# Patient Record
Sex: FEMALE | Race: BLACK OR AFRICAN AMERICAN | NOT HISPANIC OR LATINO | URBAN - METROPOLITAN AREA
[De-identification: names, ages, dates, MRNs, and addresses within clinical notes are randomized per-mention and may not be internally consistent; named-entity substitution may affect disease eponyms.]

---

## 2018-05-04 PROBLEM — Z00.00 ENCOUNTER FOR PREVENTIVE HEALTH EXAMINATION: Status: ACTIVE | Noted: 2018-05-04

## 2022-01-01 ENCOUNTER — INPATIENT (INPATIENT)
Facility: HOSPITAL | Age: 87
LOS: 7 days | Discharge: HOME HEALTH | End: 2022-09-07
Attending: INTERNAL MEDICINE | Admitting: INTERNAL MEDICINE

## 2022-01-01 ENCOUNTER — TRANSCRIPTION ENCOUNTER (OUTPATIENT)
Age: 87
End: 2022-01-01

## 2022-01-01 ENCOUNTER — INPATIENT (INPATIENT)
Facility: HOSPITAL | Age: 87
LOS: 6 days | Discharge: HOME HEALTH | End: 2022-08-15
Attending: INTERNAL MEDICINE | Admitting: INTERNAL MEDICINE

## 2022-01-01 ENCOUNTER — APPOINTMENT (OUTPATIENT)
Dept: PULMONOLOGY | Facility: CLINIC | Age: 87
End: 2022-01-01

## 2022-01-01 VITALS
DIASTOLIC BLOOD PRESSURE: 50 MMHG | HEART RATE: 87 BPM | TEMPERATURE: 98 F | OXYGEN SATURATION: 99 % | SYSTOLIC BLOOD PRESSURE: 108 MMHG | RESPIRATION RATE: 18 BRPM

## 2022-01-01 VITALS
RESPIRATION RATE: 18 BRPM | TEMPERATURE: 98 F | HEIGHT: 57 IN | HEART RATE: 85 BPM | OXYGEN SATURATION: 98 % | DIASTOLIC BLOOD PRESSURE: 62 MMHG | SYSTOLIC BLOOD PRESSURE: 130 MMHG

## 2022-01-01 VITALS
TEMPERATURE: 98.2 F | BODY MASS INDEX: 27.48 KG/M2 | WEIGHT: 140 LBS | SYSTOLIC BLOOD PRESSURE: 110 MMHG | HEART RATE: 96 BPM | HEIGHT: 60 IN | DIASTOLIC BLOOD PRESSURE: 78 MMHG

## 2022-01-01 VITALS
TEMPERATURE: 98 F | HEART RATE: 102 BPM | SYSTOLIC BLOOD PRESSURE: 133 MMHG | OXYGEN SATURATION: 99 % | RESPIRATION RATE: 18 BRPM | DIASTOLIC BLOOD PRESSURE: 68 MMHG

## 2022-01-01 VITALS
TEMPERATURE: 97 F | OXYGEN SATURATION: 100 % | RESPIRATION RATE: 17 BRPM | HEART RATE: 73 BPM | DIASTOLIC BLOOD PRESSURE: 56 MMHG | SYSTOLIC BLOOD PRESSURE: 120 MMHG

## 2022-01-01 DIAGNOSIS — J90 PLEURAL EFFUSION, NOT ELSEWHERE CLASSIFIED: ICD-10-CM

## 2022-01-01 DIAGNOSIS — I26.99 OTHER PULMONARY EMBOLISM WITHOUT ACUTE COR PULMONALE: ICD-10-CM

## 2022-01-01 DIAGNOSIS — I10 ESSENTIAL (PRIMARY) HYPERTENSION: ICD-10-CM

## 2022-01-01 DIAGNOSIS — Z86.69 PERSONAL HISTORY OF OTHER DISEASES OF THE NERVOUS SYSTEM AND SENSE ORGANS: ICD-10-CM

## 2022-01-01 DIAGNOSIS — Z92.89 PERSONAL HISTORY OF OTHER MEDICAL TREATMENT: Chronic | ICD-10-CM

## 2022-01-01 DIAGNOSIS — D72.829 ELEVATED WHITE BLOOD CELL COUNT, UNSPECIFIED: ICD-10-CM

## 2022-01-01 DIAGNOSIS — D69.6 THROMBOCYTOPENIA, UNSPECIFIED: ICD-10-CM

## 2022-01-01 DIAGNOSIS — R63.8 OTHER SYMPTOMS AND SIGNS CONCERNING FOOD AND FLUID INTAKE: ICD-10-CM

## 2022-01-01 DIAGNOSIS — D64.9 ANEMIA, UNSPECIFIED: ICD-10-CM

## 2022-01-01 DIAGNOSIS — E03.9 HYPOTHYROIDISM, UNSPECIFIED: ICD-10-CM

## 2022-01-01 DIAGNOSIS — C54.1 MALIGNANT NEOPLASM OF ENDOMETRIUM: ICD-10-CM

## 2022-01-01 DIAGNOSIS — Z86.79 PERSONAL HISTORY OF OTHER DISEASES OF THE CIRCULATORY SYSTEM: ICD-10-CM

## 2022-01-01 DIAGNOSIS — Z80.8 FAMILY HISTORY OF MALIGNANT NEOPLASM OF OTHER ORGANS OR SYSTEMS: ICD-10-CM

## 2022-01-01 DIAGNOSIS — Z80.1 FAMILY HISTORY OF MALIGNANT NEOPLASM OF TRACHEA, BRONCHUS AND LUNG: ICD-10-CM

## 2022-01-01 DIAGNOSIS — R79.89 OTHER SPECIFIED ABNORMAL FINDINGS OF BLOOD CHEMISTRY: ICD-10-CM

## 2022-01-01 DIAGNOSIS — E78.5 HYPERLIPIDEMIA, UNSPECIFIED: ICD-10-CM

## 2022-01-01 DIAGNOSIS — Z29.9 ENCOUNTER FOR PROPHYLACTIC MEASURES, UNSPECIFIED: ICD-10-CM

## 2022-01-01 DIAGNOSIS — I25.10 ATHEROSCLEROTIC HEART DISEASE OF NATIVE CORONARY ARTERY WITHOUT ANGINA PECTORIS: ICD-10-CM

## 2022-01-01 DIAGNOSIS — M81.0 AGE-RELATED OSTEOPOROSIS W/OUT CURRENT PATHOLOGICAL FRACTURE: ICD-10-CM

## 2022-01-01 DIAGNOSIS — R74.01 ELEVATION OF LEVELS OF LIVER TRANSAMINASE LEVELS: ICD-10-CM

## 2022-01-01 DIAGNOSIS — Z95.2 PRESENCE OF PROSTHETIC HEART VALVE: Chronic | ICD-10-CM

## 2022-01-01 DIAGNOSIS — M19.90 UNSPECIFIED OSTEOARTHRITIS, UNSPECIFIED SITE: ICD-10-CM

## 2022-01-01 DIAGNOSIS — R55 SYNCOPE AND COLLAPSE: ICD-10-CM

## 2022-01-01 DIAGNOSIS — J18.9 PNEUMONIA, UNSPECIFIED ORGANISM: ICD-10-CM

## 2022-01-01 DIAGNOSIS — R82.81 PYURIA: ICD-10-CM

## 2022-01-01 DIAGNOSIS — I26.99 OTHER PULMONARY EMBOLISM W/OUT ACUTE COR PULMONALE: ICD-10-CM

## 2022-01-01 LAB
A1C WITH ESTIMATED AVERAGE GLUCOSE RESULT: 5.7 % — HIGH (ref 4–5.6)
ALBUMIN SERPL ELPH-MCNC: 2.9 G/DL — LOW (ref 3.3–5)
ALBUMIN SERPL ELPH-MCNC: 3 G/DL — LOW (ref 3.3–5)
ALBUMIN SERPL ELPH-MCNC: 3.1 G/DL — LOW (ref 3.3–5)
ALBUMIN SERPL ELPH-MCNC: 3.2 G/DL — LOW (ref 3.3–5)
ALBUMIN SERPL ELPH-MCNC: 3.4 G/DL — SIGNIFICANT CHANGE UP (ref 3.3–5)
ALBUMIN SERPL ELPH-MCNC: 3.8 G/DL — SIGNIFICANT CHANGE UP (ref 3.3–5)
ALP SERPL-CCNC: 157 U/L — HIGH (ref 40–120)
ALP SERPL-CCNC: 167 U/L — HIGH (ref 40–120)
ALP SERPL-CCNC: 170 U/L — HIGH (ref 40–120)
ALP SERPL-CCNC: 173 U/L — HIGH (ref 40–120)
ALP SERPL-CCNC: 191 U/L — HIGH (ref 40–120)
ALP SERPL-CCNC: 195 U/L — HIGH (ref 40–120)
ALP SERPL-CCNC: 216 U/L — HIGH (ref 40–120)
ALP SERPL-CCNC: 233 U/L — HIGH (ref 40–120)
ALP SERPL-CCNC: 245 U/L — HIGH (ref 40–120)
ALP SERPL-CCNC: 90 U/L — SIGNIFICANT CHANGE UP (ref 40–120)
ALP SERPL-CCNC: 95 U/L — SIGNIFICANT CHANGE UP (ref 40–120)
ALP SERPL-CCNC: 97 U/L — SIGNIFICANT CHANGE UP (ref 40–120)
ALT FLD-CCNC: 14 U/L — SIGNIFICANT CHANGE UP (ref 4–33)
ALT FLD-CCNC: 15 U/L — SIGNIFICANT CHANGE UP (ref 4–33)
ALT FLD-CCNC: 18 U/L — SIGNIFICANT CHANGE UP (ref 4–33)
ALT FLD-CCNC: 20 U/L — SIGNIFICANT CHANGE UP (ref 4–33)
ALT FLD-CCNC: 20 U/L — SIGNIFICANT CHANGE UP (ref 4–33)
ALT FLD-CCNC: 22 U/L — SIGNIFICANT CHANGE UP (ref 4–33)
ALT FLD-CCNC: 23 U/L — SIGNIFICANT CHANGE UP (ref 4–33)
ALT FLD-CCNC: 23 U/L — SIGNIFICANT CHANGE UP (ref 4–33)
ALT FLD-CCNC: 24 U/L — SIGNIFICANT CHANGE UP (ref 4–33)
ALT FLD-CCNC: 27 U/L — SIGNIFICANT CHANGE UP (ref 4–33)
ALT FLD-CCNC: 29 U/L — SIGNIFICANT CHANGE UP (ref 4–33)
ALT FLD-CCNC: SIGNIFICANT CHANGE UP U/L (ref 4–33)
ANION GAP SERPL CALC-SCNC: 10 MMOL/L — SIGNIFICANT CHANGE UP (ref 7–14)
ANION GAP SERPL CALC-SCNC: 10 MMOL/L — SIGNIFICANT CHANGE UP (ref 7–14)
ANION GAP SERPL CALC-SCNC: 11 MMOL/L — SIGNIFICANT CHANGE UP (ref 7–14)
ANION GAP SERPL CALC-SCNC: 12 MMOL/L — SIGNIFICANT CHANGE UP (ref 7–14)
ANION GAP SERPL CALC-SCNC: 12 MMOL/L — SIGNIFICANT CHANGE UP (ref 7–14)
ANION GAP SERPL CALC-SCNC: 13 MMOL/L — SIGNIFICANT CHANGE UP (ref 7–14)
ANION GAP SERPL CALC-SCNC: 13 MMOL/L — SIGNIFICANT CHANGE UP (ref 7–14)
ANION GAP SERPL CALC-SCNC: 14 MMOL/L — SIGNIFICANT CHANGE UP (ref 7–14)
ANION GAP SERPL CALC-SCNC: 6 MMOL/L — LOW (ref 7–14)
ANION GAP SERPL CALC-SCNC: 8 MMOL/L — SIGNIFICANT CHANGE UP (ref 7–14)
ANION GAP SERPL CALC-SCNC: 8 MMOL/L — SIGNIFICANT CHANGE UP (ref 7–14)
ANION GAP SERPL CALC-SCNC: 9 MMOL/L — SIGNIFICANT CHANGE UP (ref 7–14)
ANION GAP SERPL CALC-SCNC: 9 MMOL/L — SIGNIFICANT CHANGE UP (ref 7–14)
ANISOCYTOSIS BLD QL: SLIGHT — SIGNIFICANT CHANGE UP
APPEARANCE UR: ABNORMAL
APPEARANCE UR: CLEAR — SIGNIFICANT CHANGE UP
APPEARANCE UR: CLEAR — SIGNIFICANT CHANGE UP
APTT BLD: 174.4 SEC — CRITICAL HIGH (ref 27–36.3)
APTT BLD: 28.7 SEC — SIGNIFICANT CHANGE UP (ref 27–36.3)
APTT BLD: 51.8 SEC — HIGH (ref 27–36.3)
APTT BLD: 54.5 SEC — HIGH (ref 27–36.3)
APTT BLD: 69.7 SEC — HIGH (ref 27–36.3)
APTT BLD: 85.3 SEC — HIGH (ref 27–36.3)
APTT BLD: 88.6 SEC — HIGH (ref 27–36.3)
APTT BLD: >200 SEC — CRITICAL HIGH (ref 27–36.3)
AST SERPL-CCNC: 20 U/L — SIGNIFICANT CHANGE UP (ref 4–32)
AST SERPL-CCNC: 22 U/L — SIGNIFICANT CHANGE UP (ref 4–32)
AST SERPL-CCNC: 22 U/L — SIGNIFICANT CHANGE UP (ref 4–32)
AST SERPL-CCNC: 24 U/L — SIGNIFICANT CHANGE UP (ref 4–32)
AST SERPL-CCNC: 24 U/L — SIGNIFICANT CHANGE UP (ref 4–32)
AST SERPL-CCNC: 25 U/L — SIGNIFICANT CHANGE UP (ref 4–32)
AST SERPL-CCNC: 31 U/L — SIGNIFICANT CHANGE UP (ref 4–32)
AST SERPL-CCNC: 32 U/L — SIGNIFICANT CHANGE UP (ref 4–32)
AST SERPL-CCNC: 32 U/L — SIGNIFICANT CHANGE UP (ref 4–32)
AST SERPL-CCNC: 33 U/L — HIGH (ref 4–32)
AST SERPL-CCNC: 40 U/L — HIGH (ref 4–32)
AST SERPL-CCNC: SIGNIFICANT CHANGE UP U/L (ref 4–32)
B PERT DNA SPEC QL NAA+PROBE: SIGNIFICANT CHANGE UP
B PERT+PARAPERT DNA PNL SPEC NAA+PROBE: SIGNIFICANT CHANGE UP
BACTERIA # UR AUTO: ABNORMAL
BACTERIA # UR AUTO: ABNORMAL
BASE EXCESS BLDV CALC-SCNC: 4 MMOL/L — HIGH (ref -2–3)
BASE EXCESS BLDV CALC-SCNC: 4.1 MMOL/L — HIGH (ref -2–3)
BASOPHILS # BLD AUTO: 0 K/UL — SIGNIFICANT CHANGE UP (ref 0–0.2)
BASOPHILS # BLD AUTO: 0.03 K/UL — SIGNIFICANT CHANGE UP (ref 0–0.2)
BASOPHILS # BLD AUTO: 0.13 K/UL — SIGNIFICANT CHANGE UP (ref 0–0.2)
BASOPHILS # BLD AUTO: 0.18 K/UL — SIGNIFICANT CHANGE UP (ref 0–0.2)
BASOPHILS NFR BLD AUTO: 0 % — SIGNIFICANT CHANGE UP (ref 0–2)
BASOPHILS NFR BLD AUTO: 0.2 % — SIGNIFICANT CHANGE UP (ref 0–2)
BASOPHILS NFR BLD AUTO: 0.4 % — SIGNIFICANT CHANGE UP (ref 0–2)
BASOPHILS NFR BLD AUTO: 0.5 % — SIGNIFICANT CHANGE UP (ref 0–2)
BILIRUB DIRECT SERPL-MCNC: <0.2 MG/DL — SIGNIFICANT CHANGE UP (ref 0–0.3)
BILIRUB INDIRECT FLD-MCNC: >0 MG/DL — SIGNIFICANT CHANGE UP (ref 0–1)
BILIRUB INDIRECT FLD-MCNC: >0.1 MG/DL — SIGNIFICANT CHANGE UP (ref 0–1)
BILIRUB INDIRECT FLD-MCNC: >0.1 MG/DL — SIGNIFICANT CHANGE UP (ref 0–1)
BILIRUB SERPL-MCNC: 0.2 MG/DL — SIGNIFICANT CHANGE UP (ref 0.2–1.2)
BILIRUB SERPL-MCNC: 0.3 MG/DL — SIGNIFICANT CHANGE UP (ref 0.2–1.2)
BILIRUB SERPL-MCNC: 0.3 MG/DL — SIGNIFICANT CHANGE UP (ref 0.2–1.2)
BILIRUB SERPL-MCNC: 0.4 MG/DL — SIGNIFICANT CHANGE UP (ref 0.2–1.2)
BILIRUB SERPL-MCNC: 0.4 MG/DL — SIGNIFICANT CHANGE UP (ref 0.2–1.2)
BILIRUB SERPL-MCNC: 0.7 MG/DL — SIGNIFICANT CHANGE UP (ref 0.2–1.2)
BILIRUB SERPL-MCNC: 0.7 MG/DL — SIGNIFICANT CHANGE UP (ref 0.2–1.2)
BILIRUB SERPL-MCNC: 0.8 MG/DL — SIGNIFICANT CHANGE UP (ref 0.2–1.2)
BILIRUB SERPL-MCNC: 0.9 MG/DL — SIGNIFICANT CHANGE UP (ref 0.2–1.2)
BILIRUB UR-MCNC: NEGATIVE — SIGNIFICANT CHANGE UP
BLD GP AB SCN SERPL QL: NEGATIVE — SIGNIFICANT CHANGE UP
BLD GP AB SCN SERPL QL: NEGATIVE — SIGNIFICANT CHANGE UP
BLOOD GAS VENOUS COMPREHENSIVE RESULT: SIGNIFICANT CHANGE UP
BLOOD GAS VENOUS COMPREHENSIVE RESULT: SIGNIFICANT CHANGE UP
BORDETELLA PARAPERTUSSIS (RAPRVP): SIGNIFICANT CHANGE UP
BUN SERPL-MCNC: 10 MG/DL — SIGNIFICANT CHANGE UP (ref 7–23)
BUN SERPL-MCNC: 11 MG/DL — SIGNIFICANT CHANGE UP (ref 7–23)
BUN SERPL-MCNC: 15 MG/DL — SIGNIFICANT CHANGE UP (ref 7–23)
BUN SERPL-MCNC: 17 MG/DL — SIGNIFICANT CHANGE UP (ref 7–23)
BUN SERPL-MCNC: 22 MG/DL — SIGNIFICANT CHANGE UP (ref 7–23)
BUN SERPL-MCNC: 5 MG/DL — LOW (ref 7–23)
BUN SERPL-MCNC: 6 MG/DL — LOW (ref 7–23)
BUN SERPL-MCNC: 7 MG/DL — SIGNIFICANT CHANGE UP (ref 7–23)
BUN SERPL-MCNC: 8 MG/DL — SIGNIFICANT CHANGE UP (ref 7–23)
BUN SERPL-MCNC: 9 MG/DL — SIGNIFICANT CHANGE UP (ref 7–23)
BUN SERPL-MCNC: 9 MG/DL — SIGNIFICANT CHANGE UP (ref 7–23)
BURR CELLS BLD QL SMEAR: PRESENT — SIGNIFICANT CHANGE UP
C PNEUM DNA SPEC QL NAA+PROBE: SIGNIFICANT CHANGE UP
CALCIUM SERPL-MCNC: 7.9 MG/DL — LOW (ref 8.4–10.5)
CALCIUM SERPL-MCNC: 8.1 MG/DL — LOW (ref 8.4–10.5)
CALCIUM SERPL-MCNC: 8.2 MG/DL — LOW (ref 8.4–10.5)
CALCIUM SERPL-MCNC: 8.3 MG/DL — LOW (ref 8.4–10.5)
CALCIUM SERPL-MCNC: 8.4 MG/DL — SIGNIFICANT CHANGE UP (ref 8.4–10.5)
CALCIUM SERPL-MCNC: 8.4 MG/DL — SIGNIFICANT CHANGE UP (ref 8.4–10.5)
CALCIUM SERPL-MCNC: 8.5 MG/DL — SIGNIFICANT CHANGE UP (ref 8.4–10.5)
CALCIUM SERPL-MCNC: 8.6 MG/DL — SIGNIFICANT CHANGE UP (ref 8.4–10.5)
CALCIUM SERPL-MCNC: 8.8 MG/DL — SIGNIFICANT CHANGE UP (ref 8.4–10.5)
CALCIUM SERPL-MCNC: 9 MG/DL — SIGNIFICANT CHANGE UP (ref 8.4–10.5)
CALCIUM SERPL-MCNC: 9 MG/DL — SIGNIFICANT CHANGE UP (ref 8.4–10.5)
CHLORIDE BLDV-SCNC: 105 MMOL/L — SIGNIFICANT CHANGE UP (ref 96–108)
CHLORIDE BLDV-SCNC: 99 MMOL/L — SIGNIFICANT CHANGE UP (ref 96–108)
CHLORIDE SERPL-SCNC: 100 MMOL/L — SIGNIFICANT CHANGE UP (ref 98–107)
CHLORIDE SERPL-SCNC: 100 MMOL/L — SIGNIFICANT CHANGE UP (ref 98–107)
CHLORIDE SERPL-SCNC: 101 MMOL/L — SIGNIFICANT CHANGE UP (ref 98–107)
CHLORIDE SERPL-SCNC: 102 MMOL/L — SIGNIFICANT CHANGE UP (ref 98–107)
CHLORIDE SERPL-SCNC: 102 MMOL/L — SIGNIFICANT CHANGE UP (ref 98–107)
CHLORIDE SERPL-SCNC: 103 MMOL/L — SIGNIFICANT CHANGE UP (ref 98–107)
CHLORIDE SERPL-SCNC: 104 MMOL/L — SIGNIFICANT CHANGE UP (ref 98–107)
CHLORIDE SERPL-SCNC: 95 MMOL/L — LOW (ref 98–107)
CHLORIDE SERPL-SCNC: 96 MMOL/L — LOW (ref 98–107)
CHLORIDE SERPL-SCNC: 97 MMOL/L — LOW (ref 98–107)
CHLORIDE SERPL-SCNC: 98 MMOL/L — SIGNIFICANT CHANGE UP (ref 98–107)
CHLORIDE SERPL-SCNC: 98 MMOL/L — SIGNIFICANT CHANGE UP (ref 98–107)
CHLORIDE SERPL-SCNC: 99 MMOL/L — SIGNIFICANT CHANGE UP (ref 98–107)
CK MB BLD-MCNC: 3.7 % — HIGH (ref 0–2.5)
CK MB CFR SERPL CALC: 2.3 NG/ML — SIGNIFICANT CHANGE UP
CK SERPL-CCNC: 63 U/L — SIGNIFICANT CHANGE UP (ref 25–170)
CO2 BLDV-SCNC: 30.6 MMOL/L — HIGH (ref 22–26)
CO2 BLDV-SCNC: 32.2 MMOL/L — HIGH (ref 22–26)
CO2 SERPL-SCNC: 23 MMOL/L — SIGNIFICANT CHANGE UP (ref 22–31)
CO2 SERPL-SCNC: 24 MMOL/L — SIGNIFICANT CHANGE UP (ref 22–31)
CO2 SERPL-SCNC: 25 MMOL/L — SIGNIFICANT CHANGE UP (ref 22–31)
CO2 SERPL-SCNC: 26 MMOL/L — SIGNIFICANT CHANGE UP (ref 22–31)
CO2 SERPL-SCNC: 27 MMOL/L — SIGNIFICANT CHANGE UP (ref 22–31)
CO2 SERPL-SCNC: 28 MMOL/L — SIGNIFICANT CHANGE UP (ref 22–31)
CO2 SERPL-SCNC: 29 MMOL/L — SIGNIFICANT CHANGE UP (ref 22–31)
CO2 SERPL-SCNC: 29 MMOL/L — SIGNIFICANT CHANGE UP (ref 22–31)
CO2 SERPL-SCNC: 31 MMOL/L — SIGNIFICANT CHANGE UP (ref 22–31)
CO2 SERPL-SCNC: 31 MMOL/L — SIGNIFICANT CHANGE UP (ref 22–31)
CO2 SERPL-SCNC: 32 MMOL/L — HIGH (ref 22–31)
COLOR SPEC: YELLOW — SIGNIFICANT CHANGE UP
CREAT SERPL-MCNC: 0.37 MG/DL — LOW (ref 0.5–1.3)
CREAT SERPL-MCNC: 0.43 MG/DL — LOW (ref 0.5–1.3)
CREAT SERPL-MCNC: 0.43 MG/DL — LOW (ref 0.5–1.3)
CREAT SERPL-MCNC: 0.51 MG/DL — SIGNIFICANT CHANGE UP (ref 0.5–1.3)
CREAT SERPL-MCNC: 0.51 MG/DL — SIGNIFICANT CHANGE UP (ref 0.5–1.3)
CREAT SERPL-MCNC: 0.52 MG/DL — SIGNIFICANT CHANGE UP (ref 0.5–1.3)
CREAT SERPL-MCNC: 0.53 MG/DL — SIGNIFICANT CHANGE UP (ref 0.5–1.3)
CREAT SERPL-MCNC: 0.55 MG/DL — SIGNIFICANT CHANGE UP (ref 0.5–1.3)
CREAT SERPL-MCNC: 0.55 MG/DL — SIGNIFICANT CHANGE UP (ref 0.5–1.3)
CREAT SERPL-MCNC: 0.58 MG/DL — SIGNIFICANT CHANGE UP (ref 0.5–1.3)
CREAT SERPL-MCNC: 0.58 MG/DL — SIGNIFICANT CHANGE UP (ref 0.5–1.3)
CREAT SERPL-MCNC: 0.59 MG/DL — SIGNIFICANT CHANGE UP (ref 0.5–1.3)
CREAT SERPL-MCNC: 0.59 MG/DL — SIGNIFICANT CHANGE UP (ref 0.5–1.3)
CREAT SERPL-MCNC: 0.6 MG/DL — SIGNIFICANT CHANGE UP (ref 0.5–1.3)
CREAT SERPL-MCNC: 0.61 MG/DL — SIGNIFICANT CHANGE UP (ref 0.5–1.3)
CREAT SERPL-MCNC: 0.64 MG/DL — SIGNIFICANT CHANGE UP (ref 0.5–1.3)
CREAT SERPL-MCNC: 0.68 MG/DL — SIGNIFICANT CHANGE UP (ref 0.5–1.3)
CREAT SERPL-MCNC: 0.68 MG/DL — SIGNIFICANT CHANGE UP (ref 0.5–1.3)
CULTURE RESULTS: NO GROWTH — SIGNIFICANT CHANGE UP
CULTURE RESULTS: SIGNIFICANT CHANGE UP
D DIMER BLD IA.RAPID-MCNC: 2314 NG/ML DDU — HIGH
DIFF PNL FLD: ABNORMAL
EGFR: 85 ML/MIN/1.73M2 — SIGNIFICANT CHANGE UP
EGFR: 85 ML/MIN/1.73M2 — SIGNIFICANT CHANGE UP
EGFR: 87 ML/MIN/1.73M2 — SIGNIFICANT CHANGE UP
EGFR: 88 ML/MIN/1.73M2 — SIGNIFICANT CHANGE UP
EGFR: 89 ML/MIN/1.73M2 — SIGNIFICANT CHANGE UP
EGFR: 89 ML/MIN/1.73M2 — SIGNIFICANT CHANGE UP
EGFR: 90 ML/MIN/1.73M2 — SIGNIFICANT CHANGE UP
EGFR: 90 ML/MIN/1.73M2 — SIGNIFICANT CHANGE UP
EGFR: 91 ML/MIN/1.73M2 — SIGNIFICANT CHANGE UP
EGFR: 95 ML/MIN/1.73M2 — SIGNIFICANT CHANGE UP
EGFR: 95 ML/MIN/1.73M2 — SIGNIFICANT CHANGE UP
EGFR: 99 ML/MIN/1.73M2 — SIGNIFICANT CHANGE UP
EOSINOPHIL # BLD AUTO: 0 K/UL — SIGNIFICANT CHANGE UP (ref 0–0.5)
EOSINOPHIL # BLD AUTO: 0 K/UL — SIGNIFICANT CHANGE UP (ref 0–0.5)
EOSINOPHIL # BLD AUTO: 0.33 K/UL — SIGNIFICANT CHANGE UP (ref 0–0.5)
EOSINOPHIL # BLD AUTO: 0.37 K/UL — SIGNIFICANT CHANGE UP (ref 0–0.5)
EOSINOPHIL # BLD AUTO: 0.38 K/UL — SIGNIFICANT CHANGE UP (ref 0–0.5)
EOSINOPHIL # BLD AUTO: 0.47 K/UL — SIGNIFICANT CHANGE UP (ref 0–0.5)
EOSINOPHIL # BLD AUTO: 0.7 K/UL — HIGH (ref 0–0.5)
EOSINOPHIL NFR BLD AUTO: 0 % — SIGNIFICANT CHANGE UP (ref 0–6)
EOSINOPHIL NFR BLD AUTO: 0 % — SIGNIFICANT CHANGE UP (ref 0–6)
EOSINOPHIL NFR BLD AUTO: 0.9 % — SIGNIFICANT CHANGE UP (ref 0–6)
EOSINOPHIL NFR BLD AUTO: 0.9 % — SIGNIFICANT CHANGE UP (ref 0–6)
EOSINOPHIL NFR BLD AUTO: 1 % — SIGNIFICANT CHANGE UP (ref 0–6)
EOSINOPHIL NFR BLD AUTO: 3.4 % — SIGNIFICANT CHANGE UP (ref 0–6)
EOSINOPHIL NFR BLD AUTO: 4.2 % — SIGNIFICANT CHANGE UP (ref 0–6)
EPI CELLS # UR: 20 /HPF — HIGH (ref 0–5)
EPI CELLS # UR: 5 /HPF — SIGNIFICANT CHANGE UP (ref 0–5)
ESTIMATED AVERAGE GLUCOSE: 117 — SIGNIFICANT CHANGE UP
FLUAV AG NPH QL: SIGNIFICANT CHANGE UP
FLUAV SUBTYP SPEC NAA+PROBE: SIGNIFICANT CHANGE UP
FLUBV AG NPH QL: SIGNIFICANT CHANGE UP
FLUBV RNA SPEC QL NAA+PROBE: SIGNIFICANT CHANGE UP
GAS PNL BLDV: 133 MMOL/L — LOW (ref 136–145)
GAS PNL BLDV: 136 MMOL/L — SIGNIFICANT CHANGE UP (ref 136–145)
GLUCOSE BLDV-MCNC: 136 MG/DL — HIGH (ref 70–99)
GLUCOSE BLDV-MCNC: 92 MG/DL — SIGNIFICANT CHANGE UP (ref 70–99)
GLUCOSE SERPL-MCNC: 103 MG/DL — HIGH (ref 70–99)
GLUCOSE SERPL-MCNC: 106 MG/DL — HIGH (ref 70–99)
GLUCOSE SERPL-MCNC: 107 MG/DL — HIGH (ref 70–99)
GLUCOSE SERPL-MCNC: 137 MG/DL — HIGH (ref 70–99)
GLUCOSE SERPL-MCNC: 142 MG/DL — HIGH (ref 70–99)
GLUCOSE SERPL-MCNC: 391 MG/DL — HIGH (ref 70–99)
GLUCOSE SERPL-MCNC: 68 MG/DL — LOW (ref 70–99)
GLUCOSE SERPL-MCNC: 71 MG/DL — SIGNIFICANT CHANGE UP (ref 70–99)
GLUCOSE SERPL-MCNC: 73 MG/DL — SIGNIFICANT CHANGE UP (ref 70–99)
GLUCOSE SERPL-MCNC: 75 MG/DL — SIGNIFICANT CHANGE UP (ref 70–99)
GLUCOSE SERPL-MCNC: 82 MG/DL — SIGNIFICANT CHANGE UP (ref 70–99)
GLUCOSE SERPL-MCNC: 84 MG/DL — SIGNIFICANT CHANGE UP (ref 70–99)
GLUCOSE SERPL-MCNC: 85 MG/DL — SIGNIFICANT CHANGE UP (ref 70–99)
GLUCOSE SERPL-MCNC: 86 MG/DL — SIGNIFICANT CHANGE UP (ref 70–99)
GLUCOSE SERPL-MCNC: 91 MG/DL — SIGNIFICANT CHANGE UP (ref 70–99)
GLUCOSE SERPL-MCNC: 99 MG/DL — SIGNIFICANT CHANGE UP (ref 70–99)
GLUCOSE UR QL: NEGATIVE — SIGNIFICANT CHANGE UP
HADV DNA SPEC QL NAA+PROBE: SIGNIFICANT CHANGE UP
HCO3 BLDV-SCNC: 29 MMOL/L — SIGNIFICANT CHANGE UP (ref 22–29)
HCO3 BLDV-SCNC: 31 MMOL/L — HIGH (ref 22–29)
HCOV 229E RNA SPEC QL NAA+PROBE: SIGNIFICANT CHANGE UP
HCOV HKU1 RNA SPEC QL NAA+PROBE: SIGNIFICANT CHANGE UP
HCOV NL63 RNA SPEC QL NAA+PROBE: SIGNIFICANT CHANGE UP
HCOV OC43 RNA SPEC QL NAA+PROBE: SIGNIFICANT CHANGE UP
HCT VFR BLD CALC: 25.9 % — LOW (ref 34.5–45)
HCT VFR BLD CALC: 29.3 % — LOW (ref 34.5–45)
HCT VFR BLD CALC: 30.4 % — LOW (ref 34.5–45)
HCT VFR BLD CALC: 30.4 % — LOW (ref 34.5–45)
HCT VFR BLD CALC: 30.8 % — LOW (ref 34.5–45)
HCT VFR BLD CALC: 31.2 % — LOW (ref 34.5–45)
HCT VFR BLD CALC: 31.2 % — LOW (ref 34.5–45)
HCT VFR BLD CALC: 31.3 % — LOW (ref 34.5–45)
HCT VFR BLD CALC: 31.6 % — LOW (ref 34.5–45)
HCT VFR BLD CALC: 32.7 % — LOW (ref 34.5–45)
HCT VFR BLD CALC: 32.9 % — LOW (ref 34.5–45)
HCT VFR BLD CALC: 33.1 % — LOW (ref 34.5–45)
HCT VFR BLD CALC: 34.4 % — LOW (ref 34.5–45)
HCT VFR BLD CALC: 36.5 % — SIGNIFICANT CHANGE UP (ref 34.5–45)
HCT VFR BLD CALC: 37.7 % — SIGNIFICANT CHANGE UP (ref 34.5–45)
HCT VFR BLD CALC: 38.7 % — SIGNIFICANT CHANGE UP (ref 34.5–45)
HCT VFR BLD CALC: 38.9 % — SIGNIFICANT CHANGE UP (ref 34.5–45)
HCT VFR BLD CALC: 39.6 % — SIGNIFICANT CHANGE UP (ref 34.5–45)
HCT VFR BLD CALC: 39.6 % — SIGNIFICANT CHANGE UP (ref 34.5–45)
HCT VFR BLD CALC: 40 % — SIGNIFICANT CHANGE UP (ref 34.5–45)
HCT VFR BLDA CALC: 34 % — LOW (ref 34.5–46.5)
HCT VFR BLDA CALC: 41 % — SIGNIFICANT CHANGE UP (ref 34.5–46.5)
HGB BLD CALC-MCNC: 11.2 G/DL — LOW (ref 11.5–15.5)
HGB BLD CALC-MCNC: 13.5 G/DL — SIGNIFICANT CHANGE UP (ref 11.5–15.5)
HGB BLD-MCNC: 10 G/DL — LOW (ref 11.5–15.5)
HGB BLD-MCNC: 10.3 G/DL — LOW (ref 11.5–15.5)
HGB BLD-MCNC: 10.3 G/DL — LOW (ref 11.5–15.5)
HGB BLD-MCNC: 10.4 G/DL — LOW (ref 11.5–15.5)
HGB BLD-MCNC: 10.4 G/DL — LOW (ref 11.5–15.5)
HGB BLD-MCNC: 10.8 G/DL — LOW (ref 11.5–15.5)
HGB BLD-MCNC: 10.8 G/DL — LOW (ref 11.5–15.5)
HGB BLD-MCNC: 10.9 G/DL — LOW (ref 11.5–15.5)
HGB BLD-MCNC: 11.8 G/DL — SIGNIFICANT CHANGE UP (ref 11.5–15.5)
HGB BLD-MCNC: 12.3 G/DL — SIGNIFICANT CHANGE UP (ref 11.5–15.5)
HGB BLD-MCNC: 12.5 G/DL — SIGNIFICANT CHANGE UP (ref 11.5–15.5)
HGB BLD-MCNC: 12.5 G/DL — SIGNIFICANT CHANGE UP (ref 11.5–15.5)
HGB BLD-MCNC: 12.6 G/DL — SIGNIFICANT CHANGE UP (ref 11.5–15.5)
HGB BLD-MCNC: 12.7 G/DL — SIGNIFICANT CHANGE UP (ref 11.5–15.5)
HGB BLD-MCNC: 12.9 G/DL — SIGNIFICANT CHANGE UP (ref 11.5–15.5)
HGB BLD-MCNC: 13.1 G/DL — SIGNIFICANT CHANGE UP (ref 11.5–15.5)
HGB BLD-MCNC: 9.4 G/DL — LOW (ref 11.5–15.5)
HGB BLD-MCNC: 9.7 G/DL — LOW (ref 11.5–15.5)
HGB BLD-MCNC: 9.7 G/DL — LOW (ref 11.5–15.5)
HGB BLD-MCNC: 9.8 G/DL — LOW (ref 11.5–15.5)
HMPV RNA SPEC QL NAA+PROBE: SIGNIFICANT CHANGE UP
HPIV1 RNA SPEC QL NAA+PROBE: SIGNIFICANT CHANGE UP
HPIV2 RNA SPEC QL NAA+PROBE: SIGNIFICANT CHANGE UP
HPIV3 RNA SPEC QL NAA+PROBE: SIGNIFICANT CHANGE UP
HPIV4 RNA SPEC QL NAA+PROBE: SIGNIFICANT CHANGE UP
HYALINE CASTS # UR AUTO: 1 /LPF — SIGNIFICANT CHANGE UP (ref 0–7)
HYALINE CASTS # UR AUTO: 4 /LPF — SIGNIFICANT CHANGE UP (ref 0–7)
IANC: 11.63 K/UL — HIGH (ref 1.8–7.4)
IANC: 14.08 K/UL — HIGH (ref 1.8–7.4)
IANC: 24.13 K/UL — HIGH (ref 1.8–7.4)
IANC: 30.54 K/UL — HIGH (ref 1.8–7.4)
IANC: 31.47 K/UL — HIGH (ref 1.8–7.4)
IANC: 35.71 K/UL — HIGH (ref 1.8–7.4)
IANC: 52.11 K/UL — HIGH (ref 1.8–7.4)
IMM GRANULOCYTES NFR BLD AUTO: 0.8 % — SIGNIFICANT CHANGE UP (ref 0–1.5)
IMM GRANULOCYTES NFR BLD AUTO: 3.3 % — HIGH (ref 0–1.5)
IMM GRANULOCYTES NFR BLD AUTO: 3.8 % — HIGH (ref 0–1.5)
INR BLD: 1.2 RATIO — HIGH (ref 0.88–1.16)
KETONES UR-MCNC: NEGATIVE — SIGNIFICANT CHANGE UP
LACTATE BLDV-MCNC: 1.4 MMOL/L — SIGNIFICANT CHANGE UP (ref 0.5–2)
LACTATE BLDV-MCNC: 2.3 MMOL/L — HIGH (ref 0.5–2)
LACTATE SERPL-SCNC: 1.8 MMOL/L — SIGNIFICANT CHANGE UP (ref 0.5–2)
LEUKOCYTE ESTERASE UR-ACNC: ABNORMAL
LEUKOCYTE ESTERASE UR-ACNC: NEGATIVE — SIGNIFICANT CHANGE UP
LEUKOCYTE ESTERASE UR-ACNC: NEGATIVE — SIGNIFICANT CHANGE UP
LYMPHOCYTES # BLD AUTO: 0 % — LOW (ref 13–44)
LYMPHOCYTES # BLD AUTO: 0 K/UL — LOW (ref 1–3.3)
LYMPHOCYTES # BLD AUTO: 0.12 K/UL — LOW (ref 1–3.3)
LYMPHOCYTES # BLD AUTO: 0.5 K/UL — LOW (ref 1–3.3)
LYMPHOCYTES # BLD AUTO: 0.84 K/UL — LOW (ref 1–3.3)
LYMPHOCYTES # BLD AUTO: 0.9 % — LOW (ref 13–44)
LYMPHOCYTES # BLD AUTO: 1.7 % — LOW (ref 13–44)
LYMPHOCYTES # BLD AUTO: 1.78 K/UL — SIGNIFICANT CHANGE UP (ref 1–3.3)
LYMPHOCYTES # BLD AUTO: 1.8 K/UL — SIGNIFICANT CHANGE UP (ref 1–3.3)
LYMPHOCYTES # BLD AUTO: 1.94 K/UL — SIGNIFICANT CHANGE UP (ref 1–3.3)
LYMPHOCYTES # BLD AUTO: 4.3 % — LOW (ref 13–44)
LYMPHOCYTES # BLD AUTO: 4.9 % — LOW (ref 13–44)
LYMPHOCYTES # BLD AUTO: 5 % — LOW (ref 13–44)
LYMPHOCYTES # BLD AUTO: 5.4 % — LOW (ref 13–44)
M PNEUMO DNA SPEC QL NAA+PROBE: SIGNIFICANT CHANGE UP
MACROCYTES BLD QL: SLIGHT — SIGNIFICANT CHANGE UP
MACROCYTES BLD QL: SLIGHT — SIGNIFICANT CHANGE UP
MAGNESIUM SERPL-MCNC: 1.5 MG/DL — LOW (ref 1.6–2.6)
MAGNESIUM SERPL-MCNC: 1.6 MG/DL — SIGNIFICANT CHANGE UP (ref 1.6–2.6)
MAGNESIUM SERPL-MCNC: 1.7 MG/DL — SIGNIFICANT CHANGE UP (ref 1.6–2.6)
MAGNESIUM SERPL-MCNC: 1.7 MG/DL — SIGNIFICANT CHANGE UP (ref 1.6–2.6)
MAGNESIUM SERPL-MCNC: 1.8 MG/DL — SIGNIFICANT CHANGE UP (ref 1.6–2.6)
MAGNESIUM SERPL-MCNC: 2 MG/DL — SIGNIFICANT CHANGE UP (ref 1.6–2.6)
MANUAL SMEAR VERIFICATION: SIGNIFICANT CHANGE UP
MCHC RBC-ENTMCNC: 28.8 PG — SIGNIFICANT CHANGE UP (ref 27–34)
MCHC RBC-ENTMCNC: 29 PG — SIGNIFICANT CHANGE UP (ref 27–34)
MCHC RBC-ENTMCNC: 29 PG — SIGNIFICANT CHANGE UP (ref 27–34)
MCHC RBC-ENTMCNC: 29.1 PG — SIGNIFICANT CHANGE UP (ref 27–34)
MCHC RBC-ENTMCNC: 29.3 PG — SIGNIFICANT CHANGE UP (ref 27–34)
MCHC RBC-ENTMCNC: 29.4 PG — SIGNIFICANT CHANGE UP (ref 27–34)
MCHC RBC-ENTMCNC: 29.4 PG — SIGNIFICANT CHANGE UP (ref 27–34)
MCHC RBC-ENTMCNC: 29.5 PG — SIGNIFICANT CHANGE UP (ref 27–34)
MCHC RBC-ENTMCNC: 29.6 PG — SIGNIFICANT CHANGE UP (ref 27–34)
MCHC RBC-ENTMCNC: 29.7 PG — SIGNIFICANT CHANGE UP (ref 27–34)
MCHC RBC-ENTMCNC: 29.9 PG — SIGNIFICANT CHANGE UP (ref 27–34)
MCHC RBC-ENTMCNC: 30.3 PG — SIGNIFICANT CHANGE UP (ref 27–34)
MCHC RBC-ENTMCNC: 30.6 PG — SIGNIFICANT CHANGE UP (ref 27–34)
MCHC RBC-ENTMCNC: 31.1 GM/DL — LOW (ref 32–36)
MCHC RBC-ENTMCNC: 31.5 PG — SIGNIFICANT CHANGE UP (ref 27–34)
MCHC RBC-ENTMCNC: 31.9 GM/DL — LOW (ref 32–36)
MCHC RBC-ENTMCNC: 32.1 GM/DL — SIGNIFICANT CHANGE UP (ref 32–36)
MCHC RBC-ENTMCNC: 32.1 GM/DL — SIGNIFICANT CHANGE UP (ref 32–36)
MCHC RBC-ENTMCNC: 32.3 GM/DL — SIGNIFICANT CHANGE UP (ref 32–36)
MCHC RBC-ENTMCNC: 32.3 GM/DL — SIGNIFICANT CHANGE UP (ref 32–36)
MCHC RBC-ENTMCNC: 32.6 GM/DL — SIGNIFICANT CHANGE UP (ref 32–36)
MCHC RBC-ENTMCNC: 32.8 GM/DL — SIGNIFICANT CHANGE UP (ref 32–36)
MCHC RBC-ENTMCNC: 32.9 GM/DL — SIGNIFICANT CHANGE UP (ref 32–36)
MCHC RBC-ENTMCNC: 33 GM/DL — SIGNIFICANT CHANGE UP (ref 32–36)
MCHC RBC-ENTMCNC: 33 GM/DL — SIGNIFICANT CHANGE UP (ref 32–36)
MCHC RBC-ENTMCNC: 33.1 GM/DL — SIGNIFICANT CHANGE UP (ref 32–36)
MCHC RBC-ENTMCNC: 33.4 GM/DL — SIGNIFICANT CHANGE UP (ref 32–36)
MCHC RBC-ENTMCNC: 33.8 GM/DL — SIGNIFICANT CHANGE UP (ref 32–36)
MCHC RBC-ENTMCNC: 34.1 PG — HIGH (ref 27–34)
MCHC RBC-ENTMCNC: 34.1 PG — HIGH (ref 27–34)
MCHC RBC-ENTMCNC: 36.3 GM/DL — HIGH (ref 32–36)
MCHC RBC-ENTMCNC: 36.6 GM/DL — HIGH (ref 32–36)
MCV RBC AUTO: 89.6 FL — SIGNIFICANT CHANGE UP (ref 80–100)
MCV RBC AUTO: 89.7 FL — SIGNIFICANT CHANGE UP (ref 80–100)
MCV RBC AUTO: 89.8 FL — SIGNIFICANT CHANGE UP (ref 80–100)
MCV RBC AUTO: 89.8 FL — SIGNIFICANT CHANGE UP (ref 80–100)
MCV RBC AUTO: 89.9 FL — SIGNIFICANT CHANGE UP (ref 80–100)
MCV RBC AUTO: 90.1 FL — SIGNIFICANT CHANGE UP (ref 80–100)
MCV RBC AUTO: 90.2 FL — SIGNIFICANT CHANGE UP (ref 80–100)
MCV RBC AUTO: 90.6 FL — SIGNIFICANT CHANGE UP (ref 80–100)
MCV RBC AUTO: 90.7 FL — SIGNIFICANT CHANGE UP (ref 80–100)
MCV RBC AUTO: 90.7 FL — SIGNIFICANT CHANGE UP (ref 80–100)
MCV RBC AUTO: 90.8 FL — SIGNIFICANT CHANGE UP (ref 80–100)
MCV RBC AUTO: 91 FL — SIGNIFICANT CHANGE UP (ref 80–100)
MCV RBC AUTO: 91.5 FL — SIGNIFICANT CHANGE UP (ref 80–100)
MCV RBC AUTO: 91.6 FL — SIGNIFICANT CHANGE UP (ref 80–100)
MCV RBC AUTO: 91.6 FL — SIGNIFICANT CHANGE UP (ref 80–100)
MCV RBC AUTO: 91.7 FL — SIGNIFICANT CHANGE UP (ref 80–100)
MCV RBC AUTO: 93.1 FL — SIGNIFICANT CHANGE UP (ref 80–100)
MCV RBC AUTO: 93.2 FL — SIGNIFICANT CHANGE UP (ref 80–100)
MCV RBC AUTO: 93.3 FL — SIGNIFICANT CHANGE UP (ref 80–100)
MCV RBC AUTO: 93.8 FL — SIGNIFICANT CHANGE UP (ref 80–100)
METAMYELOCYTES # FLD: 0.9 % — SIGNIFICANT CHANGE UP (ref 0–1)
MONOCYTES # BLD AUTO: 0 K/UL — SIGNIFICANT CHANGE UP (ref 0–0.9)
MONOCYTES # BLD AUTO: 0.71 K/UL — SIGNIFICANT CHANGE UP (ref 0–0.9)
MONOCYTES # BLD AUTO: 0.88 K/UL — SIGNIFICANT CHANGE UP (ref 0–0.9)
MONOCYTES # BLD AUTO: 1.6 K/UL — HIGH (ref 0–0.9)
MONOCYTES # BLD AUTO: 1.68 K/UL — HIGH (ref 0–0.9)
MONOCYTES NFR BLD AUTO: 0 % — LOW (ref 2–14)
MONOCYTES NFR BLD AUTO: 1.7 % — LOW (ref 2–14)
MONOCYTES NFR BLD AUTO: 4.4 % — SIGNIFICANT CHANGE UP (ref 2–14)
MONOCYTES NFR BLD AUTO: 4.7 % — SIGNIFICANT CHANGE UP (ref 2–14)
MONOCYTES NFR BLD AUTO: 5.3 % — SIGNIFICANT CHANGE UP (ref 2–14)
MRSA PCR RESULT.: SIGNIFICANT CHANGE UP
MYELOCYTES NFR BLD: 0.9 % — HIGH (ref 0–0)
NEUTROPHILS # BLD AUTO: 13.17 K/UL — HIGH (ref 1.8–7.4)
NEUTROPHILS # BLD AUTO: 14.08 K/UL — HIGH (ref 1.8–7.4)
NEUTROPHILS # BLD AUTO: 27.81 K/UL — HIGH (ref 1.8–7.4)
NEUTROPHILS # BLD AUTO: 30.54 K/UL — HIGH (ref 1.8–7.4)
NEUTROPHILS # BLD AUTO: 31.47 K/UL — HIGH (ref 1.8–7.4)
NEUTROPHILS # BLD AUTO: 38.95 K/UL — HIGH (ref 1.8–7.4)
NEUTROPHILS # BLD AUTO: 59.87 K/UL — HIGH (ref 1.8–7.4)
NEUTROPHILS NFR BLD AUTO: 84.5 % — HIGH (ref 43–77)
NEUTROPHILS NFR BLD AUTO: 84.7 % — HIGH (ref 43–77)
NEUTROPHILS NFR BLD AUTO: 86 % — HIGH (ref 43–77)
NEUTROPHILS NFR BLD AUTO: 86.1 % — HIGH (ref 43–77)
NEUTROPHILS NFR BLD AUTO: 87.1 % — HIGH (ref 43–77)
NEUTROPHILS NFR BLD AUTO: 95.7 % — HIGH (ref 43–77)
NEUTROPHILS NFR BLD AUTO: 98.3 % — HIGH (ref 43–77)
NEUTS BAND # BLD: 1.7 % — SIGNIFICANT CHANGE UP (ref 0–6)
NEUTS BAND # BLD: 8.7 % — HIGH (ref 0–6)
NITRITE UR-MCNC: NEGATIVE — SIGNIFICANT CHANGE UP
NRBC # BLD: 0 /100 WBCS — SIGNIFICANT CHANGE UP
NRBC # BLD: 0 /100 WBCS — SIGNIFICANT CHANGE UP (ref 0–0)
NRBC # FLD: 0 K/UL — SIGNIFICANT CHANGE UP
NRBC # FLD: 0 K/UL — SIGNIFICANT CHANGE UP (ref 0–0)
NRBC # FLD: 0.04 K/UL — HIGH
NRBC # FLD: 0.05 K/UL — HIGH (ref 0–0)
NRBC # FLD: 0.07 K/UL — HIGH
NRBC # FLD: 0.07 K/UL — HIGH (ref 0–0)
NRBC # FLD: 0.08 K/UL — HIGH (ref 0–0)
NRBC # FLD: 0.09 K/UL — HIGH
NRBC # FLD: 0.1 K/UL — HIGH (ref 0–0)
NRBC # FLD: SIGNIFICANT CHANGE UP K/UL (ref 0–0)
NT-PROBNP SERPL-SCNC: 1500 PG/ML — HIGH
NT-PROBNP SERPL-SCNC: 2320 PG/ML — HIGH
OVALOCYTES BLD QL SMEAR: SLIGHT — SIGNIFICANT CHANGE UP
PCO2 BLDV: 45 MMHG — HIGH (ref 39–42)
PCO2 BLDV: 53 MMHG — HIGH (ref 39–42)
PH BLDV: 7.37 — SIGNIFICANT CHANGE UP (ref 7.32–7.43)
PH BLDV: 7.42 — SIGNIFICANT CHANGE UP (ref 7.32–7.43)
PH UR: 6 — SIGNIFICANT CHANGE UP (ref 5–8)
PH UR: 6.5 — SIGNIFICANT CHANGE UP (ref 5–8)
PH UR: 7.5 — SIGNIFICANT CHANGE UP (ref 5–8)
PHOSPHATE SERPL-MCNC: 2.3 MG/DL — LOW (ref 2.5–4.5)
PHOSPHATE SERPL-MCNC: 2.3 MG/DL — LOW (ref 2.5–4.5)
PHOSPHATE SERPL-MCNC: 2.6 MG/DL — SIGNIFICANT CHANGE UP (ref 2.5–4.5)
PHOSPHATE SERPL-MCNC: 2.7 MG/DL — SIGNIFICANT CHANGE UP (ref 2.5–4.5)
PHOSPHATE SERPL-MCNC: 2.8 MG/DL — SIGNIFICANT CHANGE UP (ref 2.5–4.5)
PHOSPHATE SERPL-MCNC: 2.8 MG/DL — SIGNIFICANT CHANGE UP (ref 2.5–4.5)
PHOSPHATE SERPL-MCNC: 3 MG/DL — SIGNIFICANT CHANGE UP (ref 2.5–4.5)
PHOSPHATE SERPL-MCNC: 3.3 MG/DL — SIGNIFICANT CHANGE UP (ref 2.5–4.5)
PHOSPHATE SERPL-MCNC: 3.4 MG/DL — SIGNIFICANT CHANGE UP (ref 2.5–4.5)
PHOSPHATE SERPL-MCNC: 3.5 MG/DL — SIGNIFICANT CHANGE UP (ref 2.5–4.5)
PHOSPHATE SERPL-MCNC: 3.5 MG/DL — SIGNIFICANT CHANGE UP (ref 2.5–4.5)
PHOSPHATE SERPL-MCNC: 3.7 MG/DL — SIGNIFICANT CHANGE UP (ref 2.5–4.5)
PHOSPHATE SERPL-MCNC: 3.7 MG/DL — SIGNIFICANT CHANGE UP (ref 2.5–4.5)
PHOSPHATE SERPL-MCNC: SIGNIFICANT CHANGE UP MG/DL (ref 2.5–4.5)
PLAT MORPH BLD: NORMAL — SIGNIFICANT CHANGE UP
PLATELET # BLD AUTO: 114 K/UL — LOW (ref 150–400)
PLATELET # BLD AUTO: 124 K/UL — LOW (ref 150–400)
PLATELET # BLD AUTO: 124 K/UL — LOW (ref 150–400)
PLATELET # BLD AUTO: 137 K/UL — LOW (ref 150–400)
PLATELET # BLD AUTO: 151 K/UL — SIGNIFICANT CHANGE UP (ref 150–400)
PLATELET # BLD AUTO: 173 K/UL — SIGNIFICANT CHANGE UP (ref 150–400)
PLATELET # BLD AUTO: 46 K/UL — LOW (ref 150–400)
PLATELET # BLD AUTO: 46 K/UL — LOW (ref 150–400)
PLATELET # BLD AUTO: 53 K/UL — LOW (ref 150–400)
PLATELET # BLD AUTO: 53 K/UL — LOW (ref 150–400)
PLATELET # BLD AUTO: 62 K/UL — LOW (ref 150–400)
PLATELET # BLD AUTO: 63 K/UL — LOW (ref 150–400)
PLATELET # BLD AUTO: 65 K/UL — LOW (ref 150–400)
PLATELET # BLD AUTO: 65 K/UL — LOW (ref 150–400)
PLATELET # BLD AUTO: 67 K/UL — LOW (ref 150–400)
PLATELET # BLD AUTO: 70 K/UL — LOW (ref 150–400)
PLATELET # BLD AUTO: 74 K/UL — LOW (ref 150–400)
PLATELET # BLD AUTO: 90 K/UL — LOW (ref 150–400)
PLATELET # BLD AUTO: 90 K/UL — LOW (ref 150–400)
PLATELET # BLD AUTO: 97 K/UL — LOW (ref 150–400)
PLATELET COUNT - ESTIMATE: ABNORMAL
PO2 BLDV: 23 MMHG — SIGNIFICANT CHANGE UP
PO2 BLDV: 73 MMHG — SIGNIFICANT CHANGE UP
POIKILOCYTOSIS BLD QL AUTO: SLIGHT — SIGNIFICANT CHANGE UP
POLYCHROMASIA BLD QL SMEAR: SLIGHT — SIGNIFICANT CHANGE UP
POTASSIUM BLDV-SCNC: 3.9 MMOL/L — SIGNIFICANT CHANGE UP (ref 3.5–5.1)
POTASSIUM BLDV-SCNC: 4.4 MMOL/L — SIGNIFICANT CHANGE UP (ref 3.5–5.1)
POTASSIUM SERPL-MCNC: 3.4 MMOL/L — LOW (ref 3.5–5.3)
POTASSIUM SERPL-MCNC: 3.7 MMOL/L — SIGNIFICANT CHANGE UP (ref 3.5–5.3)
POTASSIUM SERPL-MCNC: 3.8 MMOL/L — SIGNIFICANT CHANGE UP (ref 3.5–5.3)
POTASSIUM SERPL-MCNC: 3.9 MMOL/L — SIGNIFICANT CHANGE UP (ref 3.5–5.3)
POTASSIUM SERPL-MCNC: 4 MMOL/L — SIGNIFICANT CHANGE UP (ref 3.5–5.3)
POTASSIUM SERPL-MCNC: 4 MMOL/L — SIGNIFICANT CHANGE UP (ref 3.5–5.3)
POTASSIUM SERPL-MCNC: 4.1 MMOL/L — SIGNIFICANT CHANGE UP (ref 3.5–5.3)
POTASSIUM SERPL-MCNC: 4.2 MMOL/L — SIGNIFICANT CHANGE UP (ref 3.5–5.3)
POTASSIUM SERPL-MCNC: 4.2 MMOL/L — SIGNIFICANT CHANGE UP (ref 3.5–5.3)
POTASSIUM SERPL-MCNC: 4.3 MMOL/L — SIGNIFICANT CHANGE UP (ref 3.5–5.3)
POTASSIUM SERPL-MCNC: 4.5 MMOL/L — SIGNIFICANT CHANGE UP (ref 3.5–5.3)
POTASSIUM SERPL-MCNC: 4.7 MMOL/L — SIGNIFICANT CHANGE UP (ref 3.5–5.3)
POTASSIUM SERPL-MCNC: 5.9 MMOL/L — HIGH (ref 3.5–5.3)
POTASSIUM SERPL-MCNC: SIGNIFICANT CHANGE UP MMOL/L (ref 3.5–5.3)
POTASSIUM SERPL-SCNC: 3.4 MMOL/L — LOW (ref 3.5–5.3)
POTASSIUM SERPL-SCNC: 3.7 MMOL/L — SIGNIFICANT CHANGE UP (ref 3.5–5.3)
POTASSIUM SERPL-SCNC: 3.8 MMOL/L — SIGNIFICANT CHANGE UP (ref 3.5–5.3)
POTASSIUM SERPL-SCNC: 3.9 MMOL/L — SIGNIFICANT CHANGE UP (ref 3.5–5.3)
POTASSIUM SERPL-SCNC: 4 MMOL/L — SIGNIFICANT CHANGE UP (ref 3.5–5.3)
POTASSIUM SERPL-SCNC: 4 MMOL/L — SIGNIFICANT CHANGE UP (ref 3.5–5.3)
POTASSIUM SERPL-SCNC: 4.1 MMOL/L — SIGNIFICANT CHANGE UP (ref 3.5–5.3)
POTASSIUM SERPL-SCNC: 4.2 MMOL/L — SIGNIFICANT CHANGE UP (ref 3.5–5.3)
POTASSIUM SERPL-SCNC: 4.2 MMOL/L — SIGNIFICANT CHANGE UP (ref 3.5–5.3)
POTASSIUM SERPL-SCNC: 4.3 MMOL/L — SIGNIFICANT CHANGE UP (ref 3.5–5.3)
POTASSIUM SERPL-SCNC: 4.5 MMOL/L — SIGNIFICANT CHANGE UP (ref 3.5–5.3)
POTASSIUM SERPL-SCNC: 4.7 MMOL/L — SIGNIFICANT CHANGE UP (ref 3.5–5.3)
POTASSIUM SERPL-SCNC: 5.9 MMOL/L — HIGH (ref 3.5–5.3)
POTASSIUM SERPL-SCNC: SIGNIFICANT CHANGE UP MMOL/L (ref 3.5–5.3)
PROCALCITONIN SERPL-MCNC: 0.09 NG/ML — SIGNIFICANT CHANGE UP (ref 0.02–0.1)
PROT SERPL-MCNC: 5.4 G/DL — LOW (ref 6–8.3)
PROT SERPL-MCNC: 5.5 G/DL — LOW (ref 6–8.3)
PROT SERPL-MCNC: 5.7 G/DL — LOW (ref 6–8.3)
PROT SERPL-MCNC: 5.7 G/DL — LOW (ref 6–8.3)
PROT SERPL-MCNC: 5.8 G/DL — LOW (ref 6–8.3)
PROT SERPL-MCNC: 5.9 G/DL — LOW (ref 6–8.3)
PROT SERPL-MCNC: 5.9 G/DL — LOW (ref 6–8.3)
PROT SERPL-MCNC: 6 G/DL — SIGNIFICANT CHANGE UP (ref 6–8.3)
PROT SERPL-MCNC: 6.2 G/DL — SIGNIFICANT CHANGE UP (ref 6–8.3)
PROT SERPL-MCNC: 6.6 G/DL — SIGNIFICANT CHANGE UP (ref 6–8.3)
PROT SERPL-MCNC: 6.7 G/DL — SIGNIFICANT CHANGE UP (ref 6–8.3)
PROT SERPL-MCNC: SIGNIFICANT CHANGE UP G/DL (ref 6–8.3)
PROT UR-MCNC: ABNORMAL
PROT UR-MCNC: ABNORMAL
PROT UR-MCNC: NEGATIVE — SIGNIFICANT CHANGE UP
PROTHROM AB SERPL-ACNC: 13.9 SEC — HIGH (ref 10.5–13.4)
RAPID RVP RESULT: SIGNIFICANT CHANGE UP
RBC # BLD: 2.76 M/UL — LOW (ref 3.8–5.2)
RBC # BLD: 3.2 M/UL — LOW (ref 3.8–5.2)
RBC # BLD: 3.3 M/UL — LOW (ref 3.8–5.2)
RBC # BLD: 3.34 M/UL — LOW (ref 3.8–5.2)
RBC # BLD: 3.35 M/UL — LOW (ref 3.8–5.2)
RBC # BLD: 3.39 M/UL — LOW (ref 3.8–5.2)
RBC # BLD: 3.44 M/UL — LOW (ref 3.8–5.2)
RBC # BLD: 3.45 M/UL — LOW (ref 3.8–5.2)
RBC # BLD: 3.52 M/UL — LOW (ref 3.8–5.2)
RBC # BLD: 3.57 M/UL — LOW (ref 3.8–5.2)
RBC # BLD: 3.61 M/UL — LOW (ref 3.8–5.2)
RBC # BLD: 3.67 M/UL — LOW (ref 3.8–5.2)
RBC # BLD: 3.69 M/UL — LOW (ref 3.8–5.2)
RBC # BLD: 4.06 M/UL — SIGNIFICANT CHANGE UP (ref 3.8–5.2)
RBC # BLD: 4.16 M/UL — SIGNIFICANT CHANGE UP (ref 3.8–5.2)
RBC # BLD: 4.25 M/UL — SIGNIFICANT CHANGE UP (ref 3.8–5.2)
RBC # BLD: 4.26 M/UL — SIGNIFICANT CHANGE UP (ref 3.8–5.2)
RBC # BLD: 4.39 M/UL — SIGNIFICANT CHANGE UP (ref 3.8–5.2)
RBC # BLD: 4.41 M/UL — SIGNIFICANT CHANGE UP (ref 3.8–5.2)
RBC # BLD: 4.44 M/UL — SIGNIFICANT CHANGE UP (ref 3.8–5.2)
RBC # FLD: 13 % — SIGNIFICANT CHANGE UP (ref 10.3–14.5)
RBC # FLD: 13.2 % — SIGNIFICANT CHANGE UP (ref 10.3–14.5)
RBC # FLD: 13.4 % — SIGNIFICANT CHANGE UP (ref 10.3–14.5)
RBC # FLD: 13.4 % — SIGNIFICANT CHANGE UP (ref 10.3–14.5)
RBC # FLD: 13.5 % — SIGNIFICANT CHANGE UP (ref 10.3–14.5)
RBC # FLD: 13.6 % — SIGNIFICANT CHANGE UP (ref 10.3–14.5)
RBC # FLD: 13.6 % — SIGNIFICANT CHANGE UP (ref 10.3–14.5)
RBC # FLD: 13.7 % — SIGNIFICANT CHANGE UP (ref 10.3–14.5)
RBC # FLD: 13.7 % — SIGNIFICANT CHANGE UP (ref 10.3–14.5)
RBC # FLD: 13.9 % — SIGNIFICANT CHANGE UP (ref 10.3–14.5)
RBC # FLD: 13.9 % — SIGNIFICANT CHANGE UP (ref 10.3–14.5)
RBC # FLD: 14 % — SIGNIFICANT CHANGE UP (ref 10.3–14.5)
RBC # FLD: 14.2 % — SIGNIFICANT CHANGE UP (ref 10.3–14.5)
RBC # FLD: 14.2 % — SIGNIFICANT CHANGE UP (ref 10.3–14.5)
RBC # FLD: 14.8 % — HIGH (ref 10.3–14.5)
RBC # FLD: 14.9 % — HIGH (ref 10.3–14.5)
RBC # FLD: 15 % — HIGH (ref 10.3–14.5)
RBC # FLD: 15.7 % — HIGH (ref 10.3–14.5)
RBC BLD AUTO: ABNORMAL
RBC BLD AUTO: ABNORMAL
RBC BLD AUTO: SIGNIFICANT CHANGE UP
RBC CASTS # UR COMP ASSIST: 2 /HPF — SIGNIFICANT CHANGE UP (ref 0–4)
RBC CASTS # UR COMP ASSIST: 7 /HPF — HIGH (ref 0–4)
RH IG SCN BLD-IMP: POSITIVE — SIGNIFICANT CHANGE UP
RH IG SCN BLD-IMP: POSITIVE — SIGNIFICANT CHANGE UP
RSV RNA NPH QL NAA+NON-PROBE: SIGNIFICANT CHANGE UP
RSV RNA SPEC QL NAA+PROBE: SIGNIFICANT CHANGE UP
RV+EV RNA SPEC QL NAA+PROBE: SIGNIFICANT CHANGE UP
S AUREUS DNA NOSE QL NAA+PROBE: SIGNIFICANT CHANGE UP
SAO2 % BLDV: 45.7 % — SIGNIFICANT CHANGE UP
SAO2 % BLDV: 94.6 % — SIGNIFICANT CHANGE UP
SARS-COV-2 RNA SPEC QL NAA+PROBE: SIGNIFICANT CHANGE UP
SODIUM SERPL-SCNC: 132 MMOL/L — LOW (ref 135–145)
SODIUM SERPL-SCNC: 134 MMOL/L — LOW (ref 135–145)
SODIUM SERPL-SCNC: 134 MMOL/L — LOW (ref 135–145)
SODIUM SERPL-SCNC: 135 MMOL/L — SIGNIFICANT CHANGE UP (ref 135–145)
SODIUM SERPL-SCNC: 136 MMOL/L — SIGNIFICANT CHANGE UP (ref 135–145)
SODIUM SERPL-SCNC: 137 MMOL/L — SIGNIFICANT CHANGE UP (ref 135–145)
SODIUM SERPL-SCNC: 138 MMOL/L — SIGNIFICANT CHANGE UP (ref 135–145)
SODIUM SERPL-SCNC: 139 MMOL/L — SIGNIFICANT CHANGE UP (ref 135–145)
SODIUM SERPL-SCNC: 140 MMOL/L — SIGNIFICANT CHANGE UP (ref 135–145)
SODIUM SERPL-SCNC: 140 MMOL/L — SIGNIFICANT CHANGE UP (ref 135–145)
SODIUM SERPL-SCNC: 141 MMOL/L — SIGNIFICANT CHANGE UP (ref 135–145)
SODIUM SERPL-SCNC: 141 MMOL/L — SIGNIFICANT CHANGE UP (ref 135–145)
SP GR SPEC: 1.01 — LOW (ref 1.01–1.05)
SP GR SPEC: 1.02 — SIGNIFICANT CHANGE UP (ref 1–1.05)
SP GR SPEC: 1.03 — SIGNIFICANT CHANGE UP (ref 1.01–1.05)
SPECIMEN SOURCE: SIGNIFICANT CHANGE UP
TROPONIN T, HIGH SENSITIVITY RESULT: 21 NG/L — SIGNIFICANT CHANGE UP
TROPONIN T, HIGH SENSITIVITY RESULT: 26 NG/L — SIGNIFICANT CHANGE UP
TROPONIN T, HIGH SENSITIVITY RESULT: 26 NG/L — SIGNIFICANT CHANGE UP
TROPONIN T, HIGH SENSITIVITY RESULT: 38 NG/L — SIGNIFICANT CHANGE UP
TSH SERPL-MCNC: 4.18 UIU/ML — SIGNIFICANT CHANGE UP (ref 0.27–4.2)
UROBILINOGEN FLD QL: SIGNIFICANT CHANGE UP
VARIANT LYMPHS # BLD: 1.7 % — SIGNIFICANT CHANGE UP (ref 0–6)
WBC # BLD: 13.76 K/UL — HIGH (ref 3.8–10.5)
WBC # BLD: 16.41 K/UL — HIGH (ref 3.8–10.5)
WBC # BLD: 16.67 K/UL — HIGH (ref 3.8–10.5)
WBC # BLD: 17.62 K/UL — HIGH (ref 3.8–10.5)
WBC # BLD: 18.97 K/UL — HIGH (ref 3.8–10.5)
WBC # BLD: 22.11 K/UL — HIGH (ref 3.8–10.5)
WBC # BLD: 27.85 K/UL — HIGH (ref 3.8–10.5)
WBC # BLD: 29.34 K/UL — HIGH (ref 3.8–10.5)
WBC # BLD: 36.03 K/UL — HIGH (ref 3.8–10.5)
WBC # BLD: 36.55 K/UL — HIGH (ref 3.8–10.5)
WBC # BLD: 38.26 K/UL — HIGH (ref 3.8–10.5)
WBC # BLD: 4.75 K/UL — SIGNIFICANT CHANGE UP (ref 3.8–10.5)
WBC # BLD: 40.43 K/UL — CRITICAL HIGH (ref 3.8–10.5)
WBC # BLD: 41.58 K/UL — CRITICAL HIGH (ref 3.8–10.5)
WBC # BLD: 41.84 K/UL — CRITICAL HIGH (ref 3.8–10.5)
WBC # BLD: 45.5 K/UL — CRITICAL HIGH (ref 3.8–10.5)
WBC # BLD: 51.78 K/UL — CRITICAL HIGH (ref 3.8–10.5)
WBC # BLD: 57.72 K/UL — CRITICAL HIGH (ref 3.8–10.5)
WBC # BLD: 59.87 K/UL — CRITICAL HIGH (ref 3.8–10.5)
WBC # BLD: 7.92 K/UL — SIGNIFICANT CHANGE UP (ref 3.8–10.5)
WBC # FLD AUTO: 13.76 K/UL — HIGH (ref 3.8–10.5)
WBC # FLD AUTO: 16.41 K/UL — HIGH (ref 3.8–10.5)
WBC # FLD AUTO: 16.67 K/UL — HIGH (ref 3.8–10.5)
WBC # FLD AUTO: 17.62 K/UL — HIGH (ref 3.8–10.5)
WBC # FLD AUTO: 18.97 K/UL — HIGH (ref 3.8–10.5)
WBC # FLD AUTO: 22.11 K/UL — HIGH (ref 3.8–10.5)
WBC # FLD AUTO: 27.85 K/UL — HIGH (ref 3.8–10.5)
WBC # FLD AUTO: 29.34 K/UL — HIGH (ref 3.8–10.5)
WBC # FLD AUTO: 36.03 K/UL — HIGH (ref 3.8–10.5)
WBC # FLD AUTO: 36.55 K/UL — HIGH (ref 3.8–10.5)
WBC # FLD AUTO: 38.26 K/UL — HIGH (ref 3.8–10.5)
WBC # FLD AUTO: 4.75 K/UL — SIGNIFICANT CHANGE UP (ref 3.8–10.5)
WBC # FLD AUTO: 40.43 K/UL — CRITICAL HIGH (ref 3.8–10.5)
WBC # FLD AUTO: 41.58 K/UL — CRITICAL HIGH (ref 3.8–10.5)
WBC # FLD AUTO: 41.84 K/UL — CRITICAL HIGH (ref 3.8–10.5)
WBC # FLD AUTO: 45.5 K/UL — CRITICAL HIGH (ref 3.8–10.5)
WBC # FLD AUTO: 51.78 K/UL — CRITICAL HIGH (ref 3.8–10.5)
WBC # FLD AUTO: 57.72 K/UL — CRITICAL HIGH (ref 3.8–10.5)
WBC # FLD AUTO: 59.87 K/UL — CRITICAL HIGH (ref 3.8–10.5)
WBC # FLD AUTO: 7.92 K/UL — SIGNIFICANT CHANGE UP (ref 3.8–10.5)
WBC UR QL: 3 /HPF — SIGNIFICANT CHANGE UP (ref 0–5)
WBC UR QL: 31 /HPF — HIGH (ref 0–5)

## 2022-01-01 PROCEDURE — 71045 X-RAY EXAM CHEST 1 VIEW: CPT | Mod: 26

## 2022-01-01 PROCEDURE — 93970 EXTREMITY STUDY: CPT | Mod: 26

## 2022-01-01 PROCEDURE — ZZZZZ: CPT

## 2022-01-01 PROCEDURE — 99285 EMERGENCY DEPT VISIT HI MDM: CPT | Mod: 25

## 2022-01-01 PROCEDURE — 71046 X-RAY EXAM CHEST 2 VIEWS: CPT | Mod: 26

## 2022-01-01 PROCEDURE — 99233 SBSQ HOSP IP/OBS HIGH 50: CPT

## 2022-01-01 PROCEDURE — 94010 BREATHING CAPACITY TEST: CPT

## 2022-01-01 PROCEDURE — 94726 PLETHYSMOGRAPHY LUNG VOLUMES: CPT

## 2022-01-01 PROCEDURE — 71275 CT ANGIOGRAPHY CHEST: CPT | Mod: 26,MB

## 2022-01-01 PROCEDURE — 93010 ELECTROCARDIOGRAM REPORT: CPT

## 2022-01-01 PROCEDURE — 95816 EEG AWAKE AND DROWSY: CPT | Mod: 26

## 2022-01-01 PROCEDURE — 93306 TTE W/DOPPLER COMPLETE: CPT | Mod: 26

## 2022-01-01 PROCEDURE — 99232 SBSQ HOSP IP/OBS MODERATE 35: CPT

## 2022-01-01 PROCEDURE — 99223 1ST HOSP IP/OBS HIGH 75: CPT

## 2022-01-01 PROCEDURE — 99204 OFFICE O/P NEW MOD 45 MIN: CPT | Mod: 25

## 2022-01-01 PROCEDURE — 70450 CT HEAD/BRAIN W/O DYE: CPT | Mod: 26,MA

## 2022-01-01 PROCEDURE — 99291 CRITICAL CARE FIRST HOUR: CPT | Mod: GC

## 2022-01-01 PROCEDURE — 94729 DIFFUSING CAPACITY: CPT

## 2022-01-01 PROCEDURE — 99232 SBSQ HOSP IP/OBS MODERATE 35: CPT | Mod: FS

## 2022-01-01 PROCEDURE — 70552 MRI BRAIN STEM W/DYE: CPT | Mod: 26

## 2022-01-01 RX ORDER — OXYCODONE AND ACETAMINOPHEN 5; 325 MG/1; MG/1
1 TABLET ORAL EVERY 6 HOURS
Refills: 0 | Status: DISCONTINUED | OUTPATIENT
Start: 2022-01-01 | End: 2022-01-01

## 2022-01-01 RX ORDER — PREGABALIN 225 MG/1
5000 CAPSULE ORAL
Qty: 0 | Refills: 0 | DISCHARGE

## 2022-01-01 RX ORDER — ASPIRIN/CALCIUM CARB/MAGNESIUM 324 MG
81 TABLET ORAL DAILY
Refills: 0 | Status: DISCONTINUED | OUTPATIENT
Start: 2022-01-01 | End: 2022-01-01

## 2022-01-01 RX ORDER — ACETAMINOPHEN 500 MG
650 TABLET ORAL EVERY 6 HOURS
Refills: 0 | Status: DISCONTINUED | OUTPATIENT
Start: 2022-01-01 | End: 2022-01-01

## 2022-01-01 RX ORDER — ACETAMINOPHEN 500 MG
650 TABLET ORAL ONCE
Refills: 0 | Status: COMPLETED | OUTPATIENT
Start: 2022-01-01 | End: 2022-01-01

## 2022-01-01 RX ORDER — SODIUM CHLORIDE 9 MG/ML
1000 INJECTION INTRAMUSCULAR; INTRAVENOUS; SUBCUTANEOUS ONCE
Refills: 0 | Status: COMPLETED | OUTPATIENT
Start: 2022-01-01 | End: 2022-01-01

## 2022-01-01 RX ORDER — POTASSIUM CHLORIDE 20 MEQ
40 PACKET (EA) ORAL ONCE
Refills: 0 | Status: COMPLETED | OUTPATIENT
Start: 2022-01-01 | End: 2022-01-01

## 2022-01-01 RX ORDER — ENOXAPARIN SODIUM 100 MG/ML
90 INJECTION SUBCUTANEOUS EVERY 24 HOURS
Refills: 0 | Status: DISCONTINUED | OUTPATIENT
Start: 2022-01-01 | End: 2022-01-01

## 2022-01-01 RX ORDER — MAGNESIUM OXIDE 400 MG ORAL TABLET 241.3 MG
400 TABLET ORAL ONCE
Refills: 0 | Status: COMPLETED | OUTPATIENT
Start: 2022-01-01 | End: 2022-01-01

## 2022-01-01 RX ORDER — FAMOTIDINE 10 MG/ML
40 INJECTION INTRAVENOUS DAILY
Refills: 0 | Status: DISCONTINUED | OUTPATIENT
Start: 2022-01-01 | End: 2022-01-01

## 2022-01-01 RX ORDER — OXYCODONE HYDROCHLORIDE 5 MG/1
5 TABLET ORAL ONCE
Refills: 0 | Status: DISCONTINUED | OUTPATIENT
Start: 2022-01-01 | End: 2022-01-01

## 2022-01-01 RX ORDER — CHLORHEXIDINE GLUCONATE 213 G/1000ML
1 SOLUTION TOPICAL
Refills: 0 | Status: DISCONTINUED | OUTPATIENT
Start: 2022-01-01 | End: 2022-01-01

## 2022-01-01 RX ORDER — OXYCODONE AND ACETAMINOPHEN 5; 325 MG/1; MG/1
1 TABLET ORAL ONCE
Refills: 0 | Status: DISCONTINUED | OUTPATIENT
Start: 2022-01-01 | End: 2022-01-01

## 2022-01-01 RX ORDER — ENOXAPARIN SODIUM 100 MG/ML
40 INJECTION SUBCUTANEOUS EVERY 24 HOURS
Refills: 0 | Status: DISCONTINUED | OUTPATIENT
Start: 2022-01-01 | End: 2022-01-01

## 2022-01-01 RX ORDER — VANCOMYCIN HCL 1 G
1000 VIAL (EA) INTRAVENOUS ONCE
Refills: 0 | Status: COMPLETED | OUTPATIENT
Start: 2022-01-01 | End: 2022-01-01

## 2022-01-01 RX ORDER — SODIUM ZIRCONIUM CYCLOSILICATE 10 G/10G
5 POWDER, FOR SUSPENSION ORAL ONCE
Refills: 0 | Status: COMPLETED | OUTPATIENT
Start: 2022-01-01 | End: 2022-01-01

## 2022-01-01 RX ORDER — MAGNESIUM SULFATE 500 MG/ML
2 VIAL (ML) INJECTION ONCE
Refills: 0 | Status: COMPLETED | OUTPATIENT
Start: 2022-01-01 | End: 2022-01-01

## 2022-01-01 RX ORDER — POLYETHYLENE GLYCOL 3350 17 G/17G
17 POWDER, FOR SOLUTION ORAL DAILY
Refills: 0 | Status: DISCONTINUED | OUTPATIENT
Start: 2022-01-01 | End: 2022-01-01

## 2022-01-01 RX ORDER — HEPARIN SODIUM 5000 [USP'U]/ML
2500 INJECTION INTRAVENOUS; SUBCUTANEOUS EVERY 6 HOURS
Refills: 0 | Status: DISCONTINUED | OUTPATIENT
Start: 2022-01-01 | End: 2022-01-01

## 2022-01-01 RX ORDER — HEPARIN SODIUM 5000 [USP'U]/ML
700 INJECTION INTRAVENOUS; SUBCUTANEOUS
Qty: 25000 | Refills: 0 | Status: DISCONTINUED | OUTPATIENT
Start: 2022-01-01 | End: 2022-01-01

## 2022-01-01 RX ORDER — HEPARIN SODIUM 5000 [USP'U]/ML
5000 INJECTION INTRAVENOUS; SUBCUTANEOUS ONCE
Refills: 0 | Status: COMPLETED | OUTPATIENT
Start: 2022-01-01 | End: 2022-01-01

## 2022-01-01 RX ORDER — SODIUM CHLORIDE 9 MG/ML
1000 INJECTION INTRAMUSCULAR; INTRAVENOUS; SUBCUTANEOUS
Refills: 0 | Status: DISCONTINUED | OUTPATIENT
Start: 2022-01-01 | End: 2022-01-01

## 2022-01-01 RX ORDER — ENOXAPARIN SODIUM 100 MG/ML
90 INJECTION SUBCUTANEOUS
Qty: 30 | Refills: 0
Start: 2022-01-01

## 2022-01-01 RX ORDER — LANOLIN ALCOHOL/MO/W.PET/CERES
1 CREAM (GRAM) TOPICAL
Qty: 30 | Refills: 0
Start: 2022-01-01 | End: 2022-01-01

## 2022-01-01 RX ORDER — LEVOTHYROXINE SODIUM 125 MCG
75 TABLET ORAL DAILY
Refills: 0 | Status: DISCONTINUED | OUTPATIENT
Start: 2022-01-01 | End: 2022-01-01

## 2022-01-01 RX ORDER — SODIUM CHLORIDE 9 MG/ML
500 INJECTION INTRAMUSCULAR; INTRAVENOUS; SUBCUTANEOUS
Refills: 0 | Status: DISCONTINUED | OUTPATIENT
Start: 2022-01-01 | End: 2022-01-01

## 2022-01-01 RX ORDER — ATENOLOL 25 MG/1
50 TABLET ORAL ONCE
Refills: 0 | Status: COMPLETED | OUTPATIENT
Start: 2022-01-01 | End: 2022-01-01

## 2022-01-01 RX ORDER — LOSARTAN POTASSIUM 100 MG/1
1 TABLET, FILM COATED ORAL
Qty: 0 | Refills: 0 | DISCHARGE

## 2022-01-01 RX ORDER — SODIUM,POTASSIUM PHOSPHATES 278-250MG
1 POWDER IN PACKET (EA) ORAL ONCE
Refills: 0 | Status: COMPLETED | OUTPATIENT
Start: 2022-01-01 | End: 2022-01-01

## 2022-01-01 RX ORDER — ATORVASTATIN CALCIUM 80 MG/1
80 TABLET, FILM COATED ORAL AT BEDTIME
Refills: 0 | Status: DISCONTINUED | OUTPATIENT
Start: 2022-01-01 | End: 2022-01-01

## 2022-01-01 RX ORDER — HEPARIN SODIUM 5000 [USP'U]/ML
900 INJECTION INTRAVENOUS; SUBCUTANEOUS
Qty: 25000 | Refills: 0 | Status: DISCONTINUED | OUTPATIENT
Start: 2022-01-01 | End: 2022-01-01

## 2022-01-01 RX ORDER — HEPARIN SODIUM 5000 [USP'U]/ML
5000 INJECTION INTRAVENOUS; SUBCUTANEOUS EVERY 6 HOURS
Refills: 0 | Status: DISCONTINUED | OUTPATIENT
Start: 2022-01-01 | End: 2022-01-01

## 2022-01-01 RX ORDER — SENNA PLUS 8.6 MG/1
2 TABLET ORAL
Qty: 0 | Refills: 0 | DISCHARGE
Start: 2022-01-01

## 2022-01-01 RX ORDER — ATENOLOL 25 MG/1
50 TABLET ORAL EVERY 12 HOURS
Refills: 0 | Status: DISCONTINUED | OUTPATIENT
Start: 2022-01-01 | End: 2022-01-01

## 2022-01-01 RX ORDER — MULTIVIT-MIN/FERROUS GLUCONATE 9 MG/15 ML
1 LIQUID (ML) ORAL DAILY
Refills: 0 | Status: DISCONTINUED | OUTPATIENT
Start: 2022-01-01 | End: 2022-01-01

## 2022-01-01 RX ORDER — LIDOCAINE 4 G/100G
1 CREAM TOPICAL EVERY 24 HOURS
Refills: 0 | Status: DISCONTINUED | OUTPATIENT
Start: 2022-01-01 | End: 2022-01-01

## 2022-01-01 RX ORDER — LANOLIN ALCOHOL/MO/W.PET/CERES
3 CREAM (GRAM) TOPICAL AT BEDTIME
Refills: 0 | Status: DISCONTINUED | OUTPATIENT
Start: 2022-01-01 | End: 2022-01-01

## 2022-01-01 RX ORDER — HEPARIN SODIUM 5000 [USP'U]/ML
INJECTION INTRAVENOUS; SUBCUTANEOUS
Qty: 25000 | Refills: 0 | Status: DISCONTINUED | OUTPATIENT
Start: 2022-01-01 | End: 2022-01-01

## 2022-01-01 RX ORDER — CEFEPIME 1 G/1
2000 INJECTION, POWDER, FOR SOLUTION INTRAMUSCULAR; INTRAVENOUS ONCE
Refills: 0 | Status: COMPLETED | OUTPATIENT
Start: 2022-01-01 | End: 2022-01-01

## 2022-01-01 RX ORDER — POLYETHYLENE GLYCOL 3350 17 G/17G
17 POWDER, FOR SOLUTION ORAL
Qty: 0 | Refills: 0 | DISCHARGE
Start: 2022-01-01

## 2022-01-01 RX ORDER — ONDANSETRON 8 MG/1
4 TABLET, FILM COATED ORAL ONCE
Refills: 0 | Status: COMPLETED | OUTPATIENT
Start: 2022-01-01 | End: 2022-01-01

## 2022-01-01 RX ORDER — MAGNESIUM OXIDE 400 MG ORAL TABLET 241.3 MG
400 TABLET ORAL
Refills: 0 | Status: COMPLETED | OUTPATIENT
Start: 2022-01-01 | End: 2022-01-01

## 2022-01-01 RX ORDER — ACETAMINOPHEN 500 MG
2 TABLET ORAL
Qty: 0 | Refills: 0 | DISCHARGE
Start: 2022-01-01

## 2022-01-01 RX ORDER — PANTOPRAZOLE SODIUM 20 MG/1
40 TABLET, DELAYED RELEASE ORAL
Refills: 0 | Status: DISCONTINUED | OUTPATIENT
Start: 2022-01-01 | End: 2022-01-01

## 2022-01-01 RX ORDER — FUROSEMIDE 40 MG
20 TABLET ORAL ONCE
Refills: 0 | Status: COMPLETED | OUTPATIENT
Start: 2022-01-01 | End: 2022-01-01

## 2022-01-01 RX ORDER — FUROSEMIDE 40 MG
20 TABLET ORAL DAILY
Refills: 0 | Status: DISCONTINUED | OUTPATIENT
Start: 2022-01-01 | End: 2022-01-01

## 2022-01-01 RX ORDER — SIMETHICONE 80 MG/1
80 TABLET, CHEWABLE ORAL ONCE
Refills: 0 | Status: COMPLETED | OUTPATIENT
Start: 2022-01-01 | End: 2022-01-01

## 2022-01-01 RX ORDER — ATORVASTATIN CALCIUM 80 MG/1
40 TABLET, FILM COATED ORAL AT BEDTIME
Refills: 0 | Status: DISCONTINUED | OUTPATIENT
Start: 2022-01-01 | End: 2022-01-01

## 2022-01-01 RX ORDER — MAGNESIUM SULFATE 500 MG/ML
1 VIAL (ML) INJECTION ONCE
Refills: 0 | Status: COMPLETED | OUTPATIENT
Start: 2022-01-01 | End: 2022-01-01

## 2022-01-01 RX ORDER — PREGABALIN 225 MG/1
1000 CAPSULE ORAL DAILY
Refills: 0 | Status: DISCONTINUED | OUTPATIENT
Start: 2022-01-01 | End: 2022-01-01

## 2022-01-01 RX ORDER — SENNA PLUS 8.6 MG/1
2 TABLET ORAL AT BEDTIME
Refills: 0 | Status: DISCONTINUED | OUTPATIENT
Start: 2022-01-01 | End: 2022-01-01

## 2022-01-01 RX ORDER — LIDOCAINE 4 G/100G
1 CREAM TOPICAL
Qty: 30 | Refills: 0
Start: 2022-01-01 | End: 2022-01-01

## 2022-01-01 RX ORDER — RIVAROXABAN 15 MG-20MG
1 KIT ORAL
Qty: 1 | Refills: 0
Start: 2022-01-01

## 2022-01-01 RX ORDER — ASCORBIC ACID 60 MG
500 TABLET,CHEWABLE ORAL
Refills: 0 | Status: DISCONTINUED | OUTPATIENT
Start: 2022-01-01 | End: 2022-01-01

## 2022-01-01 RX ADMIN — ATORVASTATIN CALCIUM 40 MILLIGRAM(S): 80 TABLET, FILM COATED ORAL at 23:08

## 2022-01-01 RX ADMIN — PREGABALIN 1000 MICROGRAM(S): 225 CAPSULE ORAL at 10:49

## 2022-01-01 RX ADMIN — Medication 20 MILLIGRAM(S): at 06:00

## 2022-01-01 RX ADMIN — ATENOLOL 50 MILLIGRAM(S): 25 TABLET ORAL at 20:23

## 2022-01-01 RX ADMIN — OXYCODONE AND ACETAMINOPHEN 1 TABLET(S): 5; 325 TABLET ORAL at 05:13

## 2022-01-01 RX ADMIN — OXYCODONE AND ACETAMINOPHEN 1 TABLET(S): 5; 325 TABLET ORAL at 06:30

## 2022-01-01 RX ADMIN — Medication 250 MILLIGRAM(S): at 00:47

## 2022-01-01 RX ADMIN — HEPARIN SODIUM 700 UNIT(S)/HR: 5000 INJECTION INTRAVENOUS; SUBCUTANEOUS at 14:46

## 2022-01-01 RX ADMIN — OXYCODONE AND ACETAMINOPHEN 1 TABLET(S): 5; 325 TABLET ORAL at 14:27

## 2022-01-01 RX ADMIN — Medication 81 MILLIGRAM(S): at 13:19

## 2022-01-01 RX ADMIN — Medication 75 MICROGRAM(S): at 05:38

## 2022-01-01 RX ADMIN — OXYCODONE AND ACETAMINOPHEN 1 TABLET(S): 5; 325 TABLET ORAL at 12:55

## 2022-01-01 RX ADMIN — ATORVASTATIN CALCIUM 40 MILLIGRAM(S): 80 TABLET, FILM COATED ORAL at 21:40

## 2022-01-01 RX ADMIN — Medication 81 MILLIGRAM(S): at 10:35

## 2022-01-01 RX ADMIN — HEPARIN SODIUM 900 UNIT(S)/HR: 5000 INJECTION INTRAVENOUS; SUBCUTANEOUS at 19:03

## 2022-01-01 RX ADMIN — ATORVASTATIN CALCIUM 80 MILLIGRAM(S): 80 TABLET, FILM COATED ORAL at 21:06

## 2022-01-01 RX ADMIN — Medication 650 MILLIGRAM(S): at 23:30

## 2022-01-01 RX ADMIN — FAMOTIDINE 40 MILLIGRAM(S): 10 INJECTION INTRAVENOUS at 10:48

## 2022-01-01 RX ADMIN — Medication 81 MILLIGRAM(S): at 13:06

## 2022-01-01 RX ADMIN — OXYCODONE AND ACETAMINOPHEN 1 TABLET(S): 5; 325 TABLET ORAL at 21:06

## 2022-01-01 RX ADMIN — Medication 75 MICROGRAM(S): at 05:48

## 2022-01-01 RX ADMIN — SODIUM CHLORIDE 250 MILLILITER(S): 9 INJECTION INTRAMUSCULAR; INTRAVENOUS; SUBCUTANEOUS at 11:28

## 2022-01-01 RX ADMIN — MAGNESIUM OXIDE 400 MG ORAL TABLET 400 MILLIGRAM(S): 241.3 TABLET ORAL at 19:04

## 2022-01-01 RX ADMIN — OXYCODONE AND ACETAMINOPHEN 1 TABLET(S): 5; 325 TABLET ORAL at 06:13

## 2022-01-01 RX ADMIN — ENOXAPARIN SODIUM 90 MILLIGRAM(S): 100 INJECTION SUBCUTANEOUS at 19:09

## 2022-01-01 RX ADMIN — OXYCODONE AND ACETAMINOPHEN 1 TABLET(S): 5; 325 TABLET ORAL at 06:02

## 2022-01-01 RX ADMIN — ATENOLOL 50 MILLIGRAM(S): 25 TABLET ORAL at 21:18

## 2022-01-01 RX ADMIN — OXYCODONE AND ACETAMINOPHEN 1 TABLET(S): 5; 325 TABLET ORAL at 18:22

## 2022-01-01 RX ADMIN — HEPARIN SODIUM 5000 UNIT(S): 5000 INJECTION INTRAVENOUS; SUBCUTANEOUS at 04:03

## 2022-01-01 RX ADMIN — Medication 100 GRAM(S): at 09:38

## 2022-01-01 RX ADMIN — OXYCODONE AND ACETAMINOPHEN 1 TABLET(S): 5; 325 TABLET ORAL at 21:05

## 2022-01-01 RX ADMIN — OXYCODONE AND ACETAMINOPHEN 1 TABLET(S): 5; 325 TABLET ORAL at 14:42

## 2022-01-01 RX ADMIN — OXYCODONE AND ACETAMINOPHEN 1 TABLET(S): 5; 325 TABLET ORAL at 07:41

## 2022-01-01 RX ADMIN — HEPARIN SODIUM 1100 UNIT(S)/HR: 5000 INJECTION INTRAVENOUS; SUBCUTANEOUS at 04:06

## 2022-01-01 RX ADMIN — OXYCODONE AND ACETAMINOPHEN 1 TABLET(S): 5; 325 TABLET ORAL at 06:33

## 2022-01-01 RX ADMIN — OXYCODONE AND ACETAMINOPHEN 1 TABLET(S): 5; 325 TABLET ORAL at 13:50

## 2022-01-01 RX ADMIN — OXYCODONE AND ACETAMINOPHEN 1 TABLET(S): 5; 325 TABLET ORAL at 08:16

## 2022-01-01 RX ADMIN — OXYCODONE AND ACETAMINOPHEN 1 TABLET(S): 5; 325 TABLET ORAL at 05:00

## 2022-01-01 RX ADMIN — ATORVASTATIN CALCIUM 80 MILLIGRAM(S): 80 TABLET, FILM COATED ORAL at 23:24

## 2022-01-01 RX ADMIN — Medication 500 MILLIGRAM(S): at 18:26

## 2022-01-01 RX ADMIN — Medication 20 MILLIGRAM(S): at 05:34

## 2022-01-01 RX ADMIN — OXYCODONE AND ACETAMINOPHEN 1 TABLET(S): 5; 325 TABLET ORAL at 06:05

## 2022-01-01 RX ADMIN — FAMOTIDINE 40 MILLIGRAM(S): 10 INJECTION INTRAVENOUS at 13:06

## 2022-01-01 RX ADMIN — OXYCODONE AND ACETAMINOPHEN 1 TABLET(S): 5; 325 TABLET ORAL at 18:41

## 2022-01-01 RX ADMIN — SENNA PLUS 2 TABLET(S): 8.6 TABLET ORAL at 20:30

## 2022-01-01 RX ADMIN — FAMOTIDINE 40 MILLIGRAM(S): 10 INJECTION INTRAVENOUS at 13:20

## 2022-01-01 RX ADMIN — LIDOCAINE 1 PATCH: 4 CREAM TOPICAL at 13:05

## 2022-01-01 RX ADMIN — FAMOTIDINE 40 MILLIGRAM(S): 10 INJECTION INTRAVENOUS at 11:57

## 2022-01-01 RX ADMIN — OXYCODONE AND ACETAMINOPHEN 1 TABLET(S): 5; 325 TABLET ORAL at 05:55

## 2022-01-01 RX ADMIN — PREGABALIN 1000 MICROGRAM(S): 225 CAPSULE ORAL at 13:19

## 2022-01-01 RX ADMIN — ATORVASTATIN CALCIUM 80 MILLIGRAM(S): 80 TABLET, FILM COATED ORAL at 23:11

## 2022-01-01 RX ADMIN — OXYCODONE AND ACETAMINOPHEN 1 TABLET(S): 5; 325 TABLET ORAL at 18:34

## 2022-01-01 RX ADMIN — Medication 75 MICROGRAM(S): at 05:34

## 2022-01-01 RX ADMIN — OXYCODONE AND ACETAMINOPHEN 1 TABLET(S): 5; 325 TABLET ORAL at 06:51

## 2022-01-01 RX ADMIN — PANTOPRAZOLE SODIUM 40 MILLIGRAM(S): 20 TABLET, DELAYED RELEASE ORAL at 05:56

## 2022-01-01 RX ADMIN — Medication 1 TABLET(S): at 12:47

## 2022-01-01 RX ADMIN — OXYCODONE AND ACETAMINOPHEN 1 TABLET(S): 5; 325 TABLET ORAL at 16:59

## 2022-01-01 RX ADMIN — ENOXAPARIN SODIUM 90 MILLIGRAM(S): 100 INJECTION SUBCUTANEOUS at 18:31

## 2022-01-01 RX ADMIN — Medication 75 MICROGRAM(S): at 11:31

## 2022-01-01 RX ADMIN — PREGABALIN 1000 MICROGRAM(S): 225 CAPSULE ORAL at 13:07

## 2022-01-01 RX ADMIN — ATENOLOL 50 MILLIGRAM(S): 25 TABLET ORAL at 09:38

## 2022-01-01 RX ADMIN — PREGABALIN 1000 MICROGRAM(S): 225 CAPSULE ORAL at 12:55

## 2022-01-01 RX ADMIN — OXYCODONE AND ACETAMINOPHEN 1 TABLET(S): 5; 325 TABLET ORAL at 21:51

## 2022-01-01 RX ADMIN — Medication 3 MILLIGRAM(S): at 23:10

## 2022-01-01 RX ADMIN — ATENOLOL 50 MILLIGRAM(S): 25 TABLET ORAL at 10:36

## 2022-01-01 RX ADMIN — ATORVASTATIN CALCIUM 80 MILLIGRAM(S): 80 TABLET, FILM COATED ORAL at 21:50

## 2022-01-01 RX ADMIN — ATENOLOL 50 MILLIGRAM(S): 25 TABLET ORAL at 21:06

## 2022-01-01 RX ADMIN — Medication 81 MILLIGRAM(S): at 10:48

## 2022-01-01 RX ADMIN — OXYCODONE AND ACETAMINOPHEN 1 TABLET(S): 5; 325 TABLET ORAL at 06:32

## 2022-01-01 RX ADMIN — Medication 1 TABLET(S): at 10:49

## 2022-01-01 RX ADMIN — OXYCODONE AND ACETAMINOPHEN 1 TABLET(S): 5; 325 TABLET ORAL at 13:25

## 2022-01-01 RX ADMIN — Medication 81 MILLIGRAM(S): at 11:46

## 2022-01-01 RX ADMIN — OXYCODONE AND ACETAMINOPHEN 1 TABLET(S): 5; 325 TABLET ORAL at 05:30

## 2022-01-01 RX ADMIN — Medication 1 TABLET(S): at 13:19

## 2022-01-01 RX ADMIN — Medication 75 MICROGRAM(S): at 05:56

## 2022-01-01 RX ADMIN — Medication 20 MILLIGRAM(S): at 05:44

## 2022-01-01 RX ADMIN — Medication 75 MICROGRAM(S): at 05:04

## 2022-01-01 RX ADMIN — Medication 20 MILLIGRAM(S): at 05:09

## 2022-01-01 RX ADMIN — CHLORHEXIDINE GLUCONATE 1 APPLICATION(S): 213 SOLUTION TOPICAL at 05:40

## 2022-01-01 RX ADMIN — HEPARIN SODIUM 900 UNIT(S)/HR: 5000 INJECTION INTRAVENOUS; SUBCUTANEOUS at 08:02

## 2022-01-01 RX ADMIN — Medication 20 MILLIGRAM(S): at 04:53

## 2022-01-01 RX ADMIN — Medication 500 MILLIGRAM(S): at 18:12

## 2022-01-01 RX ADMIN — Medication 25 GRAM(S): at 14:43

## 2022-01-01 RX ADMIN — Medication 81 MILLIGRAM(S): at 12:55

## 2022-01-01 RX ADMIN — HEPARIN SODIUM 900 UNIT(S)/HR: 5000 INJECTION INTRAVENOUS; SUBCUTANEOUS at 19:40

## 2022-01-01 RX ADMIN — OXYCODONE AND ACETAMINOPHEN 1 TABLET(S): 5; 325 TABLET ORAL at 19:22

## 2022-01-01 RX ADMIN — Medication 20 MILLIGRAM(S): at 15:48

## 2022-01-01 RX ADMIN — HEPARIN SODIUM 900 UNIT(S)/HR: 5000 INJECTION INTRAVENOUS; SUBCUTANEOUS at 13:04

## 2022-01-01 RX ADMIN — SODIUM ZIRCONIUM CYCLOSILICATE 5 GRAM(S): 10 POWDER, FOR SUSPENSION ORAL at 12:58

## 2022-01-01 RX ADMIN — FAMOTIDINE 40 MILLIGRAM(S): 10 INJECTION INTRAVENOUS at 12:46

## 2022-01-01 RX ADMIN — ATENOLOL 50 MILLIGRAM(S): 25 TABLET ORAL at 09:48

## 2022-01-01 RX ADMIN — Medication 1 TABLET(S): at 14:42

## 2022-01-01 RX ADMIN — OXYCODONE AND ACETAMINOPHEN 1 TABLET(S): 5; 325 TABLET ORAL at 14:52

## 2022-01-01 RX ADMIN — Medication 500 MILLIGRAM(S): at 05:38

## 2022-01-01 RX ADMIN — CHLORHEXIDINE GLUCONATE 1 APPLICATION(S): 213 SOLUTION TOPICAL at 18:37

## 2022-01-01 RX ADMIN — SIMETHICONE 80 MILLIGRAM(S): 80 TABLET, CHEWABLE ORAL at 00:16

## 2022-01-01 RX ADMIN — Medication 81 MILLIGRAM(S): at 11:25

## 2022-01-01 RX ADMIN — Medication 81 MILLIGRAM(S): at 11:30

## 2022-01-01 RX ADMIN — ATORVASTATIN CALCIUM 40 MILLIGRAM(S): 80 TABLET, FILM COATED ORAL at 21:57

## 2022-01-01 RX ADMIN — Medication 20 MILLIGRAM(S): at 05:48

## 2022-01-01 RX ADMIN — Medication 25 GRAM(S): at 18:22

## 2022-01-01 RX ADMIN — Medication 75 MICROGRAM(S): at 07:30

## 2022-01-01 RX ADMIN — Medication 300 UNIT(S): at 04:44

## 2022-01-01 RX ADMIN — OXYCODONE AND ACETAMINOPHEN 1 TABLET(S): 5; 325 TABLET ORAL at 02:00

## 2022-01-01 RX ADMIN — PANTOPRAZOLE SODIUM 40 MILLIGRAM(S): 20 TABLET, DELAYED RELEASE ORAL at 06:05

## 2022-01-01 RX ADMIN — OXYCODONE AND ACETAMINOPHEN 1 TABLET(S): 5; 325 TABLET ORAL at 15:30

## 2022-01-01 RX ADMIN — Medication 81 MILLIGRAM(S): at 11:47

## 2022-01-01 RX ADMIN — OXYCODONE AND ACETAMINOPHEN 1 TABLET(S): 5; 325 TABLET ORAL at 05:04

## 2022-01-01 RX ADMIN — ATORVASTATIN CALCIUM 40 MILLIGRAM(S): 80 TABLET, FILM COATED ORAL at 20:31

## 2022-01-01 RX ADMIN — OXYCODONE AND ACETAMINOPHEN 1 TABLET(S): 5; 325 TABLET ORAL at 05:43

## 2022-01-01 RX ADMIN — Medication 40 MILLIEQUIVALENT(S): at 19:04

## 2022-01-01 RX ADMIN — OXYCODONE AND ACETAMINOPHEN 1 TABLET(S): 5; 325 TABLET ORAL at 19:30

## 2022-01-01 RX ADMIN — LIDOCAINE 1 PATCH: 4 CREAM TOPICAL at 01:15

## 2022-01-01 RX ADMIN — Medication 20 MILLIGRAM(S): at 05:38

## 2022-01-01 RX ADMIN — Medication 1 TABLET(S): at 12:56

## 2022-01-01 RX ADMIN — Medication 20 MILLIGRAM(S): at 05:56

## 2022-01-01 RX ADMIN — FAMOTIDINE 40 MILLIGRAM(S): 10 INJECTION INTRAVENOUS at 12:56

## 2022-01-01 RX ADMIN — LIDOCAINE 1 PATCH: 4 CREAM TOPICAL at 01:05

## 2022-01-01 RX ADMIN — ATENOLOL 50 MILLIGRAM(S): 25 TABLET ORAL at 21:50

## 2022-01-01 RX ADMIN — Medication 81 MILLIGRAM(S): at 11:28

## 2022-01-01 RX ADMIN — Medication 1 TABLET(S): at 10:37

## 2022-01-01 RX ADMIN — ATORVASTATIN CALCIUM 40 MILLIGRAM(S): 80 TABLET, FILM COATED ORAL at 21:02

## 2022-01-01 RX ADMIN — OXYCODONE AND ACETAMINOPHEN 1 TABLET(S): 5; 325 TABLET ORAL at 22:30

## 2022-01-01 RX ADMIN — SENNA PLUS 2 TABLET(S): 8.6 TABLET ORAL at 21:05

## 2022-01-01 RX ADMIN — OXYCODONE AND ACETAMINOPHEN 1 TABLET(S): 5; 325 TABLET ORAL at 22:00

## 2022-01-01 RX ADMIN — OXYCODONE AND ACETAMINOPHEN 1 TABLET(S): 5; 325 TABLET ORAL at 14:20

## 2022-01-01 RX ADMIN — FAMOTIDINE 40 MILLIGRAM(S): 10 INJECTION INTRAVENOUS at 10:37

## 2022-01-01 RX ADMIN — SENNA PLUS 2 TABLET(S): 8.6 TABLET ORAL at 23:09

## 2022-01-01 RX ADMIN — Medication 1 TABLET(S): at 18:26

## 2022-01-01 RX ADMIN — OXYCODONE AND ACETAMINOPHEN 1 TABLET(S): 5; 325 TABLET ORAL at 07:00

## 2022-01-01 RX ADMIN — Medication 81 MILLIGRAM(S): at 12:46

## 2022-01-01 RX ADMIN — OXYCODONE AND ACETAMINOPHEN 1 TABLET(S): 5; 325 TABLET ORAL at 13:27

## 2022-01-01 RX ADMIN — ONDANSETRON 4 MILLIGRAM(S): 8 TABLET, FILM COATED ORAL at 07:51

## 2022-01-01 RX ADMIN — OXYCODONE AND ACETAMINOPHEN 1 TABLET(S): 5; 325 TABLET ORAL at 22:56

## 2022-01-01 RX ADMIN — CHLORHEXIDINE GLUCONATE 1 APPLICATION(S): 213 SOLUTION TOPICAL at 17:59

## 2022-01-01 RX ADMIN — Medication 25 GRAM(S): at 09:25

## 2022-01-01 RX ADMIN — CEFEPIME 100 MILLIGRAM(S): 1 INJECTION, POWDER, FOR SOLUTION INTRAMUSCULAR; INTRAVENOUS at 23:40

## 2022-01-01 RX ADMIN — ATENOLOL 50 MILLIGRAM(S): 25 TABLET ORAL at 10:49

## 2022-01-01 RX ADMIN — Medication 75 MICROGRAM(S): at 06:00

## 2022-01-01 RX ADMIN — OXYCODONE AND ACETAMINOPHEN 1 TABLET(S): 5; 325 TABLET ORAL at 06:21

## 2022-01-01 RX ADMIN — Medication 20 MILLIGRAM(S): at 05:05

## 2022-01-01 RX ADMIN — PREGABALIN 1000 MICROGRAM(S): 225 CAPSULE ORAL at 11:57

## 2022-01-01 RX ADMIN — Medication 75 MICROGRAM(S): at 05:10

## 2022-01-01 RX ADMIN — MAGNESIUM OXIDE 400 MG ORAL TABLET 400 MILLIGRAM(S): 241.3 TABLET ORAL at 13:07

## 2022-01-01 RX ADMIN — Medication 75 MICROGRAM(S): at 05:31

## 2022-01-01 RX ADMIN — ATENOLOL 50 MILLIGRAM(S): 25 TABLET ORAL at 23:24

## 2022-01-01 RX ADMIN — HEPARIN SODIUM 900 UNIT(S)/HR: 5000 INJECTION INTRAVENOUS; SUBCUTANEOUS at 11:02

## 2022-01-01 RX ADMIN — ENOXAPARIN SODIUM 90 MILLIGRAM(S): 100 INJECTION SUBCUTANEOUS at 18:12

## 2022-01-01 RX ADMIN — Medication 20 MILLIGRAM(S): at 23:09

## 2022-01-01 RX ADMIN — Medication 25 GRAM(S): at 05:11

## 2022-01-01 RX ADMIN — ATENOLOL 50 MILLIGRAM(S): 25 TABLET ORAL at 14:48

## 2022-01-01 RX ADMIN — ENOXAPARIN SODIUM 90 MILLIGRAM(S): 100 INJECTION SUBCUTANEOUS at 18:47

## 2022-01-01 RX ADMIN — Medication 75 MICROGRAM(S): at 04:52

## 2022-01-01 RX ADMIN — ATENOLOL 50 MILLIGRAM(S): 25 TABLET ORAL at 10:10

## 2022-01-01 RX ADMIN — Medication 75 MICROGRAM(S): at 05:07

## 2022-01-01 RX ADMIN — OXYCODONE AND ACETAMINOPHEN 1 TABLET(S): 5; 325 TABLET ORAL at 04:56

## 2022-01-01 RX ADMIN — SENNA PLUS 2 TABLET(S): 8.6 TABLET ORAL at 21:57

## 2022-01-01 RX ADMIN — SENNA PLUS 2 TABLET(S): 8.6 TABLET ORAL at 21:02

## 2022-01-01 RX ADMIN — Medication 1 TABLET(S): at 11:57

## 2022-01-01 RX ADMIN — OXYCODONE AND ACETAMINOPHEN 1 TABLET(S): 5; 325 TABLET ORAL at 14:00

## 2022-01-01 RX ADMIN — PREGABALIN 1000 MICROGRAM(S): 225 CAPSULE ORAL at 10:37

## 2022-01-01 RX ADMIN — ATORVASTATIN CALCIUM 80 MILLIGRAM(S): 80 TABLET, FILM COATED ORAL at 22:52

## 2022-01-01 RX ADMIN — OXYCODONE AND ACETAMINOPHEN 1 TABLET(S): 5; 325 TABLET ORAL at 17:38

## 2022-01-01 RX ADMIN — HEPARIN SODIUM 800 UNIT(S)/HR: 5000 INJECTION INTRAVENOUS; SUBCUTANEOUS at 08:08

## 2022-01-01 RX ADMIN — OXYCODONE AND ACETAMINOPHEN 1 TABLET(S): 5; 325 TABLET ORAL at 20:30

## 2022-01-01 RX ADMIN — OXYCODONE AND ACETAMINOPHEN 1 TABLET(S): 5; 325 TABLET ORAL at 13:01

## 2022-01-01 RX ADMIN — PREGABALIN 1000 MICROGRAM(S): 225 CAPSULE ORAL at 12:47

## 2022-01-01 RX ADMIN — SODIUM CHLORIDE 1000 MILLILITER(S): 9 INJECTION INTRAMUSCULAR; INTRAVENOUS; SUBCUTANEOUS at 23:13

## 2022-01-01 RX ADMIN — Medication 500 MILLIGRAM(S): at 05:50

## 2022-01-01 RX ADMIN — OXYCODONE AND ACETAMINOPHEN 1 TABLET(S): 5; 325 TABLET ORAL at 22:06

## 2022-01-01 RX ADMIN — OXYCODONE AND ACETAMINOPHEN 1 TABLET(S): 5; 325 TABLET ORAL at 21:30

## 2022-01-01 RX ADMIN — PREGABALIN 1000 MICROGRAM(S): 225 CAPSULE ORAL at 13:06

## 2022-01-01 RX ADMIN — ATENOLOL 50 MILLIGRAM(S): 25 TABLET ORAL at 07:51

## 2022-01-01 RX ADMIN — ATORVASTATIN CALCIUM 80 MILLIGRAM(S): 80 TABLET, FILM COATED ORAL at 21:13

## 2022-01-01 RX ADMIN — OXYCODONE AND ACETAMINOPHEN 1 TABLET(S): 5; 325 TABLET ORAL at 15:52

## 2022-01-01 RX ADMIN — CHLORHEXIDINE GLUCONATE 1 APPLICATION(S): 213 SOLUTION TOPICAL at 05:51

## 2022-01-01 RX ADMIN — Medication 75 MICROGRAM(S): at 05:44

## 2022-01-01 RX ADMIN — OXYCODONE AND ACETAMINOPHEN 1 TABLET(S): 5; 325 TABLET ORAL at 01:18

## 2022-01-01 RX ADMIN — PANTOPRAZOLE SODIUM 40 MILLIGRAM(S): 20 TABLET, DELAYED RELEASE ORAL at 09:52

## 2022-01-01 RX ADMIN — OXYCODONE AND ACETAMINOPHEN 1 TABLET(S): 5; 325 TABLET ORAL at 21:56

## 2022-01-01 RX ADMIN — SENNA PLUS 2 TABLET(S): 8.6 TABLET ORAL at 23:11

## 2022-01-01 RX ADMIN — SENNA PLUS 2 TABLET(S): 8.6 TABLET ORAL at 23:24

## 2022-01-01 RX ADMIN — Medication 650 MILLIGRAM(S): at 00:00

## 2022-01-01 RX ADMIN — Medication 1 TABLET(S): at 19:04

## 2022-01-01 RX ADMIN — SODIUM CHLORIDE 70 MILLILITER(S): 9 INJECTION INTRAMUSCULAR; INTRAVENOUS; SUBCUTANEOUS at 20:26

## 2022-01-01 RX ADMIN — OXYCODONE AND ACETAMINOPHEN 1 TABLET(S): 5; 325 TABLET ORAL at 13:11

## 2022-01-01 RX ADMIN — ATORVASTATIN CALCIUM 80 MILLIGRAM(S): 80 TABLET, FILM COATED ORAL at 21:07

## 2022-01-01 RX ADMIN — Medication 3 MILLIGRAM(S): at 21:06

## 2022-01-01 RX ADMIN — Medication 81 MILLIGRAM(S): at 11:58

## 2022-01-01 RX ADMIN — OXYCODONE AND ACETAMINOPHEN 1 TABLET(S): 5; 325 TABLET ORAL at 19:40

## 2022-01-01 RX ADMIN — Medication 1 TABLET(S): at 13:06

## 2022-01-01 RX ADMIN — OXYCODONE AND ACETAMINOPHEN 1 TABLET(S): 5; 325 TABLET ORAL at 22:23

## 2022-01-01 RX ADMIN — ATORVASTATIN CALCIUM 40 MILLIGRAM(S): 80 TABLET, FILM COATED ORAL at 21:05

## 2022-01-01 RX ADMIN — Medication 20 MILLIGRAM(S): at 05:53

## 2022-01-01 RX ADMIN — Medication 1 TABLET(S): at 13:05

## 2022-01-01 RX ADMIN — ATENOLOL 50 MILLIGRAM(S): 25 TABLET ORAL at 23:10

## 2022-01-01 RX ADMIN — ENOXAPARIN SODIUM 90 MILLIGRAM(S): 100 INJECTION SUBCUTANEOUS at 18:23

## 2022-01-01 RX ADMIN — MAGNESIUM OXIDE 400 MG ORAL TABLET 400 MILLIGRAM(S): 241.3 TABLET ORAL at 18:34

## 2022-01-01 RX ADMIN — Medication 81 MILLIGRAM(S): at 11:57

## 2022-01-01 RX ADMIN — OXYCODONE AND ACETAMINOPHEN 1 TABLET(S): 5; 325 TABLET ORAL at 06:43

## 2022-01-01 RX ADMIN — PANTOPRAZOLE SODIUM 40 MILLIGRAM(S): 20 TABLET, DELAYED RELEASE ORAL at 04:52

## 2022-01-01 RX ADMIN — OXYCODONE AND ACETAMINOPHEN 1 TABLET(S): 5; 325 TABLET ORAL at 07:16

## 2022-01-01 RX ADMIN — OXYCODONE AND ACETAMINOPHEN 1 TABLET(S): 5; 325 TABLET ORAL at 14:30

## 2022-01-01 RX ADMIN — SENNA PLUS 2 TABLET(S): 8.6 TABLET ORAL at 21:40

## 2022-08-08 NOTE — ED ADULT TRIAGE NOTE - CHIEF COMPLAINT QUOTE
ca pt on chemo, went for a walk and had a witnessed syncopal episode. pt does not remember episode. ambulates with rolling walker.

## 2022-08-08 NOTE — ED PROVIDER NOTE - CLINICAL SUMMARY MEDICAL DECISION MAKING FREE TEXT BOX
Pavan Michele, DO PGY-2: 87yF with PMH of active endometrial ca on chemo (1st tx 4 days ago), HTN, HLD presents to the ED c/o a witnessed syncopal episode while sitting outside in a chair without prodromal symptoms. Pt did not fall or hit head or have any trauma. Pt is currently denies complaints. Denies fever, CP, SOB, abdominal pain, n/v/d, dysuria. Pt with sluggish response to EOM, with otherwise normal neuro exam. DDx includes but not limited to ACS, dysrhythmia, new brain met or other intracranial pathology, infxn. Will obtain CT head, CXR, ekg, cardiac monitor, labs, urine. Reassess. Likely admit.

## 2022-08-08 NOTE — CHART NOTE - NSCHARTNOTEFT_GEN_A_CORE
87 year old female with a past medical history of serous endometrial cancer, followed at [facility] [place]. She received # 1 carboplatin, taxol on 8/5/22 with Neulasta,. She is presenting due to confusion followed by syncope. Full consult to follow if admitted.    If admitted to medicine, please admit to Dr. Tariq Olivier.    Thank you,  Juancarlos Yeh PA-C  Hematology/Oncology  [facility]  [phone] (office)  [phone] (alt office)  Evenings and weekends please call MD on call or office

## 2022-08-08 NOTE — ED ADULT NURSE NOTE - OBJECTIVE STATEMENT
pt is A&Ox3, ambulatory, able to make needs known and presents with C/O syncopal episode earlier today that was witnessed by her daughter that reports PT passed out while out for a walk for about a minute. Daughter denies any head trauma or other bodily injury. PT unsure as to what happened today and has no memory of episode. PT does report feeling lethargic and weak which she relates to decreased PO intake since recent chemo therapy for uterine CA, IV to the right A/C #20, labs sent

## 2022-08-08 NOTE — ED PROVIDER NOTE - OBJECTIVE STATEMENT
87yF with PMH of active endometrial ca on chemo (1st tx 4 days ago), HTN, HLD presents to the ED c/o a witnessed syncopal episode while sitting outside in a chair without prodromal symptoms. Pt did not fall or hit head or have any trauma. Pt is currently denies complaints. Denies fever, CP, SOB, abdominal pain, n/v/d, dysuria.

## 2022-08-08 NOTE — ED PROVIDER NOTE - PROGRESS NOTE DETAILS
Pebbles Vazquez PGY-3 ct head negative, patient stable for admission for [facility] due to syncope. likely due to new chemo however need eval for possible cardiac etiology. otherwise ekg, trop negative  wbc elevated liukelt due to recent chemo treatment. no infectious sxs such as dysuria, cough, abd pain, or any other sxs besides syncope.

## 2022-08-08 NOTE — ED PROVIDER NOTE - ATTENDING CONTRIBUTION TO CARE
Brief HPI:  87yF with PMH of active endometrial ca on chemo (1st tx 4 days ago), HTN, HLD presents to the ED c/o a witnessed syncopal episode while sitting.  No reported seizure like activity.  Patient currently asymptomatic.     Vitals:   Reviewed    Exam:    GEN:  Non-toxic appearing, non-distressed, speaking full sentences, non-diaphoretic, AAOx3  HEENT:  NCAT, neck supple, EOMI, PERRLA, sclera anicteric, no conjunctival pallor or injection, no stridor, normal voice, no tonsillar exudate, uvula midline  CV:  regular rhythm and rate, s1/s2 audible, no murmurs, rubs or gallops, peripheral pulses 2+ and symmetric  PULM:  non-labored respirations, lungs clear to auscultation bilaterally, no wheezes, crackles or rales  ABD:  non distended, non-tender, no rebound, no guarding, negative Ashley's sign, bowel sounds normal, no cvat  MSK:  no gross deformity, non-tender extremities and joints, range of motion grossly normal appearing, no extremity edema, extremities warm and well perfused   NEURO:  AAOx3, CN II-XII intact, motor 5/5 in upper and lower extremities bilaterally, sensation grossly intact in extremities and trunk, finger to nose testing wnl, no nystagmus, negative Romberg, no pronator drift, no gait deficit  SKIN:  warm, dry, no rash or vesicles     A/P:  87yF with PMH of active endometrial ca on chemo (1st tx 4 days ago), HTN, HLD presents to the ED c/o a witnessed syncopal episode while sitting. EKG non-ischemic.  Will send labs, supportive care, head ct.  Dispo pending.

## 2022-08-08 NOTE — ED ADULT NURSE NOTE - NSFALLRSKASSESSTYPE_ED_ALL_ED
Patient reports did an Oncare visit and needs to schedule COVID visit. Reports she will on the line with COVID scheduling and they told her to speak with nurse line.     Warm transferred to COVID scheduling.  Was informed they could schedule test with her.     Samanta Campos RN/[facility]      Reason for Disposition    Nursing judgment    Additional Information    Negative: Nursing judgment    Negative: Information only call; adult is not ill or injured    Negative: Nursing judgment    Negative: Nursing judgment    Negative: Nursing judgment    Negative: Nursing judgment    Negative: Nursing judgment    Negative: Nursing judgment    Negative: Nursing judgment    Negative: Nursing judgment    Negative: Nursing judgment    Negative: Nursing judgment    Negative: Nursing judgment    Protocols used: NO GUIDELINE OR REFERENCE [id number]       Initial (On Arrival)

## 2022-08-08 NOTE — ED PROVIDER NOTE - PHYSICAL EXAMINATION
GEN: Patient awake alert NAD.   HEENT: normocephalic, atraumatic, sluggish EOM, PERRL, no scleral icterus, moist MM  CARDIAC: RRR, S1, S2, no murmur.   PULM: CTA B/L no wheeze, rhonchi, rales.   ABD: soft NT, ND, no rebound no guarding, no CVA tenderness.   MSK: Moving all extremities, no edema. 5/5 strength and full ROM in all extremities.     NEURO: A&Ox3, gait normal, no focal neurological deficits, CN 2-12 grossly intact, -pronator drift, Romberg, no dysmetria   SKIN: warm, dry, no rash.

## 2022-08-09 NOTE — H&P ADULT - PROBLEM SELECTOR PLAN 6
-  goal normotension; resume home antihypertensives - No baseline for comparison; elevated to 1500  - Obtain TTE - No baseline for comparison; elevated to 1500  - Obtain TTE - has a hx of TAVR - s/p PCI 3/21  - resume home meds - s/p PCI 3/21  - resume home statin, aspirin; resume home BB when BP tolerates

## 2022-08-09 NOTE — PHYSICAL THERAPY INITIAL EVALUATION ADULT - PERTINENT HX OF CURRENT PROBLEM, REHAB EVAL
Pt is an 87 year old female presenting after witnessed loss of consciousness episode. Pt was sitting in chair outside, when suddenly started starring into space. CT head unremarkable. PMH: serous endometrial carcinoma (stage IV) undergoing systemic chemotherapy (carboplatin, taxol x1 – 8/5/22) at [facility] [place], HTN, CAD s/p PCI 3/2021, s/p AVR,  hypothyroidism, and HLD.

## 2022-08-09 NOTE — H&P ADULT - PROBLEM SELECTOR PLAN 5
- No baseline for comparison; elevated to 1500  - Obtain TTE - in setting of recently initiated chemotherapy; trend for now

## 2022-08-09 NOTE — OCCUPATIONAL THERAPY INITIAL EVALUATION ADULT - TRANSFER SAFETY CONCERNS NOTED: SIT/STAND, REHAB EVAL
decreased safety awareness/losing balance/inability to maintain weight-bearing restrictions w/o assist

## 2022-08-09 NOTE — H&P ADULT - PROBLEM SELECTOR PLAN 4
Lab Results   Component Value Date    HGBA1C 7 1 (H) 12/07/2020       COMPLIANT WITH MEDICATION  DENIES ANY NUMBNESS, TINGLING IN FEET    - DISCUSSED DIETARY MODIFICATION OF CARBOHYDRATES  - HGB A1C AND URINE STUDIES DUE TODAY  - FOOT CARE  - RV 3 MONTHS - in setting of recently initiated chemotherapy; trend for now - UA with pyuria and bacteria, large LE, but negative nitrite  - patient without urinary complaints  - hold antibiotics for now, trend fever curve - low threshold to initiate antibiotics in patient newly initiated on CTx

## 2022-08-09 NOTE — H&P ADULT - NSHPPHYSICALEXAM_GEN_ALL_CORE
PHYSICAL EXAM:  Vital Signs Last 24 Hrs  T(C): 36.8 (09 Aug 2022 01:48), Max: 36.8 (09 Aug 2022 01:48)  T(F): 98.3 (09 Aug 2022 01:48), Max: 98.3 (09 Aug 2022 01:48)  HR: 80 (09 Aug 2022 04:05) (71 - 80)  BP: 120/68 (09 Aug 2022 04:05) (104/41 - 135/65)  BP(mean): --  RR: 17 (09 Aug 2022 04:05) (15 - 17)  SpO2: 98% (09 Aug 2022 04:05) (98% - 100%)    Parameters below as of 09 Aug 2022 04:05  Patient On (Oxygen Delivery Method): room air      CONSTITUTIONAL: NAD, well-developed, well-groomed  EYES: PERRLA; conjunctiva and sclera clear  ENMT: Moist oral mucosa, no pharyngeal injection or exudates; normal dentition  NECK: Supple, no palpable masses; no thyromegaly  RESPIRATORY: Normal respiratory effort; lungs are clear to auscultation bilaterally  CARDIOVASCULAR: Regular rate and rhythm, normal S1 and S2, no murmur/rub/gallop; No lower extremity edema; Peripheral pulses are 2+ bilaterally  ABDOMEN: Nontender to palpation, normoactive bowel sounds, no rebound/guarding; No hepatosplenomegaly  MUSCULOSKELETAL:  Normal gait; no clubbing or cyanosis of digits; no joint swelling or tenderness to palpation  PSYCH: A+O to person, place, and time; affect appropriate  NEUROLOGY: CN 2-12 are intact and symmetric; no gross sensory deficits   SKIN: No rashes; no palpable lesions

## 2022-08-09 NOTE — PHYSICAL THERAPY INITIAL EVALUATION ADULT - CRITERIA FOR SKILLED THERAPEUTIC INTERVENTIONS
Needs to be seen BUT need diagnosis' or his coagulation issues  first on what father has.     impairments found

## 2022-08-09 NOTE — H&P ADULT - ASSESSMENT
Patient is an 88yo F with PMH of serous endometrial carcinoma (stage IV) undergoing systemic chemotherapy (carboplatin, taxol x1 – 8/5/22) at [facility] [place], HTN, and HLD who presented after a witnessed loss of consciousness while sitting in a chair outdoors.    Patient is an 86yo F with PMH of serous endometrial carcinoma (stage IV) undergoing systemic chemotherapy (carboplatin, taxol x1 – 8/5/22) at [facility] [place], HTN, CAD s/p PCI 3/2021, s/p AVR, and HLD who presented after a witnessed loss of consciousness while sitting in a chair outdoors.    Patient is an 88yo F with PMH of serous endometrial carcinoma (stage IV) undergoing systemic chemotherapy (carboplatin, taxol x1 – 8/5/22[place] [facility] [place] [facility] [place]N, CAD s/p PCI 3/2021, s/p AVR, hypothyroidism, and HLD who presented after a witnessed loss of consciousness while sitting in a chair outdoors.

## 2022-08-09 NOTE — H&P ADULT - PROBLEM SELECTOR PLAN 7
- Resume home meds -  goal normotension; resume home antihypertensives - No baseline for comparison; elevated to 1500  - Obtain TTE - has a hx of TAVR  - resume home diuretics - No baseline for comparison; elevated to 1500  - Obtain TTE - has a hx of TAVR  - resume home furosemide - hold for hypotension

## 2022-08-09 NOTE — H&P ADULT - NSICDXPASTMEDICALHX_GEN_ALL_CORE_FT
PAST MEDICAL HISTORY:  Endometrial ca     HTN (hypertension)      PAST MEDICAL HISTORY:  CAD (coronary artery disease)     Endometrial ca     HTN (hypertension)     S/P AVR

## 2022-08-09 NOTE — H&P ADULT - NSHPREVIEWOFSYSTEMS_GEN_ALL_CORE
CONSTITUTIONAL: No fever, weight loss, or fatigue  EYES: No eye pain, visual disturbances, or discharge  ENMT:  No difficulty hearing, tinnitus, vertigo; No sinus or throat pain  NECK: No pain or stiffness  BREASTS: No pain, masses, or nipple discharge  RESPIRATORY: No cough, wheezing, chills or hemoptysis; No shortness of breath  CARDIOVASCULAR: No chest pain, palpitations, dizziness, or leg swelling  GASTROINTESTINAL: No abdominal or epigastric pain. No nausea, vomiting, or hematemesis; No diarrhea or constipation. No melena or hematochezia.  GENITOURINARY: No dysuria, frequency, hematuria, or incontinence  NEUROLOGICAL: No headaches, memory loss, loss of strength, numbness, or tremors; admitted to loss of consciousness  SKIN: No itching, burning, rashes, or lesions   LYMPH NODES: No enlarged glands  ENDOCRINE: No heat or cold intolerance; No hair loss  MUSCULOSKELETAL: No joint pain or swelling; No muscle, back, or extremity pain  PSYCHIATRIC: No depression, anxiety, mood swings, or difficulty sleeping  HEME/LYMPH: No easy bruising, or bleeding gums  ALLERGY AND IMMUNOLOGIC: No hives or eczema CONSTITUTIONAL: No fever, weight loss, or fatigue  EYES: No eye pain, visual disturbances, or discharge  ENMT:  No difficulty hearing, tinnitus, vertigo; No sinus or throat pain  NECK: No pain or stiffness  BREASTS: No pain, masses, or nipple discharge  RESPIRATORY: No cough, wheezing, chills or hemoptysis; No shortness of breath  CARDIOVASCULAR: No chest pain, palpitations, dizziness, or leg swelling  GASTROINTESTINAL: No abdominal or epigastric pain. No nausea, vomiting, or hematemesis; No diarrhea or constipation. No melena or hematochezia.  GENITOURINARY: No dysuria, frequency, hematuria, or incontinence  NEUROLOGICAL: No headaches, memory loss, loss of strength, numbness, or tremors; admitted to loss of consciousness and confusion  SKIN: No itching, burning, rashes, or lesions   LYMPH NODES: No enlarged glands  ENDOCRINE: No heat or cold intolerance; No hair loss  MUSCULOSKELETAL: No joint pain or swelling; No muscle, back, or extremity pain  PSYCHIATRIC: No depression, anxiety, mood swings, or difficulty sleeping  HEME/LYMPH: No easy bruising, or bleeding gums  ALLERGY AND IMMUNOLOGIC: No hives or eczema

## 2022-08-09 NOTE — H&P ADULT - PROBLEM SELECTOR PLAN 3
- s/p Neulasta; not immediately concerning for infection   - trend fever curve and monitor for signs of infectious process - s/p Neulasta  - trend fever curve and monitor for signs of infectious process  - UA with pyuria and bacteria, large LE, but negative nitrite, though patient without urinary complaints - s/p Neulasta  - UA with pyuria and bacteria, large LE, but negative nitrite, though patient without urinary complaints  - CXR with right basilar hazy opacities likely represent pleural effusion - unclear what this is from, but given lack of respiratory symptoms and focal consolidation, unlikely PNA  - trend fever curve and monitor for signs of infectious process

## 2022-08-09 NOTE — H&P ADULT - NSICDXFAMILYHX_GEN_ALL_CORE_FT
FAMILY HISTORY:  No pertinent family history in first degree relatives FAMILY HISTORY:  Child  Still living? Unknown  Family history of parotid cancer, Age at diagnosis: Age Unknown

## 2022-08-09 NOTE — H&P ADULT - PROBLEM SELECTOR PLAN 2
- Recently initiated systemic chemotherapy – 8/5/22 – with carboplatin and paclitaxel  - [place] [facility] [place]

## 2022-08-09 NOTE — PATIENT PROFILE ADULT - FOOD INSECURITY
Call to Eric. He tested positive on 5/19, symptoms only lasted about 2 days. He did not take Paxlovid.  His COVID symptoms were mild.  He was planning to go to dinner w/ friends tonight.  I told him that we recommend masking /  Isolating for 20 days.  He was sad to hear this.     
no

## 2022-08-09 NOTE — OCCUPATIONAL THERAPY INITIAL EVALUATION ADULT - GENERAL OBSERVATIONS, REHAB EVAL
PT received semisupine in stretcher, +telemetry, daughter at bedside and agreed to participate in OT.

## 2022-08-09 NOTE — H&P ADULT - HISTORY OF PRESENT ILLNESS
Patient is an 88yo F with PMH of serous endometrial carcinoma (stage IV) undergoing systemic chemotherapy (carboplatin, taxol x1 – 8/5/22) at [facility] [place], HTN, and HLD who presented after a witnessed loss of consciousness while sitting in a chair outdoors.    Patient is an 86yo F with PMH of serous endometrial carcinoma (stage IV) [place]atin, [place]22[place] [facility] [place], and HLD who presented after a witnessed loss of consciousness while sitting in her walker outdoors on the day of presentation. The patient's daughter, Shayla, was present for the event. She stated that the patient was commenting on the breeze when she said something unusual, and then lost consciousness for about 45 seconds. The daughter described it as a staring episode without generalized shaking or other movements, stating the patient's eyes "almost rolled backward." The episode self-resolved and was followed by a period of confusion, without a quantified length. The patient's granddaughter had also commented on the patient being confused prior to the event, about 1 day after receiving her first session of chemotherapy. The patient herself did not recall the event in significant detail, but reported recent heaviness in her legs since initiating the chemotherapy.  The patient had a prior syncopal episode several years ago when she fell down the stairs, and was evaluated at the time by a cardiologist. She underwent a TAVR at the time for a reportedly damaged valve.  Patient is an 88yo F with PMH of serous endometrial carcinoma (stage IV) undergoing systemic chemotherapy (carboplatin, taxol x1 – 8/5/22) at [facility] [place], HTN, CAD s/p PCI 3/2021, s/p AVR, and HLD who presented after a witnessed loss of consciousness while sitting in her walker outdoors on the day of presentation. The patient's daughter, Shayla, was present for the event. She stated that the patient was commenting on the breeze when she said something unusual, and then lost consciousness for about 45 seconds. The daughter described it as a staring episode without generalized shaking or other movements, stating the patient's eyes "almost rolled backward." The episode self-resolved and was followed by a period of confusion, without a quantified length. The patient's granddaughter had also commented on the patient being confused prior to the event, about 1 day after receiving her first session of chemotherapy. The patient herself did not recall the event in significant detail, but reported recent heaviness in her legs since initiating the chemotherapy.  The patient had a prior syncopal episode several years ago when she fell down the stairs, and was evaluated at the time by a cardiologist. She underwent a TAVR at the time for a reportedly damaged valve.  Patient is an 88yo F with PMH of serous endometrial carcinoma (stage IV) undergoing systemic [place] (carboplatin, taxol x1 – 8[place]5/22[place] [facility] [place], CAD s/p PCI 3/2021, s/p AVR,  hypothyroidism, and HLD who presented after a witnessed loss of consciousness while sitting in her walker outdoors on the day of presentation. The patient's daughter, Shayla, was present for the event. She stated that the patient was commenting on the breeze when she said something unusual, and then lost consciousness for about 45 seconds. The daughter described it as a staring episode without generalized shaking or other movements, stating the patient's eyes "almost rolled backward." The episode self-resolved and was followed by a period of confusion, without a quantified length. The patient's granddaughter had also commented on the patient being confused prior to the event, about 1 day after receiving her first session of chemotherapy. The patient herself did not recall the event in significant detail, but reported recent heaviness in her legs since initiating the chemotherapy.  The patient had a prior syncopal episode several years ago when she fell down the stairs, and was evaluated at the time by a cardiologist. She underwent a TAVR at the time for a reportedly damaged valve.

## 2022-08-09 NOTE — OCCUPATIONAL THERAPY INITIAL EVALUATION ADULT - PERTINENT HX OF CURRENT PROBLEM, REHAB EVAL
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

## 2022-08-09 NOTE — PATIENT PROFILE ADULT - FALL HARM RISK - HARM RISK INTERVENTIONS
Assistance with ambulation/Assistance OOB with selected safe patient handling equipment/Communicate Risk of Fall with Harm to all staff/Discuss with provider need for PT consult/Monitor gait and stability/Provide patient with walking aids - walker, cane, crutches/Reinforce activity limits and safety measures with patient and family/Review medications for side effects contributing to fall risk/Tailored Fall Risk Interventions/Use of alarms - bed, chair and/or voice tab/Visual Cue: Yellow wristband and red socks/Bed in lowest position, wheels locked, appropriate side rails in place/Call bell, personal items and telephone in reach/Instruct patient to call for assistance before getting out of bed or chair/Non-slip footwear when patient is out of bed/[place] to call system/Physically safe environment - no spills, clutter or unnecessary equipment/Purposeful Proactive Rounding/Room/bathroom lighting operational, light cord in reach

## 2022-08-09 NOTE — PHYSICAL THERAPY INITIAL EVALUATION ADULT - GENERAL OBSERVATIONS, REHAB EVAL
Pt received semisupine on stretcher, +cardiac monitor, in NAD. Daughter at bedside. Pt agreeable to participate in PT evaluation. Pt left semisupine on stretcher, all lines intact and RN aware.

## 2022-08-09 NOTE — PHYSICAL THERAPY INITIAL EVALUATION ADULT - ADDITIONAL COMMENTS
Pt lives in a house with 5 exterior steps to enter (bilateral handrail). Within home, there is a flight of stairs (unilateral handrail and banister). Prior to admission, pt ambulated with a cane/rollator independently.

## 2022-08-09 NOTE — H&P ADULT - PROBLEM SELECTOR PLAN 1
- Witnessed LOC without trauma  - Telemetry monitoring  - ECG:   - Troponin negative x2  - Obtain TTE  - CT head unremarkable; possibility of new metastases to brain – obtain MRI with contrast  - In setting of recently initiated systemic chemotherapy  - Obtain orthostatic VS  - Hemodynamics, lack of hypoxia, and lack of chest pain do not point to pulmonary embolism but within DDx due to known malignancy - Witnessed LOC without trauma  - Telemetry monitoring - PVCs seen on telemetry in ED  - ECG: NSR, Q waves V2-V3, III, aVF  - Troponin negative x2  - Obtain TTE - has a hx of TAVR  - CT head unremarkable; possibility of new metastases to brain – obtain MRI with contrast  - In setting of recently initiated systemic chemotherapy  - Obtain orthostatic VS  - Hemodynamics, lack of hypoxia, and lack of chest pain do not point to pulmonary embolism but within DDx due to known malignancy - Witnessed LOC without trauma  - Telemetry monitoring - PVCs seen on telemetry in ED  - ECG: NSR, Q waves V2-V3, III, aVF  - Troponin negative x2  - Obtain TTE - has a hx of TAVR  - potentially seizure - obtain spot EEG  - CT head unremarkable; possibility of new metastases to brain – obtain MRI with contrast  - In setting of recently initiated systemic chemotherapy  - Obtain orthostatic VS  - Hemodynamics, lack of hypoxia, and lack of chest pain do not point to pulmonary embolism but within DDx due to known malignancy

## 2022-08-09 NOTE — ED ADULT NURSE REASSESSMENT NOTE - NS ED NURSE REASSESS COMMENT FT1
Pt alert and awake, complaining of back and LLE pain, 8/10 pain, ACP (Brock Hancock) made aware, awaiting for further orders at this moment, pt changed and repositioned for comfort, respirations are even and unlabored, safety precautions implemented as per protocol.
Received report from Mid-shift ONEL Deutsch: Pt A&Ox4, no complaints at this moment, NAD, respirations are even and unlabored, VS noted, CM in progress= NSR, Safety precautions implemented as per protocol, awaiting further MD orders, will continue to monitor.
Report received from night RNDelaney. Pt appears comfortable conversing with family at bedside. Pt verbalizes improvement of symptoms. Denies chest pain, SOB, headache, lightheadedness, dizziness, vision changes, nausea, vomiting, fever or chills. Respirations even and unlabored. Skin is warm and intact. Bedside cardiac monitor - NSR. Bed in lowest position, wheels locked, side rails up, safety maintained. Awaiting bed assignment.
Pt reports increase in abdominal pain. States right lower quadrant and radiates throughout entire lower abd. MD made aware. V/S charted. Awaiting pain med orders.
Pt noted to be slightly hypotensive at this time. Denies dizziness, blurry vision, headache, light-headedness. Hospitalist paged and made aware. Awaiting fluid orders at this time. Pt in no apprent distress. NSR on monitor. Respirations even and unlabored. Will continue to monitor.

## 2022-08-09 NOTE — H&P ADULT - PROBLEM SELECTOR PLAN 8
- lovenox - Resume home meds -  goal normotension; resume home antihypertensives, hold for hypotension

## 2022-08-10 NOTE — EEG REPORT - NS EEG TEXT BOX
[facility]   COMPREHENSIVE EPILEPSY CENTER   REPORT OF ROUTINE VIDEO EEG     [facility]: [address] [place] [address] [address], [place] [address], Ph#: [phone]  [facility]: [address], [place] [address], Ph#: [phone]  Office: [address] [place] [address], [facility] [address], [place] [address] Ph#: [phone]    Patient Name: FRIDA WELCH  Age and : 87y ([birth year])  MRN #: [id number]  Location: [place] [address] A  Referring Physician: Tariq Olivier    Study Date: 08-10-22    _____________________________________________________________  TECHNICAL INFORMATION    Placement and Labeling of Electrodes:  The EEG was performed utilizing 20 channels referential EEG connections (coronal over temporal over parasagittal montage) using all standard 10-20 electrode placements with EKG.  Recording was at a sampling rate of 256 samples per second per channel.  Time synchronized digital video recording was done simultaneously with EEG recording.  A low light infrared camera was used for low light recording.  Pio and seizure detection algorithms were utilized.    _____________________________________________________________  HISTORY    Patient is a 87y old  Female who presents with a chief complaint of syncope (10 Aug 2022 15:39)      PERTINENT MEDICATION:  MEDICATIONS  (STANDING):  aspirin enteric coated 81 milliGRAM(s) Oral daily  atorvastatin 40 milliGRAM(s) Oral at bedtime  enoxaparin Injectable 40 milliGRAM(s) SubCutaneous every 24 hours  furosemide    Tablet 20 milliGRAM(s) Oral daily  levothyroxine 75 MICROGram(s) Oral daily  senna 2 Tablet(s) Oral at bedtime  sodium chloride 0.9%. 1000 milliLiter(s) (70 mL/Hr) IV Continuous <Continuous>    _____________________________________________________________  STUDY INTERPRETATION    Findings: The background was continuous, spontaneously variable and reactive. During wakefulness, the posterior dominant rhythm consisted of symmetric, well-modulated 9-10 Hz activity, with amplitude to 30 uV, that attenuated to eye opening.  Low amplitude frontal beta was noted in wakefulness.    Background Slowing:  No generalized background slowing was present.    Focal Slowing:   None was present.    Sleep Background:  Drowsiness was characterized by fragmentation, attenuation, and slowing of the background activity.      Other Non-Epileptiform Findings:  None were present.    Interictal Epileptiform Activity:   None were present.    Events:  Clinical events: None recorded.  Seizures: None recorded.    Activation Procedures:   Hyperventilation was not performed.    Photic stimulation was not performed.     Artifacts:  Intermittent myogenic and movement artifacts were noted.    ECG:  The heart rate on single channel ECG was predominantly between 60-80 BPM.    _____________________________________________________________  EEG SUMMARY/CLASSIFICATION  Normal EEG in the awake, drowsy and asleep states.  _____________________________________________________________  EEG IMPRESSION/CLINICAL CORRELATE  Normal EEG study.    Jim Dexter MD  Attending Physician, [facility] [place]

## 2022-08-10 NOTE — PROGRESS NOTE ADULT - ASSESSMENT
Hematology/Oncology consulted on this 87 year old female with a past medical history of Stage IV endometrial carcinoma, [place] [facility] is s/p carboplatin and paclitaxel on 8-5, and received a dose of Filgrastim 6mg SQ. Presented to the ED due to syncope.    Syncope  - CT head shows  No acute intracranial hemorrhage, vasogenic edema concerning for   intracranial metastatic disease, hydrocephalus or extra-axial collection.   Mild chronic microvascular changes.  Please note, however, that there is limited assessment for intracranial   metastatic disease in the absence of IV contrast. Contrast-enhanced brain   MRI can be performed if there is strong clinical concern for better   assessment.  - MRI head pending, EEG placed  - PT/OT evaluated, recommend home services  - TTE pending    Metastatic endometrial carcinoma  - s/p carboplatin and paclitaxel on 8-5, and received a dose of Filgrastim 6mg SQ  - [place] [facility]    Leukocytosis  - Leukocytosis downtrending  - Likely reactive due to Filgrastim  - Continue to monitor CBC    Thrombocytopenia  - Platelet count 65k, lower today  - Thrombocytopenia likely due to recent CTX  - Please transfuse platelets for counts < 10k    Will continue to follow and coordinate care with [facility].    Juancarlos Yeh PA-C  Hematology/Oncology  [facility]  [phone] (office)  [phone] (alt office)  Evenings and weekends please call MD on call or office

## 2022-08-10 NOTE — PROGRESS NOTE ADULT - ASSESSMENT
Patient is an 86yo F with PMH of serous endometrial carcinoma (stage IV) undergoing systemic [place] (carboplatin, taxol x1 – 8/5/22[place] [facility], CAD s/p PCI 3/2021, s/p AVR, hypothyroidism, and HLD who presented after a witnessed loss of consciousness while sitting in a chair outdoors.

## 2022-08-10 NOTE — PROGRESS NOTE ADULT - ASSESSMENT
Patient is an 86yo F with PMH of serous endometrial carcinoma (stage IV) undergoing systemic chemotherapy ([place]oplatin, taxol x1 – 8/5/22[place] [facility], CAD s/p PCI 3/2021, s/p AVR, hypothyroidism, and HLD who presented after a witnessed loss of consciousness while sitting in a chair outdoors.

## 2022-08-10 NOTE — PROGRESS NOTE ADULT - PROBLEM SELECTOR PLAN 3
- s/p Neulasta  - UA with pyuria and bacteria, large LE, but negative nitrite, though patient without urinary complaints  - CXR with right basilar hazy opacities likely represent pleural effusion - unclear what this is from, but given lack of respiratory symptoms and focal consolidation, unlikely PNA  - trend fever curve and monitor for signs of infectious process

## 2022-08-11 NOTE — PROGRESS NOTE ADULT - NS ATTEND AMEND GEN_ALL_CORE FT
No complaints.    - No chemotherapy while inpatient. Recently completed carboplatin + paclitaxel, followed by peg-filgrastim.  - Cytopenia likely secondary to chemotherapy. Monitor CBC and transfuse to maintain hemoglobin above 7.  - MRI pending for syncope workup.

## 2022-08-11 NOTE — PROGRESS NOTE ADULT - ASSESSMENT
Patient is an 88yo F with PMH of serous endometrial carcinoma (stage IV) undergoing systemic chemotherapy (carboplatin, taxol x1 – 8/5/22) at [facility], HTN, CAD s/p PCI 3/2021, s/p AVR, hypothyroidism, and HLD who presented after a witnessed loss of consciousness while sitting in a chair outdoors.

## 2022-08-11 NOTE — PROGRESS NOTE ADULT - ASSESSMENT
Hematology/Oncology consulted on this 87 year old female with a past medical history of Stage IV endometrial carcinoma, [place] [facility] is s/p carboplatin and paclitaxel on 8-5, and received a dose of Filgrastim 6mg SQ. Presented to the ED due to syncope.    Syncope  - CT head shows  No acute intracranial hemorrhage, vasogenic edema concerning for   intracranial metastatic disease, hydrocephalus or extra-axial collection.   Mild chronic microvascular changes.  Please note, however, that there is limited assessment for intracranial   metastatic disease in the absence of IV contrast. Contrast-enhanced brain   MRI can be performed if there is strong clinical concern for better   assessment.  - MRI head pending  - EEG normal  - PT/OT evaluated, recommend home services  - TTE shows  1. Mitral annular calcification and calcified mitral  leaflets with normal diastolic opening. Mild mitral  regurgitation.  2. Transcatheter aortic valve replacement.  Peak  transaortic valve gradient equals 36 mm Hg, mean  transaortic valve gradient equals 20 mm Hg, which is  probably normal in the presence of a prosthetic valve.  Mild aortic regurgitation.  3. Moderate concentric left ventricular hypertrophy.  4. Normal left ventricular systolic function. No segmental  wall motion abnormalities.  5. Normal right ventricular size and function.    Metastatic endometrial carcinoma  - s/p carboplatin and paclitaxel on 8-5, and received a dose of Filgrastim 6mg SQ  - [place] [facility]    Leukocytosis  - Leukocytosis downtrending  - Likely reactive due to Filgrastim  - Continue to monitor CBC    Thrombocytopenia  - Platelet count 46k, lower today  - Thrombocytopenia likely due to recent CTX  - Please transfuse platelets for counts < 10k    Will continue to follow and coordinate care with [facility].    Juancarlos Yeh PA-C  Hematology/Oncology  [facility]  [phone] (office)  [phone] (alt office)  Evenings and weekends please call MD on call or office

## 2022-08-11 NOTE — CONSULT NOTE ADULT - ASSESSMENT
Patient is an 88yo F with PMH of serous endometrial carcinoma (stage IV) undergoing systemic chemotherapy (carboplatin, taxol x1 – 8/5/22) at [facility], HTN, CAD s/p PCI 3/2021, s/p AVR,  hypothyroidism, and HLD who presented after a witnessed loss of consciousness while sitting in her walker outdoors on the day of presentation. The patient's daughter, Shayla, was present for the event. She stated that the patient was commenting on the breeze when she said something unusual, and then lost consciousness for about 45 seconds. The daughter described it as a staring episode without generalized shaking or other movements, stating the patient's eyes "almost rolled backward." The episode self-resolved and was followed by a period of confusion, without a quantified length. The patient's granddaughter had also commented on the patient being confused prior to the event, about 1 day after receiving her first session of chemotherapy. The patient herself did not recall the event in significant detail, but reported recent heaviness in her legs since initiating the chemotherapy. The patient had a prior syncopal episode several years ago when she fell down the stairs, and was evaluated at the time by a cardiologist. She underwent a TAVR at the time for a reportedly damaged valve.     Hematology/Oncology consulted on this 87 year old female with a past medical history of Stage IV endometrial carcinoma, under care at [facility]. She is s/p carboplatin and paclitaxel on 8-5, and received a dose of Filgrastim 6mg SQ.    Syncope  - CT head shows  No acute intracranial hemorrhage, vasogenic edema concerning for   intracranial metastatic disease, hydrocephalus or extra-axial collection.   Mild chronic microvascular changes.  Please note, however, that there is limited assessment for intracranial   metastatic disease in the absence of IV contrast. Contrast-enhanced brain   MRI can be performed if there is strong clinical concern for better   assessment.  - MRI head pending  - PT/OT consulted    Metastatic endometrial carcinoma  - s/p carboplatin and paclitaxel on 8-5, and received a dose of Filgrastim 6mg SQ    Leukocytosis  - Leukocytosis likely reactive due to Filgrastim  - Continue to monitor CBC    Thrombocytopenia  - Thrombocytopenia likely reactive given recent CTX  - Please transfuse platelets for counts < 10k    Will continue to follow and coordinate care with [facility].    Juancarlos Yeh PA-C  Hematology/Oncology  [facility]  [phone] (office)  [phone] (alt office)  Evenings and weekends please call MD on call or office
88yo F with PMH of serous endometrial carcinoma (stage IV) undergoing systemic chemotherapy (carboplatin, taxol x1 – 8/5/22[place] [facility], [place]N, CAD s/p PCI 3/2021, s/p AVR,  hypothyroidism, and HLD who presented after a witnessed loss of consciousness with quick return to baseline PE non-focal CTH- EEG normal    Impression: syncope less likely seizure    MRI brain w/con pending, can be done here or can be arranged as an outpt  Can follow up with Neurology, Dr. Waldemar Arnett at [phone]

## 2022-08-11 NOTE — CONSULT NOTE ADULT - SUBJECTIVE AND OBJECTIVE BOX
Neurology consult    FRIDA [id number]     Patient is a 87y old  Female who presents with a chief complaint of syncope (11 Aug 2022 15:19)      HPI:  Patient is an 86yo F with PMH of serous endometrial carcinoma (stage IV) undergoing systemic [place] (carboplatin, [place]1 – [place] [facility], CAD s/p PCI 3/2021, s/p AVR,  hypothyroidism, and HLD who presented after a witnessed loss of consciousness while sitting in her walker outdoors on the day of presentation. The patient's daughter, Shayla, was present for the event. She stated that the patient was commenting on the breeze when she said something unusual, and then lost consciousness for about 45 seconds. The daughter described it as a staring episode without generalized shaking or other movements, stating the patient's eyes "almost rolled backward." The episode self-resolved and was followed by a period of confusion, without a quantified length. The patient's granddaughter had also commented on the patient being confused prior to the event, about 1 day after receiving her first session of chemotherapy. The patient herself did not recall the event in significant detail, but reported recent heaviness in her legs since initiating the chemotherapy.  The patient had a prior syncopal episode several years ago when she fell down the stairs, and was evaluated at the time by a cardiologist. She underwent a TAVR at the time for a reportedly damaged valve.  (09 Aug 2022 08:30)      no difficulty with language.  No vision loss or double vision.  No dizziness, vertigo or new hearing loss.  . No difficulty with swallowing.  No focal weakness.  No focal sensory changes.  No numbness or tingling in the bilateral lower extremities.  No difficulty with balance.  No difficulty with ambulation.    REVIEW OF SYSTEMS:    Constitutional: No fever, chills, fatigue, weakness  Eyes: no eye pain, visual disturbances, or discharge  ENT:  No difficulty hearing, tinnitus, vertigo; No sinus or throat pain  Neck: No pain or stiffness  Respiratory: No cough, dyspnea, wheezing   Cardiovascular: No chest pain, palpitations,   Gastrointestinal: No abdominal or epigastric pain. No nausea, vomiting  No diarrhea or constipation.   Genitourinary: No dysuria, frequency, hematuria or incontinence  Neurological: No headaches, lightheadedness, vertigo, numbness or tremors  Psychiatric: No depression, anxiety, mood swings or difficulty sleeping  Musculoskeletal: No joint pain or swelling; No muscle, back or extremity pain  Skin: No itching, burning, rashes or lesions   Lymph Nodes: No enlarged glands  Endocrine: No heat or cold intolerance; No hair loss, No h/o diabetes or thyroid dysfunction  Allergy and Immunologic: No hives or eczema    MEDICATIONS    aspirin enteric coated 81 milliGRAM(s) Oral daily  atorvastatin 40 milliGRAM(s) Oral at bedtime  furosemide    Tablet 20 milliGRAM(s) Oral daily  levothyroxine 75 MICROGram(s) Oral daily  oxycodone    5 mG/acetaminophen 325 mG 1 Tablet(s) Oral every 6 hours PRN  polyethylene glycol 3350 17 Gram(s) Oral daily PRN  senna 2 Tablet(s) Oral at bedtime  sodium chloride 0.9%. 1000 milliLiter(s) IV Continuous <Continuous>      PMH: Endometrial ca    HTN (hypertension)    CAD (coronary artery disease)    S/P AVR    Hypothyroidism         PSH: S/P AVR (aortic valve replacement)    History of endometrial biopsy        Family history: No history of dementia, strokes, or seizures   FAMILY HISTORY:  Family history of parotid cancer (Child)        SOCIAL HISTORY:  No history of tobacco or alcohol use     Allergies    No Known Allergies    Intolerances            Vital Signs Last 24 Hrs  T(C): 37.1 (11 Aug 2022 11:33), Max: 37.1 (11 Aug 2022 11:33)  T(F): 98.7 (11 Aug 2022 11:33), Max: 98.7 (11 Aug 2022 11:33)  HR: 94 (11 Aug 2022 11:33) (85 - 94)  BP: 130/71 (11 Aug 2022 11:33) (120/62 - 130/71)  BP(mean): --  RR: 18 (11 Aug 2022 11:33) (17 - 18)  SpO2: 100% (11 Aug 2022 11:33) (98% - 100%)    Parameters below as of 11 Aug 2022 11:33  Patient On (Oxygen Delivery Method): room air          On Neurological Examination:    Mental Status - Patient is alert, awake, oriented X3. fluent, names, no dysarthria no aphasia Follows commands well and able to answer questions appropriately. Mood and affect  normal    Cranial Nerves - PERRL, EOMI, VFF, V1-V3 intact, no gross facial asymmetry, tongue/uvula midline    Motor Exam -   5/5    Sensory    Intact to light touch and pinprick bilaterally    Coord: FTN intact bilaterally     Gait -  deferred    Reflexes:          2+ UE absent LE                                              LABS:  CBC Full  -  ( 11 Aug 2022 05:39 )  WBC Count : 4.75 K/uL  RBC Count : 4.16 M/uL  Hemoglobin : 12.3 g/dL  Hematocrit : 37.7 %  Platelet Count - Automated : 46 K/uL  Mean Cell Volume : 90.6 fL  Mean Cell Hemoglobin : 29.6 pg  Mean Cell Hemoglobin Concentration : 32.6 gm/dL  Auto Neutrophil # : x  Auto Lymphocyte # : x  Auto Monocyte # : x  Auto Eosinophil # : x  Auto Basophil # : x  Auto Neutrophil % : x  Auto Lymphocyte % : x  Auto Monocyte % : x  Auto Eosinophil % : x  Auto Basophil % : x      08-11    136  |  98  |  11  ----------------------------<  91  3.9   |  29  |  0.68    Ca    8.5      11 Aug 2022 05:39  Phos  2.8     08-11  Mg     1.60     08-11    TPro  6.2  /  Alb  3.4  /  TBili  0.4  /  DBili  x   /  AST  24  /  ALT  22  /  AlkPhos  97  08-10    LIVER FUNCTIONS - ( 10 Aug 2022 05:24 )  Alb: 3.4 g/dL / Pro: 6.2 g/dL / ALK PHOS: 97 U/L / ALT: 22 U/L / AST: 24 U/L / GGT: x           Hemoglobin A1C:             RADIOLOGY  CTH   MRI:                  
Reason for consult: Endometrial cancer    HPI:  Patient is an 86yo F with PMH of serous endometrial carcinoma (stage IV) undergoing systemic chemotherapy (carboplatin, taxol x1 – 8/5/22) at [facility], HTN, CAD s/p PCI 3/2021, s/p AVR,  hypothyroidism, and HLD who presented after a witnessed loss of consciousness while sitting in her walker outdoors on the day of presentation. The patient's daughter, Shayla, was present for the event. She stated that the patient was commenting on the breeze when she said something unusual, and then lost consciousness for about 45 seconds. The daughter described it as a staring episode without generalized shaking or other movements, stating the patient's eyes "almost rolled backward." The episode self-resolved and was followed by a period of confusion, without a quantified length. The patient's granddaughter had also commented on the patient being confused prior to the event, about 1 day after receiving her first session of chemotherapy. The patient herself did not recall the event in significant detail, but reported recent heaviness in her legs since initiating the chemotherapy. The patient had a prior syncopal episode several years ago when she fell down the stairs, and was evaluated at the time by a cardiologist. She underwent a TAVR at the time for a reportedly damaged valve.     Hematology/Oncology consulted on this 87 year old female with a past medical history of Stage IV endometrial carcinoma, under care at [facility]. She is s/p carboplatin and paclitaxel on 8-5, and received a dose of Filgrastim 6mg SQ.    PAST MEDICAL & SURGICAL HISTORY:  Endometrial ca      HTN (hypertension)      CAD (coronary artery disease)      S/P AVR      Hypothyroidism      S/P AVR (aortic valve replacement)      History of endometrial biopsy          FAMILY HISTORY:  Family history of parotid cancer (Child)        Alochol: Denied  Smoking: Nonsmoker  Drug Use: Denied  Marital Status:         Allergies    No Known Allergies    Intolerances        MEDICATIONS  (STANDING):  aspirin enteric coated 81 milliGRAM(s) Oral daily  atorvastatin 40 milliGRAM(s) Oral at bedtime  enoxaparin Injectable 40 milliGRAM(s) SubCutaneous every 24 hours  furosemide    Tablet 20 milliGRAM(s) Oral daily  levothyroxine 75 MICROGram(s) Oral daily  sodium chloride 0.9%. 500 milliLiter(s) (250 mL/Hr) IV Continuous <Continuous>    MEDICATIONS  (PRN):      ROS  No fever, sweats, chills  No epistaxis, HA, sore throat  No CP, SOB, cough, sputum  No n/v/d, abd pain, melena, hematochezia  No edema  No rash  No anxiety  No back pain, joint pain  Right sided pelvic pain  No bleeding, bruising  No dysuria, hematuria    T(C): 37.1 (08-09-22 @ 15:43), Max: 37.1 (08-09-22 @ 15:43)  HR: 79 (08-09-22 @ 15:43) (71 - 80)  BP: 101/55 (08-09-22 @ 15:43) (93/50 - 135/65)  RR: 17 (08-09-22 @ 15:43) (13 - 18)  SpO2: 98% (08-09-22 @ 15:43) (98% - 100%)  Wt(kg): --    PE  NAD  Awake, alert  Anicteric, MMM  RRR  CTAB  Abd soft, NT, ND  No c/c/e  No rash grossly                        10.3   18.97 )-----------( 67       ( 09 Aug 2022 10:32 )             31.2       08-09    134<L>  |  99  |  17  ----------------------------<  107<H>  4.0   |  27  |  0.60    Ca    8.3<L>      09 Aug 2022 10:32  Mg     2.00     08-08    TPro  5.9<L>  /  Alb  3.2<L>  /  TBili  0.7  /  DBili  x   /  AST  24  /  ALT  20  /  AlkPhos  95  08-09

## 2022-08-11 NOTE — PROGRESS NOTE ADULT - PROBLEM SELECTOR PLAN 3
- s/p Neulasta  - UA with pyuria and bacteria, large LE, but negative nitrite, though patient without urinary complaints  - CXR with right basilar hazy opacities likely represent pleural effusion - unclear what this is from, but given lack of respiratory symptoms and focal consolidation, unlikely PNA  low plts - monitor closely , lovenox on hold

## 2022-08-11 NOTE — PROGRESS NOTE ADULT - ASSESSMENT
Patient is an 88yo F with PMH of serous endometrial carcinoma (stage IV) undergoing systemic chemotherapy (carboplatin, taxol x1 – 8/5/22[place] [facility], [place]N, CAD s/p PCI 3/2021, s/p AVR, hypothyroidism, and HLD who presented after a witnessed loss of consciousness while sitting in a chair outdoors.

## 2022-08-12 NOTE — PROGRESS NOTE ADULT - ASSESSMENT
Hematology/Oncology consulted on this 87 year old female with a past medical history of Stage IV endometrial carcinoma, [place] [facility] [place] is s/p carboplatin and paclitaxel on 8-5, and received a dose of Filgrastim 6mg SQ. Presented to the ED due to syncope.    Syncope  - CT head shows  No acute intracranial hemorrhage, vasogenic edema concerning for   intracranial metastatic disease, hydrocephalus or extra-axial collection.   Mild chronic microvascular changes.  - MRI head pending  - EEG normal  - PT/OT evaluated, recommend home services  - TTE shows  1. Mitral annular calcification and calcified mitral  leaflets with normal diastolic opening. Mild mitral  regurgitation.  2. Transcatheter aortic valve replacement.  Peak  transaortic valve gradient equals 36 mm Hg, mean  transaortic valve gradient equals 20 mm Hg, which is  probably normal in the presence of a prosthetic valve.  Mild aortic regurgitation.  3. Moderate concentric left ventricular hypertrophy.  4. Normal left ventricular systolic function. No segmental  wall motion abnormalities.  5. Normal right ventricular size and function.    Metastatic endometrial carcinoma  - s/p carboplatin and paclitaxel on 8-5, and received a dose of Filgrastim 6mg SQ  - No chemotherapy while inpatient.  - [place] [facility] [place]    Leukocytosis  - Now resolved  - Likely reactive due to Filgrastim    Thrombocytopenia  - Platelet count 53k  - Likely secondary to chemotherapy  - Please transfuse platelets for counts < 10k    Will continue to follow and coordinate care [place] [facility]    Juancarlos Yeh PA-C  Hematology/Oncology  [facility]  [phone] (office)  [phone] (alt office)  Evenings and weekends please call MD on call or office Hematology/Oncology consulted on this 87 year old female with a past medical history of Stage IV endometrial carcinoma, [place] [facility] [place] is s/p carboplatin and paclitaxel on 8-5, and received a dose of Filgrastim 6mg SQ. Presented to the ED due to syncope.    Syncope  - CT head shows  No acute intracranial hemorrhage, vasogenic edema concerning for   intracranial metastatic disease, hydrocephalus or extra-axial collection.   Mild chronic microvascular changes.  - MRI head pending  - EEG normal  - PT/OT evaluated, recommend home services  - TTE shows  1. Mitral annular calcification and calcified mitral  leaflets with normal diastolic opening. Mild mitral  regurgitation.  2. Transcatheter aortic valve replacement.  Peak  transaortic valve gradient equals 36 mm Hg, mean  transaortic valve gradient equals 20 mm Hg, which is  probably normal in the presence of a prosthetic valve.  Mild aortic regurgitation.  3. Moderate concentric left ventricular hypertrophy.  4. Normal left ventricular systolic function. No segmental  wall motion abnormalities.  5. Normal right ventricular size and function.    Metastatic endometrial carcinoma  - s/p carboplatin and paclitaxel on 8-5, and received a dose of Peg-Filgrastim 6mg SQ  - No chemotherapy while inpatient.  - [place] [facility] [place]    Leukocytosis  - Now resolved  - Likely reactive due to Peg-Filgrastim    Thrombocytopenia  - Platelet count 53k  - Likely secondary to chemotherapy  - Please transfuse platelets for counts < 10k    Will continue to follow and [place] [facility] [place]    Juancarlos Yeh PA-C  Hematology/Oncology  [facility]  [phone] (office)  [phone] (alt office)  Evenings and weekends please call MD on call or office

## 2022-08-12 NOTE — PROGRESS NOTE ADULT - ASSESSMENT
Patient is an 88yo F with PMH of serous endometrial carcinoma (stage IV) [place], [place]1 [place]22[place] [facility] [place], CAD s/p PCI 3/2021, s/p AVR, hypothyroidism, and HLD who presented after a witnessed loss of consciousness while sitting in a chair outdoors.

## 2022-08-12 NOTE — PROGRESS NOTE ADULT - ASSESSMENT
Patient is an 86yo F with PMH of serous endometrial carcinoma (stage IV) undergoing systemic chemotherapy (carboplatin, taxol x1 – 8/5/22) at [facility] [place], HTN, CAD s/p PCI 3/2021, s/p AVR, hypothyroidism, and HLD who presented after a witnessed loss of consciousness while sitting in a chair outdoors.

## 2022-08-12 NOTE — CHART NOTE - NSCHARTNOTEFT_GEN_A_CORE
Notified by RN of patient c/o chest pain     Patient is an 86 yo F with PMHx serous endometrial carcinoma (stage IV) undergoing systemic chemotherapy (carboplatin, taxol x1 – 8/5/22) at [place], HTN, CAD s/p PCI 3/2021, s/p AVR,  hypothyroidism, and HLD who presented after a witnessed loss of consciousness admitted for syncope. Patient seen at bedside reports complaint of 6/10 chest tightness described as "pulling" patient reports pain radiates to her back. Reports some mild improvement with drinking gingerale. Denies diaphoresis, palpitations, dyspnea, jaw pain, abdominal pain, nausea         Meds    MEDICATIONS  (STANDING):  aspirin enteric coated 81 milliGRAM(s) Oral daily  atorvastatin 40 milliGRAM(s) Oral at bedtime  furosemide    Tablet 20 milliGRAM(s) Oral daily  levothyroxine 75 MICROGram(s) Oral daily  senna 2 Tablet(s) Oral at bedtime  sodium chloride 0.9%. 1000 milliLiter(s) (70 mL/Hr) IV Continuous <Continuous>    MEDICATIONS  (PRN):  acetaminophen     Tablet .. 650 milliGRAM(s) Oral every 6 hours PRN Temp greater or equal to 38C (100.4F), Mild Pain (1 - 3)  oxycodone    5 mG/acetaminophen 325 mG 1 Tablet(s) Oral every 6 hours PRN Moderate Pain (4 - 6)  polyethylene glycol 3350 17 Gram(s) Oral daily PRN Constipation        Vital Signs Last 24 Hrs  T(C): 37.3 (11 Aug 2022 21:00), Max: 37.3 (11 Aug 2022 21:00)  T(F): 99.2 (11 Aug 2022 21:00), Max: 99.2 (11 Aug 2022 21:00)  HR: 98 (11 Aug 2022 21:00) (85 - 98)  BP: 112/76 (11 Aug 2022 21:00) (112/76 - 130/71)  BP(mean): --  RR: 18 (11 Aug 2022 21:00) (18 - 18)  SpO2: 98% (11 Aug 2022 21:00) (98% - 100%)    Parameters below as of 11 Aug 2022 21:00  Patient On (Oxygen Delivery Method): room air      PHYSICAL EXAM:  GENERAL: Elderly female NAD   HEENT - NC/AT, pupils equal and reactive to light,   NECK: Supple, No JVD  CHEST/LUNG: Clear to auscultation bilaterally   HEART: Regular rate and rhythm; + S1S2 Chest wall nontender to palpation   ABDOMEN: Soft, Nontender, Nondistended; Bowel sounds present x 4  EXTREMITIES:  2+ Peripheral Pulses, No LE edema   SKIN: No rashes or lesions    Consultant(s) Notes Reviewed:  [x ] YES  [ ] NO  Care Discussed with Consultants/Other Providers [ x] YES  [ ] NO      EKG: SR @ 91 bpm QTc 472       86 yo F with PMHx serous endometrial carcinoma (stage IV) undergoing systemic chemotherapy (carboplatin, taxol x1 – 8/5/22) at [place], HTN, CAD s/p PCI 3/2021, s/p AVR,  hypothyroidism, and HLD who presented after a witnessed loss of consciousness admitted for syncope complaining of chest tightness      1. Chest pain   - Pt with initial flat trops in ER  - send repeat Cardiac enzymes with AM labs   - place on 02 for comfort  - pt agreed to taking Tylenol for pain   - continue to monitor on tele Notified by RN of patient c/o chest pain     Patient is an 86 yo F with PMHx serous endometrial carcinoma (stage IV) undergoing systemic chemotherapy (carboplatin, taxol x1 – 8/5/22) at [facility] [place], HTN, CAD s/p PCI 3/2021, s/p AVR,  hypothyroidism, and HLD who presented after a witnessed loss of consciousness admitted for syncope. Patient seen at bedside reports complaint of 6/10 chest tightness described as "pulling" patient reports pain radiates to her back. Reports some mild improvement with drinking gingerale. Denies diaphoresis, palpitations, dyspnea, jaw pain, abdominal pain, nausea         Meds    MEDICATIONS  (STANDING):  aspirin enteric coated 81 milliGRAM(s) Oral daily  atorvastatin 40 milliGRAM(s) Oral at bedtime  furosemide    Tablet 20 milliGRAM(s) Oral daily  levothyroxine 75 MICROGram(s) Oral daily  senna 2 Tablet(s) Oral at bedtime  sodium chloride 0.9%. 1000 milliLiter(s) (70 mL/Hr) IV Continuous <Continuous>    MEDICATIONS  (PRN):  acetaminophen     Tablet .. 650 milliGRAM(s) Oral every 6 hours PRN Temp greater or equal to 38C (100.4F), Mild Pain (1 - 3)  oxycodone    5 mG/acetaminophen 325 mG 1 Tablet(s) Oral every 6 hours PRN Moderate Pain (4 - 6)  polyethylene glycol 3350 17 Gram(s) Oral daily PRN Constipation        Vital Signs Last 24 Hrs  T(C): 37.3 (11 Aug 2022 21:00), Max: 37.3 (11 Aug 2022 21:00)  T(F): 99.2 (11 Aug 2022 21:00), Max: 99.2 (11 Aug 2022 21:00)  HR: 98 (11 Aug 2022 21:00) (85 - 98)  BP: 112/76 (11 Aug 2022 21:00) (112/76 - 130/71)  BP(mean): --  RR: 18 (11 Aug 2022 21:00) (18 - 18)  SpO2: 98% (11 Aug 2022 21:00) (98% - 100%)    Parameters below as of 11 Aug 2022 21:00  Patient On (Oxygen Delivery Method): room air      PHYSICAL EXAM:  GENERAL: Elderly female NAD   HEENT - NC/AT, pupils equal and reactive to light,   NECK: Supple, No JVD  CHEST/LUNG: Clear to auscultation bilaterally   HEART: Regular rate and rhythm; + S1S2 Chest wall nontender to palpation   ABDOMEN: Soft, Nontender, Nondistended; Bowel sounds present x 4  EXTREMITIES:  2+ Peripheral Pulses, No LE edema   SKIN: No rashes or lesions    Consultant(s) Notes Reviewed:  [x ] YES  [ ] NO  Care Discussed with Consultants/Other Providers [ x] YES  [ ] NO      EKG: SR @ 91 bpm QTc 472    Transthoracic Echocardiogram (08.10.22 @ 09:48) > EF-55-60^     CONCLUSIONS:  1. Mitral annular calcification and calcified mitral  leaflets with normal diastolic opening. Mild mitral  regurgitation.  2. Transcatheter aortic valve replacement.  Peak  transaortic valve gradient equals 36 mm Hg, mean  transaortic valve gradient equals 20 mm Hg, which is  probably normal in the presence of a prosthetic valve.  Mild aortic regurgitation.  3. Moderate concentric left ventricular hypertrophy.  4. Normal left ventricular systolic function. No segmental  wall motion abnormalities.  5. Normal right ventricular size and function.  Pericardium/PleuraNormal pericardium with trace pericardial  effusion.    < end of copied text >         86 yo F with PMHx serous endometrial carcinoma (stage IV) undergoing systemic chemotherapy (carboplatin, taxol x1 – 8/5/22) at [facility], HTN, CAD s/p PCI 3/2021, s/p AVR,  hypothyroidism, and HLD who presented after a witnessed loss of consciousness admitted for syncope complaining of chest tightness      1. Chest pain   - Pt with initial flat trops in ER  - send repeat Cardiac enzymes with AM labs   - place on 02 for comfort  - pt agreed to taking Tylenol for pain   - continue to monitor on tele    Patient agreed with above plan. Discussed with RN Notified by RN of patient c/o chest pain     Patient is an 86 yo F with PMHx serous endometrial carcinoma (stage IV) undergoing systemic chemotherapy (carboplatin, taxol x1 – 8/5/22) at [facility] [place], HTN, CAD s/p PCI 3/2021, s/p AVR,  hypothyroidism, and HLD who presented after a witnessed loss of consciousness admitted for syncope. Patient seen at bedside reports complaint of 6/10 chest tightness described as "pulling" patient reports pain radiates to her back. Reports some mild improvement with drinking gingerale. Denies diaphoresis, palpitations, dyspnea, jaw pain, abdominal pain, nausea         Meds    MEDICATIONS  (STANDING):  aspirin enteric coated 81 milliGRAM(s) Oral daily  atorvastatin 40 milliGRAM(s) Oral at bedtime  furosemide    Tablet 20 milliGRAM(s) Oral daily  levothyroxine 75 MICROGram(s) Oral daily  senna 2 Tablet(s) Oral at bedtime  sodium chloride 0.9%. 1000 milliLiter(s) (70 mL/Hr) IV Continuous <Continuous>    MEDICATIONS  (PRN):  acetaminophen     Tablet .. 650 milliGRAM(s) Oral every 6 hours PRN Temp greater or equal to 38C (100.4F), Mild Pain (1 - 3)  oxycodone    5 mG/acetaminophen 325 mG 1 Tablet(s) Oral every 6 hours PRN Moderate Pain (4 - 6)  polyethylene glycol 3350 17 Gram(s) Oral daily PRN Constipation        Vital Signs Last 24 Hrs  T(C): 37.3 (11 Aug 2022 21:00), Max: 37.3 (11 Aug 2022 21:00)  T(F): 99.2 (11 Aug 2022 21:00), Max: 99.2 (11 Aug 2022 21:00)  HR: 98 (11 Aug 2022 21:00) (85 - 98)  BP: 112/76 (11 Aug 2022 21:00) (112/76 - 130/71)  BP(mean): --  RR: 18 (11 Aug 2022 21:00) (18 - 18)  SpO2: 98% (11 Aug 2022 21:00) (98% - 100%)    Parameters below as of 11 Aug 2022 21:00   Patient On (Oxygen Delivery Method): room air      PHYSICAL EXAM:  GENERAL: Elderly female NAD   HEENT - NC/AT, pupils equal and reactive to light,   NECK: Supple, No JVD  CHEST/LUNG: Clear to auscultation bilaterally   HEART: Regular rate and rhythm; + S1S2 Chest wall nontender to palpation   ABDOMEN: Soft, Nontender, Nondistended; Bowel sounds present x 4  EXTREMITIES:  2+ Peripheral Pulses, No LE edema   SKIN: No rashes or lesions  NEURO: No neuro deficits     Consultant(s) Notes Reviewed:  [x ] YES  [ ] NO  Care Discussed with Consultants/Other Providers [ x] YES  [ ] NO      EKG: SR @ 91 bpm QTc 472    Transthoracic Echocardiogram (08.10.22 @ 09:48) > EF-55-60^     CONCLUSIONS:  1. Mitral annular calcification and calcified mitral  leaflets with normal diastolic opening. Mild mitral  regurgitation.  2. Transcatheter aortic valve replacement.  Peak  transaortic valve gradient equals 36 mm Hg, mean  transaortic valve gradient equals 20 mm Hg, which is  probably normal in the presence of a prosthetic valve.  Mild aortic regurgitation.  3. Moderate concentric left ventricular hypertrophy.  4. Normal left ventricular systolic function. No segmental  wall motion abnormalities.  5. Normal right ventricular size and function.  Pericardium/PleuraNormal pericardium with trace pericardial  effusion.    < end of copied text >         86 yo F with PMHx serous endometrial carcinoma (stage IV) undergoing systemic chemotherapy (carboplatin, taxol x1 – 8/5/22) at [facility] [place], HTN, CAD s/p PCI 3/2021, s/p AVR,  hypothyroidism, and HLD who presented after a witnessed loss of consciousness admitted for syncope complaining of chest tightness      1. Chest pain   - Pt with initial flat trops in ER  - send repeat Cardiac enzymes with AM labs   - place on 02 for comfort  - pt agreed to taking Tylenol for pain   - continue to monitor on tele    Patient agreed with above plan. Discussed with RN Notified by RN of patient c/o chest pain     Patient is an 88 yo F with PMHx serous endometrial carcinoma (stage IV) undergoing systemic chemotherapy (carboplatin, taxol x1 – 8/5/22) [place] [facility] [place], HTN, CAD s/p PCI 3/2021, s/p AVR,  hypothyroidism, and HLD who presented after a witnessed loss of consciousness admitted for syncope. Patient seen at bedside reports complaint of 6/10 chest tightness described as "pulling" patient reports pain radiates to her back. Reports some mild improvement with drinking gingerale. Denies diaphoresis, palpitations, dyspnea, jaw pain, abdominal pain, nausea         Meds    MEDICATIONS  (STANDING):  aspirin enteric coated 81 milliGRAM(s) Oral daily  atorvastatin 40 milliGRAM(s) Oral at bedtime  furosemide    Tablet 20 milliGRAM(s) Oral daily  levothyroxine 75 MICROGram(s) Oral daily  senna 2 Tablet(s) Oral at bedtime  sodium chloride 0.9%. 1000 milliLiter(s) (70 mL/Hr) IV Continuous <Continuous>    MEDICATIONS  (PRN):  acetaminophen     Tablet .. 650 milliGRAM(s) Oral every 6 hours PRN Temp greater or equal to 38C (100.4F), Mild Pain (1 - 3)  oxycodone    5 mG/acetaminophen 325 mG 1 Tablet(s) Oral every 6 hours PRN Moderate Pain (4 - 6)  polyethylene glycol 3350 17 Gram(s) Oral daily PRN Constipation        Vital Signs Last 24 Hrs  T(C): 37.3 (11 Aug 2022 21:00), Max: 37.3 (11 Aug 2022 21:00)  T(F): 99.2 (11 Aug 2022 21:00), Max: 99.2 (11 Aug 2022 21:00)  HR: 98 (11 Aug 2022 21:00) (85 - 98)  BP: 112/76 (11 Aug 2022 21:00) (112/76 - 130/71)  BP(mean): --  RR: 18 (11 Aug 2022 21:00) (18 - 18)  SpO2: 98% (11 Aug 2022 21:00) (98% - 100%)    Parameters below as of 11 Aug 2022 21:00   Patient On (Oxygen Delivery Method): room air      PHYSICAL EXAM:  GENERAL: Elderly female NAD   HEENT - NC/AT, pupils equal and reactive to light,   NECK: Supple, No JVD  CHEST/LUNG: Clear to auscultation bilaterally   HEART: Regular rate and rhythm; + S1S2 Chest wall nontender to palpation   ABDOMEN: Soft, Nontender, Nondistended; Bowel sounds present x 4  EXTREMITIES:  2+ Peripheral Pulses, No LE edema   SKIN: No rashes or lesions    Consultant(s) Notes Reviewed:  [x ] YES  [ ] NO  Care Discussed with Consultants/Other Providers [ x] YES  [ ] NO      EKG: SR @ 91 bpm QTc 472    Transthoracic Echocardiogram (08.10.22 @ 09:48) > EF-55-60^     CONCLUSIONS:  1. Mitral annular calcification and calcified mitral  leaflets with normal diastolic opening. Mild mitral  regurgitation.  2. Transcatheter aortic valve replacement.  Peak  transaortic valve gradient equals 36 mm Hg, mean  transaortic valve gradient equals 20 mm Hg, which is  probably normal in the presence of a prosthetic valve.  Mild aortic regurgitation.  3. Moderate concentric left ventricular hypertrophy.  4. Normal left ventricular systolic function. No segmental  wall motion abnormalities.  5. Normal right ventricular size and function.  Pericardium/PleuraNormal pericardium with trace pericardial  effusion.    < end of copied text >         88 yo F with PMHx serous endometrial carcinoma (stage IV) undergoing systemic chemotherapy (carboplatin, taxol x1 – 8/5/22) at [facility] [place], HTN, CAD s/p PCI 3/2021, s/p AVR,  hypothyroidism, and HLD who presented after a witnessed loss of consciousness admitted for syncope complaining of chest tightness      1. Chest pain   - Pt with initial flat trops in ER  - send repeat Cardiac enzymes with AM labs   - place on 02 for comfort  - pt agreed to taking Tylenol for pain   - continue to monitor on tele    Patient agreed with above plan. Discussed with RN

## 2022-08-13 NOTE — PROGRESS NOTE ADULT - ASSESSMENT
Patient is an 88yo F with PMH of serous endometrial carcinoma (stage IV) undergoing systemic [place] ([place]atin, [place]ol x1 – 8[place]22[place] [facility] [place], CAD s/p PCI 3/2021, s/p AVR, hypothyroidism, and HLD who presented after a witnessed loss of consciousness while sitting in a chair outdoors.

## 2022-08-13 NOTE — PROGRESS NOTE ADULT - ASSESSMENT
1. Syncope    -- work up unremarkable, no further events  -- await MRI Brain  -- CT Head w no acute intracranial hemorrhage, vasogenic edema concerning for intracranial metastatic disease, hydrocephalus or extra-axial collection. Mild chronic microvascular changes.  -- EEG normal  --PT/OT evaluated, recommend home services  -- Echo unremarkable    2. Metastatic endometrial carcinosarcoma    -- s/p carboplatin and paclitaxel on 8/5, and received a dose of Peg-Filgrastim 6mg SQ  -- No chemotherapy while inpatient.  -- Follows at [facility] [place]    3. Leukocytosis    -- no s/s of infection  -- wbc up to 27,850  -- still think this is sec to Neulasta which was given on 8/5 and would expect peak response at 5-7 days and could take up to 2 weeks for WBC to return to normal    4. Thrombocytopenia    -- platelets improving to 70K today  -- Likely secondary to chemotherapy     Soumya Molina MD

## 2022-08-13 NOTE — PROGRESS NOTE ADULT - PROBLEM SELECTOR PLAN 3
- s/p Neulasta  - UA with pyuria and bacteria, large LE, but negative nitrite, though patient without urinary complaints  - CXR with right basilar hazy opacities likely represent pleural effusion - unclear what this is from, but given lack of respiratory symptoms and focal consolidation, unlikely PNA  low plts - monitor closely , lovenox on hold - s/p Neulasta  - UA with pyuria and bacteria, large LE, but negative nitrite, though patient without urinary complaints  - CXR with right basilar hazy opacities likely represent pleural effusion - unclear what this is from, but given lack of respiratory symptoms and focal consolidation, unlikely PNA  low plts - monitor closely , lovenox on hold    pts wbc elevated compared to yesterday - pt afebrile, monitor closely for any signs of infection

## 2022-08-13 NOTE — PROGRESS NOTE ADULT - ASSESSMENT
Patient is an 86yo F with PMH of serous endometrial carcinoma (stage IV) undergoing systemic chemotherapy (carboplatin, taxol x1 – 8/5/22) [place] [facility] [place], [place]N, CAD s/p PCI 3/2021, s/p AVR, hypothyroidism, and HLD who presented after a witnessed loss of consciousness while sitting in a chair outdoors.

## 2022-08-14 NOTE — PROGRESS NOTE ADULT - PROBLEM SELECTOR PLAN 9
- Resume home statin

## 2022-08-14 NOTE — PROGRESS NOTE ADULT - PROBLEM SELECTOR PLAN 7
- No baseline for comparison; elevated to 1500  - Obtain TTE - has a hx of TAVR  - resume home furosemide - hold for hypotension

## 2022-08-14 NOTE — PROGRESS NOTE ADULT - PROBLEM SELECTOR PLAN 5
- in setting of recently initiated chemotherapy; trend for now

## 2022-08-14 NOTE — PROGRESS NOTE ADULT - PROBLEM SELECTOR PLAN 2
- Recently initiated systemic chemotherapy – 8/5/22 – with carboplatin and paclitaxel  - Follows at [facility] [place]
- Recently initiated systemic chemotherapy – 8/5/22 – with carboplatin and paclitaxel  - [place] [facility] [place]  heme/onc f/u
- Recently initiated systemic chemotherapy – 8/5/22 – with carboplatin and paclitaxel  - [place] [facility] [place]
- Recently initiated systemic chemotherapy – 8/5/22 – with carboplatin and paclitaxel  - Follows at [facility] [place]
- Recently initiated systemic chemotherapy – 8/5/22 – with carboplatin and paclitaxel  - [place] [facility] [place]
- Recently initiated systemic chemotherapy – 8/5/22 – with carboplatin and paclitaxel  - [place] [facility] [place]
- Recently initiated systemic chemotherapy – 8/5/22 – with carboplatin and paclitaxel  - Follows at [facility] [place]
- Recently initiated systemic chemotherapy – 8/5/22 – with carboplatin and paclitaxel  - Follows at [facility] [place]
- Recently initiated systemic chemotherapy – 8/5/22 – with carboplatin and paclitaxel  - [place] [facility] [place]
- Recently initiated systemic chemotherapy – 8/5/22 – with carboplatin and paclitaxel  - Follows at [facility] [place]

## 2022-08-14 NOTE — PROGRESS NOTE ADULT - PROBLEM SELECTOR PLAN 10
- resume home LT4 75mcg po qam

## 2022-08-14 NOTE — PROGRESS NOTE ADULT - TIME BILLING
- Review of records, telemetry, vital signs and daily labs.   - General and cardiovascular physical examination.  - Generation of cardiovascular treatment plan.  - Coordination of care.      Patient was seen and examined by me on 8/10/22,interim events noted,labs and radiology studies reviewed.  Jairo Mayo MD,FACC.  [address] [place], [place] [id number].  [phone]
- Review of records, telemetry, vital signs and daily labs.   - General and cardiovascular physical examination.  - Generation of cardiovascular treatment plan.  - Coordination of care.      Patient was seen and examined by me on 8/14/22,interim events noted,labs and radiology studies reviewed.  Jairo Mayo MD,FACC.  [address] [place], [place] [id number].  [phone]
- Review of records, telemetry, vital signs and daily labs.   - General and cardiovascular physical examination.  - Generation of cardiovascular treatment plan.  - Coordination of care.      Patient was seen and examined by me on 8/13/22,interim events noted,labs and radiology studies reviewed.  Jairo Mayo MD,FACC.  [address] [place], [place] [id number].  [phone]
- Review of records, telemetry, vital signs and daily labs.   - General and cardiovascular physical examination.  - Generation of cardiovascular treatment plan.  - Coordination of care.      Patient was seen and examined by me on 8/12/22,interim events noted,labs and radiology studies reviewed.  Jairo Mayo MD,FACC.  [address] [place], [place] [id number].  [phone]
- Review of records, telemetry, vital signs and daily labs.   - General and cardiovascular physical examination.  - Generation of cardiovascular treatment plan.  - Coordination of care.      Patient was seen and examined by me on 8/11/22,interim events noted,labs and radiology studies reviewed.  Jairo Mayo MD,FACC.  [address] [place], [place] [id number].  [phone]

## 2022-08-14 NOTE — PROGRESS NOTE ADULT - PROBLEM SELECTOR PROBLEM 7
Elevated brain natriuretic peptide (BNP) level

## 2022-08-14 NOTE — PROGRESS NOTE ADULT - ASSESSMENT
Patient is an 86yo F with PMH of serous endometrial carcinoma (stage IV) [place], [place]1 – [place] [facility] [place], CAD s/p PCI 3/2021, s/p AVR, hypothyroidism, and HLD who presented after a witnessed loss of consciousness while sitting in a chair outdoors.

## 2022-08-14 NOTE — PROGRESS NOTE ADULT - PROBLEM SELECTOR PROBLEM 1
Witnessed syncope

## 2022-08-14 NOTE — PROGRESS NOTE ADULT - PROBLEM SELECTOR PLAN 4
- UA with pyuria and bacteria, large LE, but negative nitrite  - patient without urinary complaints  - hold antibiotics for now, trend fever curve - low threshold to initiate antibiotics in patient newly initiated on CTx

## 2022-08-14 NOTE — PROGRESS NOTE ADULT - PROBLEM SELECTOR PLAN 1
- Witnessed LOC without trauma  - Telemetry monitoring - PVCs seen on telemetry in ED  - ECG: NSR, Q waves V2-V3, III, aVF  - Troponin negative x2  - Obtain TTE - has a hx of TAVR  - potentially seizure - obtain spot EEG  - CT head unremarkable; possibility of new metastases to brain – obtain MRI with contrast  - In setting of recently initiated systemic chemotherapy  - Obtain orthostatic VS  - Hemodynamics, lack of hypoxia, and lack of chest pain do not point to pulmonary embolism but within DDx due to known malignancy
- Witnessed LOC without trauma  - Telemetry monitoring - PVCs seen on telemetry in ED  - ECG: NSR, Q waves V2-V3, III, aVF  - Troponin negative x2  - neuro / cards f/u
- Witnessed LOC without trauma  - Telemetry monitoring - PVCs seen on telemetry in ED  - ECG: NSR, Q waves V2-V3, III, aVF  - Troponin negative x2
- Witnessed LOC without trauma  - Telemetry monitoring - PVCs seen on telemetry in ED  - ECG: NSR, Q waves V2-V3, III, aVF  - Troponin negative x2  - neuro / cards f/u
- Witnessed LOC without trauma  - Telemetry monitoring - PVCs seen on telemetry in ED  - ECG: NSR, Q waves V2-V3, III, aVF  - Troponin negative x2  - Obtain TTE - has a hx of TAVR  - potentially seizure - obtain spot EEG  - CT head unremarkable; possibility of new metastases to brain – obtain MRI with contrast  - In setting of recently initiated systemic chemotherapy  - Obtain orthostatic VS  - Hemodynamics, lack of hypoxia, and lack of chest pain do not point to pulmonary embolism but within DDx due to known malignancy
- Witnessed LOC without trauma  - Telemetry monitoring - PVCs seen on telemetry in ED  - ECG: NSR, Q waves V2-V3, III, aVF  - Troponin negative x2

## 2022-08-14 NOTE — PROGRESS NOTE ADULT - PROBLEM SELECTOR PLAN 3
- s/p Neulasta  - UA with pyuria and bacteria, large LE, but negative nitrite, though patient without urinary complaints  - CXR with right basilar hazy opacities likely represent pleural effusion - unclear what this is from, but given lack of respiratory symptoms and focal consolidation, unlikely PNA  low plts - monitor closely , lovenox on hold- imporving - discsus with heme about restarting dvt prophylaxis    pts wbc elevated compared to yesterday - pt afebrile, monitor closely for any signs of infection

## 2022-08-14 NOTE — PROGRESS NOTE ADULT - ASSESSMENT
1. Syncope    -- work up unremarkable, no further events  -- await MRI Brain  -- CT Head w no acute intracranial hemorrhage, vasogenic edema concerning for intracranial metastatic disease, hydrocephalus or extra-axial collection. Mild chronic microvascular changes.  -- EEG normal  -- PT/OT evaluated, recommend home services  -- Echo unremarkable    2. Metastatic endometrial carcinosarcoma    -- s/p carboplatin and paclitaxel on 8/5, and received a dose of Peg-Filgrastim 6mg SQ  -- No chemotherapy while inpatient.  -- Follows at [facility] [place]    3. Leukocytosis    -- WBC today is 45,500. Platelets also improved  --  She is afebrile, no hypothermia, BP is normal. No Signs or Sx of Infection. I think she has marrow recovery w Neulasta effect resulting in Leukocytosis/Neutrophilia. Unless she becomes febrile/hypothermic, develops low BP or other sx, no need for ID w/u or Abx. Neulasta was given on 8/5 and would expect peak response at 5-7 days and could take up to 2 weeks for WBC to return to normal    4. Thrombocytopenia    -- platelets improving to 97 K today  -- Likely secondary to chemotherapy     Soumya Molina MD

## 2022-08-14 NOTE — PROGRESS NOTE ADULT - PROBLEM SELECTOR PLAN 8
-  goal normotension; resume home antihypertensives, hold for hypotension

## 2022-08-14 NOTE — PROGRESS NOTE ADULT - ASSESSMENT
Patient is an 88yo F with PMH of serous endometrial carcinoma (stage IV) [place], [place] [facility] [place] CAD s/p PCI 3/2021, s/p AVR, hypothyroidism, and HLD who presented after a witnessed loss of consciousness while sitting in a chair outdoors.

## 2022-08-14 NOTE — PROGRESS NOTE ADULT - PROBLEM SELECTOR PLAN 6
- s/p PCI 3/21  - resume home statin, aspirin; resume home BB when BP tolerates

## 2022-08-14 NOTE — PROGRESS NOTE ADULT - PROBLEM SELECTOR PROBLEM 2
Endometrial carcinoma

## 2022-08-15 NOTE — DISCHARGE NOTE PROVIDER - NSDCMRMEDTOKEN_GEN_ALL_CORE_FT
alendronate 70 mg oral tablet: 1 tab(s) orally once a week  aspirin 81 mg oral delayed release tablet: 1 tab(s) orally once a day  atenolol 50 mg oral tablet: 1 tab(s) orally 2 times a day  ezetimibe 10 mg oral tablet: 1 tab(s) orally once a day  famotidine 40 mg oral tablet: 1 tab(s) orally once a day (at bedtime)  furosemide 20 mg oral tablet: 1 tab(s) orally once a day  levothyroxine 75 mcg (0.075 mg) oral tablet: 1 tab(s) orally once a day  losartan 100 mg oral tablet: 1 tab(s) orally once a day  One A Day Women&#x27;s Complete oral tablet: 1 tab(s) orally once a day  oxycodone-acetaminophen 5 mg-325 mg oral tablet: 1 tab(s) orally 2 times a day  rosuvastatin 20 mg oral tablet: 1 tab(s) orally once a day  Ventolin HFA 90 mcg/inh inhalation aerosol: 2 puff(s) inhaled every 6 hours, As Needed  Vitamin B12: 5000 microgram(s) sublingual once a day  Vitamin D3 50 mcg (2000 intl units) oral tablet: 1 tab(s) orally once a day   alendronate 70 mg oral tablet: 1 tab(s) orally once a week  aspirin 81 mg oral delayed release tablet: 1 tab(s) orally once a day  atenolol 50 mg oral tablet: 1 tab(s) orally 2 times a day  ezetimibe 10 mg oral tablet: 1 tab(s) orally once a day  famotidine 40 mg oral tablet: 1 tab(s) orally once a day (at bedtime)  furosemide 20 mg oral tablet: 1 tab(s) orally once a day  levothyroxine 75 mcg (0.075 mg) oral tablet: 1 tab(s) orally once a day  losartan 100 mg oral tablet: 1 tab(s) orally once a day  One A Day Women&#x27;s Complete oral tablet: 1 tab(s) orally once a day  oxycodone-acetaminophen 5 mg-325 mg oral tablet: 1 tab(s) orally 2 times a day  polyethylene glycol 3350 oral powder for reconstitution: 17 gram(s) orally once a day, As needed, Constipation  rosuvastatin 20 mg oral tablet: 1 tab(s) orally once a day  senna leaf extract oral tablet: 2 tab(s) orally once a day (at bedtime)  Ventolin HFA 90 mcg/inh inhalation aerosol: 2 puff(s) inhaled every 6 hours, As Needed  Vitamin B12: 5000 microgram(s) sublingual once a day  Vitamin D3 50 mcg (2000 intl units) oral tablet: 1 tab(s) orally once a day

## 2022-08-15 NOTE — CHART NOTE - NSCHARTNOTEFT_GEN_A_CORE
< from: MR Head w/ IV Cont (08.15.22 @ 11:20) >      INTERPRETATION:  Clinical indication: Endometrial CA. Patient presents   with syncope.    MRI of brain was performed using sagittal T1 axial T1 T2 T2 FLAIR   diffusion and susceptibility weighted sequence. The patient was injected   with approximately 5 cc of gadavist IV with 2.5 cc contrast discarded.   Sagittal coronal and axial T1-weighted sequence performed.    Parenchymal volume loss and chronic microvascular ischemic changes are   identified.    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    There is no acute hemorrhage mass mass effect or abnormal enhancement   seen.    Tiny subcentimeter foci of T2 prolongation with associated restricted   diffusion is seen involving the right posterior temporal/occipital   subcortical, high left frontal cortex as well as the right centrum   semiovale region. No associated areas of abnormal enhancement is seen.   These findings could be compatible with acute lacunar infarcts and less   likely underlying metastasis (given the lack of associated enhancement)   though continued close interval follow-up is recommended. Small focus of   abnormal susceptibility seen involving the leftposterior temporal   subcortical region which could be compatible area of old hemorrhage   mineralization or small cavernous angioma.    The large vessels demonstrate normal flow-voids.    The visualized paranasal sinus mastoid and mastoid clear.    IMPRESSION: Tiny foci of abnormal restricted diffusion is identified as   described above.    --- End of Report ---    < end of copied text >        For now continue Aspirin 81  Can follow up with Neurology, Dr. Waldemar Arnett at [phone] for potential repeat imaging and further w/u

## 2022-08-15 NOTE — DISCHARGE NOTE PROVIDER - HOSPITAL COURSE
Patient is an 88yo F with PMH of serous endometrial carcinoma (stage IV) undergoing systemic chemotherapy (carboplatin, taxol x1 – 8/5/22) [place] [facility] [place], HTN, CAD s/p PCI 3/2021, s/p AVR, hypothyroidism, and HLD who presented after a witnessed loss of consciousness while sitting in a chair outdoors.   Witnessed syncope.   - Witnessed LOC without trauma  - Telemetry monitoring - PVCs seen on telemetry in ED  - ECG: NSR, Q waves V2-V3, III, aVF  - Troponin negative x2  - neuro: continue Aspirin 81, outpt follow up with MD Amberly Medeiros for repeat     Endometrial carcinoma.    - Recently initiated systemic chemotherapy – 8/5/22 – with carboplatin and paclitaxel  - Follows at [facility] [place].    Leukocytosis.    - s/p Neulasta  - UA with pyuria and bacteria, large LE, but negative nitrite, though patient without urinary complaints  - CXR with right basilar hazy opacities likely represent pleural effusion - unclear what this is from, but given lack of respiratory symptoms and focal consolidation, unlikely PNA  low plts - monitor closely , lovenox on hold- imporving - discsus with heme about restarting dvt prophylaxis  pts wbc elevated compared to yesterday - pt afebrile, monitor closely for any signs of infection.    Pyuria.   - UA with pyuria and bacteria, large LE, but negative nitrite  - patient without urinary complaints  - hold antibiotics for now, trend fever curve - low threshold to initiate antibiotics in patient newly initiated on CTx.    Thrombocytopenia.   - in setting of recently initiated chemotherapy; trend for now.    CAD (coronary artery disease).   - s/p PCI 3/21  - resume home statin, aspirin; resume home BB when BP tolerates.    Elevated brain natriuretic peptide (BNP) level.    - No baseline for comparison; elevated to 1500  - Obtain TTE - has a hx of TAVR  - resume home furosemide - hold for hypotension.    HTN (hypertension).   -  goal normotension; resume home antihypertensives, hold for hypotension.     HLD (hyperlipidemia).   - Resume home statin.    Hypothyroidism.   - resume home LT4 75mcg po     Case discussed with Dr. Olivier on 8/15/22 and patient cleared for discharge. Reviewed discharge medications with patient; All new medications requiring new prescription sent to pharmacy of patients choice. Reviewed need for prescription for previous home medications and new prescriptions sent if requested. Patient in agreement and understands.

## 2022-08-15 NOTE — PROGRESS NOTE ADULT - PROVIDER SPECIALTY LIST ADULT
Heme/Onc
Cardiology
Internal Medicine
Cardiology
Internal Medicine
Cardiology
Internal Medicine
Cardiology
Internal Medicine
Internal Medicine
Cardiology

## 2022-08-15 NOTE — PROGRESS NOTE ADULT - ASSESSMENT
1. Syncope  -- work up unremarkable, no further events  -- CT Head w no acute intracranial hemorrhage, vasogenic edema concerning for intracranial metastatic disease, hydrocephalus or extra-axial collection. Mild chronic microvascular changes.  -- Brain MRI with tiny foci of abnormal restricted diffusion - neurology recommended outpatient f/u   -- EEG normal  -- PT/OT evaluated, recommend home services  -- Echo unremarkable    2. Metastatic endometrial carcinosarcoma  -- s/p carboplatin and paclitaxel on 8/5, and received a dose of Peg-Filgrastim 6mg SQ  -- No chemotherapy while inpatient.  -- [place] [facility] [place]    3. Leukocytosis - improving  --  She is afebrile, no hypothermia, BP is normal. No Signs or Sx of Infection. I think she has marrow recovery w Neulasta effect resulting in Leukocytosis/Neutrophilia. Unless she becomes febrile/hypothermic, develops low BP or other sx, no need for ID w/u or Abx. Neulasta was given on 8/5 and would expect peak response at 5-7 days and could take up to 2 weeks for WBC to return to normal    4. Thrombocytopenia - improving  -- platelets improved to 124K today   -- Likely secondary to chemotherapy     Will continue to follow.    Twyla Pitts PA-C  Hematology/Oncology  [facility]  [phone] (office)  [phone] (alt office)  Evenings and weekends please call MD on call or office

## 2022-08-15 NOTE — H&P ADULT - PROBLEM SELECTOR PROBLEM 6
HTN (hypertension) Elevated brain natriuretic peptide (BNP) level IV intact/Admission wristband placed CAD (coronary artery disease)

## 2022-08-15 NOTE — DISCHARGE NOTE NURSING/CASE MANAGEMENT/SOCIAL WORK - NSDCPEFALRISK_GEN_ALL_CORE
For information on Fall & Injury Prevention, visit: [email or web address] OR  [email or web address] OR  https://www.cdc.gov/steadi/patient.html

## 2022-08-15 NOTE — DISCHARGE NOTE PROVIDER - PROVIDER TOKENS
PROVIDER:[TOKEN:[[id number]],FOLLOWUP:[2 weeks]],PROVIDER:[TOKEN:[[id number]],FOLLOWUP:[1 week]]

## 2022-08-15 NOTE — PROGRESS NOTE ADULT - SUBJECTIVE AND OBJECTIVE BOX
SUBJECTIVE / OVERNIGHT EVENTS:pt seen and examined  22     MEDICATIONS  (STANDING):  aspirin enteric coated 81 milliGRAM(s) Oral daily  atorvastatin 40 milliGRAM(s) Oral at bedtime  furosemide    Tablet 20 milliGRAM(s) Oral daily  levothyroxine 75 MICROGram(s) Oral daily  pantoprazole    Tablet 40 milliGRAM(s) Oral before breakfast  senna 2 Tablet(s) Oral at bedtime  sodium chloride 0.9%. 1000 milliLiter(s) (70 mL/Hr) IV Continuous <Continuous>    MEDICATIONS  (PRN):  acetaminophen     Tablet .. 650 milliGRAM(s) Oral every 6 hours PRN Temp greater or equal to 38C (100.4F), Mild Pain (1 - 3)  oxycodone    5 mG/acetaminophen 325 mG 1 Tablet(s) Oral every 6 hours PRN Moderate Pain (4 - 6)  polyethylene glycol 3350 17 Gram(s) Oral daily PRN Constipation    Vital Signs Last 24 Hrs  T(C): 37.2 (22 @ 22:20), Max: 37.3 (22 @ 12:58)  T(F): 98.9 (22 @ 22:20), Max: 99.1 (22 @ 12:58)  HR: 100 (22 @ 22:20) (90 - 106)  BP: 138/79 (22 @ 22:20) (138/79 - 141/70)  BP(mean): --  RR: 18 (22 @ 22:20) (18 - 18)  SpO2: 99% (22 @ 22:20) (97% - 100%)      Constitutional: No fever, fatigue  Skin: No rash.  Eyes: No recent vision problems or eye pain.  ENT: No congestion, ear pain, or sore throat.  Cardiovascular: No chest pain or palpation.  Respiratory: No cough, shortness of breath, congestion, or wheezing.  Gastrointestinal: No abdominal pain, nausea, vomiting, or diarrhea.  Genitourinary: No dysuria.  Musculoskeletal: No joint swelling.  Neurologic: No headache.    PHYSICAL EXAM:  GENERAL: NAD  EYES: EOMI, PERRLA  NECK: Supple, No JVD  CHEST/LUNG: dec breath sounds at bases  HEART:  S1 , S2 +  ABDOMEN: soft , bs+  EXTREMITIES:  trace edema  NEUROLOGY:alert awake    LABS:      137  |  95<L>  |  9   ----------------------------<  71  4.5   |  28  |  0.61    Ca    8.8      14 Aug 2022 07:26  Phos  3.5       Mg     1.50           Creatinine Trend: 0.61 <--, 0.58 <--, 0.52 <--, 0.68 <--, 0.55 <--, 0.60 <--, 0.59 <--                        12.6   45.50 )-----------( 97       ( 14 Aug 2022 05:31 )             34.4     Urine Studies:  Urinalysis Basic - ( 08 Aug 2022 18:32 )    Color: Yellow / Appearance: Slightly Turbid / S.017 / pH:   Gluc:  / Ketone: Negative  / Bili: Negative / Urobili: <2 mg/dL   Blood:  / Protein: Trace / Nitrite: Negative   Leuk Esterase: Large / RBC: 7 /HPF / WBC 31 /HPF   Sq Epi:  / Non Sq Epi: 20 /HPF / Bacteria: Few                                    RADIOLOGY & ADDITIONAL TESTS:    Imaging Personally Reviewed:    Consultant(s) Notes Reviewed:      Care Discussed with Consultants/Other Providers:  
Patient is a 87y old  Female who presents with a chief complaint of syncope (10 Aug 2022 15:39)    Patient seen and examined at bedside. Feeling well. Family present.    MEDICATIONS  (STANDING):  aspirin enteric coated 81 milliGRAM(s) Oral daily  atorvastatin 40 milliGRAM(s) Oral at bedtime  furosemide    Tablet 20 milliGRAM(s) Oral daily  levothyroxine 75 MICROGram(s) Oral daily  senna 2 Tablet(s) Oral at bedtime  sodium chloride 0.9%. 1000 milliLiter(s) (70 mL/Hr) IV Continuous <Continuous>    MEDICATIONS  (PRN):  oxycodone    5 mG/acetaminophen 325 mG 1 Tablet(s) Oral every 6 hours PRN Moderate Pain (4 - 6)  polyethylene glycol 3350 17 Gram(s) Oral daily PRN Constipation    Vital Signs Last 24 Hrs  T(C): 37.1 (11 Aug 2022 11:33), Max: 37.1 (11 Aug 2022 11:33)  T(F): 98.7 (11 Aug 2022 11:33), Max: 98.7 (11 Aug 2022 11:33)  HR: 94 (11 Aug 2022 11:33) (85 - 94)  BP: 130/71 (11 Aug 2022 11:33) (120/62 - 130/71)  BP(mean): --  RR: 18 (11 Aug 2022 11:33) (17 - 18)  SpO2: 100% (11 Aug 2022 11:33) (98% - 100%)    Parameters below as of 11 Aug 2022 11:33  Patient On (Oxygen Delivery Method): room air        PE  NAD  Awake, alert  Anicteric, MMM  No c/c/e  No rash grossly                          12.3   4.75  )-----------( 46       ( 11 Aug 2022 05:39 )             37.7       08-11    136  |  98  |  11  ----------------------------<  91  3.9   |  29  |  0.68    Ca    8.5      11 Aug 2022 05:39  Phos  2.8     08-11  Mg     1.60     08-11    TPro  6.2  /  Alb  3.4  /  TBili  0.4  /  DBili  x   /  AST  24  /  ALT  22  /  AlkPhos  97  08-10      
Pt is seen and examined  pt is awake and lying in bed   Anxious to go home  walked w PT yesterday  No LH/Dizziness        PAST MEDICAL & SURGICAL HISTORY:  Endometrial ca      HTN (hypertension)      CAD (coronary artery disease)      S/P AVR      Hypothyroidism      S/P AVR (aortic valve replacement)      History of endometrial biopsy          ROS:  Negative except for:    MEDICATIONS  (STANDING):  aspirin enteric coated 81 milliGRAM(s) Oral daily  atorvastatin 40 milliGRAM(s) Oral at bedtime  furosemide    Tablet 20 milliGRAM(s) Oral daily  levothyroxine 75 MICROGram(s) Oral daily  pantoprazole    Tablet 40 milliGRAM(s) Oral before breakfast  senna 2 Tablet(s) Oral at bedtime  sodium chloride 0.9%. 1000 milliLiter(s) (70 mL/Hr) IV Continuous <Continuous>    MEDICATIONS  (PRN):  acetaminophen     Tablet .. 650 milliGRAM(s) Oral every 6 hours PRN Temp greater or equal to 38C (100.4F), Mild Pain (1 - 3)  oxycodone    5 mG/acetaminophen 325 mG 1 Tablet(s) Oral every 6 hours PRN Moderate Pain (4 - 6)  polyethylene glycol 3350 17 Gram(s) Oral daily PRN Constipation      Allergies    No Known Allergies    Intolerances        Vital Signs Last 24 Hrs  T(C): 36.6 (13 Aug 2022 12:33), Max: 37.1 (12 Aug 2022 20:17)  T(F): 97.9 (13 Aug 2022 12:33), Max: 98.7 (12 Aug 2022 20:17)  HR: 100 (13 Aug 2022 12:33) (100 - 117)  BP: 114/60 (13 Aug 2022 12:33) (114/60 - 138/79)  BP(mean): --  RR: 18 (13 Aug 2022 12:33) (17 - 18)  SpO2: 100% (13 Aug 2022 12:33) (99% - 100%)    Parameters below as of 13 Aug 2022 12:33  Patient On (Oxygen Delivery Method): room air        PHYSICAL EXAM  General: adult in NAD  HEENT: clear oropharynx, anicteric sclera, pink conjunctiva  Neck: supple  CV: normal S1/S2 with no murmur rubs or gallops  Lungs: positive air movement b/l ant lungs,clear to auscultation, no wheezes, no rales  Abdomen: soft non-tender non-distended, no hepatosplenomegaly  Ext: no clubbing cyanosis or edema     LABS:                          12.9   27.85 )-----------( 70       ( 13 Aug 2022 05:24 )             39.6     Serial CBC's  08-13 @ 05:24  Hct-39.6 / Hgb-12.9 / Plat-70 / RBC-4.39 / WBC-27.85          Serial CBC's  08-12 @ 04:05  Hct-38.7 / Hgb-12.5 / Plat-53 / RBC-4.26 / WBC-7.92            08-13    137  |  96<L>  |  9   ----------------------------<  85  3.9   |  27  |  0.58    Ca    9.0      13 Aug 2022 05:24  Phos  3.0     08-13  Mg     1.50     08-13            WBC Count: 27.85 K/uL (08-13 @ 05:24)  Hemoglobin: 12.9 g/dL (08-13 @ 05:24)            RADIOLOGY & ADDITIONAL STUDIES:    
Patient is a 87y old  Female who presents with a chief complaint of syncope (15 Aug 2022 10:07)    Pt seen this morning, no new complaints.     MEDICATIONS  (STANDING):  aspirin enteric coated 81 milliGRAM(s) Oral daily  atorvastatin 40 milliGRAM(s) Oral at bedtime  furosemide    Tablet 20 milliGRAM(s) Oral daily  levothyroxine 75 MICROGram(s) Oral daily  pantoprazole    Tablet 40 milliGRAM(s) Oral before breakfast  senna 2 Tablet(s) Oral at bedtime  sodium chloride 0.9%. 1000 milliLiter(s) (70 mL/Hr) IV Continuous <Continuous>    MEDICATIONS  (PRN):  acetaminophen     Tablet .. 650 milliGRAM(s) Oral every 6 hours PRN Temp greater or equal to 38C (100.4F), Mild Pain (1 - 3)  oxycodone    5 mG/acetaminophen 325 mG 1 Tablet(s) Oral every 6 hours PRN Moderate Pain (4 - 6)  polyethylene glycol 3350 17 Gram(s) Oral daily PRN Constipation        Vital Signs Last 24 Hrs  T(C): 36.9 (15 Aug 2022 15:55), Max: 37.2 (14 Aug 2022 22:20)  T(F): 98.5 (15 Aug 2022 15:55), Max: 98.9 (14 Aug 2022 22:20)  HR: 102 (15 Aug 2022 15:55) (100 - 160)  BP: 133/68 (15 Aug 2022 15:55) (133/68 - 140/75)  BP(mean): --  RR: 18 (15 Aug 2022 15:55) (18 - 18)  SpO2: 99% (15 Aug 2022 15:55) (99% - 99%)    Parameters below as of 15 Aug 2022 15:55  Patient On (Oxygen Delivery Method): room air        PE  NAD  Awake, alert  Anicteric, MMM  No c/c/e  No rash grossly                            12.7   41.58 )-----------( 124      ( 15 Aug 2022 06:00 )             39.6       08-15    139  |  97<L>  |  10  ----------------------------<  84  4.2   |  31  |  0.68    Ca    9.0      15 Aug 2022 06:00  Phos  3.7     08-15  Mg     1.60     08-15      ACC: [id number] EXAM:  MR BRAIN IC                          PROCEDURE DATE:  08/15/2022          INTERPRETATION:  Clinical indication: Endometrial CA. Patient presents   with syncope.    MRI of brain was performed using sagittal T1 axial T1 T2 T2 FLAIR   diffusion and susceptibility weighted sequence. The patient was injected   with approximately 5 cc of gadavist IV with 2.5 cc contrast discarded.   Sagittal coronal and axial T1-weighted sequence performed.    Parenchymal volume loss and chronic microvascular ischemic changes are   identified.    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    There is no acute hemorrhage mass mass effect or abnormal enhancement   seen.    Tiny subcentimeter foci of T2 prolongation with associated restricted   diffusion is seen involving the right posterior temporal/occipital   subcortical, high left frontal cortex as well as the right centrum   semiovale region. No associated areas of abnormal enhancement is seen.   These findings could be compatible with acute lacunar infarcts and less   likely underlying metastasis (given the lack of associated enhancement)   though continued close interval follow-up is recommended. Small focus of   abnormal susceptibility seen involving the left posterior temporal   subcortical region which could be compatible area of old hemorrhage   mineralization or small cavernous angioma.    The large vessels demonstrate normal flow-voids.    The visualized paranasal sinus mastoid and mastoid clear.    IMPRESSION: Tiny foci of abnormal restricted diffusion is identified as   described above.    --- End of Report ---    
  Pt is seen and examined  pt is awake and lying in bed   feels good. no fevers/chills  anxious to go home      PAST MEDICAL & SURGICAL HISTORY:  Endometrial ca      HTN (hypertension)      CAD (coronary artery disease)      S/P AVR      Hypothyroidism      S/P AVR (aortic valve replacement)      History of endometrial biopsy          ROS:  Negative except for:    MEDICATIONS  (STANDING):  aspirin enteric coated 81 milliGRAM(s) Oral daily  atorvastatin 40 milliGRAM(s) Oral at bedtime  furosemide    Tablet 20 milliGRAM(s) Oral daily  levothyroxine 75 MICROGram(s) Oral daily  pantoprazole    Tablet 40 milliGRAM(s) Oral before breakfast  senna 2 Tablet(s) Oral at bedtime  sodium chloride 0.9%. 1000 milliLiter(s) (70 mL/Hr) IV Continuous <Continuous>    MEDICATIONS  (PRN):  acetaminophen     Tablet .. 650 milliGRAM(s) Oral every 6 hours PRN Temp greater or equal to 38C (100.4F), Mild Pain (1 - 3)  oxycodone    5 mG/acetaminophen 325 mG 1 Tablet(s) Oral every 6 hours PRN Moderate Pain (4 - 6)  polyethylene glycol 3350 17 Gram(s) Oral daily PRN Constipation      Allergies    No Known Allergies    Intolerances        Vital Signs Last 24 Hrs  T(C): 37.3 (14 Aug 2022 12:58), Max: 37.3 (14 Aug 2022 12:58)  T(F): 99.1 (14 Aug 2022 12:58), Max: 99.1 (14 Aug 2022 12:58)  HR: 90 (14 Aug 2022 12:58) (90 - 111)  BP: 139/83 (14 Aug 2022 12:58) (114/60 - 141/70)  BP(mean): --  RR: 18 (14 Aug 2022 12:58) (18 - 19)  SpO2: 97% (14 Aug 2022 12:58) (97% - 100%)    Parameters below as of 14 Aug 2022 12:58  Patient On (Oxygen Delivery Method): room air        PHYSICAL EXAM    General: adult in NAD  HEENT: clear oropharynx, anicteric sclera, pink conjunctiva  Neck: supple  CV: normal S1/S2 +  Lungs: positive air movement b/l ant lungs,clear to auscultation, no wheezes, no rales  Abdomen: soft non-tender non-distended, no hepatosplenomegaly  Ext: no clubbing cyanosis or edema    LABS:                          12.6   45.50 )-----------( 97       ( 14 Aug 2022 05:31 )             34.4     Serial CBC's  08-14 @ 05:31  Hct-34.4 / Hgb-12.6 / Plat-97 / RBC-3.69 / WBC-45.50          Serial CBC's  08-13 @ 05:24  Hct-39.6 / Hgb-12.9 / Plat-70 / RBC-4.39 / WBC-27.85            08-14    137  |  95<L>  |  9   ----------------------------<  71  4.5   |  28  |  0.61    Ca    8.8      14 Aug 2022 07:26  Phos  3.5     08-14  Mg     1.50     08-14            Hemoglobin: 12.6 g/dL (08-14 @ 05:31)  Hematocrit: 34.4 % (08-14 @ 05:31)            RADIOLOGY & ADDITIONAL STUDIES:    
Patient is a 87y old  Female who presents with a chief complaint of syncope (09 Aug 2022 16:29)    Patient seen and examined at bedside. No new complaints. Reports she is feeling better.    MEDICATIONS  (STANDING):  aspirin enteric coated 81 milliGRAM(s) Oral daily  atorvastatin 40 milliGRAM(s) Oral at bedtime  enoxaparin Injectable 40 milliGRAM(s) SubCutaneous every 24 hours  furosemide    Tablet 20 milliGRAM(s) Oral daily  levothyroxine 75 MICROGram(s) Oral daily  senna 2 Tablet(s) Oral at bedtime  sodium chloride 0.9%. 1000 milliLiter(s) (70 mL/Hr) IV Continuous <Continuous>    MEDICATIONS  (PRN):  oxycodone    5 mG/acetaminophen 325 mG 1 Tablet(s) Oral every 6 hours PRN Moderate Pain (4 - 6)  polyethylene glycol 3350 17 Gram(s) Oral daily PRN Constipation    Vital Signs Last 24 Hrs  T(C): 37.4 (10 Aug 2022 11:42), Max: 37.4 (10 Aug 2022 11:42)  T(F): 99.3 (10 Aug 2022 11:42), Max: 99.3 (10 Aug 2022 11:42)  HR: 88 (10 Aug 2022 11:42) (78 - 88)  BP: 123/60 (10 Aug 2022 11:42) (78/50 - 134/64)  BP(mean): 68 (09 Aug 2022 19:59) (60 - 68)  RR: 18 (10 Aug 2022 11:42) (17 - 21)  SpO2: 99% (10 Aug 2022 11:42) (98% - 100%)    Parameters below as of 10 Aug 2022 11:42  Patient On (Oxygen Delivery Method): room air    PE  NAD  Awake, alert  Anicteric, MMM  No c/c/e  No rash grossly                          12.5   13.76 )-----------( 65       ( 10 Aug 2022 05:24 )             38.9       08-10    134<L>  |  98  |  15  ----------------------------<  137<H>  3.7   |  23  |  0.55    Ca    8.4      10 Aug 2022 05:24  Mg     2.00     08-08    TPro  6.2  /  Alb  3.4  /  TBili  0.4  /  DBili  x   /  AST  24  /  ALT  22  /  AlkPhos  97  08-10      
Patient is a 87y old  Female who presents with a chief complaint of syncope (11 Aug 2022 18:31)    Patient seen and examined at bedside.    MEDICATIONS  (STANDING):  aspirin enteric coated 81 milliGRAM(s) Oral daily  atorvastatin 40 milliGRAM(s) Oral at bedtime  furosemide    Tablet 20 milliGRAM(s) Oral daily  levothyroxine 75 MICROGram(s) Oral daily  pantoprazole    Tablet 40 milliGRAM(s) Oral before breakfast  senna 2 Tablet(s) Oral at bedtime  sodium chloride 0.9%. 1000 milliLiter(s) (70 mL/Hr) IV Continuous <Continuous>    MEDICATIONS  (PRN):  acetaminophen     Tablet .. 650 milliGRAM(s) Oral every 6 hours PRN Temp greater or equal to 38C (100.4F), Mild Pain (1 - 3)  oxycodone    5 mG/acetaminophen 325 mG 1 Tablet(s) Oral every 6 hours PRN Moderate Pain (4 - 6)  polyethylene glycol 3350 17 Gram(s) Oral daily PRN Constipation    Vital Signs Last 24 Hrs  T(C): 36.7 (12 Aug 2022 05:00), Max: 37.3 (11 Aug 2022 21:00)  T(F): 98 (12 Aug 2022 05:00), Max: 99.2 (11 Aug 2022 21:00)  HR: 90 (12 Aug 2022 05:00) (90 - 98)  BP: 125/70 (12 Aug 2022 05:00) (112/76 - 125/70)  BP(mean): --  RR: 18 (12 Aug 2022 05:00) (18 - 18)  SpO2: 100% (12 Aug 2022 05:00) (98% - 100%)    Parameters below as of 12 Aug 2022 05:00  Patient On (Oxygen Delivery Method): room air    PE  NAD  Awake, alert  Anicteric, MMM  No c/c/e  No rash grossly                        12.5   7.92  )-----------( 53       ( 12 Aug 2022 04:05 )             38.7     08-12    132<L>  |  95<L>  |  11  ----------------------------<  103<H>  3.9   |  25  |  0.52    Ca    8.8      12 Aug 2022 04:05  Phos  2.7     08-12  Mg     1.50     08-12        
    SUBJECTIVE / OVERNIGHT EVENTS:pt seen and examined  22     MEDICATIONS  (STANDING):  aspirin enteric coated 81 milliGRAM(s) Oral daily  atorvastatin 40 milliGRAM(s) Oral at bedtime  furosemide    Tablet 20 milliGRAM(s) Oral daily  levothyroxine 75 MICROGram(s) Oral daily  pantoprazole    Tablet 40 milliGRAM(s) Oral before breakfast  senna 2 Tablet(s) Oral at bedtime  sodium chloride 0.9%. 1000 milliLiter(s) (70 mL/Hr) IV Continuous <Continuous>    MEDICATIONS  (PRN):  acetaminophen     Tablet .. 650 milliGRAM(s) Oral every 6 hours PRN Temp greater or equal to 38C (100.4F), Mild Pain (1 - 3)  oxycodone    5 mG/acetaminophen 325 mG 1 Tablet(s) Oral every 6 hours PRN Moderate Pain (4 - 6)  polyethylene glycol 3350 17 Gram(s) Oral daily PRN Constipation    Vital Signs Last 24 Hrs  T(C): 37.1 (22 @ 20:17), Max: 37.1 (22 @ 20:17)  T(F): 98.7 (22 @ 20:17), Max: 98.7 (22 @ 20:17)  HR: 117 (22 @ 20:17) (90 - 117)  BP: 138/79 (22 @ 20:17) (124/72 - 138/79)  BP(mean): --  RR: 18 (22 @ 20:17) (18 - 18)  SpO2: 99% (22 @ 20:17) (98% - 100%)    Orthostatic VS  22 @ 16:15  Lying BP: 120/69 HR: 94  Sitting BP: 124/63 HR: 99  Standing BP: 118/68 HR: 109  Site: upper left arm  Mode: electronic    Constitutional: No fever, fatigue  Skin: No rash.  Eyes: No recent vision problems or eye pain.  ENT: No congestion, ear pain, or sore throat.  Cardiovascular: No chest pain or palpation.  Respiratory: No cough, shortness of breath, congestion, or wheezing.  Gastrointestinal: No abdominal pain, nausea, vomiting, or diarrhea.  Genitourinary: No dysuria.  Musculoskeletal: No joint swelling.  Neurologic: No headache.    PHYSICAL EXAM:  GENERAL: NAD  EYES: EOMI, PERRLA  NECK: Supple, No JVD  CHEST/LUNG: dec breath sounds at bases  HEART:  S1 , S2 +  ABDOMEN: soft , bs+  EXTREMITIES:  trace edema  NEUROLOGY:alert awake    LABS:      132<L>  |  95<L>  |  11  ----------------------------<  103<H>  3.9   |  25  |  0.52    Ca    8.8      12 Aug 2022 04:05  Phos  2.7       Mg     1.50           Creatinine Trend: 0.52 <--, 0.68 <--, 0.55 <--, 0.60 <--, 0.59 <--                        12.5   7.92  )-----------( 53       ( 12 Aug 2022 04:05 )             38.7     Urine Studies:  Urinalysis Basic - ( 08 Aug 2022 18:32 )    Color: Yellow / Appearance: Slightly Turbid / S.017 / pH:   Gluc:  / Ketone: Negative  / Bili: Negative / Urobili: <2 mg/dL   Blood:  / Protein: Trace / Nitrite: Negative   Leuk Esterase: Large / RBC: 7 /HPF / WBC 31 /HPF   Sq Epi:  / Non Sq Epi: 20 /HPF / Bacteria: Few        CARDIAC MARKERS ( 12 Aug 2022 04:05 )  x     / x     / 63 U/L / x     / 2.3 ng/mL            RADIOLOGY & ADDITIONAL TESTS:    Imaging Personally Reviewed:    Consultant(s) Notes Reviewed:      Care Discussed with Consultants/Other Providers:  
PATIENT SEEN AND EXAMINED ON :- 8/14/22  DATE OF SERVICE:   8/14/22          Interim events noted,Labs ,Radiological studies and Cardiology tests reviewed .       HOSPITAL COURSE: HPI:  Patient is an 88yo F with PMH of serous endometrial carcinoma (stage IV) [place] systemic [place]carboplatin, [place]ol x1 – [place] [facility] [place], CAD s/p PCI 3/2021, s/p AVR,  hypothyroidism, and HLD who presented after a witnessed loss of consciousness while sitting in her walker outdoors on the day of presentation. The patient's daughter, Shayla, was present for the event. She stated that the patient was commenting on the breeze when she said something unusual, and then lost consciousness for about 45 seconds. The daughter described it as a staring episode without generalized shaking or other movements, stating the patient's eyes "almost rolled backward." The episode self-resolved and was followed by a period of confusion, without a quantified length. The patient's granddaughter had also commented on the patient being confused prior to the event, about 1 day after receiving her first session of chemotherapy. The patient herself did not recall the event in significant detail, but reported recent heaviness in her legs since initiating the chemotherapy.  The patient had a prior syncopal episode several years ago when she fell down the stairs, and was evaluated at the time by a cardiologist. She underwent a TAVR at the time for a reportedly damaged valve.  (09 Aug 2022 08:30)      INTERIM EVENTS:Patient seen at bedside ,interim events noted.      PMH -reviewed admission note, no change since admission  HEART FAILURE: Acute[ ]Chronic[ ] Systolic[ ] Diastolic[ ] Combined Systolic and Diastolic[ ]  CAD[ ] CABG[ ] PCI[ ]  DEVICES[ ] PPM[ ] ICD[ ] ILR[ ]  ATRIAL FIBRILLATION[ ] Paroxysmal[ ] Permanent[ ] CHADS2-[  ]  [place][ ] CKD1[ ] CKD2[ ] CKD3[ ] CKD4[ ] ESRD[ ]  COPD[ ] HTN[ ]   DM[ ] Type1[ ] Type 2[ ]   CVA[ ] Paresis[ ]    AMBULATION: Assisted[ ] Cane/walker[ ] Independent[ ]    MEDICATIONS  (STANDING):  aspirin enteric coated 81 milliGRAM(s) Oral daily  atorvastatin 40 milliGRAM(s) Oral at bedtime  furosemide    Tablet 20 milliGRAM(s) Oral daily  levothyroxine 75 MICROGram(s) Oral daily  pantoprazole    Tablet 40 milliGRAM(s) Oral before breakfast  senna 2 Tablet(s) Oral at bedtime  sodium chloride 0.9%. 1000 milliLiter(s) (70 mL/Hr) IV Continuous <Continuous>    MEDICATIONS  (PRN):  acetaminophen     Tablet .. 650 milliGRAM(s) Oral every 6 hours PRN Temp greater or equal to 38C (100.4F), Mild Pain (1 - 3)  oxycodone    5 mG/acetaminophen 325 mG 1 Tablet(s) Oral every 6 hours PRN Moderate Pain (4 - 6)  polyethylene glycol 3350 17 Gram(s) Oral daily PRN Constipation            REVIEW OF SYSTEMS:  Constitutional: [ ] fever, [ ]weight loss,  [ ]fatigue [ ]weight gain  Eyes: [ ] visual changes  Respiratory: [ ]shortness of breath;  [ ] cough, [ ]wheezing, [ ]chills, [ ]hemoptysis  Cardiovascular: [ ] chest pain, [ ]palpitations, [ ]dizziness,  [ ]leg swelling[ ]orthopnea[ ]PND  Gastrointestinal: [ ] abdominal pain, [ ]nausea, [ ]vomiting,  [ ]diarrhea [ ]Constipation [ ]Melena  Genitourinary: [ ] dysuria, [ ] hematuria [ ][place]  Neurologic: [ ] headaches [ ] tremors[ ]weakness [ ]Paralysis Right[ ] Left[ ]  Skin: [ ] itching, [ ]burning, [ ] rashes  Endocrine: [ ] heat or cold intolerance  Musculoskeletal: [ ] joint pain or swelling; [ ] muscle, back, or extremity pain  Psychiatric: [ ] depression, [ ]anxiety, [ ]mood swings, or [ ]difficulty sleeping  Hematologic: [ ] easy bruising, [ ] bleeding gums    [ ] All remaining systems negative except as per above.   [ ]Unable to obtain.  [x] No change in ROS since admission      Vital Signs Last 24 Hrs  T(C): 37.3 (14 Aug 2022 12:58), Max: 37.3 (14 Aug 2022 12:58)  T(F): 99.1 (14 Aug 2022 12:58), Max: 99.1 (14 Aug 2022 12:58)  HR: 90 (14 Aug 2022 12:58) (90 - 106)  BP: 139/83 (14 Aug 2022 12:58) (139/83 - 141/70)  BP(mean): --  RR: 18 (14 Aug 2022 12:58) (18 - 18)  SpO2: 97% (14 Aug 2022 12:58) (97% - 100%)    Parameters below as of 14 Aug 2022 12:58  Patient On (Oxygen Delivery Method): room air      I&O's Summary      PHYSICAL EXAM:  General: No acute distress BMI-  HEENT: EOMI, PERRL  Neck: Supple, [ ] JVD  Lungs: Equal air entry bilaterally; [ ] rales [ ] wheezing [ ] rhonchi  Heart: Regular rate and rhythm; [x ] murmur   2/6 [ x] systolic [ ] diastolic [ ] radiation[ ] rubs [ ]  gallops  Abdomen: Nontender, bowel sounds present  Extremities: No clubbing, cyanosis, [ ] edema [ ]Pulses  equal and intact  Nervous system:  Alert & Oriented X3, no focal deficits  Psychiatric: Normal affect  Skin: No rashes or lesions    LABS:  08-14    137  |  95<L>  |  9   ----------------------------<  71  4.5   |  28  |  0.61    Ca    8.8      14 Aug 2022 07:26  Phos  3.5     08-14  Mg     1.50     08-14      Creatinine Trend: 0.61<--, 0.58<--, 0.52<--, 0.68<--, 0.55<--, 0.60<--                        12.6   45.50 )-----------( 97       ( 14 Aug 2022 05:31 )             34.4               
PATIENT SEEN AND EXAMINED ON :- 8/13/22  DATE OF SERVICE:    8/13/22         Interim events noted,Labs ,Radiological studies and Cardiology tests reviewed .       HOSPITAL COURSE: HPI:  Patient is an 86yo F with PMH of serous endometrial carcinoma (stage IV) [place] systemic [place]carboplatin, [place]ol [place]1 – [place] [facility] [place]N, CAD s/p PCI 3/2021, s/p AVR,  hypothyroidism, and HLD who presented after a witnessed loss of consciousness while sitting in her walker outdoors on the day of presentation. The patient's daughter, Shayla, was present for the event. She stated that the patient was commenting on the breeze when she said something unusual, and then lost consciousness for about 45 seconds. The daughter described it as a staring episode without generalized shaking or other movements, stating the patient's eyes "almost rolled backward." The episode self-resolved and was followed by a period of confusion, without a quantified length. The patient's granddaughter had also commented on the patient being confused prior to the event, about 1 day after receiving her first session of chemotherapy. The patient herself did not recall the event in significant detail, but reported recent heaviness in her legs since initiating the chemotherapy.  The patient had a prior syncopal episode several years ago when she fell down the stairs, and was evaluated at the time by a cardiologist. She underwent a TAVR at the time for a reportedly damaged valve.  (09 Aug 2022 08:30)      INTERIM EVENTS:Patient seen at bedside ,interim events noted.      PMH -reviewed admission note, no change since admission  HEART FAILURE: Acute[ ]Chronic[ ] Systolic[ ] Diastolic[ ] Combined Systolic and Diastolic[ ]  CAD[ ] CABG[ ] PCI[ ]  DEVICES[ ] PPM[ ] ICD[ ] ILR[ ]  ATRIAL FIBRILLATION[ ] Paroxysmal[ ] Permanent[ ] CHADS2-[  ]  [place][ ] CKD1[ ] CKD2[ ] CKD3[ ] CKD4[ ] ESRD[ ]  COPD[ ] HTN[ ]   DM[ ] Type1[ ] Type 2[ ]   CVA[ ] Paresis[ ]    AMBULATION: Assisted[ ] Cane/walker[ ] Independent[ ]    MEDICATIONS  (STANDING):  aspirin enteric coated 81 milliGRAM(s) Oral daily  atorvastatin 40 milliGRAM(s) Oral at bedtime  furosemide    Tablet 20 milliGRAM(s) Oral daily  levothyroxine 75 MICROGram(s) Oral daily  pantoprazole    Tablet 40 milliGRAM(s) Oral before breakfast  senna 2 Tablet(s) Oral at bedtime  sodium chloride 0.9%. 1000 milliLiter(s) (70 mL/Hr) IV Continuous <Continuous>    MEDICATIONS  (PRN):  acetaminophen     Tablet .. 650 milliGRAM(s) Oral every 6 hours PRN Temp greater or equal to 38C (100.4F), Mild Pain (1 - 3)  oxycodone    5 mG/acetaminophen 325 mG 1 Tablet(s) Oral every 6 hours PRN Moderate Pain (4 - 6)  polyethylene glycol 3350 17 Gram(s) Oral daily PRN Constipation            REVIEW OF SYSTEMS:  Constitutional: [ ] fever, [ ]weight loss,  [ ]fatigue [ ]weight gain  Eyes: [ ] visual changes  Respiratory: [ ]shortness of breath;  [ ] cough, [ ]wheezing, [ ]chills, [ ]hemoptysis  Cardiovascular: [ ] chest pain, [ ]palpitations, [ ]dizziness,  [ ]leg swelling[ ]orthopnea[ ]PND  Gastrointestinal: [ ] abdominal pain, [ ]nausea, [ ]vomiting,  [ ]diarrhea [ ]Constipation [ ]Melena  Genitourinary: [ ] dysuria, [ ] hematuria [ ]Birch  Neurologic: [ ] headaches [ ] tremors[ ]weakness [ ]Paralysis Right[ ] Left[ ]  Skin: [ ] itching, [ ]burning, [ ] rashes  Endocrine: [ ] heat or cold intolerance  Musculoskeletal: [ ] joint pain or swelling; [ ] muscle, back, or extremity pain  Psychiatric: [ ] depression, [ ]anxiety, [ ]mood swings, or [ ]difficulty sleeping  Hematologic: [ ] easy bruising, [ ] bleeding gums    [ ] All remaining systems negative except as per above.   [ ]Unable to obtain.  [x] No change in ROS since admission      Vital Signs Last 24 Hrs  T(C): 36.9 (13 Aug 2022 20:00), Max: 36.9 (13 Aug 2022 20:00)  T(F): 98.5 (13 Aug 2022 20:00), Max: 98.5 (13 Aug 2022 20:00)  HR: 111 (13 Aug 2022 20:00) (100 - 111)  BP: 140/69 (13 Aug 2022 20:00) (114/60 - 140/69)  BP(mean): --  RR: 19 (13 Aug 2022 20:00) (17 - 19)  SpO2: 100% (13 Aug 2022 20:00) (100% - 100%)    Parameters below as of 13 Aug 2022 20:00  Patient On (Oxygen Delivery Method): room air      I&O's Summary      PHYSICAL EXAM:  General: No acute distress BMI-  HEENT: EOMI, PERRL  Neck: Supple, [ ] JVD  Lungs: Equal air entry bilaterally; [ ] rales [ ] wheezing [ ] rhonchi  Heart: Regular rate and rhythm; [x ] murmur   2/6 [ x] systolic [ ] diastolic [ ] radiation[ ] rubs [ ]  gallops  Abdomen: Nontender, bowel sounds present  Extremities: No clubbing, cyanosis, [ ] edema [ ]Pulses  equal and intact  Nervous system:  Alert & Oriented X3, no focal deficits  Psychiatric: Normal affect  Skin: No rashes or lesions    LABS:  08-13    137  |  96<L>  |  9   ----------------------------<  85  3.9   |  27  |  0.58    Ca    9.0      13 Aug 2022 05:24  Phos  3.0     08-13  Mg     1.50     08-13      Creatinine Trend: 0.58<--, 0.52<--, 0.68<--, 0.55<--, 0.60<--, 0.59<--                        12.9   27.85 )-----------( 70       ( 13 Aug 2022 05:24 )             39.6               
PATIENT SEEN AND EXAMINED ON :- 8/12/22  DATE OF SERVICE:    8/12/22         Interim events noted,Labs ,Radiological studies and Cardiology tests reviewed .       HOSPITAL COURSE: HPI:  Patient is an 88yo F with PMH of serous endometrial carcinoma (stage IV) [place]atin, taxol [place]1 – [place]22[place] [facility] [place], [place] s/p PCI 3/2021, s/p AVR,  hypothyroidism, and HLD who presented after a witnessed loss of consciousness while sitting in her walker outdoors on the day of presentation. The patient's daughter, Shayla, was present for the event. She stated that the patient was commenting on the breeze when she said something unusual, and then lost consciousness for about 45 seconds. The daughter described it as a staring episode without generalized shaking or other movements, stating the patient's eyes "almost rolled backward." The episode self-resolved and was followed by a period of confusion, without a quantified length. The patient's granddaughter had also commented on the patient being confused prior to the event, about 1 day after receiving her first session of chemotherapy. The patient herself did not recall the event in significant detail, but reported recent heaviness in her legs since initiating the chemotherapy.  The patient had a prior syncopal episode several years ago when she fell down the stairs, and was evaluated at the time by a cardiologist. She underwent a TAVR at the time for a reportedly damaged valve.  (09 Aug 2022 08:30)      INTERIM EVENTS:Patient seen at bedside ,interim events noted.      PMH -reviewed admission note, no change since admission  HEART FAILURE: Acute[ ]Chronic[ ] Systolic[ ] Diastolic[ ] Combined Systolic and Diastolic[ ]  CAD[ ] CABG[ ] PCI[ ]  DEVICES[ ] PPM[ ] ICD[ ] ILR[ ]  ATRIAL FIBRILLATION[ ] Paroxysmal[ ] Permanent[ ] CHADS2-[  ]  [place][ ] CKD1[ ] CKD2[ ] CKD3[ ] CKD4[ ] ESRD[ ]  COPD[ ] HTN[ ]   DM[ ] Type1[ ] Type 2[ ]   CVA[ ] Paresis[ ]    AMBULATION: Assisted[ ] Cane/walker[ ] Independent[ ]    MEDICATIONS  (STANDING):  aspirin enteric coated 81 milliGRAM(s) Oral daily  atorvastatin 40 milliGRAM(s) Oral at bedtime  furosemide    Tablet 20 milliGRAM(s) Oral daily  levothyroxine 75 MICROGram(s) Oral daily  pantoprazole    Tablet 40 milliGRAM(s) Oral before breakfast  senna 2 Tablet(s) Oral at bedtime  sodium chloride 0.9%. 1000 milliLiter(s) (70 mL/Hr) IV Continuous <Continuous>    MEDICATIONS  (PRN):  acetaminophen     Tablet .. 650 milliGRAM(s) Oral every 6 hours PRN Temp greater or equal to 38C (100.4F), Mild Pain (1 - 3)  oxycodone    5 mG/acetaminophen 325 mG 1 Tablet(s) Oral every 6 hours PRN Moderate Pain (4 - 6)  polyethylene glycol 3350 17 Gram(s) Oral daily PRN Constipation            REVIEW OF SYSTEMS:  Constitutional: [ ] fever, [ ]weight loss,  [ ]fatigue [ ]weight gain  Eyes: [ ] visual changes  Respiratory: [ ]shortness of breath;  [ ] cough, [ ]wheezing, [ ]chills, [ ]hemoptysis  Cardiovascular: [ ] chest pain, [ ]palpitations, [ ]dizziness,  [ ]leg swelling[ ]orthopnea[ ]PND  Gastrointestinal: [ ] abdominal pain, [ ]nausea, [ ]vomiting,  [ ]diarrhea [ ]Constipation [ ]Melena  Genitourinary: [ ] dysuria, [ ] hematuria [ ]Birch  Neurologic: [ ] headaches [ ] tremors[ ]weakness [ ]Paralysis Right[ ] Left[ ]  Skin: [ ] itching, [ ]burning, [ ] rashes  Endocrine: [ ] heat or cold intolerance  Musculoskeletal: [ ] joint pain or swelling; [ ] muscle, back, or extremity pain  Psychiatric: [ ] depression, [ ]anxiety, [ ]mood swings, or [ ]difficulty sleeping  Hematologic: [ ] easy bruising, [ ] bleeding gums    [ ] All remaining systems negative except as per above.   [ ]Unable to obtain.  [x] No change in ROS since admission      Vital Signs Last 24 Hrs  T(C): 37.1 (12 Aug 2022 20:17), Max: 37.1 (12 Aug 2022 20:17)  T(F): 98.7 (12 Aug 2022 20:17), Max: 98.7 (12 Aug 2022 20:17)  HR: 117 (12 Aug 2022 20:17) (90 - 117)  BP: 138/79 (12 Aug 2022 20:17) (124/72 - 138/79)  BP(mean): --  RR: 18 (12 Aug 2022 20:17) (18 - 18)  SpO2: 99% (12 Aug 2022 20:17) (98% - 100%)    Parameters below as of 12 Aug 2022 20:17  Patient On (Oxygen Delivery Method): room air      I&O's Summary      PHYSICAL EXAM:  General: No acute distress BMI-  HEENT: EOMI, PERRL  Neck: Supple, [ ] JVD  Lungs: Equal air entry bilaterally; [ ] rales [ ] wheezing [ ] rhonchi  Heart: Regular rate and rhythm; [x ] murmur   2/6 [ x] systolic [ ] diastolic [ ] radiation[ ] rubs [ ]  gallops  Abdomen: Nontender, bowel sounds present  Extremities: No clubbing, cyanosis, [ ] edema [ ]Pulses  equal and intact  Nervous system:  Alert & Oriented X3, no focal deficits  Psychiatric: Normal affect  Skin: No rashes or lesions    LABS:  08-12    132<L>  |  95<L>  |  11  ----------------------------<  103<H>  3.9   |  25  |  0.52    Ca    8.8      12 Aug 2022 04:05  Phos  2.7     08-12  Mg     1.50     08-12      Creatinine Trend: 0.52<--, 0.68<--, 0.55<--, 0.60<--, 0.59<--                        12.5   7.92  )-----------( 53       ( 12 Aug 2022 04:05 )             38.7               
    SUBJECTIVE / OVERNIGHT EVENTS:pt seen and examined  22     MEDICATIONS  (STANDING):  aspirin enteric coated 81 milliGRAM(s) Oral daily  atorvastatin 40 milliGRAM(s) Oral at bedtime  furosemide    Tablet 20 milliGRAM(s) Oral daily  levothyroxine 75 MICROGram(s) Oral daily  pantoprazole    Tablet 40 milliGRAM(s) Oral before breakfast  senna 2 Tablet(s) Oral at bedtime  sodium chloride 0.9%. 1000 milliLiter(s) (70 mL/Hr) IV Continuous <Continuous>    MEDICATIONS  (PRN):  acetaminophen     Tablet .. 650 milliGRAM(s) Oral every 6 hours PRN Temp greater or equal to 38C (100.4F), Mild Pain (1 - 3)  oxycodone    5 mG/acetaminophen 325 mG 1 Tablet(s) Oral every 6 hours PRN Moderate Pain (4 - 6)  polyethylene glycol 3350 17 Gram(s) Oral daily PRN Constipation    Vital Signs Last 24 Hrs  T(C): 36.9 (22 @ 20:00), Max: 36.9 (22 @ 20:00)  T(F): 98.5 (22 @ 20:00), Max: 98.5 (22 @ 20:00)  HR: 111 (22 @ 20:00) (100 - 111)  BP: 140/69 (22 @ 20:00) (114/60 - 140/69)  BP(mean): --  RR: 19 (22 @ 20:00) (17 - 19)  SpO2: 100% (22 @ 20:00) (100% - 100%)    Constitutional: No fever, fatigue  Skin: No rash.  Eyes: No recent vision problems or eye pain.  ENT: No congestion, ear pain, or sore throat.  Cardiovascular: No chest pain or palpation.  Respiratory: No cough, shortness of breath, congestion, or wheezing.  Gastrointestinal: No abdominal pain, nausea, vomiting, or diarrhea.  Genitourinary: No dysuria.  Musculoskeletal: No joint swelling.  Neurologic: No headache.    PHYSICAL EXAM:  GENERAL: NAD  EYES: EOMI, PERRLA  NECK: Supple, No JVD  CHEST/LUNG: dec breath sounds at bases  HEART:  S1 , S2 +  ABDOMEN: soft , bs+  EXTREMITIES:  trace edema  NEUROLOGY:alert awake    LABS:      137  |  96<L>  |  9   ----------------------------<  85  3.9   |  27  |  0.58    Ca    9.0      13 Aug 2022 05:24  Phos  3.0       Mg     1.50           Creatinine Trend: 0.58 <--, 0.52 <--, 0.68 <--, 0.55 <--, 0.60 <--, 0.59 <--                        12.9   27.85 )-----------( 70       ( 13 Aug 2022 05:24 )             39.6     Urine Studies:  Urinalysis Basic - ( 08 Aug 2022 18:32 )    Color: Yellow / Appearance: Slightly Turbid / S.017 / pH:   Gluc:  / Ketone: Negative  / Bili: Negative / Urobili: <2 mg/dL   Blood:  / Protein: Trace / Nitrite: Negative   Leuk Esterase: Large / RBC: 7 /HPF / WBC 31 /HPF   Sq Epi:  / Non Sq Epi: 20 /HPF / Bacteria: Few        CARDIAC MARKERS ( 12 Aug 2022 04:05 )  x     / x     / 63 U/L / x     / 2.3 ng/mL                      RADIOLOGY & ADDITIONAL TESTS:    Imaging Personally Reviewed:    Consultant(s) Notes Reviewed:      Care Discussed with Consultants/Other Providers:  
    SUBJECTIVE / OVERNIGHT EVENTS:pt seen and examined  22     MEDICATIONS  (STANDING):  aspirin enteric coated 81 milliGRAM(s) Oral daily  atorvastatin 40 milliGRAM(s) Oral at bedtime  furosemide    Tablet 20 milliGRAM(s) Oral daily  levothyroxine 75 MICROGram(s) Oral daily  senna 2 Tablet(s) Oral at bedtime  sodium chloride 0.9%. 1000 milliLiter(s) (70 mL/Hr) IV Continuous <Continuous>    MEDICATIONS  (PRN):  oxycodone    5 mG/acetaminophen 325 mG 1 Tablet(s) Oral every 6 hours PRN Moderate Pain (4 - 6)  polyethylene glycol 3350 17 Gram(s) Oral daily PRN Constipation    Vital Signs Last 24 Hrs  T(C): 37.1 (22 @ 11:33), Max: 37.1 (22 @ 11:33)  T(F): 98.7 (22 @ 11:33), Max: 98.7 (22 @ 11:33)  HR: 94 (22 @ 11:33) (85 - 94)  BP: 130/71 (22 @ 11:33) (124/70 - 130/71)  BP(mean): --  RR: 18 (22 @ 11:33) (18 - 18)  SpO2: 100% (22 @ 11:33) (98% - 100%)    Orthostatic VS  22 @ 16:15  Lying BP: 120/69 HR: 94  Sitting BP: 124/63 HR: 99  Standing BP: 118/68 HR: 109  Site: upper left arm  Mode: electronic  Orthostatic VS  22 @ 21:42  Lying BP: 96/50 HR: 81  Sitting BP: 100/46 HR: 83  Standing BP: 102/60 HR: 83  Site: --  Mode: --      Constitutional: No fever, fatigue  Skin: No rash.  Eyes: No recent vision problems or eye pain.  ENT: No congestion, ear pain, or sore throat.  Cardiovascular: No chest pain or palpation.  Respiratory: No cough, shortness of breath, congestion, or wheezing.  Gastrointestinal: No abdominal pain, nausea, vomiting, or diarrhea.  Genitourinary: No dysuria.  Musculoskeletal: No joint swelling.  Neurologic: No headache.    PHYSICAL EXAM:  GENERAL: NAD  EYES: EOMI, PERRLA  NECK: Supple, No JVD  CHEST/LUNG: dec breath sounds at bases  HEART:  S1 , S2 +  ABDOMEN: soft , bs+  EXTREMITIES:  trace edema  NEUROLOGY:alert awake      LABS:      136  |  98  |  11  ----------------------------<  91  3.9   |  29  |  0.68    Ca    8.5      11 Aug 2022 05:39  Phos  2.8       Mg     1.60         TPro  6.2  /  Alb  3.4  /  TBili  0.4  /  DBili      /  AST  24  /  ALT  22  /  AlkPhos  97  08-10    Creatinine Trend: 0.68 <--, 0.55 <--, 0.60 <--, 0.59 <--                        12.3   4.75  )-----------( 46       ( 11 Aug 2022 05:39 )             37.7     Urine Studies:  Urinalysis Basic - ( 08 Aug 2022 18:32 )    Color: Yellow / Appearance: Slightly Turbid / S.017 / pH:   Gluc:  / Ketone: Negative  / Bili: Negative / Urobili: <2 mg/dL   Blood:  / Protein: Trace / Nitrite: Negative   Leuk Esterase: Large / RBC: 7 /HPF / WBC 31 /HPF   Sq Epi:  / Non Sq Epi: 20 /HPF / Bacteria: Few              LIVER FUNCTIONS - ( 10 Aug 2022 05:24 )  Alb: 3.4 g/dL / Pro: 6.2 g/dL / ALK PHOS: 97 U/L / ALT: 22 U/L / AST: 24 U/L / GGT: x             RADIOLOGY & ADDITIONAL TESTS:    Imaging Personally Reviewed:    Consultant(s) Notes Reviewed:      Care Discussed with Consultants/Other Providers:  
    SUBJECTIVE / OVERNIGHT EVENTS:pt seen and examined  08-10-22     MEDICATIONS  (STANDING):  aspirin enteric coated 81 milliGRAM(s) Oral daily  atorvastatin 40 milliGRAM(s) Oral at bedtime  enoxaparin Injectable 40 milliGRAM(s) SubCutaneous every 24 hours  furosemide    Tablet 20 milliGRAM(s) Oral daily  levothyroxine 75 MICROGram(s) Oral daily  senna 2 Tablet(s) Oral at bedtime  sodium chloride 0.9%. 1000 milliLiter(s) (70 mL/Hr) IV Continuous <Continuous>    MEDICATIONS  (PRN):  oxycodone    5 mG/acetaminophen 325 mG 1 Tablet(s) Oral every 6 hours PRN Moderate Pain (4 - 6)  polyethylene glycol 3350 17 Gram(s) Oral daily PRN Constipation    T(C): 37.4 (08-10-22 @ 11:42), Max: 37.4 (08-10-22 @ 11:42)  HR: 88 (08-10-22 @ 11:42) (81 - 88)  BP: 123/60 (08-10-22 @ 11:42) (102/60 - 134/64)  RR: 18 (08-10-22 @ 11:42) (18 - 20)  SpO2: 99% (08-10-22 @ 11:42) (98% - 99%)    CAPILLARY BLOOD GLUCOSE        I&O's Summary      Constitutional: No fever, fatigue  Skin: No rash.  Eyes: No recent vision problems or eye pain.  ENT: No congestion, ear pain, or sore throat.  Cardiovascular: No chest pain or palpation.  Respiratory: No cough, shortness of breath, congestion, or wheezing.  Gastrointestinal: No abdominal pain, nausea, vomiting, or diarrhea.  Genitourinary: No dysuria.  Musculoskeletal: No joint swelling.  Neurologic: No headache.    PHYSICAL EXAM:  GENERAL: NAD  EYES: EOMI, PERRLA  NECK: Supple, No JVD  CHEST/LUNG: dec breath sounds at bases  HEART:  S1 , S2 +  ABDOMEN: soft , bs+  EXTREMITIES:  trace edema  NEUROLOGY:alert awake      LABS:                        12.5   13.76 )-----------( 65       ( 10 Aug 2022 05:24 )             38.9     08-10    134<L>  |  98  |  15  ----------------------------<  137<H>  3.7   |  23  |  0.55    Ca    8.4      10 Aug 2022 05:24    TPro  6.2  /  Alb  3.4  /  TBili  0.4  /  DBili  x   /  AST  24  /  ALT  22  /  AlkPhos  97  08-10              RADIOLOGY & ADDITIONAL TESTS:    Imaging Personally Reviewed:    Consultant(s) Notes Reviewed:      Care Discussed with Consultants/Other Providers:  
PATIENT SEEN AND EXAMINED ON :- 8/10/22  DATE OF SERVICE:    8/10/22         Interim events noted,Labs ,Radiological studies and Cardiology tests reviewed .       HOSPITAL COURSE: HPI:  Patient is an 86yo F with PMH of serous endometrial carcinoma (stage IV) undergoing systemic chemotherapy (carboplatin, taxol x1 – 8/5/22) [place] [facility] [place], [place]N, CAD s/p PCI 3/2021, s/p AVR,  hypothyroidism, and HLD who presented after a witnessed loss of consciousness while sitting in her walker outdoors on the day of presentation. The patient's daughter, Shayla, was present for the event. She stated that the patient was commenting on the breeze when she said something unusual, and then lost consciousness for about 45 seconds. The daughter described it as a staring episode without generalized shaking or other movements, stating the patient's eyes "almost rolled backward." The episode self-resolved and was followed by a period of confusion, without a quantified length. The patient's granddaughter had also commented on the patient being confused prior to the event, about 1 day after receiving her first session of chemotherapy. The patient herself did not recall the event in significant detail, but reported recent heaviness in her legs since initiating the chemotherapy.  The patient had a prior syncopal episode several years ago when she fell down the stairs, and was evaluated at the time by a cardiologist. She underwent a TAVR at the time for a reportedly damaged valve.  (09 Aug 2022 08:30)      INTERIM EVENTS:Patient seen at bedside ,interim events noted.      PMH -reviewed admission note, no change since admission  HEART FAILURE: Acute[ ]Chronic[ ] Systolic[ ] Diastolic[ ] Combined Systolic and Diastolic[ ]  CAD[ ] CABG[ ] PCI[ ]  DEVICES[ ] PPM[ ] ICD[ ] ILR[ ]  ATRIAL FIBRILLATION[ ] Paroxysmal[ ] Permanent[ ] CHADS2-[  ]  [place][ ] CKD1[ ] CKD2[ ] CKD3[ ] CKD4[ ] ESRD[ ]  COPD[ ] HTN[ ]   DM[ ] Type1[ ] Type 2[ ]   CVA[ ] Paresis[ ]    AMBULATION: Assisted[ ] Cane/walker[ ] Independent[ ]    MEDICATIONS  (STANDING):  aspirin enteric coated 81 milliGRAM(s) Oral daily  atorvastatin 40 milliGRAM(s) Oral at bedtime  enoxaparin Injectable 40 milliGRAM(s) SubCutaneous every 24 hours  furosemide    Tablet 20 milliGRAM(s) Oral daily  levothyroxine 75 MICROGram(s) Oral daily  senna 2 Tablet(s) Oral at bedtime  sodium chloride 0.9%. 1000 milliLiter(s) (70 mL/Hr) IV Continuous <Continuous>    MEDICATIONS  (PRN):  oxycodone    5 mG/acetaminophen 325 mG 1 Tablet(s) Oral every 6 hours PRN Moderate Pain (4 - 6)  polyethylene glycol 3350 17 Gram(s) Oral daily PRN Constipation            REVIEW OF SYSTEMS:  Constitutional: [ ] fever, [ ]weight loss,  [ ]fatigue [ ]weight gain  Eyes: [ ] visual changes  Respiratory: [ ]shortness of breath;  [ ] cough, [ ]wheezing, [ ]chills, [ ]hemoptysis  Cardiovascular: [ ] chest pain, [ ]palpitations, [ ]dizziness,  [ ]leg swelling[ ]orthopnea[ ]PND  Gastrointestinal: [ ] abdominal pain, [ ]nausea, [ ]vomiting,  [ ]diarrhea [ ]Constipation [ ]Melena  Genitourinary: [ ] dysuria, [ ] hematuria [ ]Birch  Neurologic: [ ] headaches [ ] tremors[ ]weakness [ ]Paralysis Right[ ] Left[ ]  Skin: [ ] itching, [ ]burning, [ ] rashes  Endocrine: [ ] heat or cold intolerance  Musculoskeletal: [ ] joint pain or swelling; [ ] muscle, back, or extremity pain  Psychiatric: [ ] depression, [ ]anxiety, [ ]mood swings, or [ ]difficulty sleeping  Hematologic: [ ] easy bruising, [ ] bleeding gums    [ ] All remaining systems negative except as per above.   [ ]Unable to obtain.  [x] No change in ROS since admission      Vital Signs Last 24 Hrs  T(C): 37 (10 Aug 2022 20:36), Max: 37.4 (10 Aug 2022 11:42)  T(F): 98.6 (10 Aug 2022 20:36), Max: 99.3 (10 Aug 2022 11:42)  HR: 93 (10 Aug 2022 20:36) (81 - 93)  BP: 120/62 (10 Aug 2022 20:36) (102/60 - 134/64)  BP(mean): --  RR: 17 (10 Aug 2022 20:36) (17 - 20)  SpO2: 98% (10 Aug 2022 20:36) (98% - 99%)    Parameters below as of 10 Aug 2022 20:36  Patient On (Oxygen Delivery Method): room air      I&O's Summary      PHYSICAL EXAM:  General: No acute distress BMI-  HEENT: EOMI, PERRL  Neck: Supple, [ ] JVD  Lungs: Equal air entry bilaterally; [ ] rales [ ] wheezing [ ] rhonchi  Heart: Regular rate and rhythm; [x ] murmur   2/6 [ x] systolic [ ] diastolic [ ] radiation[ ] rubs [ ]  gallops  Abdomen: Nontender, bowel sounds present  Extremities: No clubbing, cyanosis, [ ] edema [ ]Pulses  equal and intact  Nervous system:  Alert & Oriented X3, no focal deficits  Psychiatric: Normal affect  Skin: No rashes or lesions    LABS:  08-10    134<L>  |  98  |  15  ----------------------------<  137<H>  3.7   |  23  |  0.55    Ca    8.4      10 Aug 2022 05:24    TPro  6.2  /  Alb  3.4  /  TBili  0.4  /  DBili  x   /  AST  24  /  ALT  22  /  AlkPhos  97  08-10    Creatinine Trend: 0.55<--, 0.60<--, 0.59<--                        12.5   13.76 )-----------( 65       ( 10 Aug 2022 05:24 )             38.9               
PATIENT SEEN AND EXAMINED ON :- 8/11/22  DATE OF SERVICE:     8/11/22        Interim events noted,Labs ,Radiological studies and Cardiology tests reviewed .       HOSPITAL COURSE: HPI:  Patient is an 88yo F with PMH of serous endometrial carcinoma (stage IV) undergoing systemic chemotherapy (carboplatin, taxol x1 – 8/5/22) at [facility] [place], HTN, CAD s/p PCI 3/2021, s/p AVR,  hypothyroidism, and HLD who presented after a witnessed loss of consciousness while sitting in her walker outdoors on the day of presentation. The patient's daughter, Shayla, was present for the event. She stated that the patient was commenting on the breeze when she said something unusual, and then lost consciousness for about 45 seconds. The daughter described it as a staring episode without generalized shaking or other movements, stating the patient's eyes "almost rolled backward." The episode self-resolved and was followed by a period of confusion, without a quantified length. The patient's granddaughter had also commented on the patient being confused prior to the event, about 1 day after receiving her first session of chemotherapy. The patient herself did not recall the event in significant detail, but reported recent heaviness in her legs since initiating the chemotherapy.  The patient had a prior syncopal episode several years ago when she fell down the stairs, and was evaluated at the time by a cardiologist. She underwent a TAVR at the time for a reportedly damaged valve.  (09 Aug 2022 08:30)      INTERIM EVENTS:Patient seen at bedside ,interim events noted.      PMH -reviewed admission note, no change since admission  HEART FAILURE: Acute[ ]Chronic[ ] Systolic[ ] Diastolic[ ] Combined Systolic and Diastolic[ ]  CAD[ ] CABG[ ] PCI[ ]  DEVICES[ ] PPM[ ] ICD[ ] ILR[ ]  ATRIAL FIBRILLATION[ ] Paroxysmal[ ] Permanent[ ] CHADS2-[  ]  [place][ ] CKD1[ ] CKD2[ ] CKD3[ ] CKD4[ ] ESRD[ ]  COPD[ ] HTN[ ]   DM[ ] Type1[ ] Type 2[ ]   CVA[ ] Paresis[ ]    AMBULATION: Assisted[ ] Cane/walker[ ] Independent[ ]    MEDICATIONS  (STANDING):  aspirin enteric coated 81 milliGRAM(s) Oral daily  atorvastatin 40 milliGRAM(s) Oral at bedtime  furosemide    Tablet 20 milliGRAM(s) Oral daily  levothyroxine 75 MICROGram(s) Oral daily  senna 2 Tablet(s) Oral at bedtime  sodium chloride 0.9%. 1000 milliLiter(s) (70 mL/Hr) IV Continuous <Continuous>    MEDICATIONS  (PRN):  oxycodone    5 mG/acetaminophen 325 mG 1 Tablet(s) Oral every 6 hours PRN Moderate Pain (4 - 6)  polyethylene glycol 3350 17 Gram(s) Oral daily PRN Constipation            REVIEW OF SYSTEMS:  Constitutional: [ ] fever, [ ]weight loss,  [ ]fatigue [ ]weight gain  Eyes: [ ] visual changes  Respiratory: [ ]shortness of breath;  [ ] cough, [ ]wheezing, [ ]chills, [ ]hemoptysis  Cardiovascular: [ ] chest pain, [ ]palpitations, [ ]dizziness,  [ ]leg swelling[ ]orthopnea[ ]PND  Gastrointestinal: [ ] abdominal pain, [ ]nausea, [ ]vomiting,  [ ]diarrhea [ ]Constipation [ ]Melena  Genitourinary: [ ] dysuria, [ ] hematuria [ ]Birch  Neurologic: [ ] headaches [ ] tremors[ ]weakness [ ]Paralysis Right[ ] Left[ ]  Skin: [ ] itching, [ ]burning, [ ] rashes  Endocrine: [ ] heat or cold intolerance  Musculoskeletal: [ ] joint pain or swelling; [ ] muscle, back, or extremity pain  Psychiatric: [ ] depression, [ ]anxiety, [ ]mood swings, or [ ]difficulty sleeping  Hematologic: [ ] easy bruising, [ ] bleeding gums    [ ] All remaining systems negative except as per above.   [ ]Unable to obtain.  [x] No change in ROS since admission      Vital Signs Last 24 Hrs  T(C): 37.1 (11 Aug 2022 11:33), Max: 37.1 (11 Aug 2022 11:33)  T(F): 98.7 (11 Aug 2022 11:33), Max: 98.7 (11 Aug 2022 11:33)  HR: 94 (11 Aug 2022 11:33) (85 - 94)  BP: 130/71 (11 Aug 2022 11:33) (124/70 - 130/71)  BP(mean): --  RR: 18 (11 Aug 2022 11:33) (18 - 18)  SpO2: 100% (11 Aug 2022 11:33) (98% - 100%)    Parameters below as of 11 Aug 2022 11:33  Patient On (Oxygen Delivery Method): room air      I&O's Summary      PHYSICAL EXAM:  General: No acute distress BMI-  HEENT: EOMI, PERRL  Neck: Supple, [ ] JVD  Lungs: Equal air entry bilaterally; [ ] rales [ ] wheezing [ ] rhonchi  Heart: Regular rate and rhythm; [x ] murmur   2/6 [ x] systolic [ ] diastolic [ ] radiation[ ] rubs [ ]  gallops  Abdomen: Nontender, bowel sounds present  Extremities: No clubbing, cyanosis, [ ] edema [ ]Pulses  equal and intact  Nervous system:  Alert & Oriented X3, no focal deficits  Psychiatric: Normal affect  Skin: No rashes or lesions    LABS:  08-11    136  |  98  |  11  ----------------------------<  91  3.9   |  29  |  0.68    Ca    8.5      11 Aug 2022 05:39  Phos  2.8     08-11  Mg     1.60     08-11    TPro  6.2  /  Alb  3.4  /  TBili  0.4  /  DBili  x   /  AST  24  /  ALT  22  /  AlkPhos  97  08-10    Creatinine Trend: 0.68<--, 0.55<--, 0.60<--, 0.59<--                        12.3   4.75  )-----------( 46       ( 11 Aug 2022 05:39 )             37.7

## 2022-08-15 NOTE — DISCHARGE NOTE PROVIDER - NSDCCPCAREPLAN_GEN_ALL_CORE_FT
PRINCIPAL DISCHARGE DIAGNOSIS  Diagnosis: Syncope  Assessment and Plan of Treatment: Resolved. Your blood pressures were normal. For now continue Aspirin 81 daily and follow up with Neurology, Dr. Waldemar Arnett at [phone] for potential repeat imaging and further w/u.  Please maintain a safe environment where you reside. It is recommended that you move in a careful manner to prevent future events. Perform any exercises recommended by physical therapy and follow-up with your PCP. Please follow up with your primary care physician regarding your hospitalization      SECONDARY DISCHARGE DIAGNOSES  Diagnosis: Endometrial carcinoma  Assessment and Plan of Treatment: You have a history of endometrial carcinoma. You recently initiated systemic chemotherapy – 8/5/22 – with carboplatin and paclitaxel  Please follow up with [facility]. Please follow up with your primary care physician regarding your hospitalization      Diagnosis: HLD (hyperlipidemia)  Assessment and Plan of Treatment: Continue cholesterol control medications. Continue DASH diet. Follow up with your PCP for further management and monitoring of lipid and cholesterol panels. Please call to make an appointment      Diagnosis: HTN (hypertension)  Assessment and Plan of Treatment: Continue current blood pressure medication regimen as directed. Monitor for any visual changes, headaches or dizziness.  Monitor blood pressure regularly.  Follow up with your PCP for further management for high blood pressure.    Diagnosis: Leukocytosis  Assessment and Plan of Treatment: Leukocytosis (increased white count) is likely from the medication Neulasta. Please follow up with your primary care physician regarding your hospitalization.

## 2022-08-15 NOTE — DISCHARGE NOTE PROVIDER - CARE PROVIDER_API CALL
Waldemar Arnett)  Neurology; Neurophysiology  [address] [facility] [address] [address]  [place] [address]  Phone: [phone]  Fax: [phone]  Follow Up Time: 2 weeks    Kala Mcdaniel (DNP; NP; RN)  [place] [address]  Phone: [phone]  Fax: [phone]  Follow Up Time: 1 week

## 2022-08-15 NOTE — DISCHARGE NOTE NURSING/CASE MANAGEMENT/SOCIAL WORK - PATIENT PORTAL LINK FT
You can access the FollowMyHealth Patient Portal offered by [facility] by registering at the following website: [email or web address]. By joining [facility]’s FollowMyHealth portal, you will also be able to view your health information using other applications (apps) compatible with our system.

## 2022-08-15 NOTE — PROVIDER CONTACT NOTE (CRITICAL VALUE NOTIFICATION) - ASSESSMENT
no acute s/s of distress noted. pt afebrile. VS per flowsheet
no acute s/s of distress noted. pt afebrile. VS per flowsheet

## 2022-08-30 NOTE — ED ADULT NURSE NOTE - OBJECTIVE STATEMENT
Pt presents to ED with c/o cough, weakness and difficulty ambulating as she used to. pt had her 2nd chemo therapy on friday and since then had N/V/cough/fever/and weakness. daughter at bedside.

## 2022-08-30 NOTE — ED PROVIDER NOTE - CLINICAL SUMMARY MEDICAL DECISION MAKING FREE TEXT BOX
86 y/o female with pmhx of serous endometrial carcinoma (stage IV) undergoing systemic [place] [facility] (last chemo Friday), HTN, CAD s/p PCI 3/2021, s/p AVR, hypothyroidism, and HLD presenting with SOB, cough x 2 days; [place] [facility] and sent to ER for further evaluation, no hypoxia, no increased WOB, non-toxic appearing, hemodynamically stable, tachycardic; given immunocompromised state will empirically treat with vanc/cefepime, check rectal temp, BC, UA/UC, CXR given reported pneumonia and reassess

## 2022-08-30 NOTE — ED PROVIDER NOTE - NSICDXFAMILYHX_GEN_ALL_CORE_FT
FAMILY HISTORY:  Child  Still living? Unknown  Family history of parotid cancer, Age at diagnosis: Age Unknown

## 2022-08-30 NOTE — ED PROVIDER NOTE - NS ED ROS FT
Gen: Denies fever, weight loss  CV: Denies chest pain, palpitations  Skin: Denies rash, erythema, color changes  Endo: Denies sensitivity to heat, cold, increased urination  GI: Denies diarrhea, constipation, nausea, vomiting  Msk: Denies back pain, LE swelling, extremity pain  : Denies dysuria, increased frequency  Neuro: Denies LOC, weakness

## 2022-08-30 NOTE — ED PROVIDER NOTE - NSICDXPASTMEDICALHX_GEN_ALL_CORE_FT
PAST MEDICAL HISTORY:  CAD (coronary artery disease)     Endometrial ca     HTN (hypertension)     Hypothyroidism     S/P AVR

## 2022-08-30 NOTE — ED ADULT NURSE NOTE - NS ED NURSE TRANSPORT WITH
Heparin @7, 6L NC/oxygen/IV pump Same Histology In Subsequent Stages Text: The pattern and morphology of the tumor is as described in the first stage.

## 2022-08-30 NOTE — ED PROVIDER NOTE - PROGRESS NOTE DETAILS
José Luis, PGY-3  Right segmental PE on CTA chest with moderate b/l pleural effusions, no evidence of right heart strain, no hypoxia/increased WOB. Admitted to Dr. Olivier; Trop/pro-BNP added on that admitted doctor will follow. Started on heparin drip

## 2022-08-30 NOTE — ED PROVIDER NOTE - PHYSICAL EXAMINATION
Gen: WDWN, NAD, comfortable/non-toxic appearing, chest wall port c/d/i   HEENT: No nasal discharge, mucous membranes mildly dry, no oropharyngeal edema/erythema/exudates   CV: Tachycardic with regular rhythm, +S1/S2, no M/R/G, equal b/l radial pulses 2+  Resp: CTAB, no W/R/R, SPO2 >95% on RA, no increased WOB   GI: Abdomen soft non-distended, NTTP, no masses/organomegaly   MSK/Skin: No CVA tenderness, no open wounds, no bruising, no LE edema/calf tenderness   Neuro: A&Ox4, moving all 4 extremities spontaneously,  gross sensation intact in UE and LE BL  Psych: appropriate mood

## 2022-08-30 NOTE — ED ADULT NURSE NOTE - CAS TRG GENERAL AIRWAY, MLM
Patient will come to [facility] office to : prescription.   Patient was advised of location and hours: Yes.   Patient was advised to bring photo identification: Yes.   Patient elects another party to  item: no.   Patent

## 2022-08-30 NOTE — ED PROVIDER NOTE - ATTENDING CONTRIBUTION TO CARE
Brief HPI:  86 y/o female with pmhx of serous endometrial carcinoma (stage IV) [place] [facility] [place] HTN, CAD s/p PCI 3/2021, s/p AVR, hypothyroidism, and HLD presenting with SOB, cough x 2 days.  Had outpatient cxr showing concern for pna.  Patient with recent hospitalization with cxr showing right sided effusion.  Denies cp.      Vitals:   Reviewed    Exam:    GEN:  Non-toxic appearing, non-distressed, speaking full sentences, non-diaphoretic, AAOx3  HEENT:  NCAT, neck supple, EOMI, PERRLA, sclera anicteric, no conjunctival pallor or injection, no stridor, normal voice, no tonsillar exudate, uvula midline  CV:  regular rhythm and rate, s1/s2 audible, no murmurs, rubs or gallops, peripheral pulses 2+ and symmetric  PULM:  non-labored respirations, lungs clear to auscultation bilaterally, no wheezes, crackles or rales  ABD:  non distended, non-tender, no rebound, no guarding, negative Ashley's sign, bowel sounds normal, no cvat  MSK:  no gross deformity, non-tender extremities and joints, range of motion grossly normal appearing, no extremity edema, extremities warm and well perfused   NEURO:  AAOx3, CN II-XII intact, motor 5/5 in upper and lower extremities bilaterally, sensation grossly intact in extremities and trunk, finger to nose testing wnl, no nystagmus, negative Romberg, no pronator drift, no gait deficit  SKIN:  warm, dry, no rash or vesicles     A/P:  86 y/o female with pmhx of serous endometrial carcinoma (stage IV) [place] [facility] [place] HTN, CAD s/p PCI 3/2021, s/p AVR, hypothyroidism, and HLD presenting with SOB, cough x 2 days.  VSS.  Possible pna, although pe considered given recent hospitalization.  Plan for labs, cxr, ctpe.  Dispo pending.

## 2022-08-30 NOTE — ED ADULT TRIAGE NOTE - CHIEF COMPLAINT QUOTE
p/t on chemo for uterine ca, c/o of cough and mild sob, positive for lt lower lobe infiltrate from this am, p/t denies any chest pain

## 2022-08-31 NOTE — H&P ADULT - PROBLEM SELECTOR PLAN 11
- F: s/p 1L NS, encourage PO intake  - E: replete K<4, Mg<2  - N: DASH/TLC  - D: hep gtt -- transition to oral agent as appropriate  - G: famotidine 40mg daily    code: full  dispo: pending PT genie

## 2022-08-31 NOTE — H&P ADULT - PROBLEM SELECTOR PLAN 4
- on arrival,  with AST 40  - pt denies toxic substances  - continue to trend, likely in setting of hypovolemia with poor PO intake in last few days  - s/p 1L NS in ER

## 2022-08-31 NOTE — H&P ADULT - NSHPPHYSICALEXAM_GEN_ALL_CORE
VITAL SIGNS:  T(C): 36.7 (08-31-22 @ 06:22), Max: 37 (08-30-22 @ 23:44)  T(F): 98 (08-31-22 @ 06:22), Max: 98.6 (08-30-22 @ 23:44)  HR: 110 (08-31-22 @ 08:41) (85 - 110)  BP: 140/70 (08-31-22 @ 08:41) (115/75 - 140/70)  BP(mean): --  RR: 18 (08-31-22 @ 08:41) (18 - 18)  SpO2: 100% (08-31-22 @ 08:41) (98% - 100%)  Wt(kg): --    PHYSICAL EXAM:  Constitutional: WDWN resting comfortably in bed; NAD; appears tired  Head: NC/AT  Eyes: PERRL, EOMI, anicteric sclera  ENT: no nasal discharge; MMM  Neck: supple; no JVD  Respiratory: CTA B/L; no W/R/R; speaking comfortably in full sentences on RA without signs of respiratory distress  Cardiac: +S1/S2; tachycardic 100-110; no M/R/G  Gastrointestinal: soft, NT/ND; no rebound or guarding; +BSx4  Extremities: WWP, no clubbing or cyanosis; 1+ BL symmetric edema to knees   Musculoskeletal: NROM x4; no joint swelling, tenderness or erythema  Vascular: 2+ radial, DP/PT pulses B/L  Dermatologic: skin warm, dry and intact; no rashes, wounds, or scars  Neurologic: AAOx3; CNII-XII grossly intact; no focal deficits  Psychiatric: affect and characteristics of appearance, verbalizations, behaviors are appropriate VITAL SIGNS:  T(C): 36.7 (08-31-22 @ 06:22), Max: 37 (08-30-22 @ 23:44)  T(F): 98 (08-31-22 @ 06:22), Max: 98.6 (08-30-22 @ 23:44)  HR: 110 (08-31-22 @ 08:41) (85 - 110)  BP: 140/70 (08-31-22 @ 08:41) (115/75 - 140/70)  BP(mean): --  RR: 18 (08-31-22 @ 08:41) (18 - 18)  SpO2: 100% (08-31-22 @ 08:41) (98% - 100%)  Wt(kg): --    PHYSICAL EXAM:  Constitutional: WDWN resting comfortably in bed; NAD; appears tired  Head: NC/AT  Eyes: PERRL, EOMI, anicteric sclera  ENT: no nasal discharge; MMM  Neck: supple; no JVD  Respiratory: CTA B/L; no W/R/R; speaking comfortably in full sentences on RA without signs of respiratory distress  Cardiac: +S1/S2; tachycardic 100-110; no M/R/G  Gastrointestinal: soft, NT/ND; no rebound or guarding; +BSx4  Extremities: WWP, no clubbing or cyanosis; 1+ BL symmetric edema to knees   Musculoskeletal: NROM x4; no joint swelling, tenderness or erythema  Vascular: 2+ radial, DP/PT pulses B/L  Dermatologic: skin warm, dry and intact; no rashes, wounds, or scars; R CW port site c/d/i  Neurologic: AAOx3; CNII-XII grossly intact; no focal deficits  Psychiatric: affect and characteristics of appearance, verbalizations, behaviors are appropriate

## 2022-08-31 NOTE — H&P ADULT - NSHPLABSRESULTS_GEN_ALL_CORE
LABS:                        10.9   59.87 )-----------( 90       ( 30 Aug 2022 21:50 )             32.9         141  |  103  |  11  ----------------------------<  142<H>  4.2   |  27  |  0.43<L>    Ca    8.4      30 Aug 2022 21:50    TPro  6.6  /  Alb  3.4  /  TBili  0.8  /  DBili  x   /  AST  40<H>  /  ALT  23  /  AlkPhos  191<H>      PT/INR - ( 30 Aug 2022 21:50 )   PT: 13.9 sec;   INR: 1.20 ratio         PTT - ( 30 Aug 2022 21:50 )  PTT:28.7 sec  Urinalysis Basic - ( 30 Aug 2022 21:05 )    Color: Yellow / Appearance: Clear / S.029 / pH: x  Gluc: x / Ketone: Negative  / Bili: Negative / Urobili: <2 mg/dL   Blood: x / Protein: 30 mg/dL / Nitrite: Negative   Leuk Esterase: Negative / RBC: 2 /HPF / WBC 3 /HPF   Sq Epi: x / Non Sq Epi: 5 /HPF / Bacteria: Few      CAPILLARY BLOOD GLUCOSE          RADIOLOGY & ADDITIONAL TESTS: Reviewed.

## 2022-08-31 NOTE — H&P ADULT - PROBLEM SELECTOR PLAN 1
- pt p/w 2d R sided pain, cough, mild SOB, weakness, and difficulty ambulating; evaluated at [facility] and told that CXR showed LLL PNA and recommended ER eval  - on arrival, hemodynamically stable   - CTA chest PE protocol with RUL segmental PE, no evidence of R heart strain, mod BL pleural effusions, and perihepatic ascites of unclear etiology  - likely provoked PE in setting of active malignancy  - trop neg, BNP 1514, d-dimer 2314  - defer TTE in setting of stability and neg trop, neg CT chest heart strain   - s/p hep 5000U bolus -- gtt  - consider transition to oral AC  - repeat trop, BNP, PTT (goal full AC 59-99 PTT) and adjust hep gtt as needed  - incentive spirometry  - lido patch PRN flank pain - pt p/w 2d R sided pain, cough, mild SOB, weakness, and difficulty ambulating; evaluated at [facility] and told that CXR showed LLL PNA and recommended ER eval  - on arrival, hemodynamically stable   - CTA chest PE protocol with RUL segmental PE, no evidence of R heart strain, mod BL pleural effusions, and perihepatic ascites of unclear etiology  - likely provoked PE in setting of active malignancy  - trop neg, BNP 1514, d-dimer 2314  - defer TTE in setting of stability and neg trop, neg CT chest heart strain   - s/p hep 5000U bolus -- gtt  - will continue hep gtt while pt tachycardic  - consider transition to oral AC pending improved heart rate  - repeat trop, BNP, PTT (goal full AC 59-99 PTT) and adjust hep gtt as needed  - incentive spirometry  - lido patch PRN flank pain, though improving

## 2022-08-31 NOTE — H&P ADULT - PROBLEM SELECTOR PLAN 8
- pt with hx HLD, on crestor 20mg daily  - therapeutic exchange while admitted; atorvastatin 80mg daily

## 2022-08-31 NOTE — CONSULT NOTE ADULT - SUBJECTIVE AND OBJECTIVE BOX
Reason for consult: Endometrial cancer    HPI:  Pt is an 88 yo F with PMH S4 serous endometrial carcinoma [place] chest [place] port, [place]), HTN, CAD (s/p PCI 2021), AV replacement, hypothyroidism, and HLD who p/w R sided chest pain, cough, mild SOB, weakness, and difficulty ambulating x2d. [place] [facility] [place] CXR showed LLL consolidation and recommended ER evaluation. Pt states she had been doing "okay" since last discharge and had been working with home PT but started to feel unwell again in the last week. Daughter has been sick with a cough but has not been evaluated. Then pt began developing symptoms and thought she may have gotten sick as well. Generally has had decreased appetite and decreased PO intake. Denies HA, lightheadedness, dizziness, abdominal pain, changes to BMs or urination, pain in extremities. Last took meds at home 8/30 AM. Has been ambulating with a cane and has not had too much difficulty but has noticed increased weakness. No other concerns or complaints. Feels better currently than when she first came to the ER.     On arrival, T 98, HR 85, /62, RR 18 O2sat 98% RA. EKG with . Labs with WBC 59.87, 98% neutrophils, 1.7% bands, Hb 10.9, MCV 89, plt 90, d-dimer 2314, lactate neg, , AST 40, trop 27, BNP 1514, UA neg, RVP neg, BCx sent. CXR with hazy bibasilar opacities likely pleural effusions and atelectasis. CT PE with RUL segmental PE without evidence of R heart strain; moderate BL pleural effusions, and perihepatic ascites of unclear etiology. Pt given heparin 5000 bolus -- gtt, tylenol, cefepime 2g, vanc 1g, and 1L NS. Admitted to medicine for further observation and management.  (31 Aug 2022 11:09)    Hematology/Oncology called to see patient who is under care of Dr. Praveena Alvarado of [facility] for the management of stage IV uterine carcinosarcoma. She received cycle 2 carbo/taxol and pegfilgrastim on 08/26. Pt was [place] [facility] [place] and shortness of breath with CXR concerning for pneumonia, then sent to [facility] for further workup and treatment. She notes improvement in symptoms today, but notes left calf tenderness.       PAST MEDICAL & SURGICAL HISTORY:  Endometrial ca  S4 serous endometrial carcinoma, [facility], chemotherapy      HTN (hypertension)      CAD (coronary artery disease)      S/P AVR      Hypothyroidism      HLD (hyperlipidemia)      S/P AVR (aortic valve replacement)      History of endometrial biopsy          FAMILY HISTORY:  Family history of parotid cancer (Child)        Alochol: Denied  Smoking: Nonsmoker  Drug Use: Denied  Marital Status:         Allergies    No Known Allergies    Intolerances        MEDICATIONS  (STANDING):  aspirin enteric coated 81 milliGRAM(s) Oral daily  ATENolol  Tablet 50 milliGRAM(s) Oral once  ATENolol  Tablet 50 milliGRAM(s) Oral every 12 hours  atorvastatin 80 milliGRAM(s) Oral at bedtime  cyanocobalamin 1000 MICROGram(s) Oral daily  famotidine    Tablet 40 milliGRAM(s) Oral daily  heparin  Infusion. 900 Unit(s)/Hr (9 mL/Hr) IV Continuous <Continuous>  levothyroxine 75 MICROGram(s) Oral daily  lidocaine   4% Patch 1 Patch Transdermal every 24 hours  melatonin 3 milliGRAM(s) Oral at bedtime  multivitamin 1 Tablet(s) Oral daily  senna 2 Tablet(s) Oral at bedtime    MEDICATIONS  (PRN):  acetaminophen     Tablet .. 650 milliGRAM(s) Oral every 6 hours PRN Temp greater or equal to 38C (100.4F), Mild Pain (1 - 3), Moderate Pain (4 - 6)  polyethylene glycol 3350 17 Gram(s) Oral daily PRN Constipation      ROS  No fever, sweats, chills  No epistaxis, HA, sore throat  +dyspnea, cough   No n/v/d, abd pain, melena, hematochezia  No edema  No rash  No anxiety  No back pain, joint pain  +LLE pain   No bleeding, bruising  No dysuria, hematuria    T(C): 36.7 (08-31-22 @ 06:22), Max: 37 (08-30-22 @ 23:44)  HR: 110 (08-31-22 @ 08:41) (85 - 110)  BP: 140/70 (08-31-22 @ 08:41) (115/75 - 140/70)  RR: 18 (08-31-22 @ 08:41) (18 - 18)  SpO2: 100% (08-31-22 @ 08:41) (98% - 100%)  Wt(kg): --    PE  NAD, lying in bed comfortably  Awake, alert  Anicteric, MMM  RRR  CTAB  R chest wall port   Abd soft, NT, ND  Left calf tender, no c/c/e  No rash grossly                          10.3   57.72 )-----------( 63       ( 31 Aug 2022 10:07 )             31.3       08-31    138  |  104  |  10  ----------------------------<  106<H>  TNP   |  24  |  0.37<L>    Ca    7.9<L>      31 Aug 2022 12:15  Phos  TNP     08-31  Mg     1.70     08-31    TPro  TNP  /  Alb  3.2<L>  /  TBili  0.7  /  DBili  x   /  AST  TNP  /  ALT  TNP  /  AlkPhos  173<H>  08-31      ACC: [id number] [facility]  [facility]                          PROCEDURE DATE:  08/31/2022          INTERPRETATION:  CLINICAL INFORMATION: Elevated d-dimer.    COMPARISON: None.    CONTRAST/COMPLICATIONS:  IV Contrast: Omnipaque 350  70 cc administered   0 cc discarded  Oral Contrast: NONE  Complications: None reported at time of study completion    PROCEDURE:  CT Angiogram of the chest was obtained with intravenous contrast. Three   dimensional maximum intensity projection (MIP) images were generated.    FINDINGS:    Motion degraded study.    PULMONARY ANGIOGRAM: Right upper lobe segmental pulmonary embolism.    LYMPH NODES/MEDIASTINUM: No lymphadenopathy.    HEART/VASCULATURE: Cardiomegaly.  TAVR. No CT evidence of right heart   strain. No pericardial effusion.    AIRWAYS/LUNGS/PLEURA: Patent central airways. Moderate bilateral pleural   effusions with adjacent lower lobe passive atelectasis.    UPPER ABDOMEN: Perihepatic ascites. Cholelithiasis.    BONES/SOFT TISSUES: Right chest wall port with tip at the superior   cavoatrial junction. Degenerative changes of the spine.    IMPRESSION:    Right upper lobe segmental pulmonary embolism. No CT evidence of right   heart strain.    Moderate bilateral pleural effusions.    Cardiomegaly.    Perihepatic ascites of uncertain etiology.    Findings were discussed with Dr. Ordonez at 8/31/2022 2:51 AM by Dr. Lord with read back confirmation.    --- End of Report ---    ACC: [id number] EXAM:  XR CHEST PA LAT 2V                          PROCEDURE DATE:  08/31/2022          INTERPRETATION:  CLINICAL INDICATION: Shortness of breath and cough.    EXAM: Two views of the chest.    COMPARISON: Chest radiograph 8/8/2022.    FINDINGS:  Right chest port with tip in the SVC.  TAVR  Hazy bibasilar opacities.  No pneumothorax.  The heart is enlarged.  The visualized osseous structures demonstrate no acute pathology.    IMPRESSION:    Hazy bibasilar opacities, nonspecific yet favored to represent pleural   effusions and atelectasis.    Further information may be obtained from the patient's subsequent CT of   the chest.    --- End of Report ---

## 2022-08-31 NOTE — H&P ADULT - PROBLEM SELECTOR PLAN 6
- pt with hx of S4 serous endometrial carcinoma, f[place] [facility] [place] s/p 2nd dose 8/26  - chest wall port c/d/i

## 2022-08-31 NOTE — H&P ADULT - ASSESSMENT
Pt is an 86 yo F with PMH S4 serous endometrial carcinoma ([facility], chest wall port, chemo), HTN, CAD (s/p PCI 2021), AV replacement, hypothyroidism, and HLD who p/w R sided chest pain, cough, mild SOB, weakness, and difficulty ambulating x2d. Found to have RUL segmental PE, on heparin gtt, admitted to medicine for further observation and management.

## 2022-08-31 NOTE — ED ADULT NURSE REASSESSMENT NOTE - NS ED NURSE REASSESS COMMENT FT1
Notified MD Bullard of Ptt, notified to speak to JUJU Pruitt in regards to Heparin going forward. MD Bullard was unaware of Heparin protocol. As per JUJU Pruitt Heparin restarted at 7mL/hr, will follow full anticoagulation Heparin nomogram.

## 2022-08-31 NOTE — ED ADULT NURSE REASSESSMENT NOTE - NS ED NURSE REASSESS COMMENT FT1
Received pt at change of shift, PT is resting in stretcher, easily arousable to verbal stimuli, A+Ox4. pt arrives for right sided pain, found to have a PE on the right side. Respirations even and unlabored, normal work of breathing, no accessory muscle use, speaking in full clear uninterrupted sentences. ABD is soft, non tender, non distended. Pt denies any chest pain, headache, dizziness, N/V/D, fever, chills. skin dry and intact, 20G to right wrist, no redness or swelling noted, Heparin infusing @ 11 mL/hr as per MD orders, Ptt @ 1000am, will continue to monitor. Received pt at change of shift, PT is resting in stretcher, easily arousable to verbal stimuli, A+Ox4. pt arrives for right sided pain, found to have a PE on the right side. Respirations even and unlabored, normal work of breathing, no accessory muscle use, speaking in full clear uninterrupted sentences. ABD is soft, non tender, non distended. Pt denies any chest pain, headache, dizziness, N/V/D, fever, chills. 1nak3yr pressure wounds noted to sacral area, daughter states she has been taking care of it at home.  20G to right wrist, no redness or swelling noted, Heparin infusing @ 11 mL/hr as per MD orders, Ptt @ 1000am, will continue to monitor.

## 2022-08-31 NOTE — H&P ADULT - PROBLEM/PLAN-8
DISPLAY PLAN FREE TEXT
Breasts of normal contour, size, color and symmetry, without milk, signs of inflammation or tenderness; nipples with normal size, shape, number and spacing.  Axillary exam normal.

## 2022-08-31 NOTE — CONSULT NOTE ADULT - ASSESSMENT
Hematology/Oncology called to see patient who is under care of Dr. Praveena Alvarado of [facility] for the management of stage IV uterine carcinosarcoma. She received cycle 2 carbo/taxol and pegfilgrastim on 08/26. Pt was initially seen at [facility] for cough and shortness of breath with CXR concerning for pneumonia, then sent to [facility] for further workup and treatment. She notes improvement in symptoms today, but notes left calf tenderness.     1. Pulmonary embolism, bilateral pleural effusions, ?URI   -- CTA shows RUL segmental PE w/o right heart strain + moderate bilateral pleural effusions   -- PE likely provoked in setting of active malignancy and recent hospitalization   -- c/w heparin drip for now, may transition to DOAC on discharge   -- Recommend pulmonary evaluation   -- LE duplex ordered to r/o DVT as patient is c/o left calf pain, will follow results     2. Leukocytosis  -- Pt received Neulasta 08/26   -- Possibly elevated due to infectious process too - f/u cultures     3. Anemia, thrombocytopenia  -- Likely 2/2 chemo, last dose given 08/26   -- Transfuse to maintain hgb >7.0 grams, platelets >10K    4. Uterine carcinosarcoma  -- Received C2 carbo/taxol on 08/26. No systemic treatment while inpatient   -- Outpatient follow-up with [facility] after discharge     Will continue to follow.    Twyla Pitts PA-C  Hematology/Oncology  [facility]  [phone] (office)  [phone] (alt office)  Evenings and weekends please call MD on call or office

## 2022-08-31 NOTE — H&P ADULT - PROBLEM SELECTOR PLAN 3
- on arrival, plt 90; per chart review, baseline varies between   - unclear etiology, could consider peripheral smear  - likely in setting of active malignancy and chemotherapy, as below

## 2022-08-31 NOTE — H&P ADULT - HISTORY OF PRESENT ILLNESS
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 Went to [facility] for evaluation where CXR showed LLL consolidation and recommended ER evaluation.     On arrival, T 98, HR 85, /62, RR 18 O2sat 98% RA. EKG with . Labs with WBC 59.87, 98% neutrophils, 1.7% bands, Hb 10.9, MCV 89, plt 90, d-dimer 2314, lactate neg, , AST 40, trop 27, BNP 1514, UA neg, RVP neg, BCx sent. CXR with hazy bibasilar opacities likely pleural effusions and atelectasis. CT PE with RUL segmental PE without evidence of R heart strain; moderate BL pleural effusions, and perihepatic ascites of unclear etiology. Pt given heparin 5000 bolus -- gtt, tylenol, cefepime 2g, vanc 1g, and 1L NS. Admitted to medicine for further observation and management.  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 [facility] to [facility] where CXR showed LLL consolidation and recommended ER evaluation. Pt states she had been doing "okay" since last discharge and had been working with home PT but started to feel unwell again in the last week. Daughter has been sick with a cough but has not been evaluated. Then pt began developing symptoms and thought she may have gotten sick as well. Generally has had decreased appetite and decreased PO intake. Denies HA, lightheadedness, dizziness, abdominal pain, changes to BMs or urination, pain in extremities. Last took meds at home 8/30 AM. Has been ambulating with a cane and has not had too much difficulty but has noticed increased weakness. No other concerns or complaints. Feels better currently than when she first came to the ER.     On arrival, T 98, HR 85, /62, RR 18 O2sat 98% RA. EKG with . Labs with WBC 59.87, 98% neutrophils, 1.7% bands, Hb 10.9, MCV 89, plt 90, d-dimer 2314, lactate neg, , AST 40, trop 27, BNP 1514, UA neg, RVP neg, BCx sent. CXR with hazy bibasilar opacities likely pleural effusions and atelectasis. CT PE with RUL segmental PE without evidence of R heart strain; moderate BL pleural effusions, and perihepatic ascites of unclear etiology. Pt given heparin 5000 bolus -- gtt, tylenol, cefepime 2g, vanc 1g, and 1L NS. Admitted to medicine for further observation and management.

## 2022-08-31 NOTE — H&P ADULT - PROBLEM SELECTOR PLAN 7
- pt with hx HTN, on atenolol 50mg BID, lasix 20mg daily, and losartan 100mg daily  - hold home meds in setting of PE -- restart as appropriate

## 2022-08-31 NOTE — H&P ADULT - PROBLEM SELECTOR PLAN 5
- on arrival, WBC 59.87 with 98% neutrophils and 1.7% bands -- uptrending per chart review  - likely in setting of acute infection and recent neulasta @ [facility]  - UA neg, BCx sent  - CXR with hazy bibasilar opacities likely pleural effusions and atelectasis  - CT chest as above  - less likely PNA as pt afebrile and CT chest without obvious consolidation; will check MRSA swab and procal   - s/p vanc/cefepime in ER -- defer continuation at present -- reassess if febrile or change to hemodynamics, low threshold to restart as pt on chemotherapy

## 2022-08-31 NOTE — H&P ADULT - NSICDXPASTMEDICALHX_GEN_ALL_CORE_FT
PAST MEDICAL HISTORY:  CAD (coronary artery disease)     Endometrial ca S4 serous endometrial carcinoma, [facility], chemotherapy    HLD (hyperlipidemia)     HTN (hypertension)     Hypothyroidism     S/P AVR

## 2022-08-31 NOTE — CONSULT NOTE ADULT - NS ATTEND AMEND GEN_ALL_CORE FT
pt presenting for sob found to have PE. on heparin. would recommend b/l duplex. like pe provoked from carcinoma

## 2022-08-31 NOTE — ED ADULT NURSE REASSESSMENT NOTE - NS ED NURSE REASSESS COMMENT FT1
pt on continuous cardiac monitor, reported to have PE, heparin started per order. pt is in NAD, family at bedside.

## 2022-08-31 NOTE — H&P ADULT - NSHPREVIEWOFSYSTEMS_GEN_ALL_CORE
CONSTITUTIONAL: No fevers or chills. +weakness  EYES/ENT: No visual changes;  No vertigo or throat pain   NECK: No pain or stiffness  RESPIRATORY: No wheezing, hemoptysis; +shortness of breath, +cough  CARDIOVASCULAR: No chest pain or palpitations  GASTROINTESTINAL: No abdominal or epigastric pain. No nausea, vomiting, or hematemesis; No diarrhea or constipation. No melena or hematochezia.  GENITOURINARY: No dysuria, frequency or hematuria  NEUROLOGICAL: No numbness or weakness  SKIN: No itching, burning, rashes, or lesions   All other review of systems is negative unless indicated above. CONSTITUTIONAL: No fevers or chills. +weakness  EYES/ENT: No visual changes;  No vertigo or throat pain   NECK: No pain or stiffness  RESPIRATORY: No wheezing, hemoptysis; +shortness of breath (improving), +cough (improving)  CARDIOVASCULAR: No chest pain or palpitations  GASTROINTESTINAL: No abdominal or epigastric pain. No nausea, vomiting, or hematemesis; No diarrhea or constipation. No melena or hematochezia.  GENITOURINARY: No dysuria, frequency or hematuria  NEUROLOGICAL: No numbness or weakness  SKIN: No itching, burning, rashes, or lesions   All other review of systems is negative unless indicated above.

## 2022-08-31 NOTE — H&P ADULT - PROBLEM SELECTOR PLAN 2
- on arrival, Hb 10.9 with MCV 89  - chart review, baseline Hb 12  - no active s/s bleeding   - can pursue outpt w/u of folate/b12/iron  - may be in setting of active malignancy, as below  - keep active T&S, transfuse Hb<7

## 2022-09-01 PROBLEM — I10 ESSENTIAL (PRIMARY) HYPERTENSION: Chronic | Status: ACTIVE | Noted: 2022-01-01

## 2022-09-01 PROBLEM — E03.9 HYPOTHYROIDISM, UNSPECIFIED: Chronic | Status: ACTIVE | Noted: 2022-01-01

## 2022-09-01 PROBLEM — Z95.2 PRESENCE OF PROSTHETIC HEART VALVE: Chronic | Status: ACTIVE | Noted: 2022-01-01

## 2022-09-01 PROBLEM — I25.10 ATHEROSCLEROTIC HEART DISEASE OF NATIVE CORONARY ARTERY WITHOUT ANGINA PECTORIS: Chronic | Status: ACTIVE | Noted: 2022-01-01

## 2022-09-01 NOTE — CONSULT NOTE ADULT - ATTENDING COMMENTS
Suspect leukocytosis is primarily related to GCSF, similar to last time.   Suspect respiratory symptoms are related to acute PE and not a respiratory infection.     Will sign off, call back if needed  Paged medicine     Parker Donahue MD   Infectious Disease   Available on TEAMS. After 5PM and on weekends please page fellow on call or call [phone]

## 2022-09-01 NOTE — PHYSICAL THERAPY INITIAL EVALUATION ADULT - GENERAL OBSERVATIONS, REHAB EVAL
Pt encountered in semisupine position, no distress, AxOx4, with +IV, +cardiac monitor, +pulse oximeter, and +4L of O2 through nasal cannula. Pt agreeable to participate in PT evaluation.

## 2022-09-01 NOTE — PHYSICAL THERAPY INITIAL EVALUATION ADULT - NSPTDISCHREC_GEN_A_CORE
Rehabilitation @ this time to improve strength and balance for return to prior level of function; please follow PT notes carefully; pt has good potential to go home with home PT.

## 2022-09-01 NOTE — PROGRESS NOTE ADULT - SUBJECTIVE AND OBJECTIVE BOX
Patient is a 87y old  Female who presents with a chief complaint of PE (01 Sep 2022 09:45)    Patient seen and examined this morning. Symptoms continuing to improve. She feels well overall.     MEDICATIONS  (STANDING):  aspirin enteric coated 81 milliGRAM(s) Oral daily  ATENolol  Tablet 50 milliGRAM(s) Oral every 12 hours  atorvastatin 80 milliGRAM(s) Oral at bedtime  cyanocobalamin 1000 MICROGram(s) Oral daily  famotidine    Tablet 40 milliGRAM(s) Oral daily  heparin  Infusion. 700 Unit(s)/Hr (7 mL/Hr) IV Continuous <Continuous>  levothyroxine 75 MICROGram(s) Oral daily  lidocaine   4% Patch 1 Patch Transdermal every 24 hours  melatonin 3 milliGRAM(s) Oral at bedtime  multivitamin 1 Tablet(s) Oral daily  senna 2 Tablet(s) Oral at bedtime    MEDICATIONS  (PRN):  acetaminophen     Tablet .. 650 milliGRAM(s) Oral every 6 hours PRN Temp greater or equal to 38C (100.4F), Mild Pain (1 - 3), Moderate Pain (4 - 6)  oxycodone    5 mG/acetaminophen 325 mG 1 Tablet(s) Oral every 6 hours PRN Severe Pain (7 - 10)  polyethylene glycol 3350 17 Gram(s) Oral daily PRN Constipation        Vital Signs Last 24 Hrs  T(C): 36.8 (01 Sep 2022 12:03), Max: 37.1 (31 Aug 2022 16:01)  T(F): 98.3 (01 Sep 2022 12:03), Max: 98.7 (31 Aug 2022 16:01)  HR: 70 (01 Sep 2022 12:03) (70 - 105)  BP: 112/63 (01 Sep 2022 12:03) (111/63 - 132/72)  BP(mean): --  RR: 18 (01 Sep 2022 12:03) (17 - 20)  SpO2: 100% (01 Sep 2022 12:03) (95% - 100%)    Parameters below as of 01 Sep 2022 12:03  Patient On (Oxygen Delivery Method): nasal cannula        PE  NAD  Awake, alert  Anicteric, MMM  RRR  CTAB  Abd soft, NT, ND  No c/c/e  No rash grossly                          9.7    16.67 )-----------( 46       ( 01 Sep 2022 09:48 )             31.2       09-01    135  |  95<L>  |  11  ----------------------------<  391<H>  3.4<L>   |  27  |  0.43<L>    Ca    8.1<L>      01 Sep 2022 09:48  Phos  2.3     09-01  Mg     1.50     09-01    TPro  5.9<L>  /  Alb  3.1<L>  /  TBili  0.4  /  DBili  x   /  AST  32  /  ALT  20  /  AlkPhos  170<H>  09-01    ACC: [id number] EXAM:  US DPLX LWR EXT VEINS COMPL BI                          PROCEDURE DATE:  08/31/2022          INTERPRETATION:  CLINICAL INFORMATION: Patient pulmonary embolism and   left calf pain    COMPARISON: None available.    TECHNIQUE: Duplex sonography of the BILATERAL LOWER extremity veins with   color and spectral Doppler, with and without compression.    FINDINGS:    RIGHT:  Normal compressibility of the RIGHT common femoral, femoral and popliteal   veins.  Doppler examination shows normal spontaneous and phasic flow.  No RIGHT calf vein thrombosis is detected.    LEFT:  Normal compressibility of the LEFT common femoral, femoral and popliteal   veins.  Doppler examination shows normal spontaneous and phasic flow.  No LEFT calf vein thrombosis is detected.    IMPRESSION:  No evidence of deep venous thrombosis in either lower extremity.    --- End of Report ---

## 2022-09-01 NOTE — CONSULT NOTE ADULT - ASSESSMENT
WORK UP          DIAGNOSIS and IMPRESSION          RECOMMENDATIONS        PT TO BE SEEN. PRELIM NOTE  PENDING RECS. PLEASE WAIT FOR FINAL RECS AFTER DISCUSSION WITH ATTENDING#    Familia Callejas DO, PGY-4   ID fellow  [employer] Teams Preferred  After 5pm/weekends call [phone]   WORK UP  Afebrile VSS  WBC 59 > 51  Cr 0.37  UA negative  CXR with bilateral hazy opacities in lung bases more consistent with pleural effusion  CTA Chest with acute RUL PE and bilateral pleural effusion  US doppler without DVT  COVID/RVP negative  BCx x3 negative  UCx negative    DIAGNOSIS and IMPRESSION  87F with Stage 4 serous endometrial carcinoma ([facility], chest wall port, chemo), HTN, CAD (s/p PCI 2021), AV replacement, hypothyroidism, and HLD who presented with R sided chest pain, cough, mild SOB, found to have RUL segmental PE, admitted on 8/31 for Acute PE on heparin gtt. ID consulted for Leukocytosis.     #Leukocytosis  #Cough  #Acute RUL PE    RECOMMENDATIONS  - leukocytosis likely secondary to Neulasta given 8/26   - Cough likely viral, no consolidation on CT Chest, now improving without treatment  - no fever or chills  - monitor off antibiotic, symptomatic management for cough  - trend WBC      PT TO BE SEEN. PRELIM NOTE  PENDING RECS. PLEASE WAIT FOR FINAL RECS AFTER DISCUSSION WITH ATTENDINGEvelyn Callejas DO, PGY-4   ID fellow  [employer] Teams Preferred  After 5pm/weekends call [phone]   WORK UP  Afebrile VSS  WBC 59 > 51  Cr 0.37  UA negative  CXR with bilateral hazy opacities in lung bases more consistent with pleural effusion  CTA Chest with acute RUL PE and bilateral pleural effusion  US doppler without DVT  COVID/RVP negative  BCx x3 negative  UCx negative    DIAGNOSIS and IMPRESSION  87F with Stage 4 serous endometrial carcinoma ([facility] [place] [facility], chest wall port, chemo), HTN, CAD (s/p PCI 2021), AV replacement, hypothyroidism, and HLD who presented with R sided chest pain, cough, mild SOB, found to have RUL segmental PE, admitted on 8/31 for Acute PE on heparin gtt. ID consulted for Leukocytosis.     #Leukocytosis  #Cough  #Acute RUL PE    RECOMMENDATIONS  - leukocytosis likely secondary to Neulasta given 8/26   - Cough likely viral, no consolidation on CT Chest, now improving without treatment  - no fever or chills  - monitor off antibiotic, symptomatic management for cough  - trend WBC      D/w Attending    Familia Callejas DO, PGY-4   ID fellow  [employer] Teams Preferred  After 5pm/weekends call [phone]   WORK UP  Afebrile VSS  WBC 59 > 51  Cr 0.37  UA negative  CXR with bilateral hazy opacities in lung bases more consistent with pleural effusion  CTA Chest with acute RUL PE and bilateral pleural effusion  US doppler without DVT  COVID/RVP negative  BCx x3 negative  UCx negative    DIAGNOSIS and IMPRESSION  87F with Stage 4 serous endometrial carcinoma ([facility], chest wall port, chemo), HTN, CAD (s/p PCI 2021), AV replacement, hypothyroidism, and HLD who presented with R sided chest pain, cough, mild SOB, found to have RUL segmental PE, admitted on 8/31 for Acute PE on heparin gtt. ID consulted for Leukocytosis.     #Leukocytosis  #Cough  #Acute RUL PE    RECOMMENDATIONS  - leukocytosis likely secondary to Neulasta given 8/26   - Cough likely related to PE, no consolidation on CT Chest, now improving without treatment  - no fever or chills  - monitor off antibiotic      D/w Attending    Familia Callejas DO, PGY-4   ID fellow  [employer] Teams Preferred  After 5pm/weekends call [phone]

## 2022-09-01 NOTE — PATIENT PROFILE ADULT - FALL HARM RISK - HARM RISK INTERVENTIONS

## 2022-09-01 NOTE — PHYSICAL THERAPY INITIAL EVALUATION ADULT - DIAGNOSIS, PT EVAL
Pt admitted for +PE; pt presents with decreased strength, decreased balance, and decreased aerobic capacity/endurance.

## 2022-09-01 NOTE — PROGRESS NOTE ADULT - SUBJECTIVE AND OBJECTIVE BOX
SUBJECTIVE / OVERNIGHT EVENTS:pt seen and examined  22     MEDICATIONS  (STANDING):  aspirin enteric coated 81 milliGRAM(s) Oral daily  ATENolol  Tablet 50 milliGRAM(s) Oral every 12 hours  atorvastatin 80 milliGRAM(s) Oral at bedtime  cyanocobalamin 1000 MICROGram(s) Oral daily  famotidine    Tablet 40 milliGRAM(s) Oral daily  heparin  Infusion. 700 Unit(s)/Hr (7 mL/Hr) IV Continuous <Continuous>  levothyroxine 75 MICROGram(s) Oral daily  lidocaine   4% Patch 1 Patch Transdermal every 24 hours  melatonin 3 milliGRAM(s) Oral at bedtime  multivitamin 1 Tablet(s) Oral daily  senna 2 Tablet(s) Oral at bedtime    MEDICATIONS  (PRN):  acetaminophen     Tablet .. 650 milliGRAM(s) Oral every 6 hours PRN Temp greater or equal to 38C (100.4F), Mild Pain (1 - 3), Moderate Pain (4 - 6)  oxycodone    5 mG/acetaminophen 325 mG 1 Tablet(s) Oral every 6 hours PRN Severe Pain (7 - 10)  polyethylene glycol 3350 17 Gram(s) Oral daily PRN Constipation    T(C): 36.8 (22 @ 12:03), Max: 37.1 (22 @ 19:22)  HR: 70 (22 @ 12:03) (70 - 85)  BP: 112/63 (22 @ 12:03) (112/63 - 132/72)  RR: 18 (22 @ 12:03) (18 - 18)  SpO2: 100% (22 @ 12:03) (95% - 100%)    CAPILLARY BLOOD GLUCOSE        I&O's Summary      Constitutional: No fever, fatigue  Skin: No rash.  Eyes: No recent vision problems or eye pain.  ENT: No congestion, ear pain, or sore throat.  Cardiovascular: No chest pain or palpation.  Respiratory: No cough, shortness of breath, congestion, or wheezing.  Gastrointestinal: No abdominal pain, nausea, vomiting, or diarrhea.  Genitourinary: No dysuria.  Musculoskeletal: No joint swelling.  Neurologic: No headache.    PHYSICAL EXAM:  GENERAL: NAD  EYES: EOMI, PERRLA  NECK: Supple, No JVD  CHEST/LUNG: dec breath sounds at bases  HEART:  S1 , S2 +  ABDOMEN: soft , bs+  EXTREMITIES:  trace edema  NEUROLOGY:alert awake       LABS:                        9.7    16.67 )-----------( 46       ( 01 Sep 2022 09:48 )             31.2         135  |  95<L>  |  11  ----------------------------<  391<H>  3.4<L>   |  27  |  0.43<L>    Ca    8.1<L>      01 Sep 2022 09:48  Phos  2.3       Mg     1.50         TPro  5.9<L>  /  Alb  3.1<L>  /  TBili  0.4  /  DBili  x   /  AST  32  /  ALT  20  /  AlkPhos  170<H>      PT/INR - ( 30 Aug 2022 21:50 )   PT: 13.9 sec;   INR: 1.20 ratio         PTT - ( 01 Sep 2022 09:48 )  PTT:54.5 sec      Urinalysis Basic - ( 30 Aug 2022 21:05 )    Color: Yellow / Appearance: Clear / S.029 / pH: x  Gluc: x / Ketone: Negative  / Bili: Negative / Urobili: <2 mg/dL   Blood: x / Protein: 30 mg/dL / Nitrite: Negative   Leuk Esterase: Negative / RBC: 2 /HPF / WBC 3 /HPF   Sq Epi: x / Non Sq Epi: 5 /HPF / Bacteria: Few        RADIOLOGY & ADDITIONAL TESTS:    Imaging Personally Reviewed:    Consultant(s) Notes Reviewed:      Care Discussed with Consultants/Other Providers:

## 2022-09-01 NOTE — PHYSICAL THERAPY INITIAL EVALUATION ADULT - PRECAUTIONS/LIMITATIONS, REHAB EVAL
+4L of O2 through nasal cannula/cardiac precautions/fall precautions/oxygen therapy device and L/min

## 2022-09-01 NOTE — PHYSICAL THERAPY INITIAL EVALUATION ADULT - PERTINENT HX OF CURRENT PROBLEM, REHAB EVAL
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 Found to have Right Upper Lobe segmental PE, on heparin gtt, admitted to medicine for further observation and management.

## 2022-09-01 NOTE — CONSULT NOTE ADULT - SUBJECTIVE AND OBJECTIVE BOX
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 ID consulted for Leukocytosis.     Patient follows with Dr. Praveena Alvarado at [facility] for the management of stage IV uterine carcinosarcoma. Pt received Neulasta            PAST MEDICAL & SURGICAL HISTORY:  Endometrial ca  S4 serous endometrial carcinoma, [facility], [place]  HTN (hypertension)  CAD (coronary artery disease)  S/P AVR  Hypothyroidism  HLD (hyperlipidemia)  S/P AVR (aortic valve replacement)  History of endometrial biopsy        Allergies  No Known Allergies    ANTIMICROBIALS (past 90 days)  MEDICATIONS  (STANDING):  s/p Vanco and Cefepime ()    MEDICATIONS  (STANDING):  acetaminophen     Tablet .. 650 every 6 hours PRN  aspirin enteric coated 81 daily  ATENolol  Tablet 50 every 12 hours  atorvastatin 80 at bedtime  famotidine    Tablet 40 daily  heparin  Infusion. 700 <Continuous>  levothyroxine 75 daily  melatonin 3 at bedtime  oxycodone    5 mG/acetaminophen 325 mG 1 every 6 hours PRN  polyethylene glycol 3350 17 daily PRN  senna 2 at bedtime    SOCIAL HISTORY:       FAMILY HISTORY:  Family history of parotid cancer (Child)      REVIEW OF SYSTEMS  [  ] ROS unobtainable because:    [  ] All other systems negative except as noted below:	    Constitutional:  [ ] fever [ ] chills  [ ] weight loss  [ ] weakness  Skin:  [ ] rash [ ] phlebitis	  Eyes: [ ] icterus [ ] pain  [ ] discharge	  ENMT: [ ] sore throat  [ ] thrush [ ] ulcers [ ] exudates  Respiratory: [ ] dyspnea [ ] hemoptysis [ ] cough [ ] sputum	  Cardiovascular:  [ ] chest pain [ ] palpitations [ ] edema	  Gastrointestinal:  [ ] nausea [ ] vomiting [ ] diarrhea [ ] constipation [ ] pain	  Genitourinary:  [ ] dysuria [ ] frequency [ ] hematuria [ ] discharge [ ] flank pain  [ ] incontinence  Musculoskeletal:  [ ] myalgias [ ] arthralgias [ ] arthritis  [ ] back pain  Neurological:  [ ] headache [ ] seizures  [ ] confusion/altered mental status  Psychiatric:  [ ] anxiety [ ] depression	  Hematology/Lymphatics:  [ ] lymphadenopathy  Endocrine:  [ ] adrenal [ ] thyroid  Allergic/Immunologic:	 [ ] transplant [ ] seasonal    Vital Signs Last 24 Hrs  T(F): 98.7 (22 @ 16:01), Max: 98.7 (22 @ 16:01)  Vital Signs Last 24 Hrs  HR: 93 (22 @ 16:01) (93 - 105)  BP: 123/71 (22 @ 16:01) (111/63 - 123/71)  RR: 18 (22 @ 00:40)  SpO2: 95% (22 @ 00:40) (95% - 100%)  Wt(kg): --    PHYSICAL EXAM:  Constitutional: non-toxic, no distress  HEAD/EYES: anicteric, no conjunctival injection  ENT:  supple, no thrush  Cardiovascular:   normal S1, S2, no murmur, no edema  Respiratory:  clear BS bilaterally, no wheezes, no rales  GI:  soft, non-tender, normal bowel sounds  :  no brown, no CVA tenderness  Musculoskeletal:  no synovitis, normal ROM  Neurologic: awake and alert, normal strength, no focal findings  Skin:  no rash, no erythema, no phlebitis  Heme/Onc: no lymphadenopathy   Psychiatric:  awake, alert, appropriate mood                            10.0   51.78 )-----------( 62       ( 31 Aug 2022 12:15 )             30.4       138  |  104  |  10  ----------------------------<  106<H>  TNP   |  24  |  0.37<L>    Ca    7.9<L>      31 Aug 2022 12:15  Phos  TNP       Mg     1.70         TPro  TNP  /  Alb  3.2<L>  /  TBili  0.7  /  DBili  x   /  AST  TNP  /  ALT  TNP  /  AlkPhos  173<H>      Urinalysis Basic - ( 30 Aug 2022 21:05 )    Color: Yellow / Appearance: Clear / S.029 / pH: x  Gluc: x / Ketone: Negative  / Bili: Negative / Urobili: <2 mg/dL   Blood: x / Protein: 30 mg/dL / Nitrite: Negative   Leuk Esterase: Negative / RBC: 2 /HPF / WBC 3 /HPF   Sq Epi: x / Non Sq Epi: 5 /HPF / Bacteria: Few    MICROBIOLOGY:  Culture - Blood (collected 30 Aug 2022 22:00)  Source: .Blood Blood-Peripheral  Preliminary Report (01 Sep 2022 02:02):    No growth to date.    Culture - Blood (collected 30 Aug 2022 21:50)  Source: .Blood Port Device  Preliminary Report (01 Sep 2022 02:02):    No growth to date.    Culture - Blood (collected 30 Aug 2022 21:40)  Source: .Blood Blood-Peripheral  Preliminary Report (01 Sep 2022 02:02):    No growth to date.    Culture - Urine (collected 30 Aug 2022 21:05)  Source: Clean Catch Clean Catch (Midstream)  Final Report (01 Sep 2022 07:02):    No growth    Rapid RVP Result: NotDetec (0830 @ 20:50)    RADIOLOGY:  imaging below personally reviewed and agree with findings  < from: Xray Chest 2 Views PA/Lat (22 @ 00:25) >  IMPRESSION:    Hazy bibasilar opacities, nonspecific yetfavored to represent pleural   effusions and atelectasis.    Further information may be obtained from the patient's subsequent CT of   the chest.    < end of copied text >  < from: CT Angio Chest PE Protocol w/ IV Cont (22 @ 02:37) >  IMPRESSION:    Right upper lobe segmental pulmonary embolism. No CT evidence of right   heart strain.    Moderate bilateral pleural effusions.    Cardiomegaly.    Perihepatic ascites of uncertain etiology.    Findings were discussed with Dr. Ordonez at 2022 2:51 AM by Dr. Lord with read back confirmation.    < end of copied text >  < from: US Duplex Venous Lower Ext Complete, Bilateral (22 @ 16:33) >  IMPRESSION:  No evidence of deep venous thrombosis in either lower extremity.    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 ID consulted for Leukocytosis.     Patient states her cough has improved. Denies any fever chills, dysuria or loose stools. Patient lives with her son and daughter. Daughter was recently sick with bronchitis and currently on antibiotic treatment at home.     Patient follows with Dr. Praveena Alvarado at [facility] for the management of stage IV uterine carcinosarcoma. Was diagnosed early May 2022. Currently on treatment, last treatment in  for which Pt also received Neulasta.     Afebrile VSS  WBC 59 > 51  Cr 0.37  UA negative  CXR with bilateral hazy opacities in lung bases more consistent with pleural effusion  CTA Chest with acute RUL PE and bilateral pleural effusion  US doppler without DVT  COVID/RVP negative  BCx x3 negative  UCx negative      PAST MEDICAL & SURGICAL HISTORY:  Endometrial ca  S4 serous endometrial carcinoma, [facility], chemotherapy  HTN (hypertension)  CAD (coronary artery disease)  S/P AVR  Hypothyroidism  HLD (hyperlipidemia)  S/P AVR (aortic valve replacement)  History of endometrial biopsy        Allergies  No Known Allergies    ANTIMICROBIALS (past 90 days)  MEDICATIONS  (STANDING):  s/p Vanco and Cefepime ()    MEDICATIONS  (STANDING):  acetaminophen     Tablet .. 650 every 6 hours PRN  aspirin enteric coated 81 daily  ATENolol  Tablet 50 every 12 hours  atorvastatin 80 at bedtime  famotidine    Tablet 40 daily  heparin  Infusion. 700 <Continuous>  levothyroxine 75 daily  melatonin 3 at bedtime  oxycodone    5 mG/acetaminophen 325 mG 1 every 6 hours PRN  polyethylene glycol 3350 17 daily PRN  senna 2 at bedtime    SOCIAL HISTORY:   Denies alcohol, tobacco, recreational drug use  Lives with family, no recent travel    FAMILY HISTORY:  Family history of parotid cancer (Child)      REVIEW OF SYSTEMS  [  ] ROS unobtainable because:    [x  ] All other systems negative except as noted below:	    Constitutional:  [ ] fever [ ] chills  [ ] weight loss  [ ] weakness  Skin:  [ ] rash [ ] phlebitis	  Eyes: [ ] icterus [ ] pain  [ ] discharge	  ENMT: [ ] sore throat  [ ] thrush [ ] ulcers [ ] exudates  Respiratory: [ ] dyspnea [ ] hemoptysis [x ] cough [ ] sputum	  Cardiovascular:  [ ] chest pain [ ] palpitations [ ] edema	  Gastrointestinal:  [ ] nausea [ ] vomiting [ ] diarrhea [ ] constipation [ ] pain	  Genitourinary:  [ ] dysuria [ ] frequency [ ] hematuria [ ] discharge [ ] flank pain  [ ] incontinence  Musculoskeletal:  [ ] myalgias [ ] arthralgias [ ] arthritis  [ ] back pain  Neurological:  [ ] headache [ ] seizures  [ ] confusion/altered mental status  Psychiatric:  [ ] anxiety [ ] depression	  Hematology/Lymphatics:  [ ] lymphadenopathy  Endocrine:  [ ] adrenal [ ] thyroid  Allergic/Immunologic:	 [ ] transplant [ ] seasonal    Vital Signs Last 24 Hrs  T(F): 98.7 (22 @ 16:01), Max: 98.7 (22 @ 16:01)  Vital Signs Last 24 Hrs  HR: 93 (22 @ 16:01) (93 - 105)  BP: 123/71 (22 @ 16:01) (111/63 - 123/71)  RR: 18 (22 @ 00:40)  SpO2: 95% (22 @ 00:40) (95% - 100%)  Wt(kg): --    Physical Exam:  Constitutional:  well preserved, comfortable  Head/Eyes: no icterus, PERRL, EOMI  ENT:  supple; no thrush  LUNGS:  CTA +R chest wall chemoport without erythema  CVS:  normal S1, S2, no murmur  Abd:  soft, non-tender; non-distended  Ext:  no edema  Vascular:  IV site no erythema tenderness or discharge  MSK:  joints without swelling  Neuro: AAO X 3, non- focal                            10.0   51.78 )-----------( 62       ( 31 Aug 2022 12:15 )             30.4       138  |  104  |  10  ----------------------------<  106<H>  TNP   |  24  |  0.37<L>    Ca    7.9<L>      31 Aug 2022 12:15  Phos  TNP       Mg     1.70         TPro  TNP  /  Alb  3.2<L>  /  TBili  0.7  /  DBili  x   /  AST  TNP  /  ALT  TNP  /  AlkPhos  173<H>      Urinalysis Basic - ( 30 Aug 2022 21:05 )    Color: Yellow / Appearance: Clear / S.029 / pH: x  Gluc: x / Ketone: Negative  / Bili: Negative / Urobili: <2 mg/dL   Blood: x / Protein: 30 mg/dL / Nitrite: Negative   Leuk Esterase: Negative / RBC: 2 /HPF / WBC 3 /HPF   Sq Epi: x / Non Sq Epi: 5 /HPF / Bacteria: Few    MICROBIOLOGY:  Culture - Blood (collected 30 Aug 2022 22:00)  Source: .Blood Blood-Peripheral  Preliminary Report (01 Sep 2022 02:02):    No growth to date.    Culture - Blood (collected 30 Aug 2022 21:50)  Source: .Blood Port Device  Preliminary Report (01 Sep 2022 02:02):    No growth to date.    Culture - Blood (collected 30 Aug 2022 21:40)  Source: .Blood Blood-Peripheral  Preliminary Report (01 Sep 2022 02:02):    No growth to date.    Culture - Urine (collected 30 Aug 2022 21:05)  Source: Clean Catch Clean Catch (Midstream)  Final Report (01 Sep 2022 07:02):    No growth    Rapid RVP Result: NotDetec ( @ 20:50)    RADIOLOGY:  imaging below personally reviewed and agree with findings  < from: Xray Chest 2 Views PA/Lat (22 @ 00:25) >  IMPRESSION:    Hazy bibasilar opacities, nonspecific yetfavored to represent pleural   effusions and atelectasis.    Further information may be obtained from the patient's subsequent CT of   the chest.    < end of copied text >  < from: CT Angio Chest PE Protocol w/ IV Cont (22 @ 02:37) >  IMPRESSION:    Right upper lobe segmental pulmonary embolism. No CT evidence of right   heart strain.    Moderate bilateral pleural effusions.    Cardiomegaly.    Perihepatic ascites of uncertain etiology.    Findings were discussed with Dr. Ordonez at 2022 2:51 AM by Dr. Lord with read back confirmation.    < end of copied text >  < from: US Duplex Venous Lower Ext Complete, Bilateral (22 @ 16:33) >  IMPRESSION:  No evidence of deep venous thrombosis in either lower extremity.    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 ID consulted for Leukocytosis.     Patient states her cough has improved. Denies any fever chills, dysuria or loose stools. Patient lives with her son and daughter. Daughter was recently sick with bronchitis and currently on antibiotic treatment at home.     Patient follows with Dr. Praveena Alvarado at [facility] for the management of stage IV uterine carcinosarcoma. Was diagnosed early May 2022. Currently on treatment, last treatment in  for which Pt also received Neulasta.     Afebrile VSS  WBC 59 > 51  Cr 0.37  UA negative  CXR with bilateral hazy opacities in lung bases more consistent with pleural effusion  CTA Chest with acute RUL PE and bilateral pleural effusion  US doppler without DVT  COVID/RVP negative  BCx x3 negative  UCx negative      PAST MEDICAL & SURGICAL HISTORY:  Endometrial ca  S4 serous endometrial carcinoma, [facility], chemotherapy  HTN (hypertension)  CAD (coronary artery disease)  S/P AVR  Hypothyroidism  HLD (hyperlipidemia)  S/P AVR (aortic valve replacement)  History of endometrial biopsy        Allergies  No Known Allergies    ANTIMICROBIALS (past 90 days)  MEDICATIONS  (STANDING):  s/p Vanco and Cefepime ()    MEDICATIONS  (STANDING):  acetaminophen     Tablet .. 650 every 6 hours PRN  aspirin enteric coated 81 daily  ATENolol  Tablet 50 every 12 hours  atorvastatin 80 at bedtime  famotidine    Tablet 40 daily  heparin  Infusion. 700 <Continuous>  levothyroxine 75 daily  melatonin 3 at bedtime  oxycodone    5 mG/acetaminophen 325 mG 1 every 6 hours PRN  polyethylene glycol 3350 17 daily PRN  senna 2 at bedtime    SOCIAL HISTORY:   Denies alcohol, tobacco, recreational drug use  Lives with family, no recent travel    FAMILY HISTORY:  Family history of parotid cancer (Child)      REVIEW OF SYSTEMS  [  ] ROS unobtainable because:    [x  ] All other systems negative except as noted below:	    Constitutional:  [ ] fever [ ] chills  [ ] weight loss  [ ] weakness  Skin:  [ ] rash [ ] phlebitis	  Eyes: [ ] icterus [ ] pain  [ ] discharge	  ENMT: [ ] sore throat  [ ] thrush [ ] ulcers [ ] exudates  Respiratory: [ ] dyspnea [ ] hemoptysis [x ] cough [ ] sputum	  Cardiovascular:  [ ] chest pain [ ] palpitations [ ] edema	  Gastrointestinal:  [ ] nausea [ ] vomiting [ ] diarrhea [ ] constipation [ ] pain	  Genitourinary:  [ ] dysuria [ ] frequency [ ] hematuria [ ] discharge [ ] flank pain  [ ] incontinence  Musculoskeletal:  [ ] myalgias [ ] arthralgias [ ] arthritis  [ ] back pain  Neurological:  [ ] headache [ ] seizures  [ ] confusion/altered mental status  Psychiatric:  [ ] anxiety [ ] depression	  Hematology/Lymphatics:  [ ] lymphadenopathy  Endocrine:  [ ] adrenal [ ] thyroid  Allergic/Immunologic:	 [ ] transplant [ ] seasonal    Vital Signs Last 24 Hrs  T(F): 98.7 (22 @ 16:01), Max: 98.7 (22 @ 16:01)  Vital Signs Last 24 Hrs  HR: 93 (22 @ 16:01) (93 - 105)  BP: 123/71 (22 @ 16:01) (111/63 - 123/71)  RR: 18 (22 @ 00:40)  SpO2: 95% (22 @ 00:40) (95% - 100%)  Wt(kg): --    Physical Exam:  Constitutional:  well preserved, comfortable  Head/Eyes: no icterus  ENT:  supple   LUNGS:  CTA +R chest wall chemoport without erythema  CVS:  normal S1, S2, no murmur  Abd:  soft, non-tender; non-distended  Ext:  no edema  Vascular:  IV site no erythema tenderness or discharge  MSK:  joints without swelling  Neuro: AAO X 3, non- focal                            10.0   51.78 )-----------( 62       ( 31 Aug 2022 12:15 )             30.4       138  |  104  |  10  ----------------------------<  106<H>  TNP   |  24  |  0.37<L>    Ca    7.9<L>      31 Aug 2022 12:15  Phos  TNP       Mg     1.70     08-31    TPro  TNP  /  Alb  3.2<L>  /  TBili  0.7  /  DBili  x   /  AST  TNP  /  ALT  TNP  /  AlkPhos  173<H>      Urinalysis Basic - ( 30 Aug 2022 21:05 )    Color: Yellow / Appearance: Clear / S.029 / pH: x  Gluc: x / Ketone: Negative  / Bili: Negative / Urobili: <2 mg/dL   Blood: x / Protein: 30 mg/dL / Nitrite: Negative   Leuk Esterase: Negative / RBC: 2 /HPF / WBC 3 /HPF   Sq Epi: x / Non Sq Epi: 5 /HPF / Bacteria: Few    MICROBIOLOGY:  Culture - Blood (collected 30 Aug 2022 22:00)  Source: .Blood Blood-Peripheral  Preliminary Report (01 Sep 2022 02:02):    No growth to date.    Culture - Blood (collected 30 Aug 2022 21:50)  Source: .Blood Port Device  Preliminary Report (01 Sep 2022 02:02):    No growth to date.    Culture - Blood (collected 30 Aug 2022 21:40)  Source: .Blood Blood-Peripheral  Preliminary Report (01 Sep 2022 02:02):    No growth to date.    Culture - Urine (collected 30 Aug 2022 21:05)  Source: Clean Catch Clean Catch (Midstream)  Final Report (01 Sep 2022 07:02):    No growth    Rapid RVP Result: NotDetec ( @ 20:50)    RADIOLOGY:  imaging below personally reviewed and agree with findings  < from: Xray Chest 2 Views PA/Lat (22 @ 00:25) >  IMPRESSION:    Hazy bibasilar opacities, nonspecific yetfavored to represent pleural   effusions and atelectasis.    Further information may be obtained from the patient's subsequent CT of   the chest.    < end of copied text >  < from: CT Angio Chest PE Protocol w/ IV Cont (22 @ 02:37) >  IMPRESSION:    Right upper lobe segmental pulmonary embolism. No CT evidence of right   heart strain.    Moderate bilateral pleural effusions.    Cardiomegaly.    Perihepatic ascites of uncertain etiology.    Findings were discussed with Dr. rOdonez at 2022 2:51 AM by Dr. Lord with read back confirmation.    < end of copied text >  < from: US Duplex Venous Lower Ext Complete, Bilateral (22 @ 16:33) >  IMPRESSION:  No evidence of deep venous thrombosis in either lower extremity.    < end of copied text >

## 2022-09-01 NOTE — PROGRESS NOTE ADULT - NS ATTEND AMEND GEN_ALL_CORE FT
88 y/o female with history of uterine carcinosarcoma admitted with cough and dyspnea.    - Patient follows at [facility]; last received chemotherapy on August 26th with carboplatin + paclitaxel. No treatment while inpatient.  - Pulmonary embolism seen on CTA. On heparin drip. Given active chemotherapy with thrombocytopenia would consider transitioning to enoxaparin for intermediate-term anticoagulation. If patient prefers oral, can discharge on direct oral anticoagulant accepting risks that pose with thrombocytopenia occurring after chemotherapy.   - Platelet today is 46. Continue close monitoring. If remains below 50 but above 25 can consider enoxaparin 0.5 mg/kg BID rather than 1 mg/kg BID.  - Infectious disease consulted. No antibiotics recommended.

## 2022-09-01 NOTE — CHART NOTE - NSCHARTNOTEFT_GEN_A_CORE
Patient noted to arrive to 7N unit on NC 5L previously on room air. Patient seen at bedside by provider. Patient sleeping. When awoke, patient denies chest pain, shortness of breath, or cough. Patient states she is unsure why ED placed her on NC as she was not previously short of breath. Provider titrated NC to 2L and patient remained spO2 100%. Patient then trialed on room air, satting comfortably at 95%, denies shortness of breath. Continuos pulse ox ordered. NC PRN if O2 < 96%. Will continue to monitor.

## 2022-09-01 NOTE — PHYSICAL THERAPY INITIAL EVALUATION ADULT - PATIENT PROFILE REVIEW, REHAB EVAL
ACTIVITY: Ambulate as Tolerated; Spoke with ONEL Blake prior to PT evaluation--> Pt OK for PT consult/yes

## 2022-09-02 NOTE — DISCHARGE NOTE PROVIDER - CARE PROVIDER_API CALL
Kala Mcdaniel (DNP; NP; RN)  NP in [facility]  [address] [place] [address]  Phone: [phone]  Fax: [phone]  Established Patient  Follow Up Time: 1 week    Ortega Denny (DO)  Internal Medicine; Pulmonary Disease; Sleep Medicine  [address], [address]  [place] [address]  Phone: [phone]  Fax: [phone]  Follow Up Time: 2 weeks

## 2022-09-02 NOTE — PROGRESS NOTE ADULT - SUBJECTIVE AND OBJECTIVE BOX
Patient is a 87y old  Female who presents with a chief complaint of Other pulmonary embolism without acute cor pulmonale    Patient seen and examined this morning. Feels fine, no new complaints.      MEDICATIONS  (STANDING):  aspirin enteric coated 81 milliGRAM(s) Oral daily  ATENolol  Tablet 50 milliGRAM(s) Oral every 12 hours  atorvastatin 80 milliGRAM(s) Oral at bedtime  cyanocobalamin 1000 MICROGram(s) Oral daily  famotidine    Tablet 40 milliGRAM(s) Oral daily  heparin  Infusion. 700 Unit(s)/Hr (7 mL/Hr) IV Continuous <Continuous>  levothyroxine 75 MICROGram(s) Oral daily  lidocaine   4% Patch 1 Patch Transdermal every 24 hours  melatonin 3 milliGRAM(s) Oral at bedtime  multivitamin 1 Tablet(s) Oral daily  senna 2 Tablet(s) Oral at bedtime    MEDICATIONS  (PRN):  acetaminophen     Tablet .. 650 milliGRAM(s) Oral every 6 hours PRN Temp greater or equal to 38C (100.4F), Mild Pain (1 - 3), Moderate Pain (4 - 6)  oxycodone    5 mG/acetaminophen 325 mG 1 Tablet(s) Oral every 6 hours PRN Severe Pain (7 - 10)  polyethylene glycol 3350 17 Gram(s) Oral daily PRN Constipation        Vital Signs Last 24 Hrs  T(C): 36.6 (02 Sep 2022 11:52), Max: 36.8 (01 Sep 2022 22:00)  T(F): 97.9 (02 Sep 2022 11:52), Max: 98.3 (01 Sep 2022 22:00)  HR: 79 (02 Sep 2022 11:52) (70 - 79)  BP: 108/51 (02 Sep 2022 11:52) (108/51 - 118/55)  BP(mean): --  RR: 18 (02 Sep 2022 11:52) (18 - 18)  SpO2: 97% (02 Sep 2022 11:52) (97% - 100%)    Parameters below as of 02 Sep 2022 11:52  Patient On (Oxygen Delivery Method): room air  O2 Flow (L/min): 4      PE  NAD  Awake, alert  Anicteric, MMM  RRR  CTAB  Abd soft, NT, ND  No c/c/e  No rash grossly                          9.4    16.41 )-----------( 53       ( 02 Sep 2022 05:45 )             25.9       09-02    140  |  102  |  10  ----------------------------<  91  4.3   |  32<H>  |  0.53    Ca    8.5      02 Sep 2022 05:45  Phos  2.6     09-02  Mg     1.50     09-02    TPro  5.7<L>  /  Alb  3.0<L>  /  TBili  0.3  /  DBili  <0.2  /  AST  22  /  ALT  15  /  AlkPhos  157<H>  09-02      Patient name: FRIDA WELCH  YOB: 1935   Age: 87 (F)   MR#: [id number]  Study Date: 9/1/2022  Location: [id number]: Susi Watson RDCS  Study quality: Technically Fair  Referring Physician: Tariq Olivier MD  Blood Pressure: 108/61 mmHg  Height: 152 cm  Weight: 64 kg  BSA: 1.6 m2  ------------------------------------------------------------------------  PROCEDURE: Transthoracic echocardiogram with 2-D, M-Mode  and complete spectral and color flow Doppler.  INDICATION: Dyspnea, unspecified (R06.00)  ------------------------------------------------------------------------  DIMENSIONS:  Dimensions:     Normal Values:  LA:     3.2 cm    2.0 - 4.0 cm  Ao:     2.6 cm    2.0 - 3.8 cm  SEPTUM: 1.3 cm    0.6 - 1.2 cm  PWT:    1.3 cm    0.6 - 1.1 cm  LVIDd:  4.0 cm    3.0 - 5.6 cm  LVIDs:  2.9 cm    1.8 - 4.0 cm  Derived Variables:  LVMI: 117 g/m2  RWT: 0.65  Fractional short: 28 %  Ejection Fraction (Modified Greer Rule): 54 %  ------------------------------------------------------------------------  OBSERVATIONS:  Mitral Valve: Mitral annular calcification, otherwise  normal mitral valve. Mild mitral regurgitation.  Aortic Root: Normal aortic root.  Aortic Valve: Transcatheter aortic valve replacement. Peak  transaortic valve gradient equals 34 mm Hg, mean  transaortic valve gradient equals 20 mm Hg, which is  probably normal in the presence of a prosthetic valve. Mild  aortic regurgitation.  Left Atrium: Moderately dilated left atrium.  LA volume  index = 43 cc/m2.  Left Ventricle: Low normal left ventricular systolic  function. No segmental wall motion abnormalities. Moderate  concentric left ventricular hypertrophy. Mild diastolic  dysfunction (Stage I).  Right Heart: Normal right atrium. Normal right ventricular  size and function. Normal tricuspid valve.  Mild-moderate  tricuspid regurgitation. Normal pulmonic valve.  Pericardium/PleuraNormal pericardium with no pericardial  effusion. Left pleural effusion.  ------------------------------------------------------------------------  CONCLUSIONS:  1. Mitral annular calcification, otherwise normal mitral  valve. Mild mitral regurgitation.  2. Transcatheter aortic valve replacement. Peak transaortic  valve gradient equals 34 mm Hg, mean transaortic valve  gradient equals 20 mm Hg, which is probably normal in the  presence of a prosthetic valve. Mild aortic regurgitation.  3. Moderately dilated left atrium.  LA volume index = 43  cc/m2.  4. Moderate concentric left ventricular hypertrophy.  5. Low normal left ventricular systolic function. No  segmental wall motion abnormalities.  6. Mild diastolic dysfunction (Stage I).  7. Normal right ventricular size and function.  8. Left pleural effusion.  ------------------------------------------------------------------------

## 2022-09-02 NOTE — DISCHARGE NOTE PROVIDER - HOSPITAL COURSE
87 F PMH S4 serous [place] [place]), HTN, CAD (s/p PCI 2021), AV replacement, hypothyroidism, HLD who p/w R sided chest pain, cough, mild SOB, weakness, and difficulty ambulating x2d. Went to [facility] [place] [place] where CXR showed LLL consolidation and recommended ER evaluation. Pt states she had been doing "okay" since last discharge and had been working with home PT but started to feel unwell again in the last week. Daughter has been sick with a cough but has not been evaluated. Then pt began developing symptoms and thought she may have gotten sick as well. Generally has had decreased appetite and decreased PO intake. Denies HA, lightheadedness, dizziness, abdominal pain, changes to BMs or urination, pain in extremities. Last took meds at home 8/30 AM. Has been ambulating with a cane and has not had too much difficulty but has noticed increased weakness. No other concerns or complaints. Feels better currently than when she first came to the ER.     On arrival, T 98, HR 85, /62, RR 18 O2sat 98% RA. Labs with WBC 59.87, 98% neutrophils, 1.7% bands, Hb 10.9, MCV 89, plt 90, D-dimer 2314, lactate neg, , AST 40, trop 27, BNP 1514, UA neg, RVP neg, BCx sent. CXR with hazy bibasilar opacities likely pleural effusions and atelectasis. CT PE with RUL segmental PE without evidence of R heart strain; moderate BL pleural effusions, and perihepatic ascites of unclear etiology. S/P Vanco/ Cefepime in ED. Started on heparin gtt. Lasix/Losartan/Atenolol held 2/2 PE/soft BP.     ID consulted, monitor off Abx. Pulm consulted. TTE done mild MR. Mild AR. Moderately dilated LA. Moderate concentric left ventricular hypertrophy. Low normal left ventricular systolic function. No segmental wall motion abnormalities. Mild diastolic dysfunction (Stage I). Normal RV size and function. Left pleural effusion. IR consulted for evaluation for thoracentesis; recommend to hold off. Pt monitored with improvement, now stable on RA. Transitioned to Lovenox    Spoke with attending, _____________________             87 F PMH S4 serous endometrial carcinoma ([facility] [place], chest wall port, chemo), HTN, CAD (s/p PCI 2021), AV replacement, hypothyroidism, HLD who p/w R sided chest pain, cough, mild SOB, weakness, and difficulty ambulating x2d. Went to [facility] for evaluation where CXR showed LLL consolidation and recommended ER evaluation. Pt states she had been doing "okay" since last discharge and had been working with home PT but started to feel unwell again in the last week. Daughter has been sick with a cough but has not been evaluated. Then pt began developing symptoms and thought she may have gotten sick as well. Generally has had decreased appetite and decreased PO intake. Denies HA, lightheadedness, dizziness, abdominal pain, changes to BMs or urination, pain in extremities. Last took meds at home 8/30 AM. Has been ambulating with a cane and has not had too much difficulty but has noticed increased weakness. No other concerns or complaints. Feels better currently than when she first came to the ER.     On arrival, T 98, HR 85, /62, RR 18 O2sat 98% RA. Labs with WBC 59.87, 98% neutrophils, 1.7% bands, Hb 10.9, MCV 89, plt 90, D-dimer 2314, lactate neg, , AST 40, trop 27, BNP 1514, UA neg, RVP neg, BCx sent. CXR with hazy bibasilar opacities likely pleural effusions and atelectasis. CT PE with RUL segmental PE without evidence of R heart strain; moderate BL pleural effusions, and perihepatic ascites of unclear etiology. S/P Vanco/ Cefepime in ED. Started on heparin gtt. Lasix/Losartan/Atenolol held 2/2 PE/soft BP.     ID consulted, monitor off Abx. Pulm consulted. TTE done mild MR. Mild AR. Moderately dilated LA. Moderate concentric left ventricular hypertrophy. Low normal left ventricular systolic function. No segmental wall motion abnormalities. Mild diastolic dysfunction (Stage I). Normal RV size and function. Left pleural effusion. IR consulted for evaluation for thoracentesis; recommend to hold off. Pt monitored with improvement, now stable on RA. Transitioned to Lovenox    Dispo: home with services    On 9/03/2022, case was discussed with Dr. Olivier, patient is medically cleared and optimized for discharge today. All medications were reviewed with attending, and sent to mutually agreed upon pharmacy.     87 F PMH S4 serous [place] [place] HTN, CAD (s/p PCI 2021), AV replacement, hypothyroidism, HLD who p/w R sided chest pain, cough, mild SOB, weakness, and difficulty ambulating x2d. [place] [facility] [place]R showed LLL consolidation and recommended ER evaluation. Pt states she had been doing "okay" since last discharge and had been working with home PT but started to feel unwell again in the last week. Daughter has been sick with a cough but has not been evaluated. Then pt began developing symptoms and thought she may have gotten sick as well. Generally has had decreased appetite and decreased PO intake. Denies HA, lightheadedness, dizziness, abdominal pain, changes to BMs or urination, pain in extremities. Last took meds at home 8/30 AM. Has been ambulating with a cane and has not had too much difficulty but has noticed increased weakness. No other concerns or complaints. Feels better currently than when she first came to the ER.     On arrival, T 98, HR 85, /62, RR 18 O2sat 98% RA. Labs with WBC 59.87, 98% neutrophils, 1.7% bands, Hb 10.9, MCV 89, plt 90, D-dimer 2314, lactate neg, , AST 40, trop 27, BNP 1514, UA neg, RVP neg, BCx sent. CXR with hazy bibasilar opacities likely pleural effusions and atelectasis. CT PE with RUL segmental PE without evidence of R heart strain; moderate BL pleural effusions, and perihepatic ascites of unclear etiology. S/P Vanco/ Cefepime in ED. Started on heparin gtt. Lasix/Losartan/Atenolol held 2/2 PE/soft BP.     ID consulted, monitor off Abx. Pulm consulted. TTE done mild MR. Mild AR. Moderately dilated LA. Moderate concentric left ventricular hypertrophy. Low normal left ventricular systolic function. No segmental wall motion abnormalities. Mild diastolic dysfunction (Stage I). Normal RV size and function. Left pleural effusion. IR consulted for evaluation for thoracentesis; recommend to hold off. Pt monitored with improvement, Transitioned to Lovenox. Patient remains on room air 97%, but desaturated on ambulating to 85% now will require home supplemental oxygen on discharge, to be discharged home with services.     Hospital Course:   RUL Segmental PE/Pleural effusions   -  pt p/w 2d R sided pain, cough, mild SOB, weakness, and difficulty ambulating; [place] [facility] [place] told that CXR showed LLL PNA and recommended ER eval  - 8/31 CXR - Hazy bibasilar opacities, nonspecific yet favored to represent pleural   effusions and atelectasis   - 8/31 CTPA - RUL segmental PE. No CT evidence of right heart strain. Moderate bilateral pleural effusions. Cardiomegaly. Perihepatic ascites of uncertain etiology.  - PE likely provoked in setting of active malignancy and recent hospitalization   - IR consulted 9/2- no thoracentesis at this time, recommend diuresis   - s/p Heparin gtt> lovenox 90mg BID (ON[facility] prefers Lovenox given thrombocytopenia)  - TTE - mild MR. Mild AR. Moderately dilated LA. Moderate concentric left ventricular hypertrophy. Low normal left ventricular systolic function. No segmental wall motion abnormalities. Mild diastolic dysfunction (Stage I). Normal RV size and function. Left pleural effusion.  - 8/31 LE duplex- negative for DVT    - Repeat CXR 9/4- increased B/L moderate pleural effusions   - Pulm consulted  - continue diuresis   - Cards consulted     Leukocytosis   - on arrival, WBC 59.87 with 98% neutrophils and 1.7% bands -- uptrending per chart review. Remains afebrile   - likely in setting of acute infection and recent [place] [facility] [place]  - 8/30 BCX/UCX negative   - initial CXR with hazy bibasilar opacities likely pleural effusions and atelectasis  - less likely PNA as pt afebrile and CT chest without obvious consolidation  - s/p vanco/cefepime in ER, monitor off Abx (low threshold to restart abx as pt on chemo)   - repeat UA neg, UCx/BCX 9/4-- ntd  - MRSA nose cx-- neg  - 9/4 repeat CXR -increased moderate bilateral pleural effusions, IR consulted- no thora  - trend WBC. fever curve     HTN  - on atenolol 50mg BID, lasix 20mg daily, and losartan 100mg daily  - c/w atenolol, resumed lasix  - HOLD losartan given [place], restart PRN    HLD  - pt with hx HLD, on crestor 20mg daily  - therapeutic exchange while admitted; atorvastatin 80mg daily.    Hypothyroidism.   - TSH WNL   - Levothyroxine 75mcg qd    Serous Endometrial Carcinoma (Pito IV)  - [place] [facility] [place] chemo was Friday 8/26)  - pt with hx of S4 serous endometrial carcinoma, f[place] [facility] [place] s/p 2nd dose 8/26  - chest wall port c/d/i.  - Heme-Onc  Dr. Powell following  - [place] [facility] [place]     Normocytic anemia  - on arrival, Hb 10.9 with MCV 89  - chart review, baseline Hb 12  - no active s/s bleeding   - can pursue outpt w/u of folate/b12/iron   - may be in setting of active malignancy, as below  - keep active T&S, transfuse for Hb<7.    Thrombocytopenia.   - on arrival, plt 90; per chart review, baseline varies between   - unclear etiology, could consider peripheral smear  - likely in setting of active malignancy and chemotherapy, as below.   - transfuse if plts <10K    Transaminitis.   - on arrival,  with AST 40   - pt denies toxic substances  - continue to trend, likely in setting of hypovolemia with poor PO intake in last few days  - s/p 1L NS in ER.    CAD (coronary artery disease).   - pt with hx CAD s/p PCI 2021, on ASA 81mg daily  - c/w home med.     Dispo: home with services and home oxygen     On_________, case was discussed with Dr. Olivier, patient is medically cleared and optimized for discharge today. All medications were reviewed with attending, and sent to mutually agreed upon pharmacy.   87 F PMH S4 serous endometrial carcinoma ([facility], chest wall port, chemo), HTN, CAD (s/p PCI 2021), AV replacement, hypothyroidism, HLD who p/w R sided chest pain, cough, mild SOB, weakness, and difficulty ambulating x2d. [place] [facility] [place] where CXR showed LLL consolidation and recommended ER evaluation. Pt states she had been doing "okay" since last discharge and had been working with home PT but started to feel unwell again in the last week. Daughter has been sick with a cough but has not been evaluated. Then pt began developing symptoms and thought she may have gotten sick as well. Generally has had decreased appetite and decreased PO intake. Denies HA, lightheadedness, dizziness, abdominal pain, changes to BMs or urination, pain in extremities. Last took meds at home 8/30 AM. Has been ambulating with a cane and has not had too much difficulty but has noticed increased weakness. No other concerns or complaints. Feels better currently than when she first came to the ER.     On arrival, T 98, HR 85, /62, RR 18 O2sat 98% RA. Labs with WBC 59.87, 98% neutrophils, 1.7% bands, Hb 10.9, MCV 89, plt 90, D-dimer 2314, lactate neg, , AST 40, trop 27, BNP 1514, UA neg, RVP neg, BCx sent. CXR with hazy bibasilar opacities likely pleural effusions and atelectasis. CT PE with RUL segmental PE without evidence of R heart strain; moderate BL pleural effusions, and perihepatic ascites of unclear etiology. S/P Vanco/ Cefepime in ED. Started on heparin gtt. Lasix/Losartan/Atenolol held 2/2 PE/soft BP.     ID consulted, monitor off Abx. Pulm consulted. TTE done mild MR. Mild AR. Moderately dilated LA. Moderate concentric left ventricular hypertrophy. Low normal left ventricular systolic function. No segmental wall motion abnormalities. Mild diastolic dysfunction (Stage I). Normal RV size and function. Left pleural effusion. IR consulted for evaluation for thoracentesis; recommend to hold off. Pt monitored with improvement, Transitioned to Lovenox. Patient remains on room air 97%, but desaturated on ambulating to 85% now will require home supplemental oxygen on discharge, to be discharged home with services.     Hospital Course:   RUL Segmental PE/Pleural effusions   -  pt p/w 2d R sided pain, cough, mild SOB, weakness, and difficulty ambulating; evaluated at [facility] [place] and told that CXR showed LLL PNA and recommended ER eval  - 8/31 CXR - Hazy bibasilar opacities, nonspecific yet favored to represent pleural   effusions and atelectasis   - 8/31 CTPA - RUL segmental PE. No CT evidence of right heart strain. Moderate bilateral pleural effusions. Cardiomegaly. Perihepatic ascites of uncertain etiology.  - PE likely provoked in setting of active malignancy and recent hospitalization   - IR consulted 9/2- no thoracentesis at this time, recommend diuresis   - s/p Heparin gtt> lovenox 90mg BID (ON[facility] prefers Lovenox given thrombocytopenia)  - TTE - mild MR. Mild AR. Moderately dilated LA. Moderate concentric left ventricular hypertrophy. Low normal left ventricular systolic function. No segmental wall motion abnormalities. Mild diastolic dysfunction (Stage I). Normal RV size and function. Left pleural effusion.  - 8/31 LE duplex- negative for DVT    - Repeat CXR 9/4- increased B/L moderate pleural effusions   - Pulm consulted  - continue diuresis   - Cards consulted     Leukocytosis   - on arrival, WBC 59.87 with 98% neutrophils and 1.7% bands -- uptrending per chart review. Remains afebrile   - likely in setting of acute infection and recent neulasta @ [facility] on 8/26  - 8/30 BCX/UCX negative   - initial CXR with hazy bibasilar opacities likely pleural effusions and atelectasis  - less likely PNA as pt afebrile and CT chest without obvious consolidation  - s/p vanco/cefepime in ER, monitor off Abx (low threshold to restart abx as pt on chemo)   - repeat UA neg, UCx/BCX 9/4-- ntd  - MRSA nose cx-- neg  - 9/4 repeat CXR -increased moderate bilateral pleural effusions, IR consulted- no thora  - trend WBC. fever curve     HTN  - on atenolol 50mg BID, lasix 20mg daily, and losartan 100mg daily  - c/w atenolol, resumed lasix  - HOLD losartan given [place], restart PRN    HLD  - pt with hx HLD, on crestor 20mg daily  - therapeutic exchange while admitted; atorvastatin 80mg daily.    Hypothyroidism.   - TSH WNL   - Levothyroxine 75mcg qd    Serous Endometrial Carcinoma (Pito IV)  - undergoing chemotherapy @ [facility] (last chemo was Friday 8/26)  - pt with hx of S4 serous endometrial carcinoma, f/w onc at [facility] on chemotherapy s/p 2nd dose 8/26  - chest wall port c/d/i.  - Heme-Onc  Dr. Powell following  - follows at [facility] [place]     Normocytic anemia  - on arrival, Hb 10.9 with MCV 89  - chart review, baseline Hb 12  - no active s/s bleeding   - can pursue outpt w/u of folate/b12/iron   - may be in setting of active malignancy, as below  - keep active T&S, transfuse for Hb<7.    Thrombocytopenia.   - on arrival, plt 90; per chart review, baseline varies between   - unclear etiology, could consider peripheral smear  - likely in setting of active malignancy and chemotherapy, as below.   - transfuse if plts <10K    Transaminitis.   - on arrival,  with AST 40   - pt denies toxic substances  - continue to trend, likely in setting of hypovolemia with poor PO intake in last few days  - s/p 1L NS in ER.    CAD (coronary artery disease).   - pt with hx CAD s/p PCI 2021, on ASA 81mg daily  - c/w home med.     Dispo: home with services and home oxygen     On 9/7/22 case was discussed with Dr. Olivier, patient is medically cleared and optimized for discharge today. All medications were reviewed with attending, and sent to mutually agreed upon pharmacy.

## 2022-09-02 NOTE — DIETITIAN INITIAL EVALUATION ADULT - NSICDXPASTMEDICALHX_GEN_ALL_CORE_FT
PAST MEDICAL HISTORY:  CAD (coronary artery disease)     Endometrial ca S4 serous endometrial carcinoma, [facility] [place], chemotherapy    HLD (hyperlipidemia)     HTN (hypertension)     Hypothyroidism     S/P AVR

## 2022-09-02 NOTE — DISCHARGE NOTE PROVIDER - DETAILS OF MALNUTRITION DIAGNOSIS/DIAGNOSES
This patient has been assessed with a concern for Malnutrition and was treated during this hospitalization for the following Nutrition diagnosis/diagnoses:     -  09/02/2022: Moderate protein-calorie malnutrition

## 2022-09-02 NOTE — CONSULT NOTE ADULT - ASSESSMENT
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 IR consulted for thoracentesis.     Allergies:   Medications (Abx/Cardiac/Anticoagulation/Blood Products)    aspirin enteric coated: 81 milliGRAM(s) Oral (09-02 @ 12:55)  ATENolol  Tablet: 50 milliGRAM(s) Oral (09-02 @ 09:38)  heparin  Infusion.: 900 Unit(s)/Hr IV Continuous (09-01 @ 19:41)  heparin  Lock Flush 100 Units/mL Injectable: 300 Unit(s) IV Push (09-01 @ 04:44)    Data:    T(C): 36.6  HR: 79  BP: 108/51  RR: 18  SpO2: 97%    -WBC 16.41 / HgB 9.4 / Hct 25.9 / Plt 53  -Na 140 / Cl 102 / BUN 10 / Glucose 91  -K 4.3 / CO2 32 / Cr 0.53  -ALT 15 / Alk Phos 157 / T.Bili 0.3  -INR -- / PTT 88.6      Radiology:   Imaging reviewed    Assessment/Plan:   88 yo female with PMH of h PMH of 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 Patient saturating > 92% on room air. IR consulted for thoracentesis.    -- Case reviewed with attending Dr. Bauer  -- Patient saturating well on room air. Small bilateral pleural effusions on imaging.  -- Recommend diuresis per primary team as clinically indicated. Will hold off on thoracentesis at this time.  -- Please contact IR with further questions.

## 2022-09-02 NOTE — DIETITIAN INITIAL EVALUATION ADULT - ETIOLOGY
related to inability to meet sufficient protein-energy in setting of stage II coccyx wound, Stage IV endometrial carcinoma on chemo, persistent lack of appetite

## 2022-09-02 NOTE — DISCHARGE NOTE PROVIDER - NSDCFUADDAPPT_GEN_ALL_CORE_FT
Repeat bloodwork with your PCP within 2 weeks (CBC, BMP, LFTs), If you are in need of a general medicine physician and post-discharge medical follow-up for further care/recommendations you may contact the [facility] for an appointment [phone]      Followup with your oncologist at [facility]

## 2022-09-02 NOTE — DIETITIAN INITIAL EVALUATION ADULT - PERTINENT LABORATORY DATA
09-02    140  |  102  |  10  ----------------------------<  91  4.3   |  32<H>  |  0.53    Ca    8.5      02 Sep 2022 05:45  Phos  2.6     09-02  Mg     1.50     09-02    TPro  5.7<L>  /  Alb  3.0<L>  /  TBili  0.3  /  DBili  <0.2  /  AST  22  /  ALT  15  /  AlkPhos  157<H>  09-02  A1C with Estimated Average Glucose Result: 5.7 % (08-31-22 @ 12:15)

## 2022-09-02 NOTE — PROGRESS NOTE ADULT - SUBJECTIVE AND OBJECTIVE BOX
PULMONARY PROGRESS NOTE    FRIDA WELCH  MRN-[id number]    Patient is a 87y old  Female who presents with a chief complaint of PE (01 Sep 2022 14:15)      HPI:  -  -    ROS:   -    ACTIVE MEDICATION LIST:  MEDICATIONS  (STANDING):  aspirin enteric coated 81 milliGRAM(s) Oral daily  ATENolol  Tablet 50 milliGRAM(s) Oral every 12 hours  atorvastatin 80 milliGRAM(s) Oral at bedtime  cyanocobalamin 1000 MICROGram(s) Oral daily  famotidine    Tablet 40 milliGRAM(s) Oral daily  heparin  Infusion. 700 Unit(s)/Hr (7 mL/Hr) IV Continuous <Continuous>  levothyroxine 75 MICROGram(s) Oral daily  lidocaine   4% Patch 1 Patch Transdermal every 24 hours  melatonin 3 milliGRAM(s) Oral at bedtime  multivitamin 1 Tablet(s) Oral daily  senna 2 Tablet(s) Oral at bedtime    MEDICATIONS  (PRN):  acetaminophen     Tablet .. 650 milliGRAM(s) Oral every 6 hours PRN Temp greater or equal to 38C (100.4F), Mild Pain (1 - 3), Moderate Pain (4 - 6)  oxycodone    5 mG/acetaminophen 325 mG 1 Tablet(s) Oral every 6 hours PRN Severe Pain (7 - 10)  polyethylene glycol 3350 17 Gram(s) Oral daily PRN Constipation      EXAM:  Vital Signs Last 24 Hrs  T(C): 36.6 (02 Sep 2022 05:54), Max: 36.8 (01 Sep 2022 12:03)  T(F): 97.8 (02 Sep 2022 05:54), Max: 98.3 (01 Sep 2022 12:03)  HR: 76 (02 Sep 2022 05:54) (70 - 78)  BP: 117/54 (02 Sep 2022 05:54) (112/63 - 118/55)  BP(mean): --  RR: 18 (02 Sep 2022 05:54) (18 - 18)  SpO2: 100% (02 Sep 2022 05:54) (100% - 100%)    Parameters below as of 02 Sep 2022 05:54  Patient On (Oxygen Delivery Method): nasal cannula  O2 Flow (L/min): 4      GENERAL: The patient is awake and alert in no apparent distress.     LUNGS: Clear to auscultation without wheezing, rales or rhonchi; respirations unlabored    HEART: Regular rate and rhythm without murmur.                            9.7    16.67 )-----------( 46       ( 01 Sep 2022 09:48 )             31.2       09-01    135  |  95<L>  |  11  ----------------------------<  391<H>  3.4<L>   |  27  |  0.43<L>    Ca    8.1<L>      01 Sep 2022 09:48  Phos  2.3     09-01  Mg     1.50     09-01    TPro  5.9<L>  /  Alb  3.1<L>  /  TBili  0.4  /  DBili  x   /  AST  32  /  ALT  20  /  AlkPhos  170<H>  09-01    >>> <<<    PROBLEM LIST:  87y Female with HEALTH ISSUES - PROBLEM Dx:  Pulmonary embolism    Normocytic anemia    Thrombocytopenia    Transaminitis    Endometrial cancer    HTN (hypertension)    HLD (hyperlipidemia)    CAD (coronary artery disease)    Hypothyroidism    Nutrition, metabolism, and development symptoms    Leukocytosis    Pulmonary thromboembolism              RECS:        Please call with any questions.    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 ever seen pulm. no history of vte/pe  was on 3L this morning. felt weak, but breathing better. no history of effusions/thoracentesis in the past    -    ACTIVE MEDICATION LIST:  MEDICATIONS  (STANDING):  aspirin enteric coated 81 milliGRAM(s) Oral daily  ATENolol  Tablet 50 milliGRAM(s) Oral every 12 hours  atorvastatin 80 milliGRAM(s) Oral at bedtime  cyanocobalamin 1000 MICROGram(s) Oral daily  famotidine    Tablet 40 milliGRAM(s) Oral daily  heparin  Infusion. 700 Unit(s)/Hr (7 mL/Hr) IV Continuous <Continuous>  levothyroxine 75 MICROGram(s) Oral daily  lidocaine   4% Patch 1 Patch Transdermal every 24 hours  melatonin 3 milliGRAM(s) Oral at bedtime  multivitamin 1 Tablet(s) Oral daily  senna 2 Tablet(s) Oral at bedtime    MEDICATIONS  (PRN):  acetaminophen     Tablet .. 650 milliGRAM(s) Oral every 6 hours PRN Temp greater or equal to 38C (100.4F), Mild Pain (1 - 3), Moderate Pain (4 - 6)  oxycodone    5 mG/acetaminophen 325 mG 1 Tablet(s) Oral every 6 hours PRN Severe Pain (7 - 10)  polyethylene glycol 3350 17 Gram(s) Oral daily PRN Constipation      EXAM:  Vital Signs Last 24 Hrs  T(C): 36.6 (02 Sep 2022 05:54), Max: 36.8 (01 Sep 2022 12:03)  T(F): 97.8 (02 Sep 2022 05:54), Max: 98.3 (01 Sep 2022 12:03)  HR: 76 (02 Sep 2022 05:54) (70 - 78)  BP: 117/54 (02 Sep 2022 05:54) (112/63 - 118/55)  BP(mean): --  RR: 18 (02 Sep 2022 05:54) (18 - 18)  SpO2: 100% (02 Sep 2022 05:54) (100% - 100%)    Parameters below as of 02 Sep 2022 05:54  Patient On (Oxygen Delivery Method): nasal cannula  O2 Flow (L/min): 4      GENERAL: The patient is awake and alert in no apparent distress.     LUNGS: not labored, not wheezing, decreased at bases                           9.7    16.67 )-----------( 46       ( 01 Sep 2022 09:48 )             31.2       09-01    135  |  95<L>  |  11  ----------------------------<  391<H>  3.4<L>   |  27  |  0.43<L>    Ca    8.1<L>      01 Sep 2022 09:48  Phos  2.3     09-01  Mg     1.50     09-01    TPro  5.9<L>  /  Alb  3.1<L>  /  TBili  0.4  /  DBili  x   /  AST  32  /  ALT  20  /  AlkPhos  170<H>  09-01     < from: US Duplex Venous Lower Ext Complete, Bilateral (08.31.22 @ 16:33) >    ACC: [id number] EXAM:  US DPLX LWR EXT VEINS COMPL BI                          PROCEDURE DATE:  08/31/2022          INTERPRETATION:  CLINICAL INFORMATION: Patient pulmonary embolism and   left calf pain    COMPARISON: None available.    TECHNIQUE: Duplex sonography of the BILATERAL LOWER extremity veins with   color and spectral Doppler, with and without compression.    FINDINGS:    RIGHT:  Normal compressibility of the RIGHT common femoral, femoral and popliteal   veins.  Doppler examination shows normal spontaneous and phasic flow.  No RIGHT calf vein thrombosis is detected.    LEFT:  Normal compressibility of the LEFT common femoral, femoral and popliteal   veins.  Doppler examination shows normal spontaneous and phasic flow.  No LEFT calfvein thrombosis is detected.    IMPRESSION:  No evidence of deep venous thrombosis in either lower extremity.    --- End of Report ---            HOMERO MONSON MD; Attending Radiologist  This document has been electronically signed. Aug 31 2022  4:55PM    < end of copied text >  < from: CT Angio Chest PE Protocol w/ IV Cont (08.31.22 @ 02:37) >    ACC: [id number] EXAM:  CT ANGIO CHEST PULM CLARISSA SEALS                          PROCEDURE DATE:  08/31/2022          INTERPRETATION:  CLINICAL INFORMATION: Elevated d-dimer.    COMPARISON: None.    CONTRAST/COMPLICATIONS:  IV Contrast: Omnipaque 350  70 cc administered   0 cc discarded  Oral Contrast: NONE  Complications: None reported at time of study completion    PROCEDURE:  CT Angiogram of the chest was obtained with intravenous contrast. Three   dimensional maximum intensity projection (MIP) images were generated.    FINDINGS:    Motion degraded study.    PULMONARY ANGIOGRAM: Right upper lobe segmental pulmonary embolism.    LYMPH NODES/MEDIASTINUM: No lymphadenopathy.    HEART/VASCULATURE: Cardiomegaly.  TAVR. No CT evidence of right heart   strain. No pericardial effusion.    AIRWAYS/LUNGS/PLEURA: Patent central airways. Moderate bilateral pleural   effusions with adjacent lower lobe passive atelectasis.    UPPER ABDOMEN: Perihepatic ascites. Cholelithiasis.    BONES/SOFT TISSUES: Right chest wall port with tip at the superior   cavoatrial junction. Degenerative changes of the spine.    IMPRESSION:    Right upper lobe segmental pulmonary embolism. No CT evidence of right   heart strain.    Moderate bilateral pleural effusions.    Cardiomegaly.    Perihepatic ascites of uncertain etiology.    Findings were discussed with Dr. Ordonez at 8/31/2022 2:51 AM by Dr. Hammer with read back confirmation.    --- End of Report ---          WES HAMMER MD; Resident Radiology  This document has been electronically signed.    < end of copied text >  < from: Transthoracic Echocardiogram (09.01.22 @ 16:05) >  Pericardium/PleuraNormal pericardium with no pericardial  effusion. Left pleural effusion.  ------------------------------------------------------------------------  CONCLUSIONS:  1. Mitral annular calcification, otherwise normal mitral  valve. Mild mitral regurgitation.  2. Transcatheter aortic valve replacement. Peak transaortic  valve gradient equals 34 mm Hg, mean transaortic valve  gradient equals 20 mm Hg, which is probably normal in the  presence of a prosthetic valve. Mild aortic regurgitation.  3. Moderately dilated left atrium.  LA volume index = 43  cc/m2.  4. Moderate concentric left ventricular hypertrophy.  5. Low normal left ventricular systolic function. No  segmental wall motion abnormalities.  6. Mild diastolic dysfunction (Stage I).  7. Normal right ventricular size and function.  8. Left pleural effusion.  ------------------------------------------------------------------------  Confirmed on  9/1/2022 - 17:18:12 by Cole Riggs M.D.,  [facility], [facility]  ------------------------------------------------------------------------    < end of copied text >      PROBLEM LIST:  87y Female with HEALTH ISSUES - PROBLEM Dx:  Pulmonary embolism    Normocytic anemia    Thrombocytopenia    Transaminitis    Endometrial cancer    HTN (hypertension)    HLD (hyperlipidemia)    CAD (coronary artery disease)    Hypothyroidism    Nutrition, metabolism, and development symptoms    Leukocytosis    Pulmonary thromboembolism           RECS:  on heparin ggt now for the PE  she has effusions b/l and she remains  on 02  before switching to long acting agent would suggest checking her room air saturations and ambulatory saturations  if she remains hypoxic, would suggest IR guided thoracentesis for therapy  prior to subq or PO treatment for PE  nebs PRN  f/u heme onco      Please call with any questions.    Jami Denny DO  [facility] Pulmonary/Sleep Medicine  [phone]

## 2022-09-02 NOTE — DIETITIAN INITIAL EVALUATION ADULT - ADD RECOMMEND
1) Continue DASH/TLC diet   2) Recommend Ensure Enlive 2x daily (provides 350 kcal, 20 gm protein per 8oz serving) to optimize nutrition status and promote wound healing   3) Recommend vitamin C (500mg) 2x daily to promote wound healing  4) Continue MVI   5) Monitor weights, labs, and po intake  6) RD to follow up PRN.

## 2022-09-02 NOTE — DIETITIAN INITIAL EVALUATION ADULT - PERTINENT MEDS FT
MEDICATIONS  (STANDING):  aspirin enteric coated 81 milliGRAM(s) Oral daily  ATENolol  Tablet 50 milliGRAM(s) Oral every 12 hours  atorvastatin 80 milliGRAM(s) Oral at bedtime  cyanocobalamin 1000 MICROGram(s) Oral daily  famotidine    Tablet 40 milliGRAM(s) Oral daily  heparin  Infusion. 700 Unit(s)/Hr (7 mL/Hr) IV Continuous <Continuous>  levothyroxine 75 MICROGram(s) Oral daily  lidocaine   4% Patch 1 Patch Transdermal every 24 hours  melatonin 3 milliGRAM(s) Oral at bedtime  multivitamin 1 Tablet(s) Oral daily  senna 2 Tablet(s) Oral at bedtime    MEDICATIONS  (PRN):  acetaminophen     Tablet .. 650 milliGRAM(s) Oral every 6 hours PRN Temp greater or equal to 38C (100.4F), Mild Pain (1 - 3), Moderate Pain (4 - 6)  oxycodone    5 mG/acetaminophen 325 mG 1 Tablet(s) Oral every 6 hours PRN Severe Pain (7 - 10)  polyethylene glycol 3350 17 Gram(s) Oral daily PRN Constipation

## 2022-09-02 NOTE — DISCHARGE NOTE PROVIDER - NSDCCPCAREPLAN_GEN_ALL_CORE_FT
PRINCIPAL DISCHARGE DIAGNOSIS  Diagnosis: Pulmonary embolism  Assessment and Plan of Treatment:        PRINCIPAL DISCHARGE DIAGNOSIS  Diagnosis: Pulmonary embolism  Assessment and Plan of Treatment: You presented with shortness of breath, cough, CAT scan noted with new right lung pulmonary embolism (a clot in your lung likely provoked in the setting of active malignancy), and with bilateral moderate pleural efussions. You were started on anticoagulation with heparin drip now changed to Lovenox injections, please continue as directed.  You were seen by the lung doctor (pulmonlogist). Lower extremity ultrasound negative for clot. You were also evaluated by the interventional radiologist- no indication for thoracentesis at this time (which is drainage of pleural effusion from lungs). You were treated with IV diuretics now on oral lasix, please continue as prescribec.  You are being discharged home with supplemental oxygen since your oxygen levels dropped while ambualting      SECONDARY DISCHARGE DIAGNOSES  Diagnosis: Leukocytosis  Assessment and Plan of Treatment: You were noted to have elevated white blood cells (WBC), now downtrending. No fevers noted, infectious workup/cultures negative till date.   You chest xray and CAT scan noted with pleural effusions, no pneumonia. You received a course of antibiotics.    Diagnosis: Transaminitis  Assessment and Plan of Treatment: Noted with elevated liver enzymes, stable  Repeat LFTs with your doctor within 1-2 weeks    Diagnosis: Endometrial cancer  Assessment and Plan of Treatment: Stable, you were seen by the oncologist in the hospital  Please followup with your oncologsit at [facility]    Diagnosis: Normocytic anemia  Assessment and Plan of Treatment: Decreased blood counts, stable. Follow-up with your outpatient provider for further monitoring of your blood counts. Monitor for signs/symptoms indicating worsening of disease, such as, easy bleeding/bruising, pale skin, fatigue, dizziness, increased heart rate, or chest pain. Repeat CBC with your primary care doctor - and can pursue outpatient workup of folate/B12/iron    Diagnosis: Thrombocytopenia  Assessment and Plan of Treatment: Noted with low platelet counts likely in the setting of active malignancy and chemotherapy, No active bleeding  Repeat CBC with your primary care doctor    Diagnosis: HTN (hypertension)  Assessment and Plan of Treatment: stable Continue blood pressure medication regimen as directed. Follow a low salt/cholesterol diet. Monitor for any visual changes, headaches or dizziness.  Monitor blood pressure regularly.  Follow up with your primary care provider for further management for high blood pressure.      Diagnosis: CAD (coronary artery disease)  Assessment and Plan of Treatment: continue aspirin     PRINCIPAL DISCHARGE DIAGNOSIS  Diagnosis: Pulmonary embolism  Assessment and Plan of Treatment: You presented with shortness of breath, cough, CAT scan noted with new right lung pulmonary embolism (a clot in your lung likely provoked in the setting of active malignancy), and with bilateral moderate pleural efussions. You were started on anticoagulation with heparin drip now changed to Lovenox injections, please continue as directed.  You were seen by the lung doctor (pulmonlogist). Lower extremity ultrasound negative for clot. You were also evaluated by the interventional radiologist- no indication for thoracentesis at this time (which is drainage of pleural effusion from lungs). You were treated with IV diuretics now on oral lasix, please continue as prescribed.  You are being discharged home with supplemental oxygen since your oxygen levels dropped while ambualting      SECONDARY DISCHARGE DIAGNOSES  Diagnosis: Thrombocytopenia  Assessment and Plan of Treatment: Noted with low platelet counts likely in the setting of active malignancy and chemotherapy, No active bleeding  Repeat CBC with your primary care doctor    Diagnosis: Transaminitis  Assessment and Plan of Treatment: Noted with elevated liver enzymes, stable  Repeat LFTs with your doctor within 1-2 weeks    Diagnosis: Endometrial cancer  Assessment and Plan of Treatment: Stable, you were seen by the oncologist in the hospital  [place] [facility]    Diagnosis: HTN (hypertension)  Assessment and Plan of Treatment: stable Continue blood pressure medication regimen as directed. Follow a low salt/cholesterol diet. Monitor for any visual changes, headaches or dizziness.  Monitor blood pressure regularly.  Follow up with your primary care provider for further management for high blood pressure.      Diagnosis: Leukocytosis  Assessment and Plan of Treatment: You were noted to have elevated white blood cells (WBC), now downtrending. No fevers noted, infectious workup/cultures negative till date.   You chest xray and CAT scan noted with pleural effusions, no pneumonia. You received a course of antibiotics.    Diagnosis: Normocytic anemia  Assessment and Plan of Treatment: Decreased blood counts, stable. Follow-up with your outpatient provider for further monitoring of your blood counts. Monitor for signs/symptoms indicating worsening of disease, such as, easy bleeding/bruising, pale skin, fatigue, dizziness, increased heart rate, or chest pain. Repeat CBC with your primary care doctor - and can pursue outpatient workup of folate/B12/iron    Diagnosis: CAD (coronary artery disease)  Assessment and Plan of Treatment: continue aspirin

## 2022-09-02 NOTE — DISCHARGE NOTE PROVIDER - NSDCMRMEDTOKEN_GEN_ALL_CORE_FT
alendronate 70 mg oral tablet: 1 tab(s) orally once a week  aspirin 81 mg oral delayed release tablet: 1 tab(s) orally once a day  atenolol 50 mg oral tablet: 1 tab(s) orally 2 times a day  ezetimibe 10 mg oral tablet: 1 tab(s) orally once a day  famotidine 40 mg oral tablet: 1 tab(s) orally once a day (at bedtime)  furosemide 20 mg oral tablet: 1 tab(s) orally once a day  levothyroxine 75 mcg (0.075 mg) oral tablet: 1 tab(s) orally once a day  losartan 100 mg oral tablet: 1 tab(s) orally once a day  Lovenox 100 mg/mL injectable solution: 90 milligram(s) subcutaneously once a day   INSURANCE CHECK 35043  One A Day Women&#x27;s Complete oral tablet: 1 tab(s) orally once a day  oxycodone-acetaminophen 5 mg-325 mg oral tablet: 1 tab(s) orally 2 times a day  polyethylene glycol 3350 oral powder for reconstitution: 17 gram(s) orally once a day, As needed, Constipation  rosuvastatin 20 mg oral tablet: 1 tab(s) orally once a day  senna leaf extract oral tablet: 2 tab(s) orally once a day (at bedtime)  Ventolin HFA 90 mcg/inh inhalation aerosol: 2 puff(s) inhaled every 6 hours, As Needed  Vitamin B12: 5000 microgram(s) sublingual once a day  Vitamin D3 50 mcg (2000 intl units) oral tablet: 1 tab(s) orally once a day   acetaminophen 325 mg oral tablet: 2 tab(s) orally every 6 hours, As needed, Temp greater or equal to 38C (100.4F), Mild Pain (1 - 3), Moderate Pain (4 - 6)  alendronate 70 mg oral tablet: 1 tab(s) orally once a week  aspirin 81 mg oral delayed release tablet: 1 tab(s) orally once a day  atenolol 50 mg oral tablet: 1 tab(s) orally 2 times a day  ezetimibe 10 mg oral tablet: 1 tab(s) orally once a day  famotidine 40 mg oral tablet: 1 tab(s) orally once a day (at bedtime)  furosemide 20 mg oral tablet: 1 tab(s) orally once a day  levothyroxine 75 mcg (0.075 mg) oral tablet: 1 tab(s) orally once a day  lidocaine 4% topical film: Apply topically to affected area once a day   Lovenox 100 mg/mL injectable solution: 90 milligram(s) subcutaneously once a day     melatonin 3 mg oral tablet: 1 tab(s) orally once a day (at bedtime)  One A Day Women&#x27;s Complete oral tablet: 1 tab(s) orally once a day  oxycodone-acetaminophen 5 mg-325 mg oral tablet: 1 tab(s) orally 2 times a day  polyethylene glycol 3350 oral powder for reconstitution: 17 gram(s) orally once a day, As needed, Constipation  rosuvastatin 20 mg oral tablet: 1 tab(s) orally once a day  senna leaf extract oral tablet: 2 tab(s) orally once a day (at bedtime)  Ventolin HFA 90 mcg/inh inhalation aerosol: 2 puff(s) inhaled every 6 hours, As Needed  Vitamin B12: 5000 microgram(s) sublingual once a day  Vitamin D3 50 mcg (2000 intl units) oral tablet: 1 tab(s) orally once a day

## 2022-09-02 NOTE — DISCHARGE NOTE PROVIDER - PROVIDER TOKENS
PROVIDER:[TOKEN:[[id number]],FOLLOWUP:[1 week],ESTABLISHEDPATIENT:[T]],PROVIDER:[TOKEN:[[id number]],FOLLOWUP:[2 weeks]]

## 2022-09-02 NOTE — DISCHARGE NOTE PROVIDER - CARE PROVIDERS DIRECT ADDRESSES
,DirectAddress_Unknown,[email or web address]

## 2022-09-02 NOTE — DIETITIAN INITIAL EVALUATION ADULT - OTHER INFO
Pt is an 86 yo F with PMH stage 4 serous [place] [place] HTN, CAD (s/p PCI 2021), AV replacement, hypothyroidism, and HLD who p/w R sided chest pain, cough, mild SOB, weakness, and difficulty ambulating x2d. Found to have RUL segmental PE, on heparin gtt, admitted to medicine for further observation and management. Pulmonology and heme/onc following.     Pt A&O x4. Reports her usual body weight is 140lbs - denies any recent weight changes. Admission wt 139.3lbs (63.2kg). Weight history as per francine PONCE, 63.5kg/139.7lbs (8/9). States she loves fruits and often eats them for breakfast with oatmeal. Her son usually cooks her dinner which includes a starch, protein, and vegetable. She does not like to eat most protein foods except tuna and occasionally chicken, fish, and turkey but she knows she needs to include more protein in her diet. RD reinforced importance of protein intake for wound healing and to optimize nutrition status. Pt takes a MVI and vitamin B12 at home. NKFA.    As per RN flowsheet, consuming % PO. Pt states she is eating her meals, but not finishing them. She was thrilled she got cottage cheese and a banana for breakfast this morning. No complaints of N/V/C. Reports having diarrhea this morning - attributes to bowel regimen. Pt agreeable to try ensure - requests only chocolate. Preferences updated. Reports no issues chewing or swallowing.

## 2022-09-03 NOTE — PROGRESS NOTE ADULT - SUBJECTIVE AND OBJECTIVE BOX
PULMONARY PROGRESS NOTE    FRIDA WELCH  MRN-[id number]    Patient is a 87y old  Female who presents with a chief complaint of PE (03 Sep 2022 13:07)      HPI:  -on room air  breathing ok    ROS:   -    ACTIVE MEDICATION LIST:  MEDICATIONS  (STANDING):  aspirin enteric coated 81 milliGRAM(s) Oral daily  ATENolol  Tablet 50 milliGRAM(s) Oral every 12 hours  atorvastatin 80 milliGRAM(s) Oral at bedtime  cyanocobalamin 1000 MICROGram(s) Oral daily  enoxaparin Injectable 90 milliGRAM(s) SubCutaneous every 24 hours  famotidine    Tablet 40 milliGRAM(s) Oral daily  levothyroxine 75 MICROGram(s) Oral daily  lidocaine   4% Patch 1 Patch Transdermal every 24 hours  melatonin 3 milliGRAM(s) Oral at bedtime  multivitamin 1 Tablet(s) Oral daily  senna 2 Tablet(s) Oral at bedtime    MEDICATIONS  (PRN):  acetaminophen     Tablet .. 650 milliGRAM(s) Oral every 6 hours PRN Temp greater or equal to 38C (100.4F), Mild Pain (1 - 3), Moderate Pain (4 - 6)  oxycodone    5 mG/acetaminophen 325 mG 1 Tablet(s) Oral every 6 hours PRN Severe Pain (7 - 10)  polyethylene glycol 3350 17 Gram(s) Oral daily PRN Constipation      EXAM:  Vital Signs Last 24 Hrs  T(C): 36.8 (03 Sep 2022 15:45), Max: 36.9 (03 Sep 2022 12:13)  T(F): 98.2 (03 Sep 2022 15:45), Max: 98.4 (03 Sep 2022 12:13)  HR: 82 (03 Sep 2022 15:55) (82 - 98)  BP: 138/61 (03 Sep 2022 15:55) (110/55 - 140/65)  BP(mean): --  RR: 18 (03 Sep 2022 15:55) (18 - 18)  SpO2: 98% (03 Sep 2022 15:55) (96% - 100%)    Parameters below as of 03 Sep 2022 15:55  Patient On (Oxygen Delivery Method): room air        GENERAL: The patient is awake and alert in no apparent distress.     LUNGS: not labored  decrease at bases                           9.7    22.11 )-----------( 65       ( 03 Sep 2022 04:41 )             30.4       09-03    139  |  102  |  8   ----------------------------<  86  4.7   |  28  |  0.51    Ca    8.6      03 Sep 2022 04:41  Phos  2.3     09-03  Mg     1.50     09-03    TPro  6.0  /  Alb  3.4  /  TBili  0.3  /  DBili  <0.2  /  AST  22  /  ALT  14  /  AlkPhos  167<H>  09-03     < from: US Duplex Venous Lower Ext Complete, Bilateral (08.31.22 @ 16:33) >    ACC: [id number] EXAM:  US DPLX LWR EXT VEINS COMPL BI                          PROCEDURE DATE:  08/31/2022          INTERPRETATION:  CLINICAL INFORMATION: Patient pulmonary embolism and   left calf pain    COMPARISON: None available.    TECHNIQUE: Duplex sonography of the BILATERAL LOWER extremity veins with   color and spectral Doppler, with and without compression.    FINDINGS:    RIGHT:  Normal compressibility of the RIGHT common femoral, femoral and popliteal   veins.  Doppler examination shows normal spontaneous and phasic flow.  No RIGHT calf vein thrombosis is detected.    LEFT:  Normal compressibility of the LEFT common femoral, femoral and popliteal   veins.  Doppler examination shows normal spontaneous and phasic flow.  No LEFT calfvein thrombosis is detected.    IMPRESSION:  No evidence of deep venous thrombosis in either lower extremity.    --- End of Report ---            HOMERO MONSON MD; Attending Radiologist  This document has been electronically signed. Aug 31 2022  4:55PM    < end of copied text >  < from: CT Angio Chest PE Protocol w/ IV Cont (08.31.22 @ 02:37) >    ACC: [id number] EXAM:  CT ANGIO CHEST PULM ART [facility]                          PROCEDURE DATE:  08/31/2022          INTERPRETATION:  CLINICAL INFORMATION: Elevated d-dimer.    COMPARISON: None.    CONTRAST/COMPLICATIONS:  IV Contrast: Omnipaque 350  70 cc administered   0 cc discarded  Oral Contrast: NONE  Complications: None reported at time of study completion    PROCEDURE:  CT Angiogram of the chest was obtained with intravenous contrast. Three   dimensional maximum intensity projection (MIP) images were generated.    FINDINGS:    Motion degraded study.    PULMONARY ANGIOGRAM: Right upper lobe segmental pulmonary embolism.    LYMPH NODES/MEDIASTINUM: No lymphadenopathy.    HEART/VASCULATURE: Cardiomegaly.  TAVR. No CT evidence of right heart   strain. No pericardial effusion.    AIRWAYS/LUNGS/PLEURA: Patent central airways. Moderate bilateral pleural   effusions with adjacent lower lobe passive atelectasis.    UPPER ABDOMEN: Perihepatic ascites. Cholelithiasis.    BONES/SOFT TISSUES: Right chest wall port with tip at the superior   cavoatrial junction. Degenerative changes of the spine.    IMPRESSION:    Right upper lobe segmental pulmonary embolism. No CT evidence of right   heart strain.    Moderate bilateral pleural effusions.    Cardiomegaly.    Perihepatic ascites of uncertain etiology.    Findings were discussed with Dr. Ordonez at 8/31/2022 2:51 AM by Dr. Hammer with read back confirmation.    --- End of Report ---          WES HAMMER MD; Resident Radiology  This document has been electronically signed.  GENARO DIAZ MD; Attending Radiologist    < end of copied text >  < from: Transthoracic Echocardiogram (09.01.22 @ 16:05) >  ------------------------------------------------------------------------  CONCLUSIONS:  1. Mitral annular calcification, otherwise normal mitral  valve. Mild mitral regurgitation.  2. Transcatheter aortic valve replacement. Peak transaortic  valve gradient equals 34 mm Hg, mean transaortic valve  gradient equals 20 mm Hg, which is probably normal in the  presence of a prosthetic valve. Mild aortic regurgitation.  3. Moderately dilated left atrium.  LA volume index = 43  cc/m2.  4. Moderate concentric left ventricular hypertrophy.  5. Low normal left ventricular systolic function. No  segmental wall motion abnormalities.  6. Mild diastolic dysfunction (Stage I).  7. Normal right ventricular size and function.  8. Left pleural effusion.  ------------------------------------------------------------------------  Confirmed on  9/1/2022 - 17:18:12 by Cole Riggs M.D.,  [facility], [facility]  ------------------------------------------------------------------------    < end of copied text >      PROBLEM LIST:  87y Female with HEALTH ISSUES - PROBLEM Dx:  Pulmonary embolism    Normocytic anemia    Thrombocytopenia    Transaminitis    Endometrial cancer    HTN (hypertension)    HLD (hyperlipidemia)    CAD (coronary artery disease)    Hypothyroidism    Nutrition, metabolism, and development symptoms    Leukocytosis    Pulmonary thromboembolism              RECS:  lovenox for PE  dopplers negative  IR note appreciated  diuresis  close f/u with heme onco re: lovenox/cbc  can repeat xray once cardio find her euvolemic        Please call with any questions.    Jami Denny DO  [facility] Pulmonary/Sleep Medicine  [phone]

## 2022-09-03 NOTE — PROGRESS NOTE ADULT - SUBJECTIVE AND OBJECTIVE BOX
FRIDA WELCH  87y  Female      Patient is a 87y old  Female who presents with a chief complaint of PE (03 Sep 2022 22:39)  comfortable,no sob,.nad    REVIEW OF SYSTEMS:  CONSTITUTIONAL: No fever  RESPIRATORY: No cough, hemoptysis or shortness of breath  CARDIOVASCULAR: No chest pain, palpitations, dizziness, or leg swelling  GASTROINTESTINAL: No abdominal pain. nausea, vomiting, hematemesis  INTERVAL HPI/OVERNIGHT EVENTS:  T(C): 36.4 (09-03-22 @ 20:56), Max: 36.9 (09-03-22 @ 12:13)  HR: 84 (09-03-22 @ 20:56) (82 - 93)  BP: 118/62 (09-03-22 @ 20:56) (110/55 - 140/65)  RR: 18 (09-03-22 @ 20:56) (18 - 18)  SpO2: 97% (09-03-22 @ 20:56) (97% - 100%)  Wt(kg): --  I&O's Summary    T(C): 36.4 (09-03-22 @ 20:56), Max: 36.9 (09-03-22 @ 12:13)  HR: 84 (09-03-22 @ 20:56) (82 - 93)  BP: 118/62 (09-03-22 @ 20:56) (110/55 - 140/65)  RR: 18 (09-03-22 @ 20:56) (18 - 18)  SpO2: 97% (09-03-22 @ 20:56) (97% - 100%)  Wt(kg): --Vital Signs Last 24 Hrs  T(C): 36.4 (03 Sep 2022 20:56), Max: 36.9 (03 Sep 2022 12:13)  T(F): 97.5 (03 Sep 2022 20:56), Max: 98.4 (03 Sep 2022 12:13)  HR: 84 (03 Sep 2022 20:56) (82 - 93)  BP: 118/62 (03 Sep 2022 20:56) (110/55 - 140/65)  BP(mean): --  RR: 18 (03 Sep 2022 20:56) (18 - 18)  SpO2: 97% (03 Sep 2022 20:56) (97% - 100%)    Parameters below as of 03 Sep 2022 20:56  Patient On (Oxygen Delivery Method): room air        LABS:                        9.7    22.11 )-----------( 65       ( 03 Sep 2022 04:41 )             30.4     09-03    139  |  102  |  8   ----------------------------<  86  4.7   |  28  |  0.51    Ca    8.6      03 Sep 2022 04:41  Phos  2.3     09-03  Mg     1.50     09-03    TPro  6.0  /  Alb  3.4  /  TBili  0.3  /  DBili  <0.2  /  AST  22  /  ALT  14  /  AlkPhos  167<H>  09-03    PTT - ( 02 Sep 2022 06:05 )  PTT:88.6 sec    CAPILLARY BLOOD GLUCOSE                PAST MEDICAL & SURGICAL HISTORY:  Endometrial ca  S4 serous endometrial carcinoma, [facility] [place], chemotherapy      HTN (hypertension)      CAD (coronary artery disease)      S/P AVR      Hypothyroidism      HLD (hyperlipidemia)      S/P AVR (aortic valve replacement)      History of endometrial biopsy          MEDICATIONS  (STANDING):  aspirin enteric coated 81 milliGRAM(s) Oral daily  ATENolol  Tablet 50 milliGRAM(s) Oral every 12 hours  atorvastatin 80 milliGRAM(s) Oral at bedtime  cyanocobalamin 1000 MICROGram(s) Oral daily  enoxaparin Injectable 90 milliGRAM(s) SubCutaneous every 24 hours  famotidine    Tablet 40 milliGRAM(s) Oral daily  levothyroxine 75 MICROGram(s) Oral daily  lidocaine   4% Patch 1 Patch Transdermal every 24 hours  melatonin 3 milliGRAM(s) Oral at bedtime  multivitamin 1 Tablet(s) Oral daily  senna 2 Tablet(s) Oral at bedtime    MEDICATIONS  (PRN):  acetaminophen     Tablet .. 650 milliGRAM(s) Oral every 6 hours PRN Temp greater or equal to 38C (100.4F), Mild Pain (1 - 3), Moderate Pain (4 - 6)  oxycodone    5 mG/acetaminophen 325 mG 1 Tablet(s) Oral every 6 hours PRN Severe Pain (7 - 10)  polyethylene glycol 3350 17 Gram(s) Oral daily PRN Constipation        RADIOLOGY & ADDITIONAL TESTS:    Imaging Personally Reviewed:  [ ] YES  [ ] NO    Consultant(s) Notes Reviewed:  [x ] YES  [ ] NO    PHYSICAL EXAM:  GENERAL: Alert and awake lying in bed in no distress  HEAD:  Atraumatic, Normocephalic  EYES: EOMI, JENNIE, conjunctiva and sclera clear  NECK: Supple, No JVD, Normal thyroid  NERVOUS SYSTEM:  Alert & Oriented X2, Motor and sensory systems are intact,   CHEST/LUNG: Bilateral clear breath sounds, no rhochi, no wheezing, no crepitations,  HEART: Regular rate and rhythm; No murmurs, rubs, or gallops  ABDOMEN: Soft, Nontender, Nondistended; Bowel sounds present  EXTREMITIES:   Peripheral Pulses are palpable, no  edema        Care Discussed with Consultants/Other Providers [x ] YES  [ ] NO      Code Status: [] Full Code [] DNR [] DNI [] Goals of Care:   Disposition: [] ICU [] Stroke Unit [] RCU []PCU []Floor [] Discharge Home         ROMA [email or web address]

## 2022-09-03 NOTE — CHART NOTE - NSCHARTNOTEFT_GEN_A_CORE
Vital Signs Last 24 Hrs  T(C): 36.7 (03 Sep 2022 07:46), Max: 36.7 (03 Sep 2022 07:46)  T(F): 98 (03 Sep 2022 07:46), Max: 98 (03 Sep 2022 07:46)  HR: 85 (03 Sep 2022 07:46) (83 - 98)  BP: 135/62 (03 Sep 2022 07:46) (131/69 - 139/64)  BP(mean): --  RR: 18 (03 Sep 2022 07:46) (18 - 18)  SpO2: 98% (03 Sep 2022 07:46) (96% - 98%)    Parameters below as of 03 Sep 2022 07:46  Patient On (Oxygen Delivery Method): room air                          9.7    22.11 )-----------( 65       ( 03 Sep 2022 04:41 )             30.4   09-03    139  |  102  |  8   ----------------------------<  86  4.7   |  28  |  0.51    Ca    8.6      03 Sep 2022 04:41  Phos  2.3     09-03  Mg     1.50     09-03    TPro  6.0  /  Alb  3.4  /  TBili  0.3  /  DBili  <0.2  /  AST\  22  /  ALT  14  /  AlkPhos  167<H>  09-03    Results and case discussed with Dr. Olivier, patient on RA but appears dyspneic, IV lasix 20mg x1 ordered as discussed with attending, and to resume home PO lasix. Patient with episode of emesis after eating breakfast, Zofran x1 given. RN informed to perform lovenox teaching and return demonstration with patient and family, Lovenox covered at [employer] $1.35copay. Will closely continue to monitor

## 2022-09-03 NOTE — PROGRESS NOTE ADULT - SUBJECTIVE AND OBJECTIVE BOX
PATIENT SEEN AND EXAMINED ON :- 9/3/22  DATE OF SERVICE:     9/3/22        Interim events noted,Labs ,Radiological studies and Cardiology tests reviewed .       HOSPITAL COURSE: HPI:  Pt is an 88 yo F with PMH S4 serous endometrial carcinoma ([facility] [place], chest wall port, chemo), HTN, CAD (s/p PCI 2021), AV replacement, hypothyroidism, and HLD who p/w R sided chest pain, cough, mild SOB, weakness, and difficulty ambulating x2d. [place] [facility] [place] showed LLL consolidation and recommended ER evaluation. Pt states she had been doing "okay" since last discharge and had been working with home PT but started to feel unwell again in the last week. Daughter has been sick with a cough but has not been evaluated. Then pt began developing symptoms and thought she may have gotten sick as well. Generally has had decreased appetite and decreased PO intake. Denies HA, lightheadedness, dizziness, abdominal pain, changes to BMs or urination, pain in extremities. Last took meds at home 8/30 AM. Has been ambulating with a cane and has not had too much difficulty but has noticed increased weakness. No other concerns or complaints. Feels better currently than when she first came to the ER.     On arrival, T 98, HR 85, /62, RR 18 O2sat 98% RA. EKG with . Labs with WBC 59.87, 98% neutrophils, 1.7% bands, Hb 10.9, MCV 89, plt 90, d-dimer 2314, lactate neg, , AST 40, trop 27, BNP 1514, UA neg, RVP neg, BCx sent. CXR with hazy bibasilar opacities likely pleural effusions and atelectasis. CT PE with RUL segmental PE without evidence of R heart strain; moderate BL pleural effusions, and perihepatic ascites of unclear etiology. Pt given heparin 5000 bolus -- gtt, tylenol, cefepime 2g, vanc 1g, and 1L NS. Admitted to medicine for further observation and management.  (31 Aug 2022 11:09)      INTERIM EVENTS:Patient seen at bedside ,interim events noted.      PMH -reviewed admission note, no change since admission  HEART FAILURE: Acute[ ]Chronic[ ] Systolic[ ] Diastolic[ ] Combined Systolic and Diastolic[ ]  CAD[ ] CABG[ ] PCI[ ]  DEVICES[ ] PPM[ ] ICD[ ] ILR[ ]  ATRIAL FIBRILLATION[ ] Paroxysmal[ ] Permanent[ ] CHADS2-[  ]  [place][ ] CKD1[ ] CKD2[ ] CKD3[ ] CKD4[ ] ESRD[ ]  COPD[ ] HTN[ ]   DM[ ] Type1[ ] Type 2[ ]   CVA[ ] Paresis[ ]    AMBULATION: Assisted[ ] Cane/walker[ ] Independent[ ]    MEDICATIONS  (STANDING):  aspirin enteric coated 81 milliGRAM(s) Oral daily  ATENolol  Tablet 50 milliGRAM(s) Oral every 12 hours  atorvastatin 80 milliGRAM(s) Oral at bedtime  cyanocobalamin 1000 MICROGram(s) Oral daily  enoxaparin Injectable 90 milliGRAM(s) SubCutaneous every 24 hours  famotidine    Tablet 40 milliGRAM(s) Oral daily  levothyroxine 75 MICROGram(s) Oral daily  lidocaine   4% Patch 1 Patch Transdermal every 24 hours  melatonin 3 milliGRAM(s) Oral at bedtime  multivitamin 1 Tablet(s) Oral daily  senna 2 Tablet(s) Oral at bedtime    MEDICATIONS  (PRN):  acetaminophen     Tablet .. 650 milliGRAM(s) Oral every 6 hours PRN Temp greater or equal to 38C (100.4F), Mild Pain (1 - 3), Moderate Pain (4 - 6)  oxycodone    5 mG/acetaminophen 325 mG 1 Tablet(s) Oral every 6 hours PRN Severe Pain (7 - 10)  polyethylene glycol 3350 17 Gram(s) Oral daily PRN Constipation            REVIEW OF SYSTEMS:  Constitutional: [ ] fever, [ ]weight loss,  [ ]fatigue [ ]weight gain  Eyes: [ ] visual changes  Respiratory: [ ]shortness of breath;  [ ] cough, [ ]wheezing, [ ]chills, [ ]hemoptysis  Cardiovascular: [ ] chest pain, [ ]palpitations, [ ]dizziness,  [ ]leg swelling[ ]orthopnea[ ]PND  Gastrointestinal: [ ] abdominal pain, [ ]nausea, [ ]vomiting,  [ ]diarrhea [ ]Constipation [ ]Melena  Genitourinary: [ ] dysuria, [ ] hematuria [ ]Birch  Neurologic: [ ] headaches [ ] tremors[ ]weakness [ ]Paralysis Right[ ] Left[ ]  Skin: [ ] itching, [ ]burning, [ ] rashes  Endocrine: [ ] heat or cold intolerance  Musculoskeletal: [ ] joint pain or swelling; [ ] muscle, back, or extremity pain  Psychiatric: [ ] depression, [ ]anxiety, [ ]mood swings, or [ ]difficulty sleeping  Hematologic: [ ] easy bruising, [ ] bleeding gums    [ ] All remaining systems negative except as per above.   [ ]Unable to obtain.  [x] No change in ROS since admission      Vital Signs Last 24 Hrs  T(C): 36.4 (03 Sep 2022 20:56), Max: 36.9 (03 Sep 2022 12:13)  T(F): 97.5 (03 Sep 2022 20:56), Max: 98.4 (03 Sep 2022 12:13)  HR: 84 (03 Sep 2022 20:56) (82 - 93)  BP: 118/62 (03 Sep 2022 20:56) (110/55 - 140/65)  BP(mean): --  RR: 18 (03 Sep 2022 20:56) (18 - 18)  SpO2: 97% (03 Sep 2022 20:56) (97% - 100%)    Parameters below as of 03 Sep 2022 20:56  Patient On (Oxygen Delivery Method): room air      I&O's Summary      PHYSICAL EXAM:  General: No acute distress BMI-  HEENT: EOMI, PERRL  Neck: Supple, [ ] JVD  Lungs: Equal air entry bilaterally; [ ] rales [ ] wheezing [ ] rhonchi  Heart: Regular rate and rhythm; [x ] murmur   2/6 [ x] systolic [ ] diastolic [ ] radiation[ ] rubs [ ]  gallops  Abdomen: Nontender, bowel sounds present  Extremities: No clubbing, cyanosis, [ ] edema [ ]Pulses  equal and intact  Nervous system:  Alert & Oriented X3, no focal deficits  Psychiatric: Normal affect  Skin: No rashes or lesions    LABS:  09-03    139  |  102  |  8   ----------------------------<  86  4.7   |  28  |  0.51    Ca    8.6      03 Sep 2022 04:41  Phos  2.3     09-03  Mg     1.50     09-03    TPro  6.0  /  Alb  3.4  /  TBili  0.3  /  DBili  <0.2  /  AST  22  /  ALT  14  /  AlkPhos  167<H>  09-03    Creatinine Trend: 0.51<--, 0.53<--, 0.43<--, 0.37<--, 0.43<--, 0.68<--                        9.7    22.11 )-----------( 65       ( 03 Sep 2022 04:41 )             30.4     PTT - ( 02 Sep 2022 06:05 )  PTT:88.6 sec

## 2022-09-03 NOTE — PROGRESS NOTE ADULT - SUBJECTIVE AND OBJECTIVE BOX
Patient seen, states she ate dinner last night and since then has had GI symptoms with nausea    MEDICATIONS  (STANDING):  aspirin enteric coated 81 milliGRAM(s) Oral daily  ATENolol  Tablet 50 milliGRAM(s) Oral every 12 hours  atorvastatin 80 milliGRAM(s) Oral at bedtime  cyanocobalamin 1000 MICROGram(s) Oral daily  enoxaparin Injectable 90 milliGRAM(s) SubCutaneous every 24 hours  famotidine    Tablet 40 milliGRAM(s) Oral daily  furosemide   Injectable 20 milliGRAM(s) IV Push once  levothyroxine 75 MICROGram(s) Oral daily  lidocaine   4% Patch 1 Patch Transdermal every 24 hours  melatonin 3 milliGRAM(s) Oral at bedtime  multivitamin 1 Tablet(s) Oral daily  senna 2 Tablet(s) Oral at bedtime    MEDICATIONS  (PRN):  acetaminophen     Tablet .. 650 milliGRAM(s) Oral every 6 hours PRN Temp greater or equal to 38C (100.4F), Mild Pain (1 - 3), Moderate Pain (4 - 6)  oxycodone    5 mG/acetaminophen 325 mG 1 Tablet(s) Oral every 6 hours PRN Severe Pain (7 - 10)  polyethylene glycol 3350 17 Gram(s) Oral daily PRN Constipation      ROS  No fever, sweats, chills  No epistaxis, HA, sore throat  No CP, SOB, cough, sputum  No n/v/d, abd pain, melena, hematochezia  No edema  No rash  No anxiety  No back pain, joint pain  No bleeding, bruising  No dysuria, hematuria    Vital Signs Last 24 Hrs  T(C): 36.7 (03 Sep 2022 07:46), Max: 36.7 (02 Sep 2022 13:30)  T(F): 98 (03 Sep 2022 07:46), Max: 98 (02 Sep 2022 13:30)  HR: 85 (03 Sep 2022 07:46) (80 - 98)  BP: 135/62 (03 Sep 2022 07:46) (112/60 - 139/64)  BP(mean): --  RR: 18 (03 Sep 2022 07:46) (18 - 19)  SpO2: 98% (03 Sep 2022 07:46) (96% - 98%)    Parameters below as of 03 Sep 2022 07:46  Patient On (Oxygen Delivery Method): room air        PE  NAD  Awake, alert  Anicteric, MMM  RRR  CTAB  Abd soft, NT, ND  No c/c/e  No rash grossly  FROM                          9.7    22.11 )-----------( 65       ( 03 Sep 2022 04:41 )             30.4       09-03    139  |  102  |  8   ----------------------------<  86  4.7   |  28  |  0.51    Ca    8.6      03 Sep 2022 04:41  Phos  2.3     09-03  Mg     1.50     09-03    TPro  6.0  /  Alb  3.4  /  TBili  0.3  /  DBili  <0.2  /  AST  22  /  ALT  14  /  AlkPhos  167<H>  09-03

## 2022-09-04 NOTE — CHART NOTE - NSCHARTNOTEFT_GEN_A_CORE
Chest X-ray results of increasing bilateral pleural effusions reviewed with . Writer evaluated patient at bedside. Patient resting in bed comfortably sating well on RA. No complaints of SOB at this time. Lungs clear to auscultation however diminished at L. lower lung base minimally. IR consult order placed for evaluation for thoracentesis. Of note patient previously evaluated by IR on 9/2. Will continue with Lasix 40 mg PO QD for now.

## 2022-09-04 NOTE — PROGRESS NOTE ADULT - SUBJECTIVE AND OBJECTIVE BOX
PATIENT SEEN AND EXAMINED ON :- 9/4/22  DATE OF SERVICE:   9/4/22          Interim events noted,Labs ,Radiological studies and Cardiology tests reviewed .       HOSPITAL COURSE: HPI:  Pt is an 86 yo F with PMH S4 serous endometrial carcinoma ([facility] [place], chest wall port, chemo), HTN, CAD (s/p PCI 2021), AV replacement, hypothyroidism, and HLD who p/w R sided chest pain, cough, mild SOB, weakness, and difficulty ambulating x2d. Went to [facility] for evaluation where CXR showed LLL consolidation and recommended ER evaluation. Pt states she had been doing "okay" since last discharge and had been working with home PT but started to feel unwell again in the last week. Daughter has been sick with a cough but has not been evaluated. Then pt began developing symptoms and thought she may have gotten sick as well. Generally has had decreased appetite and decreased PO intake. Denies HA, lightheadedness, dizziness, abdominal pain, changes to BMs or urination, pain in extremities. Last took meds at home 8/30 AM. Has been ambulating with a cane and has not had too much difficulty but has noticed increased weakness. No other concerns or complaints. Feels better currently than when she first came to the ER.     On arrival, T 98, HR 85, /62, RR 18 O2sat 98% RA. EKG with . Labs with WBC 59.87, 98% neutrophils, 1.7% bands, Hb 10.9, MCV 89, plt 90, d-dimer 2314, lactate neg, , AST 40, trop 27, BNP 1514, UA neg, RVP neg, BCx sent. CXR with hazy bibasilar opacities likely pleural effusions and atelectasis. CT PE with RUL segmental PE without evidence of R heart strain; moderate BL pleural effusions, and perihepatic ascites of unclear etiology. Pt given heparin 5000 bolus -- gtt, tylenol, cefepime 2g, vanc 1g, and 1L NS. Admitted to medicine for further observation and management.  (31 Aug 2022 11:09)      INTERIM EVENTS:Patient seen at bedside ,interim events noted.      PMH -reviewed admission note, no change since admission  HEART FAILURE: Acute[ ]Chronic[ ] Systolic[ ] Diastolic[ ] Combined Systolic and Diastolic[ ]  CAD[ ] CABG[ ] PCI[ ]  DEVICES[ ] PPM[ ] ICD[ ] ILR[ ]  ATRIAL FIBRILLATION[ ] Paroxysmal[ ] Permanent[ ] CHADS2-[  ]  [place][ ] CKD1[ ] CKD2[ ] CKD3[ ] CKD4[ ] ESRD[ ]  COPD[ ] HTN[ ]   DM[ ] Type1[ ] Type 2[ ]   CVA[ ] Paresis[ ]    AMBULATION: Assisted[ ] Cane/walker[ ] Independent[ ]    MEDICATIONS  (STANDING):  aspirin enteric coated 81 milliGRAM(s) Oral daily  ATENolol  Tablet 50 milliGRAM(s) Oral every 12 hours  atorvastatin 80 milliGRAM(s) Oral at bedtime  cyanocobalamin 1000 MICROGram(s) Oral daily  enoxaparin Injectable 90 milliGRAM(s) SubCutaneous every 24 hours  famotidine    Tablet 40 milliGRAM(s) Oral daily  furosemide    Tablet 20 milliGRAM(s) Oral daily  levothyroxine 75 MICROGram(s) Oral daily  lidocaine   4% Patch 1 Patch Transdermal every 24 hours  melatonin 3 milliGRAM(s) Oral at bedtime  multivitamin 1 Tablet(s) Oral daily  senna 2 Tablet(s) Oral at bedtime    MEDICATIONS  (PRN):  acetaminophen     Tablet .. 650 milliGRAM(s) Oral every 6 hours PRN Temp greater or equal to 38C (100.4F), Mild Pain (1 - 3), Moderate Pain (4 - 6)  oxycodone    5 mG/acetaminophen 325 mG 1 Tablet(s) Oral every 6 hours PRN Severe Pain (7 - 10)  polyethylene glycol 3350 17 Gram(s) Oral daily PRN Constipation            REVIEW OF SYSTEMS:  Constitutional: [ ] fever, [ ]weight loss,  [ ]fatigue [ ]weight gain  Eyes: [ ] visual changes  Respiratory: [ ]shortness of breath;  [ ] cough, [ ]wheezing, [ ]chills, [ ]hemoptysis  Cardiovascular: [ ] chest pain, [ ]palpitations, [ ]dizziness,  [ ]leg swelling[ ]orthopnea[ ]PND  Gastrointestinal: [ ] abdominal pain, [ ]nausea, [ ]vomiting,  [ ]diarrhea [ ]Constipation [ ]Melena  Genitourinary: [ ] dysuria, [ ] hematuria [ ]Birch  Neurologic: [ ] headaches [ ] tremors[ ]weakness [ ]Paralysis Right[ ] Left[ ]  Skin: [ ] itching, [ ]burning, [ ] rashes  Endocrine: [ ] heat or cold intolerance  Musculoskeletal: [ ] joint pain or swelling; [ ] muscle, back, or extremity pain  Psychiatric: [ ] depression, [ ]anxiety, [ ]mood swings, or [ ]difficulty sleeping  Hematologic: [ ] easy bruising, [ ] bleeding gums    [ ] All remaining systems negative except as per above.   [ ]Unable to obtain.  [x] No change in ROS since admission      Vital Signs Last 24 Hrs  T(C): 36.8 (04 Sep 2022 16:30), Max: 36.8 (04 Sep 2022 16:30)  T(F): 98.3 (04 Sep 2022 16:30), Max: 98.3 (04 Sep 2022 16:30)  HR: 78 (04 Sep 2022 16:30) (76 - 80)  BP: 120/64 (04 Sep 2022 16:30) (110/62 - 120/64)  BP(mean): --  RR: 18 (04 Sep 2022 16:30) (18 - 18)  SpO2: 98% (04 Sep 2022 16:30) (98% - 98%)    Parameters below as of 04 Sep 2022 16:30  Patient On (Oxygen Delivery Method): room air      I&O's Summary      PHYSICAL EXAM:  General: No acute distress BMI-  HEENT: EOMI, PERRL  Neck: Supple, [ ] JVD  Lungs: Equal air entry bilaterally; [ ] rales [ ] wheezing [ ] rhonchi  Heart: Regular rate and rhythm; [x ] murmur   2/6 [ x] systolic [ ] diastolic [ ] radiation[ ] rubs [ ]  gallops  Abdomen: Nontender, bowel sounds present  Extremities: No clubbing, cyanosis, [ ] edema [ ]Pulses  equal and intact  Nervous system:  Alert & Oriented X3, no focal deficits  Psychiatric: Normal affect  Skin: No rashes or lesions    LABS:  09-04    137  |  99  |  5<L>  ----------------------------<  82  4.1   |  28  |  0.55    Ca    8.3<L>      04 Sep 2022 04:40  Phos  2.8     09-04  Mg     1.60     09-04    TPro  5.8<L>  /  Alb  2.9<L>  /  TBili  0.2  /  DBili  <0.2  /  AST  20  /  ALT  18  /  AlkPhos  195<H>  09-04    Creatinine Trend: 0.55<--, 0.51<--, 0.53<--, 0.43<--, 0.37<--, 0.43<--                        9.8    38.26 )-----------( 90       ( 04 Sep 2022 04:40 )             29.3

## 2022-09-04 NOTE — PROGRESS NOTE ADULT - SUBJECTIVE AND OBJECTIVE BOX
FRIDA WELCH  87y  Female      Patient is a 87y old  Female who presents with a chief complaint of PE (04 Sep 2022 17:20)  feels better.no sob,no cp,no fever,no cough    REVIEW OF SYSTEMS:  CONSTITUTIONAL: No fever  RESPIRATORY: No cough, hemoptysis or shortness of breath  CARDIOVASCULAR: No chest pain, palpitations, dizziness, or leg swelling  GASTROINTESTINAL: No abdominal pain. nausea, vomiting, hematemesis  GENITOURINARY: No dysuria, frequency, hematuria   NEUROLOGICAL: No headaches, no dizziness  MUSCULOSKELETAL: No joint pain or swelling;     INTERVAL HPI/OVERNIGHT EVENTS:  T(C): 36.8 (22 @ 16:30), Max: 36.8 (22 @ 16:30)  HR: 78 (22 @ 16:30) (76 - 84)  BP: 120/64 (22 @ 16:30) (110/62 - 120/64)  RR: 18 (22 @ 16:30) (18 - 18)  SpO2: 98% (22 @ 16:30) (97% - 98%)  Wt(kg): --  I&O's Summary    T(C): 36.8 (22 @ 16:30), Max: 36.8 (22 @ 16:30)  HR: 78 (22 @ 16:30) (76 - 84)  BP: 120/64 (22 @ 16:30) (110/62 - 120/64)  RR: 18 (22 @ 16:30) (18 - 18)  SpO2: 98% (22 @ 16:30) (97% - 98%)  Wt(kg): --Vital Signs Last 24 Hrs  T(C): 36.8 (04 Sep 2022 16:30), Max: 36.8 (04 Sep 2022 16:30)  T(F): 98.3 (04 Sep 2022 16:30), Max: 98.3 (04 Sep 2022 16:30)  HR: 78 (04 Sep 2022 16:30) (76 - 84)  BP: 120/64 (04 Sep 2022 16:30) (110/62 - 120/64)  BP(mean): --  RR: 18 (04 Sep 2022 16:30) (18 - 18)  SpO2: 98% (04 Sep 2022 16:30) (97% - 98%)    Parameters below as of 04 Sep 2022 16:30  Patient On (Oxygen Delivery Method): room air        LABS:                        9.8    38.26 )-----------( 90       ( 04 Sep 2022 04:40 )             29.3     09-04    137  |  99  |  5<L>  ----------------------------<  82  4.1   |  28  |  0.55    Ca    8.3<L>      04 Sep 2022 04:40  Phos  2.8       Mg     1.60         TPro  5.8<L>  /  Alb  2.9<L>  /  TBili  0.2  /  DBili  <0.2  /  AST  20  /  ALT  18  /  AlkPhos  195<H>  04      Urinalysis Basic - ( 04 Sep 2022 18:49 )    Color: Yellow / Appearance: Clear / S.009 / pH: x  Gluc: x / Ketone: Negative  / Bili: Negative / Urobili: <2 mg/dL   Blood: x / Protein: Negative / Nitrite: Negative   Leuk Esterase: Negative / RBC: 4 /HPF / WBC 3 /HPF   Sq Epi: x / Non Sq Epi: 0 /HPF / Bacteria: Negative      CAPILLARY BLOOD GLUCOSE            Urinalysis Basic - ( 04 Sep 2022 18:49 )    Color: Yellow / Appearance: Clear / S.009 / pH: x  Gluc: x / Ketone: Negative  / Bili: Negative / Urobili: <2 mg/dL   Blood: x / Protein: Negative / Nitrite: Negative   Leuk Esterase: Negative / RBC: 4 /HPF / WBC 3 /HPF   Sq Epi: x / Non Sq Epi: 0 /HPF / Bacteria: Negative        PAST MEDICAL & SURGICAL HISTORY:  Endometrial ca  S4 serous [place] [facility] [place]      HTN (hypertension)      CAD (coronary artery disease)      S/P AVR      Hypothyroidism      HLD (hyperlipidemia)      S/P AVR (aortic valve replacement)      History of endometrial biopsy          MEDICATIONS  (STANDING):  aspirin enteric coated 81 milliGRAM(s) Oral daily  ATENolol  Tablet 50 milliGRAM(s) Oral every 12 hours  atorvastatin 80 milliGRAM(s) Oral at bedtime  cyanocobalamin 1000 MICROGram(s) Oral daily  enoxaparin Injectable 90 milliGRAM(s) SubCutaneous every 24 hours  famotidine    Tablet 40 milliGRAM(s) Oral daily  furosemide    Tablet 20 milliGRAM(s) Oral daily  levothyroxine 75 MICROGram(s) Oral daily  lidocaine   4% Patch 1 Patch Transdermal every 24 hours  melatonin 3 milliGRAM(s) Oral at bedtime  multivitamin 1 Tablet(s) Oral daily  senna 2 Tablet(s) Oral at bedtime    MEDICATIONS  (PRN):  acetaminophen     Tablet .. 650 milliGRAM(s) Oral every 6 hours PRN Temp greater or equal to 38C (100.4F), Mild Pain (1 - 3), Moderate Pain (4 - 6)  oxycodone    5 mG/acetaminophen 325 mG 1 Tablet(s) Oral every 6 hours PRN Severe Pain (7 - 10)  polyethylene glycol 3350 17 Gram(s) Oral daily PRN Constipation        RADIOLOGY & ADDITIONAL TESTS:    Imaging Personally Reviewed:  [ ] YES  [ ] NO    Consultant(s) Notes Reviewed:  [ ] YES  [ ] NO    PHYSICAL EXAM:  GENERAL: Alert and awake lying in bed in no distress  HEAD:  Atraumatic, Normocephalic  EYES: EOMI, JENNIE, conjunctiva and sclera clear  NECK: Supple, No JVD, Normal thyroid  NERVOUS SYSTEM:  Alert & Oriented X3, Motor and sensory systems are intact,   CHEST/LUNG: Bilateral clear breath sounds, no rhochi, no wheezing, no crepitations,  HEART: Regular rate and rhythm; No murmurs, rubs, or gallops  ABDOMEN: Soft, Nontender, Nondistended; Bowel sounds present  EXTREMITIES:   Peripheral Pulses are palpable, no  edema        Care Discussed with Consultants/Other Providers [x ] YES  [ ] NO      Code Status: [] Full Code [] DNR [] DNI [] Goals of Care:   Disposition: [] ICU [] Stroke Unit [] RCU []PCU []Floor [] Discharge Home         [facility]AL[facility]

## 2022-09-04 NOTE — PROGRESS NOTE ADULT - SUBJECTIVE AND OBJECTIVE BOX
no new events     acetaminophen     Tablet .. 650 milliGRAM(s) Oral every 6 hours PRN  aspirin enteric coated 81 milliGRAM(s) Oral daily  ATENolol  Tablet 50 milliGRAM(s) Oral every 12 hours  atorvastatin 80 milliGRAM(s) Oral at bedtime  cyanocobalamin 1000 MICROGram(s) Oral daily  enoxaparin Injectable 90 milliGRAM(s) SubCutaneous every 24 hours  famotidine    Tablet 40 milliGRAM(s) Oral daily  furosemide    Tablet 20 milliGRAM(s) Oral daily  levothyroxine 75 MICROGram(s) Oral daily  lidocaine   4% Patch 1 Patch Transdermal every 24 hours  melatonin 3 milliGRAM(s) Oral at bedtime  multivitamin 1 Tablet(s) Oral daily  oxycodone    5 mG/acetaminophen 325 mG 1 Tablet(s) Oral every 6 hours PRN  polyethylene glycol 3350 17 Gram(s) Oral daily PRN  senna 2 Tablet(s) Oral at bedtime      No Known Allergies      ROS otherwise negative     T(C): 36.6 (09-04-22 @ 05:28), Max: 36.9 (09-03-22 @ 12:13)  HR: 76 (09-04-22 @ 05:28) (76 - 93)  BP: 118/65 (09-04-22 @ 05:28) (110/55 - 140/65)  RR: 18 (09-04-22 @ 05:28) (18 - 18)  SpO2: 98% (09-04-22 @ 05:28) (97% - 100%)  PHYSICAL EXAM  Gen:  laying in bed, nad  H:  anicteric, eomi  Ext:  no edema                          9.8    38.26 )-----------( 90       ( 04 Sep 2022 04:40 )             29.3                         9.7    22.11 )-----------( 65       ( 03 Sep 2022 04:41 )             30.4                         9.4    16.41 )-----------( 53       ( 02 Sep 2022 05:45 )             25.9   09-04    137  |  99  |  5<L>  ----------------------------<  82  4.1   |  28  |  0.55    Ca    8.3<L>      04 Sep 2022 04:40  Phos  2.8     09-04  Mg     1.60     09-04    TPro  5.8<L>  /  Alb  2.9<L>  /  TBili  0.2  /  DBili  <0.2  /  AST  20  /  ALT  18  /  AlkPhos  195<H>  09-04

## 2022-09-04 NOTE — PROGRESS NOTE ADULT - SUBJECTIVE AND OBJECTIVE BOX
PULMONARY PROGRESS NOTE    FRIDA WELCH  MRN-[id number]    Patient is a 87y old  Female who presents with a chief complaint of PE (04 Sep 2022 10:41)      HPI:  -eating dinner on room air  family at bedside  w/o resp complaints  -    ROS:   -    ACTIVE MEDICATION LIST:  MEDICATIONS  (STANDING):  aspirin enteric coated 81 milliGRAM(s) Oral daily  ATENolol  Tablet 50 milliGRAM(s) Oral every 12 hours  atorvastatin 80 milliGRAM(s) Oral at bedtime  cyanocobalamin 1000 MICROGram(s) Oral daily  enoxaparin Injectable 90 milliGRAM(s) SubCutaneous every 24 hours  famotidine    Tablet 40 milliGRAM(s) Oral daily  furosemide    Tablet 20 milliGRAM(s) Oral daily  levothyroxine 75 MICROGram(s) Oral daily  lidocaine   4% Patch 1 Patch Transdermal every 24 hours  magnesium oxide 400 milliGRAM(s) Oral two times a day with meals  melatonin 3 milliGRAM(s) Oral at bedtime  multivitamin 1 Tablet(s) Oral daily  senna 2 Tablet(s) Oral at bedtime    MEDICATIONS  (PRN):  acetaminophen     Tablet .. 650 milliGRAM(s) Oral every 6 hours PRN Temp greater or equal to 38C (100.4F), Mild Pain (1 - 3), Moderate Pain (4 - 6)  oxycodone    5 mG/acetaminophen 325 mG 1 Tablet(s) Oral every 6 hours PRN Severe Pain (7 - 10)  polyethylene glycol 3350 17 Gram(s) Oral daily PRN Constipation      EXAM:  Vital Signs Last 24 Hrs  T(C): 36.7 (04 Sep 2022 10:10), Max: 36.7 (04 Sep 2022 10:10)  T(F): 98 (04 Sep 2022 10:10), Max: 98 (04 Sep 2022 10:10)  HR: 80 (04 Sep 2022 10:10) (76 - 84)  BP: 110/62 (04 Sep 2022 10:10) (110/62 - 118/65)  BP(mean): --  RR: 18 (04 Sep 2022 10:10) (18 - 18)  SpO2: 98% (04 Sep 2022 10:10) (97% - 98%)    Parameters below as of 04 Sep 2022 10:10  Patient On (Oxygen Delivery Method): room air        GENERAL: The patient is awake and alert in no apparent distress.     LUNGS: Clear to auscultation without wheezin g                            9.8    38.26 )-----------( 90       ( 04 Sep 2022 04:40 )             29.3       09-04    137  |  99  |  5<L>  ----------------------------<  82  4.1   |  28  |  0.55    Ca    8.3<L>      04 Sep 2022 04:40  Phos  2.8     09-04  Mg     1.60     09-04    TPro  5.8<L>  /  Alb  2.9<L>  /  TBili  0.2  /  DBili  <0.2  /  AST  20  /  ALT  18  /  AlkPhos  195<H>  09-04     < from: CT Angio Chest PE Protocol w/ IV Cont (08.31.22 @ 02:37) >    ACC: [id number] EXAM:  CT ANGIO CHEST PULM [facility]                          PROCEDURE DATE:  08/31/2022          INTERPRETATION:  CLINICAL INFORMATION: Elevated d-dimer.    COMPARISON: None.    CONTRAST/COMPLICATIONS:  IV Contrast: Omnipaque 350  70 cc administered   0 cc discarded  Oral Contrast: NONE  Complications: None reported at time of study completion    PROCEDURE:  CT Angiogram of the chest was obtained with intravenous contrast. Three   dimensional maximum intensity projection (MIP) images were generated.    FINDINGS:    Motion degraded study.    PULMONARY ANGIOGRAM: Right upper lobe segmental pulmonary embolism.    LYMPH NODES/MEDIASTINUM: No lymphadenopathy.    HEART/VASCULATURE: Cardiomegaly.  TAVR. No CT evidence of right heart   strain. No pericardial effusion.    AIRWAYS/LUNGS/PLEURA: Patent central airways. Moderate bilateral pleural   effusions with adjacent lower lobe passive atelectasis.    UPPER ABDOMEN: Perihepatic ascites. Cholelithiasis.    BONES/SOFT TISSUES: Right chest wall port with tip at the superior   cavoatrial junction. Degenerative changes of the spine.    IMPRESSION:    Right upper lobe segmental pulmonary embolism. No CT evidence of right   heart strain.    Moderate bilateral pleural effusions.    Cardiomegaly.    Perihepatic ascites of uncertain etiology.    Findings were discussed with Dr. Ordonez at 8/31/2022 2:51 AM by Dr. Hammer with read back confirmation.    --- End of Report ---          WES HAMMER MD; Resident Radiology  This document has been electronically signed.    < end of copied text >    PROBLEM LIST:  87y Female with HEALTH ISSUES - PROBLEM Dx:  Pulmonary embolism    Normocytic anemia    Thrombocytopenia    Transaminitis    Endometrial cancer    HTN (hypertension)    HLD (hyperlipidemia)    CAD (coronary artery disease)    Hypothyroidism    Nutrition, metabolism, and development symptoms    Leukocytosis    Pulmonary thromboembolism              RECS:  lovenox for PE  dopplers negative  IR note appreciated  diuresis  defer to heme onco re: leukocytosis  can repeat xray once cardio find her euvolemic, if shes still admitted  discussed with family at bedside      Please call with any questions.    Jami Denny DO  [facility] Pulmonary/Sleep Medicine  [phone]

## 2022-09-05 NOTE — CHART NOTE - NSCHARTNOTEFT_GEN_A_CORE
Late entry:  Vital Signs Last 24 Hrs  T(C): 36.4 (05 Sep 2022 10:19), Max: 36.8 (04 Sep 2022 16:30)  T(F): 97.6 (05 Sep 2022 10:19), Max: 98.3 (04 Sep 2022 16:30)  HR: 80 (05 Sep 2022 10:19) (75 - 80)  BP: 112/55 (05 Sep 2022 10:19) (112/55 - 134/65)  BP(mean): --  RR: 18 (05 Sep 2022 10:19) (18 - 18)  SpO2: 99% (05 Sep 2022 10:19) (98% - 100%)    Parameters below as of 05 Sep 2022 10:19  Patient On (Oxygen Delivery Method): room air                          10.4   40.4   )-----------( 137      ( 05 Sep 2022 15:05 )             31.6   09-05    139  |  99  |  7   ----------------------------<  73  5.9<H>   |  28  |  0.59    Ca    8.3<L>      05 Sep 2022 06:21  Phos  3.5     09-05  Mg     1.60     09-05    TPro  5.5<L>  /  Alb  3.2<L>  /  TBili  0.2  /  DBili  <0.2  /  AST  32  /  ALT  23  /  AlkPhos  233<H>  09-05    Results and case discussed with Dr. Olivier, uptrend in WBC, remains afebrile. Patient denies dyspnea this morning, denies CP, N/V, chills, on RA. As discussed with attending, continue PO lasix, BCX/UCX in process, monitor off antibiotics for now, trend WBC. K 5.9 s/p lokelma x1, will repeat BMP. CXR with B/L pleural effusions, IR consult requested. Close monitoring ongoing, continuous pulse oximetry/tele. Discussed plan of care with RN, patient and attending.

## 2022-09-05 NOTE — PROGRESS NOTE ADULT - SUBJECTIVE AND OBJECTIVE BOX
Pt seen, comfortable    MEDICATIONS  (STANDING):  aspirin enteric coated 81 milliGRAM(s) Oral daily  ATENolol  Tablet 50 milliGRAM(s) Oral every 12 hours  atorvastatin 80 milliGRAM(s) Oral at bedtime  cyanocobalamin 1000 MICROGram(s) Oral daily  enoxaparin Injectable 90 milliGRAM(s) SubCutaneous every 24 hours  famotidine    Tablet 40 milliGRAM(s) Oral daily  furosemide    Tablet 20 milliGRAM(s) Oral daily  levothyroxine 75 MICROGram(s) Oral daily  lidocaine   4% Patch 1 Patch Transdermal every 24 hours  melatonin 3 milliGRAM(s) Oral at bedtime  multivitamin 1 Tablet(s) Oral daily  senna 2 Tablet(s) Oral at bedtime    MEDICATIONS  (PRN):  acetaminophen     Tablet .. 650 milliGRAM(s) Oral every 6 hours PRN Temp greater or equal to 38C (100.4F), Mild Pain (1 - 3), Moderate Pain (4 - 6)  oxycodone    5 mG/acetaminophen 325 mG 1 Tablet(s) Oral every 6 hours PRN Severe Pain (7 - 10)  polyethylene glycol 3350 17 Gram(s) Oral daily PRN Constipation      ROS  No fever, sweats, chills  No epistaxis, HA, sore throat  No CP, SOB, cough, sputum  No n/v/d, abd pain, melena, hematochezia  No edema  No rash  No anxiety  No back pain, joint pain  No bleeding, bruising  No dysuria, hematuria    Vital Signs Last 24 Hrs  T(C): 36.4 (05 Sep 2022 10:19), Max: 36.8 (04 Sep 2022 16:30)  T(F): 97.6 (05 Sep 2022 10:19), Max: 98.3 (04 Sep 2022 16:30)  HR: 80 (05 Sep 2022 10:19) (75 - 80)  BP: 112/55 (05 Sep 2022 10:19) (112/55 - 134/65)  BP(mean): --  RR: 18 (05 Sep 2022 10:19) (18 - 18)  SpO2: 99% (05 Sep 2022 10:19) (98% - 100%)    Parameters below as of 05 Sep 2022 10:19  Patient On (Oxygen Delivery Method): room air        PE  NAD  Awake, alert  Anicteric, MMM  RRR  CTAB  Abd soft, NT, ND  No c/c/e  No rash grossly  FROM                          10.8   40.43 )-----------( 124      ( 05 Sep 2022 06:21 )             32.7       09-05    139  |  99  |  7   ----------------------------<  73  5.9<H>   |  28  |  0.59    Ca    8.3<L>      05 Sep 2022 06:21  Phos  3.5     09-05  Mg     1.60     09-05    TPro  5.5<L>  /  Alb  3.2<L>  /  TBili  0.2  /  DBili  <0.2  /  AST  32  /  ALT  23  /  AlkPhos  233<H>  09-05       No

## 2022-09-05 NOTE — PROGRESS NOTE ADULT - SUBJECTIVE AND OBJECTIVE BOX
FRIDA WELCH  87y  Female      Patient is a 87y old  Female who presents with a chief complaint of PE (05 Sep 2022 15:45)  Above noted.Increased b/l pleural effusion,asymtomatic,seen by IR,NO INTERVENION  NO SOB,NO CP    REVIEW OF SYSTEMS:  CONSTITUTIONAL: No fever  RESPIRATORY: No cough, hemoptysis or shortness of breath  CARDIOVASCULAR: No chest pain, palpitations, dizziness, or leg swelling  GASTROINTESTINAL: No abdominal pain. nausea, vomiting, hematemesis  INTERVAL HPI/OVERNIGHT EVENTS:  T(C): 36.4 (22 @ 10:19), Max: 36.6 (22 @ 21:50)  HR: 80 (22 @ 10:19) (75 - 80)  BP: 112/55 (22 @ 10:19) (112/55 - 134/65)  RR: 18 (22 @ 10:19) (18 - 18)  SpO2: 99% (22 @ 10:19) (99% - 100%)  Wt(kg): --  I&O's Summary    T(C): 36.4 (22 @ 10:19), Max: 36.6 (22 @ 21:50)  HR: 80 (22 @ 10:19) (75 - 80)  BP: 112/55 (22 @ 10:19) (112/55 - 134/65)  RR: 18 (22 @ 10:19) (18 - 18)  SpO2: 99% (22 @ 10:19) (99% - 100%)  Wt(kg): --Vital Signs Last 24 Hrs  T(C): 36.4 (05 Sep 2022 10:19), Max: 36.6 (04 Sep 2022 21:50)  T(F): 97.6 (05 Sep 2022 10:19), Max: 97.9 (04 Sep 2022 21:50)  HR: 80 (05 Sep 2022 10:19) (75 - 80)  BP: 112/55 (05 Sep 2022 10:19) (112/55 - 134/65)  BP(mean): --  RR: 18 (05 Sep 2022 10:19) (18 - 18)  SpO2: 99% (05 Sep 2022 10:19) (99% - 100%)    Parameters below as of 05 Sep 2022 10:19  Patient On (Oxygen Delivery Method): room air        LABS:                        10.4   41.84 )-----------( 137      ( 05 Sep 2022 15:05 )             31.6     09-05    139  |  100  |  6<L>  ----------------------------<  99  3.7   |  31  |  0.58    Ca    8.3<L>      05 Sep 2022 15:05  Phos  3.4     09-05  Mg     1.50     -05    TPro  5.5<L>  /  Alb  3.2<L>  /  TBili  0.2  /  DBili  <0.2  /  AST  32  /  ALT  23  /  AlkPhos  233<H>  09-05      Urinalysis Basic - ( 04 Sep 2022 18:49 )    Color: Yellow / Appearance: Clear / S.009 / pH: x  Gluc: x / Ketone: Negative  / Bili: Negative / Urobili: <2 mg/dL   Blood: x / Protein: Negative / Nitrite: Negative   Leuk Esterase: Negative / RBC: 4 /HPF / WBC 3 /HPF   Sq Epi: x / Non Sq Epi: 0 /HPF / Bacteria: Negative      CAPILLARY BLOOD GLUCOSE            Urinalysis Basic - ( 04 Sep 2022 18:49 )    Color: Yellow / Appearance: Clear / S.009 / pH: x  Gluc: x / Ketone: Negative  / Bili: Negative / Urobili: <2 mg/dL   Blood: x / Protein: Negative / Nitrite: Negative   Leuk Esterase: Negative / RBC: 4 /HPF / WBC 3 /HPF   Sq Epi: x / Non Sq Epi: 0 /HPF / Bacteria: Negative        PAST MEDICAL & SURGICAL HISTORY:  Endometrial ca  S4 serous endometrial carcinoma, [facility] [place]      HTN (hypertension)      CAD (coronary artery disease)      S/P AVR      Hypothyroidism      HLD (hyperlipidemia)      S/P AVR (aortic valve replacement)      History of endometrial biopsy          MEDICATIONS  (STANDING):  ascorbic acid 500 milliGRAM(s) Oral two times a day  aspirin enteric coated 81 milliGRAM(s) Oral daily  ATENolol  Tablet 50 milliGRAM(s) Oral every 12 hours  atorvastatin 80 milliGRAM(s) Oral at bedtime  chlorhexidine 2% Cloths 1 Application(s) Topical two times a day  cyanocobalamin 1000 MICROGram(s) Oral daily  enoxaparin Injectable 90 milliGRAM(s) SubCutaneous every 24 hours  famotidine    Tablet 40 milliGRAM(s) Oral daily  furosemide    Tablet 20 milliGRAM(s) Oral daily  levothyroxine 75 MICROGram(s) Oral daily  lidocaine   4% Patch 1 Patch Transdermal every 24 hours  melatonin 3 milliGRAM(s) Oral at bedtime  multivitamin 1 Tablet(s) Oral daily  senna 2 Tablet(s) Oral at bedtime    MEDICATIONS  (PRN):  acetaminophen     Tablet .. 650 milliGRAM(s) Oral every 6 hours PRN Temp greater or equal to 38C (100.4F), Mild Pain (1 - 3), Moderate Pain (4 - 6)  oxycodone    5 mG/acetaminophen 325 mG 1 Tablet(s) Oral every 6 hours PRN Severe Pain (7 - 10)  polyethylene glycol 3350 17 Gram(s) Oral daily PRN Constipation        RADIOLOGY & ADDITIONAL TESTS:    Imaging Personally Reviewed:  [ ] YES  [ ] NO    Consultant(s) Notes Reviewed:  [ ] YES  [ ] NO    PHYSICAL EXAM:  GENERAL: Alert and awake lying in bed in no distress  HEAD:  Atraumatic, Normocephalic  EYES: EOMI, JENNIE, conjunctiva and sclera clear  NECK: Supple, No JVD, Normal thyroid  NERVOUS SYSTEM:  Alert & Oriented X3, Motor and sensory s  Lungs--Decreased breath sounds,c/lear b/l  HEART: Regular rate and rhythm; No murmurs, rubs, or gallops  ABDOMEN: Soft, Nontender, Nondistended; Bowel sounds present  EXTREMITIES:   Peripheral Pulses are palpable, no  edema        Care Discussed with Consultants/Other Providers [ x] YES  [ ] NO      Code Status: [] Full Code [] DNR [] DNI [] Goals of Care:   Disposition: [] ICU [] Stroke Unit [] RCU []PCU []Floor [] Discharge Home         SUMMER BritoP

## 2022-09-05 NOTE — PROGRESS NOTE ADULT - SUBJECTIVE AND OBJECTIVE BOX
PATIENT SEEN AND EXAMINED ON :- 9/5/22  DATE OF SERVICE:    9/5/22         Interim events noted,Labs ,Radiological studies and Cardiology tests reviewed .       HOSPITAL COURSE: HPI:  Pt is an 86 yo F with PMH S4 serous endometrial carcinoma ([facility] [place], chest wall port, chemo), HTN, CAD (s/p PCI 2021), AV replacement, hypothyroidism, and HLD who p/w R sided chest pain, cough, mild SOB, weakness, and difficulty ambulating x2d. [place] [facility] [place] where CXR showed LLL consolidation and recommended ER evaluation. Pt states she had been doing "okay" since last discharge and had been working with home PT but started to feel unwell again in the last week. Daughter has been sick with a cough but has not been evaluated. Then pt began developing symptoms and thought she may have gotten sick as well. Generally has had decreased appetite and decreased PO intake. Denies HA, lightheadedness, dizziness, abdominal pain, changes to BMs or urination, pain in extremities. Last took meds at home 8/30 AM. Has been ambulating with a cane and has not had too much difficulty but has noticed increased weakness. No other concerns or complaints. Feels better currently than when she first came to the ER.     On arrival, T 98, HR 85, /62, RR 18 O2sat 98% RA. EKG with . Labs with WBC 59.87, 98% neutrophils, 1.7% bands, Hb 10.9, MCV 89, plt 90, d-dimer 2314, lactate neg, , AST 40, trop 27, BNP 1514, UA neg, RVP neg, BCx sent. CXR with hazy bibasilar opacities likely pleural effusions and atelectasis. CT PE with RUL segmental PE without evidence of R heart strain; moderate BL pleural effusions, and perihepatic ascites of unclear etiology. Pt given heparin 5000 bolus -- gtt, tylenol, cefepime 2g, vanc 1g, and 1L NS. Admitted to medicine for further observation and management.  (31 Aug 2022 11:09)      INTERIM EVENTS:Patient seen at bedside ,interim events noted.      PMH -reviewed admission note, no change since admission  HEART FAILURE: Acute[ ]Chronic[ ] Systolic[ ] Diastolic[ ] Combined Systolic and Diastolic[ ]  CAD[ ] CABG[ ] PCI[ ]  DEVICES[ ] PPM[ ] ICD[ ] ILR[ ]  ATRIAL FIBRILLATION[ ] Paroxysmal[ ] Permanent[ ] CHADS2-[  ]  [place][ ] CKD1[ ] CKD2[ ] CKD3[ ] CKD4[ ] ESRD[ ]  COPD[ ] HTN[ ]   DM[ ] Type1[ ] Type 2[ ]   CVA[ ] Paresis[ ]    AMBULATION: Assisted[ ] Cane/walker[ ] Independent[ ]    MEDICATIONS  (STANDING):  ascorbic acid 500 milliGRAM(s) Oral two times a day  aspirin enteric coated 81 milliGRAM(s) Oral daily  ATENolol  Tablet 50 milliGRAM(s) Oral every 12 hours  atorvastatin 80 milliGRAM(s) Oral at bedtime  chlorhexidine 2% Cloths 1 Application(s) Topical two times a day  cyanocobalamin 1000 MICROGram(s) Oral daily  enoxaparin Injectable 90 milliGRAM(s) SubCutaneous every 24 hours  famotidine    Tablet 40 milliGRAM(s) Oral daily  furosemide    Tablet 20 milliGRAM(s) Oral daily  levothyroxine 75 MICROGram(s) Oral daily  lidocaine   4% Patch 1 Patch Transdermal every 24 hours  melatonin 3 milliGRAM(s) Oral at bedtime  multivitamin 1 Tablet(s) Oral daily  senna 2 Tablet(s) Oral at bedtime    MEDICATIONS  (PRN):  acetaminophen     Tablet .. 650 milliGRAM(s) Oral every 6 hours PRN Temp greater or equal to 38C (100.4F), Mild Pain (1 - 3), Moderate Pain (4 - 6)  oxycodone    5 mG/acetaminophen 325 mG 1 Tablet(s) Oral every 6 hours PRN Severe Pain (7 - 10)  polyethylene glycol 3350 17 Gram(s) Oral daily PRN Constipation            REVIEW OF SYSTEMS:  Constitutional: [ ] fever, [ ]weight loss,  [ ]fatigue [ ]weight gain  Eyes: [ ] visual changes  Respiratory: [ ]shortness of breath;  [ ] cough, [ ]wheezing, [ ]chills, [ ]hemoptysis  Cardiovascular: [ ] chest pain, [ ]palpitations, [ ]dizziness,  [ ]leg swelling[ ]orthopnea[ ]PND  Gastrointestinal: [ ] abdominal pain, [ ]nausea, [ ]vomiting,  [ ]diarrhea [ ]Constipation [ ]Melena  Genitourinary: [ ] dysuria, [ ] hematuria [ ]Birch  Neurologic: [ ] headaches [ ] tremors[ ]weakness [ ]Paralysis Right[ ] Left[ ]  Skin: [ ] itching, [ ]burning, [ ] rashes  Endocrine: [ ] heat or cold intolerance  Musculoskeletal: [ ] joint pain or swelling; [ ] muscle, back, or extremity pain  Psychiatric: [ ] depression, [ ]anxiety, [ ]mood swings, or [ ]difficulty sleeping  Hematologic: [ ] easy bruising, [ ] bleeding gums    [ ] All remaining systems negative except as per above.   [ ]Unable to obtain.  [x] No change in ROS since admission      Vital Signs Last 24 Hrs  T(C): 36.4 (05 Sep 2022 10:19), Max: 36.6 (04 Sep 2022 21:50)  T(F): 97.6 (05 Sep 2022 10:19), Max: 97.9 (04 Sep 2022 21:50)  HR: 80 (05 Sep 2022 10:19) (75 - 80)  BP: 112/55 (05 Sep 2022 10:19) (112/55 - 134/65)  BP(mean): --  RR: 18 (05 Sep 2022 10:19) (18 - 18)  SpO2: 99% (05 Sep 2022 10:19) (99% - 100%)    Parameters below as of 05 Sep 2022 10:19  Patient On (Oxygen Delivery Method): room air      I&O's Summary      PHYSICAL EXAM:  General: No acute distress BMI-  HEENT: EOMI, PERRL  Neck: Supple, [ ] JVD  Lungs: Equal air entry bilaterally; [ ] rales [ ] wheezing [ ] rhonchi  Heart: Regular rate and rhythm; [x ] murmur   2/6 [ x] systolic [ ] diastolic [ ] radiation[ ] rubs [ ]  gallops  Abdomen: Nontender, bowel sounds present  Extremities: No clubbing, cyanosis, [ ] edema [ ]Pulses  equal and intact  Nervous system:  Alert & Oriented X3, no focal deficits  Psychiatric: Normal affect  Skin: No rashes or lesions    LABS:  09-05    139  |  100  |  6<L>  ----------------------------<  99  3.7   |  31  |  0.58    Ca    8.3<L>      05 Sep 2022 15:05  Phos  3.4     09-05  Mg     1.50     09-05    TPro  5.5<L>  /  Alb  3.2<L>  /  TBili  0.2  /  DBili  <0.2  /  AST  32  /  ALT  23  /  AlkPhos  233<H>  09-05    Creatinine Trend: 0.58<--, 0.59<--, 0.55<--, 0.51<--, 0.53<--, 0.43<--                        10.4   41.84 )-----------( 137      ( 05 Sep 2022 15:05 )             31.6

## 2022-09-05 NOTE — PROGRESS NOTE ADULT - SUBJECTIVE AND OBJECTIVE BOX
IR consulted again for re-evaluation for thoracentesis.    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 IR consulted for thoracentesis. Patient continues to saturate well on room air.    -- Again recommend continued diuresis per primary team as clinically indicated. Will hold off on thoracentesis at this time.  -- Please contact IR with further questions.     Luis M Carolina MD  Interventional Radiology PGY6    -EMERGENT: [facility] IR Pager: [phone].  [facility] IR Pager: [phone] [id number]  -Nonemergent consults:  [facility] order "Consult- Interventional Radiology"  -Available on [employer] TEAMS for questions

## 2022-09-05 NOTE — PROVIDER CONTACT NOTE (CRITICAL VALUE NOTIFICATION) - BACKGROUND
Hx endometrial cancer undergoing chemotherapy, HTN, CAD. Admitted for RUL PE
Hx endometrial cancer undergoing chemotherapy, HTN, CAD. Admitted for RUL PE

## 2022-09-06 NOTE — PROGRESS NOTE ADULT - PROBLEM SELECTOR PROBLEM 2
Normocytic anemia

## 2022-09-06 NOTE — PROGRESS NOTE ADULT - PROBLEM SELECTOR PLAN 5
- on arrival, WBC 59.87 with 98% neutrophils and 1.7% bands -- uptrending per chart review  - likely in setting of acute [place] and [place] [facility] [place]   monitor closely
- on arrival, WBC 59.87 with 98% neutrophils and 1.7% bands -- uptrending per chart review  - likely in setting of acute infection and recent neulasta @ [facility] [place]  - improving   monitor closely
- on arrival, WBC 59.87 with 98% neutrophils and 1.7% bands -- uptrending per chart review  - likely in setting of acute infection and recent neulasta @ [facility] [place]  - UA neg, BCx sent  - CXR with hazy bibasilar opacities likely pleural effusions and atelectasis  - CT chest as above  - less likely PNA as pt afebrile and CT chest without obvious consolidation; will check MRSA swab and procal   - s/p vanc/cefepime in ER -- defer continuation at present -- reassess if febrile or change to hemodynamics, low threshold to restart as pt on chemotherapy  -Trend wbc count-downtrending
- on arrival, WBC 59.87 with 98% neutrophils and 1.7% bands -- uptrending per chart review  - likely in setting of acute infection and recent neulasta @ [facility] [place]  - UA neg, BCx sent  - CXR with hazy bibasilar opacities likely pleural effusions and atelectasis  - CT chest as above  - less likely PNA as pt afebrile and CT chest without obvious consolidation; will check MRSA swab and procal   - s/p vanc/cefepime in ER -- defer continuation at present -- reassess if febrile or change to hemodynamics, low threshold to restart as pt on chemotherapy  -Trend wbc count
- on arrival, WBC 59.87 with 98% neutrophils and 1.7% bands -- uptrending per chart review  - likely in setting of acute infection and recent [place]ulasta [place] [facility] [place]  - UA neg, BCx sent  - CXR with hazy bibasilar opacities likely pleural effusions and atelectasis  - CT chest as above  - less likely PNA as pt afebrile and CT chest without obvious consolidation; will check MRSA swab and procal   - s/p vanc/cefepime in ER -- defer continuation at present -- reassess if febrile or change to hemodynamics, low threshold to restart as pt on chemotherapy  -Trend wbc count-downtrending
- on arrival, WBC 59.87 with 98% neutrophils and 1.7% bands -- uptrending per chart review  - likely in setting of acute infection and recent neulasta @ [facility] [place]  - UA neg, BCx sent  - CXR with hazy bibasilar opacities likely pleural effusions and atelectasis  - CT chest as above  - less likely PNA as pt afebrile and CT chest without obvious consolidation; will check MRSA swab and procal   - s/p vanc/cefepime in ER -- defer continuation at present -- reassess if febrile or change to hemodynamics, low threshold to restart as pt on chemotherapy
- on arrival, WBC 59.87 with 98% neutrophils and 1.7% bands -- uptrending per chart review  - likely in setting of acute infection and recent neulasta @ [facility] [place]  - UA neg, BCx sent  - CXR with hazy bibasilar opacities likely pleural effusions and atelectasis  - CT chest as above  - less likely PNA as pt afebrile and CT chest without obvious consolidation; will check MRSA swab and procal   - s/p vanc/cefepime in ER -- defer continuation at present -- reassess if febrile or change to hemodynamics, low threshold to restart as pt on chemotherapy
- on arrival, WBC 59.87 with 98% neutrophils and 1.7% bands -- uptrending per chart review  - likely in setting of acute infection and recent [place]ulasta [place] [facility] [place]  - UA neg, BCx sent  - CXR with hazy bibasilar opacities likely pleural effusions and atelectasis  - CT chest as above  - less likely PNA as pt afebrile and CT chest without obvious consolidation; will check MRSA swab and procal   - s/p vanc/cefepime in ER -- defer continuation at present -- reassess if febrile or change to hemodynamics, low threshold to restart as pt on chemotherapy  -Trend wbc count-downtrending
- on arrival, WBC 59.87 with 98% neutrophils and 1.7% bands -- uptrending per chart review  - likely in setting of acute infection and recent neulasta @ [facility] [place]  - UA neg, BCx sent  - CXR with hazy bibasilar opacities likely pleural effusions and atelectasis  - CT chest as above  - less likely PNA as pt afebrile and CT chest without obvious consolidation; will check MRSA swab and procal   - s/p vanc/cefepime in ER -- defer continuation at present -- reassess if febrile or change to hemodynamics, low threshold to restart as pt on chemotherapy
- on arrival, WBC 59.87 with 98% neutrophils and 1.7% bands -- uptrending per chart review  - likely in setting of acute infection and recent neulasta @ [facility] [place]  - UA neg, BCx sent  - CXR with hazy bibasilar opacities likely pleural effusions and atelectasis  - CT chest as above  - less likely PNA as pt afebrile and CT chest without obvious consolidation; will check MRSA swab and procal   - s/p vanc/cefepime in ER -- defer continuation at present -- reassess if febrile or change to hemodynamics, low threshold to restart as pt on chemotherapy  -Trend wbc count-downtrending

## 2022-09-06 NOTE — PROGRESS NOTE ADULT - SUBJECTIVE AND OBJECTIVE BOX
PATIENT SEEN AND EXAMINED ON :- 9/6/22  DATE OF SERVICE: 9/6/22            Interim events noted,Labs ,Radiological studies and Cardiology tests reviewed .       HOSPITAL COURSE: HPI:  Pt is an 88 yo F with PMH S4 serous endometrial carcinoma ([facility] [place], chest wall port, chemo), HTN, CAD (s/p PCI 2021), AV replacement, hypothyroidism, and HLD who p/w R sided chest pain, cough, mild SOB, weakness, and difficulty ambulating x2d. Went to [facility] for evaluation where CXR showed LLL consolidation and recommended ER evaluation. Pt states she had been doing "okay" since last discharge and had been working with home PT but started to feel unwell again in the last week. Daughter has been sick with a cough but has not been evaluated. Then pt began developing symptoms and thought she may have gotten sick as well. Generally has had decreased appetite and decreased PO intake. Denies HA, lightheadedness, dizziness, abdominal pain, changes to BMs or urination, pain in extremities. Last took meds at home 8/30 AM. Has been ambulating with a cane and has not had too much difficulty but has noticed increased weakness. No other concerns or complaints. Feels better currently than when she first came to the ER.     On arrival, T 98, HR 85, /62, RR 18 O2sat 98% RA. EKG with . Labs with WBC 59.87, 98% neutrophils, 1.7% bands, Hb 10.9, MCV 89, plt 90, d-dimer 2314, lactate neg, , AST 40, trop 27, BNP 1514, UA neg, RVP neg, BCx sent. CXR with hazy bibasilar opacities likely pleural effusions and atelectasis. CT PE with RUL segmental PE without evidence of R heart strain; moderate BL pleural effusions, and perihepatic ascites of unclear etiology. Pt given heparin 5000 bolus -- gtt, tylenol, cefepime 2g, vanc 1g, and 1L NS. Admitted to medicine for further observation and management.  (31 Aug 2022 11:09)      INTERIM EVENTS:Patient seen at bedside ,interim events noted.      PMH -reviewed admission note, no change since admission  HEART FAILURE: Acute[ ]Chronic[ ] Systolic[ ] Diastolic[ ] Combined Systolic and Diastolic[ ]  CAD[ ] CABG[ ] PCI[ ]  DEVICES[ ] PPM[ ] ICD[ ] ILR[ ]  ATRIAL FIBRILLATION[ ] Paroxysmal[ ] Permanent[ ] CHADS2-[  ]  [place][ ] CKD1[ ] CKD2[ ] CKD3[ ] CKD4[ ] ESRD[ ]  COPD[ ] HTN[ ]   DM[ ] Type1[ ] Type 2[ ]   CVA[ ] Paresis[ ]    AMBULATION: Assisted[ ] Cane/walker[ ] Independent[ ]    MEDICATIONS  (STANDING):  ascorbic acid 500 milliGRAM(s) Oral two times a day  aspirin enteric coated 81 milliGRAM(s) Oral daily  ATENolol  Tablet 50 milliGRAM(s) Oral every 12 hours  atorvastatin 80 milliGRAM(s) Oral at bedtime  chlorhexidine 2% Cloths 1 Application(s) Topical two times a day  cyanocobalamin 1000 MICROGram(s) Oral daily  enoxaparin Injectable 90 milliGRAM(s) SubCutaneous every 24 hours  famotidine    Tablet 40 milliGRAM(s) Oral daily  furosemide    Tablet 20 milliGRAM(s) Oral daily  levothyroxine 75 MICROGram(s) Oral daily  lidocaine   4% Patch 1 Patch Transdermal every 24 hours  melatonin 3 milliGRAM(s) Oral at bedtime  multivitamin 1 Tablet(s) Oral daily  senna 2 Tablet(s) Oral at bedtime    MEDICATIONS  (PRN):  acetaminophen     Tablet .. 650 milliGRAM(s) Oral every 6 hours PRN Temp greater or equal to 38C (100.4F), Mild Pain (1 - 3), Moderate Pain (4 - 6)  oxycodone    5 mG/acetaminophen 325 mG 1 Tablet(s) Oral every 6 hours PRN Severe Pain (7 - 10)  polyethylene glycol 3350 17 Gram(s) Oral daily PRN Constipation            REVIEW OF SYSTEMS:  Constitutional: [ ] fever, [ ]weight loss,  [ ]fatigue [ ]weight gain  Eyes: [ ] visual changes  Respiratory: [ ]shortness of breath;  [ ] cough, [ ]wheezing, [ ]chills, [ ]hemoptysis  Cardiovascular: [ ] chest pain, [ ]palpitations, [ ]dizziness,  [ ]leg swelling[ ]orthopnea[ ]PND  Gastrointestinal: [ ] abdominal pain, [ ]nausea, [ ]vomiting,  [ ]diarrhea [ ]Constipation [ ]Melena  Genitourinary: [ ] dysuria, [ ] hematuria [ ]Birch  Neurologic: [ ] headaches [ ] tremors[ ]weakness [ ]Paralysis Right[ ] Left[ ]  Skin: [ ] itching, [ ]burning, [ ] rashes  Endocrine: [ ] heat or cold intolerance  Musculoskeletal: [ ] joint pain or swelling; [ ] muscle, back, or extremity pain  Psychiatric: [ ] depression, [ ]anxiety, [ ]mood swings, or [ ]difficulty sleeping  Hematologic: [ ] easy bruising, [ ] bleeding gums    [ ] All remaining systems negative except as per above.   [ ]Unable to obtain.  [x] No change in ROS since admission      Vital Signs Last 24 Hrs  T(C): 36.1 (06 Sep 2022 10:45), Max: 36.9 (06 Sep 2022 05:00)  T(F): 97 (06 Sep 2022 10:45), Max: 98.4 (06 Sep 2022 05:00)  HR: 85 (06 Sep 2022 10:45) (85 - 89)  BP: 124/60 (06 Sep 2022 10:45) (124/60 - 139/54)  BP(mean): --  RR: 18 (06 Sep 2022 13:28) (18 - 20)  SpO2: 97% (06 Sep 2022 13:28) (85% - 99%)    Parameters below as of 06 Sep 2022 13:28  Patient On (Oxygen Delivery Method): nasal cannula  O2 Flow (L/min): 2    I&O's Summary      PHYSICAL EXAM:  General: No acute distress BMI-  HEENT: EOMI, PERRL  Neck: Supple, [ ] JVD  Lungs: Equal air entry bilaterally; [ ] rales [ ] wheezing [ ] rhonchi  Heart: Regular rate and rhythm; [x ] murmur   2/6 [ x] systolic [ ] diastolic [ ] radiation[ ] rubs [ ]  gallops  Abdomen: Nontender, bowel sounds present  Extremities: No clubbing, cyanosis, [ ] edema [ ]Pulses  equal and intact  Nervous system:  Alert & Oriented X3, no focal deficits  Psychiatric: Normal affect  Skin: No rashes or lesions    LABS:  09-06    140  |  100  |  6<L>  ----------------------------<  75  3.7   |  29  |  0.64    Ca    8.5      06 Sep 2022 06:34  Phos  3.7     09-06  Mg     1.80     09-06    TPro  5.7<L>  /  Alb  3.1<L>  /  TBili  0.2  /  DBili  <0.2  /  AST  33<H>  /  ALT  29  /  AlkPhos  216<H>  09-06    Creatinine Trend: 0.64<--, 0.58<--, 0.59<--, 0.55<--, 0.51<--, 0.53<--                        10.8   36.55 )-----------( 151      ( 06 Sep 2022 06:34 )             33.1

## 2022-09-06 NOTE — CHART NOTE - NSCHARTNOTESELECT_GEN_ALL_CORE
Event Note
Increasing Bilateral Pleural Effusions/Event Note
home oxygen/Event Note
Event Note
Event Note

## 2022-09-06 NOTE — PROGRESS NOTE ADULT - SUBJECTIVE AND OBJECTIVE BOX
Patient is a 87y old  Female who presents with a chief complaint of PE (06 Sep 2022 11:07)    Patient seen and examined at bedside.    MEDICATIONS  (STANDING):  ascorbic acid 500 milliGRAM(s) Oral two times a day  aspirin enteric coated 81 milliGRAM(s) Oral daily  ATENolol  Tablet 50 milliGRAM(s) Oral every 12 hours  atorvastatin 80 milliGRAM(s) Oral at bedtime  chlorhexidine 2% Cloths 1 Application(s) Topical two times a day  cyanocobalamin 1000 MICROGram(s) Oral daily  enoxaparin Injectable 90 milliGRAM(s) SubCutaneous every 24 hours  famotidine    Tablet 40 milliGRAM(s) Oral daily  furosemide    Tablet 20 milliGRAM(s) Oral daily  levothyroxine 75 MICROGram(s) Oral daily  lidocaine   4% Patch 1 Patch Transdermal every 24 hours  melatonin 3 milliGRAM(s) Oral at bedtime  multivitamin 1 Tablet(s) Oral daily  senna 2 Tablet(s) Oral at bedtime    MEDICATIONS  (PRN):  acetaminophen     Tablet .. 650 milliGRAM(s) Oral every 6 hours PRN Temp greater or equal to 38C (100.4F), Mild Pain (1 - 3), Moderate Pain (4 - 6)  oxycodone    5 mG/acetaminophen 325 mG 1 Tablet(s) Oral every 6 hours PRN Severe Pain (7 - 10)  polyethylene glycol 3350 17 Gram(s) Oral daily PRN Constipation      Vital Signs Last 24 Hrs  T(C): 36.1 (06 Sep 2022 10:45), Max: 36.9 (06 Sep 2022 05:00)  T(F): 97 (06 Sep 2022 10:45), Max: 98.4 (06 Sep 2022 05:00)  HR: 85 (06 Sep 2022 10:45) (85 - 89)  BP: 124/60 (06 Sep 2022 10:45) (121/63 - 139/54)  BP(mean): --  RR: 18 (06 Sep 2022 13:28) (18 - 20)  SpO2: 97% (06 Sep 2022 13:28) (85% - 99%)    Parameters below as of 06 Sep 2022 13:28  Patient On (Oxygen Delivery Method): nasal cannula  O2 Flow (L/min): 2      PE  NAD  Awake, alert  Anicteric, MMM  RRR  CTAB  Abd soft, NT, ND  No c/c/e  No rash grossly  FROM                          10.8   36.55 )-----------( 151      ( 06 Sep 2022 06:34 )             33.1       09-06    140  |  100  |  6<L>  ----------------------------<  75  3.7   |  29  |  0.64    Ca    8.5      06 Sep 2022 06:34  Phos  3.7     09-06  Mg     1.80     09-06    TPro  5.7<L>  /  Alb  3.1<L>  /  TBili  0.2  /  DBili  <0.2  /  AST  33<H>  /  ALT  29  /  AlkPhos  216<H>  09-06

## 2022-09-06 NOTE — CHART NOTE - NSCHARTNOTEFT_GEN_A_CORE
Patient with admitting diagnosis of RUL Segmental PE and bilateral Pleural effusions.  Patient is in need of oxygen upon discharge from hospital. O2 saturating at rest on room air at  99%; Upon ambulation on room air patient desaturated to 85% on pulse oximetry - Placed 2L NC supplemental oxygen and patient improved on pulse oximetry to 97%. Patient requires oxygen at home 2L NC for hypoxia in the setting of PE and pleural effusions to assist in performing activities of daily living. Patient with admitting diagnosis of RUL Segmental PE and bilateral Pleural effusions.  Patient is in need of oxygen upon discharge from hospital. O2 saturating at rest on room air at  99%; Upon ambulation on room air patient desaturated to 85% on pulse oximetry - Placed 2L NC while and ambulating ,  pulse ox increased to 97%. Patient requires oxygen at home 2L NC for hypoxia in the setting of PE and pleural effusions to assist in performing activities of daily living.

## 2022-09-06 NOTE — PROGRESS NOTE ADULT - PROBLEM SELECTOR PLAN 2
- on arrival, Hb 10.9 with MCV 89  - chart review, baseline Hb 12  - no active s/s bleeding   - can pursue outpt w/u of folate/b12/iron  - may be in setting of active malignancy, as below  - keep active T&S, transfuse Hb<7
Leukocytosis, higher   -- Pt received Neulasta had 8/26  which likely explains leukocytosis  -- Urine and blood cultures NGTD 08/30    3Anemia, thrombocytopenia  -- Likely 2/2 chemo, last dose given 08/26   -- Monitor for count recovery   -- Transfuse to maintain hgb >7.0, platelets >10K
- on arrival, Hb 10.9 with MCV 89  - chart review, baseline Hb 12  - no active s/s bleeding   - can pursue outpt w/u of folate/b12/iron  - may be in setting of active malignancy, as below  - keep active T&S, transfuse Hb<7
Leukocytosis, higher   -- Pt received Neulasta had 8/26  which likely explains leukocytosis  -- Urine and blood cultures NGTD 08/30    3Anemia, thrombocytopenia  -- Likely 2/2 chemo, last dose given 08/26   -- Monitor for count recovery   -- Transfuse to maintain hgb >7.0, platelets >10K
- on arrival, Hb 10.9 with MCV 89  - chart review, baseline Hb 12  - no active s/s bleeding   - can pursue outpt w/u of folate/b12/iron  - may be in setting of active malignancy, as below  - keep active T&S, transfuse Hb<7

## 2022-09-06 NOTE — PROGRESS NOTE ADULT - PROBLEM SELECTOR PROBLEM 4
Transaminitis
Transaminitis
[FreeTextEntry1] : All blood tests were reviewed with patient.\par 
Transaminitis

## 2022-09-06 NOTE — PROGRESS NOTE ADULT - PROBLEM SELECTOR PLAN 11
- F: s/p 1L NS, encourage PO intake  - E: replete K<4, Mg<2  - N: DASH/TLC  - D: hep gtt -- transition to oral agent as appropriate  - G: famotidine 40mg daily    code: full  dispo: pending PT genie
- F: s/p 1L NS, encourage PO intake  - E: replete K<4, Mg<2  - N: DASH/TLC  - D: hep gtt -- transition to oral agent as appropriate  - G: famotidine 40mg daily    code: full  dispo: pending PT genie
poss dc
- F: s/p 1L NS, encourage PO intake  - E: replete K<4, Mg<2  - N: DASH/TLC  - D: hep gtt -- transition to oral agent as appropriate  - G: famotidine 40mg daily    code: full  dispo: pending PT genie
- F: s/p 1L NS, encourage PO intake  - E: replete K<4, Mg<2  - N: DASH/TLC  - D: hep gtt -- transition to oral agent as appropriate  - G: famotidine 40mg daily
- F: s/p 1L NS, encourage PO intake  - E: replete K<4, Mg<2  - N: DASH/TLC  - D: hep gtt -- transition to oral agent as appropriate  - G: famotidine 40mg daily    code: full  dispo: pending PT genie
poss dc
- F: s/p 1L NS, encourage PO intake  - E: replete K<4, Mg<2  - N: DASH/TLC  - D: hep gtt -- transition to oral agent as appropriate  - G: famotidine 40mg daily    code: full  dispo: pending PT genie

## 2022-09-06 NOTE — PROGRESS NOTE ADULT - PROBLEM SELECTOR PLAN 9
- pt with hx CAD s/p PCI 2021, on ASA 81mg daily  - c/w home med
None

## 2022-09-06 NOTE — PROGRESS NOTE ADULT - TIME BILLING
- Review of records, telemetry, vital signs and daily labs.   - General and cardiovascular physical examination.  - Generation of cardiovascular treatment plan.  - Coordination of care.      Patient was seen and examined by me on 9/4/22,interim events noted,labs and radiology studies reviewed.  Jairo Mayo MD,FACC.  [address] [place], [place] [id number].  [phone]
- Review of records, telemetry, vital signs and daily labs.   - General and cardiovascular physical examination.  - Generation of cardiovascular treatment plan.  - Coordination of care.      Patient was seen and examined by me on 9/6/22,interim events noted,labs and radiology studies reviewed.  Jairo Mayo MD,FACC.  [address] [place], [place] [id number].  [phone]
-d/w ACP  -D/W Daughter at bedside.Answered all questions and plan of care
-d/w ACP
- Review of records, telemetry, vital signs and daily labs.   - General and cardiovascular physical examination.  - Generation of cardiovascular treatment plan.  - Coordination of care.      Patient was seen and examined by me on 9/3/22,interim events noted,labs and radiology studies reviewed.  Jairo Mayo MD,FACC.  [address] [place], [place] [id number].  [phone]
-b/l pleural [id number] by IR,NO INTERVENTION.C/W Diuresis  -d/w ACP  -D/W Daughter at bedside.Answered all questions and plan of care
- Review of records, telemetry, vital signs and daily labs.   - General and cardiovascular physical examination.  - Generation of cardiovascular treatment plan.  - Coordination of care.      Patient was seen and examined by me on 9/5/22,interim events noted,labs and radiology studies reviewed.  Jairo Mayo MD,FACC.  [address] [place], [place] [id number].  [phone]

## 2022-09-06 NOTE — PROGRESS NOTE ADULT - PROBLEM SELECTOR PLAN 8
- pt with hx HLD, on crestor 20mg daily  - therapeutic exchange while admitted; atorvastatin 80mg daily
- pt with hx HLD, on crestor 20mg daily  -
- pt with hx HLD, on crestor 20mg daily  - therapeutic exchange while admitted; atorvastatin 80mg daily
- pt with hx HLD, on crestor 20mg daily  - therapeutic exchange while admitted; atorvastatin 80mg daily
- pt with hx HLD, on crestor 20mg daily  -

## 2022-09-06 NOTE — PROGRESS NOTE ADULT - PROBLEM SELECTOR PLAN 10
- pt with hx hypothyroidism, on synthroid 75mcg daily  - c/w home med

## 2022-09-06 NOTE — PROGRESS NOTE ADULT - NUTRITIONAL ASSESSMENT
This patient has been assessed with a concern for Malnutrition and has been determined to have a diagnosis/diagnoses of Moderate protein-calorie malnutrition.    This patient is being managed with:   Diet DASH/TLC-  Sodium & Cholesterol Restricted  Supplement Feeding Modality:  Oral  Ensure Enlive Cans or Servings Per Day:  1       Frequency:  Two Times a day  Entered: Sep  5 2022  4:11PM    
This patient has been assessed with a concern for Malnutrition and has been determined to have a diagnosis/diagnoses of Moderate protein-calorie malnutrition.    This patient is being managed with:   Diet DASH/TLC-  Sodium & Cholesterol Restricted  Supplement Feeding Modality:  Oral  Ensure Enlive Cans or Servings Per Day:  1       Frequency:  Two Times a day  Entered: Sep  5 2022  4:11PM    
This patient has been assessed with a concern for Malnutrition and has been determined to have a diagnosis/diagnoses of Moderate protein-calorie malnutrition.    This patient is being managed with:   Diet DASH/TLC-  Sodium & Cholesterol Restricted  Entered: Aug 31 2022 10:25AM    
This patient has been assessed with a concern for Malnutrition and has been determined to have a diagnosis/diagnoses of Moderate protein-calorie malnutrition.    This patient is being managed with:   Diet DASH/TLC-  Sodium & Cholesterol Restricted  Supplement Feeding Modality:  Oral  Ensure Enlive Cans or Servings Per Day:  1       Frequency:  Two Times a day  Entered: Sep  5 2022  4:11PM    
This patient has been assessed with a concern for Malnutrition and has been determined to have a diagnosis/diagnoses of Moderate protein-calorie malnutrition.    This patient is being managed with:   Diet DASH/TLC-  Sodium & Cholesterol Restricted  Entered: Aug 31 2022 10:25AM    
This patient has been assessed with a concern for Malnutrition and has been determined to have a diagnosis/diagnoses of Moderate protein-calorie malnutrition.    This patient is being managed with:   Diet DASH/TLC-  Sodium & Cholesterol Restricted  Supplement Feeding Modality:  Oral  Ensure Enlive Cans or Servings Per Day:  1       Frequency:  Two Times a day  Entered: Sep  5 2022  4:11PM

## 2022-09-06 NOTE — PROGRESS NOTE ADULT - PROBLEM SELECTOR PLAN 1
- pt p/w 2d R sided pain, cough, mild SOB, weakness, and difficulty ambulating; evaluated at [facility] and told that CXR showed LLL PNA and recommended ER eval  - on arrival, hemodynamically stable   - CTA chest PE protocol with RUL segmental PE, no evidence of R heart strain, mod BL pleural effusions, and perihepatic ascites of unclear etiology  - cont ac  - < from: Transthoracic Echocardiogram (09.01.22 @ 16:05) >    Mild mitral regurgitation.  2. Transcatheter aortic valve replacement. Peak transaortic  valve gradient equals 34 mm Hg, mean transaortic valve  gradient equals 20 mm Hg, which is probably normal in the  presence of a prosthetic valve. Mild aortic regurgitation.  3. Moderately dilated left atrium.  LA volume index = 43  cc/m2.  4. Moderate concentric left ventricular hypertrophy.  5. Low normal left ventricular systolic function. No  segmental wall motion abnormalities.
- pt p/w 2d R sided pain, cough, mild SOB, weakness, and difficulty ambulating; evaluated at [facility] and told that CXR showed LLL PNA and recommended ER eval  - on arrival, hemodynamically stable   - CTA chest PE protocol with RUL segmental PE, no evidence of R heart strain, mod BL pleural effusions, and perihepatic ascites of unclear etiology  - cont ac +echo
- pt p/w 2d R sided pain, cough, mild SOB, weakness, and difficulty ambulating; evaluated at [facility] and told that CXR showed LLL PNA and recommended ER eval  - on arrival, hemodynamically stable   - CTA chest PE protocol with RUL segmental PE, no evidence of R heart strain, mod BL pleural effusions, and perihepatic ascites of unclear etiology  - cont ac  - < from: Transthoracic Echocardiogram (09.01.22 @ 16:05) >    Mild mitral regurgitation.  2. Transcatheter aortic valve replacement. Peak transaortic  valve gradient equals 34 mm Hg, mean transaortic valve  gradient equals 20 mm Hg, which is probably normal in the  presence of a prosthetic valve. Mild aortic regurgitation.  3. Moderately dilated left atrium.  LA volume index = 43  cc/m2.  4. Moderate concentric left ventricular hypertrophy.  5. Low normal left ventricular systolic function. No  segmental wall motion abnormalities.
- pt p/w 2d R sided pain, cough, mild SOB, weakness, and difficulty ambulating; evaluated at [facility] and told that CXR showed LLL PNA and recommended ER eval  - on arrival, hemodynamically stable   - CTA chest PE protocol with RUL segmental PE, no evidence of R heart strain, mod BL pleural effusions, and perihepatic ascites of unclear etiology  - cont ac-Lovonex  - < from: Transthoracic Echocardiogram (09.01.22 @ 16:05) >    Mild mitral regurgitation.  2. Transcatheter aortic valve replacement. Peak transaortic  valve gradient equals 34 mm Hg, mean transaortic valve  gradient equals 20 mm Hg, which is probably normal in the  presence of a prosthetic valve. Mild aortic regurgitation.  3. Moderately dilated left atrium.  LA volume index = 43  cc/m2.  4. Moderate concentric left ventricular hypertrophy.  5. Low normal left ventricular systolic function. No  segmental wall motion abnormalities.
- pt p/w 2d R sided pain, cough, mild SOB, weakness, and difficulty ambulating; evaluated at [facility] and told that CXR showed LLL PNA and recommended ER eval  - on arrival, hemodynamically stable   - CTA chest PE protocol with RUL segmental PE, no evidence of R heart strain, mod BL pleural effusions, and perihepatic ascites of unclear etiology  - cont ac  - < from: Transthoracic Echocardiogram (09.01.22 @ 16:05) >    Mild mitral regurgitation.  2. Transcatheter aortic valve replacement. Peak transaortic  valve gradient equals 34 mm Hg, mean transaortic valve  gradient equals 20 mm Hg, which is probably normal in the  presence of a prosthetic valve. Mild aortic regurgitation.  3. Moderately dilated left atrium.  LA volume index = 43  cc/m2.  4. Moderate concentric left ventricular hypertrophy.  5. Low normal left ventricular systolic function. No  segmental wall motion abnormalities.
- pt p/w 2d R sided pain, cough, mild SOB, weakness, and difficulty ambulating; evaluated at [facility] and told that CXR showed LLL PNA and recommended ER eval  - on arrival, hemodynamically stable   - CTA chest PE protocol with RUL segmental PE, no evidence of R heart strain, mod BL pleural effusions, and perihepatic ascites of unclear etiology  - cont ac  - < from: Transthoracic Echocardiogram (09.01.22 @ 16:05) >    Mild mitral regurgitation.  2. Transcatheter aortic valve replacement. Peak transaortic  valve gradient equals 34 mm Hg, mean transaortic valve  gradient equals 20 mm Hg, which is probably normal in the  presence of a prosthetic valve. Mild aortic regurgitation.  3. Moderately dilated left atrium.  LA volume index = 43  cc/m2.  4. Moderate concentric left ventricular hypertrophy.  5. Low normal left ventricular systolic function. No  segmental wall motion abnormalities.
- pt p/w 2d R sided pain, cough, mild SOB, weakness, and difficulty ambulating; evaluated at [facility] and told that CXR showed LLL PNA and recommended ER eval  - on arrival, hemodynamically stable   - CTA chest PE protocol with RUL segmental PE, no evidence of R heart strain, mod BL pleural effusions, and perihepatic ascites of unclear etiology  - cont ac-Lovonex  - < from: Transthoracic Echocardiogram (09.01.22 @ 16:05) >    Mild mitral regurgitation.  2. Transcatheter aortic valve replacement. Peak transaortic  valve gradient equals 34 mm Hg, mean transaortic valve  gradient equals 20 mm Hg, which is probably normal in the  presence of a prosthetic valve. Mild aortic regurgitation.  3. Moderately dilated left atrium.  LA volume index = 43  cc/m2.  4. Moderate concentric left ventricular hypertrophy.  5. Low normal left ventricular systolic function. No  segmental wall motion abnormalities.

## 2022-09-06 NOTE — PROGRESS NOTE ADULT - SUBJECTIVE AND OBJECTIVE BOX
SUBJECTIVE / OVERNIGHT EVENTS:pt seen and examined  22     MEDICATIONS  (STANDING):  ascorbic acid 500 milliGRAM(s) Oral two times a day  aspirin enteric coated 81 milliGRAM(s) Oral daily  ATENolol  Tablet 50 milliGRAM(s) Oral every 12 hours  atorvastatin 80 milliGRAM(s) Oral at bedtime  chlorhexidine 2% Cloths 1 Application(s) Topical two times a day  cyanocobalamin 1000 MICROGram(s) Oral daily  enoxaparin Injectable 90 milliGRAM(s) SubCutaneous every 24 hours  famotidine    Tablet 40 milliGRAM(s) Oral daily  furosemide    Tablet 20 milliGRAM(s) Oral daily  levothyroxine 75 MICROGram(s) Oral daily  lidocaine   4% Patch 1 Patch Transdermal every 24 hours  melatonin 3 milliGRAM(s) Oral at bedtime  multivitamin 1 Tablet(s) Oral daily  senna 2 Tablet(s) Oral at bedtime    MEDICATIONS  (PRN):  acetaminophen     Tablet .. 650 milliGRAM(s) Oral every 6 hours PRN Temp greater or equal to 38C (100.4F), Mild Pain (1 - 3), Moderate Pain (4 - 6)  oxycodone    5 mG/acetaminophen 325 mG 1 Tablet(s) Oral every 6 hours PRN Severe Pain (7 - 10)  polyethylene glycol 3350 17 Gram(s) Oral daily PRN Constipation    Vital Signs Last 24 Hrs  T(C): 36.1 (22 @ 10:45), Max: 36.9 (22 @ 05:00)  T(F): 97 (22 @ 10:45), Max: 98.4 (22 @ 05:00)  HR: 85 (22 @ 10:45) (85 - 89)  BP: 124/60 (22 @ 10:45) (121/63 - 139/54)  BP(mean): --  RR: 18 (22 @ 13:28) (18 - 20)  SpO2: 97% (22 @ 13:28) (85% - 99%)          Constitutional: No fever, fatigue  Skin: No rash.  Eyes: No recent vision problems or eye pain.  ENT: No congestion, ear pain, or sore throat.  Cardiovascular: No chest pain or palpation.  Respiratory: No cough, shortness of breath, congestion, or wheezing.  Gastrointestinal: No abdominal pain, nausea, vomiting, or diarrhea.  Genitourinary: No dysuria.  Musculoskeletal: No joint swelling.  Neurologic: No headache.    PHYSICAL EXAM:  GENERAL: NAD  EYES: EOMI, PERRLA  NECK: Supple, No JVD  CHEST/LUNG: dec breath sounds at bases  HEART:  S1 , S2 +  ABDOMEN: soft , bs+  EXTREMITIES:  trace edema  NEUROLOGY:alert awake       LABS:      140  |  100  |  6<L>  ----------------------------<  75  3.7   |  29  |  0.64    Ca    8.5      06 Sep 2022 06:34  Phos  3.7       Mg     1.80         TPro  5.7<L>  /  Alb  3.1<L>  /  TBili  0.2  /  DBili  <0.2  /  AST  33<H>  /  ALT  29  /  AlkPhos  216<H>      Creatinine Trend: 0.64 <--, 0.58 <--, 0.59 <--, 0.55 <--, 0.51 <--, 0.53 <--, 0.43 <--, 0.37 <--, 0.43 <--                        10.8   36.55 )-----------( 151      ( 06 Sep 2022 06:34 )             33.1     Urine Studies:  Urinalysis Basic - ( 04 Sep 2022 18:49 )    Color: Yellow / Appearance: Clear / S.009 / pH:   Gluc:  / Ketone: Negative  / Bili: Negative / Urobili: <2 mg/dL   Blood:  / Protein: Negative / Nitrite: Negative   Leuk Esterase: Negative / RBC: 4 /HPF / WBC 3 /HPF   Sq Epi:  / Non Sq Epi: 0 /HPF / Bacteria: Negative              LIVER FUNCTIONS - ( 06 Sep 2022 06:34 )  Alb: 3.1 g/dL / Pro: 5.7 g/dL / ALK PHOS: 216 U/L / ALT: 29 U/L / AST: 33 U/L / GGT: x                         LIVER FUNCTIONS - ( 02 Sep 2022 05:45 )  Alb: 3.0 g/dL / Pro: 5.7 g/dL / ALK PHOS: 157 U/L / ALT: 15 U/L / AST: 22 U/L / GGT: x           PTT - ( 02 Sep 2022 06:05 )  PTT:88.6 sec      RADIOLOGY & ADDITIONAL TESTS:    Imaging Personally Reviewed:    Consultant(s) Notes Reviewed:      Care Discussed with Consultants/Other Providers:

## 2022-09-06 NOTE — PROGRESS NOTE ADULT - PROBLEM SELECTOR PROBLEM 1
Pulmonary embolism

## 2022-09-06 NOTE — PROGRESS NOTE ADULT - PROBLEM SELECTOR PROBLEM 6
Endometrial cancer

## 2022-09-06 NOTE — PROGRESS NOTE ADULT - PROBLEM SELECTOR PLAN 7
- pt with hx HTN, on atenolol 50mg BID, lasix 20mg daily, and losartan 100mg daily  - hold home meds in setting of PE -- restart as appropriate
-cnnt atenolol 50
- pt with hx HTN, on atenolol 50mg BID, lasix 20mg daily, and losartan 100mg daily  - hold home meds in setting of PE -- restart as appropriate
-cnnt atenolol 50

## 2022-09-06 NOTE — PROGRESS NOTE ADULT - SUBJECTIVE AND OBJECTIVE BOX
[address] 
[phone] Patient: Diana Rubi MRN: [id number] XKT:[id number]/4/0275 You are allergic to the following Allergen Reactions Latex Rash Aspirin Unknown (comments) Isoniazid Other (comments) Pcn (Penicillins) Rash  
    
 Sulfa (Sulfonamide Antibiotics) Rash Recent Documentation Height Weight Breastfeeding? BMI OB Status Smoking Status 1.6 m 104.9 kg No 40.96 kg/m2 Hysterectomy Never Smoker Unresulted Labs Order Current Status SAMPLE TO BLOOD BANK In process CULTURE, BLOOD Preliminary result Emergency Contacts Name Discharge Info Relation Home Work Mobile Kartik [place]  Child [2] [phone] Tarsha Machuca  Child [2] [phone] Refused,Refused DECLINED CAREGIVER [4] About your hospitalization You were admitted on:  April 22, 2017 You last received care in the:  [facility] 6S NEURO-SCI TELE You were discharged on:  April 28, 2017 Unit phone number:  [phone] Why you were hospitalized Your primary diagnosis was:  Acute Delirium Your diagnoses also included:  Shortness Of Breath, Delirium, Debility, Goals Of Care, Counseling/Discussion Providers Seen During Your Hospitalizations Provider Role Specialty Primary office phone Denise Turcios MD Attending Provider Emergency Medicine [phone] Alina Bateman MD Attending Provider Internal Medicine [phone] Mary Ann Torres MD Attending Provider Internal Medicine [phone] Aki Vega MD Attending Provider Internal Medicine [phone] Your Primary Care Physician (PCP) Primary Care Physician Office Phone Office Fax UNKNOWN, PROVIDER ** None ** ** None ** Follow-up Information Follow up With Details Comments Contact Info Provider Unknown   Patient not available to ask PULMONARY PROGRESS NOTE    FRIDA WELCH  MRN-[id number]    Patient is a 87y old  Female who presents with a chief complaint of PE (05 Sep 2022 18:06)      HPI:  -  -    ROS:   -    ACTIVE MEDICATION LIST:  MEDICATIONS  (STANDING):  ascorbic acid 500 milliGRAM(s) Oral two times a day  aspirin enteric coated 81 milliGRAM(s) Oral daily  ATENolol  Tablet 50 milliGRAM(s) Oral every 12 hours  atorvastatin 80 milliGRAM(s) Oral at bedtime  chlorhexidine 2% Cloths 1 Application(s) Topical two times a day  cyanocobalamin 1000 MICROGram(s) Oral daily  enoxaparin Injectable 90 milliGRAM(s) SubCutaneous every 24 hours  famotidine    Tablet 40 milliGRAM(s) Oral daily  furosemide    Tablet 20 milliGRAM(s) Oral daily  levothyroxine 75 MICROGram(s) Oral daily  lidocaine   4% Patch 1 Patch Transdermal every 24 hours  melatonin 3 milliGRAM(s) Oral at bedtime  multivitamin 1 Tablet(s) Oral daily  senna 2 Tablet(s) Oral at bedtime    MEDICATIONS  (PRN):  acetaminophen     Tablet .. 650 milliGRAM(s) Oral every 6 hours PRN Temp greater or equal to 38C (100.4F), Mild Pain (1 - 3), Moderate Pain (4 - 6)  oxycodone    5 mG/acetaminophen 325 mG 1 Tablet(s) Oral every 6 hours PRN Severe Pain (7 - 10)  polyethylene glycol 3350 17 Gram(s) Oral daily PRN Constipation      EXAM:  Vital Signs Last 24 Hrs  T(C): 36.1 (06 Sep 2022 10:45), Max: 36.9 (06 Sep 2022 05:00)  T(F): 97 (06 Sep 2022 10:45), Max: 98.4 (06 Sep 2022 05:00)  HR: 85 (06 Sep 2022 10:45) (85 - 89)  BP: 124/60 (06 Sep 2022 10:45) (121/63 - 139/54)  BP(mean): --  RR: 18 (06 Sep 2022 10:45) (18 - 18)  SpO2: 99% (06 Sep 2022 10:45) (98% - 99%)    Parameters below as of 06 Sep 2022 10:45  Patient On (Oxygen Delivery Method): room air        GENERAL: The patient is awake and alert in no apparent distress.     LUNGS: Clear to auscultation without wheezing, rales or rhonchi; respirations unlabored    HEART: Regular rate and rhythm without murmur.                            10.8   36.55 )-----------( 151      ( 06 Sep 2022 06:34 )             33.1       09-06    140  |  100  |  6<L>  ----------------------------<  75  3.7   |  29  |  0.64    Ca    8.5      06 Sep 2022 06:34  Phos  3.7     09-06  Mg     1.80     09-06    TPro  5.7<L>  /  Alb  3.1<L>  /  TBili  0.2  /  DBili  <0.2  /  AST  33<H>  /  ALT  29  /  AlkPhos  216<H>  09-06    >>> <<<    PROBLEM LIST:  87y Female with HEALTH ISSUES - PROBLEM Dx:  Pulmonary embolism    Normocytic anemia    Thrombocytopenia    Transaminitis    Endometrial cancer    HTN (hypertension)    HLD (hyperlipidemia)    CAD (coronary artery disease)    Hypothyroidism    Nutrition, metabolism, and development symptoms    Leukocytosis    Pulmonary thromboembolism              RECS:        Please call with any questions.    Jami Denny DO  [facility] Pulmonary/Sleep Medicine  [phone]   [address] [facility] [address] [place] [address] 
[phone] Current Discharge Medication List  
  
CONTINUE these medications which have CHANGED Dose & Instructions Dispensing Information Comments Morning Noon Evening Bedtime  
 donepezil 5 mg tablet Commonly known as:  ARICEPT What changed:  Another medication with the same name was removed. Continue taking this medication, and follow the directions you see here. Your last dose was: Your next dose is:    
   
   
 Dose:  5 mg Take 5 mg by mouth nightly. Refills:  0  
     
   
   
   
  
 gabapentin 300 mg capsule Commonly known as:  NEURONTIN What changed:  Another medication with the same name was removed. Continue taking this medication, and follow the directions you see here. Your last dose was: Your next dose is:    
   
   
 Dose:  300 mg Take 300 mg by mouth nightly. Refills:  0 CONTINUE these medications which have NOT CHANGED Dose & Instructions Dispensing Information Comments Morning Noon Evening Bedtime  
 acetaminophen 500 mg tablet Commonly known as:  TYLENOL Your last dose was: Your next dose is:    
   
   
 Dose:  500 mg Take 500 mg by mouth every eight (8) hours as needed for Pain. Patient received afternoon dose at [facility] Refills:  0  
     
   
   
   
  
 amLODIPine 2.5 mg tablet Commonly known as:  Meera Lala Your last dose was: Your next dose is:    
   
   
 Dose:  2.5 mg Take 2.5 mg by mouth nightly. Refills:  0  
     
   
   
   
  
 citalopram 20 mg tablet Commonly known as:  Rancho Flores Your last dose was: Your next dose is:    
   
   
 Dose:  20 mg Take 20 mg by mouth daily. Refills:  0  
     
   
   
   
  
 ergocalciferol 50,000 unit capsule Commonly known as:  ERGOCALCIFEROL Your last dose was: Your next dose is:    
   
   
 Dose:  841834 Units Take 100,000 Units by mouth every month. Refills:  0  
     
   
   
   
  
 furosemide 20 mg tablet Commonly known as:  LASIX Your last dose was: Your next dose is:    
   
   
 Dose:  20 mg Take 20 mg by mouth every Monday, Wednesday, Friday. Refills:  0  
     
   
   
   
  
 guaiFENesin 100 mg/5 mL liquid Commonly known as:  ROBITUSSIN Your last dose was: Your next dose is:    
   
   
 Dose:  200 mg Take 200 mg by mouth every four (4) hours as needed for Cough. Refills:  0  
     
   
   
   
  
 hydrALAZINE 100 mg tablet Commonly known as:  APRESOLINE Your last dose was: Your next dose is:    
   
   
 Dose:  50 mg Take 50 mg by mouth three (3) times daily. Refills:  0  
     
   
   
   
  
 levothyroxine 100 mcg tablet Commonly known as:  SYNTHROID Your last dose was: Your next dose is:    
   
   
 Dose:  100 mcg Take 100 mcg by mouth Daily (before breakfast). Refills:  0  
     
   
   
   
  
 lisinopril 40 mg tablet Commonly known as:  Rexanne  Your last dose was: Your next dose is:    
   
   
 Dose:  40 mg Take 40 mg by mouth daily. Refills:  0  
     
   
   
   
  
 magnesium oxide 400 mg tablet Commonly known as:  MAG-OX Your last dose was: Your next dose is:    
   
   
 Dose:  400 mg Take 400 mg by mouth daily. Refills:  0 MIRALAX 17 gram packet Generic drug:  polyethylene glycol Your last dose was: Your next dose is:    
   
   
 Dose:  17 g Take 17 g by mouth daily. Mix in 8 oz of water, juice, soda, coffee, or tea prior to administration Refills:  0 PLAVIX 75 mg Tab Generic drug:  clopidogrel Your last dose was: Your next dose is:    
   
   
 Dose:  75 mg Take 75 mg by mouth daily. Refills:  0 PULMONARY PROGRESS NOTE    FRIDA WELCH  MRN-[id number]    Patient is a 87y old  Female who presents with a chief complaint of PE (05 Sep 2022 18:06)      HPI:  -  on room air  was dyspneic yesterday  overnight events noted      ROS:   -    ACTIVE MEDICATION LIST:  MEDICATIONS  (STANDING):  ascorbic acid 500 milliGRAM(s) Oral two times a day  aspirin enteric coated 81 milliGRAM(s) Oral daily  ATENolol  Tablet 50 milliGRAM(s) Oral every 12 hours  atorvastatin 80 milliGRAM(s) Oral at bedtime  chlorhexidine 2% Cloths 1 Application(s) Topical two times a day  cyanocobalamin 1000 MICROGram(s) Oral daily  enoxaparin Injectable 90 milliGRAM(s) SubCutaneous every 24 hours  famotidine    Tablet 40 milliGRAM(s) Oral daily  furosemide    Tablet 20 milliGRAM(s) Oral daily  levothyroxine 75 MICROGram(s) Oral daily  lidocaine   4% Patch 1 Patch Transdermal every 24 hours  melatonin 3 milliGRAM(s) Oral at bedtime  multivitamin 1 Tablet(s) Oral daily  senna 2 Tablet(s) Oral at bedtime    MEDICATIONS  (PRN):  acetaminophen     Tablet .. 650 milliGRAM(s) Oral every 6 hours PRN Temp greater or equal to 38C (100.4F), Mild Pain (1 - 3), Moderate Pain (4 - 6)  oxycodone    5 mG/acetaminophen 325 mG 1 Tablet(s) Oral every 6 hours PRN Severe Pain (7 - 10)  polyethylene glycol 3350 17 Gram(s) Oral daily PRN Constipation      EXAM:  Vital Signs Last 24 Hrs  T(C): 36.1 (06 Sep 2022 10:45), Max: 36.9 (06 Sep 2022 05:00)  T(F): 97 (06 Sep 2022 10:45), Max: 98.4 (06 Sep 2022 05:00)  HR: 85 (06 Sep 2022 10:45) (85 - 89)  BP: 124/60 (06 Sep 2022 10:45) (121/63 - 139/54)  BP(mean): --  RR: 18 (06 Sep 2022 10:45) (18 - 18)  SpO2: 99% (06 Sep 2022 10:45) (98% - 99%)    Parameters below as of 06 Sep 2022 10:45  Patient On (Oxygen Delivery Method): room air        GENERAL: The patient is awake and alert in no apparent distress.     LUNGs: not labored  decreased at bases b/l    trace pitting edema b/l                            10.8   36.55 )-----------( 151      ( 06 Sep 2022 06:34 )             33.1       09-06    140  |  100  |  6<L>  ----------------------------<  75  3.7   |  29  |  0.64    Ca    8.5      06 Sep 2022 06:34  Phos  3.7     09-06  Mg     1.80     09-06    TPro  5.7<L>  /  Alb  3.1<L>  /  TBili  0.2  /  DBili  <0.2  /  AST  33<H>  /  ALT  29  /  AlkPhos  216<H>  09-06     < from: Xray Chest 1 View- PORTABLE-Urgent (Xray Chest 1 View- PORTABLE-Urgent .) (09.04.22 @ 16:54) >    ACC: [id number] EXAM:  XR CHEST PORTABLE URGENT 1V                          PROCEDURE DATE:  09/04/2022          INTERPRETATION:  CLINICAL INFORMATION: Dyspnea and leukocytosis    TIME OF EXAMINATION: September 4 at 4:24 PM    EXAM: Portable chest    FINDINGS:  Since the last exam, there has been a considerable increase in bilateral   pleural effusions moderate now. Visible upper lung fields are clear.   Heart size is stable. No pneumothorax.    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 Enthesophyte is seen. There        COMPARISON: August 31        IMPRESSION: Follow-up showing increased bilateral pleural ef    < end of copied text >  < from: Transthoracic Echocardiogram (09.01.22 @ 16:05) >  ------------------------------------------------------------------------  CONCLUSIONS:  1. Mitral annular calcification, otherwise normal mitral  valve. Mild mitral regurgitation.  2. Transcatheter aortic valve replacement. Peak transaortic  valve gradient equals 34 mm Hg, mean transaortic valve  gradient equals 20 mm Hg, which is probably normal in the  presence of a prosthetic valve. Mild aortic regurgitation.  3. Moderately dilated left atrium.  LA volume index = 43  cc/m2.  4. Moderate concentric left ventricular hypertrophy.  5. Low normal left ventricular systolic function. No  segmental wall motion abnormalities.  6. Mild diastolic dysfunction (Stage I).  7. Normal right ventricular size and function.  8. Left pleural effusion.  ------------------------------------------------------------------------  Confirmed on  9/1/2022 - 17:18:12 by Cole Riggs M.D.,  [facility], [facility]  ------------------------------------------------------------------------    < end of copied text >    PROBLEM LIST:  87y Female with HEALTH ISSUES - PROBLEM Dx:  Pulmonary embolism    Normocytic anemia    Thrombocytopenia    Transaminitis    Endometrial cancer    HTN (hypertension)    HLD (hyperlipidemia)    CAD (coronary artery disease)    Hypothyroidism    Nutrition, metabolism, and development symptoms    Leukocytosis    Pulmonary thromboembolism              RECS:  lovenox for PE  diuresis        Please call with any questions.    Jami Denny DO  [facility] Pulmonary/Sleep Medicine  [phone]   polyvinyl alcohol 1.4 % ophthalmic solution Commonly known as:  Aletta Miriam Your last dose was: Your next dose is:    
   
   
 Dose:  2 Drop Administer 2 Drops to both eyes ACB/HS. Refills:  0 SARNA ANTI-ITCH 0.5-0.5 % lotion Generic drug:  camphor-menthol Your last dose was: Your next dose is:    
   
   
 Apply  to affected area four (4) times daily as needed (neuropathich pain). Refills:  0  
     
   
   
   
  
 simethicone 80 mg chewable tablet Commonly known as:  Chase Nicholas Your last dose was: Your next dose is:    
   
   
 Dose:  160 mg Take 160 mg by mouth every eight (8) hours as needed for Flatulence. Refills:  0 VOLTAREN 1 % Gel Generic drug:  diclofenac Your last dose was: Your next dose is:    
   
   
 Dose:  2 g Apply 2 g to affected area four (4) times daily as needed (Knee and shoulder pain). Refills:  0 ZOFRAN (AS HYDROCHLORIDE) 4 mg tablet Generic drug:  ondansetron hcl Your last dose was: Your next dose is:    
   
   
 Dose:  4 mg Take 4 mg by mouth every eight (8) hours as needed for Nausea. Refills:  0 STOP taking these medications DECARA 50,000 unit Cap Generic drug:  Cholecalciferol (Vitamin D3) HYDROcodone-acetaminophen 5-325 mg per tablet Commonly known as:  NORCO  
   
  
 VITAMIN D3 2,000 unit Tab Generic drug:  cholecalciferol (vitamin D3) Discharge Instructions Discharge SNF/Rehab Instructions/LTAC  
 
 
PATIENT ID: Brooke Jimenez MRN: [id number] YOB: 1931 DATE OF ADMISSION: 4/21/2017  3:29 PM   
DATE OF DISCHARGE: 4/28/2017 PRIMARY CARE PROVIDER: PROVIDER UNKNOWN  
 
 
DISCHARGING PHYSICIAN: Alan Mandujano NP To contact this individual call [phone] and ask the  to page. If unavailable ask to be transferred the Adult Hospitalist Department. CONSULTATIONS: IP CONSULT TO HOSPITALIST 
IP CONSULT TO GENERAL SURGERY 
IP CONSULT TO CARDIOLOGY 
IP CONSULT TO INTERVENTIONAL RADIOLOGY 
IP CONSULT TO PALLIATIVE CARE - PROVIDER PROCEDURES/SURGERIES: Procedure(s): CHOLECYSTECTOMY LAPAROSCOPIC POSSIBLE OPEN  
 
ADMITTING DIAGNOSES & HOSPITAL COURSE:  
 
82year old female with history of prior CVA with residual right-sided weakness, HTN, DM-2, dementia, chronic diastolic heart failure, hypothyroidism presented on 4/22/17 with altered mental status and poorly characterized right upper quadrant abdominal pain. She was noted to have findings concerning for acute cholecystitis on CT Abd/Pelvis which was also confirmed via hida scan. She was offered a surgical removal of gallbladder however family wished for more conservative treatment with fluids and IV antibiotics. Despite five days of IV antibiotics and aggressive medical treatments Ms. Brittni Lewis continues to not improve medically ( fever, increasing WBC, lethargy and confusion). There was a palliative care meeting today to discuss goals of care and the following was decided : 
 
 
Note from Palliative care today :  
 
We discussed the option of continuing antibiotics for a few more days vs stopping everything including TPN and focus on comfort alone. She can also return to a [facility]ACE facility and receive comfort care under their care. Family agreeable to this option and want comfort care. 
  
2. Comfort care orders initiated. 3. TPN and antibiotics stopped. 4. [facility]  will co ordinate discharge with primary team and  5. Initial consult note routed to primary continuity provider 6. Communicated plan of care with: Palliative IDTDr. Smalls Apo DISCHARGE DIAGNOSES / PLAN DURING ADMISSION:   
 
1. Delirium, acute metabolic encephalopathy 
-has hit a plateau at this point , no further improvement seen - suspect secondary to sepsis in setting of possible acute cholecystitis - CT head 4/21 - Chronic microvascular ischemic change. No acute intracranial process. - MRI brain 4/22 - No evidence for acute infarction. Mild to moderate bilateral subcortical and periventricular increased T2/FLAIR signal abnormalities are nonspecific but may represent changes of chronic small 
vessel ischemic disease. 
  
2. Suspected sepsis secondary to acute cholecystitis - with tachycardia, leukocytosis upon admission ( white count is rising again) - CT Abd/Pelvis 4/21 - Findings are suggestive of acute cholecystitis. - Abd U/S 4/21 - Distended gallbladder with gallstones; no biliary ductal dilatation. No right hydronephrosis. - blood cultures 4/21 - gram positive cocci in clusters in 1/2 bottles drawn - coagulase negative Staphylococcus 
- on empiric levofloxacin, Flagyl, added vancomycin due to GPCs in clusters as above, however, this is likely contaminant , Vanc dc'd 
- NGTD on repeat cultures 4/26 
- HIDA on 4/24 with cystic duct obstruction and possible acute cholecystitis - Attempted to place cholecystostomy tube on 4/24 and unable to do so. Family declines surgery. 
  
3. Possible STACEY 
-resolved 
- unclear baseline Cr, possibly some underlying CKD, Cr elevated to 1.80 upon admission, suspect prerenal azotemia and sepsis contributing, seemingly improving 4. Prior CVA  
- holding home Plavix in setting of possible need for intervention  
  
5. Chronic diastolic heart failure 
- does not currently appear in acute exacerbation of this diagnosis 
- unclear baseline NYHA classification, unable to determine at this juncture given her mentation 
- on hydralazine 
  
6. Dementia 
- continue home Aricept, Celexa 
  
7. DM-2 
- sliding scale insulin coverage increased to resistant, stable 
- continue home gabapentin 
  
8. HTN 
- home amlodipine, hydralazine 
-increased Amlodipine dose on 4/26- BP minimally improved on 4/27 - monitor  
  
9. Hypothyroidism 
- resume home levothyroxine 
  
10. Wheezing- improved - predominantly upper airways, suspect some slight reactive airways - prn DuoNebs 
  
11. Morbid Obesity - BMI 37.96 
  
12 Hypernatremia - TPN started per general surgery - stopped today per comfort care measures 
- accu checks AC & HS with sliding scale insulin ordered 
-monitor 
  
  
13. Malnutrition in the setting of dysphagia 
-stop TPN today t 
-??? Dietary put in reccs for dysphagia II pureed diet PENDING TEST RESULTS:  
At the time of discharge the following test results are still pending: none FOLLOW UP APPOINTMENTS:   
Follow-up Information Follow up With Details Comments Contact Info Provider Unknown   Patient not available to ask ADDITIONAL CARE RECOMMENDATIONS: per [facility] clinic DIET: Dysphagia diet-pureed- Dysphagia II and Comfort feedings OXYGEN / BiPAP SETTINGS: Supplemental oxygen for comfort to keep saturations > 90% ACTIVITY: Activity as tolerated DISCHARGE MEDICATIONS: 
 See Medication Reconciliation Form NOTIFY YOUR PHYSICIAN FOR ANY OF THE FOLLOWING:  
Fever over 101 degrees for 24 hours. Chest pain, shortness of breath, fever, chills, nausea, vomiting, diarrhea, change in mentation, falling, weakness, bleeding. Severe pain or pain not relieved by medications. Or, any other signs or symptoms that you may have questions about. DISPOSITION: 
  Home With: 
 OT  PT  HH  RN  
  
X SNF/Inpatient Rehab/LTAC Independent/assisted living Hospice Other:  
 
 
PATIENT CONDITION AT DISCHARGE:  
 
Functional status X Poor Deconditioned Independent Cognition Marii Sow Forgetful X Dementia Catheters/lines (plus indication) Lua PICC   
 PEG   
X None Code status Full code X DNR   
 
 
 
CHRONIC MEDICAL DIAGNOSES: 
Problem List as of 4/28/2017  Date Reviewed: 4/28/2017 Codes Class Noted - Resolved Shortness of breath ICD-10-CM: R06.02 
ICD-9-CM: 786.05  4/27/2017 - Present Delirium ICD-10-CM: R41.0 ICD-9-CM: 780.09  4/27/2017 - Present Debility ICD-10-CM: R53.81 ICD-9-CM: 799.3  4/27/2017 - Present Goals of care, counseling/discussion ICD-10-CM: Z71.89 ICD-9-CM: V65.49  4/27/2017 - Present * (Principal)Acute delirium ICD-10-CM: R41.0 ICD-9-CM: 780.09  4/22/2017 - Present CDMP Checked: Yes X Signed:  
Laureen Felder NP 
4/28/2017 
3:37 PM 
 
  
 
Discharge Orders None [employer] Announcement We are excited to announce that we are making your provider's discharge notes available to you in [employer]. You will see these notes when they are completed and signed by the physician that discharged you from your recent hospital stay. If you have any questions or concerns about any information you see in [employer], please call the Health Information Department where you were seen or reach out to your Primary Care Provider for more information about your plan of care. Introducing [facility]! [address] [facility] introduces [employer] patient portal. Now you can access parts of your medical record, email your doctor's office, and request medication refills online. 1. In your internet browser, go to [email or web address] 2. Click on the First Time User? Click Here link in the Sign In box. You will see the New Member Sign Up page. 3. Enter your [employer] Access Code exactly as it appears below. You will not need to use this code after youve completed the sign-up process. If you do not sign up before the expiration date, you must request a new code. · [employer] Access Code: [email or web address] Expires: 7/20/2017  5:15 PM 
 
4. Enter the last four digits of your Social Security Number (xxxx) and Date of Birth (mm/dd/yyyy) as indicated and click Submit. You will be taken to the next sign-up page. 5. Create a Walltik ID. This will be your [employer]tik login ID and cannot be changed, so think of one that is secure and easy to remember. 6. Create a Walltik password. You can change your password at any time. 7. Enter your Password Reset Question and Answer. This can be used at a later time if you forget your password. 8. Enter your e-mail address. You will receive e-mail notification when new information is available in [address] 9. Click Sign Up. You can now view and download portions of your medical record. 10. Click the Download Summary menu link to download a portable copy of your medical information. If you have questions, please visit the Frequently Asked Questions section of the [employer] website. Remember, [employer] is NOT to be used for urgent needs. For medical emergencies, dial 911. Now available from your iPhone and Android! General Information Please provide this summary of care documentation to your next provider. Patient Signature:  ____________________________________________________________ Date:  ____________________________________________________________  
  
Orestes Mcgill Provider Signature:  ____________________________________________________________ Date:  ____________________________________________________________

## 2022-09-06 NOTE — PROGRESS NOTE ADULT - PROBLEM SELECTOR PLAN 3
- on arrival, plt 90; per chart review, baseline varies between   - unclear etiology, could consider peripheral smear  - likely in setting of active malignancy and chemotherapy, as below
plan as above
- on arrival, plt 90; per chart review, baseline varies between   - unclear etiology, could consider peripheral smear  - likely in setting of active malignancy and chemotherapy, as below
plan as above
- on arrival, plt 90; per chart review, baseline varies between   - unclear etiology, could consider peripheral smear  - likely in setting of active malignancy and chemotherapy, as below

## 2022-09-06 NOTE — PROGRESS NOTE ADULT - PROBLEM SELECTOR PLAN 6
- pt with hx of S4 serous endometrial carcinoma, f/w onc at [facility] [place] on chemotherapy s/p 2nd dose 8/26  - chest wall port c/d/i
- pt with hx of S4 serous endometrial carcinoma, f/w onc at [facility] [place] on chemotherapy s/p 2nd dose 8/26  - chest wall port c/d/i
- pt with hx of S4 serous endometrial carcinoma, f/w onc at [facility] [place] on chemotherapy s/p 2nd dose 8/26  - chest wall port c/d/i
- pt with hx of S4 serous endometrial carcinoma, f/w onc at [facility] [place] on chemotherapy s/p 2nd dose 8/26  - chest wall port c/d/i
- pt with hx of S4 serous endometrial carcinoma, f/w onc at [facility] [place] on chemotherapy s/p 2nd dose 8/26  - chest wall port c/d/i
- pt with hx of S4 serous endometrial carcinoma, f/w onc at [facility] [place] on chemotherapy s/p 2nd dose 8/26  - chest wall port c/d/i
- pt with hx of S4 serous endometrial carcinoma, f[place] [facility] [place] s/p 2nd dose 8/26  - chest wall port c/d/i
- pt with hx of S4 serous endometrial carcinoma, f/w onc at [facility] [place] on chemotherapy s/p 2nd dose 8/26  - chest wall port c/d/i
- pt with hx of S4 serous endometrial carcinoma, f/w onc at [facility] [place] on chemotherapy s/p 2nd dose 8/26  - chest wall port c/d/i
- pt with hx of S4 serous endometrial carcinoma, [place] [facility] [place] 2nd dose [place]  - chest wall port c/d/i

## 2022-09-07 NOTE — PROGRESS NOTE ADULT - SUBJECTIVE AND OBJECTIVE BOX
PULMONARY PROGRESS NOTE    FRIDA WELCH  MRN-[id number]    Patient is a 87y old  Female who presents with a chief complaint of PE (06 Sep 2022 14:11)      HPI:  -  -    ROS:   -    ACTIVE MEDICATION LIST:  MEDICATIONS  (STANDING):  ascorbic acid 500 milliGRAM(s) Oral two times a day  aspirin enteric coated 81 milliGRAM(s) Oral daily  ATENolol  Tablet 50 milliGRAM(s) Oral every 12 hours  atorvastatin 80 milliGRAM(s) Oral at bedtime  chlorhexidine 2% Cloths 1 Application(s) Topical two times a day  cyanocobalamin 1000 MICROGram(s) Oral daily  enoxaparin Injectable 90 milliGRAM(s) SubCutaneous every 24 hours  famotidine    Tablet 40 milliGRAM(s) Oral daily  furosemide    Tablet 20 milliGRAM(s) Oral daily  levothyroxine 75 MICROGram(s) Oral daily  lidocaine   4% Patch 1 Patch Transdermal every 24 hours  melatonin 3 milliGRAM(s) Oral at bedtime  multivitamin 1 Tablet(s) Oral daily  senna 2 Tablet(s) Oral at bedtime    MEDICATIONS  (PRN):  acetaminophen     Tablet .. 650 milliGRAM(s) Oral every 6 hours PRN Temp greater or equal to 38C (100.4F), Mild Pain (1 - 3), Moderate Pain (4 - 6)  oxycodone    5 mG/acetaminophen 325 mG 1 Tablet(s) Oral every 6 hours PRN Severe Pain (7 - 10)  polyethylene glycol 3350 17 Gram(s) Oral daily PRN Constipation      EXAM:  Vital Signs Last 24 Hrs  T(C): 37 (07 Sep 2022 05:30), Max: 37 (07 Sep 2022 05:30)  T(F): 98.6 (07 Sep 2022 05:30), Max: 98.6 (07 Sep 2022 05:30)  HR: 79 (07 Sep 2022 05:30) (79 - 89)  BP: 107/56 (07 Sep 2022 05:30) (107/56 - 124/60)  BP(mean): --  RR: 18 (07 Sep 2022 05:30) (18 - 20)  SpO2: 98% (07 Sep 2022 05:30) (85% - 99%)    Parameters below as of 07 Sep 2022 05:30  Patient On (Oxygen Delivery Method): room air        GENERAL: The patient is awake and alert in no apparent distress.     LUNGS: Clear to auscultation without wheezing, rales or rhonchi; respirations unlabored    HEART: Regular rate and rhythm without murmur.                            10.4   36.03 )-----------( 173      ( 07 Sep 2022 07:26 )             30.8       09-07    141  |  101  |  6<L>  ----------------------------<  68<L>  4.0   |  26  |  0.51    Ca    8.2<L>      07 Sep 2022 07:26  Phos  3.3     09-07  Mg     1.70     09-07    TPro  5.4<L>  /  Alb  3.1<L>  /  TBili  0.2  /  DBili  <0.2  /  AST  31  /  ALT  24  /  AlkPhos  245<H>  09-07    >>> <<<    PROBLEM LIST:  87y Female with HEALTH ISSUES - PROBLEM Dx:  Pulmonary embolism    Normocytic anemia    Thrombocytopenia    Transaminitis    Endometrial cancer    HTN (hypertension)    HLD (hyperlipidemia)    CAD (coronary artery disease)    Hypothyroidism    Nutrition, metabolism, and development symptoms    Leukocytosis    Pulmonary thromboembolism              RECS:        Please call with any questions.    Jami Denny DO  [facility] Pulmonary/Sleep Medicine  [phone]   PULMONARY PROGRESS NOTE    FRIDA WELCH  MRN-[id number]    Patient is a 87y old  Female who presents with a chief complaint of PE (06 Sep 2022 14:11)      HPI:  - overnight events noted  on room air now  feels dyspnea with exertion  -    ROS:   -    ACTIVE MEDICATION LIST:  MEDICATIONS  (STANDING):  ascorbic acid 500 milliGRAM(s) Oral two times a day  aspirin enteric coated 81 milliGRAM(s) Oral daily  ATENolol  Tablet 50 milliGRAM(s) Oral every 12 hours  atorvastatin 80 milliGRAM(s) Oral at bedtime  chlorhexidine 2% Cloths 1 Application(s) Topical two times a day  cyanocobalamin 1000 MICROGram(s) Oral daily  enoxaparin Injectable 90 milliGRAM(s) SubCutaneous every 24 hours  famotidine    Tablet 40 milliGRAM(s) Oral daily  furosemide    Tablet 20 milliGRAM(s) Oral daily  levothyroxine 75 MICROGram(s) Oral daily  lidocaine   4% Patch 1 Patch Transdermal every 24 hours  melatonin 3 milliGRAM(s) Oral at bedtime  multivitamin 1 Tablet(s) Oral daily  senna 2 Tablet(s) Oral at bedtime    MEDICATIONS  (PRN):  acetaminophen     Tablet .. 650 milliGRAM(s) Oral every 6 hours PRN Temp greater or equal to 38C (100.4F), Mild Pain (1 - 3), Moderate Pain (4 - 6)  oxycodone    5 mG/acetaminophen 325 mG 1 Tablet(s) Oral every 6 hours PRN Severe Pain (7 - 10)  polyethylene glycol 3350 17 Gram(s) Oral daily PRN Constipation      EXAM:  Vital Signs Last 24 Hrs  T(C): 37 (07 Sep 2022 05:30), Max: 37 (07 Sep 2022 05:30)  T(F): 98.6 (07 Sep 2022 05:30), Max: 98.6 (07 Sep 2022 05:30)  HR: 79 (07 Sep 2022 05:30) (79 - 89)  BP: 107/56 (07 Sep 2022 05:30) (107/56 - 124/60)  BP(mean): --  RR: 18 (07 Sep 2022 05:30) (18 - 20)  SpO2: 98% (07 Sep 2022 05:30) (85% - 99%)    Parameters below as of 07 Sep 2022 05:30  Patient On (Oxygen Delivery Method): room air        GENERAL: The patient is awake and alert in no apparent distress.     LUNGS: decreased at bases b/l                             10.4   36.03 )-----------( 173      ( 07 Sep 2022 07:26 )             30.8       09-07    141  |  101  |  6<L>  ----------------------------<  68<L>  4.0   |  26  |  0.51    Ca    8.2<L>      07 Sep 2022 07:26  Phos  3.3     09-07  Mg     1.70     09-07    TPro  5.4<L>  /  Alb  3.1<L>  /  TBili  0.2  /  DBili  <0.2  /  AST  31  /  ALT  24  /  AlkPhos  245<H>  09-07     < from: Xray Chest 1 View- PORTABLE-Urgent (Xray Chest 1 View- PORTABLE-Urgent .) (09.04.22 @ 16:54) >    ACC: [id number] EXAM:  XR CHEST PORTABLE URGENT 1V                          PROCEDURE DATE:  09/04/2022          INTERPRETATION:  CLINICAL INFORMATION: Dyspnea and leukocytosis    TIME OF EXAMINATION: September 4 at 4:24 PM    EXAM: Portable chest    FINDINGS:  Since the last exam, there has been a considerable increase in bilateral   pleural effusions moderate now. Visible upper lung fields are clear.   Heart size is stable. No pneumothorax.    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 Enthesophyte is seen. There        COMPARISON: August 31        IMPRESSION: Follow-up showing increased bilateral pleural effusions.    --- End of Report ---            ANTELMO DARDEN MD; Attending Radiologist    < end of copied text >      PROBLEM LIST:  87y Female with HEALTH ISSUES - PROBLEM Dx:  Pulmonary embolism    Normocytic anemia    Thrombocytopenia    Transaminitis    Endometrial cancer    HTN (hypertension)    HLD (hyperlipidemia)    CAD (coronary artery disease)    Hypothyroidism    Nutrition, metabolism, and development symptoms    Leukocytosis    Pulmonary thromboembolism             RECS:  lovenox for PE  02 requirements noted  would suggest aggressive diuresis if possible      Please call with any questions.    Jami Denny DO  [facility] Pulmonary/Sleep Medicine  [phone]

## 2022-09-07 NOTE — PROGRESS NOTE ADULT - ASSESSMENT
Hematology/Oncology called to see patient who is under care of Dr. Praveena Alvarado of [facility] for the management of stage IV uterine carcinosarcoma. She received cycle 2 carbo/taxol and pegfilgrastim on 08/26. Pt was initially seen at [facility] for cough and shortness of breath with CXR concerning for pneumonia, now with PE and pleural effusions.    1. Pulmonary embolism, bilateral pleural effusions, ?URI   -- CTA shows RUL segmental PE w/o right heart strain + moderate bilateral pleural effusions   -- PE likely provoked in setting of active malignancy and recent hospitalization   -- Continue lovenox for now  -- Pulmonary recs appreciated   -- LE duplex negative for DVT   -- Currently on nasal cannula  -- Per IR, recommend continued diuresis per primary team as clinically indicated. Will hold off on thoracentesis at this time    2. Leukocytosis, higher   -- Pt received Neulasta had 8/26  which likely explains leukocytosis  -- Urine and blood cultures NGTD 08/30    3. Anemia, thrombocytopenia  -- Likely 2/2 chemo, last dose given 08/26   -- Monitor for count recovery   -- Transfuse to maintain hgb >7.0, platelets >10K    4. Uterine carcinosarcoma  -- Received C2 carbo/taxol on 08/26. No systemic treatment while inpatient   -- Outpatient follow-up with [facility] after discharge     Juancarlos Yeh PA-C  Hematology/Oncology  [facility]  [phone] (office)  [phone] (alt office)  Evenings and weekends please call MD on call or office
Hematology/Oncology called to see patient who is under [place] Pra[place]ena [place] [facility] of stage IV uterine carcinosarcoma. She received cycle 2 carbo/taxol and pegfilgrastim on 08/26. [place] [facility] [place] [place] and shortness of breath with CXR concerning for pneumonia, now with PE and pleural effusions     1. Pulmonary embolism, bilateral pleural effusions, ?URI   -- CTA shows RUL segmental PE w/o right heart strain + moderate bilateral pleural effusions   -- PE likely provoked in setting of active malignancy and recent hospitalization   -- transitioned to lovenox; c/w lovenox for now  -- Pulmonary recs appreciated   -- LE duplex negative for DVT     2. Leukocytosis, higher   -- Pt received Neulasta had 8/26  which likely explains leukocytosis  -- Urine and blood cultures NGTD 08/30    3. Anemia, thrombocytopenia  -- Likely 2/2 chemo, last dose given 08/26   -- Monitor for count recovery   -- Transfuse to maintain hgb >7.0 grams, platelets >10K    4. Uterine carcinosarcoma  -- Received C2 carbo/taxol on 08/26. No systemic treatment while inpatient   -- [place] [facility] [place]       5. Nausea  -Bowel rest,   Will continue to follow    We will be happy to answer any onc questions on behalf of [facility] and will be in touch with them.    Víctor Marinelli MD  Hematology/Oncology  Weekends and nights please call [phone] for MD on call  Cell:  [phone]  Office Phone: [phone]  Office Fax:  [phone]  [address] [place] [address] 
Pt is an 86 yo F with PMH S4 serous endometrial carcinoma ([facility] [place], chest wall port, chemo), HTN, CAD (s/p PCI 2021), AV replacement, hypothyroidism, and HLD who p/w R sided chest pain, cough, mild SOB, weakness, and difficulty ambulating x2d. Found to have RUL segmental PE, on heparin gtt, admitted to medicine for further observation and management. 
Hematology/Oncology called to see patient who is under care of Dr. Praveena Alvarado of [facility] for the management of stage IV uterine carcinosarcoma. She received cycle 2 carbo/taxol and pegfilgrastim on 08/26. Pt was initially seen at [facility] [place] for cough and shortness of breath with CXR concerning for pneumonia, then sent to [facility] for further workup and treatment. She notes improvement in symptoms today, but notes left calf tenderness.     1. Pulmonary embolism, bilateral pleural effusions, ?URI   -- CTA shows RUL segmental PE w/o right heart strain + moderate bilateral pleural effusions   -- PE likely provoked in setting of active malignancy and recent hospitalization   -- c/w heparin drip for now. Would prefer transition to Lovenox on discharge for intermediate-term anticoagulation given thrombocytopenia with active chemotherapy  -- Pulmonary recs appreciated   -- LE duplex negative for DVT     2. Leukocytosis, improving  -- Pt received Neulasta 08/26   -- Urine and blood cultures NGTD 08/30    3. Anemia, thrombocytopenia  -- Likely 2/2 chemo, last dose given 08/26   -- Monitor for count recovery   -- Transfuse to maintain hgb >7.0 grams, platelets >10K    4. Uterine carcinosarcoma  -- Received C2 carbo/taxol on 08/26. No systemic treatment while inpatient   -- Outpatient follow-up with [facility] after discharge     Will continue to follow.    Twyla Pitts PA-C  Hematology/Oncology  [facility]  [phone] (office)  [phone] (alt office)  Evenings and weekends please call MD on call or office  
Hematology/Oncology called to see patient who is under care of Dr. Praveena Alvarado of [facility] for the management of stage IV uterine carcinosarcoma. She received cycle 2 carbo/taxol and pegfilgrastim on 08/26. Pt was initially seen at [facility] [place] for cough and shortness of breath with CXR concerning for pneumonia, now with PE and pleural effusions     1. Pulmonary embolism, bilateral pleural effusions, ?URI   -- CTA shows RUL segmental PE w/o right heart strain + moderate bilateral pleural effusions   -- PE likely provoked in setting of active malignancy and recent hospitalization   -- transitioned to lovenox; c/w lovenox for now  -- Pulmonary recs appreciated   -- LE duplex negative for DVT     2. Leukocytosis, higher   -- Pt received Neulasta had 8/26  which likely explains leukocytosis  -- Urine and blood cultures NGTD 08/30    3. Anemia, thrombocytopenia  -- Likely 2/2 chemo, last dose given 08/26   -- Monitor for count recovery   -- Transfuse to maintain hgb >7.0 grams, platelets >10K    4. Uterine carcinosarcoma  -- Received C2 carbo/taxol on 08/26. No systemic treatment while inpatient   -- Outpatient follow-up with [facility] after discharge       5. Nausea  -Bowel rest improving    Campbell Frances MD  [facility]  Cell: [phone]  
Hematology/Oncology called to see patient who is under care of Dr. Praveena Alvarado of [facility] [place] [facility] for the management of stage IV uterine carcinosarcoma. She received cycle 2 carbo/taxol and pegfilgrastim on 08/26. Pt was initially seen at [facility] for cough and shortness of breath with CXR concerning for pneumonia, then sent to [facility] for further workup and treatment. She notes improvement in symptoms today, but notes left calf tenderness.     1. Pulmonary embolism, bilateral pleural effusions, ?URI   -- CTA shows RUL segmental PE w/o right heart strain + moderate bilateral pleural effusions   -- PE likely provoked in setting of active malignancy and recent hospitalization   -- transitioned to lovenox   -- Pulmonary recs appreciated   -- LE duplex negative for DVT     2. Leukocytosis, improving  -- Pt received Neulasta 08/26   -- Urine and blood cultures NGTD 08/30    3. Anemia, thrombocytopenia  -- Likely 2/2 chemo, last dose given 08/26   -- Monitor for count recovery   -- Transfuse to maintain hgb >7.0 grams, platelets >10K    4. Uterine carcinosarcoma  -- Received C2 carbo/taxol on 08/26. No systemic treatment while inpatient   -- Outpatient follow-up with [facility] [place] after discharge       5. Nausea  -Bowel rest,   Will continue to follow    Campbell Frances MD  [facility]  Cell: [phone]  
Hematology/Oncology called to see patient who is under care of Dr. Praveena Alvarado of [facility] for the management of stage IV uterine carcinosarcoma. She received cycle 2 carbo/taxol and pegfilgrastim on 08/26. [place] [facility] [place] and [place]ness of breath with CXR concerning for pneumonia, now with PE and pleural effusions.    1. Pulmonary embolism, bilateral pleural effusions, ?URI   -- CTA shows RUL segmental PE w/o right heart strain + moderate bilateral pleural effusions   -- PE likely provoked in setting of active malignancy and recent hospitalization   -- Continue lovenox  -- Pulmonary recs appreciated   -- LE duplex negative for DVT   -- Plan for home O2  -- Per IR, hold off on thoracentesis at this time    2. Leukocytosis, higher   -- Pt received Neulasta had 8/26  which likely explains leukocytosis  -- Urine and blood cultures NGTD 08/30    3. Anemia, thrombocytopenia  -- Likely 2/2 chemo, last dose given 08/26   -- Monitor for count recovery   -- Transfuse to maintain hgb >7.0, platelets >10K    4. Uterine carcinosarcoma  -- Received C2 carbo/taxol on 08/26. No systemic treatment while inpatient   -- Outpatient follow-up with [facility] [place]     Juancarlos Yeh PA-C  Hematology/Oncology  [facility]  [phone] (office)  [phone] (alt office)  Evenings and weekends please call MD on call or office
Hematology/Oncology called to see patient who is under care of Dr. Praveena Alvarado of [facility] [place] for the management of stage IV uterine carcinosarcoma. She received cycle 2 carbo/taxol and pegfilgrastim on 08/26. [place] [facility] [place] and [place]ness of breath with CXR concerning for pneumonia, then sent to [facility] for further workup and treatment. She notes improvement in symptoms today, but notes left calf tenderness.     1. Pulmonary embolism, bilateral pleural effusions, ?URI   -- CTA shows RUL segmental PE w/o right heart strain + moderate bilateral pleural effusions   -- PE likely provoked in setting of active malignancy and recent hospitalization   -- c/w heparin drip for now, may transition to DOAC on discharge   -- Recommend pulmonary evaluation   -- LE duplex negative for DVT     2. Leukocytosis, improving  -- Pt received Neulasta 08/26   -- Urine and blood cultures NGTD 08/30    3. Anemia, thrombocytopenia  -- Likely 2/2 chemo, last dose given 08/26   -- Monitor for count recovery   -- Transfuse to maintain hgb >7.0 grams, platelets >10K    4. Uterine carcinosarcoma  -- Received C2 carbo/taxol on 08/26. No systemic treatment while inpatient   -- Outpatient follow-up with [facility] [place] after discharge     Will continue to follow.    Twyla Pitts PA-C  Hematology/Oncology  [facility]  [phone] (office)  [phone] (alt office)  Evenings and weekends please call MD on call or office  
Pt is an 86 yo F with PMH S4 serous endometrial carcinoma ([facility] [place], chest wall [place], chemo), HTN, CAD (s/p PCI 2021), AV replacement, hypothyroidism, and HLD who p/w R sided chest pain, cough, mild SOB, weakness, and difficulty ambulating x2d. Found to have RUL segmental PE, on heparin gtt, admitted to medicine for further observation and management. 
Pt is an 86 yo F with PMH S4 serous endometrial carcinoma ([facility], chest wall port, chemo), HTN, CAD (s/p PCI 2021), AV replacement, hypothyroidism, and HLD who p/w R sided chest pain, cough, mild SOB, weakness, and difficulty ambulating x2d. Found to have RUL segmental PE, on heparin gtt, admitted to medicine for further observation and management. 
Pt is an 86 yo F with PMH S4 serous endometrial carcinoma ([facility], chest wall port, chemo), HTN, CAD (s/p PCI 2021), AV replacement, hypothyroidism, and HLD who p/w R sided chest pain, cough, mild SOB, weakness, and difficulty ambulating x2d. Found to have RUL segmental PE, on heparin gtt, admitted to medicine for further observation and management. 
Pt is an 86 yo F with PMH S4 serous endometrial carcinoma ([facility], [place] port, chemo), HTN, CAD (s/p PCI 2021), AV replacement, hypothyroidism, and HLD who p/w R sided chest pain, cough, mild SOB, weakness, and difficulty ambulating x2d. Found to have RUL segmental PE, on heparin gtt, admitted to medicine for further observation and management. 
Pt is an 88 yo F with PMH S4 serous [place] [place] HTN, CAD (s/p PCI 2021), AV replacement, hypothyroidism, and HLD who p/w R sided chest pain, cough, mild SOB, weakness, and difficulty ambulating x2d. Found to have RUL segmental PE, on heparin gtt, admitted to medicine for further observation and management. 
Pt is an 88 yo F with PMH S4 serous endometrial carcinoma ([facility], chest wall port, chemo), HTN, CAD (s/p PCI 2021), AV replacement, hypothyroidism, and HLD who p/w R sided chest pain, cough, mild SOB, weakness, and difficulty ambulating x2d. Found to have RUL segmental PE, on heparin gtt, admitted to medicine for further observation and management. 
Pt is an 88 yo F with PMH S4 serous endometrial carcinoma ([facility] port, chemo), HTN, CAD (s/p PCI 2021), AV replacement, hypothyroidism, and HLD who p/w R sided chest pain, cough, mild SOB, weakness, and difficulty ambulating x2d. Found to have RUL segmental PE, on heparin gtt, admitted to medicine for further observation and management. 
Pt is an 86 yo F with PMH S4 serous endometrial carcinoma [place] [place], chemo), HTN, CAD (s/p PCI 2021), AV replacement, hypothyroidism, and HLD who p/w R sided chest pain, cough, mild SOB, weakness, and difficulty ambulating x2d. Found to have RUL segmental PE, on heparin gtt, admitted to medicine for further observation and management. 
Pt is an 86 yo F with PMH S4 serous endometrial carcinoma ([facility], chest [place] port, chemo), HTN, CAD (s/p PCI 2021), AV replacement, hypothyroidism, and HLD who p/w R sided chest pain, cough, mild SOB, weakness, and difficulty ambulating x2d. Found to have RUL segmental PE, on heparin gtt, admitted to medicine for further observation and management.

## 2022-09-07 NOTE — DISCHARGE NOTE NURSING/CASE MANAGEMENT/SOCIAL WORK - NSDCPEFALRISK_GEN_ALL_CORE
For information on Fall & Injury Prevention, visit: [email or web address] OR  [email or web address] OR  https://www.cdc.gov/steadi/patient.html

## 2022-09-07 NOTE — PROGRESS NOTE ADULT - SUBJECTIVE AND OBJECTIVE BOX
Patient is a 87y old  Female who presents with a chief complaint of PE (07 Sep 2022 10:27)    Patient seen and examined at bedside.    MEDICATIONS  (STANDING):  ascorbic acid 500 milliGRAM(s) Oral two times a day  aspirin enteric coated 81 milliGRAM(s) Oral daily  ATENolol  Tablet 50 milliGRAM(s) Oral every 12 hours  atorvastatin 80 milliGRAM(s) Oral at bedtime  chlorhexidine 2% Cloths 1 Application(s) Topical two times a day  cyanocobalamin 1000 MICROGram(s) Oral daily  enoxaparin Injectable 90 milliGRAM(s) SubCutaneous every 24 hours  famotidine    Tablet 40 milliGRAM(s) Oral daily  furosemide    Tablet 20 milliGRAM(s) Oral daily  levothyroxine 75 MICROGram(s) Oral daily  lidocaine   4% Patch 1 Patch Transdermal every 24 hours  melatonin 3 milliGRAM(s) Oral at bedtime  multivitamin 1 Tablet(s) Oral daily  senna 2 Tablet(s) Oral at bedtime    MEDICATIONS  (PRN):  acetaminophen     Tablet .. 650 milliGRAM(s) Oral every 6 hours PRN Temp greater or equal to 38C (100.4F), Mild Pain (1 - 3), Moderate Pain (4 - 6)  oxycodone    5 mG/acetaminophen 325 mG 1 Tablet(s) Oral every 6 hours PRN Severe Pain (7 - 10)  polyethylene glycol 3350 17 Gram(s) Oral daily PRN Constipation      Vital Signs Last 24 Hrs  T(C): 36.7 (07 Sep 2022 11:53), Max: 37 (07 Sep 2022 05:30)  T(F): 98 (07 Sep 2022 11:53), Max: 98.6 (07 Sep 2022 05:30)  HR: 84 (07 Sep 2022 11:53) (79 - 89)  BP: 120/56 (07 Sep 2022 11:53) (107/56 - 120/56)  BP(mean): --  RR: 18 (07 Sep 2022 11:53) (18 - 20)  SpO2: 97% (07 Sep 2022 11:53) (85% - 98%)    Parameters below as of 07 Sep 2022 11:53  Patient On (Oxygen Delivery Method): room air    PE  NAD  Awake, alert  Anicteric, MMM  No c/c/e  No rash grossly  FROM                          10.4   36.03 )-----------( 173      ( 07 Sep 2022 07:26 )             30.8       09-07    141  |  101  |  6<L>  ----------------------------<  68<L>  4.0   |  26  |  0.51    Ca    8.2<L>      07 Sep 2022 07:26  Phos  3.3     09-07  Mg     1.70     09-07    TPro  5.4<L>  /  Alb  3.1<L>  /  TBili  0.2  /  DBili  <0.2  /  AST  31  /  ALT  24  /  AlkPhos  245<H>  09-07

## 2022-09-07 NOTE — PROGRESS NOTE ADULT - PROVIDER SPECIALTY LIST ADULT
Heme/Onc
Heme/Onc
Intervent Radiology
Pulmonology
Pulmonology
Heme/Onc
Heme/Onc
Pulmonology
Heme/Onc
Internal Medicine
Cardiology
Internal Medicine

## 2022-09-07 NOTE — DISCHARGE NOTE NURSING/CASE MANAGEMENT/SOCIAL WORK - PATIENT PORTAL LINK FT
You can access the FollowMyHealth Patient Portal offered by [facility] by registering at the following website: [email or web address]. By joining [employer]’s FollowMyHealth portal, you will also be able to view your health information using other applications (apps) compatible with our system.

## 2022-09-07 NOTE — DISCHARGE NOTE NURSING/CASE MANAGEMENT/SOCIAL WORK - NSDCFUADDAPPT_GEN_ALL_CORE_FT
Repeat bloodwork with your PCP within 2 weeks (CBC, BMP, LFTs), If you are in need of a general medicine physician and post-discharge medical follow-up for further care/recommendations you may contact the [facility] for an appointment [phone]      Followup with your oncologist at [facility]

## 2022-09-14 PROBLEM — E78.5 HYPERLIPIDEMIA, UNSPECIFIED: Chronic | Status: ACTIVE | Noted: 2022-01-01

## 2022-09-14 PROBLEM — C54.1 MALIGNANT NEOPLASM OF ENDOMETRIUM: Chronic | Status: ACTIVE | Noted: 2022-01-01

## 2022-09-22 PROBLEM — J90 PLEURAL EFFUSION: Status: ACTIVE | Noted: 2022-01-01

## 2022-09-23 PROBLEM — Z86.79 HISTORY OF CORONARY ARTERY DISEASE: Status: RESOLVED | Noted: 2022-01-01 | Resolved: 2022-01-01

## 2022-09-23 PROBLEM — I26.99 PULMONARY EMBOLISM: Status: ACTIVE | Noted: 2022-01-01

## 2022-09-23 PROBLEM — Z86.69 HISTORY OF SCIATICA: Status: RESOLVED | Noted: 2022-01-01 | Resolved: 2022-01-01

## 2022-09-23 PROBLEM — Z86.69 HISTORY OF CARPAL TUNNEL SYNDROME: Status: RESOLVED | Noted: 2022-01-01 | Resolved: 2022-01-01

## 2022-09-23 PROBLEM — E03.9 HYPOTHYROIDISM: Status: ACTIVE | Noted: 2022-01-01

## 2022-09-23 PROBLEM — M19.90 DEGENERATIVE JOINT DISEASE: Status: RESOLVED | Noted: 2022-01-01 | Resolved: 2022-01-01

## 2022-09-23 PROBLEM — Z80.1 FAMILY HISTORY OF LUNG CANCER: Status: ACTIVE | Noted: 2022-01-01

## 2022-09-23 PROBLEM — Z80.8 FAMILY HISTORY OF MALIGNANT NEOPLASM OF BRAIN: Status: ACTIVE | Noted: 2022-01-01

## 2022-09-23 PROBLEM — M81.0 OSTEOPOROSIS: Status: ACTIVE | Noted: 2022-01-01

## 2022-09-23 PROBLEM — I10 HTN (HYPERTENSION): Status: ACTIVE | Noted: 2022-01-01

## 2022-09-23 PROBLEM — C54.1 ENDOMETRIAL CARCINOMA: Status: ACTIVE | Noted: 2022-01-01

## 2022-09-23 NOTE — PHYSICAL EXAM
[No Acute Distress] : no acute distress [Well Nourished] : well nourished [Well Groomed] : well groomed [Normal Oropharynx] : normal oropharynx [Normal Appearance] : normal appearance [Supple] : supple [Normal Rate/Rhythm] : normal rate/rhythm [Normal S1, S2] : normal s1, s2 [No Murmurs] : no murmurs [No Rubs] : no rubs [No Gallops] : no gallops [No Resp Distress] : no resp distress [Benign] : benign [Soft] : soft [No Clubbing] : no clubbing [No Cyanosis] : no cyanosis [2+ Pitting] : 2+ pitting [Normal Color/ Pigmentation] : normal color/ pigmentation [No Focal Deficits] : no focal deficits [Oriented x3] : oriented x3 [Normal Mood] : normal mood [Normal Affect] : normal affect [TextBox_2] : Exam performed with patient in wheelchair [TextBox_68] : decreased BS mid to lower lung zone

## 2022-09-23 NOTE — REVIEW OF SYSTEMS
[Fever] : no fever [Fatigue] : fatigue [Chills] : no chills [Recent Wt Loss (___ Lbs)] : ~T recent [unfilled] lb weight loss [Sore Throat] : no sore throat [Sinus Problems] : no sinus problems [Cough] : no cough [Sputum] : no sputum [Dyspnea] : no dyspnea [Pleuritic Pain] : no pleuritic pain [Wheezing] : no wheezing [Chest Discomfort] : no chest discomfort [Edema] : edema [Palpitations] : no palpitations [Syncope] : no syncope [GERD] : no gerd [Abdominal Pain] : no abdominal pain [Nausea] : no nausea [Vomiting] : no vomiting [Dysuria] : no dysuria [Arthralgias] : arthralgias [Myalgias] : no myalgias [Rash] : no rash [Dizziness] : no dizziness [Thyroid Problem] : thyroid problem

## 2022-09-23 NOTE — HISTORY OF PRESENT ILLNESS
[Never] : never [TextBox_4] : Ms. 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 (8/31-9/7)for right-sided chest pain, cough, mild shortness of breath and fatigue/weakness.  CTA was performed and she was found to have a RUL segmental PE  (currently on Lovenox) and moderate B/L pleural effusions and perihepatic ascites.  Echo with mild MR, mild AR, mod dilated LA, LVEF 54%, stage I diastolic dysfunction..  She underwent diuresis while inpatient - and at time of discharge was 98% on RA but desaturated to 85% with ambulation and was sent home with suppl oxygen. \par On presentation today she is accompanied by her daughter and son, she is in a wheelchair, noting that she continues to have weakness in her LEs but is working with home PT (prior to chemo she was able to  ambulate with a cane).  She denies any shortness of breath at rest and has not ambulated enough to notice any exertional dyspnea.  She denies recent fever, chills, chest pain.  By her account her legs remain very swollen, noting they had never swelled this much before she started chemo.

## 2022-10-10 NOTE — PROGRESS NOTE ADULT - PROBLEM SELECTOR PROBLEM 11
Prophylactic measure
[id number]

## 2023-01-01 ENCOUNTER — INPATIENT (INPATIENT)
Facility: HOSPITAL | Age: 88
LOS: 21 days | End: 2023-07-12
Attending: STUDENT IN AN ORGANIZED HEALTH CARE EDUCATION/TRAINING PROGRAM | Admitting: LEGAL MEDICINE
Payer: MEDICARE

## 2023-01-01 ENCOUNTER — TRANSCRIPTION ENCOUNTER (OUTPATIENT)
Age: 88
End: 2023-01-01

## 2023-01-01 ENCOUNTER — APPOINTMENT (OUTPATIENT)
Dept: THORACIC SURGERY | Facility: HOSPITAL | Age: 88
End: 2023-01-01

## 2023-01-01 ENCOUNTER — APPOINTMENT (OUTPATIENT)
Dept: GASTROENTEROLOGY | Facility: CLINIC | Age: 88
End: 2023-01-01

## 2023-01-01 ENCOUNTER — APPOINTMENT (OUTPATIENT)
Age: 88
End: 2023-01-01

## 2023-01-01 ENCOUNTER — NON-APPOINTMENT (OUTPATIENT)
Age: 88
End: 2023-01-01

## 2023-01-01 ENCOUNTER — INPATIENT (INPATIENT)
Facility: HOSPITAL | Age: 88
LOS: 11 days | Discharge: HOME HEALTH | End: 2023-05-04
Attending: SURGERY | Admitting: SURGERY
Payer: MEDICARE

## 2023-01-01 VITALS
OXYGEN SATURATION: 99 % | SYSTOLIC BLOOD PRESSURE: 129 MMHG | HEART RATE: 102 BPM | TEMPERATURE: 99 F | DIASTOLIC BLOOD PRESSURE: 65 MMHG | RESPIRATION RATE: 18 BRPM

## 2023-01-01 VITALS
SYSTOLIC BLOOD PRESSURE: 105 MMHG | DIASTOLIC BLOOD PRESSURE: 49 MMHG | TEMPERATURE: 98 F | HEART RATE: 72 BPM | OXYGEN SATURATION: 100 % | RESPIRATION RATE: 16 BRPM

## 2023-01-01 VITALS
OXYGEN SATURATION: 96 % | TEMPERATURE: 98 F | HEIGHT: 57 IN | DIASTOLIC BLOOD PRESSURE: 64 MMHG | SYSTOLIC BLOOD PRESSURE: 126 MMHG | HEART RATE: 82 BPM | RESPIRATION RATE: 18 BRPM

## 2023-01-01 DIAGNOSIS — R53.1 WEAKNESS: ICD-10-CM

## 2023-01-01 DIAGNOSIS — Z95.2 PRESENCE OF PROSTHETIC HEART VALVE: Chronic | ICD-10-CM

## 2023-01-01 DIAGNOSIS — E03.9 HYPOTHYROIDISM, UNSPECIFIED: ICD-10-CM

## 2023-01-01 DIAGNOSIS — K92.2 GASTROINTESTINAL HEMORRHAGE, UNSPECIFIED: ICD-10-CM

## 2023-01-01 DIAGNOSIS — Z92.89 PERSONAL HISTORY OF OTHER MEDICAL TREATMENT: Chronic | ICD-10-CM

## 2023-01-01 DIAGNOSIS — I25.10 ATHEROSCLEROTIC HEART DISEASE OF NATIVE CORONARY ARTERY WITHOUT ANGINA PECTORIS: ICD-10-CM

## 2023-01-01 DIAGNOSIS — C54.1 MALIGNANT NEOPLASM OF ENDOMETRIUM: ICD-10-CM

## 2023-01-01 DIAGNOSIS — D62 ACUTE POSTHEMORRHAGIC ANEMIA: ICD-10-CM

## 2023-01-01 DIAGNOSIS — I10 ESSENTIAL (PRIMARY) HYPERTENSION: ICD-10-CM

## 2023-01-01 DIAGNOSIS — J96.01 ACUTE RESPIRATORY FAILURE WITH HYPOXIA: ICD-10-CM

## 2023-01-01 DIAGNOSIS — N17.9 ACUTE KIDNEY FAILURE, UNSPECIFIED: ICD-10-CM

## 2023-01-01 DIAGNOSIS — D64.9 ANEMIA, UNSPECIFIED: ICD-10-CM

## 2023-01-01 DIAGNOSIS — R06.02 SHORTNESS OF BREATH: ICD-10-CM

## 2023-01-01 DIAGNOSIS — R63.0 ANOREXIA: ICD-10-CM

## 2023-01-01 DIAGNOSIS — D72.829 ELEVATED WHITE BLOOD CELL COUNT, UNSPECIFIED: ICD-10-CM

## 2023-01-01 DIAGNOSIS — F41.9 ANXIETY DISORDER, UNSPECIFIED: ICD-10-CM

## 2023-01-01 DIAGNOSIS — Z51.5 ENCOUNTER FOR PALLIATIVE CARE: ICD-10-CM

## 2023-01-01 DIAGNOSIS — E78.5 HYPERLIPIDEMIA, UNSPECIFIED: ICD-10-CM

## 2023-01-01 DIAGNOSIS — I26.99 OTHER PULMONARY EMBOLISM WITHOUT ACUTE COR PULMONALE: ICD-10-CM

## 2023-01-01 DIAGNOSIS — Z86.711 PERSONAL HISTORY OF PULMONARY EMBOLISM: ICD-10-CM

## 2023-01-01 DIAGNOSIS — Z29.9 ENCOUNTER FOR PROPHYLACTIC MEASURES, UNSPECIFIED: ICD-10-CM

## 2023-01-01 DIAGNOSIS — R41.82 ALTERED MENTAL STATUS, UNSPECIFIED: ICD-10-CM

## 2023-01-01 LAB
-  COAGULASE NEGATIVE STAPHYLOCOCCUS: SIGNIFICANT CHANGE UP
ACANTHOCYTES BLD QL SMEAR: SLIGHT — SIGNIFICANT CHANGE UP
ALBUMIN FLD-MCNC: 1.4 G/DL — SIGNIFICANT CHANGE UP
ALBUMIN SERPL ELPH-MCNC: 2.5 G/DL — LOW (ref 3.3–5)
ALBUMIN SERPL ELPH-MCNC: 2.6 G/DL — LOW (ref 3.3–5)
ALBUMIN SERPL ELPH-MCNC: 2.6 G/DL — LOW (ref 3.3–5)
ALBUMIN SERPL ELPH-MCNC: 3 G/DL — LOW (ref 3.3–5)
ALBUMIN SERPL ELPH-MCNC: 3.1 G/DL — LOW (ref 3.3–5)
ALBUMIN SERPL ELPH-MCNC: 3.1 G/DL — LOW (ref 3.3–5)
ALBUMIN SERPL ELPH-MCNC: 3.2 G/DL — LOW (ref 3.3–5)
ALBUMIN SERPL ELPH-MCNC: 3.3 G/DL — SIGNIFICANT CHANGE UP (ref 3.3–5)
ALBUMIN SERPL ELPH-MCNC: 3.4 G/DL — SIGNIFICANT CHANGE UP (ref 3.3–5)
ALBUMIN SERPL ELPH-MCNC: 3.4 G/DL — SIGNIFICANT CHANGE UP (ref 3.3–5)
ALBUMIN SERPL ELPH-MCNC: 3.5 G/DL — SIGNIFICANT CHANGE UP (ref 3.3–5)
ALBUMIN SERPL ELPH-MCNC: 3.5 G/DL — SIGNIFICANT CHANGE UP (ref 3.3–5)
ALBUMIN SERPL ELPH-MCNC: 3.7 G/DL — SIGNIFICANT CHANGE UP (ref 3.3–5)
ALBUMIN SERPL ELPH-MCNC: 3.7 G/DL — SIGNIFICANT CHANGE UP (ref 3.3–5)
ALBUMIN SERPL ELPH-MCNC: 3.8 G/DL — SIGNIFICANT CHANGE UP (ref 3.3–5)
ALBUMIN SERPL ELPH-MCNC: 4 G/DL — SIGNIFICANT CHANGE UP (ref 3.3–5)
ALBUMIN SERPL ELPH-MCNC: 4.5 G/DL — SIGNIFICANT CHANGE UP (ref 3.3–5)
ALBUMIN SERPL ELPH-MCNC: 4.6 G/DL — SIGNIFICANT CHANGE UP (ref 3.3–5)
ALP SERPL-CCNC: 100 U/L — SIGNIFICANT CHANGE UP (ref 40–120)
ALP SERPL-CCNC: 100 U/L — SIGNIFICANT CHANGE UP (ref 40–120)
ALP SERPL-CCNC: 71 U/L — SIGNIFICANT CHANGE UP (ref 40–120)
ALP SERPL-CCNC: 75 U/L — SIGNIFICANT CHANGE UP (ref 40–120)
ALP SERPL-CCNC: 76 U/L — SIGNIFICANT CHANGE UP (ref 40–120)
ALP SERPL-CCNC: 76 U/L — SIGNIFICANT CHANGE UP (ref 40–120)
ALP SERPL-CCNC: 77 U/L — SIGNIFICANT CHANGE UP (ref 40–120)
ALP SERPL-CCNC: 77 U/L — SIGNIFICANT CHANGE UP (ref 40–120)
ALP SERPL-CCNC: 80 U/L — SIGNIFICANT CHANGE UP (ref 40–120)
ALP SERPL-CCNC: 80 U/L — SIGNIFICANT CHANGE UP (ref 40–120)
ALP SERPL-CCNC: 86 U/L — SIGNIFICANT CHANGE UP (ref 40–120)
ALP SERPL-CCNC: 89 U/L — SIGNIFICANT CHANGE UP (ref 40–120)
ALP SERPL-CCNC: 91 U/L — SIGNIFICANT CHANGE UP (ref 40–120)
ALP SERPL-CCNC: 94 U/L — SIGNIFICANT CHANGE UP (ref 40–120)
ALP SERPL-CCNC: 96 U/L — SIGNIFICANT CHANGE UP (ref 40–120)
ALP SERPL-CCNC: 97 U/L — SIGNIFICANT CHANGE UP (ref 40–120)
ALP SERPL-CCNC: 98 U/L — SIGNIFICANT CHANGE UP (ref 40–120)
ALP SERPL-CCNC: 99 U/L — SIGNIFICANT CHANGE UP (ref 40–120)
ALT FLD-CCNC: 10 U/L — SIGNIFICANT CHANGE UP (ref 4–33)
ALT FLD-CCNC: 14 U/L — SIGNIFICANT CHANGE UP (ref 4–33)
ALT FLD-CCNC: 15 U/L — SIGNIFICANT CHANGE UP (ref 4–33)
ALT FLD-CCNC: 243 U/L — HIGH (ref 4–33)
ALT FLD-CCNC: 27 U/L — SIGNIFICANT CHANGE UP (ref 4–33)
ALT FLD-CCNC: 29 U/L — SIGNIFICANT CHANGE UP (ref 4–33)
ALT FLD-CCNC: 30 U/L — SIGNIFICANT CHANGE UP (ref 4–33)
ALT FLD-CCNC: 30 U/L — SIGNIFICANT CHANGE UP (ref 4–33)
ALT FLD-CCNC: 31 U/L — SIGNIFICANT CHANGE UP (ref 4–33)
ALT FLD-CCNC: 32 U/L — SIGNIFICANT CHANGE UP (ref 4–33)
ALT FLD-CCNC: 33 U/L — SIGNIFICANT CHANGE UP (ref 4–33)
ALT FLD-CCNC: 34 U/L — HIGH (ref 4–33)
ALT FLD-CCNC: 36 U/L — HIGH (ref 4–33)
ALT FLD-CCNC: 38 U/L — HIGH (ref 4–33)
ALT FLD-CCNC: 39 U/L — HIGH (ref 4–33)
ALT FLD-CCNC: 46 U/L — HIGH (ref 4–33)
ALT FLD-CCNC: 51 U/L — HIGH (ref 4–33)
ALT FLD-CCNC: 64 U/L — HIGH (ref 4–33)
AMMONIA BLD-MCNC: 13 UMOL/L — SIGNIFICANT CHANGE UP (ref 11–55)
AMMONIA BLD-MCNC: 29 UMOL/L — SIGNIFICANT CHANGE UP (ref 11–55)
AMMONIA BLD-MCNC: 37 UMOL/L — SIGNIFICANT CHANGE UP (ref 11–55)
ANION GAP SERPL CALC-SCNC: 10 MMOL/L — SIGNIFICANT CHANGE UP (ref 7–14)
ANION GAP SERPL CALC-SCNC: 11 MMOL/L — SIGNIFICANT CHANGE UP (ref 7–14)
ANION GAP SERPL CALC-SCNC: 12 MMOL/L — SIGNIFICANT CHANGE UP (ref 7–14)
ANION GAP SERPL CALC-SCNC: 13 MMOL/L — SIGNIFICANT CHANGE UP (ref 7–14)
ANION GAP SERPL CALC-SCNC: 14 MMOL/L — SIGNIFICANT CHANGE UP (ref 7–14)
ANION GAP SERPL CALC-SCNC: 14 MMOL/L — SIGNIFICANT CHANGE UP (ref 7–14)
ANION GAP SERPL CALC-SCNC: 15 MMOL/L — HIGH (ref 7–14)
ANION GAP SERPL CALC-SCNC: 15 MMOL/L — HIGH (ref 7–14)
ANION GAP SERPL CALC-SCNC: 16 MMOL/L — HIGH (ref 7–14)
ANION GAP SERPL CALC-SCNC: 16 MMOL/L — HIGH (ref 7–14)
ANION GAP SERPL CALC-SCNC: 17 MMOL/L — HIGH (ref 7–14)
ANION GAP SERPL CALC-SCNC: 18 MMOL/L — HIGH (ref 7–14)
ANION GAP SERPL CALC-SCNC: 19 MMOL/L — HIGH (ref 7–14)
ANION GAP SERPL CALC-SCNC: 20 MMOL/L — HIGH (ref 7–14)
ANION GAP SERPL CALC-SCNC: 8 MMOL/L — SIGNIFICANT CHANGE UP (ref 7–14)
ANION GAP SERPL CALC-SCNC: 9 MMOL/L — SIGNIFICANT CHANGE UP (ref 7–14)
ANISOCYTOSIS BLD QL: SLIGHT — SIGNIFICANT CHANGE UP
APPEARANCE UR: ABNORMAL
APPEARANCE UR: ABNORMAL
APPEARANCE UR: CLEAR — SIGNIFICANT CHANGE UP
APTT BLD: 134.1 SEC — CRITICAL HIGH (ref 27–36.3)
APTT BLD: 157.9 SEC — CRITICAL HIGH (ref 27–36.3)
APTT BLD: 28.1 SEC — SIGNIFICANT CHANGE UP (ref 27–36.3)
APTT BLD: 29.9 SEC — SIGNIFICANT CHANGE UP (ref 27–36.3)
APTT BLD: 30.6 SEC — SIGNIFICANT CHANGE UP (ref 27–36.3)
APTT BLD: 31.1 SEC — SIGNIFICANT CHANGE UP (ref 27–36.3)
APTT BLD: 32 SEC — SIGNIFICANT CHANGE UP (ref 27–36.3)
APTT BLD: 32.4 SEC — SIGNIFICANT CHANGE UP (ref 27–36.3)
APTT BLD: 33.1 SEC — SIGNIFICANT CHANGE UP (ref 27–36.3)
APTT BLD: 36.4 SEC — HIGH (ref 27–36.3)
APTT BLD: 40.9 SEC — HIGH (ref 27–36.3)
APTT BLD: 42.7 SEC — HIGH (ref 27–36.3)
APTT BLD: 48 SEC — HIGH (ref 27–36.3)
APTT BLD: 50.9 SEC — HIGH (ref 27–36.3)
APTT BLD: 64.1 SEC — HIGH (ref 27–36.3)
APTT BLD: 75.3 SEC — HIGH (ref 27–36.3)
APTT BLD: 89.9 SEC — HIGH (ref 27–36.3)
APTT BLD: 95.5 SEC — HIGH (ref 27–36.3)
APTT BLD: >200 SEC — CRITICAL HIGH (ref 27–36.3)
AST SERPL-CCNC: 103 U/L — HIGH (ref 4–32)
AST SERPL-CCNC: 104 U/L — HIGH (ref 4–32)
AST SERPL-CCNC: 112 U/L — HIGH (ref 4–32)
AST SERPL-CCNC: 112 U/L — HIGH (ref 4–32)
AST SERPL-CCNC: 120 U/L — HIGH (ref 4–32)
AST SERPL-CCNC: 133 U/L — HIGH (ref 4–32)
AST SERPL-CCNC: 139 U/L — HIGH (ref 4–32)
AST SERPL-CCNC: 172 U/L — HIGH (ref 4–32)
AST SERPL-CCNC: 192 U/L — HIGH (ref 4–32)
AST SERPL-CCNC: 192 U/L — HIGH (ref 4–32)
AST SERPL-CCNC: 268 U/L — HIGH (ref 4–32)
AST SERPL-CCNC: 29 U/L — SIGNIFICANT CHANGE UP (ref 4–32)
AST SERPL-CCNC: 35 U/L — HIGH (ref 4–32)
AST SERPL-CCNC: 38 U/L — HIGH (ref 4–32)
AST SERPL-CCNC: 61 U/L — HIGH (ref 4–32)
AST SERPL-CCNC: 95 U/L — HIGH (ref 4–32)
AST SERPL-CCNC: 98 U/L — HIGH (ref 4–32)
AST SERPL-CCNC: 98 U/L — HIGH (ref 4–32)
B PERT DNA SPEC QL NAA+PROBE: SIGNIFICANT CHANGE UP
B PERT DNA SPEC QL NAA+PROBE: SIGNIFICANT CHANGE UP
B PERT IGG+IGM PNL SER: ABNORMAL
B PERT+PARAPERT DNA PNL SPEC NAA+PROBE: SIGNIFICANT CHANGE UP
B PERT+PARAPERT DNA PNL SPEC NAA+PROBE: SIGNIFICANT CHANGE UP
BACTERIA # UR AUTO: NEGATIVE — SIGNIFICANT CHANGE UP
BASE EXCESS BLDV CALC-SCNC: 0 MMOL/L — SIGNIFICANT CHANGE UP (ref -2–3)
BASE EXCESS BLDV CALC-SCNC: 5.2 MMOL/L — HIGH (ref -2–3)
BASE EXCESS BLDV CALC-SCNC: 5.5 MMOL/L — HIGH (ref -2–3)
BASE EXCESS BLDV CALC-SCNC: 8.7 MMOL/L — HIGH (ref -2–3)
BASOPHILS # BLD AUTO: 0 K/UL — SIGNIFICANT CHANGE UP (ref 0–0.2)
BASOPHILS # BLD AUTO: 0 K/UL — SIGNIFICANT CHANGE UP (ref 0–0.2)
BASOPHILS # BLD AUTO: 0.02 K/UL — SIGNIFICANT CHANGE UP (ref 0–0.2)
BASOPHILS # BLD AUTO: 0.03 K/UL — SIGNIFICANT CHANGE UP (ref 0–0.2)
BASOPHILS # BLD AUTO: 0.04 K/UL — SIGNIFICANT CHANGE UP (ref 0–0.2)
BASOPHILS NFR BLD AUTO: 0 % — SIGNIFICANT CHANGE UP (ref 0–2)
BASOPHILS NFR BLD AUTO: 0 % — SIGNIFICANT CHANGE UP (ref 0–2)
BASOPHILS NFR BLD AUTO: 0.1 % — SIGNIFICANT CHANGE UP (ref 0–2)
BASOPHILS NFR BLD AUTO: 0.2 % — SIGNIFICANT CHANGE UP (ref 0–2)
BASOPHILS NFR BLD AUTO: 0.3 % — SIGNIFICANT CHANGE UP (ref 0–2)
BILIRUB DIRECT SERPL-MCNC: <0.2 MG/DL — SIGNIFICANT CHANGE UP (ref 0–0.3)
BILIRUB INDIRECT FLD-MCNC: >0.4 MG/DL — SIGNIFICANT CHANGE UP (ref 0–1)
BILIRUB SERPL-MCNC: 0.2 MG/DL — SIGNIFICANT CHANGE UP (ref 0.2–1.2)
BILIRUB SERPL-MCNC: 0.6 MG/DL — SIGNIFICANT CHANGE UP (ref 0.2–1.2)
BILIRUB SERPL-MCNC: 1 MG/DL — SIGNIFICANT CHANGE UP (ref 0.2–1.2)
BILIRUB SERPL-MCNC: 1.1 MG/DL — SIGNIFICANT CHANGE UP (ref 0.2–1.2)
BILIRUB SERPL-MCNC: 1.1 MG/DL — SIGNIFICANT CHANGE UP (ref 0.2–1.2)
BILIRUB SERPL-MCNC: 1.2 MG/DL — SIGNIFICANT CHANGE UP (ref 0.2–1.2)
BILIRUB SERPL-MCNC: 1.2 MG/DL — SIGNIFICANT CHANGE UP (ref 0.2–1.2)
BILIRUB SERPL-MCNC: 1.3 MG/DL — HIGH (ref 0.2–1.2)
BILIRUB SERPL-MCNC: 1.4 MG/DL — HIGH (ref 0.2–1.2)
BILIRUB SERPL-MCNC: 1.4 MG/DL — HIGH (ref 0.2–1.2)
BILIRUB SERPL-MCNC: 1.5 MG/DL — HIGH (ref 0.2–1.2)
BILIRUB SERPL-MCNC: 1.6 MG/DL — HIGH (ref 0.2–1.2)
BILIRUB UR-MCNC: NEGATIVE — SIGNIFICANT CHANGE UP
BLD GP AB SCN SERPL QL: NEGATIVE — SIGNIFICANT CHANGE UP
BLOOD GAS ARTERIAL COMPREHENSIVE RESULT: SIGNIFICANT CHANGE UP
BLOOD GAS VENOUS COMPREHENSIVE RESULT: SIGNIFICANT CHANGE UP
BORDETELLA PARAPERTUSSIS (RAPRVP): SIGNIFICANT CHANGE UP
BORDETELLA PARAPERTUSSIS (RAPRVP): SIGNIFICANT CHANGE UP
BUN SERPL-MCNC: 10 MG/DL — SIGNIFICANT CHANGE UP (ref 7–23)
BUN SERPL-MCNC: 11 MG/DL — SIGNIFICANT CHANGE UP (ref 7–23)
BUN SERPL-MCNC: 11 MG/DL — SIGNIFICANT CHANGE UP (ref 7–23)
BUN SERPL-MCNC: 12 MG/DL — SIGNIFICANT CHANGE UP (ref 7–23)
BUN SERPL-MCNC: 13 MG/DL — SIGNIFICANT CHANGE UP (ref 7–23)
BUN SERPL-MCNC: 14 MG/DL — SIGNIFICANT CHANGE UP (ref 7–23)
BUN SERPL-MCNC: 16 MG/DL — SIGNIFICANT CHANGE UP (ref 7–23)
BUN SERPL-MCNC: 17 MG/DL — SIGNIFICANT CHANGE UP (ref 7–23)
BUN SERPL-MCNC: 18 MG/DL — SIGNIFICANT CHANGE UP (ref 7–23)
BUN SERPL-MCNC: 19 MG/DL — SIGNIFICANT CHANGE UP (ref 7–23)
BUN SERPL-MCNC: 20 MG/DL — SIGNIFICANT CHANGE UP (ref 7–23)
BUN SERPL-MCNC: 22 MG/DL — SIGNIFICANT CHANGE UP (ref 7–23)
BUN SERPL-MCNC: 24 MG/DL — HIGH (ref 7–23)
BUN SERPL-MCNC: 24 MG/DL — HIGH (ref 7–23)
BUN SERPL-MCNC: 25 MG/DL — HIGH (ref 7–23)
BUN SERPL-MCNC: 27 MG/DL — HIGH (ref 7–23)
BUN SERPL-MCNC: 28 MG/DL — HIGH (ref 7–23)
BUN SERPL-MCNC: 28 MG/DL — HIGH (ref 7–23)
BUN SERPL-MCNC: 29 MG/DL — HIGH (ref 7–23)
BUN SERPL-MCNC: 29 MG/DL — HIGH (ref 7–23)
BUN SERPL-MCNC: 30 MG/DL — HIGH (ref 7–23)
BUN SERPL-MCNC: 31 MG/DL — HIGH (ref 7–23)
BUN SERPL-MCNC: 32 MG/DL — HIGH (ref 7–23)
BUN SERPL-MCNC: 33 MG/DL — HIGH (ref 7–23)
BUN SERPL-MCNC: 34 MG/DL — HIGH (ref 7–23)
BUN SERPL-MCNC: 35 MG/DL — HIGH (ref 7–23)
BUN SERPL-MCNC: 35 MG/DL — HIGH (ref 7–23)
BUN SERPL-MCNC: 36 MG/DL — HIGH (ref 7–23)
BUN SERPL-MCNC: 36 MG/DL — HIGH (ref 7–23)
BUN SERPL-MCNC: 37 MG/DL — HIGH (ref 7–23)
BUN SERPL-MCNC: 37 MG/DL — HIGH (ref 7–23)
BUN SERPL-MCNC: 40 MG/DL — HIGH (ref 7–23)
BUN SERPL-MCNC: 9 MG/DL — SIGNIFICANT CHANGE UP (ref 7–23)
BUN SERPL-MCNC: 9 MG/DL — SIGNIFICANT CHANGE UP (ref 7–23)
C DIFF BY PCR RESULT: SIGNIFICANT CHANGE UP
C PNEUM DNA SPEC QL NAA+PROBE: SIGNIFICANT CHANGE UP
C PNEUM DNA SPEC QL NAA+PROBE: SIGNIFICANT CHANGE UP
CALCIUM SERPL-MCNC: 7.5 MG/DL — LOW (ref 8.4–10.5)
CALCIUM SERPL-MCNC: 7.7 MG/DL — LOW (ref 8.4–10.5)
CALCIUM SERPL-MCNC: 7.8 MG/DL — LOW (ref 8.4–10.5)
CALCIUM SERPL-MCNC: 7.9 MG/DL — LOW (ref 8.4–10.5)
CALCIUM SERPL-MCNC: 8 MG/DL — LOW (ref 8.4–10.5)
CALCIUM SERPL-MCNC: 8.1 MG/DL — LOW (ref 8.4–10.5)
CALCIUM SERPL-MCNC: 8.2 MG/DL — LOW (ref 8.4–10.5)
CALCIUM SERPL-MCNC: 8.3 MG/DL — LOW (ref 8.4–10.5)
CALCIUM SERPL-MCNC: 8.4 MG/DL — SIGNIFICANT CHANGE UP (ref 8.4–10.5)
CALCIUM SERPL-MCNC: 8.5 MG/DL — SIGNIFICANT CHANGE UP (ref 8.4–10.5)
CALCIUM SERPL-MCNC: 8.6 MG/DL — SIGNIFICANT CHANGE UP (ref 8.4–10.5)
CALCIUM SERPL-MCNC: 8.7 MG/DL — SIGNIFICANT CHANGE UP (ref 8.4–10.5)
CALCIUM SERPL-MCNC: 8.8 MG/DL — SIGNIFICANT CHANGE UP (ref 8.4–10.5)
CALCIUM SERPL-MCNC: 8.9 MG/DL — SIGNIFICANT CHANGE UP (ref 8.4–10.5)
CALCIUM SERPL-MCNC: 9 MG/DL — SIGNIFICANT CHANGE UP (ref 8.4–10.5)
CHLORIDE BLDV-SCNC: 105 MMOL/L — SIGNIFICANT CHANGE UP (ref 96–108)
CHLORIDE BLDV-SCNC: 97 MMOL/L — SIGNIFICANT CHANGE UP (ref 96–108)
CHLORIDE BLDV-SCNC: 97 MMOL/L — SIGNIFICANT CHANGE UP (ref 96–108)
CHLORIDE BLDV-SCNC: 99 MMOL/L — SIGNIFICANT CHANGE UP (ref 96–108)
CHLORIDE SERPL-SCNC: 101 MMOL/L — SIGNIFICANT CHANGE UP (ref 98–107)
CHLORIDE SERPL-SCNC: 102 MMOL/L — SIGNIFICANT CHANGE UP (ref 98–107)
CHLORIDE SERPL-SCNC: 102 MMOL/L — SIGNIFICANT CHANGE UP (ref 98–107)
CHLORIDE SERPL-SCNC: 103 MMOL/L — SIGNIFICANT CHANGE UP (ref 98–107)
CHLORIDE SERPL-SCNC: 103 MMOL/L — SIGNIFICANT CHANGE UP (ref 98–107)
CHLORIDE SERPL-SCNC: 104 MMOL/L — SIGNIFICANT CHANGE UP (ref 98–107)
CHLORIDE SERPL-SCNC: 105 MMOL/L — SIGNIFICANT CHANGE UP (ref 98–107)
CHLORIDE SERPL-SCNC: 105 MMOL/L — SIGNIFICANT CHANGE UP (ref 98–107)
CHLORIDE SERPL-SCNC: 91 MMOL/L — LOW (ref 98–107)
CHLORIDE SERPL-SCNC: 92 MMOL/L — LOW (ref 98–107)
CHLORIDE SERPL-SCNC: 93 MMOL/L — LOW (ref 98–107)
CHLORIDE SERPL-SCNC: 93 MMOL/L — LOW (ref 98–107)
CHLORIDE SERPL-SCNC: 94 MMOL/L — LOW (ref 98–107)
CHLORIDE SERPL-SCNC: 95 MMOL/L — LOW (ref 98–107)
CHLORIDE SERPL-SCNC: 96 MMOL/L — LOW (ref 98–107)
CHLORIDE SERPL-SCNC: 97 MMOL/L — LOW (ref 98–107)
CHLORIDE SERPL-SCNC: 98 MMOL/L — SIGNIFICANT CHANGE UP (ref 98–107)
CHLORIDE UR-SCNC: <20 MMOL/L — SIGNIFICANT CHANGE UP
CK MB CFR SERPL CALC: 1.1 NG/ML — SIGNIFICANT CHANGE UP
CK MB CFR SERPL CALC: 1.5 NG/ML — SIGNIFICANT CHANGE UP
CO2 BLDV-SCNC: 26 MMOL/L — SIGNIFICANT CHANGE UP (ref 22–26)
CO2 BLDV-SCNC: 31.2 MMOL/L — HIGH (ref 22–26)
CO2 BLDV-SCNC: 31.3 MMOL/L — HIGH (ref 22–26)
CO2 BLDV-SCNC: 35.5 MMOL/L — HIGH (ref 22–26)
CO2 SERPL-SCNC: 15 MMOL/L — LOW (ref 22–31)
CO2 SERPL-SCNC: 18 MMOL/L — LOW (ref 22–31)
CO2 SERPL-SCNC: 18 MMOL/L — LOW (ref 22–31)
CO2 SERPL-SCNC: 19 MMOL/L — LOW (ref 22–31)
CO2 SERPL-SCNC: 19 MMOL/L — LOW (ref 22–31)
CO2 SERPL-SCNC: 22 MMOL/L — SIGNIFICANT CHANGE UP (ref 22–31)
CO2 SERPL-SCNC: 23 MMOL/L — SIGNIFICANT CHANGE UP (ref 22–31)
CO2 SERPL-SCNC: 24 MMOL/L — SIGNIFICANT CHANGE UP (ref 22–31)
CO2 SERPL-SCNC: 25 MMOL/L — SIGNIFICANT CHANGE UP (ref 22–31)
CO2 SERPL-SCNC: 26 MMOL/L — SIGNIFICANT CHANGE UP (ref 22–31)
CO2 SERPL-SCNC: 27 MMOL/L — SIGNIFICANT CHANGE UP (ref 22–31)
CO2 SERPL-SCNC: 28 MMOL/L — SIGNIFICANT CHANGE UP (ref 22–31)
CO2 SERPL-SCNC: 28 MMOL/L — SIGNIFICANT CHANGE UP (ref 22–31)
COLOR FLD: ABNORMAL
COLOR SPEC: SIGNIFICANT CHANGE UP
COLOR SPEC: YELLOW — SIGNIFICANT CHANGE UP
COLOR SPEC: YELLOW — SIGNIFICANT CHANGE UP
COMMENT - FLUIDS: SIGNIFICANT CHANGE UP
CREAT ?TM UR-MCNC: 111 MG/DL — SIGNIFICANT CHANGE UP
CREAT ?TM UR-MCNC: 118 MG/DL — SIGNIFICANT CHANGE UP
CREAT ?TM UR-MCNC: 192 MG/DL — SIGNIFICANT CHANGE UP
CREAT SERPL-MCNC: 0.85 MG/DL — SIGNIFICANT CHANGE UP (ref 0.5–1.3)
CREAT SERPL-MCNC: 0.86 MG/DL — SIGNIFICANT CHANGE UP (ref 0.5–1.3)
CREAT SERPL-MCNC: 0.87 MG/DL — SIGNIFICANT CHANGE UP (ref 0.5–1.3)
CREAT SERPL-MCNC: 0.89 MG/DL — SIGNIFICANT CHANGE UP (ref 0.5–1.3)
CREAT SERPL-MCNC: 0.92 MG/DL — SIGNIFICANT CHANGE UP (ref 0.5–1.3)
CREAT SERPL-MCNC: 0.95 MG/DL — SIGNIFICANT CHANGE UP (ref 0.5–1.3)
CREAT SERPL-MCNC: 0.96 MG/DL — SIGNIFICANT CHANGE UP (ref 0.5–1.3)
CREAT SERPL-MCNC: 0.97 MG/DL — SIGNIFICANT CHANGE UP (ref 0.5–1.3)
CREAT SERPL-MCNC: 0.98 MG/DL — SIGNIFICANT CHANGE UP (ref 0.5–1.3)
CREAT SERPL-MCNC: 0.99 MG/DL — SIGNIFICANT CHANGE UP (ref 0.5–1.3)
CREAT SERPL-MCNC: 1 MG/DL — SIGNIFICANT CHANGE UP (ref 0.5–1.3)
CREAT SERPL-MCNC: 1 MG/DL — SIGNIFICANT CHANGE UP (ref 0.5–1.3)
CREAT SERPL-MCNC: 1.01 MG/DL — SIGNIFICANT CHANGE UP (ref 0.5–1.3)
CREAT SERPL-MCNC: 1.02 MG/DL — SIGNIFICANT CHANGE UP (ref 0.5–1.3)
CREAT SERPL-MCNC: 1.02 MG/DL — SIGNIFICANT CHANGE UP (ref 0.5–1.3)
CREAT SERPL-MCNC: 1.04 MG/DL — SIGNIFICANT CHANGE UP (ref 0.5–1.3)
CREAT SERPL-MCNC: 1.05 MG/DL — SIGNIFICANT CHANGE UP (ref 0.5–1.3)
CREAT SERPL-MCNC: 1.08 MG/DL — SIGNIFICANT CHANGE UP (ref 0.5–1.3)
CREAT SERPL-MCNC: 1.08 MG/DL — SIGNIFICANT CHANGE UP (ref 0.5–1.3)
CREAT SERPL-MCNC: 1.2 MG/DL — SIGNIFICANT CHANGE UP (ref 0.5–1.3)
CREAT SERPL-MCNC: 1.21 MG/DL — SIGNIFICANT CHANGE UP (ref 0.5–1.3)
CREAT SERPL-MCNC: 1.23 MG/DL — SIGNIFICANT CHANGE UP (ref 0.5–1.3)
CREAT SERPL-MCNC: 1.29 MG/DL — SIGNIFICANT CHANGE UP (ref 0.5–1.3)
CREAT SERPL-MCNC: 1.32 MG/DL — HIGH (ref 0.5–1.3)
CREAT SERPL-MCNC: 1.38 MG/DL — HIGH (ref 0.5–1.3)
CREAT SERPL-MCNC: 1.47 MG/DL — HIGH (ref 0.5–1.3)
CREAT SERPL-MCNC: 1.48 MG/DL — HIGH (ref 0.5–1.3)
CREAT SERPL-MCNC: 1.48 MG/DL — HIGH (ref 0.5–1.3)
CREAT SERPL-MCNC: 1.65 MG/DL — HIGH (ref 0.5–1.3)
CREAT SERPL-MCNC: 1.68 MG/DL — HIGH (ref 0.5–1.3)
CREAT SERPL-MCNC: 1.68 MG/DL — HIGH (ref 0.5–1.3)
CREAT SERPL-MCNC: 1.71 MG/DL — HIGH (ref 0.5–1.3)
CREAT SERPL-MCNC: 1.76 MG/DL — HIGH (ref 0.5–1.3)
CREAT SERPL-MCNC: 1.77 MG/DL — HIGH (ref 0.5–1.3)
CREAT SERPL-MCNC: 1.81 MG/DL — HIGH (ref 0.5–1.3)
CREAT SERPL-MCNC: 1.84 MG/DL — HIGH (ref 0.5–1.3)
CREAT SERPL-MCNC: 1.87 MG/DL — HIGH (ref 0.5–1.3)
CREAT SERPL-MCNC: 1.88 MG/DL — HIGH (ref 0.5–1.3)
CREAT SERPL-MCNC: 1.89 MG/DL — HIGH (ref 0.5–1.3)
CREAT SERPL-MCNC: 1.92 MG/DL — HIGH (ref 0.5–1.3)
CREAT SERPL-MCNC: 1.92 MG/DL — HIGH (ref 0.5–1.3)
CREAT SERPL-MCNC: 1.97 MG/DL — HIGH (ref 0.5–1.3)
CREAT SERPL-MCNC: 2 MG/DL — HIGH (ref 0.5–1.3)
CREAT SERPL-MCNC: 2.07 MG/DL — HIGH (ref 0.5–1.3)
CULTURE RESULTS: NO GROWTH — SIGNIFICANT CHANGE UP
CULTURE RESULTS: SIGNIFICANT CHANGE UP
D DIMER BLD IA.RAPID-MCNC: 2351 NG/ML DDU — HIGH
DIFF PNL FLD: ABNORMAL
EGFR: 23 ML/MIN/1.73M2 — LOW
EGFR: 24 ML/MIN/1.73M2 — LOW
EGFR: 24 ML/MIN/1.73M2 — LOW
EGFR: 25 ML/MIN/1.73M2 — LOW
EGFR: 25 ML/MIN/1.73M2 — LOW
EGFR: 26 ML/MIN/1.73M2 — LOW
EGFR: 27 ML/MIN/1.73M2 — LOW
EGFR: 28 ML/MIN/1.73M2 — LOW
EGFR: 28 ML/MIN/1.73M2 — LOW
EGFR: 29 ML/MIN/1.73M2 — LOW
EGFR: 30 ML/MIN/1.73M2 — LOW
EGFR: 34 ML/MIN/1.73M2 — LOW
EGFR: 34 ML/MIN/1.73M2 — LOW
EGFR: 35 ML/MIN/1.73M2 — LOW
EGFR: 37 ML/MIN/1.73M2 — LOW
EGFR: 39 ML/MIN/1.73M2 — LOW
EGFR: 41 ML/MIN/1.73M2 — LOW
EGFR: 43 ML/MIN/1.73M2 — LOW
EGFR: 44 ML/MIN/1.73M2 — LOW
EGFR: 44 ML/MIN/1.73M2 — LOW
EGFR: 50 ML/MIN/1.73M2 — LOW
EGFR: 50 ML/MIN/1.73M2 — LOW
EGFR: 52 ML/MIN/1.73M2 — LOW
EGFR: 53 ML/MIN/1.73M2 — LOW
EGFR: 54 ML/MIN/1.73M2 — LOW
EGFR: 54 ML/MIN/1.73M2 — LOW
EGFR: 55 ML/MIN/1.73M2 — LOW
EGFR: 56 ML/MIN/1.73M2 — LOW
EGFR: 57 ML/MIN/1.73M2 — LOW
EGFR: 57 ML/MIN/1.73M2 — LOW
EGFR: 58 ML/MIN/1.73M2 — LOW
EGFR: 59 ML/MIN/1.73M2 — LOW
EGFR: 61 ML/MIN/1.73M2 — SIGNIFICANT CHANGE UP
EGFR: 63 ML/MIN/1.73M2 — SIGNIFICANT CHANGE UP
EGFR: 65 ML/MIN/1.73M2 — SIGNIFICANT CHANGE UP
EGFR: 66 ML/MIN/1.73M2 — SIGNIFICANT CHANGE UP
EGFR: 67 ML/MIN/1.73M2 — SIGNIFICANT CHANGE UP
EOSINOPHIL # BLD AUTO: 0 K/UL — SIGNIFICANT CHANGE UP (ref 0–0.5)
EOSINOPHIL # BLD AUTO: 0.03 K/UL — SIGNIFICANT CHANGE UP (ref 0–0.5)
EOSINOPHIL # BLD AUTO: 0.03 K/UL — SIGNIFICANT CHANGE UP (ref 0–0.5)
EOSINOPHIL # BLD AUTO: 0.04 K/UL — SIGNIFICANT CHANGE UP (ref 0–0.5)
EOSINOPHIL # BLD AUTO: 0.04 K/UL — SIGNIFICANT CHANGE UP (ref 0–0.5)
EOSINOPHIL # BLD AUTO: 0.05 K/UL — SIGNIFICANT CHANGE UP (ref 0–0.5)
EOSINOPHIL # BLD AUTO: 0.07 K/UL — SIGNIFICANT CHANGE UP (ref 0–0.5)
EOSINOPHIL # BLD AUTO: 0.12 K/UL — SIGNIFICANT CHANGE UP (ref 0–0.5)
EOSINOPHIL # FLD: 0 % — SIGNIFICANT CHANGE UP
EOSINOPHIL NFR BLD AUTO: 0 % — SIGNIFICANT CHANGE UP (ref 0–6)
EOSINOPHIL NFR BLD AUTO: 0.2 % — SIGNIFICANT CHANGE UP (ref 0–6)
EOSINOPHIL NFR BLD AUTO: 0.3 % — SIGNIFICANT CHANGE UP (ref 0–6)
EOSINOPHIL NFR BLD AUTO: 0.4 % — SIGNIFICANT CHANGE UP (ref 0–6)
EOSINOPHIL NFR BLD AUTO: 0.5 % — SIGNIFICANT CHANGE UP (ref 0–6)
EOSINOPHIL NFR BLD AUTO: 0.8 % — SIGNIFICANT CHANGE UP (ref 0–6)
EPI CELLS # UR: 2 /HPF — SIGNIFICANT CHANGE UP (ref 0–5)
EPI CELLS # UR: 8 /HPF — HIGH (ref 0–5)
EPI CELLS # UR: 8 /HPF — HIGH (ref 0–5)
FERRITIN SERPL-MCNC: 89 NG/ML — SIGNIFICANT CHANGE UP (ref 15–150)
FLUAV SUBTYP SPEC NAA+PROBE: SIGNIFICANT CHANGE UP
FLUAV SUBTYP SPEC NAA+PROBE: SIGNIFICANT CHANGE UP
FLUBV RNA SPEC QL NAA+PROBE: SIGNIFICANT CHANGE UP
FLUBV RNA SPEC QL NAA+PROBE: SIGNIFICANT CHANGE UP
FLUID INTAKE SUBSTANCE CLASS: SIGNIFICANT CHANGE UP
FOLATE SERPL-MCNC: >20 NG/ML — HIGH (ref 3.1–17.5)
FOLATE+VIT B12 SERBLD-IMP: 0 % — SIGNIFICANT CHANGE UP
GAS PNL BLDV: 130 MMOL/L — LOW (ref 136–145)
GAS PNL BLDV: 131 MMOL/L — LOW (ref 136–145)
GAS PNL BLDV: 133 MMOL/L — LOW (ref 136–145)
GAS PNL BLDV: 140 MMOL/L — SIGNIFICANT CHANGE UP (ref 136–145)
GAS PNL BLDV: SIGNIFICANT CHANGE UP
GAS PNL BLDV: SIGNIFICANT CHANGE UP
GIANT PLATELETS BLD QL SMEAR: PRESENT — SIGNIFICANT CHANGE UP
GLUCOSE BLDC GLUCOMTR-MCNC: 102 MG/DL — HIGH (ref 70–99)
GLUCOSE BLDC GLUCOMTR-MCNC: 107 MG/DL — HIGH (ref 70–99)
GLUCOSE BLDC GLUCOMTR-MCNC: 110 MG/DL — HIGH (ref 70–99)
GLUCOSE BLDC GLUCOMTR-MCNC: 111 MG/DL — HIGH (ref 70–99)
GLUCOSE BLDC GLUCOMTR-MCNC: 113 MG/DL — HIGH (ref 70–99)
GLUCOSE BLDC GLUCOMTR-MCNC: 119 MG/DL — HIGH (ref 70–99)
GLUCOSE BLDC GLUCOMTR-MCNC: 120 MG/DL — HIGH (ref 70–99)
GLUCOSE BLDC GLUCOMTR-MCNC: 120 MG/DL — HIGH (ref 70–99)
GLUCOSE BLDC GLUCOMTR-MCNC: 126 MG/DL — HIGH (ref 70–99)
GLUCOSE BLDC GLUCOMTR-MCNC: 137 MG/DL — HIGH (ref 70–99)
GLUCOSE BLDC GLUCOMTR-MCNC: 142 MG/DL — HIGH (ref 70–99)
GLUCOSE BLDC GLUCOMTR-MCNC: 143 MG/DL — HIGH (ref 70–99)
GLUCOSE BLDC GLUCOMTR-MCNC: 145 MG/DL — HIGH (ref 70–99)
GLUCOSE BLDC GLUCOMTR-MCNC: 149 MG/DL — HIGH (ref 70–99)
GLUCOSE BLDC GLUCOMTR-MCNC: 157 MG/DL — HIGH (ref 70–99)
GLUCOSE BLDC GLUCOMTR-MCNC: 163 MG/DL — HIGH (ref 70–99)
GLUCOSE BLDC GLUCOMTR-MCNC: 170 MG/DL — HIGH (ref 70–99)
GLUCOSE BLDC GLUCOMTR-MCNC: 180 MG/DL — HIGH (ref 70–99)
GLUCOSE BLDC GLUCOMTR-MCNC: 76 MG/DL — SIGNIFICANT CHANGE UP (ref 70–99)
GLUCOSE BLDC GLUCOMTR-MCNC: 92 MG/DL — SIGNIFICANT CHANGE UP (ref 70–99)
GLUCOSE BLDC GLUCOMTR-MCNC: 99 MG/DL — SIGNIFICANT CHANGE UP (ref 70–99)
GLUCOSE BLDC GLUCOMTR-MCNC: <25 MG/DL — CRITICAL LOW (ref 70–99)
GLUCOSE BLDV-MCNC: 118 MG/DL — HIGH (ref 70–99)
GLUCOSE BLDV-MCNC: 122 MG/DL — HIGH (ref 70–99)
GLUCOSE BLDV-MCNC: 78 MG/DL — SIGNIFICANT CHANGE UP (ref 70–99)
GLUCOSE BLDV-MCNC: 97 MG/DL — SIGNIFICANT CHANGE UP (ref 70–99)
GLUCOSE FLD-MCNC: 96 MG/DL — SIGNIFICANT CHANGE UP
GLUCOSE SERPL-MCNC: 100 MG/DL — HIGH (ref 70–99)
GLUCOSE SERPL-MCNC: 101 MG/DL — HIGH (ref 70–99)
GLUCOSE SERPL-MCNC: 105 MG/DL — HIGH (ref 70–99)
GLUCOSE SERPL-MCNC: 106 MG/DL — HIGH (ref 70–99)
GLUCOSE SERPL-MCNC: 110 MG/DL — HIGH (ref 70–99)
GLUCOSE SERPL-MCNC: 110 MG/DL — HIGH (ref 70–99)
GLUCOSE SERPL-MCNC: 111 MG/DL — HIGH (ref 70–99)
GLUCOSE SERPL-MCNC: 112 MG/DL — HIGH (ref 70–99)
GLUCOSE SERPL-MCNC: 113 MG/DL — HIGH (ref 70–99)
GLUCOSE SERPL-MCNC: 113 MG/DL — HIGH (ref 70–99)
GLUCOSE SERPL-MCNC: 114 MG/DL — HIGH (ref 70–99)
GLUCOSE SERPL-MCNC: 124 MG/DL — HIGH (ref 70–99)
GLUCOSE SERPL-MCNC: 125 MG/DL — HIGH (ref 70–99)
GLUCOSE SERPL-MCNC: 126 MG/DL — HIGH (ref 70–99)
GLUCOSE SERPL-MCNC: 129 MG/DL — HIGH (ref 70–99)
GLUCOSE SERPL-MCNC: 132 MG/DL — HIGH (ref 70–99)
GLUCOSE SERPL-MCNC: 135 MG/DL — HIGH (ref 70–99)
GLUCOSE SERPL-MCNC: 144 MG/DL — HIGH (ref 70–99)
GLUCOSE SERPL-MCNC: 145 MG/DL — HIGH (ref 70–99)
GLUCOSE SERPL-MCNC: 150 MG/DL — HIGH (ref 70–99)
GLUCOSE SERPL-MCNC: 150 MG/DL — HIGH (ref 70–99)
GLUCOSE SERPL-MCNC: 166 MG/DL — HIGH (ref 70–99)
GLUCOSE SERPL-MCNC: 166 MG/DL — HIGH (ref 70–99)
GLUCOSE SERPL-MCNC: 169 MG/DL — HIGH (ref 70–99)
GLUCOSE SERPL-MCNC: 71 MG/DL — SIGNIFICANT CHANGE UP (ref 70–99)
GLUCOSE SERPL-MCNC: 78 MG/DL — SIGNIFICANT CHANGE UP (ref 70–99)
GLUCOSE SERPL-MCNC: 79 MG/DL — SIGNIFICANT CHANGE UP (ref 70–99)
GLUCOSE SERPL-MCNC: 84 MG/DL — SIGNIFICANT CHANGE UP (ref 70–99)
GLUCOSE SERPL-MCNC: 86 MG/DL — SIGNIFICANT CHANGE UP (ref 70–99)
GLUCOSE SERPL-MCNC: 87 MG/DL — SIGNIFICANT CHANGE UP (ref 70–99)
GLUCOSE SERPL-MCNC: 87 MG/DL — SIGNIFICANT CHANGE UP (ref 70–99)
GLUCOSE SERPL-MCNC: 88 MG/DL — SIGNIFICANT CHANGE UP (ref 70–99)
GLUCOSE SERPL-MCNC: 89 MG/DL — SIGNIFICANT CHANGE UP (ref 70–99)
GLUCOSE SERPL-MCNC: 89 MG/DL — SIGNIFICANT CHANGE UP (ref 70–99)
GLUCOSE SERPL-MCNC: 91 MG/DL — SIGNIFICANT CHANGE UP (ref 70–99)
GLUCOSE SERPL-MCNC: 92 MG/DL — SIGNIFICANT CHANGE UP (ref 70–99)
GLUCOSE SERPL-MCNC: 92 MG/DL — SIGNIFICANT CHANGE UP (ref 70–99)
GLUCOSE SERPL-MCNC: 93 MG/DL — SIGNIFICANT CHANGE UP (ref 70–99)
GLUCOSE SERPL-MCNC: 94 MG/DL — SIGNIFICANT CHANGE UP (ref 70–99)
GLUCOSE SERPL-MCNC: 95 MG/DL — SIGNIFICANT CHANGE UP (ref 70–99)
GLUCOSE SERPL-MCNC: 96 MG/DL — SIGNIFICANT CHANGE UP (ref 70–99)
GLUCOSE SERPL-MCNC: 99 MG/DL — SIGNIFICANT CHANGE UP (ref 70–99)
GLUCOSE SERPL-MCNC: 99 MG/DL — SIGNIFICANT CHANGE UP (ref 70–99)
GLUCOSE UR QL: NEGATIVE — SIGNIFICANT CHANGE UP
GRAM STN FLD: SIGNIFICANT CHANGE UP
HADV DNA SPEC QL NAA+PROBE: SIGNIFICANT CHANGE UP
HADV DNA SPEC QL NAA+PROBE: SIGNIFICANT CHANGE UP
HAPTOGLOB SERPL-MCNC: 165 MG/DL — SIGNIFICANT CHANGE UP (ref 34–200)
HAPTOGLOB SERPL-MCNC: 167 MG/DL — SIGNIFICANT CHANGE UP (ref 34–200)
HCO3 BLDV-SCNC: 25 MMOL/L — SIGNIFICANT CHANGE UP (ref 22–29)
HCO3 BLDV-SCNC: 30 MMOL/L — HIGH (ref 22–29)
HCO3 BLDV-SCNC: 30 MMOL/L — HIGH (ref 22–29)
HCO3 BLDV-SCNC: 34 MMOL/L — HIGH (ref 22–29)
HCOV 229E RNA SPEC QL NAA+PROBE: SIGNIFICANT CHANGE UP
HCOV 229E RNA SPEC QL NAA+PROBE: SIGNIFICANT CHANGE UP
HCOV HKU1 RNA SPEC QL NAA+PROBE: SIGNIFICANT CHANGE UP
HCOV HKU1 RNA SPEC QL NAA+PROBE: SIGNIFICANT CHANGE UP
HCOV NL63 RNA SPEC QL NAA+PROBE: SIGNIFICANT CHANGE UP
HCOV NL63 RNA SPEC QL NAA+PROBE: SIGNIFICANT CHANGE UP
HCOV OC43 RNA SPEC QL NAA+PROBE: SIGNIFICANT CHANGE UP
HCOV OC43 RNA SPEC QL NAA+PROBE: SIGNIFICANT CHANGE UP
HCT VFR BLD CALC: 16.6 % — CRITICAL LOW (ref 34.5–45)
HCT VFR BLD CALC: 19.9 % — CRITICAL LOW (ref 34.5–45)
HCT VFR BLD CALC: 21 % — CRITICAL LOW (ref 34.5–45)
HCT VFR BLD CALC: 21.1 % — LOW (ref 34.5–45)
HCT VFR BLD CALC: 21.8 % — LOW (ref 34.5–45)
HCT VFR BLD CALC: 22 % — LOW (ref 34.5–45)
HCT VFR BLD CALC: 22.8 % — LOW (ref 34.5–45)
HCT VFR BLD CALC: 22.8 % — LOW (ref 34.5–45)
HCT VFR BLD CALC: 23 % — LOW (ref 34.5–45)
HCT VFR BLD CALC: 23.3 % — LOW (ref 34.5–45)
HCT VFR BLD CALC: 23.6 % — LOW (ref 34.5–45)
HCT VFR BLD CALC: 24.3 % — LOW (ref 34.5–45)
HCT VFR BLD CALC: 24.4 % — LOW (ref 34.5–45)
HCT VFR BLD CALC: 24.6 % — LOW (ref 34.5–45)
HCT VFR BLD CALC: 24.9 % — LOW (ref 34.5–45)
HCT VFR BLD CALC: 25 % — LOW (ref 34.5–45)
HCT VFR BLD CALC: 25 % — LOW (ref 34.5–45)
HCT VFR BLD CALC: 25.1 % — LOW (ref 34.5–45)
HCT VFR BLD CALC: 25.1 % — LOW (ref 34.5–45)
HCT VFR BLD CALC: 25.3 % — LOW (ref 34.5–45)
HCT VFR BLD CALC: 25.5 % — LOW (ref 34.5–45)
HCT VFR BLD CALC: 25.5 % — LOW (ref 34.5–45)
HCT VFR BLD CALC: 26.1 % — LOW (ref 34.5–45)
HCT VFR BLD CALC: 26.4 % — LOW (ref 34.5–45)
HCT VFR BLD CALC: 26.5 % — LOW (ref 34.5–45)
HCT VFR BLD CALC: 26.5 % — LOW (ref 34.5–45)
HCT VFR BLD CALC: 26.7 % — LOW (ref 34.5–45)
HCT VFR BLD CALC: 26.7 % — LOW (ref 34.5–45)
HCT VFR BLD CALC: 26.8 % — LOW (ref 34.5–45)
HCT VFR BLD CALC: 27.1 % — LOW (ref 34.5–45)
HCT VFR BLD CALC: 27.2 % — LOW (ref 34.5–45)
HCT VFR BLD CALC: 27.2 % — LOW (ref 34.5–45)
HCT VFR BLD CALC: 27.3 % — LOW (ref 34.5–45)
HCT VFR BLD CALC: 27.3 % — LOW (ref 34.5–45)
HCT VFR BLD CALC: 27.5 % — LOW (ref 34.5–45)
HCT VFR BLD CALC: 28.1 % — LOW (ref 34.5–45)
HCT VFR BLD CALC: 28.2 % — LOW (ref 34.5–45)
HCT VFR BLD CALC: 28.3 % — LOW (ref 34.5–45)
HCT VFR BLD CALC: 28.3 % — LOW (ref 34.5–45)
HCT VFR BLD CALC: 28.5 % — LOW (ref 34.5–45)
HCT VFR BLD CALC: 28.6 % — LOW (ref 34.5–45)
HCT VFR BLD CALC: 29.2 % — LOW (ref 34.5–45)
HCT VFR BLD CALC: 29.6 % — LOW (ref 34.5–45)
HCT VFR BLD CALC: 29.7 % — LOW (ref 34.5–45)
HCT VFR BLD CALC: 30 % — LOW (ref 34.5–45)
HCT VFR BLD CALC: 31 % — LOW (ref 34.5–45)
HCT VFR BLD CALC: 31 % — LOW (ref 34.5–45)
HCT VFR BLD CALC: 32.4 % — LOW (ref 34.5–45)
HCT VFR BLD CALC: 32.5 % — LOW (ref 34.5–45)
HCT VFR BLD CALC: 32.5 % — LOW (ref 34.5–45)
HCT VFR BLD CALC: 33.9 % — LOW (ref 34.5–45)
HCT VFR BLD CALC: 34.6 % — SIGNIFICANT CHANGE UP (ref 34.5–45)
HCT VFR BLD CALC: 37.3 % — SIGNIFICANT CHANGE UP (ref 34.5–45)
HCT VFR BLDA CALC: 16 % — CRITICAL LOW (ref 34.5–46.5)
HCT VFR BLDA CALC: 28 % — LOW (ref 34.5–46.5)
HCT VFR BLDA CALC: 30 % — LOW (ref 34.5–46.5)
HCT VFR BLDA CALC: 32 % — LOW (ref 34.5–46.5)
HEPARIN-PF4 AB RESULT: 0.6 U/ML — SIGNIFICANT CHANGE UP (ref 0–0.9)
HEPARIN-PF4 AB RESULT: <0.6 U/ML — SIGNIFICANT CHANGE UP (ref 0–0.9)
HGB BLD CALC-MCNC: 10.1 G/DL — LOW (ref 11.7–16.1)
HGB BLD CALC-MCNC: 10.7 G/DL — LOW (ref 11.7–16.1)
HGB BLD CALC-MCNC: 5.4 G/DL — CRITICAL LOW (ref 11.7–16.1)
HGB BLD CALC-MCNC: 9.3 G/DL — LOW (ref 11.7–16.1)
HGB BLD-MCNC: 10 G/DL — LOW (ref 11.5–15.5)
HGB BLD-MCNC: 10 G/DL — LOW (ref 11.5–15.5)
HGB BLD-MCNC: 10.2 G/DL — LOW (ref 11.5–15.5)
HGB BLD-MCNC: 10.5 G/DL — LOW (ref 11.5–15.5)
HGB BLD-MCNC: 10.7 G/DL — LOW (ref 11.5–15.5)
HGB BLD-MCNC: 10.7 G/DL — LOW (ref 11.5–15.5)
HGB BLD-MCNC: 10.8 G/DL — LOW (ref 11.5–15.5)
HGB BLD-MCNC: 10.8 G/DL — LOW (ref 11.5–15.5)
HGB BLD-MCNC: 11.2 G/DL — LOW (ref 11.5–15.5)
HGB BLD-MCNC: 11.6 G/DL — SIGNIFICANT CHANGE UP (ref 11.5–15.5)
HGB BLD-MCNC: 11.7 G/DL — SIGNIFICANT CHANGE UP (ref 11.5–15.5)
HGB BLD-MCNC: 5.4 G/DL — CRITICAL LOW (ref 11.5–15.5)
HGB BLD-MCNC: 6.7 G/DL — CRITICAL LOW (ref 11.5–15.5)
HGB BLD-MCNC: 7 G/DL — CRITICAL LOW (ref 11.5–15.5)
HGB BLD-MCNC: 7.1 G/DL — LOW (ref 11.5–15.5)
HGB BLD-MCNC: 7.3 G/DL — LOW (ref 11.5–15.5)
HGB BLD-MCNC: 7.5 G/DL — LOW (ref 11.5–15.5)
HGB BLD-MCNC: 7.7 G/DL — LOW (ref 11.5–15.5)
HGB BLD-MCNC: 7.7 G/DL — LOW (ref 11.5–15.5)
HGB BLD-MCNC: 7.8 G/DL — LOW (ref 11.5–15.5)
HGB BLD-MCNC: 7.9 G/DL — LOW (ref 11.5–15.5)
HGB BLD-MCNC: 8.1 G/DL — LOW (ref 11.5–15.5)
HGB BLD-MCNC: 8.1 G/DL — LOW (ref 11.5–15.5)
HGB BLD-MCNC: 8.4 G/DL — LOW (ref 11.5–15.5)
HGB BLD-MCNC: 8.5 G/DL — LOW (ref 11.5–15.5)
HGB BLD-MCNC: 8.7 G/DL — LOW (ref 11.5–15.5)
HGB BLD-MCNC: 8.8 G/DL — LOW (ref 11.5–15.5)
HGB BLD-MCNC: 8.9 G/DL — LOW (ref 11.5–15.5)
HGB BLD-MCNC: 9 G/DL — LOW (ref 11.5–15.5)
HGB BLD-MCNC: 9.2 G/DL — LOW (ref 11.5–15.5)
HGB BLD-MCNC: 9.4 G/DL — LOW (ref 11.5–15.5)
HGB BLD-MCNC: 9.5 G/DL — LOW (ref 11.5–15.5)
HGB BLD-MCNC: 9.6 G/DL — LOW (ref 11.5–15.5)
HGB BLD-MCNC: 9.7 G/DL — LOW (ref 11.5–15.5)
HGB BLD-MCNC: 9.8 G/DL — LOW (ref 11.5–15.5)
HGB BLD-MCNC: 9.9 G/DL — LOW (ref 11.5–15.5)
HMPV RNA SPEC QL NAA+PROBE: SIGNIFICANT CHANGE UP
HMPV RNA SPEC QL NAA+PROBE: SIGNIFICANT CHANGE UP
HPIV1 RNA SPEC QL NAA+PROBE: SIGNIFICANT CHANGE UP
HPIV1 RNA SPEC QL NAA+PROBE: SIGNIFICANT CHANGE UP
HPIV2 RNA SPEC QL NAA+PROBE: SIGNIFICANT CHANGE UP
HPIV2 RNA SPEC QL NAA+PROBE: SIGNIFICANT CHANGE UP
HPIV3 RNA SPEC QL NAA+PROBE: SIGNIFICANT CHANGE UP
HPIV3 RNA SPEC QL NAA+PROBE: SIGNIFICANT CHANGE UP
HPIV4 RNA SPEC QL NAA+PROBE: SIGNIFICANT CHANGE UP
HPIV4 RNA SPEC QL NAA+PROBE: SIGNIFICANT CHANGE UP
HYALINE CASTS # UR AUTO: 2 /LPF — SIGNIFICANT CHANGE UP (ref 0–7)
HYALINE CASTS # UR AUTO: 4 /LPF — SIGNIFICANT CHANGE UP (ref 0–7)
HYALINE CASTS # UR AUTO: 4 /LPF — SIGNIFICANT CHANGE UP (ref 0–7)
HYPOCHROMIA BLD QL: SLIGHT — SIGNIFICANT CHANGE UP
IANC: 13.63 K/UL — HIGH (ref 1.8–7.4)
IANC: 13.82 K/UL — HIGH (ref 1.8–7.4)
IANC: 13.99 K/UL — HIGH (ref 1.8–7.4)
IANC: 14.18 K/UL — HIGH (ref 1.8–7.4)
IANC: 15.3 K/UL — HIGH (ref 1.8–7.4)
IANC: 15.4 K/UL — HIGH (ref 1.8–7.4)
IANC: 15.4 K/UL — HIGH (ref 1.8–7.4)
IANC: 15.85 K/UL — HIGH (ref 1.8–7.4)
IANC: 16.09 K/UL — HIGH (ref 1.8–7.4)
IANC: 6.5 K/UL — SIGNIFICANT CHANGE UP (ref 1.8–7.4)
IANC: 8.96 K/UL — HIGH (ref 1.8–7.4)
IMM GRANULOCYTES NFR BLD AUTO: 0.4 % — SIGNIFICANT CHANGE UP (ref 0–0.9)
IMM GRANULOCYTES NFR BLD AUTO: 0.6 % — SIGNIFICANT CHANGE UP (ref 0–0.9)
IMM GRANULOCYTES NFR BLD AUTO: 0.7 % — SIGNIFICANT CHANGE UP (ref 0–0.9)
IMM GRANULOCYTES NFR BLD AUTO: 0.8 % — SIGNIFICANT CHANGE UP (ref 0–0.9)
IMM GRANULOCYTES NFR BLD AUTO: 1 % — HIGH (ref 0–0.9)
IMM GRANULOCYTES NFR BLD AUTO: 1.1 % — HIGH (ref 0–0.9)
IMM GRANULOCYTES NFR BLD AUTO: 1.6 % — HIGH (ref 0–0.9)
INR BLD: 1.17 RATIO — HIGH (ref 0.88–1.16)
INR BLD: 1.2 RATIO — HIGH (ref 0.88–1.16)
INR BLD: 1.25 RATIO — HIGH (ref 0.88–1.16)
INR BLD: 1.32 RATIO — HIGH (ref 0.88–1.16)
INR BLD: 1.36 RATIO — HIGH (ref 0.88–1.16)
INR BLD: 1.41 RATIO — HIGH (ref 0.88–1.16)
INR BLD: 1.49 RATIO — HIGH (ref 0.88–1.16)
INR BLD: 1.5 RATIO — HIGH (ref 0.88–1.16)
INR BLD: 1.78 RATIO — HIGH (ref 0.88–1.16)
INR BLD: 1.99 RATIO — HIGH (ref 0.88–1.16)
INR BLD: 2.22 RATIO — HIGH (ref 0.88–1.16)
INR BLD: 2.67 RATIO — HIGH (ref 0.88–1.16)
IRON SATN MFR SERPL: 16 % — SIGNIFICANT CHANGE UP (ref 14–50)
IRON SATN MFR SERPL: 37 UG/DL — SIGNIFICANT CHANGE UP (ref 30–160)
KETONES UR-MCNC: ABNORMAL
KETONES UR-MCNC: ABNORMAL
KETONES UR-MCNC: NEGATIVE — SIGNIFICANT CHANGE UP
LACTATE BLDV-MCNC: 1.5 MMOL/L — SIGNIFICANT CHANGE UP (ref 0.5–2)
LACTATE BLDV-MCNC: 1.5 MMOL/L — SIGNIFICANT CHANGE UP (ref 0.5–2)
LACTATE BLDV-MCNC: 2 MMOL/L — SIGNIFICANT CHANGE UP (ref 0.5–2)
LACTATE BLDV-MCNC: 3.1 MMOL/L — HIGH (ref 0.5–2)
LACTATE SERPL-SCNC: 2.2 MMOL/L — HIGH (ref 0.5–2)
LACTATE SERPL-SCNC: 2.3 MMOL/L — HIGH (ref 0.5–2)
LDH SERPL L TO P-CCNC: 436 U/L — SIGNIFICANT CHANGE UP
LDH SERPL L TO P-CCNC: 739 U/L — HIGH (ref 135–225)
LDH SERPL L TO P-CCNC: 768 U/L — HIGH (ref 135–225)
LEUKOCYTE ESTERASE UR-ACNC: NEGATIVE — SIGNIFICANT CHANGE UP
LIDOCAIN IGE QN: 11 U/L — SIGNIFICANT CHANGE UP (ref 7–60)
LYMPHOCYTES # BLD AUTO: 0 % — LOW (ref 13–44)
LYMPHOCYTES # BLD AUTO: 0 K/UL — LOW (ref 1–3.3)
LYMPHOCYTES # BLD AUTO: 0.3 K/UL — LOW (ref 1–3.3)
LYMPHOCYTES # BLD AUTO: 0.33 K/UL — LOW (ref 1–3.3)
LYMPHOCYTES # BLD AUTO: 0.41 K/UL — LOW (ref 1–3.3)
LYMPHOCYTES # BLD AUTO: 0.42 K/UL — LOW (ref 1–3.3)
LYMPHOCYTES # BLD AUTO: 0.42 K/UL — LOW (ref 1–3.3)
LYMPHOCYTES # BLD AUTO: 0.47 K/UL — LOW (ref 1–3.3)
LYMPHOCYTES # BLD AUTO: 0.51 K/UL — LOW (ref 1–3.3)
LYMPHOCYTES # BLD AUTO: 0.58 K/UL — LOW (ref 1–3.3)
LYMPHOCYTES # BLD AUTO: 0.8 K/UL — LOW (ref 1–3.3)
LYMPHOCYTES # BLD AUTO: 1.02 K/UL — SIGNIFICANT CHANGE UP (ref 1–3.3)
LYMPHOCYTES # BLD AUTO: 1.7 % — LOW (ref 13–44)
LYMPHOCYTES # BLD AUTO: 1.9 % — LOW (ref 13–44)
LYMPHOCYTES # BLD AUTO: 13.2 % — SIGNIFICANT CHANGE UP (ref 13–44)
LYMPHOCYTES # BLD AUTO: 2.5 % — LOW (ref 13–44)
LYMPHOCYTES # BLD AUTO: 2.6 % — LOW (ref 13–44)
LYMPHOCYTES # BLD AUTO: 2.6 % — LOW (ref 13–44)
LYMPHOCYTES # BLD AUTO: 3 % — LOW (ref 13–44)
LYMPHOCYTES # BLD AUTO: 3.1 % — LOW (ref 13–44)
LYMPHOCYTES # BLD AUTO: 3.7 % — LOW (ref 13–44)
LYMPHOCYTES # BLD AUTO: 7.2 % — LOW (ref 13–44)
LYMPHOCYTES # FLD: 46 % — SIGNIFICANT CHANGE UP
M PNEUMO DNA SPEC QL NAA+PROBE: SIGNIFICANT CHANGE UP
M PNEUMO DNA SPEC QL NAA+PROBE: SIGNIFICANT CHANGE UP
MACROCYTES BLD QL: SLIGHT — SIGNIFICANT CHANGE UP
MAGNESIUM SERPL-MCNC: 1.3 MG/DL — LOW (ref 1.6–2.6)
MAGNESIUM SERPL-MCNC: 1.4 MG/DL — LOW (ref 1.6–2.6)
MAGNESIUM SERPL-MCNC: 1.5 MG/DL — LOW (ref 1.6–2.6)
MAGNESIUM SERPL-MCNC: 1.6 MG/DL — SIGNIFICANT CHANGE UP (ref 1.6–2.6)
MAGNESIUM SERPL-MCNC: 1.7 MG/DL — SIGNIFICANT CHANGE UP (ref 1.6–2.6)
MAGNESIUM SERPL-MCNC: 1.8 MG/DL — SIGNIFICANT CHANGE UP (ref 1.6–2.6)
MAGNESIUM SERPL-MCNC: 1.9 MG/DL — SIGNIFICANT CHANGE UP (ref 1.6–2.6)
MAGNESIUM SERPL-MCNC: 2 MG/DL — SIGNIFICANT CHANGE UP (ref 1.6–2.6)
MAGNESIUM SERPL-MCNC: 2.1 MG/DL — SIGNIFICANT CHANGE UP (ref 1.6–2.6)
MAGNESIUM SERPL-MCNC: 2.2 MG/DL — SIGNIFICANT CHANGE UP (ref 1.6–2.6)
MAGNESIUM SERPL-MCNC: 2.5 MG/DL — SIGNIFICANT CHANGE UP (ref 1.6–2.6)
MANUAL SMEAR VERIFICATION: SIGNIFICANT CHANGE UP
MCHC RBC-ENTMCNC: 28.2 PG — SIGNIFICANT CHANGE UP (ref 27–34)
MCHC RBC-ENTMCNC: 28.3 PG — SIGNIFICANT CHANGE UP (ref 27–34)
MCHC RBC-ENTMCNC: 28.5 PG — SIGNIFICANT CHANGE UP (ref 27–34)
MCHC RBC-ENTMCNC: 28.6 PG — SIGNIFICANT CHANGE UP (ref 27–34)
MCHC RBC-ENTMCNC: 28.7 PG — SIGNIFICANT CHANGE UP (ref 27–34)
MCHC RBC-ENTMCNC: 28.8 PG — SIGNIFICANT CHANGE UP (ref 27–34)
MCHC RBC-ENTMCNC: 28.9 PG — SIGNIFICANT CHANGE UP (ref 27–34)
MCHC RBC-ENTMCNC: 29 PG — SIGNIFICANT CHANGE UP (ref 27–34)
MCHC RBC-ENTMCNC: 29.1 PG — SIGNIFICANT CHANGE UP (ref 27–34)
MCHC RBC-ENTMCNC: 29.2 PG — SIGNIFICANT CHANGE UP (ref 27–34)
MCHC RBC-ENTMCNC: 29.3 PG — SIGNIFICANT CHANGE UP (ref 27–34)
MCHC RBC-ENTMCNC: 29.4 PG — SIGNIFICANT CHANGE UP (ref 27–34)
MCHC RBC-ENTMCNC: 29.4 PG — SIGNIFICANT CHANGE UP (ref 27–34)
MCHC RBC-ENTMCNC: 29.5 PG — SIGNIFICANT CHANGE UP (ref 27–34)
MCHC RBC-ENTMCNC: 29.5 PG — SIGNIFICANT CHANGE UP (ref 27–34)
MCHC RBC-ENTMCNC: 29.6 PG — SIGNIFICANT CHANGE UP (ref 27–34)
MCHC RBC-ENTMCNC: 29.7 PG — SIGNIFICANT CHANGE UP (ref 27–34)
MCHC RBC-ENTMCNC: 29.8 PG — SIGNIFICANT CHANGE UP (ref 27–34)
MCHC RBC-ENTMCNC: 29.8 PG — SIGNIFICANT CHANGE UP (ref 27–34)
MCHC RBC-ENTMCNC: 30 PG — SIGNIFICANT CHANGE UP (ref 27–34)
MCHC RBC-ENTMCNC: 30 PG — SIGNIFICANT CHANGE UP (ref 27–34)
MCHC RBC-ENTMCNC: 30.1 PG — SIGNIFICANT CHANGE UP (ref 27–34)
MCHC RBC-ENTMCNC: 30.6 PG — SIGNIFICANT CHANGE UP (ref 27–34)
MCHC RBC-ENTMCNC: 30.9 PG — SIGNIFICANT CHANGE UP (ref 27–34)
MCHC RBC-ENTMCNC: 31.1 PG — SIGNIFICANT CHANGE UP (ref 27–34)
MCHC RBC-ENTMCNC: 31.4 GM/DL — LOW (ref 32–36)
MCHC RBC-ENTMCNC: 31.4 PG — SIGNIFICANT CHANGE UP (ref 27–34)
MCHC RBC-ENTMCNC: 31.4 PG — SIGNIFICANT CHANGE UP (ref 27–34)
MCHC RBC-ENTMCNC: 31.9 GM/DL — LOW (ref 32–36)
MCHC RBC-ENTMCNC: 32 PG — SIGNIFICANT CHANGE UP (ref 27–34)
MCHC RBC-ENTMCNC: 32.2 PG — SIGNIFICANT CHANGE UP (ref 27–34)
MCHC RBC-ENTMCNC: 32.3 PG — SIGNIFICANT CHANGE UP (ref 27–34)
MCHC RBC-ENTMCNC: 32.5 GM/DL — SIGNIFICANT CHANGE UP (ref 32–36)
MCHC RBC-ENTMCNC: 32.6 GM/DL — SIGNIFICANT CHANGE UP (ref 32–36)
MCHC RBC-ENTMCNC: 32.6 PG — SIGNIFICANT CHANGE UP (ref 27–34)
MCHC RBC-ENTMCNC: 32.6 PG — SIGNIFICANT CHANGE UP (ref 27–34)
MCHC RBC-ENTMCNC: 32.9 GM/DL — SIGNIFICANT CHANGE UP (ref 32–36)
MCHC RBC-ENTMCNC: 33 GM/DL — SIGNIFICANT CHANGE UP (ref 32–36)
MCHC RBC-ENTMCNC: 33 GM/DL — SIGNIFICANT CHANGE UP (ref 32–36)
MCHC RBC-ENTMCNC: 33 PG — SIGNIFICANT CHANGE UP (ref 27–34)
MCHC RBC-ENTMCNC: 33 PG — SIGNIFICANT CHANGE UP (ref 27–34)
MCHC RBC-ENTMCNC: 33.1 GM/DL — SIGNIFICANT CHANGE UP (ref 32–36)
MCHC RBC-ENTMCNC: 33.2 GM/DL — SIGNIFICANT CHANGE UP (ref 32–36)
MCHC RBC-ENTMCNC: 33.3 GM/DL — SIGNIFICANT CHANGE UP (ref 32–36)
MCHC RBC-ENTMCNC: 33.5 GM/DL — SIGNIFICANT CHANGE UP (ref 32–36)
MCHC RBC-ENTMCNC: 33.5 PG — SIGNIFICANT CHANGE UP (ref 27–34)
MCHC RBC-ENTMCNC: 33.6 GM/DL — SIGNIFICANT CHANGE UP (ref 32–36)
MCHC RBC-ENTMCNC: 33.6 GM/DL — SIGNIFICANT CHANGE UP (ref 32–36)
MCHC RBC-ENTMCNC: 33.7 GM/DL — SIGNIFICANT CHANGE UP (ref 32–36)
MCHC RBC-ENTMCNC: 33.8 GM/DL — SIGNIFICANT CHANGE UP (ref 32–36)
MCHC RBC-ENTMCNC: 33.8 GM/DL — SIGNIFICANT CHANGE UP (ref 32–36)
MCHC RBC-ENTMCNC: 33.9 GM/DL — SIGNIFICANT CHANGE UP (ref 32–36)
MCHC RBC-ENTMCNC: 33.9 GM/DL — SIGNIFICANT CHANGE UP (ref 32–36)
MCHC RBC-ENTMCNC: 34 GM/DL — SIGNIFICANT CHANGE UP (ref 32–36)
MCHC RBC-ENTMCNC: 34 GM/DL — SIGNIFICANT CHANGE UP (ref 32–36)
MCHC RBC-ENTMCNC: 34.1 GM/DL — SIGNIFICANT CHANGE UP (ref 32–36)
MCHC RBC-ENTMCNC: 34.3 GM/DL — SIGNIFICANT CHANGE UP (ref 32–36)
MCHC RBC-ENTMCNC: 34.3 GM/DL — SIGNIFICANT CHANGE UP (ref 32–36)
MCHC RBC-ENTMCNC: 34.9 GM/DL — SIGNIFICANT CHANGE UP (ref 32–36)
MCHC RBC-ENTMCNC: 35 GM/DL — SIGNIFICANT CHANGE UP (ref 32–36)
MCHC RBC-ENTMCNC: 35.1 GM/DL — SIGNIFICANT CHANGE UP (ref 32–36)
MCHC RBC-ENTMCNC: 35.2 GM/DL — SIGNIFICANT CHANGE UP (ref 32–36)
MCHC RBC-ENTMCNC: 35.5 GM/DL — SIGNIFICANT CHANGE UP (ref 32–36)
MCHC RBC-ENTMCNC: 35.7 GM/DL — SIGNIFICANT CHANGE UP (ref 32–36)
MCHC RBC-ENTMCNC: 35.8 GM/DL — SIGNIFICANT CHANGE UP (ref 32–36)
MCHC RBC-ENTMCNC: 36.1 GM/DL — HIGH (ref 32–36)
MCHC RBC-ENTMCNC: 36.1 GM/DL — HIGH (ref 32–36)
MCHC RBC-ENTMCNC: 36.2 GM/DL — HIGH (ref 32–36)
MCHC RBC-ENTMCNC: 36.5 GM/DL — HIGH (ref 32–36)
MCHC RBC-ENTMCNC: 36.9 GM/DL — HIGH (ref 32–36)
MCV RBC AUTO: 83.6 FL — SIGNIFICANT CHANGE UP (ref 80–100)
MCV RBC AUTO: 84.7 FL — SIGNIFICANT CHANGE UP (ref 80–100)
MCV RBC AUTO: 85 FL — SIGNIFICANT CHANGE UP (ref 80–100)
MCV RBC AUTO: 85.5 FL — SIGNIFICANT CHANGE UP (ref 80–100)
MCV RBC AUTO: 85.7 FL — SIGNIFICANT CHANGE UP (ref 80–100)
MCV RBC AUTO: 86.1 FL — SIGNIFICANT CHANGE UP (ref 80–100)
MCV RBC AUTO: 86.2 FL — SIGNIFICANT CHANGE UP (ref 80–100)
MCV RBC AUTO: 86.3 FL — SIGNIFICANT CHANGE UP (ref 80–100)
MCV RBC AUTO: 86.3 FL — SIGNIFICANT CHANGE UP (ref 80–100)
MCV RBC AUTO: 86.4 FL — SIGNIFICANT CHANGE UP (ref 80–100)
MCV RBC AUTO: 86.5 FL — SIGNIFICANT CHANGE UP (ref 80–100)
MCV RBC AUTO: 86.6 FL — SIGNIFICANT CHANGE UP (ref 80–100)
MCV RBC AUTO: 86.7 FL — SIGNIFICANT CHANGE UP (ref 80–100)
MCV RBC AUTO: 86.7 FL — SIGNIFICANT CHANGE UP (ref 80–100)
MCV RBC AUTO: 86.8 FL — SIGNIFICANT CHANGE UP (ref 80–100)
MCV RBC AUTO: 86.9 FL — SIGNIFICANT CHANGE UP (ref 80–100)
MCV RBC AUTO: 86.9 FL — SIGNIFICANT CHANGE UP (ref 80–100)
MCV RBC AUTO: 87 FL — SIGNIFICANT CHANGE UP (ref 80–100)
MCV RBC AUTO: 87.2 FL — SIGNIFICANT CHANGE UP (ref 80–100)
MCV RBC AUTO: 87.2 FL — SIGNIFICANT CHANGE UP (ref 80–100)
MCV RBC AUTO: 87.3 FL — SIGNIFICANT CHANGE UP (ref 80–100)
MCV RBC AUTO: 87.4 FL — SIGNIFICANT CHANGE UP (ref 80–100)
MCV RBC AUTO: 87.5 FL — SIGNIFICANT CHANGE UP (ref 80–100)
MCV RBC AUTO: 87.6 FL — SIGNIFICANT CHANGE UP (ref 80–100)
MCV RBC AUTO: 87.6 FL — SIGNIFICANT CHANGE UP (ref 80–100)
MCV RBC AUTO: 88.3 FL — SIGNIFICANT CHANGE UP (ref 80–100)
MCV RBC AUTO: 88.3 FL — SIGNIFICANT CHANGE UP (ref 80–100)
MCV RBC AUTO: 88.5 FL — SIGNIFICANT CHANGE UP (ref 80–100)
MCV RBC AUTO: 88.5 FL — SIGNIFICANT CHANGE UP (ref 80–100)
MCV RBC AUTO: 88.6 FL — SIGNIFICANT CHANGE UP (ref 80–100)
MCV RBC AUTO: 88.9 FL — SIGNIFICANT CHANGE UP (ref 80–100)
MCV RBC AUTO: 89 FL — SIGNIFICANT CHANGE UP (ref 80–100)
MCV RBC AUTO: 89 FL — SIGNIFICANT CHANGE UP (ref 80–100)
MCV RBC AUTO: 89.1 FL — SIGNIFICANT CHANGE UP (ref 80–100)
MCV RBC AUTO: 89.1 FL — SIGNIFICANT CHANGE UP (ref 80–100)
MCV RBC AUTO: 89.3 FL — SIGNIFICANT CHANGE UP (ref 80–100)
MCV RBC AUTO: 89.4 FL — SIGNIFICANT CHANGE UP (ref 80–100)
MCV RBC AUTO: 89.5 FL — SIGNIFICANT CHANGE UP (ref 80–100)
MCV RBC AUTO: 89.8 FL — SIGNIFICANT CHANGE UP (ref 80–100)
MCV RBC AUTO: 89.8 FL — SIGNIFICANT CHANGE UP (ref 80–100)
MCV RBC AUTO: 89.9 FL — SIGNIFICANT CHANGE UP (ref 80–100)
MCV RBC AUTO: 90.2 FL — SIGNIFICANT CHANGE UP (ref 80–100)
MCV RBC AUTO: 90.2 FL — SIGNIFICANT CHANGE UP (ref 80–100)
MCV RBC AUTO: 90.3 FL — SIGNIFICANT CHANGE UP (ref 80–100)
MCV RBC AUTO: 90.4 FL — SIGNIFICANT CHANGE UP (ref 80–100)
MCV RBC AUTO: 90.7 FL — SIGNIFICANT CHANGE UP (ref 80–100)
MCV RBC AUTO: 91.4 FL — SIGNIFICANT CHANGE UP (ref 80–100)
MCV RBC AUTO: 91.9 FL — SIGNIFICANT CHANGE UP (ref 80–100)
MCV RBC AUTO: 92.1 FL — SIGNIFICANT CHANGE UP (ref 80–100)
MCV RBC AUTO: 92.7 FL — SIGNIFICANT CHANGE UP (ref 80–100)
MCV RBC AUTO: 96.5 FL — SIGNIFICANT CHANGE UP (ref 80–100)
MESOTHL CELL # FLD: 0 % — SIGNIFICANT CHANGE UP
METHOD TYPE: SIGNIFICANT CHANGE UP
MONOCYTES # BLD AUTO: 0.27 K/UL — SIGNIFICANT CHANGE UP (ref 0–0.9)
MONOCYTES # BLD AUTO: 0.58 K/UL — SIGNIFICANT CHANGE UP (ref 0–0.9)
MONOCYTES # BLD AUTO: 0.68 K/UL — SIGNIFICANT CHANGE UP (ref 0–0.9)
MONOCYTES # BLD AUTO: 0.69 K/UL — SIGNIFICANT CHANGE UP (ref 0–0.9)
MONOCYTES # BLD AUTO: 0.75 K/UL — SIGNIFICANT CHANGE UP (ref 0–0.9)
MONOCYTES # BLD AUTO: 0.98 K/UL — HIGH (ref 0–0.9)
MONOCYTES # BLD AUTO: 1.07 K/UL — HIGH (ref 0–0.9)
MONOCYTES # BLD AUTO: 1.08 K/UL — HIGH (ref 0–0.9)
MONOCYTES # BLD AUTO: 1.12 K/UL — HIGH (ref 0–0.9)
MONOCYTES # BLD AUTO: 1.14 K/UL — HIGH (ref 0–0.9)
MONOCYTES # BLD AUTO: 1.25 K/UL — HIGH (ref 0–0.9)
MONOCYTES NFR BLD AUTO: 11.2 % — SIGNIFICANT CHANGE UP (ref 2–14)
MONOCYTES NFR BLD AUTO: 3.5 % — SIGNIFICANT CHANGE UP (ref 2–14)
MONOCYTES NFR BLD AUTO: 3.5 % — SIGNIFICANT CHANGE UP (ref 2–14)
MONOCYTES NFR BLD AUTO: 4 % — SIGNIFICANT CHANGE UP (ref 2–14)
MONOCYTES NFR BLD AUTO: 4.1 % — SIGNIFICANT CHANGE UP (ref 2–14)
MONOCYTES NFR BLD AUTO: 4.3 % — SIGNIFICANT CHANGE UP (ref 2–14)
MONOCYTES NFR BLD AUTO: 6.3 % — SIGNIFICANT CHANGE UP (ref 2–14)
MONOCYTES NFR BLD AUTO: 6.4 % — SIGNIFICANT CHANGE UP (ref 2–14)
MONOCYTES NFR BLD AUTO: 6.7 % — SIGNIFICANT CHANGE UP (ref 2–14)
MONOCYTES NFR BLD AUTO: 7.1 % — SIGNIFICANT CHANGE UP (ref 2–14)
MONOCYTES NFR BLD AUTO: 7.2 % — SIGNIFICANT CHANGE UP (ref 2–14)
MONOS+MACROS # FLD: 28 % — SIGNIFICANT CHANGE UP
MRSA PCR RESULT.: SIGNIFICANT CHANGE UP
NEUTROPHILS # BLD AUTO: 13.63 K/UL — HIGH (ref 1.8–7.4)
NEUTROPHILS # BLD AUTO: 13.82 K/UL — HIGH (ref 1.8–7.4)
NEUTROPHILS # BLD AUTO: 13.99 K/UL — HIGH (ref 1.8–7.4)
NEUTROPHILS # BLD AUTO: 14.18 K/UL — HIGH (ref 1.8–7.4)
NEUTROPHILS # BLD AUTO: 15.3 K/UL — HIGH (ref 1.8–7.4)
NEUTROPHILS # BLD AUTO: 15.4 K/UL — HIGH (ref 1.8–7.4)
NEUTROPHILS # BLD AUTO: 15.85 K/UL — HIGH (ref 1.8–7.4)
NEUTROPHILS # BLD AUTO: 16.07 K/UL — HIGH (ref 1.8–7.4)
NEUTROPHILS # BLD AUTO: 16.09 K/UL — HIGH (ref 1.8–7.4)
NEUTROPHILS # BLD AUTO: 6.42 K/UL — SIGNIFICANT CHANGE UP (ref 1.8–7.4)
NEUTROPHILS # BLD AUTO: 8.96 K/UL — HIGH (ref 1.8–7.4)
NEUTROPHILS NFR BLD AUTO: 80.5 % — HIGH (ref 43–77)
NEUTROPHILS NFR BLD AUTO: 83.3 % — HIGH (ref 43–77)
NEUTROPHILS NFR BLD AUTO: 88 % — HIGH (ref 43–77)
NEUTROPHILS NFR BLD AUTO: 88.3 % — HIGH (ref 43–77)
NEUTROPHILS NFR BLD AUTO: 88.9 % — HIGH (ref 43–77)
NEUTROPHILS NFR BLD AUTO: 89.1 % — HIGH (ref 43–77)
NEUTROPHILS NFR BLD AUTO: 89.6 % — HIGH (ref 43–77)
NEUTROPHILS NFR BLD AUTO: 92.2 % — HIGH (ref 43–77)
NEUTROPHILS NFR BLD AUTO: 92.9 % — HIGH (ref 43–77)
NEUTROPHILS NFR BLD AUTO: 93 % — HIGH (ref 43–77)
NEUTROPHILS NFR BLD AUTO: 95.6 % — HIGH (ref 43–77)
NEUTROPHILS-BODY FLUID: 11 % — SIGNIFICANT CHANGE UP
NITRITE UR-MCNC: NEGATIVE — SIGNIFICANT CHANGE UP
NON-GYNECOLOGICAL CYTOLOGY STUDY: SIGNIFICANT CHANGE UP
NRBC # BLD: 0 /100 WBCS — SIGNIFICANT CHANGE UP (ref 0–0)
NRBC # FLD: 0 K/UL — SIGNIFICANT CHANGE UP (ref 0–0)
NRBC # FLD: 0.02 K/UL — HIGH (ref 0–0)
NRBC # FLD: 0.03 K/UL — HIGH (ref 0–0)
NRBC # FLD: 0.06 K/UL — HIGH (ref 0–0)
NRBC # FLD: 0.07 K/UL — HIGH (ref 0–0)
NT-PROBNP SERPL-SCNC: 8894 PG/ML — HIGH
NT-PROBNP SERPL-SCNC: 953 PG/ML — HIGH
ORGANISM # SPEC MICROSCOPIC CNT: SIGNIFICANT CHANGE UP
ORGANISM # SPEC MICROSCOPIC CNT: SIGNIFICANT CHANGE UP
OSMOLALITY SERPL: 285 MOSM/KG — SIGNIFICANT CHANGE UP (ref 275–295)
OTHER CELLS FLD MANUAL: 15 % — SIGNIFICANT CHANGE UP
OVALOCYTES BLD QL SMEAR: SLIGHT — SIGNIFICANT CHANGE UP
PCO2 BLDV: 40 MMHG — SIGNIFICANT CHANGE UP (ref 39–52)
PCO2 BLDV: 42 MMHG — SIGNIFICANT CHANGE UP (ref 39–52)
PCO2 BLDV: 44 MMHG — SIGNIFICANT CHANGE UP (ref 39–52)
PCO2 BLDV: 50 MMHG — SIGNIFICANT CHANGE UP (ref 39–52)
PF4 HEPARIN CMPLX AB SER-ACNC: NEGATIVE — SIGNIFICANT CHANGE UP
PF4 HEPARIN CMPLX AB SER-ACNC: NEGATIVE — SIGNIFICANT CHANGE UP
PH BLDV: 7.4 — SIGNIFICANT CHANGE UP (ref 7.32–7.43)
PH BLDV: 7.44 — HIGH (ref 7.32–7.43)
PH BLDV: 7.44 — HIGH (ref 7.32–7.43)
PH BLDV: 7.46 — HIGH (ref 7.32–7.43)
PH FLD: 7.9 — SIGNIFICANT CHANGE UP
PH UR: 6 — SIGNIFICANT CHANGE UP (ref 5–8)
PHOSPHATE SERPL-MCNC: 1.8 MG/DL — LOW (ref 2.5–4.5)
PHOSPHATE SERPL-MCNC: 1.8 MG/DL — LOW (ref 2.5–4.5)
PHOSPHATE SERPL-MCNC: 2.1 MG/DL — LOW (ref 2.5–4.5)
PHOSPHATE SERPL-MCNC: 2.2 MG/DL — LOW (ref 2.5–4.5)
PHOSPHATE SERPL-MCNC: 2.4 MG/DL — LOW (ref 2.5–4.5)
PHOSPHATE SERPL-MCNC: 2.6 MG/DL — SIGNIFICANT CHANGE UP (ref 2.5–4.5)
PHOSPHATE SERPL-MCNC: 2.7 MG/DL — SIGNIFICANT CHANGE UP (ref 2.5–4.5)
PHOSPHATE SERPL-MCNC: 2.7 MG/DL — SIGNIFICANT CHANGE UP (ref 2.5–4.5)
PHOSPHATE SERPL-MCNC: 2.8 MG/DL — SIGNIFICANT CHANGE UP (ref 2.5–4.5)
PHOSPHATE SERPL-MCNC: 2.9 MG/DL — SIGNIFICANT CHANGE UP (ref 2.5–4.5)
PHOSPHATE SERPL-MCNC: 3 MG/DL — SIGNIFICANT CHANGE UP (ref 2.5–4.5)
PHOSPHATE SERPL-MCNC: 3.1 MG/DL — SIGNIFICANT CHANGE UP (ref 2.5–4.5)
PHOSPHATE SERPL-MCNC: 3.1 MG/DL — SIGNIFICANT CHANGE UP (ref 2.5–4.5)
PHOSPHATE SERPL-MCNC: 3.2 MG/DL — SIGNIFICANT CHANGE UP (ref 2.5–4.5)
PHOSPHATE SERPL-MCNC: 3.3 MG/DL — SIGNIFICANT CHANGE UP (ref 2.5–4.5)
PHOSPHATE SERPL-MCNC: 3.4 MG/DL — SIGNIFICANT CHANGE UP (ref 2.5–4.5)
PHOSPHATE SERPL-MCNC: 3.5 MG/DL — SIGNIFICANT CHANGE UP (ref 2.5–4.5)
PHOSPHATE SERPL-MCNC: 3.6 MG/DL — SIGNIFICANT CHANGE UP (ref 2.5–4.5)
PHOSPHATE SERPL-MCNC: 3.7 MG/DL — SIGNIFICANT CHANGE UP (ref 2.5–4.5)
PHOSPHATE SERPL-MCNC: 3.9 MG/DL — SIGNIFICANT CHANGE UP (ref 2.5–4.5)
PHOSPHATE SERPL-MCNC: 4 MG/DL — SIGNIFICANT CHANGE UP (ref 2.5–4.5)
PHOSPHATE SERPL-MCNC: 4.2 MG/DL — SIGNIFICANT CHANGE UP (ref 2.5–4.5)
PHOSPHATE SERPL-MCNC: 4.8 MG/DL — HIGH (ref 2.5–4.5)
PHOSPHATE SERPL-MCNC: 5.2 MG/DL — HIGH (ref 2.5–4.5)
PLAT MORPH BLD: NORMAL — SIGNIFICANT CHANGE UP
PLATELET # BLD AUTO: 100 K/UL — LOW (ref 150–400)
PLATELET # BLD AUTO: 102 K/UL — LOW (ref 150–400)
PLATELET # BLD AUTO: 104 K/UL — LOW (ref 150–400)
PLATELET # BLD AUTO: 108 K/UL — LOW (ref 150–400)
PLATELET # BLD AUTO: 108 K/UL — LOW (ref 150–400)
PLATELET # BLD AUTO: 112 K/UL — LOW (ref 150–400)
PLATELET # BLD AUTO: 115 K/UL — LOW (ref 150–400)
PLATELET # BLD AUTO: 116 K/UL — LOW (ref 150–400)
PLATELET # BLD AUTO: 117 K/UL — LOW (ref 150–400)
PLATELET # BLD AUTO: 118 K/UL — LOW (ref 150–400)
PLATELET # BLD AUTO: 120 K/UL — LOW (ref 150–400)
PLATELET # BLD AUTO: 120 K/UL — LOW (ref 150–400)
PLATELET # BLD AUTO: 121 K/UL — LOW (ref 150–400)
PLATELET # BLD AUTO: 124 K/UL — LOW (ref 150–400)
PLATELET # BLD AUTO: 128 K/UL — LOW (ref 150–400)
PLATELET # BLD AUTO: 128 K/UL — LOW (ref 150–400)
PLATELET # BLD AUTO: 129 K/UL — LOW (ref 150–400)
PLATELET # BLD AUTO: 129 K/UL — LOW (ref 150–400)
PLATELET # BLD AUTO: 132 K/UL — LOW (ref 150–400)
PLATELET # BLD AUTO: 134 K/UL — LOW (ref 150–400)
PLATELET # BLD AUTO: 135 K/UL — LOW (ref 150–400)
PLATELET # BLD AUTO: 140 K/UL — LOW (ref 150–400)
PLATELET # BLD AUTO: 146 K/UL — LOW (ref 150–400)
PLATELET # BLD AUTO: 147 K/UL — LOW (ref 150–400)
PLATELET # BLD AUTO: 152 K/UL — SIGNIFICANT CHANGE UP (ref 150–400)
PLATELET # BLD AUTO: 170 K/UL — SIGNIFICANT CHANGE UP (ref 150–400)
PLATELET # BLD AUTO: 198 K/UL — SIGNIFICANT CHANGE UP (ref 150–400)
PLATELET # BLD AUTO: 41 K/UL — LOW (ref 150–400)
PLATELET # BLD AUTO: 48 K/UL — LOW (ref 150–400)
PLATELET # BLD AUTO: 51 K/UL — LOW (ref 150–400)
PLATELET # BLD AUTO: 56 K/UL — LOW (ref 150–400)
PLATELET # BLD AUTO: 61 K/UL — LOW (ref 150–400)
PLATELET # BLD AUTO: 67 K/UL — LOW (ref 150–400)
PLATELET # BLD AUTO: 69 K/UL — LOW (ref 150–400)
PLATELET # BLD AUTO: 73 K/UL — LOW (ref 150–400)
PLATELET # BLD AUTO: 80 K/UL — LOW (ref 150–400)
PLATELET # BLD AUTO: 83 K/UL — LOW (ref 150–400)
PLATELET # BLD AUTO: 88 K/UL — LOW (ref 150–400)
PLATELET # BLD AUTO: 89 K/UL — LOW (ref 150–400)
PLATELET # BLD AUTO: 89 K/UL — LOW (ref 150–400)
PLATELET # BLD AUTO: 90 K/UL — LOW (ref 150–400)
PLATELET # BLD AUTO: 92 K/UL — LOW (ref 150–400)
PLATELET # BLD AUTO: 93 K/UL — LOW (ref 150–400)
PLATELET # BLD AUTO: 96 K/UL — LOW (ref 150–400)
PLATELET COUNT - ESTIMATE: NORMAL — SIGNIFICANT CHANGE UP
PO2 BLDV: 26 MMHG — SIGNIFICANT CHANGE UP (ref 25–45)
PO2 BLDV: 39 MMHG — SIGNIFICANT CHANGE UP (ref 25–45)
PO2 BLDV: 67 MMHG — HIGH (ref 25–45)
PO2 BLDV: 99 MMHG — HIGH (ref 25–45)
POIKILOCYTOSIS BLD QL AUTO: SLIGHT — SIGNIFICANT CHANGE UP
POLYCHROMASIA BLD QL SMEAR: SLIGHT — SIGNIFICANT CHANGE UP
POTASSIUM BLDV-SCNC: 3.3 MMOL/L — LOW (ref 3.5–5.1)
POTASSIUM BLDV-SCNC: 3.5 MMOL/L — SIGNIFICANT CHANGE UP (ref 3.5–5.1)
POTASSIUM BLDV-SCNC: 3.9 MMOL/L — SIGNIFICANT CHANGE UP (ref 3.5–5.1)
POTASSIUM BLDV-SCNC: 4.2 MMOL/L — SIGNIFICANT CHANGE UP (ref 3.5–5.1)
POTASSIUM SERPL-MCNC: 3 MMOL/L — LOW (ref 3.5–5.3)
POTASSIUM SERPL-MCNC: 3.3 MMOL/L — LOW (ref 3.5–5.3)
POTASSIUM SERPL-MCNC: 3.3 MMOL/L — LOW (ref 3.5–5.3)
POTASSIUM SERPL-MCNC: 3.4 MMOL/L — LOW (ref 3.5–5.3)
POTASSIUM SERPL-MCNC: 3.5 MMOL/L — SIGNIFICANT CHANGE UP (ref 3.5–5.3)
POTASSIUM SERPL-MCNC: 3.6 MMOL/L — SIGNIFICANT CHANGE UP (ref 3.5–5.3)
POTASSIUM SERPL-MCNC: 3.7 MMOL/L — SIGNIFICANT CHANGE UP (ref 3.5–5.3)
POTASSIUM SERPL-MCNC: 3.8 MMOL/L — SIGNIFICANT CHANGE UP (ref 3.5–5.3)
POTASSIUM SERPL-MCNC: 3.8 MMOL/L — SIGNIFICANT CHANGE UP (ref 3.5–5.3)
POTASSIUM SERPL-MCNC: 3.9 MMOL/L — SIGNIFICANT CHANGE UP (ref 3.5–5.3)
POTASSIUM SERPL-MCNC: 4 MMOL/L — SIGNIFICANT CHANGE UP (ref 3.5–5.3)
POTASSIUM SERPL-MCNC: 4.1 MMOL/L — SIGNIFICANT CHANGE UP (ref 3.5–5.3)
POTASSIUM SERPL-MCNC: 4.2 MMOL/L — SIGNIFICANT CHANGE UP (ref 3.5–5.3)
POTASSIUM SERPL-MCNC: 4.3 MMOL/L — SIGNIFICANT CHANGE UP (ref 3.5–5.3)
POTASSIUM SERPL-MCNC: 4.4 MMOL/L — SIGNIFICANT CHANGE UP (ref 3.5–5.3)
POTASSIUM SERPL-MCNC: 4.5 MMOL/L — SIGNIFICANT CHANGE UP (ref 3.5–5.3)
POTASSIUM SERPL-MCNC: 4.5 MMOL/L — SIGNIFICANT CHANGE UP (ref 3.5–5.3)
POTASSIUM SERPL-MCNC: 4.6 MMOL/L — SIGNIFICANT CHANGE UP (ref 3.5–5.3)
POTASSIUM SERPL-MCNC: 4.8 MMOL/L — SIGNIFICANT CHANGE UP (ref 3.5–5.3)
POTASSIUM SERPL-MCNC: 5.8 MMOL/L — HIGH (ref 3.5–5.3)
POTASSIUM SERPL-MCNC: 5.8 MMOL/L — HIGH (ref 3.5–5.3)
POTASSIUM SERPL-SCNC: 3 MMOL/L — LOW (ref 3.5–5.3)
POTASSIUM SERPL-SCNC: 3.3 MMOL/L — LOW (ref 3.5–5.3)
POTASSIUM SERPL-SCNC: 3.3 MMOL/L — LOW (ref 3.5–5.3)
POTASSIUM SERPL-SCNC: 3.4 MMOL/L — LOW (ref 3.5–5.3)
POTASSIUM SERPL-SCNC: 3.5 MMOL/L — SIGNIFICANT CHANGE UP (ref 3.5–5.3)
POTASSIUM SERPL-SCNC: 3.6 MMOL/L — SIGNIFICANT CHANGE UP (ref 3.5–5.3)
POTASSIUM SERPL-SCNC: 3.7 MMOL/L — SIGNIFICANT CHANGE UP (ref 3.5–5.3)
POTASSIUM SERPL-SCNC: 3.8 MMOL/L — SIGNIFICANT CHANGE UP (ref 3.5–5.3)
POTASSIUM SERPL-SCNC: 3.8 MMOL/L — SIGNIFICANT CHANGE UP (ref 3.5–5.3)
POTASSIUM SERPL-SCNC: 3.9 MMOL/L — SIGNIFICANT CHANGE UP (ref 3.5–5.3)
POTASSIUM SERPL-SCNC: 4 MMOL/L — SIGNIFICANT CHANGE UP (ref 3.5–5.3)
POTASSIUM SERPL-SCNC: 4.1 MMOL/L — SIGNIFICANT CHANGE UP (ref 3.5–5.3)
POTASSIUM SERPL-SCNC: 4.2 MMOL/L — SIGNIFICANT CHANGE UP (ref 3.5–5.3)
POTASSIUM SERPL-SCNC: 4.3 MMOL/L — SIGNIFICANT CHANGE UP (ref 3.5–5.3)
POTASSIUM SERPL-SCNC: 4.4 MMOL/L — SIGNIFICANT CHANGE UP (ref 3.5–5.3)
POTASSIUM SERPL-SCNC: 4.5 MMOL/L — SIGNIFICANT CHANGE UP (ref 3.5–5.3)
POTASSIUM SERPL-SCNC: 4.5 MMOL/L — SIGNIFICANT CHANGE UP (ref 3.5–5.3)
POTASSIUM SERPL-SCNC: 4.6 MMOL/L — SIGNIFICANT CHANGE UP (ref 3.5–5.3)
POTASSIUM SERPL-SCNC: 4.8 MMOL/L — SIGNIFICANT CHANGE UP (ref 3.5–5.3)
POTASSIUM SERPL-SCNC: 5.8 MMOL/L — HIGH (ref 3.5–5.3)
POTASSIUM SERPL-SCNC: 5.8 MMOL/L — HIGH (ref 3.5–5.3)
POTASSIUM UR-SCNC: 54.1 MMOL/L — SIGNIFICANT CHANGE UP
PROCALCITONIN SERPL-MCNC: 0.22 NG/ML — HIGH (ref 0.02–0.1)
PROCALCITONIN SERPL-MCNC: 0.39 NG/ML — HIGH (ref 0.02–0.1)
PROLACTIN SERPL-MCNC: 12 NG/ML — SIGNIFICANT CHANGE UP (ref 3.4–24.1)
PROT ?TM UR-MCNC: 146 MG/DL — SIGNIFICANT CHANGE UP
PROT ?TM UR-MCNC: 146 MG/DL — SIGNIFICANT CHANGE UP
PROT ?TM UR-MCNC: 169 MG/DL — SIGNIFICANT CHANGE UP
PROT FLD-MCNC: 4.4 G/DL — SIGNIFICANT CHANGE UP
PROT SERPL-MCNC: 5.7 G/DL — LOW (ref 6–8.3)
PROT SERPL-MCNC: 5.9 G/DL — LOW (ref 6–8.3)
PROT SERPL-MCNC: 6 G/DL — SIGNIFICANT CHANGE UP (ref 6–8.3)
PROT SERPL-MCNC: 6.2 G/DL — SIGNIFICANT CHANGE UP (ref 6–8.3)
PROT SERPL-MCNC: 6.5 G/DL — SIGNIFICANT CHANGE UP (ref 6–8.3)
PROT SERPL-MCNC: 6.5 G/DL — SIGNIFICANT CHANGE UP (ref 6–8.3)
PROT SERPL-MCNC: 6.6 G/DL — SIGNIFICANT CHANGE UP (ref 6–8.3)
PROT SERPL-MCNC: 6.7 G/DL — SIGNIFICANT CHANGE UP (ref 6–8.3)
PROT SERPL-MCNC: 7 G/DL — SIGNIFICANT CHANGE UP (ref 6–8.3)
PROT SERPL-MCNC: 7 G/DL — SIGNIFICANT CHANGE UP (ref 6–8.3)
PROT SERPL-MCNC: 7.1 G/DL — SIGNIFICANT CHANGE UP (ref 6–8.3)
PROT SERPL-MCNC: 7.2 G/DL — SIGNIFICANT CHANGE UP (ref 6–8.3)
PROT SERPL-MCNC: 7.4 G/DL — SIGNIFICANT CHANGE UP (ref 6–8.3)
PROT SERPL-MCNC: 7.6 G/DL — SIGNIFICANT CHANGE UP (ref 6–8.3)
PROT UR-MCNC: ABNORMAL
PROT/CREAT UR-RTO: 1.2 RATIO — HIGH (ref 0–0.2)
PROT/CREAT UR-RTO: 2 RATIO — HIGH (ref 0–0.2)
PROTHROM AB SERPL-ACNC: 13.6 SEC — HIGH (ref 10.5–13.4)
PROTHROM AB SERPL-ACNC: 14 SEC — HIGH (ref 10.5–13.4)
PROTHROM AB SERPL-ACNC: 14.5 SEC — HIGH (ref 10.5–13.4)
PROTHROM AB SERPL-ACNC: 15.4 SEC — HIGH (ref 10.5–13.4)
PROTHROM AB SERPL-ACNC: 15.8 SEC — HIGH (ref 10.5–13.4)
PROTHROM AB SERPL-ACNC: 16.4 SEC — HIGH (ref 10.5–13.4)
PROTHROM AB SERPL-ACNC: 17.3 SEC — HIGH (ref 10.5–13.4)
PROTHROM AB SERPL-ACNC: 17.5 SEC — HIGH (ref 10.5–13.4)
PROTHROM AB SERPL-ACNC: 20.8 SEC — HIGH (ref 10.5–13.4)
PROTHROM AB SERPL-ACNC: 23.3 SEC — HIGH (ref 10.5–13.4)
PROTHROM AB SERPL-ACNC: 26 SEC — HIGH (ref 10.5–13.4)
PROTHROM AB SERPL-ACNC: 31.3 SEC — HIGH (ref 10.5–13.4)
RAPID RVP RESULT: SIGNIFICANT CHANGE UP
RAPID RVP RESULT: SIGNIFICANT CHANGE UP
RBC # BLD: 1.72 M/UL — LOW (ref 3.8–5.2)
RBC # BLD: 2.31 M/UL — LOW (ref 3.8–5.2)
RBC # BLD: 2.39 M/UL — LOW (ref 3.8–5.2)
RBC # BLD: 2.42 M/UL — LOW (ref 3.8–5.2)
RBC # BLD: 2.44 M/UL — LOW (ref 3.8–5.2)
RBC # BLD: 2.45 M/UL — LOW (ref 3.8–5.2)
RBC # BLD: 2.56 M/UL — LOW (ref 3.8–5.2)
RBC # BLD: 2.6 M/UL — LOW (ref 3.8–5.2)
RBC # BLD: 2.66 M/UL — LOW (ref 3.8–5.2)
RBC # BLD: 2.7 M/UL — LOW (ref 3.8–5.2)
RBC # BLD: 2.7 M/UL — LOW (ref 3.8–5.2)
RBC # BLD: 2.73 M/UL — LOW (ref 3.8–5.2)
RBC # BLD: 2.74 M/UL — LOW (ref 3.8–5.2)
RBC # BLD: 2.76 M/UL — LOW (ref 3.8–5.2)
RBC # BLD: 2.76 M/UL — LOW (ref 3.8–5.2)
RBC # BLD: 2.81 M/UL — LOW (ref 3.8–5.2)
RBC # BLD: 2.88 M/UL — LOW (ref 3.8–5.2)
RBC # BLD: 2.89 M/UL — LOW (ref 3.8–5.2)
RBC # BLD: 2.9 M/UL — LOW (ref 3.8–5.2)
RBC # BLD: 2.94 M/UL — LOW (ref 3.8–5.2)
RBC # BLD: 2.98 M/UL — LOW (ref 3.8–5.2)
RBC # BLD: 2.98 M/UL — LOW (ref 3.8–5.2)
RBC # BLD: 2.99 M/UL — LOW (ref 3.8–5.2)
RBC # BLD: 3 M/UL — LOW (ref 3.8–5.2)
RBC # BLD: 3 M/UL — LOW (ref 3.8–5.2)
RBC # BLD: 3.02 M/UL — LOW (ref 3.8–5.2)
RBC # BLD: 3.04 M/UL — LOW (ref 3.8–5.2)
RBC # BLD: 3.08 M/UL — LOW (ref 3.8–5.2)
RBC # BLD: 3.13 M/UL — LOW (ref 3.8–5.2)
RBC # BLD: 3.14 M/UL — LOW (ref 3.8–5.2)
RBC # BLD: 3.15 M/UL — LOW (ref 3.8–5.2)
RBC # BLD: 3.16 M/UL — LOW (ref 3.8–5.2)
RBC # BLD: 3.18 M/UL — LOW (ref 3.8–5.2)
RBC # BLD: 3.22 M/UL — LOW (ref 3.8–5.2)
RBC # BLD: 3.23 M/UL — LOW (ref 3.8–5.2)
RBC # BLD: 3.24 M/UL — LOW (ref 3.8–5.2)
RBC # BLD: 3.28 M/UL — LOW (ref 3.8–5.2)
RBC # BLD: 3.29 M/UL — LOW (ref 3.8–5.2)
RBC # BLD: 3.33 M/UL — LOW (ref 3.8–5.2)
RBC # BLD: 3.38 M/UL — LOW (ref 3.8–5.2)
RBC # BLD: 3.43 M/UL — LOW (ref 3.8–5.2)
RBC # BLD: 3.44 M/UL — LOW (ref 3.8–5.2)
RBC # BLD: 3.46 M/UL — LOW (ref 3.8–5.2)
RBC # BLD: 3.5 M/UL — LOW (ref 3.8–5.2)
RBC # BLD: 3.54 M/UL — LOW (ref 3.8–5.2)
RBC # BLD: 3.6 M/UL — LOW (ref 3.8–5.2)
RBC # BLD: 3.62 M/UL — LOW (ref 3.8–5.2)
RBC # BLD: 3.62 M/UL — LOW (ref 3.8–5.2)
RBC # BLD: 3.77 M/UL — LOW (ref 3.8–5.2)
RBC # BLD: 3.87 M/UL — SIGNIFICANT CHANGE UP (ref 3.8–5.2)
RBC # BLD: 4.06 M/UL — SIGNIFICANT CHANGE UP (ref 3.8–5.2)
RBC # FLD: 12.9 % — SIGNIFICANT CHANGE UP (ref 10.3–14.5)
RBC # FLD: 13.1 % — SIGNIFICANT CHANGE UP (ref 10.3–14.5)
RBC # FLD: 14.3 % — SIGNIFICANT CHANGE UP (ref 10.3–14.5)
RBC # FLD: 14.6 % — HIGH (ref 10.3–14.5)
RBC # FLD: 15.2 % — HIGH (ref 10.3–14.5)
RBC # FLD: 15.2 % — HIGH (ref 10.3–14.5)
RBC # FLD: 15.3 % — HIGH (ref 10.3–14.5)
RBC # FLD: 15.6 % — HIGH (ref 10.3–14.5)
RBC # FLD: 15.7 % — HIGH (ref 10.3–14.5)
RBC # FLD: 15.7 % — HIGH (ref 10.3–14.5)
RBC # FLD: 15.8 % — HIGH (ref 10.3–14.5)
RBC # FLD: 15.9 % — HIGH (ref 10.3–14.5)
RBC # FLD: 16 % — HIGH (ref 10.3–14.5)
RBC # FLD: 16.1 % — HIGH (ref 10.3–14.5)
RBC # FLD: 16.2 % — HIGH (ref 10.3–14.5)
RBC # FLD: 16.3 % — HIGH (ref 10.3–14.5)
RBC # FLD: 16.4 % — HIGH (ref 10.3–14.5)
RBC # FLD: 16.4 % — HIGH (ref 10.3–14.5)
RBC # FLD: 16.5 % — HIGH (ref 10.3–14.5)
RBC # FLD: 16.5 % — HIGH (ref 10.3–14.5)
RBC # FLD: 16.7 % — HIGH (ref 10.3–14.5)
RBC # FLD: 16.7 % — HIGH (ref 10.3–14.5)
RBC # FLD: 16.8 % — HIGH (ref 10.3–14.5)
RBC # FLD: 17.1 % — HIGH (ref 10.3–14.5)
RBC # FLD: 17.2 % — HIGH (ref 10.3–14.5)
RBC # FLD: 17.2 % — HIGH (ref 10.3–14.5)
RBC # FLD: 17.4 % — HIGH (ref 10.3–14.5)
RBC # FLD: 17.4 % — HIGH (ref 10.3–14.5)
RBC # FLD: 17.5 % — HIGH (ref 10.3–14.5)
RBC # FLD: 17.6 % — HIGH (ref 10.3–14.5)
RBC # FLD: 17.8 % — HIGH (ref 10.3–14.5)
RBC # FLD: 17.8 % — HIGH (ref 10.3–14.5)
RBC # FLD: 18.1 % — HIGH (ref 10.3–14.5)
RBC # FLD: 18.2 % — HIGH (ref 10.3–14.5)
RBC # FLD: 18.3 % — HIGH (ref 10.3–14.5)
RBC # FLD: 18.4 % — HIGH (ref 10.3–14.5)
RBC BLD AUTO: ABNORMAL
RBC CASTS # UR COMP ASSIST: 117 /HPF — HIGH (ref 0–4)
RBC CASTS # UR COMP ASSIST: 2 /HPF — SIGNIFICANT CHANGE UP (ref 0–4)
RBC CASTS # UR COMP ASSIST: 2 /HPF — SIGNIFICANT CHANGE UP (ref 0–4)
RCV VOL RI: HIGH CELLS/UL (ref 0–5)
RETICS #: 98.7 K/UL — SIGNIFICANT CHANGE UP (ref 25–125)
RETICS/RBC NFR: 2.9 % — HIGH (ref 0.5–2.5)
RH IG SCN BLD-IMP: POSITIVE — SIGNIFICANT CHANGE UP
RSV RNA SPEC QL NAA+PROBE: SIGNIFICANT CHANGE UP
RSV RNA SPEC QL NAA+PROBE: SIGNIFICANT CHANGE UP
RV+EV RNA SPEC QL NAA+PROBE: SIGNIFICANT CHANGE UP
RV+EV RNA SPEC QL NAA+PROBE: SIGNIFICANT CHANGE UP
S AUREUS DNA NOSE QL NAA+PROBE: SIGNIFICANT CHANGE UP
SAO2 % BLDV: 36 % — LOW (ref 67–88)
SAO2 % BLDV: 64.7 % — LOW (ref 67–88)
SAO2 % BLDV: 94.3 % — HIGH (ref 67–88)
SAO2 % BLDV: 98 % — HIGH (ref 67–88)
SARS-COV-2 RNA SPEC QL NAA+PROBE: SIGNIFICANT CHANGE UP
SCHISTOCYTES BLD QL AUTO: SLIGHT — SIGNIFICANT CHANGE UP
SODIUM SERPL-SCNC: 128 MMOL/L — LOW (ref 135–145)
SODIUM SERPL-SCNC: 129 MMOL/L — LOW (ref 135–145)
SODIUM SERPL-SCNC: 129 MMOL/L — LOW (ref 135–145)
SODIUM SERPL-SCNC: 130 MMOL/L — LOW (ref 135–145)
SODIUM SERPL-SCNC: 131 MMOL/L — LOW (ref 135–145)
SODIUM SERPL-SCNC: 131 MMOL/L — LOW (ref 135–145)
SODIUM SERPL-SCNC: 132 MMOL/L — LOW (ref 135–145)
SODIUM SERPL-SCNC: 132 MMOL/L — LOW (ref 135–145)
SODIUM SERPL-SCNC: 133 MMOL/L — LOW (ref 135–145)
SODIUM SERPL-SCNC: 134 MMOL/L — LOW (ref 135–145)
SODIUM SERPL-SCNC: 135 MMOL/L — SIGNIFICANT CHANGE UP (ref 135–145)
SODIUM SERPL-SCNC: 136 MMOL/L — SIGNIFICANT CHANGE UP (ref 135–145)
SODIUM SERPL-SCNC: 137 MMOL/L — SIGNIFICANT CHANGE UP (ref 135–145)
SODIUM SERPL-SCNC: 138 MMOL/L — SIGNIFICANT CHANGE UP (ref 135–145)
SODIUM SERPL-SCNC: 138 MMOL/L — SIGNIFICANT CHANGE UP (ref 135–145)
SODIUM SERPL-SCNC: 139 MMOL/L — SIGNIFICANT CHANGE UP (ref 135–145)
SODIUM SERPL-SCNC: 141 MMOL/L — SIGNIFICANT CHANGE UP (ref 135–145)
SODIUM SERPL-SCNC: 141 MMOL/L — SIGNIFICANT CHANGE UP (ref 135–145)
SODIUM UR-SCNC: 36 MMOL/L — SIGNIFICANT CHANGE UP
SODIUM UR-SCNC: <20 MMOL/L — SIGNIFICANT CHANGE UP
SP GR SPEC: 1.03 — SIGNIFICANT CHANGE UP (ref 1.01–1.05)
SPECIMEN SOURCE: SIGNIFICANT CHANGE UP
T3 SERPL-MCNC: 42 NG/DL — LOW (ref 80–200)
T4 AB SER-ACNC: 4.45 UG/DL — LOW (ref 5.1–13)
T4 FREE SERPL-MCNC: 1 NG/DL — SIGNIFICANT CHANGE UP (ref 0.9–1.8)
T4 FREE+ TSH PNL SERPL: 6.89 UIU/ML — HIGH (ref 0.27–4.2)
TIBC SERPL-MCNC: 230 UG/DL — SIGNIFICANT CHANGE UP (ref 220–430)
TOTAL CELLS COUNTED, BODY FLUID: 100 CELLS — SIGNIFICANT CHANGE UP
TOTAL NUCLEATED CELL COUNT, BODY FLUID: 989 CELLS/UL — HIGH (ref 0–5)
TROPONIN T, HIGH SENSITIVITY RESULT: 106 NG/L — CRITICAL HIGH
TROPONIN T, HIGH SENSITIVITY RESULT: 108 NG/L — CRITICAL HIGH
TROPONIN T, HIGH SENSITIVITY RESULT: 40 NG/L — SIGNIFICANT CHANGE UP
TROPONIN T, HIGH SENSITIVITY RESULT: 45 NG/L — SIGNIFICANT CHANGE UP
TROPONIN T, HIGH SENSITIVITY RESULT: 48 NG/L — SIGNIFICANT CHANGE UP
TROPONIN T, HIGH SENSITIVITY RESULT: 52 NG/L — HIGH
TROPONIN T, HIGH SENSITIVITY RESULT: 55 NG/L — CRITICAL HIGH
TROPONIN T, HIGH SENSITIVITY RESULT: 92 NG/L — CRITICAL HIGH
TROPONIN T, HIGH SENSITIVITY RESULT: 97 NG/L — CRITICAL HIGH
TROPONIN T, HIGH SENSITIVITY RESULT: 98 NG/L — CRITICAL HIGH
TROPONIN T, HIGH SENSITIVITY RESULT: 98 NG/L — CRITICAL HIGH
TSH SERPL-MCNC: 14.92 UIU/ML — HIGH (ref 0.27–4.2)
TSH SERPL-MCNC: 25.53 UIU/ML — HIGH (ref 0.27–4.2)
TUBE TYPE: SIGNIFICANT CHANGE UP
UIBC SERPL-MCNC: 193 UG/DL — SIGNIFICANT CHANGE UP (ref 110–370)
UROBILINOGEN FLD QL: SIGNIFICANT CHANGE UP
VANCOMYCIN FLD-MCNC: 19.2 UG/ML — SIGNIFICANT CHANGE UP
VANCOMYCIN FLD-MCNC: 8 UG/ML — SIGNIFICANT CHANGE UP
VANCOMYCIN TROUGH SERPL-MCNC: 12.7 UG/ML — SIGNIFICANT CHANGE UP (ref 10–20)
VANCOMYCIN TROUGH SERPL-MCNC: 17.8 UG/ML — SIGNIFICANT CHANGE UP (ref 10–20)
VANCOMYCIN TROUGH SERPL-MCNC: 18.6 UG/ML — SIGNIFICANT CHANGE UP (ref 10–20)
VANCOMYCIN TROUGH SERPL-MCNC: 32.1 UG/ML — CRITICAL HIGH (ref 10–20)
VIT B1 SERPL-MCNC: 156.2 NMOL/L — SIGNIFICANT CHANGE UP (ref 66.5–200)
VIT B12 SERPL-MCNC: >2000 PG/ML — HIGH (ref 200–900)
VIT D25+D1,25 OH+D1,25 PNL SERPL-MCNC: 40.6 PG/ML — SIGNIFICANT CHANGE UP (ref 19.9–79.3)
WBC # BLD: 10.11 K/UL — SIGNIFICANT CHANGE UP (ref 3.8–10.5)
WBC # BLD: 10.26 K/UL — SIGNIFICANT CHANGE UP (ref 3.8–10.5)
WBC # BLD: 10.26 K/UL — SIGNIFICANT CHANGE UP (ref 3.8–10.5)
WBC # BLD: 10.32 K/UL — SIGNIFICANT CHANGE UP (ref 3.8–10.5)
WBC # BLD: 11.13 K/UL — HIGH (ref 3.8–10.5)
WBC # BLD: 11.82 K/UL — HIGH (ref 3.8–10.5)
WBC # BLD: 11.91 K/UL — HIGH (ref 3.8–10.5)
WBC # BLD: 11.92 K/UL — HIGH (ref 3.8–10.5)
WBC # BLD: 12.14 K/UL — HIGH (ref 3.8–10.5)
WBC # BLD: 13.07 K/UL — HIGH (ref 3.8–10.5)
WBC # BLD: 13.26 K/UL — HIGH (ref 3.8–10.5)
WBC # BLD: 13.61 K/UL — HIGH (ref 3.8–10.5)
WBC # BLD: 13.72 K/UL — HIGH (ref 3.8–10.5)
WBC # BLD: 13.84 K/UL — HIGH (ref 3.8–10.5)
WBC # BLD: 14.18 K/UL — HIGH (ref 3.8–10.5)
WBC # BLD: 14.49 K/UL — HIGH (ref 3.8–10.5)
WBC # BLD: 14.59 K/UL — HIGH (ref 3.8–10.5)
WBC # BLD: 14.93 K/UL — HIGH (ref 3.8–10.5)
WBC # BLD: 14.97 K/UL — HIGH (ref 3.8–10.5)
WBC # BLD: 15.06 K/UL — HIGH (ref 3.8–10.5)
WBC # BLD: 15.28 K/UL — HIGH (ref 3.8–10.5)
WBC # BLD: 15.38 K/UL — HIGH (ref 3.8–10.5)
WBC # BLD: 15.51 K/UL — HIGH (ref 3.8–10.5)
WBC # BLD: 15.56 K/UL — HIGH (ref 3.8–10.5)
WBC # BLD: 15.75 K/UL — HIGH (ref 3.8–10.5)
WBC # BLD: 15.89 K/UL — HIGH (ref 3.8–10.5)
WBC # BLD: 15.97 K/UL — HIGH (ref 3.8–10.5)
WBC # BLD: 15.97 K/UL — HIGH (ref 3.8–10.5)
WBC # BLD: 16.07 K/UL — HIGH (ref 3.8–10.5)
WBC # BLD: 16.57 K/UL — HIGH (ref 3.8–10.5)
WBC # BLD: 16.65 K/UL — HIGH (ref 3.8–10.5)
WBC # BLD: 16.71 K/UL — HIGH (ref 3.8–10.5)
WBC # BLD: 17.05 K/UL — HIGH (ref 3.8–10.5)
WBC # BLD: 17.09 K/UL — HIGH (ref 3.8–10.5)
WBC # BLD: 17.25 K/UL — HIGH (ref 3.8–10.5)
WBC # BLD: 17.32 K/UL — HIGH (ref 3.8–10.5)
WBC # BLD: 17.66 K/UL — HIGH (ref 3.8–10.5)
WBC # BLD: 17.94 K/UL — HIGH (ref 3.8–10.5)
WBC # BLD: 7.32 K/UL — SIGNIFICANT CHANGE UP (ref 3.8–10.5)
WBC # BLD: 7.66 K/UL — SIGNIFICANT CHANGE UP (ref 3.8–10.5)
WBC # BLD: 7.71 K/UL — SIGNIFICANT CHANGE UP (ref 3.8–10.5)
WBC # BLD: 7.87 K/UL — SIGNIFICANT CHANGE UP (ref 3.8–10.5)
WBC # BLD: 8.23 K/UL — SIGNIFICANT CHANGE UP (ref 3.8–10.5)
WBC # BLD: 8.24 K/UL — SIGNIFICANT CHANGE UP (ref 3.8–10.5)
WBC # BLD: 8.4 K/UL — SIGNIFICANT CHANGE UP (ref 3.8–10.5)
WBC # BLD: 8.48 K/UL — SIGNIFICANT CHANGE UP (ref 3.8–10.5)
WBC # BLD: 8.5 K/UL — SIGNIFICANT CHANGE UP (ref 3.8–10.5)
WBC # BLD: 8.54 K/UL — SIGNIFICANT CHANGE UP (ref 3.8–10.5)
WBC # BLD: 8.74 K/UL — SIGNIFICANT CHANGE UP (ref 3.8–10.5)
WBC # BLD: 8.74 K/UL — SIGNIFICANT CHANGE UP (ref 3.8–10.5)
WBC # BLD: 8.81 K/UL — SIGNIFICANT CHANGE UP (ref 3.8–10.5)
WBC # BLD: 8.92 K/UL — SIGNIFICANT CHANGE UP (ref 3.8–10.5)
WBC # BLD: 9.05 K/UL — SIGNIFICANT CHANGE UP (ref 3.8–10.5)
WBC # FLD AUTO: 10.11 K/UL — SIGNIFICANT CHANGE UP (ref 3.8–10.5)
WBC # FLD AUTO: 10.26 K/UL — SIGNIFICANT CHANGE UP (ref 3.8–10.5)
WBC # FLD AUTO: 10.26 K/UL — SIGNIFICANT CHANGE UP (ref 3.8–10.5)
WBC # FLD AUTO: 10.32 K/UL — SIGNIFICANT CHANGE UP (ref 3.8–10.5)
WBC # FLD AUTO: 11.13 K/UL — HIGH (ref 3.8–10.5)
WBC # FLD AUTO: 11.82 K/UL — HIGH (ref 3.8–10.5)
WBC # FLD AUTO: 11.91 K/UL — HIGH (ref 3.8–10.5)
WBC # FLD AUTO: 11.92 K/UL — HIGH (ref 3.8–10.5)
WBC # FLD AUTO: 12.14 K/UL — HIGH (ref 3.8–10.5)
WBC # FLD AUTO: 13.07 K/UL — HIGH (ref 3.8–10.5)
WBC # FLD AUTO: 13.26 K/UL — HIGH (ref 3.8–10.5)
WBC # FLD AUTO: 13.61 K/UL — HIGH (ref 3.8–10.5)
WBC # FLD AUTO: 13.72 K/UL — HIGH (ref 3.8–10.5)
WBC # FLD AUTO: 13.84 K/UL — HIGH (ref 3.8–10.5)
WBC # FLD AUTO: 14.18 K/UL — HIGH (ref 3.8–10.5)
WBC # FLD AUTO: 14.49 K/UL — HIGH (ref 3.8–10.5)
WBC # FLD AUTO: 14.59 K/UL — HIGH (ref 3.8–10.5)
WBC # FLD AUTO: 14.93 K/UL — HIGH (ref 3.8–10.5)
WBC # FLD AUTO: 14.97 K/UL — HIGH (ref 3.8–10.5)
WBC # FLD AUTO: 15.06 K/UL — HIGH (ref 3.8–10.5)
WBC # FLD AUTO: 15.28 K/UL — HIGH (ref 3.8–10.5)
WBC # FLD AUTO: 15.38 K/UL — HIGH (ref 3.8–10.5)
WBC # FLD AUTO: 15.51 K/UL — HIGH (ref 3.8–10.5)
WBC # FLD AUTO: 15.56 K/UL — HIGH (ref 3.8–10.5)
WBC # FLD AUTO: 15.75 K/UL — HIGH (ref 3.8–10.5)
WBC # FLD AUTO: 15.89 K/UL — HIGH (ref 3.8–10.5)
WBC # FLD AUTO: 15.97 K/UL — HIGH (ref 3.8–10.5)
WBC # FLD AUTO: 15.97 K/UL — HIGH (ref 3.8–10.5)
WBC # FLD AUTO: 16.07 K/UL — HIGH (ref 3.8–10.5)
WBC # FLD AUTO: 16.57 K/UL — HIGH (ref 3.8–10.5)
WBC # FLD AUTO: 16.65 K/UL — HIGH (ref 3.8–10.5)
WBC # FLD AUTO: 16.71 K/UL — HIGH (ref 3.8–10.5)
WBC # FLD AUTO: 17.05 K/UL — HIGH (ref 3.8–10.5)
WBC # FLD AUTO: 17.09 K/UL — HIGH (ref 3.8–10.5)
WBC # FLD AUTO: 17.25 K/UL — HIGH (ref 3.8–10.5)
WBC # FLD AUTO: 17.32 K/UL — HIGH (ref 3.8–10.5)
WBC # FLD AUTO: 17.66 K/UL — HIGH (ref 3.8–10.5)
WBC # FLD AUTO: 17.94 K/UL — HIGH (ref 3.8–10.5)
WBC # FLD AUTO: 7.32 K/UL — SIGNIFICANT CHANGE UP (ref 3.8–10.5)
WBC # FLD AUTO: 7.66 K/UL — SIGNIFICANT CHANGE UP (ref 3.8–10.5)
WBC # FLD AUTO: 7.71 K/UL — SIGNIFICANT CHANGE UP (ref 3.8–10.5)
WBC # FLD AUTO: 7.87 K/UL — SIGNIFICANT CHANGE UP (ref 3.8–10.5)
WBC # FLD AUTO: 8.23 K/UL — SIGNIFICANT CHANGE UP (ref 3.8–10.5)
WBC # FLD AUTO: 8.24 K/UL — SIGNIFICANT CHANGE UP (ref 3.8–10.5)
WBC # FLD AUTO: 8.4 K/UL — SIGNIFICANT CHANGE UP (ref 3.8–10.5)
WBC # FLD AUTO: 8.48 K/UL — SIGNIFICANT CHANGE UP (ref 3.8–10.5)
WBC # FLD AUTO: 8.5 K/UL — SIGNIFICANT CHANGE UP (ref 3.8–10.5)
WBC # FLD AUTO: 8.54 K/UL — SIGNIFICANT CHANGE UP (ref 3.8–10.5)
WBC # FLD AUTO: 8.74 K/UL — SIGNIFICANT CHANGE UP (ref 3.8–10.5)
WBC # FLD AUTO: 8.74 K/UL — SIGNIFICANT CHANGE UP (ref 3.8–10.5)
WBC # FLD AUTO: 8.81 K/UL — SIGNIFICANT CHANGE UP (ref 3.8–10.5)
WBC # FLD AUTO: 8.92 K/UL — SIGNIFICANT CHANGE UP (ref 3.8–10.5)
WBC # FLD AUTO: 9.05 K/UL — SIGNIFICANT CHANGE UP (ref 3.8–10.5)
WBC UR QL: 2 /HPF — SIGNIFICANT CHANGE UP (ref 0–5)
WBC UR QL: 2 /HPF — SIGNIFICANT CHANGE UP (ref 0–5)
WBC UR QL: 9 /HPF — HIGH (ref 0–5)

## 2023-01-01 PROCEDURE — 76770 US EXAM ABDO BACK WALL COMP: CPT | Mod: 26

## 2023-01-01 PROCEDURE — 99223 1ST HOSP IP/OBS HIGH 75: CPT | Mod: GC

## 2023-01-01 PROCEDURE — 71045 X-RAY EXAM CHEST 1 VIEW: CPT | Mod: 26

## 2023-01-01 PROCEDURE — 93971 EXTREMITY STUDY: CPT | Mod: 26,LT

## 2023-01-01 PROCEDURE — 45378 DIAGNOSTIC COLONOSCOPY: CPT | Mod: GC

## 2023-01-01 PROCEDURE — 99233 SBSQ HOSP IP/OBS HIGH 50: CPT

## 2023-01-01 PROCEDURE — 99232 SBSQ HOSP IP/OBS MODERATE 35: CPT

## 2023-01-01 PROCEDURE — 93010 ELECTROCARDIOGRAM REPORT: CPT

## 2023-01-01 PROCEDURE — 99223 1ST HOSP IP/OBS HIGH 75: CPT

## 2023-01-01 PROCEDURE — 88341 IMHCHEM/IMCYTCHM EA ADD ANTB: CPT | Mod: 26

## 2023-01-01 PROCEDURE — 93306 TTE W/DOPPLER COMPLETE: CPT | Mod: 26

## 2023-01-01 PROCEDURE — 99285 EMERGENCY DEPT VISIT HI MDM: CPT

## 2023-01-01 PROCEDURE — 93970 EXTREMITY STUDY: CPT | Mod: 26

## 2023-01-01 PROCEDURE — 99232 SBSQ HOSP IP/OBS MODERATE 35: CPT | Mod: GC

## 2023-01-01 PROCEDURE — 99497 ADVNCD CARE PLAN 30 MIN: CPT | Mod: 25

## 2023-01-01 PROCEDURE — 99233 SBSQ HOSP IP/OBS HIGH 50: CPT | Mod: 57

## 2023-01-01 PROCEDURE — 99291 CRITICAL CARE FIRST HOUR: CPT

## 2023-01-01 PROCEDURE — 95718 EEG PHYS/QHP 2-12 HR W/VEEG: CPT

## 2023-01-01 PROCEDURE — 71275 CT ANGIOGRAPHY CHEST: CPT | Mod: 26

## 2023-01-01 PROCEDURE — 99231 SBSQ HOSP IP/OBS SF/LOW 25: CPT

## 2023-01-01 PROCEDURE — 88112 CYTOPATH CELL ENHANCE TECH: CPT | Mod: 26

## 2023-01-01 PROCEDURE — 43235 EGD DIAGNOSTIC BRUSH WASH: CPT | Mod: GC

## 2023-01-01 PROCEDURE — 93308 TTE F-UP OR LMTD: CPT | Mod: 26

## 2023-01-01 PROCEDURE — 71046 X-RAY EXAM CHEST 2 VIEWS: CPT | Mod: 26

## 2023-01-01 PROCEDURE — 93010 ELECTROCARDIOGRAM REPORT: CPT | Mod: 76

## 2023-01-01 PROCEDURE — 74174 CTA ABD&PLVS W/CONTRAST: CPT | Mod: 26,MA

## 2023-01-01 PROCEDURE — 88305 TISSUE EXAM BY PATHOLOGIST: CPT | Mod: 26

## 2023-01-01 PROCEDURE — 70450 CT HEAD/BRAIN W/O DYE: CPT | Mod: 26

## 2023-01-01 PROCEDURE — 88108 CYTOPATH CONCENTRATE TECH: CPT | Mod: 26,59

## 2023-01-01 PROCEDURE — 74176 CT ABD & PELVIS W/O CONTRAST: CPT | Mod: 26

## 2023-01-01 PROCEDURE — 93308 TTE F-UP OR LMTD: CPT | Mod: 26,GC

## 2023-01-01 PROCEDURE — 93971 EXTREMITY STUDY: CPT | Mod: 26

## 2023-01-01 PROCEDURE — 76604 US EXAM CHEST: CPT | Mod: 26

## 2023-01-01 PROCEDURE — 36620 INSERTION CATHETER ARTERY: CPT

## 2023-01-01 PROCEDURE — 71250 CT THORAX DX C-: CPT | Mod: 26

## 2023-01-01 PROCEDURE — 76604 US EXAM CHEST: CPT | Mod: 26,GC

## 2023-01-01 PROCEDURE — 88342 IMHCHEM/IMCYTCHM 1ST ANTB: CPT | Mod: 26

## 2023-01-01 PROCEDURE — 99232 SBSQ HOSP IP/OBS MODERATE 35: CPT | Mod: 57

## 2023-01-01 PROCEDURE — 74174 CTA ABD&PLVS W/CONTRAST: CPT | Mod: 26

## 2023-01-01 PROCEDURE — 95720 EEG PHY/QHP EA INCR W/VEEG: CPT

## 2023-01-01 PROCEDURE — 99221 1ST HOSP IP/OBS SF/LOW 40: CPT

## 2023-01-01 PROCEDURE — 99222 1ST HOSP IP/OBS MODERATE 55: CPT | Mod: FS,GC

## 2023-01-01 PROCEDURE — 99222 1ST HOSP IP/OBS MODERATE 55: CPT

## 2023-01-01 PROCEDURE — 99223 1ST HOSP IP/OBS HIGH 75: CPT | Mod: FS,57

## 2023-01-01 PROCEDURE — 74018 RADEX ABDOMEN 1 VIEW: CPT | Mod: 26

## 2023-01-01 RX ORDER — APIXABAN 2.5 MG/1
2.5 TABLET, FILM COATED ORAL
Refills: 0 | Status: DISCONTINUED | OUTPATIENT
Start: 2023-01-01 | End: 2023-01-01

## 2023-01-01 RX ORDER — APIXABAN 2.5 MG/1
2.5 TABLET, FILM COATED ORAL ONCE
Refills: 0 | Status: COMPLETED | OUTPATIENT
Start: 2023-01-01 | End: 2023-01-01

## 2023-01-01 RX ORDER — POTASSIUM CHLORIDE 20 MEQ
20 PACKET (EA) ORAL ONCE
Refills: 0 | Status: COMPLETED | OUTPATIENT
Start: 2023-01-01 | End: 2023-01-01

## 2023-01-01 RX ORDER — DEXTROSE 50 % IN WATER 50 %
50 SYRINGE (ML) INTRAVENOUS ONCE
Refills: 0 | Status: DISCONTINUED | OUTPATIENT
Start: 2023-01-01 | End: 2023-01-01

## 2023-01-01 RX ORDER — DILTIAZEM HCL 120 MG
15 CAPSULE, EXT RELEASE 24 HR ORAL ONCE
Refills: 0 | Status: COMPLETED | OUTPATIENT
Start: 2023-01-01 | End: 2023-01-01

## 2023-01-01 RX ORDER — FUROSEMIDE 40 MG
40 TABLET ORAL ONCE
Refills: 0 | Status: COMPLETED | OUTPATIENT
Start: 2023-01-01 | End: 2023-01-01

## 2023-01-01 RX ORDER — OXYCODONE HYDROCHLORIDE 5 MG/1
5 TABLET ORAL ONCE
Refills: 0 | Status: DISCONTINUED | OUTPATIENT
Start: 2023-01-01 | End: 2023-01-01

## 2023-01-01 RX ORDER — ONDANSETRON 8 MG/1
4 TABLET, FILM COATED ORAL ONCE
Refills: 0 | Status: COMPLETED | OUTPATIENT
Start: 2023-01-01 | End: 2023-01-01

## 2023-01-01 RX ORDER — HEPARIN SODIUM 5000 [USP'U]/ML
5000 INJECTION INTRAVENOUS; SUBCUTANEOUS EVERY 12 HOURS
Refills: 0 | Status: DISCONTINUED | OUTPATIENT
Start: 2023-01-01 | End: 2023-01-01

## 2023-01-01 RX ORDER — ASPIRIN/CALCIUM CARB/MAGNESIUM 324 MG
81 TABLET ORAL DAILY
Refills: 0 | Status: DISCONTINUED | OUTPATIENT
Start: 2023-01-01 | End: 2023-01-01

## 2023-01-01 RX ORDER — POTASSIUM CHLORIDE 20 MEQ
40 PACKET (EA) ORAL ONCE
Refills: 0 | Status: COMPLETED | OUTPATIENT
Start: 2023-01-01 | End: 2023-01-01

## 2023-01-01 RX ORDER — VASOPRESSIN 20 [USP'U]/ML
0.06 INJECTION INTRAVENOUS
Qty: 40 | Refills: 0 | Status: DISCONTINUED | OUTPATIENT
Start: 2023-01-01 | End: 2023-01-01

## 2023-01-01 RX ORDER — ALBUMIN HUMAN 25 %
50 VIAL (ML) INTRAVENOUS EVERY 8 HOURS
Refills: 0 | Status: COMPLETED | OUTPATIENT
Start: 2023-01-01 | End: 2023-01-01

## 2023-01-01 RX ORDER — ATORVASTATIN CALCIUM 80 MG/1
80 TABLET, FILM COATED ORAL AT BEDTIME
Refills: 0 | Status: DISCONTINUED | OUTPATIENT
Start: 2023-01-01 | End: 2023-01-01

## 2023-01-01 RX ORDER — MAGNESIUM SULFATE 500 MG/ML
1 VIAL (ML) INJECTION ONCE
Refills: 0 | Status: COMPLETED | OUTPATIENT
Start: 2023-01-01 | End: 2023-01-01

## 2023-01-01 RX ORDER — SODIUM CHLORIDE 9 MG/ML
1000 INJECTION, SOLUTION INTRAVENOUS
Refills: 0 | Status: DISCONTINUED | OUTPATIENT
Start: 2023-01-01 | End: 2023-01-01

## 2023-01-01 RX ORDER — CETIRIZINE HYDROCHLORIDE 10 MG/1
1 TABLET ORAL
Refills: 0 | DISCHARGE

## 2023-01-01 RX ORDER — SODIUM CHLORIDE 9 MG/ML
250 INJECTION INTRAMUSCULAR; INTRAVENOUS; SUBCUTANEOUS ONCE
Refills: 0 | Status: COMPLETED | OUTPATIENT
Start: 2023-01-01 | End: 2023-01-01

## 2023-01-01 RX ORDER — LEVOTHYROXINE SODIUM 125 MCG
75 TABLET ORAL DAILY
Refills: 0 | Status: DISCONTINUED | OUTPATIENT
Start: 2023-01-01 | End: 2023-01-01

## 2023-01-01 RX ORDER — PREGABALIN 225 MG/1
1000 CAPSULE ORAL DAILY
Refills: 0 | Status: DISCONTINUED | OUTPATIENT
Start: 2023-01-01 | End: 2023-01-01

## 2023-01-01 RX ORDER — ALBUMIN HUMAN 25 %
50 VIAL (ML) INTRAVENOUS ONCE
Refills: 0 | Status: COMPLETED | OUTPATIENT
Start: 2023-01-01 | End: 2023-01-01

## 2023-01-01 RX ORDER — PANTOPRAZOLE SODIUM 20 MG/1
1 TABLET, DELAYED RELEASE ORAL
Refills: 0 | DISCHARGE

## 2023-01-01 RX ORDER — AMIODARONE HYDROCHLORIDE 400 MG/1
150 TABLET ORAL ONCE
Refills: 0 | Status: COMPLETED | OUTPATIENT
Start: 2023-01-01 | End: 2023-01-01

## 2023-01-01 RX ORDER — BUMETANIDE 0.25 MG/ML
2 INJECTION INTRAMUSCULAR; INTRAVENOUS ONCE
Refills: 0 | Status: COMPLETED | OUTPATIENT
Start: 2023-01-01 | End: 2023-01-01

## 2023-01-01 RX ORDER — SENNA PLUS 8.6 MG/1
2 TABLET ORAL
Qty: 0 | Refills: 0 | DISCHARGE
Start: 2023-01-01

## 2023-01-01 RX ORDER — MISOPROSTOL 200 UG/1
1 TABLET ORAL
Refills: 0 | DISCHARGE

## 2023-01-01 RX ORDER — MAGNESIUM SULFATE 500 MG/ML
2 VIAL (ML) INJECTION ONCE
Refills: 0 | Status: COMPLETED | OUTPATIENT
Start: 2023-01-01 | End: 2023-01-01

## 2023-01-01 RX ORDER — LOSARTAN POTASSIUM 100 MG/1
1 TABLET, FILM COATED ORAL
Refills: 0 | DISCHARGE

## 2023-01-01 RX ORDER — CHLORHEXIDINE GLUCONATE 213 G/1000ML
15 SOLUTION TOPICAL EVERY 12 HOURS
Refills: 0 | Status: DISCONTINUED | OUTPATIENT
Start: 2023-01-01 | End: 2023-01-01

## 2023-01-01 RX ORDER — OXYCODONE HYDROCHLORIDE 5 MG/1
5 TABLET ORAL EVERY 6 HOURS
Refills: 0 | Status: DISCONTINUED | OUTPATIENT
Start: 2023-01-01 | End: 2023-01-01

## 2023-01-01 RX ORDER — APIXABAN 2.5 MG/1
1 TABLET, FILM COATED ORAL
Qty: 60 | Refills: 0
Start: 2023-01-01 | End: 2023-01-01

## 2023-01-01 RX ORDER — PIPERACILLIN AND TAZOBACTAM 4; .5 G/20ML; G/20ML
3.38 INJECTION, POWDER, LYOPHILIZED, FOR SOLUTION INTRAVENOUS ONCE
Refills: 0 | Status: COMPLETED | OUTPATIENT
Start: 2023-01-01 | End: 2023-01-01

## 2023-01-01 RX ORDER — VANCOMYCIN HCL 1 G
1000 VIAL (EA) INTRAVENOUS ONCE
Refills: 0 | Status: COMPLETED | OUTPATIENT
Start: 2023-01-01 | End: 2023-01-01

## 2023-01-01 RX ORDER — LOSARTAN POTASSIUM 100 MG/1
100 TABLET, FILM COATED ORAL DAILY
Refills: 0 | Status: DISCONTINUED | OUTPATIENT
Start: 2023-01-01 | End: 2023-01-01

## 2023-01-01 RX ORDER — POLYETHYLENE GLYCOL 3350 17 G/17G
17 POWDER, FOR SOLUTION ORAL
Refills: 0 | DISCHARGE

## 2023-01-01 RX ORDER — FUROSEMIDE 40 MG
1 TABLET ORAL
Qty: 60 | Refills: 0
Start: 2023-01-01 | End: 2023-01-01

## 2023-01-01 RX ORDER — DILTIAZEM HCL 120 MG
30 CAPSULE, EXT RELEASE 24 HR ORAL EVERY 6 HOURS
Refills: 0 | Status: DISCONTINUED | OUTPATIENT
Start: 2023-01-01 | End: 2023-01-01

## 2023-01-01 RX ORDER — SENNA PLUS 8.6 MG/1
2 TABLET ORAL
Refills: 0 | DISCHARGE

## 2023-01-01 RX ORDER — DILTIAZEM HCL 120 MG
60 CAPSULE, EXT RELEASE 24 HR ORAL EVERY 6 HOURS
Refills: 0 | Status: DISCONTINUED | OUTPATIENT
Start: 2023-01-01 | End: 2023-01-01

## 2023-01-01 RX ORDER — LORATADINE 10 MG/1
10 TABLET ORAL DAILY
Refills: 0 | Status: DISCONTINUED | OUTPATIENT
Start: 2023-01-01 | End: 2023-01-01

## 2023-01-01 RX ORDER — NOREPINEPHRINE BITARTRATE/D5W 8 MG/250ML
0.05 PLASTIC BAG, INJECTION (ML) INTRAVENOUS
Qty: 8 | Refills: 0 | Status: DISCONTINUED | OUTPATIENT
Start: 2023-01-01 | End: 2023-01-01

## 2023-01-01 RX ORDER — FAMOTIDINE 10 MG/ML
1 INJECTION INTRAVENOUS
Qty: 0 | Refills: 0 | DISCHARGE

## 2023-01-01 RX ORDER — FUROSEMIDE 40 MG
20 TABLET ORAL EVERY 12 HOURS
Refills: 0 | Status: DISCONTINUED | OUTPATIENT
Start: 2023-01-01 | End: 2023-01-01

## 2023-01-01 RX ORDER — POTASSIUM CHLORIDE 20 MEQ
10 PACKET (EA) ORAL
Refills: 0 | Status: COMPLETED | OUTPATIENT
Start: 2023-01-01 | End: 2023-01-01

## 2023-01-01 RX ORDER — DILTIAZEM HCL 120 MG
5 CAPSULE, EXT RELEASE 24 HR ORAL ONCE
Refills: 0 | Status: COMPLETED | OUTPATIENT
Start: 2023-01-01 | End: 2023-01-01

## 2023-01-01 RX ORDER — SODIUM CHLORIDE 9 MG/ML
1000 INJECTION INTRAMUSCULAR; INTRAVENOUS; SUBCUTANEOUS
Refills: 0 | Status: DISCONTINUED | OUTPATIENT
Start: 2023-01-01 | End: 2023-01-01

## 2023-01-01 RX ORDER — OXYCODONE HYDROCHLORIDE 5 MG/1
5 TABLET ORAL EVERY 4 HOURS
Refills: 0 | Status: DISCONTINUED | OUTPATIENT
Start: 2023-01-01 | End: 2023-01-01

## 2023-01-01 RX ORDER — POTASSIUM PHOSPHATE, MONOBASIC POTASSIUM PHOSPHATE, DIBASIC 236; 224 MG/ML; MG/ML
30 INJECTION, SOLUTION INTRAVENOUS ONCE
Refills: 0 | Status: COMPLETED | OUTPATIENT
Start: 2023-01-01 | End: 2023-01-01

## 2023-01-01 RX ORDER — PANTOPRAZOLE SODIUM 20 MG/1
40 TABLET, DELAYED RELEASE ORAL
Refills: 0 | Status: DISCONTINUED | OUTPATIENT
Start: 2023-01-01 | End: 2023-01-01

## 2023-01-01 RX ORDER — DEXTROSE 50 % IN WATER 50 %
50 SYRINGE (ML) INTRAVENOUS ONCE
Refills: 0 | Status: COMPLETED | OUTPATIENT
Start: 2023-01-01 | End: 2023-01-01

## 2023-01-01 RX ORDER — HEPARIN SODIUM 5000 [USP'U]/ML
4500 INJECTION INTRAVENOUS; SUBCUTANEOUS EVERY 6 HOURS
Refills: 0 | Status: DISCONTINUED | OUTPATIENT
Start: 2023-01-01 | End: 2023-01-01

## 2023-01-01 RX ORDER — ALBUMIN HUMAN 25 %
50 VIAL (ML) INTRAVENOUS EVERY 6 HOURS
Refills: 0 | Status: DISCONTINUED | OUTPATIENT
Start: 2023-01-01 | End: 2023-01-01

## 2023-01-01 RX ORDER — LANOLIN ALCOHOL/MO/W.PET/CERES
3 CREAM (GRAM) TOPICAL AT BEDTIME
Refills: 0 | Status: DISCONTINUED | OUTPATIENT
Start: 2023-01-01 | End: 2023-01-01

## 2023-01-01 RX ORDER — SODIUM,POTASSIUM PHOSPHATES 278-250MG
1 POWDER IN PACKET (EA) ORAL ONCE
Refills: 0 | Status: COMPLETED | OUTPATIENT
Start: 2023-01-01 | End: 2023-01-01

## 2023-01-01 RX ORDER — OXYCODONE AND ACETAMINOPHEN 5; 325 MG/1; MG/1
1 TABLET ORAL
Qty: 0 | Refills: 0 | DISCHARGE

## 2023-01-01 RX ORDER — DILTIAZEM HCL 120 MG
5 CAPSULE, EXT RELEASE 24 HR ORAL ONCE
Refills: 0 | Status: DISCONTINUED | OUTPATIENT
Start: 2023-01-01 | End: 2023-01-01

## 2023-01-01 RX ORDER — SENNA PLUS 8.6 MG/1
2 TABLET ORAL ONCE
Refills: 0 | Status: DISCONTINUED | OUTPATIENT
Start: 2023-01-01 | End: 2023-01-01

## 2023-01-01 RX ORDER — POLYETHYLENE GLYCOL 3350 17 G/17G
17 POWDER, FOR SOLUTION ORAL ONCE
Refills: 0 | Status: COMPLETED | OUTPATIENT
Start: 2023-01-01 | End: 2023-01-01

## 2023-01-01 RX ORDER — POLYETHYLENE GLYCOL 3350 17 G/17G
17 POWDER, FOR SOLUTION ORAL DAILY
Refills: 0 | Status: DISCONTINUED | OUTPATIENT
Start: 2023-01-01 | End: 2023-01-01

## 2023-01-01 RX ORDER — ALBUMIN HUMAN 25 %
50 VIAL (ML) INTRAVENOUS EVERY 6 HOURS
Refills: 0 | Status: COMPLETED | OUTPATIENT
Start: 2023-01-01 | End: 2023-01-01

## 2023-01-01 RX ORDER — PIPERACILLIN AND TAZOBACTAM 4; .5 G/20ML; G/20ML
3.38 INJECTION, POWDER, LYOPHILIZED, FOR SOLUTION INTRAVENOUS EVERY 8 HOURS
Refills: 0 | Status: COMPLETED | OUTPATIENT
Start: 2023-01-01 | End: 2023-01-01

## 2023-01-01 RX ORDER — MULTIVIT-MIN/FERROUS GLUCONATE 9 MG/15 ML
1 LIQUID (ML) ORAL
Qty: 0 | Refills: 0 | DISCHARGE

## 2023-01-01 RX ORDER — DILTIAZEM HCL 120 MG
5 CAPSULE, EXT RELEASE 24 HR ORAL EVERY 6 HOURS
Refills: 0 | Status: DISCONTINUED | OUTPATIENT
Start: 2023-01-01 | End: 2023-01-01

## 2023-01-01 RX ORDER — PIPERACILLIN AND TAZOBACTAM 4; .5 G/20ML; G/20ML
3.38 INJECTION, POWDER, LYOPHILIZED, FOR SOLUTION INTRAVENOUS EVERY 12 HOURS
Refills: 0 | Status: DISCONTINUED | OUTPATIENT
Start: 2023-01-01 | End: 2023-01-01

## 2023-01-01 RX ORDER — MAGNESIUM HYDROXIDE 400 MG/1
30 TABLET, CHEWABLE ORAL ONCE
Refills: 0 | Status: DISCONTINUED | OUTPATIENT
Start: 2023-01-01 | End: 2023-01-01

## 2023-01-01 RX ORDER — SODIUM,POTASSIUM PHOSPHATES 278-250MG
2 POWDER IN PACKET (EA) ORAL ONCE
Refills: 0 | Status: COMPLETED | OUTPATIENT
Start: 2023-01-01 | End: 2023-01-01

## 2023-01-01 RX ORDER — CHLORHEXIDINE GLUCONATE 213 G/1000ML
1 SOLUTION TOPICAL DAILY
Refills: 0 | Status: DISCONTINUED | OUTPATIENT
Start: 2023-01-01 | End: 2023-01-01

## 2023-01-01 RX ORDER — PHYTONADIONE (VIT K1) 5 MG
5 TABLET ORAL ONCE
Refills: 0 | Status: COMPLETED | OUTPATIENT
Start: 2023-01-01 | End: 2023-01-01

## 2023-01-01 RX ORDER — EZETIMIBE 10 MG/1
1 TABLET ORAL
Qty: 0 | Refills: 0 | DISCHARGE

## 2023-01-01 RX ORDER — ALBUTEROL 90 UG/1
2 AEROSOL, METERED ORAL
Qty: 0 | Refills: 0 | DISCHARGE

## 2023-01-01 RX ORDER — HEPARIN SODIUM 5000 [USP'U]/ML
2000 INJECTION INTRAVENOUS; SUBCUTANEOUS EVERY 6 HOURS
Refills: 0 | Status: DISCONTINUED | OUTPATIENT
Start: 2023-01-01 | End: 2023-01-01

## 2023-01-01 RX ORDER — ATENOLOL 25 MG/1
50 TABLET ORAL
Refills: 0 | Status: DISCONTINUED | OUTPATIENT
Start: 2023-01-01 | End: 2023-01-01

## 2023-01-01 RX ORDER — DILTIAZEM HCL 120 MG
10 CAPSULE, EXT RELEASE 24 HR ORAL
Qty: 125 | Refills: 0 | Status: DISCONTINUED | OUTPATIENT
Start: 2023-01-01 | End: 2023-01-01

## 2023-01-01 RX ORDER — MIDAZOLAM HYDROCHLORIDE 1 MG/ML
4 INJECTION, SOLUTION INTRAMUSCULAR; INTRAVENOUS ONCE
Refills: 0 | Status: DISCONTINUED | OUTPATIENT
Start: 2023-01-01 | End: 2023-01-01

## 2023-01-01 RX ORDER — LACTULOSE 10 G/15ML
10 SOLUTION ORAL DAILY
Refills: 0 | Status: DISCONTINUED | OUTPATIENT
Start: 2023-01-01 | End: 2023-01-01

## 2023-01-01 RX ORDER — PREGABALIN 225 MG/1
5000 CAPSULE ORAL
Qty: 0 | Refills: 0 | DISCHARGE

## 2023-01-01 RX ORDER — LEVOTHYROXINE SODIUM 125 MCG
56 TABLET ORAL AT BEDTIME
Refills: 0 | Status: DISCONTINUED | OUTPATIENT
Start: 2023-01-01 | End: 2023-01-01

## 2023-01-01 RX ORDER — CEFEPIME 1 G/1
1000 INJECTION, POWDER, FOR SOLUTION INTRAMUSCULAR; INTRAVENOUS DAILY
Refills: 0 | Status: DISCONTINUED | OUTPATIENT
Start: 2023-01-01 | End: 2023-01-01

## 2023-01-01 RX ORDER — PROPOFOL 10 MG/ML
50 INJECTION, EMULSION INTRAVENOUS
Qty: 1000 | Refills: 0 | Status: DISCONTINUED | OUTPATIENT
Start: 2023-01-01 | End: 2023-01-01

## 2023-01-01 RX ORDER — HEPARIN SODIUM 5000 [USP'U]/ML
700 INJECTION INTRAVENOUS; SUBCUTANEOUS
Qty: 25000 | Refills: 0 | Status: DISCONTINUED | OUTPATIENT
Start: 2023-01-01 | End: 2023-01-01

## 2023-01-01 RX ORDER — ACETAMINOPHEN 500 MG
650 TABLET ORAL EVERY 6 HOURS
Refills: 0 | Status: DISCONTINUED | OUTPATIENT
Start: 2023-01-01 | End: 2023-01-01

## 2023-01-01 RX ORDER — CHLORHEXIDINE GLUCONATE 213 G/1000ML
1 SOLUTION TOPICAL
Refills: 0 | Status: DISCONTINUED | OUTPATIENT
Start: 2023-01-01 | End: 2023-01-01

## 2023-01-01 RX ORDER — ALENDRONATE SODIUM 70 MG/1
1 TABLET ORAL
Qty: 0 | Refills: 0 | DISCHARGE

## 2023-01-01 RX ORDER — ALPRAZOLAM 0.25 MG
0.25 TABLET ORAL EVERY 8 HOURS
Refills: 0 | Status: DISCONTINUED | OUTPATIENT
Start: 2023-01-01 | End: 2023-01-01

## 2023-01-01 RX ORDER — PANTOPRAZOLE SODIUM 20 MG/1
40 TABLET, DELAYED RELEASE ORAL DAILY
Refills: 0 | Status: DISCONTINUED | OUTPATIENT
Start: 2023-01-01 | End: 2023-01-01

## 2023-01-01 RX ORDER — ALBUTEROL 90 UG/1
2 AEROSOL, METERED ORAL EVERY 6 HOURS
Refills: 0 | Status: DISCONTINUED | OUTPATIENT
Start: 2023-01-01 | End: 2023-01-01

## 2023-01-01 RX ORDER — ATENOLOL 25 MG/1
1 TABLET ORAL
Qty: 0 | Refills: 0 | DISCHARGE

## 2023-01-01 RX ORDER — ADENOSINE 3 MG/ML
6 INJECTION INTRAVENOUS ONCE
Refills: 0 | Status: COMPLETED | OUTPATIENT
Start: 2023-01-01 | End: 2023-01-01

## 2023-01-01 RX ORDER — APIXABAN 2.5 MG/1
1 TABLET, FILM COATED ORAL
Refills: 0 | DISCHARGE

## 2023-01-01 RX ORDER — LEVOTHYROXINE SODIUM 125 MCG
1 TABLET ORAL
Qty: 0 | Refills: 0 | DISCHARGE

## 2023-01-01 RX ORDER — POLYETHYLENE GLYCOL 3350 17 G/17G
17 POWDER, FOR SOLUTION ORAL
Refills: 0 | Status: DISCONTINUED | OUTPATIENT
Start: 2023-01-01 | End: 2023-01-01

## 2023-01-01 RX ORDER — SODIUM,POTASSIUM PHOSPHATES 278-250MG
1 POWDER IN PACKET (EA) ORAL
Refills: 0 | Status: COMPLETED | OUTPATIENT
Start: 2023-01-01 | End: 2023-01-01

## 2023-01-01 RX ORDER — SENNA PLUS 8.6 MG/1
2 TABLET ORAL AT BEDTIME
Refills: 0 | Status: DISCONTINUED | OUTPATIENT
Start: 2023-01-01 | End: 2023-01-01

## 2023-01-01 RX ORDER — DILTIAZEM HCL 120 MG
12.5 CAPSULE, EXT RELEASE 24 HR ORAL
Qty: 125 | Refills: 0 | Status: DISCONTINUED | OUTPATIENT
Start: 2023-01-01 | End: 2023-01-01

## 2023-01-01 RX ORDER — PHENYLEPHRINE HYDROCHLORIDE 10 MG/ML
0.1 INJECTION INTRAVENOUS
Qty: 160 | Refills: 0 | Status: DISCONTINUED | OUTPATIENT
Start: 2023-01-01 | End: 2023-01-01

## 2023-01-01 RX ORDER — ACETAMINOPHEN 500 MG
1000 TABLET ORAL ONCE
Refills: 0 | Status: COMPLETED | OUTPATIENT
Start: 2023-01-01 | End: 2023-01-01

## 2023-01-01 RX ORDER — ALBUMIN HUMAN 25 %
100 VIAL (ML) INTRAVENOUS EVERY 6 HOURS
Refills: 0 | Status: COMPLETED | OUTPATIENT
Start: 2023-01-01 | End: 2023-01-01

## 2023-01-01 RX ORDER — FUROSEMIDE 40 MG
20 TABLET ORAL DAILY
Refills: 0 | Status: DISCONTINUED | OUTPATIENT
Start: 2023-01-01 | End: 2023-01-01

## 2023-01-01 RX ORDER — LANOLIN ALCOHOL/MO/W.PET/CERES
1 CREAM (GRAM) TOPICAL
Refills: 0 | DISCHARGE

## 2023-01-01 RX ORDER — MIDAZOLAM HYDROCHLORIDE 1 MG/ML
0.02 INJECTION, SOLUTION INTRAMUSCULAR; INTRAVENOUS
Qty: 100 | Refills: 0 | Status: DISCONTINUED | OUTPATIENT
Start: 2023-01-01 | End: 2023-01-01

## 2023-01-01 RX ORDER — LEVOTHYROXINE SODIUM 125 MCG
75 TABLET ORAL AT BEDTIME
Refills: 0 | Status: DISCONTINUED | OUTPATIENT
Start: 2023-01-01 | End: 2023-01-01

## 2023-01-01 RX ORDER — POLYETHYLENE GLYCOL 3350 17 G/17G
17 POWDER, FOR SOLUTION ORAL
Qty: 0 | Refills: 0 | DISCHARGE
Start: 2023-01-01

## 2023-01-01 RX ORDER — VASOPRESSIN 20 [USP'U]/ML
0.04 INJECTION INTRAVENOUS
Qty: 40 | Refills: 0 | Status: DISCONTINUED | OUTPATIENT
Start: 2023-01-01 | End: 2023-01-01

## 2023-01-01 RX ORDER — PROTHROMBIN COMPLEX CONCENTRATE (HUMAN) 25.5; 16.5; 24; 22; 22; 26 [IU]/ML; [IU]/ML; [IU]/ML; [IU]/ML; [IU]/ML; [IU]/ML
1500 POWDER, FOR SOLUTION INTRAVENOUS ONCE
Refills: 0 | Status: COMPLETED | OUTPATIENT
Start: 2023-01-01 | End: 2023-01-01

## 2023-01-01 RX ORDER — SODIUM ZIRCONIUM CYCLOSILICATE 10 G/10G
10 POWDER, FOR SUSPENSION ORAL ONCE
Refills: 0 | Status: COMPLETED | OUTPATIENT
Start: 2023-01-01 | End: 2023-01-01

## 2023-01-01 RX ORDER — FUROSEMIDE 40 MG
1 TABLET ORAL
Refills: 0 | DISCHARGE

## 2023-01-01 RX ORDER — NOREPINEPHRINE BITARTRATE/D5W 8 MG/250ML
0.05 PLASTIC BAG, INJECTION (ML) INTRAVENOUS
Qty: 16 | Refills: 0 | Status: DISCONTINUED | OUTPATIENT
Start: 2023-01-01 | End: 2023-01-01

## 2023-01-01 RX ORDER — HYDROMORPHONE HYDROCHLORIDE 2 MG/ML
0.2 INJECTION INTRAMUSCULAR; INTRAVENOUS; SUBCUTANEOUS EVERY 4 HOURS
Refills: 0 | Status: DISCONTINUED | OUTPATIENT
Start: 2023-01-01 | End: 2023-01-01

## 2023-01-01 RX ORDER — SODIUM,POTASSIUM PHOSPHATES 278-250MG
2 POWDER IN PACKET (EA) ORAL ONCE
Refills: 0 | Status: DISCONTINUED | OUTPATIENT
Start: 2023-01-01 | End: 2023-01-01

## 2023-01-01 RX ORDER — FUROSEMIDE 40 MG
1 TABLET ORAL
Qty: 0 | Refills: 0 | DISCHARGE

## 2023-01-01 RX ORDER — HEPARIN SODIUM 5000 [USP'U]/ML
INJECTION INTRAVENOUS; SUBCUTANEOUS
Qty: 25000 | Refills: 0 | Status: DISCONTINUED | OUTPATIENT
Start: 2023-01-01 | End: 2023-01-01

## 2023-01-01 RX ORDER — OXYCODONE HYDROCHLORIDE 5 MG/1
2.5 TABLET ORAL EVERY 6 HOURS
Refills: 0 | Status: DISCONTINUED | OUTPATIENT
Start: 2023-01-01 | End: 2023-01-01

## 2023-01-01 RX ORDER — SOD SULF/SODIUM/NAHCO3/KCL/PEG
4000 SOLUTION, RECONSTITUTED, ORAL ORAL ONCE
Refills: 0 | Status: COMPLETED | OUTPATIENT
Start: 2023-01-01 | End: 2023-01-01

## 2023-01-01 RX ORDER — PANTOPRAZOLE SODIUM 20 MG/1
1 TABLET, DELAYED RELEASE ORAL
Qty: 30 | Refills: 0
Start: 2023-01-01 | End: 2023-01-01

## 2023-01-01 RX ORDER — CHOLECALCIFEROL (VITAMIN D3) 125 MCG
2000 CAPSULE ORAL DAILY
Refills: 0 | Status: DISCONTINUED | OUTPATIENT
Start: 2023-01-01 | End: 2023-01-01

## 2023-01-01 RX ORDER — ROSUVASTATIN CALCIUM 5 MG/1
1 TABLET ORAL
Qty: 0 | Refills: 0 | DISCHARGE

## 2023-01-01 RX ORDER — ADENOSINE 3 MG/ML
6 INJECTION INTRAVENOUS ONCE
Refills: 0 | Status: DISCONTINUED | OUTPATIENT
Start: 2023-01-01 | End: 2023-01-01

## 2023-01-01 RX ORDER — MIDAZOLAM HYDROCHLORIDE 1 MG/ML
2 INJECTION, SOLUTION INTRAMUSCULAR; INTRAVENOUS ONCE
Refills: 0 | Status: DISCONTINUED | OUTPATIENT
Start: 2023-01-01 | End: 2023-01-01

## 2023-01-01 RX ORDER — DILTIAZEM HCL 120 MG
10 CAPSULE, EXT RELEASE 24 HR ORAL ONCE
Refills: 0 | Status: COMPLETED | OUTPATIENT
Start: 2023-01-01 | End: 2023-01-01

## 2023-01-01 RX ORDER — SODIUM,POTASSIUM PHOSPHATES 278-250MG
1 POWDER IN PACKET (EA) ORAL ONCE
Refills: 0 | Status: DISCONTINUED | OUTPATIENT
Start: 2023-01-01 | End: 2023-01-01

## 2023-01-01 RX ORDER — DILTIAZEM HCL 120 MG
30 CAPSULE, EXT RELEASE 24 HR ORAL ONCE
Refills: 0 | Status: COMPLETED | OUTPATIENT
Start: 2023-01-01 | End: 2023-01-01

## 2023-01-01 RX ORDER — MUPIROCIN 20 MG/G
1 OINTMENT TOPICAL
Refills: 0 | Status: DISCONTINUED | OUTPATIENT
Start: 2023-01-01 | End: 2023-01-01

## 2023-01-01 RX ORDER — PIPERACILLIN AND TAZOBACTAM 4; .5 G/20ML; G/20ML
3.38 INJECTION, POWDER, LYOPHILIZED, FOR SOLUTION INTRAVENOUS EVERY 8 HOURS
Refills: 0 | Status: DISCONTINUED | OUTPATIENT
Start: 2023-01-01 | End: 2023-01-01

## 2023-01-01 RX ORDER — CHOLECALCIFEROL (VITAMIN D3) 125 MCG
1 CAPSULE ORAL
Qty: 0 | Refills: 0 | DISCHARGE

## 2023-01-01 RX ORDER — ASPIRIN/CALCIUM CARB/MAGNESIUM 324 MG
1 TABLET ORAL
Qty: 0 | Refills: 0 | DISCHARGE

## 2023-01-01 RX ORDER — DILTIAZEM HCL 120 MG
15 CAPSULE, EXT RELEASE 24 HR ORAL
Qty: 125 | Refills: 0 | Status: DISCONTINUED | OUTPATIENT
Start: 2023-01-01 | End: 2023-01-01

## 2023-01-01 RX ORDER — MAGNESIUM HYDROXIDE 400 MG/1
30 TABLET, CHEWABLE ORAL ONCE
Refills: 0 | Status: COMPLETED | OUTPATIENT
Start: 2023-01-01 | End: 2023-01-01

## 2023-01-01 RX ADMIN — ATENOLOL 50 MILLIGRAM(S): 25 TABLET ORAL at 05:53

## 2023-01-01 RX ADMIN — MUPIROCIN 1 APPLICATION(S): 20 OINTMENT TOPICAL at 08:15

## 2023-01-01 RX ADMIN — CHLORHEXIDINE GLUCONATE 15 MILLILITER(S): 213 SOLUTION TOPICAL at 05:18

## 2023-01-01 RX ADMIN — SODIUM ZIRCONIUM CYCLOSILICATE 10 GRAM(S): 10 POWDER, FOR SUSPENSION ORAL at 10:02

## 2023-01-01 RX ADMIN — PANTOPRAZOLE SODIUM 40 MILLIGRAM(S): 20 TABLET, DELAYED RELEASE ORAL at 05:09

## 2023-01-01 RX ADMIN — Medication 1 TABLET(S): at 12:30

## 2023-01-01 RX ADMIN — Medication 75 MICROGRAM(S): at 05:36

## 2023-01-01 RX ADMIN — PIPERACILLIN AND TAZOBACTAM 25 GRAM(S): 4; .5 INJECTION, POWDER, LYOPHILIZED, FOR SOLUTION INTRAVENOUS at 15:35

## 2023-01-01 RX ADMIN — Medication 20 MILLIGRAM(S): at 05:05

## 2023-01-01 RX ADMIN — Medication 0.25 MILLIGRAM(S): at 21:13

## 2023-01-01 RX ADMIN — Medication 25 GRAM(S): at 10:23

## 2023-01-01 RX ADMIN — MAGNESIUM HYDROXIDE 30 MILLILITER(S): 400 TABLET, CHEWABLE ORAL at 12:19

## 2023-01-01 RX ADMIN — Medication 1 TABLET(S): at 09:48

## 2023-01-01 RX ADMIN — Medication 50 MILLILITER(S): at 06:11

## 2023-01-01 RX ADMIN — Medication 20 MILLIGRAM(S): at 05:20

## 2023-01-01 RX ADMIN — LORATADINE 10 MILLIGRAM(S): 10 TABLET ORAL at 14:08

## 2023-01-01 RX ADMIN — Medication 100 MILLIEQUIVALENT(S): at 01:12

## 2023-01-01 RX ADMIN — Medication 20 MILLIGRAM(S): at 06:03

## 2023-01-01 RX ADMIN — ATORVASTATIN CALCIUM 80 MILLIGRAM(S): 80 TABLET, FILM COATED ORAL at 22:08

## 2023-01-01 RX ADMIN — ATORVASTATIN CALCIUM 80 MILLIGRAM(S): 80 TABLET, FILM COATED ORAL at 21:03

## 2023-01-01 RX ADMIN — PANTOPRAZOLE SODIUM 40 MILLIGRAM(S): 20 TABLET, DELAYED RELEASE ORAL at 11:52

## 2023-01-01 RX ADMIN — Medication 50 MILLILITER(S): at 23:18

## 2023-01-01 RX ADMIN — Medication 30 MILLIGRAM(S): at 17:13

## 2023-01-01 RX ADMIN — Medication 50 MILLILITER(S): at 05:44

## 2023-01-01 RX ADMIN — LORATADINE 10 MILLIGRAM(S): 10 TABLET ORAL at 18:00

## 2023-01-01 RX ADMIN — Medication 40 MILLIGRAM(S): at 17:25

## 2023-01-01 RX ADMIN — Medication 50 MILLILITER(S): at 17:28

## 2023-01-01 RX ADMIN — OXYCODONE HYDROCHLORIDE 5 MILLIGRAM(S): 5 TABLET ORAL at 16:42

## 2023-01-01 RX ADMIN — Medication 250 MILLIGRAM(S): at 15:18

## 2023-01-01 RX ADMIN — PIPERACILLIN AND TAZOBACTAM 25 GRAM(S): 4; .5 INJECTION, POWDER, LYOPHILIZED, FOR SOLUTION INTRAVENOUS at 13:18

## 2023-01-01 RX ADMIN — Medication 75 MICROGRAM(S): at 05:22

## 2023-01-01 RX ADMIN — Medication 100 MILLIEQUIVALENT(S): at 12:06

## 2023-01-01 RX ADMIN — LORATADINE 10 MILLIGRAM(S): 10 TABLET ORAL at 11:30

## 2023-01-01 RX ADMIN — Medication 50 MILLILITER(S): at 03:12

## 2023-01-01 RX ADMIN — PREGABALIN 1000 MICROGRAM(S): 225 CAPSULE ORAL at 11:08

## 2023-01-01 RX ADMIN — PANTOPRAZOLE SODIUM 40 MILLIGRAM(S): 20 TABLET, DELAYED RELEASE ORAL at 05:45

## 2023-01-01 RX ADMIN — Medication 2000 UNIT(S): at 18:25

## 2023-01-01 RX ADMIN — MUPIROCIN 1 APPLICATION(S): 20 OINTMENT TOPICAL at 13:20

## 2023-01-01 RX ADMIN — Medication 30 MILLIGRAM(S): at 17:20

## 2023-01-01 RX ADMIN — Medication 30 MILLIGRAM(S): at 18:00

## 2023-01-01 RX ADMIN — Medication 50 MILLILITER(S): at 11:11

## 2023-01-01 RX ADMIN — OXYCODONE HYDROCHLORIDE 5 MILLIGRAM(S): 5 TABLET ORAL at 14:44

## 2023-01-01 RX ADMIN — PREGABALIN 1000 MICROGRAM(S): 225 CAPSULE ORAL at 14:09

## 2023-01-01 RX ADMIN — Medication 75 MICROGRAM(S): at 05:06

## 2023-01-01 RX ADMIN — PANTOPRAZOLE SODIUM 40 MILLIGRAM(S): 20 TABLET, DELAYED RELEASE ORAL at 18:19

## 2023-01-01 RX ADMIN — LORATADINE 10 MILLIGRAM(S): 10 TABLET ORAL at 12:53

## 2023-01-01 RX ADMIN — CHLORHEXIDINE GLUCONATE 15 MILLILITER(S): 213 SOLUTION TOPICAL at 05:43

## 2023-01-01 RX ADMIN — Medication 100 MILLIEQUIVALENT(S): at 23:39

## 2023-01-01 RX ADMIN — PIPERACILLIN AND TAZOBACTAM 25 GRAM(S): 4; .5 INJECTION, POWDER, LYOPHILIZED, FOR SOLUTION INTRAVENOUS at 21:17

## 2023-01-01 RX ADMIN — MUPIROCIN 1 APPLICATION(S): 20 OINTMENT TOPICAL at 07:54

## 2023-01-01 RX ADMIN — PANTOPRAZOLE SODIUM 40 MILLIGRAM(S): 20 TABLET, DELAYED RELEASE ORAL at 06:09

## 2023-01-01 RX ADMIN — Medication 20 MILLIEQUIVALENT(S): at 10:01

## 2023-01-01 RX ADMIN — Medication 0.25 MILLIGRAM(S): at 06:23

## 2023-01-01 RX ADMIN — Medication 25 GRAM(S): at 13:16

## 2023-01-01 RX ADMIN — OXYCODONE HYDROCHLORIDE 5 MILLIGRAM(S): 5 TABLET ORAL at 09:47

## 2023-01-01 RX ADMIN — OXYCODONE HYDROCHLORIDE 5 MILLIGRAM(S): 5 TABLET ORAL at 20:32

## 2023-01-01 RX ADMIN — HEPARIN SODIUM 700 UNIT(S)/HR: 5000 INJECTION INTRAVENOUS; SUBCUTANEOUS at 16:40

## 2023-01-01 RX ADMIN — Medication 30 MILLIGRAM(S): at 11:20

## 2023-01-01 RX ADMIN — OXYCODONE HYDROCHLORIDE 5 MILLIGRAM(S): 5 TABLET ORAL at 12:00

## 2023-01-01 RX ADMIN — MIDAZOLAM HYDROCHLORIDE 1.2 MG/KG/HR: 1 INJECTION, SOLUTION INTRAMUSCULAR; INTRAVENOUS at 15:18

## 2023-01-01 RX ADMIN — PIPERACILLIN AND TAZOBACTAM 200 GRAM(S): 4; .5 INJECTION, POWDER, LYOPHILIZED, FOR SOLUTION INTRAVENOUS at 17:49

## 2023-01-01 RX ADMIN — Medication 40 MILLIGRAM(S): at 10:06

## 2023-01-01 RX ADMIN — PANTOPRAZOLE SODIUM 40 MILLIGRAM(S): 20 TABLET, DELAYED RELEASE ORAL at 06:44

## 2023-01-01 RX ADMIN — APIXABAN 2.5 MILLIGRAM(S): 2.5 TABLET, FILM COATED ORAL at 06:42

## 2023-01-01 RX ADMIN — OXYCODONE HYDROCHLORIDE 5 MILLIGRAM(S): 5 TABLET ORAL at 07:41

## 2023-01-01 RX ADMIN — Medication 100 GRAM(S): at 02:06

## 2023-01-01 RX ADMIN — Medication 650 MILLIGRAM(S): at 09:26

## 2023-01-01 RX ADMIN — OXYCODONE HYDROCHLORIDE 5 MILLIGRAM(S): 5 TABLET ORAL at 13:25

## 2023-01-01 RX ADMIN — Medication 30 MILLIGRAM(S): at 21:46

## 2023-01-01 RX ADMIN — CHLORHEXIDINE GLUCONATE 1 APPLICATION(S): 213 SOLUTION TOPICAL at 11:30

## 2023-01-01 RX ADMIN — CHLORHEXIDINE GLUCONATE 15 MILLILITER(S): 213 SOLUTION TOPICAL at 07:38

## 2023-01-01 RX ADMIN — PROPOFOL 18 MICROGRAM(S)/KG/MIN: 10 INJECTION, EMULSION INTRAVENOUS at 22:18

## 2023-01-01 RX ADMIN — Medication 0.25 MILLIGRAM(S): at 13:00

## 2023-01-01 RX ADMIN — Medication 50 MILLILITER(S): at 00:29

## 2023-01-01 RX ADMIN — Medication 30 MILLIGRAM(S): at 12:52

## 2023-01-01 RX ADMIN — Medication 0.25 MILLIGRAM(S): at 21:31

## 2023-01-01 RX ADMIN — PIPERACILLIN AND TAZOBACTAM 25 GRAM(S): 4; .5 INJECTION, POWDER, LYOPHILIZED, FOR SOLUTION INTRAVENOUS at 22:55

## 2023-01-01 RX ADMIN — Medication 1 TABLET(S): at 11:30

## 2023-01-01 RX ADMIN — Medication 1 TABLET(S): at 11:15

## 2023-01-01 RX ADMIN — PANTOPRAZOLE SODIUM 40 MILLIGRAM(S): 20 TABLET, DELAYED RELEASE ORAL at 18:02

## 2023-01-01 RX ADMIN — APIXABAN 2.5 MILLIGRAM(S): 2.5 TABLET, FILM COATED ORAL at 06:44

## 2023-01-01 RX ADMIN — MUPIROCIN 1 APPLICATION(S): 20 OINTMENT TOPICAL at 12:07

## 2023-01-01 RX ADMIN — OXYCODONE HYDROCHLORIDE 5 MILLIGRAM(S): 5 TABLET ORAL at 22:45

## 2023-01-01 RX ADMIN — CHLORHEXIDINE GLUCONATE 1 APPLICATION(S): 213 SOLUTION TOPICAL at 11:31

## 2023-01-01 RX ADMIN — Medication 30 MILLIGRAM(S): at 23:23

## 2023-01-01 RX ADMIN — Medication 75 MICROGRAM(S): at 05:53

## 2023-01-01 RX ADMIN — PANTOPRAZOLE SODIUM 40 MILLIGRAM(S): 20 TABLET, DELAYED RELEASE ORAL at 06:42

## 2023-01-01 RX ADMIN — ATORVASTATIN CALCIUM 80 MILLIGRAM(S): 80 TABLET, FILM COATED ORAL at 22:00

## 2023-01-01 RX ADMIN — Medication 75 MICROGRAM(S): at 05:09

## 2023-01-01 RX ADMIN — ATORVASTATIN CALCIUM 80 MILLIGRAM(S): 80 TABLET, FILM COATED ORAL at 21:05

## 2023-01-01 RX ADMIN — Medication 650 MILLIGRAM(S): at 11:30

## 2023-01-01 RX ADMIN — HEPARIN SODIUM 5000 UNIT(S): 5000 INJECTION INTRAVENOUS; SUBCUTANEOUS at 05:18

## 2023-01-01 RX ADMIN — Medication 56 MICROGRAM(S): at 23:41

## 2023-01-01 RX ADMIN — SODIUM CHLORIDE 250 MILLILITER(S): 9 INJECTION INTRAMUSCULAR; INTRAVENOUS; SUBCUTANEOUS at 01:33

## 2023-01-01 RX ADMIN — Medication 60 MILLIGRAM(S): at 05:18

## 2023-01-01 RX ADMIN — MUPIROCIN 1 APPLICATION(S): 20 OINTMENT TOPICAL at 16:25

## 2023-01-01 RX ADMIN — CHLORHEXIDINE GLUCONATE 15 MILLILITER(S): 213 SOLUTION TOPICAL at 17:44

## 2023-01-01 RX ADMIN — PANTOPRAZOLE SODIUM 40 MILLIGRAM(S): 20 TABLET, DELAYED RELEASE ORAL at 06:55

## 2023-01-01 RX ADMIN — Medication 1 TABLET(S): at 14:09

## 2023-01-01 RX ADMIN — Medication 50 MILLILITER(S): at 18:20

## 2023-01-01 RX ADMIN — PIPERACILLIN AND TAZOBACTAM 25 GRAM(S): 4; .5 INJECTION, POWDER, LYOPHILIZED, FOR SOLUTION INTRAVENOUS at 06:32

## 2023-01-01 RX ADMIN — Medication 50 MILLILITER(S): at 12:22

## 2023-01-01 RX ADMIN — PANTOPRAZOLE SODIUM 40 MILLIGRAM(S): 20 TABLET, DELAYED RELEASE ORAL at 05:48

## 2023-01-01 RX ADMIN — Medication 50 MILLILITER(S): at 12:45

## 2023-01-01 RX ADMIN — MUPIROCIN 1 APPLICATION(S): 20 OINTMENT TOPICAL at 09:01

## 2023-01-01 RX ADMIN — Medication 30 MILLIGRAM(S): at 06:23

## 2023-01-01 RX ADMIN — Medication 56 MICROGRAM(S): at 22:29

## 2023-01-01 RX ADMIN — ATORVASTATIN CALCIUM 80 MILLIGRAM(S): 80 TABLET, FILM COATED ORAL at 21:41

## 2023-01-01 RX ADMIN — ADENOSINE 6 MILLIGRAM(S): 3 INJECTION INTRAVENOUS at 19:01

## 2023-01-01 RX ADMIN — Medication 2.81 MICROGRAM(S)/KG/MIN: at 08:05

## 2023-01-01 RX ADMIN — OXYCODONE HYDROCHLORIDE 5 MILLIGRAM(S): 5 TABLET ORAL at 22:00

## 2023-01-01 RX ADMIN — Medication 56 MICROGRAM(S): at 21:11

## 2023-01-01 RX ADMIN — SODIUM CHLORIDE 100 MILLILITER(S): 9 INJECTION, SOLUTION INTRAVENOUS at 08:19

## 2023-01-01 RX ADMIN — Medication 50 MILLILITER(S): at 21:24

## 2023-01-01 RX ADMIN — Medication 650 MILLIGRAM(S): at 10:30

## 2023-01-01 RX ADMIN — MUPIROCIN 1 APPLICATION(S): 20 OINTMENT TOPICAL at 13:51

## 2023-01-01 RX ADMIN — Medication 30 MILLIGRAM(S): at 05:04

## 2023-01-01 RX ADMIN — Medication 3 MILLIGRAM(S): at 23:15

## 2023-01-01 RX ADMIN — Medication 1 TABLET(S): at 17:23

## 2023-01-01 RX ADMIN — Medication 1 TABLET(S): at 08:14

## 2023-01-01 RX ADMIN — Medication 25 GRAM(S): at 09:21

## 2023-01-01 RX ADMIN — Medication 56 MICROGRAM(S): at 23:39

## 2023-01-01 RX ADMIN — Medication 30 MILLIGRAM(S): at 13:15

## 2023-01-01 RX ADMIN — Medication 20 MILLIGRAM(S): at 05:33

## 2023-01-01 RX ADMIN — POTASSIUM PHOSPHATE, MONOBASIC POTASSIUM PHOSPHATE, DIBASIC 83.33 MILLIMOLE(S): 236; 224 INJECTION, SOLUTION INTRAVENOUS at 18:14

## 2023-01-01 RX ADMIN — Medication 1 TABLET(S): at 11:08

## 2023-01-01 RX ADMIN — Medication 85 MILLIMOLE(S): at 00:46

## 2023-01-01 RX ADMIN — CEFEPIME 100 MILLIGRAM(S): 1 INJECTION, POWDER, FOR SOLUTION INTRAMUSCULAR; INTRAVENOUS at 12:46

## 2023-01-01 RX ADMIN — Medication 15 MILLIGRAM(S): at 21:35

## 2023-01-01 RX ADMIN — PANTOPRAZOLE SODIUM 40 MILLIGRAM(S): 20 TABLET, DELAYED RELEASE ORAL at 18:47

## 2023-01-01 RX ADMIN — PANTOPRAZOLE SODIUM 40 MILLIGRAM(S): 20 TABLET, DELAYED RELEASE ORAL at 05:36

## 2023-01-01 RX ADMIN — Medication 100 GRAM(S): at 02:59

## 2023-01-01 RX ADMIN — Medication 30 MILLIGRAM(S): at 18:20

## 2023-01-01 RX ADMIN — Medication 1 APPLICATION(S): at 05:45

## 2023-01-01 RX ADMIN — ATORVASTATIN CALCIUM 80 MILLIGRAM(S): 80 TABLET, FILM COATED ORAL at 21:57

## 2023-01-01 RX ADMIN — PIPERACILLIN AND TAZOBACTAM 25 GRAM(S): 4; .5 INJECTION, POWDER, LYOPHILIZED, FOR SOLUTION INTRAVENOUS at 21:19

## 2023-01-01 RX ADMIN — VASOPRESSIN 6 UNIT(S)/MIN: 20 INJECTION INTRAVENOUS at 19:58

## 2023-01-01 RX ADMIN — ATORVASTATIN CALCIUM 80 MILLIGRAM(S): 80 TABLET, FILM COATED ORAL at 22:06

## 2023-01-01 RX ADMIN — PREGABALIN 1000 MICROGRAM(S): 225 CAPSULE ORAL at 12:07

## 2023-01-01 RX ADMIN — PROTHROMBIN COMPLEX CONCENTRATE (HUMAN) 400 INTERNATIONAL UNIT(S): 25.5; 16.5; 24; 22; 22; 26 POWDER, FOR SOLUTION INTRAVENOUS at 03:51

## 2023-01-01 RX ADMIN — Medication 30 MILLIGRAM(S): at 16:04

## 2023-01-01 RX ADMIN — MUPIROCIN 1 APPLICATION(S): 20 OINTMENT TOPICAL at 08:58

## 2023-01-01 RX ADMIN — Medication 100 GRAM(S): at 08:19

## 2023-01-01 RX ADMIN — Medication 75 MICROGRAM(S): at 05:33

## 2023-01-01 RX ADMIN — Medication 1 TABLET(S): at 13:16

## 2023-01-01 RX ADMIN — Medication 0.25 MILLIGRAM(S): at 23:40

## 2023-01-01 RX ADMIN — Medication 100 MILLIEQUIVALENT(S): at 10:34

## 2023-01-01 RX ADMIN — PREGABALIN 1000 MICROGRAM(S): 225 CAPSULE ORAL at 11:14

## 2023-01-01 RX ADMIN — Medication 2000 UNIT(S): at 14:09

## 2023-01-01 RX ADMIN — BUMETANIDE 2 MILLIGRAM(S): 0.25 INJECTION INTRAMUSCULAR; INTRAVENOUS at 12:04

## 2023-01-01 RX ADMIN — PANTOPRAZOLE SODIUM 40 MILLIGRAM(S): 20 TABLET, DELAYED RELEASE ORAL at 18:52

## 2023-01-01 RX ADMIN — Medication 75 MICROGRAM(S): at 05:03

## 2023-01-01 RX ADMIN — HEPARIN SODIUM 0 UNIT(S)/HR: 5000 INJECTION INTRAVENOUS; SUBCUTANEOUS at 10:20

## 2023-01-01 RX ADMIN — HEPARIN SODIUM 300 UNIT(S)/HR: 5000 INJECTION INTRAVENOUS; SUBCUTANEOUS at 08:26

## 2023-01-01 RX ADMIN — Medication 75 MICROGRAM(S): at 05:45

## 2023-01-01 RX ADMIN — Medication 100 GRAM(S): at 22:23

## 2023-01-01 RX ADMIN — HEPARIN SODIUM 300 UNIT(S)/HR: 5000 INJECTION INTRAVENOUS; SUBCUTANEOUS at 19:22

## 2023-01-01 RX ADMIN — Medication 10 MILLIGRAM(S): at 18:07

## 2023-01-01 RX ADMIN — Medication 50 MILLILITER(S): at 05:27

## 2023-01-01 RX ADMIN — OXYCODONE HYDROCHLORIDE 5 MILLIGRAM(S): 5 TABLET ORAL at 14:05

## 2023-01-01 RX ADMIN — ATORVASTATIN CALCIUM 80 MILLIGRAM(S): 80 TABLET, FILM COATED ORAL at 21:26

## 2023-01-01 RX ADMIN — LORATADINE 10 MILLIGRAM(S): 10 TABLET ORAL at 11:08

## 2023-01-01 RX ADMIN — Medication 50 MILLILITER(S): at 17:07

## 2023-01-01 RX ADMIN — HEPARIN SODIUM 0 UNIT(S)/HR: 5000 INJECTION INTRAVENOUS; SUBCUTANEOUS at 16:51

## 2023-01-01 RX ADMIN — PANTOPRAZOLE SODIUM 40 MILLIGRAM(S): 20 TABLET, DELAYED RELEASE ORAL at 11:30

## 2023-01-01 RX ADMIN — Medication 650 MILLIGRAM(S): at 11:13

## 2023-01-01 RX ADMIN — Medication 50 MILLILITER(S): at 00:00

## 2023-01-01 RX ADMIN — Medication 50 MILLILITER(S): at 08:11

## 2023-01-01 RX ADMIN — LORATADINE 10 MILLIGRAM(S): 10 TABLET ORAL at 11:15

## 2023-01-01 RX ADMIN — HEPARIN SODIUM 1100 UNIT(S)/HR: 5000 INJECTION INTRAVENOUS; SUBCUTANEOUS at 19:23

## 2023-01-01 RX ADMIN — Medication 50 MILLILITER(S): at 18:24

## 2023-01-01 RX ADMIN — Medication 100 MILLIEQUIVALENT(S): at 11:29

## 2023-01-01 RX ADMIN — PIPERACILLIN AND TAZOBACTAM 25 GRAM(S): 4; .5 INJECTION, POWDER, LYOPHILIZED, FOR SOLUTION INTRAVENOUS at 06:13

## 2023-01-01 RX ADMIN — Medication 30 MILLIGRAM(S): at 05:48

## 2023-01-01 RX ADMIN — ONDANSETRON 4 MILLIGRAM(S): 8 TABLET, FILM COATED ORAL at 12:52

## 2023-01-01 RX ADMIN — OXYCODONE HYDROCHLORIDE 5 MILLIGRAM(S): 5 TABLET ORAL at 15:06

## 2023-01-01 RX ADMIN — Medication 1 TABLET(S): at 12:42

## 2023-01-01 RX ADMIN — PIPERACILLIN AND TAZOBACTAM 25 GRAM(S): 4; .5 INJECTION, POWDER, LYOPHILIZED, FOR SOLUTION INTRAVENOUS at 19:46

## 2023-01-01 RX ADMIN — Medication 75 MICROGRAM(S): at 05:10

## 2023-01-01 RX ADMIN — Medication 75 MICROGRAM(S): at 06:09

## 2023-01-01 RX ADMIN — OXYCODONE HYDROCHLORIDE 5 MILLIGRAM(S): 5 TABLET ORAL at 08:41

## 2023-01-01 RX ADMIN — Medication 75 MICROGRAM(S): at 05:34

## 2023-01-01 RX ADMIN — PANTOPRAZOLE SODIUM 40 MILLIGRAM(S): 20 TABLET, DELAYED RELEASE ORAL at 05:06

## 2023-01-01 RX ADMIN — Medication 1 TABLET(S): at 11:20

## 2023-01-01 RX ADMIN — Medication 50 MILLILITER(S): at 05:30

## 2023-01-01 RX ADMIN — CEFEPIME 100 MILLIGRAM(S): 1 INJECTION, POWDER, FOR SOLUTION INTRAMUSCULAR; INTRAVENOUS at 11:29

## 2023-01-01 RX ADMIN — Medication 50 MILLILITER(S): at 17:25

## 2023-01-01 RX ADMIN — PROPOFOL 18 MICROGRAM(S)/KG/MIN: 10 INJECTION, EMULSION INTRAVENOUS at 07:20

## 2023-01-01 RX ADMIN — ATENOLOL 50 MILLIGRAM(S): 25 TABLET ORAL at 06:42

## 2023-01-01 RX ADMIN — Medication 2000 UNIT(S): at 11:09

## 2023-01-01 RX ADMIN — Medication 1 APPLICATION(S): at 17:09

## 2023-01-01 RX ADMIN — Medication 50 MILLILITER(S): at 01:58

## 2023-01-01 RX ADMIN — Medication 2.81 MICROGRAM(S)/KG/MIN: at 22:19

## 2023-01-01 RX ADMIN — PIPERACILLIN AND TAZOBACTAM 25 GRAM(S): 4; .5 INJECTION, POWDER, LYOPHILIZED, FOR SOLUTION INTRAVENOUS at 21:41

## 2023-01-01 RX ADMIN — PIPERACILLIN AND TAZOBACTAM 25 GRAM(S): 4; .5 INJECTION, POWDER, LYOPHILIZED, FOR SOLUTION INTRAVENOUS at 06:22

## 2023-01-01 RX ADMIN — Medication 50 MILLILITER(S): at 23:52

## 2023-01-01 RX ADMIN — Medication 75 MICROGRAM(S): at 22:00

## 2023-01-01 RX ADMIN — VASOPRESSIN 6 UNIT(S)/MIN: 20 INJECTION INTRAVENOUS at 08:14

## 2023-01-01 RX ADMIN — Medication 2000 UNIT(S): at 11:40

## 2023-01-01 RX ADMIN — ATORVASTATIN CALCIUM 80 MILLIGRAM(S): 80 TABLET, FILM COATED ORAL at 21:16

## 2023-01-01 RX ADMIN — BUMETANIDE 2 MILLIGRAM(S): 0.25 INJECTION INTRAMUSCULAR; INTRAVENOUS at 01:26

## 2023-01-01 RX ADMIN — PHENYLEPHRINE HYDROCHLORIDE 1.13 MICROGRAM(S)/KG/MIN: 10 INJECTION INTRAVENOUS at 08:04

## 2023-01-01 RX ADMIN — PIPERACILLIN AND TAZOBACTAM 25 GRAM(S): 4; .5 INJECTION, POWDER, LYOPHILIZED, FOR SOLUTION INTRAVENOUS at 21:57

## 2023-01-01 RX ADMIN — APIXABAN 2.5 MILLIGRAM(S): 2.5 TABLET, FILM COATED ORAL at 18:51

## 2023-01-01 RX ADMIN — Medication 650 MILLIGRAM(S): at 09:48

## 2023-01-01 RX ADMIN — Medication 30 MILLIGRAM(S): at 04:10

## 2023-01-01 RX ADMIN — Medication 2000 UNIT(S): at 11:20

## 2023-01-01 RX ADMIN — Medication 50 MILLILITER(S): at 05:09

## 2023-01-01 RX ADMIN — Medication 75 MICROGRAM(S): at 05:37

## 2023-01-01 RX ADMIN — OXYCODONE HYDROCHLORIDE 5 MILLIGRAM(S): 5 TABLET ORAL at 12:28

## 2023-01-01 RX ADMIN — OXYCODONE HYDROCHLORIDE 5 MILLIGRAM(S): 5 TABLET ORAL at 23:45

## 2023-01-01 RX ADMIN — Medication 50 MILLILITER(S): at 05:07

## 2023-01-01 RX ADMIN — ATORVASTATIN CALCIUM 80 MILLIGRAM(S): 80 TABLET, FILM COATED ORAL at 00:09

## 2023-01-01 RX ADMIN — Medication 25 GRAM(S): at 08:47

## 2023-01-01 RX ADMIN — MIDAZOLAM HYDROCHLORIDE 1.2 MG/KG/HR: 1 INJECTION, SOLUTION INTRAMUSCULAR; INTRAVENOUS at 07:20

## 2023-01-01 RX ADMIN — HEPARIN SODIUM 500 UNIT(S)/HR: 5000 INJECTION INTRAVENOUS; SUBCUTANEOUS at 09:22

## 2023-01-01 RX ADMIN — Medication 2000 UNIT(S): at 12:30

## 2023-01-01 RX ADMIN — PANTOPRAZOLE SODIUM 40 MILLIGRAM(S): 20 TABLET, DELAYED RELEASE ORAL at 05:28

## 2023-01-01 RX ADMIN — OXYCODONE HYDROCHLORIDE 5 MILLIGRAM(S): 5 TABLET ORAL at 16:06

## 2023-01-01 RX ADMIN — CHLORHEXIDINE GLUCONATE 1 APPLICATION(S): 213 SOLUTION TOPICAL at 11:42

## 2023-01-01 RX ADMIN — Medication 20 MILLIEQUIVALENT(S): at 05:24

## 2023-01-01 RX ADMIN — LORATADINE 10 MILLIGRAM(S): 10 TABLET ORAL at 11:14

## 2023-01-01 RX ADMIN — Medication 100 MILLIEQUIVALENT(S): at 10:01

## 2023-01-01 RX ADMIN — Medication 2000 UNIT(S): at 12:06

## 2023-01-01 RX ADMIN — Medication 20 MILLIGRAM(S): at 06:17

## 2023-01-01 RX ADMIN — PANTOPRAZOLE SODIUM 40 MILLIGRAM(S): 20 TABLET, DELAYED RELEASE ORAL at 18:50

## 2023-01-01 RX ADMIN — CEFEPIME 100 MILLIGRAM(S): 1 INJECTION, POWDER, FOR SOLUTION INTRAMUSCULAR; INTRAVENOUS at 15:17

## 2023-01-01 RX ADMIN — Medication 650 MILLIGRAM(S): at 12:13

## 2023-01-01 RX ADMIN — ATORVASTATIN CALCIUM 80 MILLIGRAM(S): 80 TABLET, FILM COATED ORAL at 21:30

## 2023-01-01 RX ADMIN — PIPERACILLIN AND TAZOBACTAM 25 GRAM(S): 4; .5 INJECTION, POWDER, LYOPHILIZED, FOR SOLUTION INTRAVENOUS at 21:31

## 2023-01-01 RX ADMIN — ATORVASTATIN CALCIUM 80 MILLIGRAM(S): 80 TABLET, FILM COATED ORAL at 21:28

## 2023-01-01 RX ADMIN — HEPARIN SODIUM 1100 UNIT(S)/HR: 5000 INJECTION INTRAVENOUS; SUBCUTANEOUS at 09:48

## 2023-01-01 RX ADMIN — POTASSIUM PHOSPHATE, MONOBASIC POTASSIUM PHOSPHATE, DIBASIC 83.33 MILLIMOLE(S): 236; 224 INJECTION, SOLUTION INTRAVENOUS at 10:24

## 2023-01-01 RX ADMIN — LORATADINE 10 MILLIGRAM(S): 10 TABLET ORAL at 12:29

## 2023-01-01 RX ADMIN — Medication 2000 UNIT(S): at 12:08

## 2023-01-01 RX ADMIN — Medication 0.25 MILLIGRAM(S): at 14:08

## 2023-01-01 RX ADMIN — PIPERACILLIN AND TAZOBACTAM 25 GRAM(S): 4; .5 INJECTION, POWDER, LYOPHILIZED, FOR SOLUTION INTRAVENOUS at 14:49

## 2023-01-01 RX ADMIN — ATORVASTATIN CALCIUM 80 MILLIGRAM(S): 80 TABLET, FILM COATED ORAL at 22:29

## 2023-01-01 RX ADMIN — HEPARIN SODIUM 700 UNIT(S)/HR: 5000 INJECTION INTRAVENOUS; SUBCUTANEOUS at 00:23

## 2023-01-01 RX ADMIN — Medication 56 MICROGRAM(S): at 22:18

## 2023-01-01 RX ADMIN — CEFEPIME 100 MILLIGRAM(S): 1 INJECTION, POWDER, FOR SOLUTION INTRAMUSCULAR; INTRAVENOUS at 11:30

## 2023-01-01 RX ADMIN — CEFEPIME 100 MILLIGRAM(S): 1 INJECTION, POWDER, FOR SOLUTION INTRAMUSCULAR; INTRAVENOUS at 11:42

## 2023-01-01 RX ADMIN — HEPARIN SODIUM 500 UNIT(S)/HR: 5000 INJECTION INTRAVENOUS; SUBCUTANEOUS at 18:59

## 2023-01-01 RX ADMIN — Medication 40 MILLIEQUIVALENT(S): at 06:43

## 2023-01-01 RX ADMIN — Medication 2 TABLET(S): at 05:18

## 2023-01-01 RX ADMIN — PANTOPRAZOLE SODIUM 40 MILLIGRAM(S): 20 TABLET, DELAYED RELEASE ORAL at 17:18

## 2023-01-01 RX ADMIN — Medication 81 MILLIGRAM(S): at 12:29

## 2023-01-01 RX ADMIN — APIXABAN 2.5 MILLIGRAM(S): 2.5 TABLET, FILM COATED ORAL at 17:20

## 2023-01-01 RX ADMIN — HEPARIN SODIUM 5000 UNIT(S): 5000 INJECTION INTRAVENOUS; SUBCUTANEOUS at 17:44

## 2023-01-01 RX ADMIN — OXYCODONE HYDROCHLORIDE 5 MILLIGRAM(S): 5 TABLET ORAL at 11:02

## 2023-01-01 RX ADMIN — APIXABAN 2.5 MILLIGRAM(S): 2.5 TABLET, FILM COATED ORAL at 05:06

## 2023-01-01 RX ADMIN — HEPARIN SODIUM 5000 UNIT(S): 5000 INJECTION INTRAVENOUS; SUBCUTANEOUS at 05:23

## 2023-01-01 RX ADMIN — PREGABALIN 1000 MICROGRAM(S): 225 CAPSULE ORAL at 11:20

## 2023-01-01 RX ADMIN — Medication 2000 UNIT(S): at 11:30

## 2023-01-01 RX ADMIN — Medication 30 MILLIGRAM(S): at 23:59

## 2023-01-01 RX ADMIN — HEPARIN SODIUM 300 UNIT(S)/HR: 5000 INJECTION INTRAVENOUS; SUBCUTANEOUS at 18:01

## 2023-01-01 RX ADMIN — PIPERACILLIN AND TAZOBACTAM 25 GRAM(S): 4; .5 INJECTION, POWDER, LYOPHILIZED, FOR SOLUTION INTRAVENOUS at 06:20

## 2023-01-01 RX ADMIN — Medication 56 MICROGRAM(S): at 21:03

## 2023-01-01 RX ADMIN — HEPARIN SODIUM 300 UNIT(S)/HR: 5000 INJECTION INTRAVENOUS; SUBCUTANEOUS at 07:21

## 2023-01-01 RX ADMIN — CHLORHEXIDINE GLUCONATE 15 MILLILITER(S): 213 SOLUTION TOPICAL at 17:03

## 2023-01-01 RX ADMIN — Medication 25 GRAM(S): at 21:51

## 2023-01-01 RX ADMIN — PANTOPRAZOLE SODIUM 40 MILLIGRAM(S): 20 TABLET, DELAYED RELEASE ORAL at 07:44

## 2023-01-01 RX ADMIN — ATORVASTATIN CALCIUM 80 MILLIGRAM(S): 80 TABLET, FILM COATED ORAL at 21:25

## 2023-01-01 RX ADMIN — LORATADINE 10 MILLIGRAM(S): 10 TABLET ORAL at 13:15

## 2023-01-01 RX ADMIN — Medication 30 MILLIGRAM(S): at 12:21

## 2023-01-01 RX ADMIN — ATORVASTATIN CALCIUM 80 MILLIGRAM(S): 80 TABLET, FILM COATED ORAL at 21:45

## 2023-01-01 RX ADMIN — Medication 1 TABLET(S): at 12:07

## 2023-01-01 RX ADMIN — HEPARIN SODIUM 5000 UNIT(S): 5000 INJECTION INTRAVENOUS; SUBCUTANEOUS at 05:43

## 2023-01-01 RX ADMIN — OXYCODONE HYDROCHLORIDE 5 MILLIGRAM(S): 5 TABLET ORAL at 22:53

## 2023-01-01 RX ADMIN — Medication 1 TABLET(S): at 12:53

## 2023-01-01 RX ADMIN — Medication 100 MILLIEQUIVALENT(S): at 14:05

## 2023-01-01 RX ADMIN — HEPARIN SODIUM 5000 UNIT(S): 5000 INJECTION INTRAVENOUS; SUBCUTANEOUS at 17:23

## 2023-01-01 RX ADMIN — PROPOFOL 18 MICROGRAM(S)/KG/MIN: 10 INJECTION, EMULSION INTRAVENOUS at 19:27

## 2023-01-01 RX ADMIN — Medication 56 MICROGRAM(S): at 21:42

## 2023-01-01 RX ADMIN — PREGABALIN 1000 MICROGRAM(S): 225 CAPSULE ORAL at 12:53

## 2023-01-01 RX ADMIN — Medication 2.81 MICROGRAM(S)/KG/MIN: at 19:58

## 2023-01-01 RX ADMIN — Medication 40 MILLIEQUIVALENT(S): at 09:21

## 2023-01-01 RX ADMIN — OXYCODONE HYDROCHLORIDE 5 MILLIGRAM(S): 5 TABLET ORAL at 22:09

## 2023-01-01 RX ADMIN — POTASSIUM PHOSPHATE, MONOBASIC POTASSIUM PHOSPHATE, DIBASIC 83.33 MILLIMOLE(S): 236; 224 INJECTION, SOLUTION INTRAVENOUS at 10:42

## 2023-01-01 RX ADMIN — ATORVASTATIN CALCIUM 80 MILLIGRAM(S): 80 TABLET, FILM COATED ORAL at 21:46

## 2023-01-01 RX ADMIN — Medication 0.25 MILLIGRAM(S): at 21:16

## 2023-01-01 RX ADMIN — HEPARIN SODIUM 700 UNIT(S)/HR: 5000 INJECTION INTRAVENOUS; SUBCUTANEOUS at 11:10

## 2023-01-01 RX ADMIN — Medication 81 MILLIGRAM(S): at 12:53

## 2023-01-01 RX ADMIN — ATORVASTATIN CALCIUM 80 MILLIGRAM(S): 80 TABLET, FILM COATED ORAL at 22:26

## 2023-01-01 RX ADMIN — PREGABALIN 1000 MICROGRAM(S): 225 CAPSULE ORAL at 11:30

## 2023-01-01 RX ADMIN — Medication 2000 UNIT(S): at 11:14

## 2023-01-01 RX ADMIN — Medication 81 MILLIGRAM(S): at 13:29

## 2023-01-01 RX ADMIN — MUPIROCIN 1 APPLICATION(S): 20 OINTMENT TOPICAL at 11:10

## 2023-01-01 RX ADMIN — Medication 30 MILLIGRAM(S): at 11:17

## 2023-01-01 RX ADMIN — Medication 40 MILLIEQUIVALENT(S): at 11:12

## 2023-01-01 RX ADMIN — Medication 60 MILLIGRAM(S): at 11:41

## 2023-01-01 RX ADMIN — Medication 75 MICROGRAM(S): at 06:18

## 2023-01-01 RX ADMIN — APIXABAN 2.5 MILLIGRAM(S): 2.5 TABLET, FILM COATED ORAL at 19:51

## 2023-01-01 RX ADMIN — PANTOPRAZOLE SODIUM 40 MILLIGRAM(S): 20 TABLET, DELAYED RELEASE ORAL at 05:20

## 2023-01-01 RX ADMIN — ATENOLOL 50 MILLIGRAM(S): 25 TABLET ORAL at 05:05

## 2023-01-01 RX ADMIN — ATENOLOL 50 MILLIGRAM(S): 25 TABLET ORAL at 16:35

## 2023-01-01 RX ADMIN — Medication 30 MILLIGRAM(S): at 17:08

## 2023-01-01 RX ADMIN — MIDAZOLAM HYDROCHLORIDE 4 MILLIGRAM(S): 1 INJECTION, SOLUTION INTRAMUSCULAR; INTRAVENOUS at 14:00

## 2023-01-01 RX ADMIN — PANTOPRAZOLE SODIUM 40 MILLIGRAM(S): 20 TABLET, DELAYED RELEASE ORAL at 05:22

## 2023-01-01 RX ADMIN — PANTOPRAZOLE SODIUM 40 MILLIGRAM(S): 20 TABLET, DELAYED RELEASE ORAL at 05:03

## 2023-01-01 RX ADMIN — HEPARIN SODIUM 300 UNIT(S)/HR: 5000 INJECTION INTRAVENOUS; SUBCUTANEOUS at 00:26

## 2023-01-01 RX ADMIN — PREGABALIN 1000 MICROGRAM(S): 225 CAPSULE ORAL at 11:41

## 2023-01-01 RX ADMIN — Medication 75 MICROGRAM(S): at 05:14

## 2023-01-01 RX ADMIN — LORATADINE 10 MILLIGRAM(S): 10 TABLET ORAL at 11:41

## 2023-01-01 RX ADMIN — PANTOPRAZOLE SODIUM 40 MILLIGRAM(S): 20 TABLET, DELAYED RELEASE ORAL at 06:16

## 2023-01-01 RX ADMIN — Medication 20 MILLIGRAM(S): at 08:34

## 2023-01-01 RX ADMIN — Medication 56 MICROGRAM(S): at 22:20

## 2023-01-01 RX ADMIN — VASOPRESSIN 6 UNIT(S)/MIN: 20 INJECTION INTRAVENOUS at 22:19

## 2023-01-01 RX ADMIN — Medication 5 MILLIGRAM(S): at 04:44

## 2023-01-01 RX ADMIN — Medication 400 MILLIGRAM(S): at 22:00

## 2023-01-01 RX ADMIN — MUPIROCIN 1 APPLICATION(S): 20 OINTMENT TOPICAL at 08:05

## 2023-01-01 RX ADMIN — SODIUM CHLORIDE 50 MILLILITER(S): 9 INJECTION INTRAMUSCULAR; INTRAVENOUS; SUBCUTANEOUS at 17:43

## 2023-01-01 RX ADMIN — PANTOPRAZOLE SODIUM 40 MILLIGRAM(S): 20 TABLET, DELAYED RELEASE ORAL at 12:33

## 2023-01-01 RX ADMIN — HYDROMORPHONE HYDROCHLORIDE 0.2 MILLIGRAM(S): 2 INJECTION INTRAMUSCULAR; INTRAVENOUS; SUBCUTANEOUS at 08:25

## 2023-01-01 RX ADMIN — Medication 0.25 MILLIGRAM(S): at 21:42

## 2023-01-01 RX ADMIN — Medication 50 MILLILITER(S): at 13:12

## 2023-01-01 RX ADMIN — Medication 30 MILLIGRAM(S): at 06:08

## 2023-01-01 RX ADMIN — Medication 2.81 MICROGRAM(S)/KG/MIN: at 08:14

## 2023-01-01 RX ADMIN — Medication 3 MILLIGRAM(S): at 21:12

## 2023-01-01 RX ADMIN — Medication 30 MILLIGRAM(S): at 17:05

## 2023-01-01 RX ADMIN — LOSARTAN POTASSIUM 100 MILLIGRAM(S): 100 TABLET, FILM COATED ORAL at 05:54

## 2023-01-01 RX ADMIN — Medication 20 MILLIGRAM(S): at 17:21

## 2023-01-01 RX ADMIN — HEPARIN SODIUM 0 UNIT(S)/HR: 5000 INJECTION INTRAVENOUS; SUBCUTANEOUS at 17:54

## 2023-01-01 RX ADMIN — Medication 100 MILLIEQUIVALENT(S): at 02:22

## 2023-01-01 RX ADMIN — LORATADINE 10 MILLIGRAM(S): 10 TABLET ORAL at 12:23

## 2023-01-01 RX ADMIN — Medication 30 MILLIGRAM(S): at 11:03

## 2023-01-01 RX ADMIN — MUPIROCIN 1 APPLICATION(S): 20 OINTMENT TOPICAL at 08:12

## 2023-01-01 RX ADMIN — PANTOPRAZOLE SODIUM 40 MILLIGRAM(S): 20 TABLET, DELAYED RELEASE ORAL at 06:22

## 2023-01-01 RX ADMIN — Medication 1 APPLICATION(S): at 07:00

## 2023-01-01 RX ADMIN — PROPOFOL 18 MICROGRAM(S)/KG/MIN: 10 INJECTION, EMULSION INTRAVENOUS at 08:14

## 2023-01-01 RX ADMIN — PANTOPRAZOLE SODIUM 40 MILLIGRAM(S): 20 TABLET, DELAYED RELEASE ORAL at 05:10

## 2023-01-01 RX ADMIN — OXYCODONE HYDROCHLORIDE 5 MILLIGRAM(S): 5 TABLET ORAL at 13:21

## 2023-01-01 RX ADMIN — MIDAZOLAM HYDROCHLORIDE 1.2 MG/KG/HR: 1 INJECTION, SOLUTION INTRAMUSCULAR; INTRAVENOUS at 22:18

## 2023-01-01 RX ADMIN — Medication 50 MILLILITER(S): at 05:45

## 2023-01-01 RX ADMIN — OXYCODONE HYDROCHLORIDE 5 MILLIGRAM(S): 5 TABLET ORAL at 01:28

## 2023-01-01 RX ADMIN — SODIUM CHLORIDE 100 MILLILITER(S): 9 INJECTION, SOLUTION INTRAVENOUS at 05:35

## 2023-01-01 RX ADMIN — PIPERACILLIN AND TAZOBACTAM 25 GRAM(S): 4; .5 INJECTION, POWDER, LYOPHILIZED, FOR SOLUTION INTRAVENOUS at 13:00

## 2023-01-01 RX ADMIN — CHLORHEXIDINE GLUCONATE 15 MILLILITER(S): 213 SOLUTION TOPICAL at 05:23

## 2023-01-01 RX ADMIN — MUPIROCIN 1 APPLICATION(S): 20 OINTMENT TOPICAL at 12:53

## 2023-01-01 RX ADMIN — Medication 100 MILLIEQUIVALENT(S): at 11:05

## 2023-01-01 RX ADMIN — PANTOPRAZOLE SODIUM 40 MILLIGRAM(S): 20 TABLET, DELAYED RELEASE ORAL at 18:09

## 2023-01-01 RX ADMIN — CHLORHEXIDINE GLUCONATE 15 MILLILITER(S): 213 SOLUTION TOPICAL at 17:22

## 2023-01-01 RX ADMIN — OXYCODONE HYDROCHLORIDE 5 MILLIGRAM(S): 5 TABLET ORAL at 02:21

## 2023-01-01 RX ADMIN — PREGABALIN 1000 MICROGRAM(S): 225 CAPSULE ORAL at 13:15

## 2023-01-01 RX ADMIN — Medication 30 MILLIGRAM(S): at 16:37

## 2023-01-01 RX ADMIN — OXYCODONE HYDROCHLORIDE 5 MILLIGRAM(S): 5 TABLET ORAL at 19:32

## 2023-01-01 RX ADMIN — Medication 30 MILLIGRAM(S): at 05:45

## 2023-01-01 RX ADMIN — Medication 101 MILLIGRAM(S): at 10:22

## 2023-01-01 RX ADMIN — MIDAZOLAM HYDROCHLORIDE 2 MILLIGRAM(S): 1 INJECTION, SOLUTION INTRAMUSCULAR; INTRAVENOUS at 12:45

## 2023-01-01 RX ADMIN — Medication 20 MILLIGRAM(S): at 06:42

## 2023-01-01 RX ADMIN — PIPERACILLIN AND TAZOBACTAM 25 GRAM(S): 4; .5 INJECTION, POWDER, LYOPHILIZED, FOR SOLUTION INTRAVENOUS at 21:13

## 2023-01-01 RX ADMIN — HEPARIN SODIUM 900 UNIT(S)/HR: 5000 INJECTION INTRAVENOUS; SUBCUTANEOUS at 07:30

## 2023-01-01 RX ADMIN — LORATADINE 10 MILLIGRAM(S): 10 TABLET ORAL at 11:20

## 2023-01-01 RX ADMIN — Medication 20 MILLIGRAM(S): at 05:10

## 2023-01-01 RX ADMIN — HYDROMORPHONE HYDROCHLORIDE 0.2 MILLIGRAM(S): 2 INJECTION INTRAMUSCULAR; INTRAVENOUS; SUBCUTANEOUS at 22:20

## 2023-01-01 RX ADMIN — PREGABALIN 1000 MICROGRAM(S): 225 CAPSULE ORAL at 11:15

## 2023-01-01 RX ADMIN — PANTOPRAZOLE SODIUM 40 MILLIGRAM(S): 20 TABLET, DELAYED RELEASE ORAL at 17:13

## 2023-01-01 RX ADMIN — OXYCODONE HYDROCHLORIDE 5 MILLIGRAM(S): 5 TABLET ORAL at 07:46

## 2023-01-01 RX ADMIN — Medication 100 MILLIEQUIVALENT(S): at 13:03

## 2023-01-01 RX ADMIN — Medication 40 MILLIEQUIVALENT(S): at 14:23

## 2023-01-01 RX ADMIN — Medication 30 MILLIGRAM(S): at 22:26

## 2023-01-01 RX ADMIN — ATORVASTATIN CALCIUM 80 MILLIGRAM(S): 80 TABLET, FILM COATED ORAL at 21:34

## 2023-01-01 RX ADMIN — OXYCODONE HYDROCHLORIDE 5 MILLIGRAM(S): 5 TABLET ORAL at 15:42

## 2023-01-01 RX ADMIN — PROPOFOL 18 MICROGRAM(S)/KG/MIN: 10 INJECTION, EMULSION INTRAVENOUS at 19:16

## 2023-01-01 RX ADMIN — Medication 30 MILLIGRAM(S): at 17:39

## 2023-01-01 RX ADMIN — Medication 81 MILLIGRAM(S): at 12:43

## 2023-01-01 RX ADMIN — Medication 50 MILLILITER(S): at 13:56

## 2023-01-01 RX ADMIN — Medication 20 MILLIEQUIVALENT(S): at 10:23

## 2023-01-01 RX ADMIN — Medication 50 MILLILITER(S): at 20:04

## 2023-01-01 RX ADMIN — Medication 50 MILLILITER(S): at 11:45

## 2023-01-01 RX ADMIN — HYDROMORPHONE HYDROCHLORIDE 0.2 MILLIGRAM(S): 2 INJECTION INTRAMUSCULAR; INTRAVENOUS; SUBCUTANEOUS at 22:35

## 2023-01-01 RX ADMIN — PANTOPRAZOLE SODIUM 40 MILLIGRAM(S): 20 TABLET, DELAYED RELEASE ORAL at 06:36

## 2023-01-01 RX ADMIN — Medication 5 MILLIGRAM(S): at 10:11

## 2023-01-01 RX ADMIN — HEPARIN SODIUM 5000 UNIT(S): 5000 INJECTION INTRAVENOUS; SUBCUTANEOUS at 17:03

## 2023-01-01 RX ADMIN — HEPARIN SODIUM 0 UNIT(S)/HR: 5000 INJECTION INTRAVENOUS; SUBCUTANEOUS at 01:08

## 2023-01-01 RX ADMIN — PIPERACILLIN AND TAZOBACTAM 25 GRAM(S): 4; .5 INJECTION, POWDER, LYOPHILIZED, FOR SOLUTION INTRAVENOUS at 13:13

## 2023-01-01 RX ADMIN — PANTOPRAZOLE SODIUM 40 MILLIGRAM(S): 20 TABLET, DELAYED RELEASE ORAL at 05:53

## 2023-01-01 RX ADMIN — Medication 3 MILLIGRAM(S): at 21:30

## 2023-01-01 RX ADMIN — OXYCODONE HYDROCHLORIDE 5 MILLIGRAM(S): 5 TABLET ORAL at 13:55

## 2023-01-01 RX ADMIN — PANTOPRAZOLE SODIUM 40 MILLIGRAM(S): 20 TABLET, DELAYED RELEASE ORAL at 06:24

## 2023-01-01 RX ADMIN — ONDANSETRON 4 MILLIGRAM(S): 8 TABLET, FILM COATED ORAL at 16:35

## 2023-01-01 RX ADMIN — Medication 20 MILLIEQUIVALENT(S): at 12:19

## 2023-01-01 RX ADMIN — HEPARIN SODIUM 0 UNIT(S)/HR: 5000 INJECTION INTRAVENOUS; SUBCUTANEOUS at 08:19

## 2023-01-01 RX ADMIN — OXYCODONE HYDROCHLORIDE 5 MILLIGRAM(S): 5 TABLET ORAL at 06:21

## 2023-01-01 RX ADMIN — OXYCODONE HYDROCHLORIDE 5 MILLIGRAM(S): 5 TABLET ORAL at 10:50

## 2023-01-01 RX ADMIN — Medication 1 TABLET(S): at 11:14

## 2023-01-01 RX ADMIN — Medication 30 MILLIGRAM(S): at 11:13

## 2023-01-01 RX ADMIN — PIPERACILLIN AND TAZOBACTAM 25 GRAM(S): 4; .5 INJECTION, POWDER, LYOPHILIZED, FOR SOLUTION INTRAVENOUS at 14:08

## 2023-01-01 RX ADMIN — OXYCODONE HYDROCHLORIDE 5 MILLIGRAM(S): 5 TABLET ORAL at 08:46

## 2023-01-01 RX ADMIN — Medication 20 MILLIGRAM(S): at 05:22

## 2023-01-01 RX ADMIN — CHLORHEXIDINE GLUCONATE 15 MILLILITER(S): 213 SOLUTION TOPICAL at 18:06

## 2023-01-01 RX ADMIN — Medication 5.63 MICROGRAM(S)/KG/MIN: at 15:18

## 2023-01-01 RX ADMIN — Medication 2000 UNIT(S): at 12:53

## 2023-01-01 RX ADMIN — Medication 40 MILLIGRAM(S): at 10:15

## 2023-01-01 RX ADMIN — Medication 0.25 MILLIGRAM(S): at 06:14

## 2023-01-01 RX ADMIN — Medication 25 GRAM(S): at 10:50

## 2023-01-01 RX ADMIN — Medication 2000 UNIT(S): at 12:51

## 2023-01-01 RX ADMIN — Medication 25 GRAM(S): at 09:23

## 2023-01-01 RX ADMIN — Medication 25 GRAM(S): at 12:20

## 2023-01-01 RX ADMIN — POLYETHYLENE GLYCOL 3350 17 GRAM(S): 17 POWDER, FOR SOLUTION ORAL at 12:19

## 2023-01-01 RX ADMIN — MUPIROCIN 1 APPLICATION(S): 20 OINTMENT TOPICAL at 08:28

## 2023-01-01 RX ADMIN — Medication 3 MILLIGRAM(S): at 22:05

## 2023-01-01 RX ADMIN — Medication 60 MILLIGRAM(S): at 23:05

## 2023-01-01 RX ADMIN — LORATADINE 10 MILLIGRAM(S): 10 TABLET ORAL at 12:51

## 2023-01-01 RX ADMIN — Medication 1 APPLICATION(S): at 18:12

## 2023-01-01 RX ADMIN — APIXABAN 2.5 MILLIGRAM(S): 2.5 TABLET, FILM COATED ORAL at 06:17

## 2023-01-01 RX ADMIN — MUPIROCIN 1 APPLICATION(S): 20 OINTMENT TOPICAL at 09:10

## 2023-01-01 RX ADMIN — Medication 100 GRAM(S): at 01:47

## 2023-01-01 RX ADMIN — PREGABALIN 1000 MICROGRAM(S): 225 CAPSULE ORAL at 12:52

## 2023-01-01 RX ADMIN — PIPERACILLIN AND TAZOBACTAM 25 GRAM(S): 4; .5 INJECTION, POWDER, LYOPHILIZED, FOR SOLUTION INTRAVENOUS at 05:29

## 2023-01-01 RX ADMIN — VASOPRESSIN 6 UNIT(S)/MIN: 20 INJECTION INTRAVENOUS at 07:21

## 2023-01-01 RX ADMIN — PIPERACILLIN AND TAZOBACTAM 25 GRAM(S): 4; .5 INJECTION, POWDER, LYOPHILIZED, FOR SOLUTION INTRAVENOUS at 22:04

## 2023-01-01 RX ADMIN — PIPERACILLIN AND TAZOBACTAM 25 GRAM(S): 4; .5 INJECTION, POWDER, LYOPHILIZED, FOR SOLUTION INTRAVENOUS at 05:04

## 2023-01-01 RX ADMIN — Medication 0.25 MILLIGRAM(S): at 13:15

## 2023-01-01 RX ADMIN — PANTOPRAZOLE SODIUM 40 MILLIGRAM(S): 20 TABLET, DELAYED RELEASE ORAL at 06:17

## 2023-01-01 RX ADMIN — Medication 50 MILLILITER(S): at 10:06

## 2023-01-01 RX ADMIN — HEPARIN SODIUM 700 UNIT(S)/HR: 5000 INJECTION INTRAVENOUS; SUBCUTANEOUS at 19:40

## 2023-01-01 RX ADMIN — Medication 3 MILLIGRAM(S): at 23:23

## 2023-01-01 RX ADMIN — PANTOPRAZOLE SODIUM 40 MILLIGRAM(S): 20 TABLET, DELAYED RELEASE ORAL at 18:06

## 2023-01-01 RX ADMIN — Medication 40 MILLIGRAM(S): at 11:12

## 2023-01-01 RX ADMIN — PROPOFOL 18 MICROGRAM(S)/KG/MIN: 10 INJECTION, EMULSION INTRAVENOUS at 15:18

## 2023-01-01 RX ADMIN — PANTOPRAZOLE SODIUM 40 MILLIGRAM(S): 20 TABLET, DELAYED RELEASE ORAL at 05:33

## 2023-01-01 RX ADMIN — Medication 50 MILLILITER(S): at 17:08

## 2023-01-01 RX ADMIN — Medication 0.25 MILLIGRAM(S): at 05:34

## 2023-01-01 RX ADMIN — Medication 81 MILLIGRAM(S): at 11:20

## 2023-01-01 RX ADMIN — OXYCODONE HYDROCHLORIDE 5 MILLIGRAM(S): 5 TABLET ORAL at 15:40

## 2023-01-01 RX ADMIN — Medication 1 ENEMA: at 17:18

## 2023-01-01 RX ADMIN — Medication 81 MILLIGRAM(S): at 11:14

## 2023-01-01 RX ADMIN — CHLORHEXIDINE GLUCONATE 1 APPLICATION(S): 213 SOLUTION TOPICAL at 12:46

## 2023-01-01 RX ADMIN — Medication 250 MILLIGRAM(S): at 18:21

## 2023-01-01 RX ADMIN — OXYCODONE HYDROCHLORIDE 5 MILLIGRAM(S): 5 TABLET ORAL at 01:21

## 2023-01-01 RX ADMIN — Medication 40 MILLIEQUIVALENT(S): at 09:26

## 2023-01-01 RX ADMIN — Medication 2000 UNIT(S): at 09:47

## 2023-01-01 RX ADMIN — Medication 50 MILLILITER(S): at 13:58

## 2023-01-01 RX ADMIN — Medication 1 TABLET(S): at 11:41

## 2023-01-01 RX ADMIN — OXYCODONE HYDROCHLORIDE 5 MILLIGRAM(S): 5 TABLET ORAL at 23:15

## 2023-01-01 RX ADMIN — Medication 2.81 MICROGRAM(S)/KG/MIN: at 19:16

## 2023-01-01 RX ADMIN — Medication 2.81 MICROGRAM(S)/KG/MIN: at 17:28

## 2023-01-01 RX ADMIN — Medication 5 MILLIGRAM(S): at 21:20

## 2023-01-01 RX ADMIN — PIPERACILLIN AND TAZOBACTAM 25 GRAM(S): 4; .5 INJECTION, POWDER, LYOPHILIZED, FOR SOLUTION INTRAVENOUS at 21:25

## 2023-01-01 RX ADMIN — Medication 75 MICROGRAM(S): at 05:48

## 2023-01-01 RX ADMIN — Medication 3 MILLIGRAM(S): at 21:05

## 2023-01-01 RX ADMIN — HYDROMORPHONE HYDROCHLORIDE 0.2 MILLIGRAM(S): 2 INJECTION INTRAMUSCULAR; INTRAVENOUS; SUBCUTANEOUS at 23:53

## 2023-01-01 RX ADMIN — Medication 56 MICROGRAM(S): at 22:26

## 2023-01-01 RX ADMIN — ATORVASTATIN CALCIUM 80 MILLIGRAM(S): 80 TABLET, FILM COATED ORAL at 22:07

## 2023-01-01 RX ADMIN — PREGABALIN 1000 MICROGRAM(S): 225 CAPSULE ORAL at 12:23

## 2023-01-01 RX ADMIN — VASOPRESSIN 6 UNIT(S)/MIN: 20 INJECTION INTRAVENOUS at 08:04

## 2023-01-01 RX ADMIN — OXYCODONE HYDROCHLORIDE 5 MILLIGRAM(S): 5 TABLET ORAL at 21:09

## 2023-01-01 RX ADMIN — MUPIROCIN 1 APPLICATION(S): 20 OINTMENT TOPICAL at 08:13

## 2023-01-01 RX ADMIN — Medication 30 MILLIGRAM(S): at 16:22

## 2023-01-01 RX ADMIN — Medication 40 MILLIEQUIVALENT(S): at 08:45

## 2023-01-01 RX ADMIN — SODIUM CHLORIDE 50 MILLILITER(S): 9 INJECTION INTRAMUSCULAR; INTRAVENOUS; SUBCUTANEOUS at 09:14

## 2023-01-01 RX ADMIN — Medication 30 MILLIGRAM(S): at 06:14

## 2023-01-01 RX ADMIN — Medication 75 MICROGRAM(S): at 05:20

## 2023-01-01 RX ADMIN — PANTOPRAZOLE SODIUM 40 MILLIGRAM(S): 20 TABLET, DELAYED RELEASE ORAL at 05:19

## 2023-01-01 RX ADMIN — Medication 3 MILLIGRAM(S): at 21:28

## 2023-01-01 RX ADMIN — Medication 1 TABLET(S): at 12:24

## 2023-01-01 RX ADMIN — HEPARIN SODIUM 700 UNIT(S)/HR: 5000 INJECTION INTRAVENOUS; SUBCUTANEOUS at 07:19

## 2023-01-01 RX ADMIN — OXYCODONE HYDROCHLORIDE 5 MILLIGRAM(S): 5 TABLET ORAL at 23:53

## 2023-01-01 RX ADMIN — Medication 30 MILLIGRAM(S): at 21:44

## 2023-01-01 RX ADMIN — PANTOPRAZOLE SODIUM 40 MILLIGRAM(S): 20 TABLET, DELAYED RELEASE ORAL at 18:14

## 2023-01-01 RX ADMIN — ATENOLOL 50 MILLIGRAM(S): 25 TABLET ORAL at 08:34

## 2023-01-01 RX ADMIN — ATENOLOL 50 MILLIGRAM(S): 25 TABLET ORAL at 18:09

## 2023-01-01 RX ADMIN — MIDAZOLAM HYDROCHLORIDE 1.2 MG/KG/HR: 1 INJECTION, SOLUTION INTRAMUSCULAR; INTRAVENOUS at 19:27

## 2023-01-01 RX ADMIN — Medication 5.63 MICROGRAM(S)/KG/MIN: at 19:28

## 2023-01-01 RX ADMIN — Medication 81 MILLIGRAM(S): at 09:48

## 2023-01-01 RX ADMIN — ATENOLOL 50 MILLIGRAM(S): 25 TABLET ORAL at 17:20

## 2023-01-01 RX ADMIN — PIPERACILLIN AND TAZOBACTAM 200 GRAM(S): 4; .5 INJECTION, POWDER, LYOPHILIZED, FOR SOLUTION INTRAVENOUS at 11:39

## 2023-01-01 RX ADMIN — Medication 10 MG/HR: at 21:33

## 2023-01-01 RX ADMIN — PANTOPRAZOLE SODIUM 40 MILLIGRAM(S): 20 TABLET, DELAYED RELEASE ORAL at 05:46

## 2023-01-01 RX ADMIN — Medication 5.63 MICROGRAM(S)/KG/MIN: at 08:12

## 2023-01-01 RX ADMIN — LORATADINE 10 MILLIGRAM(S): 10 TABLET ORAL at 12:08

## 2023-01-01 RX ADMIN — Medication 50 MILLILITER(S): at 12:52

## 2023-01-01 RX ADMIN — Medication 20 MILLIGRAM(S): at 05:53

## 2023-01-01 RX ADMIN — Medication 25 GRAM(S): at 10:15

## 2023-01-01 RX ADMIN — Medication 30 MILLIGRAM(S): at 19:46

## 2023-01-01 RX ADMIN — HEPARIN SODIUM 900 UNIT(S)/HR: 5000 INJECTION INTRAVENOUS; SUBCUTANEOUS at 02:27

## 2023-01-01 RX ADMIN — Medication 30 MILLIGRAM(S): at 04:05

## 2023-01-01 RX ADMIN — LOSARTAN POTASSIUM 100 MILLIGRAM(S): 100 TABLET, FILM COATED ORAL at 13:29

## 2023-01-01 RX ADMIN — PIPERACILLIN AND TAZOBACTAM 25 GRAM(S): 4; .5 INJECTION, POWDER, LYOPHILIZED, FOR SOLUTION INTRAVENOUS at 05:43

## 2023-01-01 RX ADMIN — Medication 5 MILLIGRAM(S): at 15:11

## 2023-01-01 RX ADMIN — Medication 60 MILLIGRAM(S): at 17:44

## 2023-01-01 RX ADMIN — PIPERACILLIN AND TAZOBACTAM 25 GRAM(S): 4; .5 INJECTION, POWDER, LYOPHILIZED, FOR SOLUTION INTRAVENOUS at 06:08

## 2023-01-01 RX ADMIN — Medication 4000 MILLILITER(S): at 11:43

## 2023-01-01 RX ADMIN — APIXABAN 2.5 MILLIGRAM(S): 2.5 TABLET, FILM COATED ORAL at 18:20

## 2023-01-01 RX ADMIN — Medication 50 MILLILITER(S): at 23:37

## 2023-01-01 RX ADMIN — SENNA PLUS 2 TABLET(S): 8.6 TABLET ORAL at 23:22

## 2023-01-01 RX ADMIN — ATORVASTATIN CALCIUM 80 MILLIGRAM(S): 80 TABLET, FILM COATED ORAL at 21:14

## 2023-01-01 RX ADMIN — ATORVASTATIN CALCIUM 80 MILLIGRAM(S): 80 TABLET, FILM COATED ORAL at 22:32

## 2023-01-01 RX ADMIN — OXYCODONE HYDROCHLORIDE 5 MILLIGRAM(S): 5 TABLET ORAL at 13:51

## 2023-01-01 RX ADMIN — PIPERACILLIN AND TAZOBACTAM 25 GRAM(S): 4; .5 INJECTION, POWDER, LYOPHILIZED, FOR SOLUTION INTRAVENOUS at 06:09

## 2023-01-01 RX ADMIN — Medication 3 MILLIGRAM(S): at 00:10

## 2023-01-01 RX ADMIN — PANTOPRAZOLE SODIUM 40 MILLIGRAM(S): 20 TABLET, DELAYED RELEASE ORAL at 06:14

## 2023-01-01 RX ADMIN — Medication 100 MILLIEQUIVALENT(S): at 11:43

## 2023-01-01 RX ADMIN — PREGABALIN 1000 MICROGRAM(S): 225 CAPSULE ORAL at 12:30

## 2023-01-01 RX ADMIN — HYDROMORPHONE HYDROCHLORIDE 0.2 MILLIGRAM(S): 2 INJECTION INTRAMUSCULAR; INTRAVENOUS; SUBCUTANEOUS at 08:40

## 2023-01-01 RX ADMIN — HYDROMORPHONE HYDROCHLORIDE 0.2 MILLIGRAM(S): 2 INJECTION INTRAMUSCULAR; INTRAVENOUS; SUBCUTANEOUS at 23:38

## 2023-01-01 RX ADMIN — Medication 100 MILLIEQUIVALENT(S): at 13:47

## 2023-01-01 RX ADMIN — PIPERACILLIN AND TAZOBACTAM 25 GRAM(S): 4; .5 INJECTION, POWDER, LYOPHILIZED, FOR SOLUTION INTRAVENOUS at 05:07

## 2023-01-01 RX ADMIN — OXYCODONE HYDROCHLORIDE 5 MILLIGRAM(S): 5 TABLET ORAL at 13:05

## 2023-01-01 RX ADMIN — Medication 30 MILLIGRAM(S): at 12:07

## 2023-01-01 RX ADMIN — PIPERACILLIN AND TAZOBACTAM 25 GRAM(S): 4; .5 INJECTION, POWDER, LYOPHILIZED, FOR SOLUTION INTRAVENOUS at 12:33

## 2023-01-01 RX ADMIN — Medication 1 TABLET(S): at 12:50

## 2023-01-01 RX ADMIN — OXYCODONE HYDROCHLORIDE 5 MILLIGRAM(S): 5 TABLET ORAL at 14:25

## 2023-01-01 RX ADMIN — Medication 25 GRAM(S): at 05:53

## 2023-01-01 RX ADMIN — PREGABALIN 1000 MICROGRAM(S): 225 CAPSULE ORAL at 09:47

## 2023-01-01 RX ADMIN — Medication 100 GRAM(S): at 17:23

## 2023-01-01 RX ADMIN — MIDAZOLAM HYDROCHLORIDE 1.2 MG/KG/HR: 1 INJECTION, SOLUTION INTRAMUSCULAR; INTRAVENOUS at 19:16

## 2023-01-01 RX ADMIN — Medication 2000 UNIT(S): at 14:05

## 2023-01-01 RX ADMIN — ATORVASTATIN CALCIUM 80 MILLIGRAM(S): 80 TABLET, FILM COATED ORAL at 21:13

## 2023-01-01 RX ADMIN — Medication 1 PACKET(S): at 05:23

## 2023-01-01 RX ADMIN — AMIODARONE HYDROCHLORIDE 618 MILLIGRAM(S): 400 TABLET ORAL at 09:00

## 2023-01-01 RX ADMIN — PIPERACILLIN AND TAZOBACTAM 25 GRAM(S): 4; .5 INJECTION, POWDER, LYOPHILIZED, FOR SOLUTION INTRAVENOUS at 13:51

## 2023-01-01 RX ADMIN — PANTOPRAZOLE SODIUM 40 MILLIGRAM(S): 20 TABLET, DELAYED RELEASE ORAL at 17:20

## 2023-01-01 RX ADMIN — MIDAZOLAM HYDROCHLORIDE 1.2 MG/KG/HR: 1 INJECTION, SOLUTION INTRAMUSCULAR; INTRAVENOUS at 08:12

## 2023-01-01 RX ADMIN — OXYCODONE HYDROCHLORIDE 5 MILLIGRAM(S): 5 TABLET ORAL at 06:51

## 2023-01-01 RX ADMIN — VASOPRESSIN 6 UNIT(S)/MIN: 20 INJECTION INTRAVENOUS at 09:57

## 2023-01-01 RX ADMIN — Medication 2.81 MICROGRAM(S)/KG/MIN: at 07:20

## 2023-01-01 RX ADMIN — Medication 2000 UNIT(S): at 12:23

## 2023-01-01 RX ADMIN — Medication 250 MILLIGRAM(S): at 22:17

## 2023-01-01 RX ADMIN — LORATADINE 10 MILLIGRAM(S): 10 TABLET ORAL at 12:07

## 2023-01-01 RX ADMIN — POLYETHYLENE GLYCOL 3350 17 GRAM(S): 17 POWDER, FOR SOLUTION ORAL at 18:06

## 2023-01-01 RX ADMIN — Medication 50 MILLILITER(S): at 15:35

## 2023-01-01 RX ADMIN — Medication 30 MILLIGRAM(S): at 23:49

## 2023-01-01 RX ADMIN — Medication 81 MILLIGRAM(S): at 14:09

## 2023-01-01 RX ADMIN — Medication 75 MICROGRAM(S): at 05:05

## 2023-01-01 RX ADMIN — PROPOFOL 18 MICROGRAM(S)/KG/MIN: 10 INJECTION, EMULSION INTRAVENOUS at 08:12

## 2023-01-01 RX ADMIN — ATORVASTATIN CALCIUM 80 MILLIGRAM(S): 80 TABLET, FILM COATED ORAL at 22:28

## 2023-01-01 RX ADMIN — HEPARIN SODIUM 500 UNIT(S)/HR: 5000 INJECTION INTRAVENOUS; SUBCUTANEOUS at 19:33

## 2023-01-01 RX ADMIN — PIPERACILLIN AND TAZOBACTAM 25 GRAM(S): 4; .5 INJECTION, POWDER, LYOPHILIZED, FOR SOLUTION INTRAVENOUS at 14:19

## 2023-01-01 RX ADMIN — ATENOLOL 50 MILLIGRAM(S): 25 TABLET ORAL at 18:47

## 2023-01-01 RX ADMIN — Medication 1 TABLET(S): at 13:15

## 2023-03-31 NOTE — PROGRESS NOTE ADULT - SUBJECTIVE AND OBJECTIVE BOX
PROVIDER:[TOKEN:[[id number]],FOLLOWUP:[2 weeks]]     SUBJECTIVE / OVERNIGHT EVENTS:pt seen and examined  22     MEDICATIONS  (STANDING):  aspirin enteric coated 81 milliGRAM(s) Oral daily  ATENolol  Tablet 50 milliGRAM(s) Oral every 12 hours  atorvastatin 80 milliGRAM(s) Oral at bedtime  cyanocobalamin 1000 MICROGram(s) Oral daily  enoxaparin Injectable 90 milliGRAM(s) SubCutaneous every 24 hours  famotidine    Tablet 40 milliGRAM(s) Oral daily  levothyroxine 75 MICROGram(s) Oral daily  lidocaine   4% Patch 1 Patch Transdermal every 24 hours  melatonin 3 milliGRAM(s) Oral at bedtime  multivitamin 1 Tablet(s) Oral daily  senna 2 Tablet(s) Oral at bedtime    MEDICATIONS  (PRN):  acetaminophen     Tablet .. 650 milliGRAM(s) Oral every 6 hours PRN Temp greater or equal to 38C (100.4F), Mild Pain (1 - 3), Moderate Pain (4 - 6)  oxycodone    5 mG/acetaminophen 325 mG 1 Tablet(s) Oral every 6 hours PRN Severe Pain (7 - 10)  polyethylene glycol 3350 17 Gram(s) Oral daily PRN Constipation    Vital Signs Last 24 Hrs  T(C): 36.6 (22 @ 21:05), Max: 36.7 (22 @ 09:35)  T(F): 97.9 (22 @ 21:05), Max: 98 (22 @ 09:35)  HR: 98 (22 @ 21:05) (70 - 98)  BP: 131/69 (22 @ 21:05) (108/51 - 131/69)  BP(mean): --  RR: 18 (22 @ 21:05) (18 - 19)  SpO2: 96% (22 @ 21:05) (96% - 100%)        Constitutional: No fever, fatigue  Skin: No rash.  Eyes: No recent vision problems or eye pain.  ENT: No congestion, ear pain, or sore throat.  Cardiovascular: No chest pain or palpation.  Respiratory: No cough, shortness of breath, congestion, or wheezing.  Gastrointestinal: No abdominal pain, nausea, vomiting, or diarrhea.  Genitourinary: No dysuria.  Musculoskeletal: No joint swelling.  Neurologic: No headache.    PHYSICAL EXAM:  GENERAL: NAD  EYES: EOMI, PERRLA  NECK: Supple, No JVD  CHEST/LUNG: dec breath sounds at bases  HEART:  S1 , S2 +  ABDOMEN: soft , bs+  EXTREMITIES:  trace edema  NEUROLOGY:alert awake       LABS:      140  |  102  |  10  ----------------------------<  91  4.3   |  32<H>  |  0.53    Ca    8.5      02 Sep 2022 05:45  Phos  2.6       Mg     1.50         TPro  5.7<L>  /  Alb  3.0<L>  /  TBili  0.3  /  DBili  <0.2  /  AST  22  /  ALT  15  /  AlkPhos  157<H>      Creatinine Trend: 0.53 <--, 0.43 <--, 0.37 <--, 0.43 <--                        9.4    16.41 )-----------( 53       ( 02 Sep 2022 05:45 )             25.9     Urine Studies:  Urinalysis Basic - ( 30 Aug 2022 21:05 )    Color: Yellow / Appearance: Clear / S.029 / pH:   Gluc:  / Ketone: Negative  / Bili: Negative / Urobili: <2 mg/dL   Blood:  / Protein: 30 mg/dL / Nitrite: Negative   Leuk Esterase: Negative / RBC: 2 /HPF / WBC 3 /HPF   Sq Epi:  / Non Sq Epi: 5 /HPF / Bacteria: Few              LIVER FUNCTIONS - ( 02 Sep 2022 05:45 )  Alb: 3.0 g/dL / Pro: 5.7 g/dL / ALK PHOS: 157 U/L / ALT: 15 U/L / AST: 22 U/L / GGT: x           PTT - ( 02 Sep 2022 06:05 )  PTT:88.6 sec      RADIOLOGY & ADDITIONAL TESTS:    Imaging Personally Reviewed:    Consultant(s) Notes Reviewed:      Care Discussed with Consultants/Other Providers:

## 2023-04-22 NOTE — ED PROVIDER NOTE - CLINICAL SUMMARY MEDICAL DECISION MAKING FREE TEXT BOX
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  Vital signs unremarkable, not in clinical hemorrhagic shock   At this point.  Left lower quadrant tenderness implying possible diverticular source of bleeding.  No history of aortic graft placement to suggest aortoenteric fistula.  Consider other causes such as colitis, mucosal erosion in the setting of active chemotherapy.  Plan will be to obtain CTA to assess for high-volume bleed within the lumen, clinically occult.  Rule out critical anemia, rule out DIC or other intravascular hemolytic process.  Assess for bone marrow insufficiency.  Plan will be to obtain CBC, CMP, type and screen, coags, fibrinogen, haptoglobin, LDH, reticulocyte count.  CTA with GI bleed protocol.  Screening chest x-ray and EKG and patient likely requiring admission for urgent fiberoptic exploration. 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  Vital signs unremarkable, not in clinical hemorrhagic shock   At this point.  Left lower quadrant tenderness implying possible diverticular source of bleeding.  No history of aortic graft placement to suggest aortoenteric fistula.  Consider other causes such as colitis, mucosal erosion in the setting of active chemotherapy.  Plan will be to obtain CTA to assess for high-volume bleed within the lumen, clinically occult.  Rule out critical anemia, rule out DIC or other intravascular hemolytic process.  Assess for bone marrow insufficiency.  Plan will be to obtain CBC, CMP, type and screen, coags, fibrinogen, haptoglobin, LDH, reticulocyte count.  CTA with GI bleed protocol.  Screening chest x-ray and EKG and patient likely requiring admission for urgent fiberoptic exploration.    Attending MD Ott. Agree with above.  Pt is an 88 yo fem with pmhx of HLD, hypothyroidism, AVR, CAD, HTN endometrial CA who presents to ED with complaint of rectal bleeding, sxatic with light-headedness, SOB.  Pt alert, oriented x 4.  Planned CTA/labs/poss transfusion and admission.  H/H markedly depressed with symptomatic patient.  Planned transfusion.

## 2023-04-22 NOTE — ED PROVIDER NOTE - NSICDXPASTMEDICALHX_GEN_ALL_CORE_FT
PAST MEDICAL HISTORY:  CAD (coronary artery disease)     [place]ometrial [place] S4 ser[place] [facility] [place]    HLD (hyperlipidemia)     HTN (hypertension)     Hypothyroidism     S/P AVR

## 2023-04-22 NOTE — ED ADULT NURSE NOTE - NSICDXPASTMEDICALHX_GEN_ALL_CORE_FT
PAST MEDICAL HISTORY:  CAD (coronary artery disease)     Endometrial ca S4 serous endometrial carcinoma, [facility] [place], chemotherapy    HLD (hyperlipidemia)     HTN (hypertension)     Hypothyroidism     S/P AVR

## 2023-04-22 NOTE — ED PROVIDER NOTE - PHYSICAL EXAMINATION
Gen: NAD, non-toxic appearing  Head: normal appearing  HEENT: normal conjunctiva  Lung: no respiratory distress, speaking in full sentences, ctab     CV: regular rate and rhythm, no murmurs  Abd: soft, non distended, + LLQ tenderness,     Rectal: Chaperoned by Dr. Lindsay Ott MD attending.  There is dark blood at the anus.  No visible external hemorrhoids.  Digital rectal exam was negative for palpable internal hemorrhoids.  MSK: no visible deformities  Neuro: alert and grossly oriented, no gross motor deficits  Skin: No anthony rashes

## 2023-04-22 NOTE — ED PROVIDER NOTE - PROGRESS NOTE DETAILS
Eliezer Estrada MD (PGY-2): Patient clinically stable, normal vital signs.  Initial hemoglobin is 5.6, consented and put in for 2 packed red blood cell units.  Pending CT pain.  Medicine admission for critical anemia in the setting of active GI bleed.  Patient family updated Eliezer Estrada MD (PGY-2): I received a call from radiology, report of active extra of in the left lower quadrant on CT GI bleed protocol.  Surgery paged at this time.  Patient clinically stable. Eliezer Estrada MD (PGY-2): GI consulted. I spoke with the surgical consultant.  Requesting IR consultation at this time. We will recheck. Eliezer Estrada MD (PGY-2): GI consulted. I spoke with the surgical consultant. Aware of patient. Eliezer Estrada MD (PGY-2): GI consulted. I spoke with the surgical consultant. Aware of patient. We will hold off on Eliquis  reversal given last dose was greater than 15 hours ago.  And she has not had  any bloody bowel movements in the ED at this time. Eliezer Estrada MD (PGY-2): I spoke with surgery at this time.  They will accept the patient to their service.  They requested reversal of anticoagulation, will dose with Kcentra, called pharmacy will dose with 1500 units.  We will repeat CBC and coags after Kcentra and transfusions are done.  IR consulted, I spoke with the fellow, who said to hold the Eliquis and monitor hemodynamic status.  Asked that we repaged if patient becomes hemodynamically unstable.  Will admit to surgical service at this time.

## 2023-04-22 NOTE — ED PROVIDER NOTE - OBJECTIVE STATEMENT
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 she is presenting with a chief complaint of rectal bleeding.  Onset this afternoon.  X1 large dark bloody bowel movement.  Associated lightheadedness and shortness of breath.  No syncope.  No additional bowel movements.  No associated abdominal pain.  No vaginal component to the bleeding.  Anticoagulation as described.  No allergies to meds.  Active chemotherapy.

## 2023-04-22 NOTE — ED PROVIDER NOTE - NS ED ROS FT
GENERAL: no fever  EYES: no eye pain  HEENT: no neck pain  CARDIAC: no chest pain  PULMONARY: no SOB  GI: + rectal bleeding  : no dysuria  SKIN: no rashes  NEURO: no headache  MSK: no new joint pain

## 2023-04-22 NOTE — ED ADULT TRIAGE NOTE - CHIEF COMPLAINT QUOTE
had  medication dose change of ELIQUIS yesterday dose was reduced by MD , jennifer LOPEZ ACW port not accessed since dec 2022    pt noted to have white coloured material in mouth pt states" its my new Fixodent"

## 2023-04-22 NOTE — ED PROVIDER NOTE - ATTENDING CONTRIBUTION TO CARE
Attending MD Ott:  I performed a history and physical exam of the patient and discussed their management with the resident. I reviewed the resident's note and agree with the documented findings and plan of care. My medical decison making and observations are found above.

## 2023-04-23 NOTE — H&P ADULT - NSHPLABSRESULTS_GEN_ALL_CORE
Vital Signs Last 24 Hrs  T(C): 36.7 (23 Apr 2023 01:42), Max: 37.1 (22 Apr 2023 19:21)  T(F): 98.1 (23 Apr 2023 01:42), Max: 98.8 (22 Apr 2023 21:47)  HR: 90 (23 Apr 2023 01:42) (88 - 102)  BP: 109/54 (23 Apr 2023 01:42) (109/54 - 136/58)  BP(mean): --  RR: 18 (23 Apr 2023 01:42) (17 - 22)  SpO2: 100% (23 Apr 2023 01:42) (99% - 100%)    Parameters below as of 23 Apr 2023 01:42  Patient On (Oxygen Delivery Method): room air      LABS:                        5.4    7.71  )-----------( 170      ( 22 Apr 2023 20:34 )             16.6     04-22    139  |  104  |  35<H>  ----------------------------<  129<H>  3.7   |  24  |  1.48<H>    Ca    9.0      22 Apr 2023 20:34  Mg     1.40     04-22    TPro  7.1  /  Alb  3.4  /  TBili  0.2  /  DBili  x   /  AST  192<H>  /  ALT  243<H>  /  AlkPhos  96  04-22    PT/INR - ( 22 Apr 2023 20:34 )   PT: 31.3 sec;   INR: 2.67 ratio         PTT - ( 22 Apr 2023 20:34 )  PTT:33.1 sec    CAPILLARY BLOOD GLUCOSE        LIVER FUNCTIONS - ( 22 Apr 2023 20:34 )  Alb: 3.4 g/dL / Pro: 7.1 g/dL / ALK PHOS: 96 U/L / ALT: 243 U/L / AST: 192 U/L / GGT: x           RADIOLOGY & ADDITIONAL STUDIES:  < from: CT Angio Abdomen and Pelvis w/ IV Cont (04.23.23 @ 01:11) >    ACC: [id number] EXAM:  CT ANGIO ABD PELV (W)AW IC   ORDERED BY: PRABHAKAR JOSEPH     PROCEDURE DATE:  04/23/2023      < end of copied text >    < from: CT Angio Abdomen and Pelvis w/ IV Cont (04.23.23 @ 01:11) >    IMPRESSION:  Findings compatible with acute GI bleeding of mid small bowel loop within   the left lower abdomen.    Right groin, pelvic sidewall, retroperitoneal and epiphrenic   lymphadenopathy as above. 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    Bilateral pleural effusions, right greater than left. There is question   of loculation on the right.    Multiple subcentimeter hypodense lesions scattered throughout the liver.   These are too small to characterize. Further correlation with MRI can be   obtained as clinically indicated. Additional findings as above.    Nonspecific presacral edema.    Additional findings as above.    Findings were discussed with Dr. PRABHAKAR JOSEPH [phone] 4/23/2023   1:42 AM by Dr. Willis with read back confirmation.    < end of copied text >

## 2023-04-23 NOTE — CONSULT NOTE ADULT - SUBJECTIVE AND OBJECTIVE BOX
GENERAL SURGERY CONSULT NOTE  --------------------------------------------------------------------------------------------    HPI:   Patient is a 87y old  Female who presents with a chief complaint of GI bleed (23 Apr 2023 13:02)    HPI:  87F with PMHx HTN, CAD (s/p PCI 2021), AV replacement, hypothyroidism, and HLD, S4 serous endometrial carcinoma ([facility] [place], chest wall port, last chemo 12/2022, s/p 2/2023 resection ?hyst/r leonardo), PE in 9/2021. Pt was on eliquis 5mg BID, which was decreased to 2.5mg BID 1 week ago. Pt reports a few days ago she had a sudden urge to defecate. The large volume of stool was thin, dark, and malodorous. Pt reports about 4 more episodes. Pt became lightheaded with SOB. She called [facility] [place] and they advised her to go the [facility]. Pt denies nausea, vomiting, CP, SOB, distension, dysuria. H&H 5.4 / 16.6. INR 2.7. HDS.   (23 Apr 2023 03:13)    Patient reports prior colonoscopies and partial colon resection with Dr. Del Cid for polyps.    ROS: 10-system review is otherwise negative except HPI above.      PAST MEDICAL & SURGICAL HISTORY:  Endometrial ca  S4 serous endometrial carcinoma, [facility] [place], chemotherapy      HTN (hypertension)      CAD (coronary artery disease)      S/P AVR      Hypothyroidism      HLD (hyperlipidemia)      S/P AVR (aortic valve replacement)      History of endometrial biopsy        FAMILY HISTORY:  Family history of parotid cancer (Child)    [] Family history not pertinent as reviewed with the patient and family    SOCIAL HISTORY:    Daughter helps with ADLs    ALLERGIES: No Known Allergies      HOME MEDICATIONS:   acetaminophen 325 mg oral tablet: 2 tab(s) orally every 6 hours, As needed, Temp greater or equal to 38C (100.4F), Mild Pain (1 - 3), Moderate Pain (4 - 6) (23 Apr 2023 13:59)  alendronate 70 mg oral tablet: 1 tab(s) orally once a week (23 Apr 2023 13:59)  aspirin 81 mg oral delayed release tablet: 1 tab(s) orally once a day (23 Apr 2023 13:59)  atenolol 50 mg oral tablet: 1 tab(s) orally 2 times a day (23 Apr 2023 13:59)  cetirizine 10 mg oral tablet: 1 orally once a day (23 Apr 2023 13:59)  Eliquis 5 mg oral tablet: 1 orally 2 times a day (23 Apr 2023 13:59)  ezetimibe 10 mg oral tablet: 1 tab(s) orally once a day (23 Apr 2023 13:59)  famotidine 40 mg oral tablet: 1 tab(s) orally once a day (at bedtime) (23 Apr 2023 13:59)  furosemide 20 mg oral tablet: 1 tab(s) orally once a day (23 Apr 2023 13:59)  levothyroxine 75 mcg (0.075 mg) oral tablet: 1 tab(s) orally once a day (23 Apr 2023 13:59)  losartan 100 mg oral tablet: 1 orally once a day (23 Apr 2023 13:59)  miSOPROStol 200 mcg oral tablet: 1 orally once a day (23 Apr 2023 13:59)  One A Day Women&#x27;s Complete oral tablet: 1 tab(s) orally once a day (23 Apr 2023 13:59)  oxycodone-acetaminophen 5 mg-325 mg oral tablet: 1 tab(s) orally 2 times a day (23 Apr 2023 13:59)  polyethylene glycol 3350 oral powder for reconstitution: 17 gram(s) orally once a day, As needed, Constipation (23 Apr 2023 13:59)  rosuvastatin 20 mg oral tablet: 1 tab(s) orally once a day (23 Apr 2023 13:59)  senna leaf extract oral tablet: 2 tab(s) orally once a day (at bedtime) (23 Apr 2023 13:59)  Ventolin HFA 90 mcg/inh inhalation aerosol: 2 puff(s) inhaled every 6 hours, As Needed (23 Apr 2023 13:59)  Vitamin B12: 5000 microgram(s) sublingual once a day (23 Apr 2023 13:59)  Vitamin D3 50 mcg (2000 intl units) oral tablet: 1 tab(s) orally once a day (23 Apr 2023 13:59)      CURRENT MEDICATIONS  MEDICATIONS (STANDING): lactated ringers. 1000 milliLiter(s) IV Continuous <Continuous>  pantoprazole  Injectable 40 milliGRAM(s) IV Push two times a day    MEDICATIONS (PRN):  --------------------------------------------------------------------------------------------    Vitals:   T(C): 36.7 (04-23-23 @ 18:15), Max: 37.1 (04-22-23 @ 21:47)  HR: 79 (04-23-23 @ 18:15) (74 - 93)  BP: 141/70 (04-23-23 @ 18:15) (109/54 - 147/65)  RR: 18 (04-23-23 @ 18:15) (17 - 22)  SpO2: 100% (04-23-23 @ 18:15) (99% - 100%)  CAPILLARY BLOOD GLUCOSE          04-23 @ 07:01  -  04-23 @ 19:56  --------------------------------------------------------  IN:    IV PiggyBack: 700 mL    Lactated Ringers: 500 mL    PRBCs (Packed Red Blood Cells): 300 mL  Total IN: 1500 mL    OUT:    Oral Fluid: 0 mL    Voided (mL): 2 mL  Total OUT: 2 mL    Total NET: 1498 mL        Height (cm): 144.8 (04-23 @ 13:50)  Weight (kg): 60.4 (04-23 @ 13:50)  BMI (kg/m2): 28.8 (04-23 @ 13:50)  BSA (m2): 1.51 (04-23 @ 13:50)    PHYSICAL EXAM: ***  General: NAD, Lying in bed comfortably  Neuro: A+Ox3  HEENT: NC/AT, EOMI  Neck: Soft, supple  Cardio: RRR, nml S1/S2  Resp: Good effort, CTA b/l  Thorax: No chest wall tenderness  Breast: No lesions/masses, no drainage  GI/Abd: Soft, NT/ND, no rebound/guarding, no masses palpated  Vascular: All 4 extremities warm.  Skin: Intact, no breakdown  Lymphatic/Nodes: No palpable lymphadenopathy  Musculoskeletal: All 4 extremities moving spontaneously, no limitations  --------------------------------------------------------------------------------------------    LABS  CBC (04-23 @ 19:00)                              9.2<L>                         8.74    )----------------(  118<L>     --    % Neutrophils, --    % Lymphocytes, ANC: --                                  27.3<L>  CBC (04-23 @ 05:15)                              7.7<L>                         8.48    )----------------(  135<L>     --    % Neutrophils, --    % Lymphocytes, ANC: --                                  22.8<L>    BMP (04-23 @ 08:48)             139     |  103     |  30<H> 		Ca++ --      Ca 8.3<L>             ---------------------------------( 91    		Mg 1.90               3.5     |  27      |  1.29  			Ph 2.4<L>  BMP (04-22 @ 20:34)             139     |  104     |  35<H> 		Ca++ --      Ca 9.0                ---------------------------------( 129<H>		Mg 1.40<L>             3.7     |  24      |  1.48<H>			Ph --        LFTs (04-22 @ 20:34)      TPro 7.1 / Alb 3.4 / TBili 0.2 / DBili -- / <H> / <H> / AlkPhos 96    Coags (04-23 @ 08:48)  aPTT 30.6 / INR 1.50<H> / PT 17.5<H>  Coags (04-23 @ 03:29)  aPTT 32.4 / INR 2.22<H> / PT 26.0<H>        VBG (04-22 @ 20:34)     7.40 / 40 / 26 / 25 / 0.0 / 36.0<L>%     Lactate: 3.1<H>    --------------------------------------------------------------------------------------------    MICROBIOLOGY      --------------------------------------------------------------------------------------------    IMAGING    < from: CT Angio Abdomen and Pelvis w/ IV Cont (04.23.23 @ 01:11) >  PROCEDURE:  CT of the Abdomen and Pelvis was performed.  Precontrast, Arterial and Delayed phases were performed.  Sagittal and coronal reformats were performed.    FINDINGS:  LOWER CHEST: There is a small left pleural effusion and moderate-sized   right pleural effusion. The right-sided pleural effusion may be   loculated. The heart size is borderline. There is mild pericardial   thickening. Passive atelectatic changes are appreciated. 8mm prominent   right epicardial phrenic lymphnode is increased in size compared to   previous exam.    LIVER: Subcentimeter hypodense lesions are seen scattered throughout the   liver, too small to accurately characterize..  BILE DUCTS: Normal caliber.  GALLBLADDER: Cholelithiasis.  SPLEEN: Within normal limits.  PANCREAS: Within normal limits.  ADRENALS: Left adrenal gland thickening similar compared to previous   exam. This may be related to hyperplasia, though is nonspecific.  KIDNEYS/URETERS: Kidneys enhance symmetrically. There is no urinary tract   obstruction. There are no urinary tract stones.    BLADDER: Within normal limits.  REPRODUCTIVE ORGANS:  Hysterectomy.    BOWEL: Right hemicolectomy changes are appreciated. There is colonic   diverticulosis. There is no focal diverticulitis. There is progressive   pooling of intraluminal contrast within small bowel loop of left lower   abdomen, compatible with acute GI bleeding. There is a small hiatal   hernia. There is no bowel obstruction.  PERITONEUM: No large volume ascites orfree air..  VESSELS: Within normal limits.  RETROPERITONEUM/LYMPH NODES: Right iliac chain lymph node measures up to   1.3 cm measured dimension. Additional smaller right pelvic sidewall lymph   nodes are noted. Enlarged retroperitoneal lymph nodes measuring up to 1.2   cm is noted on image 305:[id number]. Additional smaller retroperitoneal and avni   hepatis lymph nodes are noted. Additional right groin lymph nodes appear   enlarged measuring up to 2.1 cm. There is presacral edema.  ABDOMINAL WALL: Postsurgical changes. Mild generalized edema.  BONES: There is generalized osteopenia. Degenerative changes are noted of   the spine..    IMPRESSION:  Findings compatible with acute GI bleeding of mid small bowel loop within   the left lower abdomen.    Right groin, pelvic sidewall, retroperitoneal and epiphrenic   lymphadenopathy as above. 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    Bilateral pleural effusions, right greater than left. There is question   of loculation on the right.    Multiple subcentimeter hypodense lesions scattered throughout the liver.   These are too small to characterize. Further correlation with MRI can be   obtained as clinically indicated. Additional findings as above.    Nonspecific presacral edema.    < end of copied text >      --------------------------------------------------------------------------------------------

## 2023-04-23 NOTE — CONSULT NOTE ADULT - SUBJECTIVE AND OBJECTIVE BOX
Reason for consult: uterine carcinosarcoma    HPI:  87F with PMHx HTN, CAD (s/p PCI 2021), AV replacement, hypothyroidism, and HLD, S4 serous endometrial carcinoma ([facility] [place], chest wall port, last chemo 12/2022, s/p 2/2023 resection ?hyst/r leonardo),c/b PE in 9/2021. Pt was on eliquis 5mg BID, which was decreased to 2.5mg BID 1 week ago. Pt reports a few days ago she had a sudden urge to defecate. The large volume of stool was thin, dark, and malodorous. Pt reports about 4 more episodes. Pt became lightheaded with SOB. She called [facility] [place] and they advised her to go the [facility]. Pt denies nausea, vomiting, CP, SOB, distension, dysuria. H&H 5.4 / 16.6. INR 2.7. HDS.   (23 Apr 2023 03:13)    Hematology/Oncology called to see patient who follows with Dr Nelson of [facility] [place] for the treatment of uterine carcinosarcoma.     PAST MEDICAL & SURGICAL HISTORY:  Endometrial ca  S4 serous endometrial carcinoma, [facility] [place], chemotherapy      HTN (hypertension)      CAD (coronary artery disease)      S/P AVR      Hypothyroidism      HLD (hyperlipidemia)      S/P AVR (aortic valve replacement)      History of endometrial biopsy          FAMILY HISTORY:  Family history of parotid cancer (Child)        Alochol: Denied  Smoking: Nonsmoker  Drug Use: Denied  Marital Status:         Allergies    No Known Allergies    Intolerances        MEDICATIONS  (STANDING):  lactated ringers. 1000 milliLiter(s) (100 mL/Hr) IV Continuous <Continuous>    MEDICATIONS  (PRN):      ROS  No fever, sweats, chills  No epistaxis, HA, sore throat  No CP, SOB, cough, sputum  No n/v/d, abd pain, melena, hematochezia  No edema  No rash  No anxiety  No back pain, joint pain  No bleeding, bruising  No dysuria, hematuria    T(C): 37 (04-23-23 @ 06:33), Max: 37.1 (04-22-23 @ 19:21)  HR: 87 (04-23-23 @ 06:33) (87 - 102)  BP: 122/56 (04-23-23 @ 06:33) (109/54 - 136/58)  RR: 20 (04-23-23 @ 06:33) (17 - 22)  SpO2: 100% (04-23-23 @ 06:33) (99% - 100%)  Wt(kg): --    PE  NAD  Awake, alert  Anicteric, MMM  RRR  CTAB  Abd soft, NT, ND  No c/c/e  No rash grossly  FROM                          7.7    8.48  )-----------( 135      ( 23 Apr 2023 05:15 )             22.8       04-22    139  |  104  |  35<H>  ----------------------------<  129<H>  3.7   |  24  |  1.48<H>    Ca    9.0      22 Apr 2023 20:34  Mg     1.40     04-22    TPro  7.1  /  Alb  3.4  /  TBili  0.2  /  DBili  x   /  AST  192<H>  /  ALT  243<H>  /  AlkPhos  96  04-22   Reason for consult: uterine carcinosarcoma    HPI:  87F with PMHx HTN, CAD (s/p PCI 2021), AV replacement, hypothyroidism, and HLD, [place] [place], [place] chemo 12/[place]22, s/p 2/2023 resection ?hyst/r leonardo),c/b PE in 9/2021. Pt was on eliquis 5mg BID, which was decreased to 2.5mg BID 1 week ago. Pt reports a few days ago she had a sudden urge to defecate. The large volume of stool was thin, dark, and malodorous. Pt reports about 4 more episodes. Pt became lightheaded with SOB. [place] [facility] [place] [facility]. Pt denies nausea, vomiting, CP, SOB, distension, dysuria. H&H 5.4 / 16.6. INR 2.7. HDS.   (23 Apr 2023 03:13)    Hematology/Oncology called to see patient who [place] Nelson [place] [facility] [place].     PAST MEDICAL & SURGICAL HISTORY:  Endometrial ca  S4 serous [place] [facility] [place]      HTN (hypertension)      CAD (coronary artery disease)      S/P AVR      Hypothyroidism      HLD (hyperlipidemia)      S/P AVR (aortic valve replacement)      History of endometrial biopsy          FAMILY HISTORY:  Family history of parotid cancer (Child)        Alochol: Denied  Smoking: Nonsmoker  Drug Use: Denied  Marital Status:         Allergies    No Known Allergies    Intolerances        MEDICATIONS  (STANDING):  lactated ringers. 1000 milliLiter(s) (100 mL/Hr) IV Continuous <Continuous>    MEDICATIONS  (PRN):      ROS  No fever, sweats, chills  No epistaxis, HA, sore throat  No CP, SOB, cough, sputum  No n/v/d, abd pain, melena, hematochezia  No edema  No rash  No anxiety  No back pain, joint pain  No bleeding, bruising  No dysuria, hematuria    T(C): 37 (04-23-23 @ 06:33), Max: 37.1 (04-22-23 @ 19:21)  HR: 87 (04-23-23 @ 06:33) (87 - 102)  BP: 122/56 (04-23-23 @ 06:33) (109/54 - 136/58)  RR: 20 (04-23-23 @ 06:33) (17 - 22)  SpO2: 100% (04-23-23 @ 06:33) (99% - 100%)  Wt(kg): --    PE  frail  Awake, alert  Anicteric, MMM  RRR  CTAB  Abd soft, NT,   +distended  No c/c/e                            7.7    8.48  )-----------( 135      ( 23 Apr 2023 05:15 )             22.8       04-22    139  |  104  |  35<H>  ----------------------------<  129<H>  3.7   |  24  |  1.48<H>    Ca    9.0      22 Apr 2023 20:34  Mg     1.40     04-22    TPro  7.1  /  Alb  3.4  /  TBili  0.2  /  DBili  x   /  AST  192<H>  /  ALT  243<H>  /  AlkPhos  96  04-22

## 2023-04-23 NOTE — H&P ADULT - HISTORY OF PRESENT ILLNESS
87F with PMHx HTN, CAD (s/p PCI 2021), AV replacement, hypothyroidism, and HLD, [place] [place] last chemo 12/[place]22, s/p 2/2023 resection ?hyst/r leonardo),c/b PE in 9/2021. Pt was on eliquis 5mg BID, which was decreased to 2.5mg BID 1 week ago. Pt reports a few days ago she had a sudden urge to defecate. The large volume of stool was thin, dark, and malodorous. Pt reports about 4 more episodes. Pt became lightheaded with SOB. [place] [facility] [place] [facility]. Pt denies nausea, vomiting, CP, SOB, distension, dysuria. H&H 5.4 / 16.6. INR 2.7. HDS.

## 2023-04-23 NOTE — CONSULT NOTE ADULT - SUBJECTIVE AND OBJECTIVE BOX
Interventional Radiology    HPI:  87F with S4 serous endometrial carcinoma on Eliquis for PE in 9/2021. Multiple episodes of melena.     Small bowel extravasation on CTA.     HD stable, responded to 2U PRBC.           Allergies: No Known Allergies      Medications (Abx/Cardiac/Anticoagulation/Blood Products)    prothrombin complex concentrate IVPB (KCENTRA): 400 mL/Hr IV Intermittent (04-23 @ 03:51)      Home Medications  acetaminophen 325 mg oral tablet: 2 tab(s) orally every 6 hours, As needed, Temp greater or equal to 38C (100.4F), Mild Pain (1 - 3), Moderate Pain (4 - 6)  alendronate 70 mg oral tablet: 1 tab(s) orally once a week  aspirin 81 mg oral delayed release tablet: 1 tab(s) orally once a day  atenolol 50 mg oral tablet: 1 tab(s) orally 2 times a day  ezetimibe 10 mg oral tablet: 1 tab(s) orally once a day  famotidine 40 mg oral tablet: 1 tab(s) orally once a day (at bedtime)  furosemide 20 mg oral tablet: 1 tab(s) orally once a day  levothyroxine 75 mcg (0.075 mg) oral tablet: 1 tab(s) orally once a day  lidocaine 4% topical film: Apply topically to affected area once a day   melatonin 3 mg oral tablet: 1 tab(s) orally once a day (at bedtime)  One A Day Women&#x27;s Complete oral tablet: 1 tab(s) orally once a day  oxycodone-acetaminophen 5 mg-325 mg oral tablet: 1 tab(s) orally 2 times a day  polyethylene glycol 3350 oral powder for reconstitution: 17 gram(s) orally once a day, As needed, Constipation  rosuvastatin 20 mg oral tablet: 1 tab(s) orally once a day  senna leaf extract oral tablet: 2 tab(s) orally once a day (at bedtime)  Ventolin HFA 90 mcg/inh inhalation aerosol: 2 puff(s) inhaled every 6 hours, As Needed  Vitamin B12: 5000 microgram(s) sublingual once a day  Vitamin D3 50 mcg (2000 intl units) oral tablet: 1 tab(s) orally once a day      Data:    T(C): 36.8  HR: 90  BP: 120/66  RR: 20  SpO2: 99%    -WBC 8.48 / HgB 7.7 / Hct 22.8 / Plt 135  -Na 139 / Cl 103 / BUN 30 / Glucose 91  -K 3.5 / CO2 27 / Cr 1.29  -ALT -- / Alk Phos -- / T.Bili --  -INR 1.50 / PTT 30.6          ASSESMENT/PLAN:   87F with S4 serous endometrial carcinoma on Eliquis for PE in 9/2021. Multiple episodes of melena.     Small bowel extravasation on CTA.     HD stable, responded to 2U PRBC.     No plan for embolization at this time  Hold AC  continue trend cbc/vitals, transfuse prn per primary team  GI consult  Small bowel location not favorable for embolization in most situations    if patient demonstrates acute decompensation/hemodynamic instability not responsive to fluid resuscitation, or if hemoglobin continues to drop significantly despite transfusion, please page IR  for re-evaluation for intervention. ([facility] [id number], [phone])           Kris Donahue MD  Interventional Radiology PGY5  Contact on [facility] Teams for nonemergent issues    - Nonemergent consults:  place sunrise order "Consult- Interventional Radiology", no page required  - Emergent issues (pager): [facility] [phone]; DANIELA [phone]; [phone]; DO NOT PAGE FOR SCHEDULING QUESTIONS  - Scheduling questions 8am-6pm : [facility] [phone]; DANIELA [phone],   - Clinic/outpatient booking: [facility] [phone]; DANILEA [phone]

## 2023-04-23 NOTE — CONSULT NOTE ADULT - ASSESSMENT
The patient is a 87y Female complaining of rectal bleeding.    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   --s/p neoadjuvant treatment w/ carbo/taxol  --s/p palliative debulking surgery with Dr. Barton on 2/10/23, clear margins, stromal involvement.  --ongoing care after discharge    hx PE  --RUL segmental PE noted on CTA 8/2022  --will require long term anticoagulation due to her active disease and immobility  --on Eliquis 2.5mg BID, hold AC d/t GIB  --follows w/ Dr Graves of [facility]  --ongoing care after discharge    rectal bleed  --Hgb 5.4 on admission, s/p 2u PRBC's, 1u plasma  --may bednefit from ppi gtt and GI consult  --management per primary team    Anemia  --longstanding, 2/2 malignancy, treatment, mechanical valve. Now worsened by GIB  --Hgb 9-10 at baseline  --may benefit from IV iron, pt likely iron deficient 2/2 GIB  --will check hor hemolysis and nutritional deficiencies  --please transfuse for Hgb <7  --trend CBC, will monitor    note is incomplete until signed by attending   will follow and coordinate care with [facility] [place]    Ronda Wright NP  Hematology/ Oncology  [facility]  [phone] (office)  [phone] (alt office)  Evenings and weekends please call MD on call or office    The patient is a 87y Female complaining of rectal bleeding.    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   --s/p neoadjuvant treatment w/ carbo/taxol  --s/p palliative debulking surgery with Dr. Barton on 2/10/23, clear margins, stromal involvement.  --ongoing care after discharge    hx PE  --RUL segmental PE noted on CTA 8/2022  --will require long term anticoagulation due to her active disease and immobility  --on Eliquis 2.5mg BID, hold AC d/t GIB  --follows w/ Dr Graves of [facility]  --ongoing care after discharge    rectal bleed  --Hgb 5.4 on admission, s/p 2u PRBC's, 1u plasma  --CTA w/ small bowel extravagation, IR consulted   --may bednefit from ppi gtt and GI consult  --management per primary team    Anemia  --longstanding, 2/2 malignancy, treatment, mechanical valve. Now worsened by GIB  --Hgb 9-10 at baseline  --may benefit from IV iron, pt likely iron deficient 2/2 GIB  --will check hor hemolysis and nutritional deficiencies  --please transfuse for Hgb <7  --trend CBC, will monitor      will follow and coordinate care with [facility] [place]    Ronda Wright NP  Hematology/ Oncology  [facility]  [phone] (office)  [phone] (alt office)  Evenings and weekends please call MD on call or office

## 2023-04-23 NOTE — H&P ADULT - NSICDXPASTMEDICALHX_GEN_ALL_CORE_FT
PAST MEDICAL HISTORY:  CAD (coronary artery disease)     [place]ometrial [place]ous [place] [facility] [place]    HLD (hyperlipidemia)     HTN (hypertension)     Hypothyroidism     S/P AVR

## 2023-04-23 NOTE — ED ADULT NURSE REASSESSMENT NOTE - NS ED NURSE REASSESS COMMENT FT1
Pt A&O x 4, resting comfortably. VSS. Respirations even and unlabored. Denies reaction to KCentra at this time. Continuous telemetry and SpO2 monitoring in place. Will continue to monitor.
Pt is A&O x 4. Reports no reaction to current transfusion. KCentra to be started over 9 min. Cleared to run together by MD. PT/ApTT/INR drawn prior to administration.
npo pt with family at bedside/ iv fluids infusing a 100cc hr. await surgical orders
report givent o the floor. pt transported with iv s in place  pt comfortable and in no distress
Pt remains in ED awaiting further disposition. Pt A&Ox4, respirations even/unlabored on room air, in NAD. Pt tolerated 1U PRBC well without any s/s of adverse reactions. Pt started on 2nd unit PRBC per provider order. Verified with 2nd RN at bedside per protocol. Pt denies having any c/o pain or discomfort. All safety precautions maintained.

## 2023-04-23 NOTE — CONSULT NOTE ADULT - ASSESSMENT
Shani Bright is an 87F with PMHx CAD (s/p PCI 2021) on ASSA with AVR, HTN, HLD, hypothyroidism [place] serous [place] [place] last chemo 12[place], s/p 2/2023 resection ?hyst/r leonardo),c/b PE in 9/2021 currently on Elliquis 2.5mg BID 1 week ago presenting to the hospital with melena found to have active extravasation in the distal small bowel. Gi consulted for further workup and management of the above.     #Melena 2/2 to active extravasation in the small bowel   Presenting with one day of melena with labs notable for CTA with active extravasation in the small bowel (distal jejunum) with labs notable for hgb of 5.4 from b/l of around 10. Risk factors for ongoing bleeding include ASA and Apixaban use. No prior EGD in the past with last c-scope >10 yrs ago per pt with recent right hemicolectomy as noted above. Currntly HDS s/p 2 units pRBC and K-Centra with hgb to 7.5. Per review of imaging with radiology, active extravasation located in the distal jejunum and slightly more proximal to anastomosis site. Will plan to preform colonoscopy +/- Push enteroscopy tomorrow for further evaluation of the above. If lesions not ammenable to endoscopic evalaution may need IR intervention given location if pt with ongoing overt bleeding     Recommendations:  - Please keep NPO will plan for  colonoscopy +/- Push enteroscopy on Monday 4/24/2023  -Trend CBC q8h and transfuse to maintain Hbg> 8 inaptient given pre-existing cardiovascular conditions.  - IV PPI BID   - Ok to continue ASA given hx of CAD  - Maintain active type & screen and access with 2 x Large bore IV      Instructions for colonoscopy  - clear liquid diet today, NPO at midnight  - please ensure golytely is completed (to be ordered by GI fellow)  - please ensure patient drinks entire prep  - please ensure morning labs are drawn at 2am, electrolytes repleted as necessary, and Hg>8    All recommendations are tentative until note is attested by attending.     Hank Edmondson, PGY-4  Gastroenterology/Hepatology Fellow  Available on Microsoft Teams   [phone] (Long Range Pager)  [phone] (Short Range Pager [facility])    After 5pm, please contact the on-call GI fellow. [phone]

## 2023-04-23 NOTE — CONSULT NOTE ADULT - ASSESSMENT
ASSESSMENT:  87F with PMHx HTN, CAD (s/p PCI 2021), AV replacement, hypothyroidism, and HLD, S4 serous [place] [place], last chemo 12/2022, s/p 2/2023 resection ?hyst), right hemicolectomy (13 years ago with Dr. Del Cid per patient) PE in 9/2021. Presented with GI bleed, appears to be small bowel source.    PLAN:    - GI planned for colonoscopy/endoscopy    To be disucssed with Dr. Del Cid in am    [facility]  MARKEL Team Surgery  [id number]

## 2023-04-23 NOTE — CONSULT NOTE ADULT - SUBJECTIVE AND OBJECTIVE BOX
HPI:  Shani Bright is an 87F with PMHx CAD (s/p PCI 2021) on ASSA with AVR, HTN, HLD, hypothyroidism withS4 serous endometrial carcinoma ([facility] [place], chest wall port, last chemo 12/2022, s/p 2/2023 resection ?hyst/r leonardo),c/b PE in 9/2021 currently on Elliquis 2.5mg BID 1 week ago presenting to the hospital with melena found to have active extravasation in the distal small bowel. Gi consulted for further workup and management of the above.     Ms. Bright states that around 1-2 days ago she had a sudden urge to defecate and noted stool that thin, dark, and malodorous in nature c/w melena. She had another  4 more episodes with increasing  lightheadedness with SOB prompting her to come into the hospital for evaluation. She denies any ongoing n/v/abd pain with vitals stable upon arrival to the ED> Labs notable for H&H 5.4 / 16.6. INR 2.7. HDS with b/l hgb of 10. CTA obtained and notable for active extravsation in the small bowel. Pt given 2 units of blood and K-centra. Regarding medications, patient currently on ASA and Apixiban for hx of PE in 2021. Last colonoscopy >10 years ago. Currently feeling better after recieving pRBC with no further episodes of melena since admisison.     Allergies:  No Known Allergies    Home Medications:  acetaminophen 325 mg oral tablet: 2 tab(s) orally every 6 hours, As needed, Temp greater or equal to 38C (100.4F), Mild Pain (1 - 3), Moderate Pain (4 - 6) (07 Sep 2022 13:08)  alendronate 70 mg oral tablet: 1 tab(s) orally once a week (09 Aug 2022 09:55)  aspirin 81 mg oral delayed release tablet: 1 tab(s) orally once a day (09 Aug 2022 09:56)  atenolol 50 mg oral tablet: 1 tab(s) orally 2 times a day (09 Aug 2022 09:55)  ezetimibe 10 mg oral tablet: 1 tab(s) orally once a day (09 Aug 2022 09:56)  famotidine 40 mg oral tablet: 1 tab(s) orally once a day (at bedtime) (09 Aug 2022 09:56)  furosemide 20 mg oral tablet: 1 tab(s) orally once a day (09 Aug 2022 09:56)  levothyroxine 75 mcg (0.075 mg) oral tablet: 1 tab(s) orally once a day (09 Aug 2022 09:57)  One A Day Women&#x27;s Complete oral tablet: 1 tab(s) orally once a day (09 Aug 2022 09:57)  oxycodone-acetaminophen 5 mg-325 mg oral tablet: 1 tab(s) orally 2 times a day (09 Aug 2022 09:57)  polyethylene glycol 3350 oral powder for reconstitution: 17 gram(s) orally once a day, As needed, Constipation (15 Aug 2022 15:32)  rosuvastatin 20 mg oral tablet: 1 tab(s) orally once a day (09 Aug 2022 09:57)  senna leaf extract oral tablet: 2 tab(s) orally once a day (at bedtime) (15 Aug 2022 15:32)  Ventolin HFA 90 mcg/inh inhalation aerosol: 2 puff(s) inhaled every 6 hours, As Needed (09 Aug 2022 09:59)  Vitamin B12: 5000 microgram(s) sublingual once a day (09 Aug 2022 09:58)  Vitamin D3 50 mcg (2000 intl units) oral tablet: 1 tab(s) orally once a day (09 Aug 2022 09:59)    Hospital Medications:  lactated ringers. 1000 milliLiter(s) IV Continuous <Continuous>      PMHX/PSHX:   Endometrial ca  HTN (hypertension)  HLD   CAD (coronary artery disease) s/p PCI   PE on AC   S/P AVR  Hypothyroidism  S/P AVR (aortic valve replacement)  History of endometrial biopsy      Family history:    Family history of parotid cancer (Child)    Social History:   Tob: Denies  EtOH: Denies  Illicit Drugs: Denies    ROS: Complete and normal except as mentioned above    PHYSICAL EXAM:   GENERAL:  No acute distress  HEENT:  NCAT, no scleral icterus   CHEST:  no respiratory distress  HEART:  Regular rate and rhythm  ABDOMEN:  Soft, non-tender, non-distended, normoactive bowel sounds  EXTREMITIES: No edema  NEURO:  Alert and oriented x 3    Vital Signs:  Vital Signs Last 24 Hrs  T(C): 36.8 (23 Apr 2023 09:05), Max: 37.1 (22 Apr 2023 19:21)  T(F): 98.2 (23 Apr 2023 09:05), Max: 98.8 (22 Apr 2023 21:47)  HR: 90 (23 Apr 2023 09:05) (87 - 102)  BP: 120/66 (23 Apr 2023 09:05) (109/54 - 136/58)  BP(mean): --  RR: 20 (23 Apr 2023 09:05) (17 - 22)  SpO2: 99% (23 Apr 2023 09:05) (99% - 100%)    Parameters below as of 23 Apr 2023 09:05  Patient On (Oxygen Delivery Method): room air      Daily     Daily     LABS:                        7.7    8.48  )-----------( 135      ( 23 Apr 2023 05:15 )             22.8     Mean Cell Volume: 89.1 fL (04-23-23 @ 05:15)    04-23    139  |  103  |  30<H>  ----------------------------<  91  3.5   |  27  |  1.29    Ca    8.3<L>      23 Apr 2023 08:48  Phos  2.4     04-23  Mg     1.90     04-23    TPro  7.1  /  Alb  3.4  /  TBili  0.2  /  DBili  x   /  AST  192<H>  /  ALT  243<H>  /  AlkPhos  96  04-22    LIVER FUNCTIONS - ( 22 Apr 2023 20:34 )  Alb: 3.4 g/dL / Pro: 7.1 g/dL / ALK PHOS: 96 U/L / ALT: 243 U/L / AST: 192 U/L / GGT: x           PT/INR - ( 23 Apr 2023 08:48 )   PT: 17.5 sec;   INR: 1.50 ratio         PTT - ( 23 Apr 2023 08:48 )  PTT:30.6 sec                            7.7    8.48  )-----------( 135      ( 23 Apr 2023 05:15 )             22.8                         5.4    7.71  )-----------( 170      ( 22 Apr 2023 20:34 )             16.6       Imaging:  < from: CT Angio Abdomen and Pelvis w/ IV Cont (04.23.23 @ 01:11) >  PROCEDURE:  CT of the Abdomen and Pelvis was performed.  Precontrast, Arterial and Delayed phases were performed.  Sagittal and coronal reformats were performed.    FINDINGS:  LOWER CHEST: There is a small left pleural effusion and moderate-sized   right pleural effusion. The right-sided pleural effusion may be   loculated. The heart size is borderline. There is mild pericardial   thickening. Passive atelectatic changes are appreciated. 8mm prominent   right epicardial phrenic lymphnode is increased in size compared to   previous exam.    LIVER: Subcentimeter hypodense lesions are seen scattered throughout the   liver, too small to accurately characterize..  BILE DUCTS: Normal caliber.  GALLBLADDER: Cholelithiasis.  SPLEEN: Within normal limits.  PANCREAS: Within normal limits.  ADRENALS: Left adrenal gland thickening similar compared to previous   exam. This may be related to hyperplasia, though is nonspecific.  KIDNEYS/URETERS: Kidneys enhance symmetrically. There is no urinary tract   obstruction. There are no urinary tract stones.    BLADDER: Within normal limits.  REPRODUCTIVE ORGANS:  Hysterectomy.    BOWEL: Right hemicolectomy changes are appreciated. There is colonic   diverticulosis. There is no focal diverticulitis. There is progressive   pooling of intraluminal contrast within small bowel loop of left lower   abdomen, compatible with acute GI bleeding. There is a small hiatal   hernia. There is no bowel obstruction.  PERITONEUM: No large volume ascites orfree air..  VESSELS: Within normal limits.  RETROPERITONEUM/LYMPH NODES: Right iliac chain lymph node measures up to   1.3 cm measured dimension. Additional smaller right pelvic sidewall lymph   nodes are noted. Enlarged retroperitoneal lymph nodes measuring up to 1.2   cm is noted on image 305:[id number]. Additional smaller retroperitoneal and avni   hepatis lymph nodes are noted. Additional right groin lymph nodes appear   enlarged measuring up to 2.1 cm. There is presacral edema.  ABDOMINAL WALL: Postsurgical changes. Mild generalized edema.  BONES: There is generalized osteopenia. Degenerative changes are noted of   the spine..    IMPRESSION:  Findings compatible with acute GI bleeding of mid small bowel loop within   the left lower abdomen.    Right groin, pelvic sidewall, retroperitoneal and epiphrenic   lymphadenopathy as above. 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    Bilateral pleural effusions, right greater than left. There is question   of loculation on the right.    Multiple subcentimeter hypodense lesions scattered throughout the liver.   These are too small to characterize. Further correlation with MRI can be   obtained as clinically indicated. Additional findings as above.    Nonspecific presacral edema.    Additional findings as above.      < end of copied text >

## 2023-04-23 NOTE — CONSULT NOTE ADULT - ATTENDING COMMENTS
Shani Bright is an 87F with PMHx CAD (s/p PCI 2021) on ASSA with AVR, HTN, HLD, hypothyroidism [place] serous [place] [place] last chemo 12/[place]22, s/p 2/2023 resection ?hyst/r leonardo),c/b PE in 9/2021 currently on Elliquis 2.5mg BID 1 week ago presenting to the hospital with melena found to have active extravasation in the distal small bowel. Gi consulted for further workup and management of the above.   Labs notable for hgb of 5.4 from b/l of around 10. Risk factors for ongoing bleeding include ASA and Apixaban use.    Recommendations:  - Please keep NPO will plan for  colonoscopy +/- Push enteroscopy on Monday 4/24/2023  -Trend CBC q8h and transfuse to maintain Hbg> 8 inaptient given pre-existing cardiovascular conditions.  - IV PPI BID   - Ok to continue ASA given hx of CAD  - Maintain active type & screen and access with 2 x Large bore IV

## 2023-04-23 NOTE — H&P ADULT - ASSESSMENT
87F with S4 serous endometrial carcinoma [place] [place], [place]22, s/p 2/2023 resection ?hyst/r leonardo) on Eliquis for PE in 9/2021. Multiple episodes of melena. Found to have small bowel bleed.    PLAN  - Reverse coagulopathy - FFP, KCentra  - Consult GI, IR, heme onc  - Transfuse PRN  - Q8 CBC  - Hold AC, DVT PPx  - Needs med rec  - [facility] [place]. Guerra, oncology, and Dr. Barton, surgery    Discussed with Dr. Loyola    Surgery Team B  [id number]

## 2023-04-23 NOTE — H&P ADULT - NSHPPHYSICALEXAM_GEN_ALL_CORE
General: NAD, resting comfortably  HEENT: NC/AT, EOMI, normal hearing  Pulmonary: normal resp effort, patent airway, satting well on RA  Cardiovascular: well perfused  Abdominal: soft, ND/NT, well healing midline and lap scars  Extremities: WWP, normal strength, no clubbing/cyanosis/edema  Neuro: A/O x 3, CNs II-XII grossly intact

## 2023-04-23 NOTE — PATIENT PROFILE ADULT - FALL HARM RISK - HARM RISK INTERVENTIONS

## 2023-04-24 NOTE — PROGRESS NOTE ADULT - SUBJECTIVE AND OBJECTIVE BOX
Overnight events:   - No acute events    SUBJECTIVE:  Patient was seen and examined on AM rounds. Denies new complaints. Reports BM    OBJECTIVE:  Vital Signs Last 24 Hrs  T(C): 36.9 (24 Apr 2023 05:00), Max: 37.1 (24 Apr 2023 02:01)  T(F): 98.4 (24 Apr 2023 05:00), Max: 98.8 (24 Apr 2023 02:01)  HR: 84 (24 Apr 2023 05:00) (74 - 90)  BP: 132/69 (24 Apr 2023 05:00) (120/66 - 147/65)  BP(mean): --  RR: 18 (24 Apr 2023 05:00) (17 - 20)  SpO2: 98% (24 Apr 2023 05:00) (97% - 100%)    Parameters below as of 24 Apr 2023 05:00  Patient On (Oxygen Delivery Method): room air      04-23-23 @ 07:01  -  04-24-23 @ 07:00  --------------------------------------------------------  IN: 1500 mL / OUT: 2 mL / NET: 1498 mL        Physical Examination:  General: NAD, Lying in bed comfortably, A+Ox3  HEENT: NC/AT, EOMI  Neck: Soft, supple  Cardio: RRR, nml S1/S2  Resp: Good effort, CTA b/l  Thorax: No chest wall tenderness  GI/Abd: Soft, NT/ND, no rebound/guarding, no masses palpated  Vascular: All 4 extremities warm.        LABS:                        8.5    9.05  )-----------( 112      ( 24 Apr 2023 02:00 )             25.0       04-24    141  |  105  |  22  ----------------------------<  88  4.5   |  23  |  1.02    Ca    7.7<L>      24 Apr 2023 02:00  Phos  4.8     04-24  Mg     1.40     04-24    TPro  7.1  /  Alb  3.4  /  TBili  0.2  /  DBili  x   /  AST  192<H>  /  ALT  243<H>  /  AlkPhos  96  04-22

## 2023-04-24 NOTE — PROGRESS NOTE ADULT - ASSESSMENT
87F with PMHx HTN, CAD (s/p PCI 2021), AV replacement, hypothyroidism, and HLD, S4 serous endometrial carcinoma ([facility] [place], chest wall port, last chemo 12/2022, s/p 2/2023 resection ?hyst), right hemicolectomy (13 years ago with Dr. Del Cid per patient) PE in 9/2021. Presented with GI bleed, appears to be small bowel source.    PLAN:    - f/u GI scope to eval GI bleed  - [id number]  - NPO  - mIVF w/ LR  - PPI BID      A Team Surgery  [id number]

## 2023-04-25 NOTE — PROGRESS NOTE ADULT - ASSESSMENT
The patient is a 87y Female complaining of rectal bleeding.    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   --s/p neoadjuvant treatment w/ carbo/taxol  --s/p palliative debulking surgery with Dr. Barton on 2/10/23, clear margins, stromal involvement.  --ongoing care after discharge    hx PE  --RUL segmental PE noted on CTA 8/2022  --will require long term anticoagulation due to her active disease and immobility  --on Eliquis 2.5mg BID, hold AC d/t GIB  - restart ac when clearned by gi   --follows w/ Dr Graves of [facility]  --ongoing care after discharge    rectal bleed  --Hgb 5.4 on admission, s/p 2u PRBC's, 1u plasma  --CTA w/ small bowel extravagation,   -- egd negative. however blood was seen in colon   - likley small bowel bleed   - follow up with GI reccs     Anemia  --longstanding, 2/2 malignancy, treatment, mechanical valve. Now worsened by GIB  --Hgb 9-10 at baseline  -- con to monitor       Trace Powell MD  HematologyOncology   O: [phone]

## 2023-04-25 NOTE — PROGRESS NOTE ADULT - SUBJECTIVE AND OBJECTIVE BOX
Gastroenterology/Hepatology Progress Note      Interval Events:     Pt. reported no bowel movement since the procedure yesterday. Denied melena or hematochezia. No fevers or chills. Tolerating po diet well.     Allergies:  No Known Allergies      Hospital Medications:  albuterol    90 MICROgram(s) HFA Inhaler 2 Puff(s) Inhalation every 6 hours PRN  atenolol  Tablet 50 milliGRAM(s) Oral two times a day  atorvastatin 80 milliGRAM(s) Oral at bedtime  furosemide    Tablet 20 milliGRAM(s) Oral daily  levothyroxine 75 MICROGram(s) Oral daily  loratadine 10 milliGRAM(s) Oral daily PRN  losartan 100 milliGRAM(s) Oral daily  melatonin 3 milliGRAM(s) Oral at bedtime PRN  pantoprazole    Tablet 40 milliGRAM(s) Oral two times a day      ROS: 14 point ROS negative unless otherwise state in subjective    PHYSICAL EXAM:   Vital Signs:  Vital Signs Last 24 Hrs  T(C): 37.3 (25 Apr 2023 06:00), Max: 37.3 (25 Apr 2023 06:00)  T(F): 99.1 (25 Apr 2023 06:00), Max: 99.1 (25 Apr 2023 06:00)  HR: 87 (25 Apr 2023 06:00) (72 - 99)  BP: 143/62 (25 Apr 2023 06:00) (108/78 - 148/56)  BP(mean): --  RR: 17 (25 Apr 2023 06:00) (14 - 20)  SpO2: 99% (25 Apr 2023 06:00) (98% - 100%)    Parameters below as of 25 Apr 2023 06:00  Patient On (Oxygen Delivery Method): room air      Daily Height in cm: 144 (24 Apr 2023 10:50)    Daily     GENERAL:  No acute distress, elderly female, lying in bed.   HEENT:  NCAT, no scleral icterus  CHEST: no resp distress  ABDOMEN:  Soft, non-tender, non-distended, no masses  EXTREMITIES:  No LE edema  NEURO:  Alert and oriented x 3, no tremors    LABS:                        9.6    10.11 )-----------( 104      ( 25 Apr 2023 07:24 )             27.5     Mean Cell Volume: 83.6 fL (04-25-23 @ 07:24)    04-25    137  |  102  |  14  ----------------------------<  84  3.7   |  24  |  0.87    Ca    7.9<L>      25 Apr 2023 07:24  Phos  2.6     04-25  Mg     1.50     04-25        PT/INR - ( 24 Apr 2023 02:00 )   PT: 15.8 sec;   INR: 1.36 ratio         PTT - ( 24 Apr 2023 02:00 )  PTT:31.1 sec          Imaging:      < from: CT Angio Abdomen and Pelvis w/ IV Cont (04.23.23 @ 01:11) >  PROCEDURE:  CT of the Abdomen and Pelvis was performed.  Precontrast, Arterial and Delayed phases were performed.  Sagittal and coronal reformats were performed.    FINDINGS:  LOWER CHEST: There is a small left pleural effusion and moderate-sized   right pleural effusion. The right-sided pleural effusion may be   loculated. The heart size is borderline. There is mild pericardial   thickening. Passive atelectatic changes are appreciated. 8mm prominent   right epicardial phrenic lymphnode is increased in size compared to   previous exam.    LIVER: Subcentimeter hypodense lesions are seen scattered throughout the   liver, too small to accurately characterize..  BILE DUCTS: Normal caliber.  GALLBLADDER: Cholelithiasis.  SPLEEN: Within normal limits.  PANCREAS: Within normal limits.  ADRENALS: Left adrenal gland thickening similar compared to previous   exam. This may be related to hyperplasia, though is nonspecific.  KIDNEYS/URETERS: Kidneys enhance symmetrically. There is no urinary tract   obstruction. There are no urinary tract stones.    BLADDER: Within normal limits.  REPRODUCTIVE ORGANS:  Hysterectomy.    BOWEL: Right hemicolectomy changes are appreciated. There is colonic   diverticulosis. There is no focal diverticulitis. There is progressive   pooling of intraluminal contrast within small bowel loop of left lower   abdomen, compatible with acute GI bleeding. There is a small hiatal   hernia. There is no bowel obstruction.  PERITONEUM: No large volume ascites orfree air..  VESSELS: Within normal limits.  RETROPERITONEUM/LYMPH NODES: Right iliac chain lymph node measures up to   1.3 cm measured dimension. Additional smaller right pelvic sidewall lymph   nodes are noted. Enlarged retroperitoneal lymph nodes measuring up to 1.2   cm is noted on image 305:[id number]. Additional smaller retroperitoneal and avni   hepatis lymph nodes are noted. Additional right groin lymph nodes appear   enlarged measuring up to 2.1 cm. There is presacral edema.  ABDOMINAL WALL: Postsurgical changes. Mild generalized edema.  BONES: There is generalized osteopenia. Degenerative changes are noted of   the spine..    IMPRESSION:  Findings compatible with acute GI bleeding of mid small bowel loop within   the left lower abdomen.    Right groin, pelvic sidewall, retroperitoneal and epiphrenic   lymphadenopathy as above. 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    Bilateral pleural effusions, right greater than left. There is question   of loculation on the right.    Multiple subcentimeter hypodense lesions scattered throughout the liver.   These are too small to characterize. Further correlation with MRI can be   obtained as clinically indicated. Additional findings as above.    Nonspecific presacral edema.    Additional findings as above.      < end of copied text >

## 2023-04-25 NOTE — PROGRESS NOTE ADULT - ASSESSMENT
87F with PMHx HTN, CAD (s/p PCI 2021), AV replacement, hypothyroidism, and HLD, S4 serous endometrial carcinoma [place] [place], last chemo 12/2022, s/p 2/2023 resection ?hyst), right hemicolectomy (13 years ago with Dr. Del Cid per patient) PE in 9/2021 presented with GI bleed. 4/24 S/p enteroscopy and colonoscopy demonstrating possible distal SB bleed    Plan  - Diet: advance to regular  - Monitor H/H  - Restart home meds  - Holding eliquis     A team surgery [id number]

## 2023-04-25 NOTE — PROGRESS NOTE ADULT - ASSESSMENT
Shani Bright is an 87F with PMHx CAD (s/p PCI 2021) on ASSA with AVR, HTN, HLD, hypothyroidism withS4 serous endometrial carcinoma [place] [place], chest wall port, last chemo 12/2022, s/p 2/2023 resection ?hyst/r leonardo),c/b PE in 9/2021 currently on Elliquis 2.5mg BID 1 week ago presenting to the hospital with melena found to have active extravasation in the distal small bowel. Gi consulted for further workup and management of the above.     #Melena 2/2 to active extravasation in the small bowel   Presenting with one day of melena with labs notable for CTA with active extravasation in the small bowel (distal jejunum) with labs notable for hgb of 5.4 from b/l of around 10. Risk factors for ongoing bleeding include ASA and Apixaban use. No prior EGD in the past with last c-scope >10 yrs ago per pt with recent right hemicolectomy as noted above.  - Currently HDS s/p 2 units pRBC and K-Centra with hgb to 7.5. P  - Per review of imaging with radiology, active extravasation located in the distal jejunum and slightly more proximal to anastomosis site.   - s/p push enteroscopy was normal. Colonoscopy had blood throughout the entire colon, with no active bleeding. Terminal ileum shows some blood but there was no active bleeding. Source likely present in the small bowel.   - Hgb stable since yesterday, w/ no overt signs of GI bleeding at this time.     Recommendations:   - Will hold off on any further intervention at this time.   - Can consider capsule endoscopy if the hgb declines again, to identify the source of bleeding in the small bowel. It is difficult to reach the source through deep enteroscopy.   - Hold off on Eliquis for now, if there is no absolute contraindications.   - Can continue with ASA daily.   - Obtain CBC Q12 hours and transfuse if hgb < 8.     All recommendations are tentative until note is attested by an attending.     Rebeka Knapp, PGY-4  Gastroenterology/Hepatology Fellow  Available on [employer] Teams  [phone] (Short Range Pager)  [phone] (Long Range Pager)    After 5pm, please contact the on-call GI fellow. [phone]

## 2023-04-25 NOTE — PROGRESS NOTE ADULT - SUBJECTIVE AND OBJECTIVE BOX
TEAM [ A ] Surgery Daily Progress Note  =====================================================  INTERVAL EVENTS: S/p enteroscopy and colonoscopy demonstrating no active bleed, but noted blood throughout the colon, possible distal SB bleed. 1 unit pRBC with appropriate response.     SUBJECTIVE: Patient seen and examined at bedside on AM rounds. Patient reports that they're feeling tired and fatigue. Patient denies bloody BM overnight.     ALLERGIES:  No Known Allergies      --------------------------------------------------------------------------------------    MEDICATIONS:    Neurologic Medications    Respiratory Medications    Cardiovascular Medications  furosemide   Injectable 20 milliGRAM(s) IV Push daily    Gastrointestinal Medications  lactated ringers. 1000 milliLiter(s) IV Continuous <Continuous>  pantoprazole  Injectable 40 milliGRAM(s) IV Push two times a day    Genitourinary Medications    Hematologic/Oncologic Medications    Antimicrobial/Immunologic Medications    Endocrine/Metabolic Medications  levothyroxine Injectable 75 MICROGram(s) IV Push at bedtime    Topical/Other Medications    --------------------------------------------------------------------------------------    VITAL SIGNS:  ICU Vital Signs Last 24 Hrs  T(C): 37.3 (25 Apr 2023 06:00), Max: 37.3 (25 Apr 2023 06:00)  T(F): 99.1 (25 Apr 2023 06:00), Max: 99.1 (25 Apr 2023 06:00)  HR: 87 (25 Apr 2023 06:00) (72 - 99)  BP: 143/62 (25 Apr 2023 06:00) (108/78 - 148/56)  BP(mean): --  ABP: --  ABP(mean): --  RR: 17 (25 Apr 2023 06:00) (14 - 20)  SpO2: 99% (25 Apr 2023 06:00) (98% - 100%)    O2 Parameters below as of 25 Apr 2023 06:00  Patient On (Oxygen Delivery Method): room air    --------------------------------------------------------------------------------------    EXAM    General: NAD, resting in bed comfortably.  Cardiac: regular rate, warm and well perfused  Respiratory: Nonlabored respirations, normal cw expansion.  Abdomen: soft, nontender, nondistended  Extremities: normal strength, FROM, no deformities    --------------------------------------------------------------------------------------    LABS                        9.5    10.32 )-----------( 104      ( 24 Apr 2023 18:15 )             28.2   04-24    141  |  104  |  19  ----------------------------<  101<H>  3.9   |  25  |  1.00    Ca    7.8<L>      24 Apr 2023 09:02  Phos  3.7     04-24  Mg     2.10     04-24  --------------------------------------------------------------------------------------    INS AND OUTS:  I&O's Detail    24 Apr 2023 07:01  -  25 Apr 2023 07:00  --------------------------------------------------------  IN:    IV PiggyBack: 100 mL    Lactated Ringers: 1550 mL    Oral Fluid: 200 mL    PRBCs (Packed Red Blood Cells): 300 mL  Total IN: 2150 mL    OUT:    Voided (mL): 1250 mL  Total OUT: 1250 mL    Total NET: 900 mL        --------------------------------------------------------------------------------------

## 2023-04-26 NOTE — PROGRESS NOTE ADULT - ASSESSMENT
The patient is a 87y Female complaining of rectal bleeding.    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   --s/p neoadjuvant treatment w/ carbo/taxol  --s/p palliative debulking surgery with Dr. Barton on 2/10/23, clear margins, stromal involvement.  --ongoing care after discharge    hx PE  --RUL segmental PE noted on CTA 8/2022  --will require long term anticoagulation due to her active disease and immobility  --on Eliquis 2.5mg BID, hold AC d/t GIB  - restart ac when cleared by GI team   --follows w/ Dr Graves of [facility]  --ongoing care after discharge    rectal bleed  --Hgb 5.4 on admission, s/p 2u PRBC's, 1u plasma  --CTA w/ small bowel extravagation,   -- egd negative. however blood was seen in colon   - likley small bowel bleed   - follow up with GI reccs     Anemia  --longstanding, 2/2 malignancy, treatment, mechanical valve. Now worsened by GIB  --Hgb 9-10 at baseline  -- hg today is 8.5       Trace Powell MD  HematologyOncology   O: [phone]

## 2023-04-26 NOTE — PROGRESS NOTE ADULT - ASSESSMENT
Shani Bright is an 87F with PMHx CAD (s/p PCI 2021) on ASSA with AVR, HTN, HLD, hypothyroidism withS4 serous [place] [place] last chemo 12/2022, s/p 2/2023 resection ?hyst/r leonardo),c/b PE in 9/2021 currently on Elliquis 2.5mg BID 1 week ago presenting to the hospital with melena found to have active extravasation in the distal small bowel. Gi consulted for further workup and management of the above.     #Melena 2/2 to active extravasation in the small bowel   Presenting with one day of melena with labs notable for CTA with active extravasation in the small bowel (distal jejunum) with labs notable for hgb of 5.4 from b/l of around 10. Risk factors for ongoing bleeding include ASA and Apixaban use. No prior EGD in the past with last c-scope >10 yrs ago per pt with recent right hemicolectomy as noted above.  - Currently HDS s/p 2 units pRBC and K-Centra with hgb to 7.5. P  - Per review of imaging with radiology, active extravasation located in the distal jejunum and slightly more proximal to anastomosis site.   - s/p push enteroscopy was normal. Colonoscopy had blood throughout the entire colon, with no active bleeding. Terminal ileum shows some blood but there was no active bleeding. Source likely present in the small bowel.   - Hgb stable since yesterday, w/ no overt signs of GI bleeding at this time.     Recommendations:   - Will hold off on any further intervention at this time as the hgb is stable w/ no overt signs of GI bleeding.   - Re-evaluate the need for resuming Eliquis with the patient and hematology, has high risk of re-bleeding w/ AC use.   - Can continue with ASA daily.   - Obtain CBC Q12 hours and transfuse if hgb < 8.     Thank you for the consult. Please call us back if you have any questions or concerns.     All recommendations are tentative until the note is attested by an attending.     Rebeka Knapp, PGY-4  Gastroenterology/Hepatology Fellow  Available on Microsoft Teams  [phone] (Short Range Pager)  [phone] (Long Range Pager)    After 5pm, please contact the on-call GI fellow. [phone]

## 2023-04-26 NOTE — DISCHARGE NOTE PROVIDER - NSDCFUADDINST_GEN_ALL_CORE_FT
If you notice any signs or symptoms of GI bleed including bright red blood in vomit/stool, black or tarry stool, dizziness, paleness, chest pain, shortness of breath, please seek care at the emergency room immediately.  NEW MEDICATIONS:   - Please STOP taking Eliquis 5 mg and START taking Eliquis 2.5 mg twice daily. You are given a 30 day supply. Please follow up with hematologist oncologist at [facility] for further monitoring and prescription refill. If you notice any signs or symptoms of GI bleed including bright red blood in vomit/stool, black or tarry stool, dizziness, paleness, chest pain, shortness of breath, please seek care at the emergency room immediately and STOP taking Aspirin and Eliquis.  - Please take Lasix 20 mg TWICE DAILY and follow up with your cardiologist or Dr Iglesias for follow up  - Please take Pantoprazole 40 mg once daily before breakfast for stress ulcer prophylaxis.    NEW MEDICATIONS:   - Please STOP taking Losartan until follow up with primary care provider or cardiologist upon discharge.   - Please STOP taking Eliquis 5 mg and START taking Eliquis 2.5 mg twice daily. You are given a 30 day supply. Please follow up with hematologist oncologist at [facility] for further monitoring and prescription refill. If you notice any signs or symptoms of GI bleed including bright red blood in vomit/stool, black or tarry stool, dizziness, paleness, chest pain, shortness of breath, please seek care at the emergency room immediately and STOP taking Aspirin and Eliquis.  - Please take Lasix 20 mg TWICE DAILY and follow up with your cardiologist or Dr Iglesias for follow up  - Please take Pantoprazole 40 mg once daily before breakfast for stress ulcer prophylaxis.

## 2023-04-26 NOTE — DISCHARGE NOTE PROVIDER - CARE PROVIDER_API CALL
Benjamin Del Cid)  ColonRectal Surgery; Surgery  [address] [facility] [address], [address]  [place] [address]  Phone: [phone]  Fax: [phone]  Follow Up Time: 2 weeks   Benjamin Del Cid)  ColonRectal Surgery; Surgery  [address] [facility] [address]  [place] [address]  Phone: [phone]  Fax: [phone]  Follow Up Time: 2 weeks    Misha Iglesias  CARDIOVASCULAR DISEASE  [address], [address]  [place] [address]  Phone: [phone]  Fax: [phone]  Follow Up Time:    Benjamin Del Cid)  ColonRectal Surgery; Surgery  [address] [facility] [address]  [place] [address]  Phone: [phone]  Fax: [phone]  Follow Up Time: 2 weeks    Misha Iglesias  CARDIOVASCULAR DISEASE  [address] [facility], [address]  [place] [address]  Phone: [phone]  Fax: [phone]  Follow Up Time: 2 weeks   Benjamin Del Cid)  ColonRectal Surgery; Surgery  [address] [facility] [address]  [place] [address]  Phone: [phone]  Fax: [phone]  Follow Up Time: 2 weeks    Misha Iglesias  CARDIOVASCULAR DISEASE  [address] [facility] [address], [address]  [place] [address]  Phone: [phone]  Fax: [phone]  Follow Up Time: 1 week

## 2023-04-26 NOTE — PHYSICAL THERAPY INITIAL EVALUATION ADULT - NSPTDISCHREC_GEN_A_CORE
continue with home PT, anticipated discharge to home with home PT services to address current functional limitations to optimize safety within the home environment with the use of a rolling walker (pt owns)/Home PT

## 2023-04-26 NOTE — PROGRESS NOTE ADULT - SUBJECTIVE AND OBJECTIVE BOX
Gastroenterology/Hepatology Progress Note      Interval Events:     Pt. had a bowel movement yesterday, no reported melena or hematochezia. She denied abd pain, nausea, vomiting, fevers or chills.     Allergies:  No Known Allergies      Hospital Medications:  albuterol    90 MICROgram(s) HFA Inhaler 2 Puff(s) Inhalation every 6 hours PRN  aspirin  chewable 81 milliGRAM(s) Oral daily  atenolol  Tablet 50 milliGRAM(s) Oral two times a day  atorvastatin 80 milliGRAM(s) Oral at bedtime  furosemide    Tablet 20 milliGRAM(s) Oral daily  levothyroxine 75 MICROGram(s) Oral daily  loratadine 10 milliGRAM(s) Oral daily PRN  losartan 100 milliGRAM(s) Oral daily  melatonin 3 milliGRAM(s) Oral at bedtime PRN  pantoprazole    Tablet 40 milliGRAM(s) Oral two times a day      ROS: 14 point ROS negative unless otherwise state in subjective    PHYSICAL EXAM:   Vital Signs:  Vital Signs Last 24 Hrs  T(C): 36.5 (26 Apr 2023 11:30), Max: 37 (25 Apr 2023 13:45)  T(F): 97.7 (26 Apr 2023 11:30), Max: 98.6 (25 Apr 2023 13:45)  HR: 84 (26 Apr 2023 11:30) (78 - 95)  BP: 134/64 (26 Apr 2023 11:30) (108/65 - 134/64)  BP(mean): --  RR: 18 (26 Apr 2023 11:30) (16 - 18)  SpO2: 100% (26 Apr 2023 11:30) (97% - 100%)    Parameters below as of 26 Apr 2023 11:30  Patient On (Oxygen Delivery Method): room air      Daily     Daily     GENERAL:  No acute distress, elderly female, lying in bed.   HEENT:  NCAT, no scleral icterus  CHEST: no resp distress  ABDOMEN:  Soft, non-tender, non-distended, no masses  EXTREMITIES:  No LE edema  NEURO:  Alert and oriented x 3, no tremors    LABS:                        8.5    8.92  )-----------( 93       ( 26 Apr 2023 11:41 )             25.3     Mean Cell Volume: 87.5 fL (04-26-23 @ 11:41)    04-26    139  |  104  |  14  ----------------------------<  89  3.6   |  24  |  0.95    Ca    7.8<L>      26 Apr 2023 05:37  Phos  2.2     04-26  Mg     1.50     04-26        Imaging:        < from: CT Angio Abdomen and Pelvis w/ IV Cont (04.23.23 @ 01:11) >  PROCEDURE:  CT of the Abdomen and Pelvis was performed.  Precontrast, Arterial and Delayed phases were performed.  Sagittal and coronal reformats were performed.    FINDINGS:  LOWER CHEST: There is a small left pleural effusion and moderate-sized   right pleural effusion. The right-sided pleural effusion may be   loculated. The heart size is borderline. There is mild pericardial   thickening. Passive atelectatic changes are appreciated. 8mm prominent   right epicardial phrenic lymphnode is increased in size compared to   previous exam.    LIVER: Subcentimeter hypodense lesions are seen scattered throughout the   liver, too small to accurately characterize..  BILE DUCTS: Normal caliber.  GALLBLADDER: Cholelithiasis.  SPLEEN: Within normal limits.  PANCREAS: Within normal limits.  ADRENALS: Left adrenal gland thickening similar compared to previous   exam. This may be related to hyperplasia, though is nonspecific.  KIDNEYS/URETERS: Kidneys enhance symmetrically. There is no urinary tract   obstruction. There are no urinary tract stones.    BLADDER: Within normal limits.  REPRODUCTIVE ORGANS:  Hysterectomy.    BOWEL: Right hemicolectomy changes are appreciated. There is colonic   diverticulosis. There is no focal diverticulitis. There is progressive   pooling of intraluminal contrast within small bowel loop of left lower   abdomen, compatible with acute GI bleeding. There is a small hiatal   hernia. There is no bowel obstruction.  PERITONEUM: No large volume ascites orfree air..  VESSELS: Within normal limits.  RETROPERITONEUM/LYMPH NODES: Right iliac chain lymph node measures up to   1.3 cm measured dimension. Additional smaller right pelvic sidewall lymph   nodes are noted. Enlarged retroperitoneal lymph nodes measuring up to 1.2   cm is noted on image 305:[id number]. Additional smaller retroperitoneal and avni   hepatis lymph nodes are noted. Additional right groin lymph nodes appear   enlarged measuring up to 2.1 cm. There is presacral edema.  ABDOMINAL WALL: Postsurgical changes. Mild generalized edema.  BONES: There is generalized osteopenia. Degenerative changes are noted of   the spine..    IMPRESSION:  Findings compatible with acute GI bleeding of mid small bowel loop within   the left lower abdomen.    Right groin, pelvic sidewall, retroperitoneal and epiphrenic   lymphadenopathy as above. 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    Bilateral pleural effusions, right greater than left. There is question   of loculation on the right.    Multiple subcentimeter hypodense lesions scattered throughout the liver.   These are too small to characterize. Further correlation with MRI can be   obtained as clinically indicated. Additional findings as above.    Nonspecific presacral edema.    Additional findings as above.      < end of copied text >

## 2023-04-26 NOTE — DISCHARGE NOTE PROVIDER - HOSPITAL COURSE
87F with PMHx HTN, CAD (s/p PCI 2021), AV replacement, hypothyroidism, and HLD, [place] [place] chemo 12[place]22, s/p 2/2023 resection ?hyst/r leonardo),c/b PE in 9/2021. Pt was on eliquis 5mg BID, which was decreased to 2.5mg BID 1 week ago. Pt reports a few days ago she had a sudden urge to defecate. The large volume of stool was thin, dark, and malodorous. Pt reports about 4 more episodes. Pt became lightheaded with SOB. [place] [facility] [place] to go the [facility]. Pt denies nausea, vomiting, CP, SOB, distension, dysuria. H&H 5.4 / 16.6. INR 2.7. HDS.    Patient admitted to colorectal surgery under the care of Dr Del Cid. 4/24 S/p enteroscopy and colonoscopy demonstrate blood in the colon without source of active bleed, suspect from distal SB. Patient tolerated operation well and there were no post-operative complications identified. Patient remained hemodynamically stable in the PACU and transferred to the surgical floor. Patient received multiple units of pRBC transfusion inpatient for acute blood loss anemia. Diet was restarted and advanced as tolerated. At this time, patient is currently ambulating with assistance, voiding, tolerating a regular diet. Pain well controlled on PO pain meds. Patient has been deemed stable for discharge home with follow up as an outpatient. 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 Pt was on eliquis 5mg BID, which was decreased to 2.5mg BID 1 week ago. Pt reports a few days ago she had a sudden urge to defecate. The large volume of stool was thin, dark, and malodorous. Pt reports about 4 more episodes. Pt became lightheaded with SOB. She called [facility] and they advised her to go the [facility]. Pt denies nausea, vomiting, CP, SOB, distension, dysuria. H&H 5.4 / 16.6. INR 2.7. HDS.    Patient admitted to colorectal surgery under the care of Dr Del Cid.    4/24: S/p enteroscopy and colonoscopy demonstrate blood in the colon without source of active bleed, suspect from distal SB. Patient tolerated operation well and there were no post-operative complications identified. Transfused 2 pRBCs    4/26: Patient was assessed by PT and deemed stable for discharge home with home PT.     4/27: Transfused 1 pRBC for continued acute blood loss anemia.     4/30: H/H remained stable. B/l LE duplex done without evidence of DVT. Heme/onc with updated recommendations of restarting Eliquis, at lower dose of 2.5mg BID (patient had previously been decreased to this dose the day before admission by outpatient hematologist, had taken one dose).    5/1: Patient noted to be tachycardic, EKG and troponin without ischemic changes. TTE  Cardiology consulted. Betablocker restarted.    Diet was restarted and advanced as tolerated. At this time, patient is currently ambulating with assistance, voiding, tolerating a regular diet. Pain well controlled on PO pain meds. Patient has been deemed stable for discharge home with follow up as an outpatient. 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 Pt was on eliquis 5mg BID, which was decreased to 2.5mg BID 1 week ago. Pt reports a few days ago she had a sudden urge to defecate. The large volume of stool was thin, dark, and malodorous. Pt reports about 4 more episodes. Pt became lightheaded with SOB. She called [facility] and they advised her to go the [facility]. Pt denies nausea, vomiting, CP, SOB, distension, dysuria. H&H 5.4 / 16.6. INR 2.7. HDS.    Patient admitted to colorectal surgery under the care of Dr Del Cid.    4/24: S/p enteroscopy and colonoscopy demonstrate blood in the colon without source of active bleed, suspect from distal SB. Patient tolerated operation well and there were no post-operative complications identified. Transfused 2 pRBCs    4/26: Patient was assessed by PT and deemed stable for discharge home with home PT.     4/27: Transfused 1 pRBC for continued acute blood loss anemia.     4/30: H/H remained stable. B/l LE duplex done without evidence of DVT. Heme/onc with updated recommendations of restarting Eliquis, at lower dose of 2.5mg BID (patient had previously been decreased to this dose the day before admission by outpatient hematologist, had taken one dose).    5/1: Patient noted to be tachycardic, EKG and troponin without ischemic changes. TTE demonstrated  1. Transcatheter aortic valve replacement. Peak transaortic  valve gradient equals 22 mm Hg, mean transaortic valve  gradient equals 11 mm Hg, which is probably normal in the  presence of a prosthetic valve. Minimal aortic  regurgitation.  2. Moderate concentric left ventricular hypertrophy.  3. Normal left ventricular systolic function.  4. Normal right ventricular size with decreased right  ventricular systolic function.  5. Estimated pulmonary artery systolic pressure equals 28  mm Hg, assuming right atrial pressure equals 10  mm Hg,  consistent with normal pulmonary pressures.  6. Normal pericardium with small pericardial effusion  adjacent to the right atrium. Right atrial inversion seen  in early systole, likley due to elevaed pericardial  pressure.  7. Bilateral pleural effusions.    5/2 Cardiology consulted. Betablocker restarted.    Diet was restarted and advanced as tolerated. At this time, patient is currently ambulating with assistance, voiding, tolerating a regular diet. Pain well controlled on PO pain meds. Patient has been deemed stable for discharge home with home PT and reinstated home health aide. Patient understands to follow up with Dr Del Cid and consultant services after discharge from hospital.

## 2023-04-26 NOTE — DISCHARGE NOTE PROVIDER - NSDCCPTREATMENT_GEN_ALL_CORE_FT
PRINCIPAL PROCEDURE  Procedure: Enteroscopy in adult  Findings and Treatment:       SECONDARY PROCEDURE  Procedure: Colonoscopy  Findings and Treatment:

## 2023-04-26 NOTE — PROGRESS NOTE ADULT - SUBJECTIVE AND OBJECTIVE BOX
Patient seen and examined at bedside. feels comfortable     MEDICATIONS  (STANDING):  aspirin  chewable 81 milliGRAM(s) Oral daily  atenolol  Tablet 50 milliGRAM(s) Oral two times a day  atorvastatin 80 milliGRAM(s) Oral at bedtime  furosemide    Tablet 20 milliGRAM(s) Oral daily  levothyroxine 75 MICROGram(s) Oral daily  losartan 100 milliGRAM(s) Oral daily  pantoprazole    Tablet 40 milliGRAM(s) Oral two times a day    MEDICATIONS  (PRN):  albuterol    90 MICROgram(s) HFA Inhaler 2 Puff(s) Inhalation every 6 hours PRN Shortness of Breath and/or Wheezing  loratadine 10 milliGRAM(s) Oral daily PRN allergy  melatonin 3 milliGRAM(s) Oral at bedtime PRN Insomnia        Vital Signs Last 24 Hrs  T(C): 36.5 (26 Apr 2023 11:30), Max: 37 (25 Apr 2023 13:45)  T(F): 97.7 (26 Apr 2023 11:30), Max: 98.6 (25 Apr 2023 13:45)  HR: 84 (26 Apr 2023 11:30) (78 - 95)  BP: 134/64 (26 Apr 2023 11:30) (108/65 - 134/64)  BP(mean): --  RR: 18 (26 Apr 2023 11:30) (16 - 18)  SpO2: 100% (26 Apr 2023 11:30) (97% - 100%)    Parameters below as of 26 Apr 2023 11:30  Patient On (Oxygen Delivery Method): room air          PHYSICAL EXAM:     GENERAL:  Appears stated age, well-groomed  HEENT:  NC/AT,  conjunctivae clear and pink  CHEST:  CTA b/l  HEART:  S1 s2+   ABDOMEN:  Soft, non-tender, non-distended  EXTEREMITIES:  no cyanosis,clubbing or edema  NEURO:  Alert, oriented, no asterixis                            8.5    8.92  )-----------( 93       ( 26 Apr 2023 11:41 )             25.3       04-26    139  |  104  |  14  ----------------------------<  89  3.6   |  24  |  0.95    Ca    7.8<L>      26 Apr 2023 05:37  Phos  2.2     04-26  Mg     1.50     04-26

## 2023-04-26 NOTE — DISCHARGE NOTE PROVIDER - NSDCHHNEEDSERVICE_GEN_ALL_CORE
Hospitalist Progress Note      Hospital summary: This is a 19-year-old man with a past medical history significant for morbid obesity, hypothyroidism, coronary artery disease, chronic systolic congestive heart failure, status post LVAD placement, obstructive sleep apnea, hypertension, depression, thrombocytopenia, benign prostatic hyperplasia, type 2 diabetes, left kidney mass, ventricular tachycardia status post AICD placement, status post CVA who was in his usual state of health until the day of presentation at the emergency room when it was reported that the patient had positive blood culture at the nursing home where the patient resides. He has chronic sepsis according to review of medical records attributed to Proteus bacteremia and candidemia, for which the patient just completed Zosyn and fluconazole. The patient probably had a repeat blood culture as result of this chronic sepsis. When the patient arrived at the emergency room, the LVAD team was consulted. According to the emergency room physician, the infectious disease consultant did not recommend antibiotics at present, but advised repeat blood culture   12/22/2019      Assessment/Plan:  Bacteremia - GNR  Hx proteus bacteremia in 10/19, treated with zosyn  Hx abdominal abscess s/p multiple surgical debridements      -  Blood culture + corynebacterium at [facility]  - pt febrile other vitals stable   - normal wbc, lactic acid    - blood cx 12/22: all 4 bottles growing  GNRs - Citrobacter/Proteus bacteremia  - rpt blood cx 12/24 - so far NGTD  - ID on board  - Continue vancomycin for gram-positive rods on blood cultures noted on 12/24  - possible LVAD infection , Surgery following.  On Abx- cefepime    Chronic systolic heart failure s/p LVAD as DT, NYHA Class III  - TTE 10/29- EF 15%  - AHF team on board  - Strict I/O, daily weights  - no BB due to RV dysfunction, no ACEi/ARB/AA due to IVVD  - TTE ordered to evaluate for vegetations -showing normal wall thickness and diastolic function, EF is 15 to 20%. No signs of vegetations. Chronic anticoagulation, goal INR 1.5-2  - warfarin per cardiologist      DEONNA on CKD - improving  - cr 2.37 on poa, baseline 1.2  - nephrology consulted   -monitor renal function - improving      Hyponatremia  Improving   Hyperkalemia-resolved with Kayexalate  -Continue to monitor    Chronic anemia - hb stable. On iron supplements   Thrombocytopenia - chronic   - no signs of active bleeding  - will monitor     Hx VT /VF s/p AICD   VF s/p ICD shock on 11/4   Cont mexiletine, mag ox   No amiodarone d/t allergy, RV failure   ICD deactivated prior to admission    DM - ISS  Hypothyroidism - synthroid   BPH - tamsulosin   MADONNA     Multiple wounds   - wound care      Patient has rescinded DNR status and hospice, currently full code  ICD remains deactivated   Palliative care consult consulted     Severe acute on chronic protein calorie malnutrition    Unstagable pressure injury: Diffuse deep tissue injures noted on buttocks. Unstagable pressure injury noted back of left shoulder: POA. Wound care     Code status: Full code  DVT prophylaxis: on coumadin. Disposition: TBD. Came from [facility]   ----------------------------------------------    CC: Bacteremia    S: Patient seen, he denies any complaints today. Review of Systems:  Review of systems not obtained due to patient factors. O:  Visit Vitals  Pulse 80   Temp 97.7 °F (36.5 °C)   Resp 18   Ht 5' 10\" (1.778 m)   Wt 103 kg (227 lb 1.2 oz)   SpO2 98%   BMI 32.58 kg/m²       PHYSICAL EXAM:  Gen: chronically ill looking, lethargic   HEENT: anicteric sclerae, normal conjunctiva, oropharynx clear  Neck: supple, trachea midline, no adenopathy  Heart: RRR, S1S2 heard. +LVAD hum  Lungs: Decreased at bases  Abd: soft, NT, ND, BS+, no organomegaly  Extr: warm.  2+ edema   Neuro: lethargic, moves all extremities      Intake/Output Summary (Last 24 hours) at 12/31/2019 1744  Last data filed at 12/31/2019 1514  Gross per 24 hour   Intake 1900 ml   Output 475 ml   Net 1425 ml        Recent labs & imaging reviewed:  Recent Results (from the past 24 hour(s))   GLUCOSE, POC    Collection Time: 12/30/19  9:09 PM   Result Value Ref Range    Glucose (POC) 152 (H) 65 - 100 mg/dL    Performed by CINTIA MEEKS    PROTHROMBIN TIME + INR    Collection Time: 12/31/19  3:58 AM   Result Value Ref Range    INR 2.7 (H) 0.9 - 1.1      Prothrombin time 26.0 (H) 9.0 - 11.1 sec   LACTIC ACID    Collection Time: 12/31/19  4:07 AM   Result Value Ref Range    Lactic acid 1.0 0.4 - 2.0 MMOL/L   CBC WITH AUTOMATED DIFF    Collection Time: 12/31/19  4:07 AM   Result Value Ref Range    WBC 9.2 4.1 - 11.1 K/uL    RBC 2.94 (L) 4.10 - 5.70 M/uL    HGB 7.5 (L) 12.1 - 17.0 g/dL    HCT 24.6 (L) 36.6 - 50.3 %    MCV 83.7 80.0 - 99.0 FL    MCH 25.5 (L) 26.0 - 34.0 PG    MCHC 30.5 30.0 - 36.5 g/dL    RDW 18.6 (H) 11.5 - 14.5 %    PLATELET 614 347 - 257 K/uL    MPV 11.1 8.9 - 12.9 FL    NRBC 0.0 0  WBC    ABSOLUTE NRBC 0.00 0.00 - 0.01 K/uL    NEUTROPHILS 81 (H) 32 - 75 %    LYMPHOCYTES 8 (L) 12 - 49 %    MONOCYTES 8 5 - 13 %    EOSINOPHILS 2 0 - 7 %    BASOPHILS 0 0 - 1 %    IMMATURE GRANULOCYTES 1 (H) 0.0 - 0.5 %    ABS. NEUTROPHILS 7.5 1.8 - 8.0 K/UL    ABS. LYMPHOCYTES 0.7 (L) 0.8 - 3.5 K/UL    ABS. MONOCYTES 0.7 0.0 - 1.0 K/UL    ABS. EOSINOPHILS 0.2 0.0 - 0.4 K/UL    ABS. BASOPHILS 0.0 0.0 - 0.1 K/UL    ABS. IMM.  GRANS. 0.1 (H) 0.00 - 0.04 K/UL    DF SMEAR SCANNED      RBC COMMENTS OVALOCYTES  PRESENT        RBC COMMENTS POLYCHROMASIA  1+        RBC COMMENTS ANISOCYTOSIS  1+       METABOLIC PANEL, BASIC    Collection Time: 12/31/19  4:07 AM   Result Value Ref Range    Sodium 137 136 - 145 mmol/L    Potassium 4.2 3.5 - 5.1 mmol/L    Chloride 110 (H) 97 - 108 mmol/L    CO2 21 21 - 32 mmol/L    Anion gap 6 5 - 15 mmol/L    Glucose 123 (H) 65 - 100 mg/dL    BUN 31 (H) 6 - 20 MG/DL    Creatinine 0. 89 0.70 - 1.30 MG/DL    BUN/Creatinine ratio 35 (H) 12 - 20      GFR est AA >60 >60 ml/min/1.73m2    GFR est non-AA >60 >60 ml/min/1.73m2    Calcium 8.3 (L) 8.5 - 10.1 MG/DL   LD    Collection Time: 12/31/19  4:07 AM   Result Value Ref Range     (H) 85 - 241 U/L   MAGNESIUM    Collection Time: 12/31/19  4:07 AM   Result Value Ref Range    Magnesium 2.0 1.6 - 2.4 mg/dL   NT-PRO BNP    Collection Time: 12/31/19  4:07 AM   Result Value Ref Range    NT pro-BNP 5,797 (H) <125 PG/ML   GLUCOSE, POC    Collection Time: 12/31/19  8:08 AM   Result Value Ref Range    Glucose (POC) 171 (H) 65 - 100 mg/dL    Performed by [employer]    GLUCOSE, POC    Collection Time: 12/31/19 11:46 AM   Result Value Ref Range    Glucose (POC) 184 (H) 65 - 100 mg/dL    Performed by [employer]    GLUCOSE, POC    Collection Time: 12/31/19  4:40 PM   Result Value Ref Range    Glucose (POC) 141 (H) 65 - 100 mg/dL    Performed by [employer]      Recent Labs     12/31/19  0407 12/30/19 0512   WBC 9.2 9.5   HGB 7.5* 7.7*   HCT 24.6* 24.8*    182     Recent Labs     12/31/19  0407 12/30/19  0512 12/29/19 0319    136 134*   K 4.2 4.4 4.4   * 106 105   CO2 21 21 21   BUN 31* 35* 45*   CREA 0.89 0.94 1.17   * 138* 138*   CA 8.3* 8.6 8.0*   MG 2.0 2.1 2.1     Recent Labs     12/30/19  0512 12/29/19 0319   SGOT 56* 56*   ALT 56 58    115   TBILI 0.6 0.7   TP 6.0* 5.7*   ALB 1.6* 1.6*   GLOB 4.4* 4.1*     Recent Labs     12/31/19  0358 12/30/19  0512 12/29/19  0319   INR 2.7* 2.7* 2.9*   PTP 26.0* 26.1* 27.7*      No results for input(s): FE, TIBC, PSAT, FERR in the last 72 hours. Lab Results   Component Value Date/Time    Folate 4.8 (L) 12/23/2019 04:25 AM      No results for input(s): PH, PCO2, PO2 in the last 72 hours. No results for input(s): CPK, CKNDX, TROIQ in the last 72 hours.     No lab exists for component: CPKMB  Lab Results   Component Value Date/Time    Cholesterol, total 99 12/23/2019 04:25 AM    HDL Cholesterol 9 12/23/2019 04:25 AM    LDL, calculated 30 12/23/2019 04:25 AM    Triglyceride 300 (H) 12/23/2019 04:25 AM    CHOL/HDL Ratio 11.0 (H) 12/23/2019 04:25 AM     Lab Results   Component Value Date/Time    Glucose (POC) 141 (H) 12/31/2019 04:40 PM    Glucose (POC) 184 (H) 12/31/2019 11:46 AM    Glucose (POC) 171 (H) 12/31/2019 08:08 AM    Glucose (POC) 152 (H) 12/30/2019 09:09 PM    Glucose (POC) 124 (H) 12/30/2019 04:51 PM     Lab Results   Component Value Date/Time    Color DARK YELLOW 12/22/2019 09:05 PM    Appearance CLOUDY (A) 12/22/2019 09:05 PM    Specific gravity 1.021 12/22/2019 09:05 PM    Specific gravity 1.010 08/09/2018 03:46 AM    pH (UA) 5.0 12/22/2019 09:05 PM    Protein TRACE (A) 12/22/2019 09:05 PM    Glucose NEGATIVE  12/22/2019 09:05 PM    Ketone NEGATIVE  12/22/2019 09:05 PM    Bilirubin NEGATIVE  08/30/2019 03:28 PM    Urobilinogen 1.0 12/22/2019 09:05 PM    Nitrites NEGATIVE  12/22/2019 09:05 PM    Leukocyte Esterase MODERATE (A) 12/22/2019 09:05 PM    Epithelial cells MODERATE (A) 12/22/2019 09:05 PM    Bacteria NEGATIVE  12/22/2019 09:05 PM    WBC 10-20 12/22/2019 09:05 PM    RBC 20-50 12/22/2019 09:05 PM       Med list reviewed  Current Facility-Administered Medications   Medication Dose Route Frequency    oxyCODONE-acetaminophen (PERCOCET) 5-325 mg per tablet 2 Tab  2 Tab Oral Q6H PRN    hydrALAZINE (APRESOLINE) tablet 10 mg  10 mg Oral TID    fentaNYL citrate (PF) injection 25 mcg  25 mcg IntraVENous Q6H PRN    magnesium oxide (MAG-OX) tablet 800 mg  800 mg Oral DAILY    magnesium oxide (MAG-OX) tablet 400 mg  400 mg Oral QHS    cefepime (MAXIPIME) 2 g in 0.9% sodium chloride (MBP/ADV) 100 mL  2 g IntraVENous Q8H    Vancomycin - pharmacy to dose   Other Rx Dosing/Monitoring    vancomycin (VANCOCIN) 1500 mg in  ml infusion  1,500 mg IntraVENous Q18H    Warfarin NP Dosing   Other PRN    folic acid (FOLVITE) tablet 1 mg  1 mg Oral DAILY    balsam peru-castor oil (VENELEX) ointment   Topical Q8H    sodium chloride (NS) flush 5-10 mL  5-10 mL IntraVENous PRN    hydrALAZINE (APRESOLINE) 20 mg/mL injection 10 mg  10 mg IntraVENous Q4H PRN    acetaminophen (TYLENOL) tablet 650 mg  650 mg Oral Q4H PRN    ascorbic acid (vitamin C) (VITAMIN C) tablet 500 mg  500 mg Oral DAILY    cholecalciferol (VITAMIN D3) (1000 Units /25 mcg) tablet 1 Tab  1,000 Units Oral DAILY    cyclobenzaprine (FLEXERIL) tablet 10 mg  10 mg Oral TID PRN    ferrous sulfate tablet 325 mg  325 mg Oral ACB    lactobac ac& pc-s.therm-b.anim (JAGJIT Q/RISAQUAD)  1 Cap Oral DAILY    levothyroxine (SYNTHROID) tablet 100 mcg  100 mcg Oral ACB    lidocaine (LIDODERM) 5 % patch 1 Patch  1 Patch TransDERmal Q24H    meclizine (ANTIVERT) tablet 25 mg  25 mg Oral QPM    mexiletine (MEXITIL) capsule 200 mg  200 mg Oral Q8H    pantoprazole (PROTONIX) tablet 40 mg  40 mg Oral DAILY    senna-docusate (PERICOLACE) 8.6-50 mg per tablet 1 Tab  1 Tab Oral BID PRN    tamsulosin (FLOMAX) capsule 0.4 mg  0.4 mg Oral DAILY    therapeutic multivitamin (THERAGRAN) tablet 1 Tab  1 Tab Oral DAILY    sodium chloride (NS) flush 5-40 mL  5-40 mL IntraVENous Q8H    sodium chloride (NS) flush 5-40 mL  5-40 mL IntraVENous PRN    ondansetron (ZOFRAN) injection 4 mg  4 mg IntraVENous Q4H PRN    insulin lispro (HUMALOG) injection   SubCUTAneous AC&HS    glucose chewable tablet 16 g  4 Tab Oral PRN    glucagon (GLUCAGEN) injection 1 mg  1 mg IntraMUSCular PRN    dextrose 10% infusion 0-250 mL  0-250 mL IntraVENous PRN       Care Plan discussed with:  Patient/Family and Nurse    Juan Valencia MD  Internal Medicine  Date of Service: 12/31/2019 Rehabilitation services

## 2023-04-26 NOTE — PHYSICAL THERAPY INITIAL EVALUATION ADULT - ADDITIONAL COMMENTS
Pt lives with her family in a house with ~3-5 stairs to enter and a chair lift to her bedroom on the second floor. prior to admission Pt was independent with all mobility and ambulated with a rolling walker. Pt denies any falls over the last year. Pt stated she was receiving home physical therapy, prior to admission.     Pt. left comfortable in bed with all tubes/lines intact, +bed alarm, head of bed elevated 30 degrees, call bell in reach and in NAD. pt's daughter at bedside.

## 2023-04-26 NOTE — DISCHARGE NOTE PROVIDER - NSDCMRMEDTOKEN_GEN_ALL_CORE_FT
acetaminophen 325 mg oral tablet: 2 tab(s) orally every 6 hours, As needed, Temp greater or equal to 38C (100.4F), Mild Pain (1 - 3), Moderate Pain (4 - 6)  alendronate 70 mg oral tablet: 1 tab(s) orally once a week  aspirin 81 mg oral delayed release tablet: 1 tab(s) orally once a day  atenolol 50 mg oral tablet: 1 tab(s) orally 2 times a day  cetirizine 10 mg oral tablet: 1 orally once a day  Eliquis 5 mg oral tablet: 1 orally 2 times a day  ezetimibe 10 mg oral tablet: 1 tab(s) orally once a day  famotidine 40 mg oral tablet: 1 tab(s) orally once a day (at bedtime)  furosemide 20 mg oral tablet: 1 tab(s) orally once a day  levothyroxine 75 mcg (0.075 mg) oral tablet: 1 tab(s) orally once a day  losartan 100 mg oral tablet: 1 orally once a day  melatonin 3 mg oral tablet: 1 tab(s) orally once a day (at bedtime)  miSOPROStol 200 mcg oral tablet: 1 orally once a day  One A Day Women&#x27;s Complete oral tablet: 1 tab(s) orally once a day  oxycodone-acetaminophen 5 mg-325 mg oral tablet: 1 tab(s) orally 2 times a day  polyethylene glycol 3350 oral powder for reconstitution: 17 gram(s) orally once a day, As needed, Constipation  rosuvastatin 20 mg oral tablet: 1 tab(s) orally once a day  senna leaf extract oral tablet: 2 tab(s) orally once a day (at bedtime)  Ventolin HFA 90 mcg/inh inhalation aerosol: 2 puff(s) inhaled every 6 hours, As Needed  Vitamin B12: 5000 microgram(s) sublingual once a day  Vitamin D3 50 mcg (2000 intl units) oral tablet: 1 tab(s) orally once a day   acetaminophen 325 mg oral tablet: 2 tab(s) orally every 6 hours, As needed, Temp greater or equal to 38C (100.4F), Mild Pain (1 - 3), Moderate Pain (4 - 6)  alendronate 70 mg oral tablet: 1 tab(s) orally once a week  apixaban 2.5 mg oral tablet: 1 tab(s) orally 2 times a day  aspirin 81 mg oral delayed release tablet: 1 tab(s) orally once a day  atenolol 50 mg oral tablet: 1 tab(s) orally 2 times a day  cetirizine 10 mg oral tablet: 1 orally once a day  ezetimibe 10 mg oral tablet: 1 tab(s) orally once a day  famotidine 40 mg oral tablet: 1 tab(s) orally once a day (at bedtime)  furosemide 20 mg oral tablet: 1 tab(s) orally every 12 hours  levothyroxine 75 mcg (0.075 mg) oral tablet: 1 tab(s) orally once a day  losartan 100 mg oral tablet: 1 orally once a day  melatonin 3 mg oral tablet: 1 tab(s) orally once a day (at bedtime)  miSOPROStol 200 mcg oral tablet: 1 orally once a day  One A Day Women&#x27;s Complete oral tablet: 1 tab(s) orally once a day  oxycodone-acetaminophen 5 mg-325 mg oral tablet: 1 tab(s) orally 2 times a day  pantoprazole 40 mg oral delayed release tablet: 1 tab(s) orally once a day  rosuvastatin 20 mg oral tablet: 1 tab(s) orally once a day  Ventolin HFA 90 mcg/inh inhalation aerosol: 2 puff(s) inhaled every 6 hours, As Needed  Vitamin B12: 5000 microgram(s) sublingual once a day  Vitamin D3 50 mcg (2000 intl units) oral tablet: 1 tab(s) orally once a day   acetaminophen 325 mg oral tablet: 2 tab(s) orally every 6 hours, As needed, Temp greater or equal to 38C (100.4F), Mild Pain (1 - 3), Moderate Pain (4 - 6)  alendronate 70 mg oral tablet: 1 tab(s) orally once a week  apixaban 2.5 mg oral tablet: 1 tab(s) orally 2 times a day  aspirin 81 mg oral delayed release tablet: 1 tab(s) orally once a day  atenolol 50 mg oral tablet: 1 tab(s) orally 2 times a day  cetirizine 10 mg oral tablet: 1 orally once a day  ezetimibe 10 mg oral tablet: 1 tab(s) orally once a day  famotidine 40 mg oral tablet: 1 tab(s) orally once a day (at bedtime)  furosemide 20 mg oral tablet: 1 tab(s) orally every 12 hours  levothyroxine 75 mcg (0.075 mg) oral tablet: 1 tab(s) orally once a day  melatonin 3 mg oral tablet: 1 tab(s) orally once a day (at bedtime)  miSOPROStol 200 mcg oral tablet: 1 orally once a day  One A Day Women&#x27;s Complete oral tablet: 1 tab(s) orally once a day  pantoprazole 40 mg oral delayed release tablet: 1 tab(s) orally once a day  polyethylene glycol 3350 oral powder for reconstitution: 17 gram(s) orally once  rosuvastatin 20 mg oral tablet: 1 tab(s) orally once a day  senna leaf extract oral tablet: 2 tab(s) orally once  Ventolin HFA 90 mcg/inh inhalation aerosol: 2 puff(s) inhaled every 6 hours, As Needed  Vitamin B12: 5000 microgram(s) sublingual once a day  Vitamin D3 50 mcg (2000 intl units) oral tablet: 1 tab(s) orally once a day

## 2023-04-26 NOTE — PROGRESS NOTE ADULT - SUBJECTIVE AND OBJECTIVE BOX
Surgery Progress Note     Overnight Events: none    Subjective:  Patient seen and examined on AM rounds. Patient had arthritis pain overnight, relieved with oxycodone (takes percocet at home). Denies nausea, vomiting, fevers, chills. Had one nonbloody, non-melanotic stool and her abdominal pain is improving. Tolerating diet.      Vital Signs:  Vital Signs Last 24 Hrs  T(C): 36.7 (26 Apr 2023 06:08), Max: 37.3 (25 Apr 2023 10:03)  T(F): 98 (26 Apr 2023 06:08), Max: 99.2 (25 Apr 2023 10:03)  HR: 79 (26 Apr 2023 06:08) (78 - 95)  BP: 108/65 (26 Apr 2023 06:08) (108/65 - 146/63)  BP(mean): --  RR: 18 (26 Apr 2023 06:08) (16 - 18)  SpO2: 97% (26 Apr 2023 06:08) (97% - 100%)    Parameters below as of 26 Apr 2023 06:08  Patient On (Oxygen Delivery Method): room air        CAPILLARY BLOOD GLUCOSE          I&O's Detail    25 Apr 2023 07:01  -  26 Apr 2023 07:00  --------------------------------------------------------  IN:    IV PiggyBack: 50 mL    Lactated Ringers: 150 mL  Total IN: 200 mL    OUT:    Voided (mL): 1700 mL  Total OUT: 1700 mL    Total NET: -1500 mL            Physical Exam:  General: NAD, resting comfortably in bed  HEENT: Normocephalic atraumatic  Respiratory: Nonlabored respirations  Cardio: regular rate  Abdomen: soft, nondistended, nontender  Vascular: extremities are warm and well perfused      Labs:    04-25    137  |  102  |  14  ----------------------------<  84  3.7   |  24  |  0.87    Ca    7.9<L>      25 Apr 2023 07:24  Phos  2.6     04-25  Mg     1.50     04-25                              9.6    10.11 )-----------( 104      ( 25 Apr 2023 07:24 )             27.5

## 2023-04-26 NOTE — DISCHARGE NOTE PROVIDER - NSDCFUADDAPPT_GEN_ALL_CORE_FT
Follow-up with cardiologist within 1-2 weeks following discharge, you will need a repeat echo.     Follow-up with your hematologist within 1-2 weeks following discharge.     Follow-up with your PCP within 1 -2 weeks following discharge.  Follow-up with cardiologist within 1-2 weeks following discharge, you will need a repeat echo.     Follow-up with your hematologist, Dr. Alvarado of [facility] after discharge within 1-2 weeks following discharge.     Follow-up with your PCP within 1 -2 weeks following discharge.  Follow-up with cardiologist within 1 week following discharge, you will need a repeat echo.     Follow-up with your hematologist, Dr. Alvarado of [facility] after discharge within 1-2 weeks following discharge.     Follow-up with your PCP within 1 -2 weeks following discharge.

## 2023-04-26 NOTE — DISCHARGE NOTE PROVIDER - NSFOLLOWUPCLINICS_GEN_ALL_ED_FT
[facility]  Gastroenterology  [address] [place] [id number]  Phone: [phone]  Fax:   Follow Up Time: Routine     [facility]  Gastroenterology  [address] [place] [id number]  Phone: [phone]  Fax:   Follow Up Time: 2 weeks

## 2023-04-26 NOTE — DISCHARGE NOTE PROVIDER - PROVIDER TOKENS
PROVIDER:[TOKEN:[[id number]],FOLLOWUP:[2 weeks]] PROVIDER:[TOKEN:[[id number]],FOLLOWUP:[2 weeks]],PROVIDER:[TOKEN:[[id number]]] PROVIDER:[TOKEN:[[id number]],FOLLOWUP:[2 weeks]],PROVIDER:[TOKEN:[[id number]],FOLLOWUP:[2 weeks]] PROVIDER:[TOKEN:[[id number]],FOLLOWUP:[2 weeks]],PROVIDER:[TOKEN:[[id number]],FOLLOWUP:[1 week]]

## 2023-04-26 NOTE — PROGRESS NOTE ADULT - ASSESSMENT
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 Del Cid per patient) PE in 9/2021 presented with GI bleed. 4/24 S/p enteroscopy and colonoscopy demonstrating no active bleed, old blood in colon, likely distal small bowel source.    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 Del Cid per patient) PE in 9/2021 presented with GI bleed. 4/24 S/p enteroscopy and colonoscopy demonstrating no active bleed, old blood in colon, likely distal small bowel source.    Plan  - regular diet  - pain control  - monitor H/H  - holding eliquis   - PT consult    A team surgery   [id number] 87F with PMHx HTN, CAD (s/p PCI 2021), AV replacement, hypothyroidism, and HLD, S4 serous endometrial carcinoma [place] wall port, last chemo 12/2022, s/p 2/2023 resection ?hyst), right hemicolectomy (13 years ago with Dr. Del Cid per patient) PE in 9/2021 presented with GI bleed. 4/24 S/p enteroscopy and colonoscopy demonstrating no active bleed, old blood in colon, likely distal small bowel source.    Plan  - IV vit K 5mg  - repeat CBC/coags  - holding eliquis   - regular diet  - pain control  - monitor H/H  - PT eval    A team surgery   [id number]

## 2023-04-27 NOTE — PROGRESS NOTE ADULT - ASSESSMENT
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 Del Cid per patient) PE in 9/2021 presented with GI bleed. 4/24 S/p enteroscopy and colonoscopy demonstrating no active bleed, old blood in colon, likely distal small bowel source.    Plan  - Acute Blood Loss Anemia H/H 7.7/23  - D/c Aspirin  - Transfuse 1u PRBC  - Post Transfusion CBC  - Continue holding eliquis   - Continue regular diet  - PT: Home PT  - OOB Ambulate    A team surgery   [id number]

## 2023-04-27 NOTE — PROGRESS NOTE ADULT - SUBJECTIVE AND OBJECTIVE BOX
Patient is a 87y old  Female who presents with a chief complaint of GI bleed (27 Apr 2023 07:43)    She denies any bleeding.  No complaints.     MEDICATIONS  (STANDING):  atorvastatin 80 milliGRAM(s) Oral at bedtime  furosemide    Tablet 20 milliGRAM(s) Oral daily  levothyroxine 75 MICROGram(s) Oral daily  pantoprazole    Tablet 40 milliGRAM(s) Oral two times a day    MEDICATIONS  (PRN):  albuterol    90 MICROgram(s) HFA Inhaler 2 Puff(s) Inhalation every 6 hours PRN Shortness of Breath and/or Wheezing  loratadine 10 milliGRAM(s) Oral daily PRN allergy  melatonin 3 milliGRAM(s) Oral at bedtime PRN Insomnia      Vital Signs Last 24 Hrs  T(C): 36.8 (27 Apr 2023 17:20), Max: 37 (27 Apr 2023 14:51)  T(F): 98.2 (27 Apr 2023 17:20), Max: 98.6 (27 Apr 2023 14:51)  HR: 77 (27 Apr 2023 17:20) (72 - 85)  BP: 118/52 (27 Apr 2023 17:20) (94/45 - 119/61)  BP(mean): --  RR: 17 (27 Apr 2023 17:20) (17 - 18)  SpO2: 100% (27 Apr 2023 17:20) (94% - 100%)    Parameters below as of 27 Apr 2023 17:20  Patient On (Oxygen Delivery Method): room air        PE  NAD  Awake, alert  Anicteric  RRR  CTAB  Abd soft, NT, ND  No edema                        10.0   8.50  )-----------( 88       ( 27 Apr 2023 17:44 )             30.0       04-27    138  |  105  |  12  ----------------------------<  93  4.4   |  22  |  1.04    Ca    7.5<L>      27 Apr 2023 05:27  Phos  3.0     04-27  Mg     1.90     04-27

## 2023-04-27 NOTE — PROGRESS NOTE ADULT - SUBJECTIVE AND OBJECTIVE BOX
A Team General Surgery Progress Note    S: Pt seen and examined with team on morning rounds. Patient feels well. Denies abdominal pain/dizziness/CP/SOB. No BM for 2 days. No blood per rectum currently.      Vital Signs Last 24 Hrs  T(C): 36.7 (27 Apr 2023 05:50), Max: 37 (26 Apr 2023 18:00)  T(F): 98 (27 Apr 2023 05:50), Max: 98.6 (26 Apr 2023 18:00)  HR: 79 (27 Apr 2023 05:50) (79 - 90)  BP: 113/60 (27 Apr 2023 05:50) (106/59 - 134/64)  BP(mean): --  RR: 18 (27 Apr 2023 05:50) (17 - 18)  SpO2: 94% (27 Apr 2023 05:50) (94% - 100%)    Parameters below as of 27 Apr 2023 05:50  Patient On (Oxygen Delivery Method): room air        I&O's Summary    26 Apr 2023 07:01  -  27 Apr 2023 07:00  --------------------------------------------------------  IN: 1700 mL / OUT: 950 mL / NET: 750 mL      I&O's Detail    26 Apr 2023 07:01  -  27 Apr 2023 07:00  --------------------------------------------------------  IN:    IV PiggyBack: 450 mL    Oral Fluid: 1250 mL  Total IN: 1700 mL    OUT:    Voided (mL): 950 mL  Total OUT: 950 mL    Total NET: 750 mL          General Appearance: Appears well, NAD  Chest: Breathing comfortably on RA.    CV: S1, S2 @ 79  Abdomen: Soft, nondistended, nontender.  Extremities: Moves all extremities.  .  MEDICATIONS  (STANDING):  aspirin  chewable 81 milliGRAM(s) Oral daily  atenolol  Tablet 50 milliGRAM(s) Oral two times a day  atorvastatin 80 milliGRAM(s) Oral at bedtime  furosemide    Tablet 20 milliGRAM(s) Oral daily  levothyroxine 75 MICROGram(s) Oral daily  losartan 100 milliGRAM(s) Oral daily  pantoprazole    Tablet 40 milliGRAM(s) Oral two times a day    MEDICATIONS  (PRN):  albuterol    90 MICROgram(s) HFA Inhaler 2 Puff(s) Inhalation every 6 hours PRN Shortness of Breath and/or Wheezing  loratadine 10 milliGRAM(s) Oral daily PRN allergy  melatonin 3 milliGRAM(s) Oral at bedtime PRN Insomnia      LABS:                        7.7    7.66  )-----------( 89       ( 27 Apr 2023 05:27 )             23.0     04-27    138  |  105  |  12  ----------------------------<  93  4.4   |  22  |  1.04    Ca    7.5<L>      27 Apr 2023 05:27  Phos  3.0     04-27  Mg     1.90     04-27      PT/INR - ( 26 Apr 2023 12:50 )   PT: 13.6 sec;   INR: 1.17 ratio         PTT - ( 26 Apr 2023 12:50 )  PTT:29.9 sec

## 2023-04-27 NOTE — PROGRESS NOTE ADULT - ASSESSMENT
The patient is a 87y Female complaining of rectal bleeding.    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   --s/p neoadjuvant treatment w/ carbo/taxol  --s/p palliative debulking surgery with Dr. Barton on 2/10/23, clear margins, stromal involvement.  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 Graves of [facility]  --ongoing care after discharge    rectal bleed  --Hgb 5.4 on admission, s/p 2u PRBC's, 1u plasma  --CTA w/ small bowel extravagation  -- S/p enteroscopy and colonoscopy 4/24 demonstrating no active bleed, old blood in colon, likely distal small bowel source.  - follow up with GI reccs     Anemia  --longstanding, 2/2 malignancy, treatment, mechanical valve. Now worsened by GIB  --Hgb 9-10 at baseline  -- hg dropped a bit but no bleeding.   -- planned for a transfusion today.  Monitor her Hg closely.       Fabienne Graves D.O.   HematologyOncology   O: [phone]

## 2023-04-28 NOTE — PROGRESS NOTE ADULT - ASSESSMENT
87F with PMHx HTN, CAD (s/p PCI 2021), AV replacement, hypothyroidism, and HLD, S4 serous [place] [place], last chemo 12/2022, s/p 2/2023 resection ?hyst), right hemicolectomy (13 years ago with Dr. Del Cid per patient) PE in 9/2021 presented with GI bleed. 4/24 S/p enteroscopy and colonoscopy demonstrating no active bleed, old blood in colon, likely distal small bowel source.    Plan  - Post transfusion Hgb 10 - f/u AM CBC  - Continue holding eliquis  - Continue regular diet  - PT: Home PT  - OOB    A team surgery   [id number]

## 2023-04-28 NOTE — PROGRESS NOTE ADULT - ASSESSMENT
The patient is a 87y Female complaining of rectal bleeding.    Uterine carcinosarcoma  --under the care  Dr Nelson of 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   --s/p neoadjuvant treatment w/ carbo/taxol  --s/p palliative debulking surgery with Dr. Barton on 2/10/23, clear margins, stromal involvement.  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  --follows w/ Dr. Graves of [facility]  --ongoing care after discharge    rectal bleed  --Hgb 5.4 on admission, s/p 2u PRBC's, 1u plasma  --CTA w/ small bowel extravagation  -- S/p enteroscopy and colonoscopy 4/24 demonstrating no active bleed, old blood in colon, likely distal small bowel source.  - follow up with GI reccs     Anemia  --longstanding, 2/2 malignancy, treatment, mechanical valve. Now worsened by GIB  --Hgb 9-10 at baseline  -- hg dropped a bit but no bleeding.   -- monitor her Hg closely       Fabienne Graves D.O.   HematologyOncology   O: [phone]

## 2023-04-28 NOTE — PROGRESS NOTE ADULT - SUBJECTIVE AND OBJECTIVE BOX
Patient is a 87y old  Female who presents with a chief complaint of GI bleed (28 Apr 2023 08:19)    She denies any bleeding.  She received 1 unit of PRBC yesterday.     MEDICATIONS  (STANDING):  atorvastatin 80 milliGRAM(s) Oral at bedtime  furosemide    Tablet 20 milliGRAM(s) Oral daily  levothyroxine 75 MICROGram(s) Oral daily  pantoprazole    Tablet 40 milliGRAM(s) Oral two times a day    MEDICATIONS  (PRN):  albuterol    90 MICROgram(s) HFA Inhaler 2 Puff(s) Inhalation every 6 hours PRN Shortness of Breath and/or Wheezing  loratadine 10 milliGRAM(s) Oral daily PRN allergy  melatonin 3 milliGRAM(s) Oral at bedtime PRN Insomnia      Vital Signs Last 24 Hrs  T(C): 37.2 (28 Apr 2023 18:00), Max: 37.2 (28 Apr 2023 18:00)  T(F): 99 (28 Apr 2023 18:00), Max: 99 (28 Apr 2023 18:00)  HR: 90 (28 Apr 2023 18:00) (76 - 90)  BP: 129/51 (28 Apr 2023 18:00) (109/50 - 133/69)  BP(mean): 70 (28 Apr 2023 18:00) (70 - 70)  RR: 18 (28 Apr 2023 18:00) (17 - 18)  SpO2: 99% (28 Apr 2023 18:00) (95% - 99%)    Parameters below as of 28 Apr 2023 18:00  Patient On (Oxygen Delivery Method): room air        PE  NAD  Awake, alert  Anicteric  RRR  CTAB  Abd soft, NT, ND  No edema                          10.8   8.81  )-----------( 100      ( 28 Apr 2023 06:50 )             32.5       04-28    137  |  104  |  9   ----------------------------<  92  3.9   |  23  |  0.92    Ca    8.1<L>      28 Apr 2023 06:50  Phos  2.7     04-28  Mg     1.70     04-28

## 2023-04-28 NOTE — PROGRESS NOTE ADULT - SUBJECTIVE AND OBJECTIVE BOX
Surgery Progress Note     Overnight Events: post-transfusion Hgb 10 (7.7)    Subjective:  Patient seen and examined on AM rounds. Patient reports that she is doing well with minimal pain. +flatus, no BM for past 2 days. Tolerating diet.      Vital Signs:  Vital Signs Last 24 Hrs  T(C): 36.4 (28 Apr 2023 05:30), Max: 37.1 (27 Apr 2023 22:00)  T(F): 97.5 (28 Apr 2023 05:30), Max: 98.7 (27 Apr 2023 22:00)  HR: 80 (28 Apr 2023 05:30) (72 - 80)  BP: 133/69 (28 Apr 2023 05:30) (94/45 - 133/69)  BP(mean): --  RR: 18 (28 Apr 2023 05:30) (17 - 18)  SpO2: 97% (28 Apr 2023 05:30) (97% - 100%)    Parameters below as of 28 Apr 2023 05:30  Patient On (Oxygen Delivery Method): room air        CAPILLARY BLOOD GLUCOSE          I&O's Detail    27 Apr 2023 07:01  -  28 Apr 2023 07:00  --------------------------------------------------------  IN:    Oral Fluid: 1160 mL    PRBCs (Packed Red Blood Cells): 300 mL  Total IN: 1460 mL    OUT:    Voided (mL): 1000 mL  Total OUT: 1000 mL    Total NET: 460 mL            Physical Exam:  General: NAD, resting comfortably in bed  HEENT: Normocephalic atraumatic  Respiratory: Nonlabored respirations  Cardio: regular rate  Abdomen: soft, nondistended, nontender  Vascular: extremities are warm and well perfused      Labs:    04-27    138  |  105  |  12  ----------------------------<  93  4.4   |  22  |  1.04    Ca    7.5<L>      27 Apr 2023 05:27  Phos  3.0     04-27  Mg     1.90     04-27                              10.0   8.50  )-----------( 88       ( 27 Apr 2023 17:44 )             30.0     PT/INR - ( 26 Apr 2023 12:50 )   PT: 13.6 sec;   INR: 1.17 ratio         PTT - ( 26 Apr 2023 12:50 )  PTT:29.9 sec

## 2023-04-29 NOTE — PROGRESS NOTE ADULT - ASSESSMENT
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 Del Cid per patient) PE in 9/2021 presented with GI bleed. 4/24 S/p enteroscopy and colonoscopy demonstrating no active bleed, old blood in colon, likely distal small bowel source.    Plan  - Monitor H/H - currently stable @10  - Continue holding eliquis, ASA, atenolol  - Continue regular diet  - PT: Home PT  - OOB    A team surgery   [id number]

## 2023-04-29 NOTE — PROGRESS NOTE ADULT - SUBJECTIVE AND OBJECTIVE BOX
Overnight events:   - No acute events    SUBJECTIVE:  Patient was seen and examined on AM rounds. Reports having a tough sleep overnight. Denies abdominal pain, SOB, N/V, fever/chills. Tolerating diet.     OBJECTIVE:  Vital Signs Last 24 Hrs  T(C): 36.6 (29 Apr 2023 05:00), Max: 37.2 (28 Apr 2023 18:00)  T(F): 97.9 (29 Apr 2023 05:00), Max: 99 (28 Apr 2023 18:00)  HR: 88 (29 Apr 2023 05:00) (76 - 94)  BP: 115/60 (29 Apr 2023 05:00) (107/55 - 129/51)  BP(mean): 82 (28 Apr 2023 22:00) (70 - 82)  RR: 18 (29 Apr 2023 05:00) (18 - 18)  SpO2: 100% (29 Apr 2023 05:00) (95% - 100%)    Parameters below as of 29 Apr 2023 05:00  Patient On (Oxygen Delivery Method): room air        04-28-23 @ 07:01  -  04-29-23 @ 07:00  --------------------------------------------------------  IN: 640 mL / OUT: 850 mL / NET: -210 mL      Physical Examination:  General: NAD, resting comfortably in bed, A&Ox3  HEENT: Normocephalic atraumatic  Respiratory: Nonlabored respirations  Cardio: regular rate  Abdomen: soft, nondistended, nontender  Vascular: extremities are warm and well perfused        LABS:                        10.2   8.54  )-----------( 96       ( 29 Apr 2023 05:34 )             28.5       04-29    138  |  103  |  9   ----------------------------<  94  3.6   |  24  |  0.85    Ca    8.3<L>      29 Apr 2023 05:34  Phos  3.0     04-29  Mg     1.60     04-29

## 2023-04-29 NOTE — PROGRESS NOTE ADULT - SUBJECTIVE AND OBJECTIVE BOX
Patient is a 87y old  Female who presents with a chief complaint of GI bleed (29 Apr 2023 08:22)    Pt seen and examined at bedside. Feeling well, Denies further rectal bleed. Family at bedside.     MEDICATIONS  (STANDING):  atorvastatin 80 milliGRAM(s) Oral at bedtime  furosemide    Tablet 20 milliGRAM(s) Oral daily  levothyroxine 75 MICROGram(s) Oral daily  pantoprazole    Tablet 40 milliGRAM(s) Oral two times a day    MEDICATIONS  (PRN):  albuterol    90 MICROgram(s) HFA Inhaler 2 Puff(s) Inhalation every 6 hours PRN Shortness of Breath and/or Wheezing  loratadine 10 milliGRAM(s) Oral daily PRN allergy  melatonin 3 milliGRAM(s) Oral at bedtime PRN Insomnia    Vital Signs Last 24 Hrs  T(C): 36.6 (29 Apr 2023 05:00), Max: 37.2 (28 Apr 2023 18:00)  T(F): 97.9 (29 Apr 2023 05:00), Max: 99 (28 Apr 2023 18:00)  HR: 88 (29 Apr 2023 05:00) (76 - 94)  BP: 115/60 (29 Apr 2023 05:00) (107/55 - 129/51)  BP(mean): 82 (28 Apr 2023 22:00) (70 - 82)  RR: 18 (29 Apr 2023 05:00) (18 - 18)  SpO2: 100% (29 Apr 2023 05:00) (95% - 100%)    Parameters below as of 29 Apr 2023 05:00  Patient On (Oxygen Delivery Method): room air    PE  NAD  Awake, alert  Anicteric, MMM  No c/c/e  No rash grossly  FROM                        10.2   8.54  )-----------( 96       ( 29 Apr 2023 05:34 )             28.5       04-29    138  |  103  |  9   ----------------------------<  94  3.6   |  24  |  0.85    Ca    8.3<L>      29 Apr 2023 05:34  Phos  3.0     04-29  Mg     1.60     04-29

## 2023-04-29 NOTE — PROGRESS NOTE ADULT - ASSESSMENT
The patient is a 87y Female complaining of rectal bleeding.    Uterine carcinosarcoma  --under the care of Dr. 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   --s/p neoadjuvant treatment w/ carbo/taxol  --s/p palliative debulking surgery with Dr. Barton on 2/10/23, clear margins, stromal involvement.  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 Graves of [facility]    Rectal bleed  --Hgb 5.4 on admission, s/p 5u PRBC's, 1u plasma  --CTA w/ small bowel extravagation  --S/p enteroscopy and colonoscopy 4/24 demonstrating no active bleed, old blood in colon, likely distal small bowel source.  --CRS following, holding ASA, atenolol, and eliquis    Anemia  --longstanding, 2/2 malignancy, treatment, mechanical valve. Now worsened by GIB  --Hgb 9-10 at baseline  --Hgb stable, 10.2    Will continue to follow.    Juancarlos Esqueda PA-C  Hematology/Oncology  [facility]  [phone] (office)

## 2023-04-30 NOTE — DIETITIAN INITIAL EVALUATION ADULT - NSICDXPASTMEDICALHX_GEN_ALL_CORE_FT
PAST MEDICAL HISTORY:  CAD (coronary artery disease)     Endometrial ca S4 serous endometrial carcinoma, [facility] [place], chemotherapy    HLD (hyperlipidemia)     HTN (hypertension)     Hypothyroidism     S/P AVR

## 2023-04-30 NOTE — DIETITIAN INITIAL EVALUATION ADULT - OTHER INFO
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 Multiple episodes of melena. Found to have small bowel bleed.    Patient seen at bedside. Reports good appetite in hospital. As per RN flow sheet, pt's PO intake varies from %. Pt's diet initiated on Clear liquid diet due to GI hemorrhage, diet has been advanced to regular diet consistency. Denies chewing and swallowing difficulties. Pt has been supplementing with Ensure Enlive 3x daily (1050 zehra and 60 gm protein). Pt states she dislike vanilla flavor, requesting Chocolate flavor. Request honored to encourage consumption. Denies any GI distress (nausea/vomiting/diarrhea/constipation.) last BM 4/29 no blood or melena as per pt. Pt's labs notable with low H/H on admission, s/p 5U PRBC as per Heme/Onc note 4/29. H/H is trending up and stable now. Pt noted previously with rectal bleeding. Colorectal Surgery team is following.     Provided pt with Heart Healthy Nutrition Therapy, encouraged pt to consume healthy fat food choices e.g avocado, nuts, seeds, and fish. Stressed importance of balanced meals with adequate protein and fiber. RD to remain available for further nutritional interventions as indicated.

## 2023-04-30 NOTE — PROGRESS NOTE ADULT - ASSESSMENT
The patient is a 87y Female complaining of rectal bleeding.    Hg holding stable, no bleeding.  Recommend to continue off anticoagulation for now.    If duplex consistent with DVT, then would recommend IVC filter- otherwise would not place filter.    Uterine carcinosarcoma  --under the care of Dr. 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   --s/p neoadjuvant treatment w/ carbo/taxol  --s/p palliative debulking surgery with Dr. Barton on 2/10/23, clear margins, stromal involvement.  --ongoing care after discharge    History of PE  --RUL segmental PE noted on CTA 8/2022  --will require long term anticoagulation due to her active disease and immobility  --LE duplex negative  --Recommend resuming Eliquis 2.5mg BID; was held due to GIB  --follows w/ Dr. Graves of [facility]    Rectal bleed  --Hgb 5.4 on admission, s/p 5u PRBC's, 1u plasma  --CTA w/ small bowel extravagation  --S/p enteroscopy and colonoscopy 4/24 demonstrating no active bleed, old blood in colon, likely distal small bowel source.  --CRS following, holding ASA, atenolol, and eliquis    Anemia  --longstanding, 2/2 malignancy, treatment, mechanical valve. Now worsened by GIB  --Hgb 9-10 at baseline  --Hgb stable, 10.7    Will continue to follow.    Juancarlos Esqueda PA-C  Hematology/Oncology  [facility]  [phone] (office)

## 2023-04-30 NOTE — PROGRESS NOTE ADULT - SUBJECTIVE AND OBJECTIVE BOX
Surgery Progress Note     Overnight Events:     Subjective:  Patient seen and examined.       Vital Signs:  Vital Signs Last 24 Hrs  T(C): 36.6 (30 Apr 2023 05:00), Max: 36.8 (29 Apr 2023 13:55)  T(F): 97.8 (30 Apr 2023 05:00), Max: 98.2 (29 Apr 2023 13:55)  HR: 99 (30 Apr 2023 05:00) (67 - 99)  BP: 115/67 (30 Apr 2023 05:00) (115/67 - 134/65)  BP(mean): 81 (29 Apr 2023 10:19) (81 - 81)  RR: 18 (30 Apr 2023 05:00) (16 - 18)  SpO2: 96% (30 Apr 2023 05:00) (96% - 100%)    Parameters below as of 30 Apr 2023 05:00  Patient On (Oxygen Delivery Method): room air        CAPILLARY BLOOD GLUCOSE          I&O's Detail    28 Apr 2023 07:01  -  29 Apr 2023 07:00  --------------------------------------------------------  IN:    Oral Fluid: 640 mL  Total IN: 640 mL    OUT:    IV PiggyBack: 0 mL    Voided (mL): 850 mL  Total OUT: 850 mL    Total NET: -210 mL      29 Apr 2023 07:01  -  30 Apr 2023 06:44  --------------------------------------------------------  IN:    Oral Fluid: 340 mL  Total IN: 340 mL    OUT:    IV PiggyBack: 0 mL    Voided (mL): 850 mL  Total OUT: 850 mL    Total NET: -510 mL            Physical Exam:  General: NAD, resting comfortably in bed  HEENT: Normocephalic atraumatic  Respiratory: Nonlabored respirations  Cardio: regular rate  Abdomen: soft, nondistended, nontender  Vascular: extremities are warm and well perfused      Labs:    04-29    138  |  103  |  9   ----------------------------<  94  3.6   |  24  |  0.85    Ca    8.3<L>      29 Apr 2023 05:34  Phos  3.0     04-29  Mg     1.60     04-29                              10.2   8.54  )-----------( 96       ( 29 Apr 2023 05:34 )             28.5     PT/INR - ( 28 Apr 2023 06:50 )   PT: 14.0 sec;   INR: 1.20 ratio         PTT - ( 28 Apr 2023 06:50 )  PTT:32.0 sec     Surgery Progress Note     Overnight Events: none    Subjective:  Patient seen and examined on AM rounds. Had 2 BMs yesterday, no blood or melena. Denies nausea, vomiting, fevers, chills, abdominal pain. Tolerating diet.      Vital Signs:  Vital Signs Last 24 Hrs  T(C): 36.6 (30 Apr 2023 05:00), Max: 36.8 (29 Apr 2023 13:55)  T(F): 97.8 (30 Apr 2023 05:00), Max: 98.2 (29 Apr 2023 13:55)  HR: 99 (30 Apr 2023 05:00) (67 - 99)  BP: 115/67 (30 Apr 2023 05:00) (115/67 - 134/65)  BP(mean): 81 (29 Apr 2023 10:19) (81 - 81)  RR: 18 (30 Apr 2023 05:00) (16 - 18)  SpO2: 96% (30 Apr 2023 05:00) (96% - 100%)    Parameters below as of 30 Apr 2023 05:00  Patient On (Oxygen Delivery Method): room air        CAPILLARY BLOOD GLUCOSE          I&O's Detail    28 Apr 2023 07:01  -  29 Apr 2023 07:00  --------------------------------------------------------  IN:    Oral Fluid: 640 mL  Total IN: 640 mL    OUT:    IV PiggyBack: 0 mL    Voided (mL): 850 mL  Total OUT: 850 mL    Total NET: -210 mL      29 Apr 2023 07:01  -  30 Apr 2023 06:44  --------------------------------------------------------  IN:    Oral Fluid: 340 mL  Total IN: 340 mL    OUT:    IV PiggyBack: 0 mL    Voided (mL): 850 mL  Total OUT: 850 mL    Total NET: -510 mL            Physical Exam:  General: NAD, resting comfortably in bed  HEENT: Normocephalic atraumatic  Respiratory: Nonlabored respirations  Cardio: regular rate  Abdomen: soft, nondistended, nontender  Vascular: extremities are warm and well perfused      Labs:    04-29    138  |  103  |  9   ----------------------------<  94  3.6   |  24  |  0.85    Ca    8.3<L>      29 Apr 2023 05:34  Phos  3.0     04-29  Mg     1.60     04-29                              10.2   8.54  )-----------( 96       ( 29 Apr 2023 05:34 )             28.5     PT/INR - ( 28 Apr 2023 06:50 )   PT: 14.0 sec;   INR: 1.20 ratio         PTT - ( 28 Apr 2023 06:50 )  PTT:32.0 sec

## 2023-04-30 NOTE — DIETITIAN INITIAL EVALUATION ADULT - PERTINENT MEDS FT
MEDICATIONS  (STANDING):  atorvastatin 80 milliGRAM(s) Oral at bedtime  furosemide    Tablet 20 milliGRAM(s) Oral daily  levothyroxine 75 MICROGram(s) Oral daily  pantoprazole    Tablet 40 milliGRAM(s) Oral two times a day    MEDICATIONS  (PRN):  albuterol    90 MICROgram(s) HFA Inhaler 2 Puff(s) Inhalation every 6 hours PRN Shortness of Breath and/or Wheezing  loratadine 10 milliGRAM(s) Oral daily PRN allergy  melatonin 3 milliGRAM(s) Oral at bedtime PRN Insomnia

## 2023-04-30 NOTE — DIETITIAN INITIAL EVALUATION ADULT - PERTINENT LABORATORY DATA
04-30    136  |  102  |  10  ----------------------------<  87  3.7   |  25  |  0.86    Ca    8.4      30 Apr 2023 07:47  Phos  3.4     04-30  Mg     1.30     04-30    A1C with Estimated Average Glucose Result: 5.7 % (08-31-22 @ 12:15)

## 2023-04-30 NOTE — DIETITIAN INITIAL EVALUATION ADULT - ORAL INTAKE PTA/DIET HISTORY
Patient reports has a fair appetite in general. Pt states she tried to finish all her food at each meal, consumes 3 meals daily. Denies any weight loss recently. Pt lives with her son and daughter in law, her son helps with food preparation and cooking. Pt denies any difficulties with food procurement.

## 2023-04-30 NOTE — DIETITIAN INITIAL EVALUATION ADULT - NS FNS WEIGHT CHANGE REASON
Pt reported her UBW fluctuates between 125-130 5months ago. Most recent weight 4/22-140lbs, 4/.3lbs. Weight loss possibly due to fluid shifting. Pt noted on Lasix which may cause weight fluctuations./unintentional

## 2023-04-30 NOTE — CHART NOTE - NSCHARTNOTEFT_GEN_A_CORE
87F with PMHx HTN, CAD (s/p PCI 2021), AV replacement, hypothyroidism, and HLD, S4 serous endometrial carcinoma ([facility] [place], chest wall port, last chemo 12/2022, s/p 2/2023 resection ?hyst), right hemicolectomy (13 years ago with Dr. Del Cid per patient) PE in 9/2021 presented with GI bleed. 4/24 S/p enteroscopy and colonoscopy demonstrating no active bleed, old blood in colon, likely distal small bowel source. H/H has remained stable since 4/27 with no additional bloody or melanotic stools.    B/l LE duplex done today w/o evidence of DVT. Heme/onc with updated recommendations of restarting Eliquis, at lower dose of 2.5mg BID (patient had previously been decreased to this dose the day before admission by outpatient hematologist, had taken one dose). I had extensive phone discussions with daughters, Shayla and Shani, regarding the risk and benefits of anticoagulation in the setting of hx of recent PE (8/2022), endometrial CA dx, as well as recent GI bleed. Risks of another blood clot were discussed extensively, as well as weighing the risks of another GI bleed vs blood clot. I also had an extensive discussion with the patient who stated that she ultimately makes her own decisions. Both the patient and the patient's daughters' would like to discuss further amongst themselves before declining or accepting restarting Eliquis.      Chelsy Finley  A Team Surgery  [id number]
Called by nurse as IV blew at beginning of PRBC transfusion and patient complained of chest pain. Went to examine patient. Appears well. Claims pain resolving and felt like indigestion. No palpitations/SOB/HA/dizziness/nausea/emesis. Pain was 5/10 upper chest/esophagus. Did not travel. Denies abdominal pain. Still +flatus but no BM in 2 days and no blood per rectum. HD Stable and voiding. EKG performed and unchanged from previous. Sinus rhythm @ 70 with no ST changes. Patient receiving Protonix BID for indigestion. IV established and PRBC transfusion resumed. Will continue to monitor.     A Team Surgery  #[id number]
POST-PROCEDURE CHECK    Patient is s/p endoscopy and colonoscopy    Subjective:  Patient reports having no pain  Denies chest pain, shortness of breath, nausea, vomiting  Is not yet having bowel movements  Has tolerated liquids since the procedure. Currently eating liquid dinner    PAST MEDICAL & SURGICAL HISTORY:  Endometrial ca  S4 serous endometrial carcinoma, [facility], chemotherapy      HTN (hypertension)  CAD (coronary artery disease)  S/P AVR  Hypothyroidism  HLD (hyperlipidemia)  S/P AVR (aortic valve replacement)  History of endometrial biopsy      Objective:  Vital Signs Last 24 Hrs  T(C): 36.7 (24 Apr 2023 18:00), Max: 37.1 (24 Apr 2023 02:01)  T(F): 98 (24 Apr 2023 18:00), Max: 98.8 (24 Apr 2023 02:01)  HR: 99 (24 Apr 2023 18:00) (72 - 99)  BP: 148/56 (24 Apr 2023 18:00) (108/78 - 148/56)  BP(mean): --  RR: 16 (24 Apr 2023 18:00) (14 - 20)  SpO2: 100% (24 Apr 2023 18:00) (97% - 100%)    Parameters below as of 24 Apr 2023 18:00  Patient On (Oxygen Delivery Method): room air      PHYSICAL EXAM:  Constitutional: Resting comfortably in bed. No acute distress  Neuro: AOx3, no focal deficits  Respiratory: Normal Respiratory effort  Cardiovascular: Regular rate, warm and well perfused  Gastrointestinal: Abdomen soft, nondistended, nontender      I&O's Detail    23 Apr 2023 07:01  -  24 Apr 2023 07:00  --------------------------------------------------------  IN:    IV PiggyBack: 700 mL    Lactated Ringers: 500 mL    PRBCs (Packed Red Blood Cells): 300 mL  Total IN: 1500 mL    OUT:    Oral Fluid: 0 mL    Voided (mL): 2 mL  Total OUT: 2 mL    Total NET: 1498 mL      24 Apr 2023 07:01  -  24 Apr 2023 18:06  --------------------------------------------------------  IN:    IV PiggyBack: 100 mL    Lactated Ringers: 400 mL  Total IN: 500 mL    OUT:    Oral Fluid: 0 mL    Voided (mL): 100 mL  Total OUT: 100 mL    Total NET: 400 mL        LABS:                        8.5    9.05  )-----------( 112      ( 24 Apr 2023 02:00 )             25.0     04-24    141  |  104  |  19  ----------------------------<  101<H>  3.9   |  25  |  1.00    Ca    7.8<L>      24 Apr 2023 09:02  Phos  3.7     04-24  Mg     2.10     04-24    TPro  7.1  /  Alb  3.4  /  TBili  0.2  /  DBili  x   /  AST  192<H>  /  ALT  243<H>  /  AlkPhos  96  04-22    PT/INR - ( 24 Apr 2023 02:00 )   PT: 15.8 sec;   INR: 1.36 ratio         PTT - ( 24 Apr 2023 02:00 )  PTT:31.1 sec    CAPILLARY BLOOD GLUCOSE          furosemide   Injectable 20      Assessment:  The patient is a 87y Female who is now several hours post-op from a endoscopy and colonoscopy    Plan:  - Getting 1 unit pRBCs - f/u Post transfusion CBC  - Holding DVT PPx  - IV Home meds  - Tolerating CLD  - IVF  - Appreciate GI procedure - No active source of bleeding found on Upper or lower endoscopy. May have a distal ileum bleed that may require further workup when stable.    A Team Surgery  [id number]

## 2023-04-30 NOTE — DIETITIAN INITIAL EVALUATION ADULT - NS FNS DIET ORDER
Diet, Regular:   Low Sodium  Supplement Feeding Modality:  Oral  Ensure Enlive Cans or Servings Per Day:  1       Frequency:  Three Times a day (04-25-23 @ 09:19)

## 2023-04-30 NOTE — PROGRESS NOTE ADULT - SUBJECTIVE AND OBJECTIVE BOX
Patient is a 87y old  Female who presents with a chief complaint of GI bleed (30 Apr 2023 06:43)    Pt seen and examined at bedside. Feeling well.  No rectal bleeding.    MEDICATIONS  (STANDING):  atorvastatin 80 milliGRAM(s) Oral at bedtime  furosemide    Tablet 20 milliGRAM(s) Oral daily  levothyroxine 75 MICROGram(s) Oral daily  pantoprazole    Tablet 40 milliGRAM(s) Oral two times a day    MEDICATIONS  (PRN):  albuterol    90 MICROgram(s) HFA Inhaler 2 Puff(s) Inhalation every 6 hours PRN Shortness of Breath and/or Wheezing  loratadine 10 milliGRAM(s) Oral daily PRN allergy  melatonin 3 milliGRAM(s) Oral at bedtime PRN Insomnia    Vital Signs Last 24 Hrs  T(C): 36.8 (30 Apr 2023 10:55), Max: 36.8 (29 Apr 2023 13:55)  T(F): 98.2 (30 Apr 2023 10:55), Max: 98.2 (29 Apr 2023 13:55)  HR: 99 (30 Apr 2023 10:55) (67 - 99)  BP: 124/61 (30 Apr 2023 10:55) (115/67 - 134/65)  BP(mean): --  RR: 18 (30 Apr 2023 10:55) (16 - 18)  SpO2: 96% (30 Apr 2023 10:55) (96% - 100%)    Parameters below as of 30 Apr 2023 10:55  Patient On (Oxygen Delivery Method): room air        PE  NAD  Awake, alert  Anicteric, MMM  No c/c/e  No rash grossly  FROM                          10.7   8.74  )-----------( 104      ( 30 Apr 2023 07:47 )             32.5       04-30    136  |  102  |  10  ----------------------------<  87  3.7   |  25  |  0.86    Ca    8.4      30 Apr 2023 07:47  Phos  3.4     04-30  Mg     1.30     04-30

## 2023-04-30 NOTE — PROGRESS NOTE ADULT - ASSESSMENT
87F with PMHx HTN, CAD (s/p PCI 2021), AV replacement, hypothyroidism, and HLD, S4 serous [place] [place], chest [place], last chemo 12/2022, s/p 2/2023 resection ?hyst), right hemicolectomy (13 years ago with Dr. Del Cid per patient) PE in 9/2021 presented with GI bleed. 4/24 S/p enteroscopy and colonoscopy demonstrating no active bleed, old blood in colon, likely distal small bowel source.    Plan  - Monitor H/H - currently stable @10  - Appreciate heme recs - f/u daily labs  - Continue holding eliquis, ASA, atenolol  - Continue regular diet  - PT: Home PT  - OOB    A team surgery   [id number] 87F with PMHx HTN, CAD (s/p PCI 2021), AV replacement, hypothyroidism, and HLD, S4 serous endometrial carcinoma [place] [place], chest wall port, last chemo 12/2022, s/p 2/2023 resection ?hyst), right hemicolectomy (13 years ago with Dr. Del Cid per patient) PE in 9/2021 presented with GI bleed. 4/24 S/p enteroscopy and colonoscopy demonstrating no active bleed, old blood in colon, likely distal small bowel source.    Plan  - Monitor H/H - currently stable @10  - Appreciate heme/onc recs  - Continue holding eliquis, ASA, atenolol  - Continue regular diet  - PT: Home PT  - OOB    A team surgery   [id number] 87F with PMHx HTN, CAD (s/p PCI 2021), AV replacement, hypothyroidism, and HLD, S4 serous endometrial carcinoma ([facility] [place], chest wall port, last chemo 12/2022, s/p 2/2023 resection ?hyst), right hemicolectomy (13 years ago with Dr. Del Cid per patient) PE in 9/2021 presented with GI bleed. 4/24 S/p enteroscopy and colonoscopy demonstrating no active bleed, old blood in colon, likely distal small bowel source.    Plan  - LE duplex pending to r/o DVT  - Monitor H/H - currently stable @10  - Appreciate heme/onc recs  - Continue holding eliquis, ASA, atenolol  - Continue regular diet  - PT: Home PT  - OOB/ambulate    A team surgery   [id number]

## 2023-04-30 NOTE — DIETITIAN INITIAL EVALUATION ADULT - ADD RECOMMEND
1) Recommend change nutrition supplement to Ensure Plus HP 2x daily (350cal, 20gm pro each).   2) Recommend continue with current diet, which remains appropriate at this time.   3) Monitor PO intake, Labs, weights, BMs, and skin integrity.   4) RD to remain available for further nutritional interventions as indicated.

## 2023-05-01 NOTE — PROVIDER CONTACT NOTE (OTHER) - ASSESSMENT
A&O4, pt denies pain/ chest pain,  this time, skin intact on R big toe, R big toe tender.
A&O4, pt denies pain this time, skin intact on R big toe, R big toe tender.
A&O4, pt c/o severe pain in R big toe, skin intact on R big toe, R big toe tender.

## 2023-05-01 NOTE — PROGRESS NOTE ADULT - ASSESSMENT
The patient is a 87y Female complaining of rectal bleeding.    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 Barton on 02/10/23, clear margins, stromal involvement.  -- Follow up with Dr. 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 Graves of [facility]    Rectal bleed  -- Hgb 5.4 on admission, s/p 5u PRBC's, 1u plasma. Hg now stable.   -- CTA w/ small bowel extravagation  -- S/p enteroscopy and colonoscopy 4/24 demonstrating no active bleed, old blood in colon, likely distal small bowel source.  -- CRS following, holding ASA, atenolol. Resumed Eliquis     Anemia  -- Longstanding, 2/2 malignancy, treatment, mechanical valve. Now worsened by GIB  -- Hgb stable, 10.7  -- Monitor CBC and transfuse to maintain hg >7    Will continue to follow.    Twyla Pitts PA-C  Hematology/Oncology  [facility]  [phone] (office)  [phone] (alt office)  Evenings and weekends please call MD on call or office

## 2023-05-01 NOTE — PROGRESS NOTE ADULT - ASSESSMENT
87F with PMHx HTN, CAD (s/p PCI 2021), AV replacement, hypothyroidism, and HLD, S4 serous endometrial carcinoma ([facility], chest wall port, last chemo 12/2022, s/p 2/2023 resection ?hyst), right hemicolectomy (13 years ago with Dr. Del Cid per patient) PE in 9/2021 presented with GI bleed. 4/24 S/p enteroscopy and colonoscopy demonstrating no active bleed, old blood in colon, likely distal small bowel source. LE duplex 4/31 negative for DVT.    Plan  - Eliquis restarted at 2.5mg BID 4/31 evening - monitor for bleeding  - F/u AM CXR read  - Monitor H/H - currently stable @10  - Appreciate heme/onc recs  - Continue holding ASA, atenolol  - Continue regular diet  - PT: Home PT  - OOB/ambulate    A team surgery   [id number] 87F with PMHx HTN, CAD (s/p PCI 2021), AV replacement, hypothyroidism, and HLD, S4 serous endometrial carcinoma ([facility] [place], chest wall port, last chemo 12/2022, s/p 2/2023 resection ?hyst), right hemicolectomy (13 years ago with Dr. Del Cid per patient) PE in 9/2021 presented with GI bleed. 4/24 S/p enteroscopy and colonoscopy demonstrating no active bleed, old blood in colon, likely distal small bowel source. LE duplex 4/31 negative for DVT.    Plan  - Eliquis restarted at 2.5mg BID 4/31 evening - monitor for bleeding  - F/u AM CXR read  - IV lasix 40mg this AM  - F/u medicine consult  - Monitor H/H - currently stable @10  - Appreciate heme/onc recs  - Continue holding ASA, atenolol  - Continue regular diet  - PT: Home PT  - OOB/ambulate    A team surgery   [id number]

## 2023-05-01 NOTE — PROVIDER CONTACT NOTE (OTHER) - RECOMMENDATIONS
MD made aware, none this time. EKG done previously today..
MD made aware
MD made aware, Repeat Troponin at 1AM.

## 2023-05-01 NOTE — CONSULT NOTE ADULT - PROBLEM SELECTOR RECOMMENDATION 5
-f/u oncology at [facility] outpt  -resume home pain meds percocet 5/325 q12 prn (istop ref [id number]) or equivalent

## 2023-05-01 NOTE — CONSULT NOTE ADULT - PROBLEM SELECTOR RECOMMENDATION 9
d/t acute bleeding from small bowel in the setting of systemic AC. now appears to have stopped. Hgb stable ~10  -s/p kcentra, vit k  -s/p 7 unit PRBC   -CTA ab 4/24 active bleed distal small bowel   -EGD/ coloscopy 4/24 no additional bleeding source found  -restarted on eliquis 2.5 bid 4/30. monitor signs of bleeding. asa remains on hold   -c/w PPI bid

## 2023-05-01 NOTE — CONSULT NOTE ADULT - ASSESSMENT
87F with PMHx HTN, CAD (s/p PCI 2021), TAVR, hypothyroidism, and HLD, S4 serous endometrial carcinoma ([facility] [place], chest wall port, last chemo 12/2022, s/p 2/2023 resection)i),c/b PE in 9/2021 on Eliquis admitted for acute blood loss anemia 2/2 GIB, now s/p CTA a/p, egd, colonoscopy no active bleeding found. Hgb now stable. tolerating low dose ac.

## 2023-05-01 NOTE — PROGRESS NOTE ADULT - SUBJECTIVE AND OBJECTIVE BOX
Surgery Progress Note     Overnight Events: tachycardic to 108, AM EKG with sinus tach    Subjective:  Patient seen and examined on AM rounds. Patient reports that she felt some SOB overnight when moving around but is feeling better now. Endorses arthritis pain in hands and neck, as well as toe pain. Denies nausea, vomiting, fevers, chills, abdominal pain. Last BM was 2 days ago.      Vital Signs:  Vital Signs Last 24 Hrs  T(C): 37.1 (01 May 2023 02:00), Max: 37.1 (30 Apr 2023 17:44)  T(F): 98.7 (01 May 2023 02:00), Max: 98.8 (30 Apr 2023 17:44)  HR: 108 (01 May 2023 02:00) (99 - 108)  BP: 137/63 (01 May 2023 02:00) (123/58 - 137/63)  BP(mean): --  RR: 17 (01 May 2023 02:00) (17 - 18)  SpO2: 98% (01 May 2023 02:00) (96% - 98%)    Parameters below as of 01 May 2023 02:00  Patient On (Oxygen Delivery Method): room air        CAPILLARY BLOOD GLUCOSE          I&O's Detail    30 Apr 2023 07:01  -  01 May 2023 07:00  --------------------------------------------------------  IN:    Oral Fluid: 240 mL  Total IN: 240 mL    OUT:    IV PiggyBack: 0 mL    Voided (mL): 1450 mL  Total OUT: 1450 mL    Total NET: -1210 mL            Physical Exam:  General: NAD, resting comfortably in bed  HEENT: Normocephalic atraumatic  Respiratory: Nonlabored respirations  Cardio: regular rate  Abdomen: soft, nondistended, nontender  Vascular: extremities are warm and well perfused      Labs:    04-30    136  |  102  |  10  ----------------------------<  87  3.7   |  25  |  0.86    Ca    8.4      30 Apr 2023 07:47  Phos  3.4     04-30  Mg     1.30     04-30                              10.7   8.74  )-----------( 104      ( 30 Apr 2023 07:47 )             32.5

## 2023-05-01 NOTE — CONSULT NOTE ADULT - PROBLEM SELECTOR RECOMMENDATION 3
L shoulder/arm pain this am like MSK etiology. currently no chest pain. low suspicion for cardiac etiology  -EKG this am sinus tach with L axis deviation, no new STT changes   -could check echo and trop if pt develops chest pain/pressure, sob, palpitation  -received additional lasix 40 iv x 1 today, diuresing well. c/w home dose lasix 20   -c/w lipitor  -resume home atenolol with hold parameters   -ASA on hold in the setting of recent GIB. c/w Eliquis L shoulder/arm pain this am like [facility] etiology. currently no chest pain. low suspicion for cardiac etiology  -EKG this am sinus tach with L axis deviation, no new STT changes   -could check echo and trop if pt develops chest pain/pressure, sob, palpitation  -CXR today showing pulm congestion. received additional lasix 40 iv x 1 today, diuresing well. c/w home dose lasix 20   -c/w lipitor  -resume home atenolol with hold parameters   -ASA on hold in the setting of recent GIB. c/w Eliquis

## 2023-05-01 NOTE — PROGRESS NOTE ADULT - SUBJECTIVE AND OBJECTIVE BOX
Patient is a 87y old  Female who presents with a chief complaint of GI bleed (01 May 2023 07:55)    Patient seen this morning. She reports feeling well and has no complaints at this time. Denies bleeding but states she has not had a bowel movement in 2 days.     MEDICATIONS  (STANDING):  apixaban 2.5 milliGRAM(s) Oral two times a day  atorvastatin 80 milliGRAM(s) Oral at bedtime  furosemide    Tablet 20 milliGRAM(s) Oral daily  levothyroxine 75 MICROGram(s) Oral daily  pantoprazole    Tablet 40 milliGRAM(s) Oral two times a day    MEDICATIONS  (PRN):  albuterol    90 MICROgram(s) HFA Inhaler 2 Puff(s) Inhalation every 6 hours PRN Shortness of Breath and/or Wheezing  loratadine 10 milliGRAM(s) Oral daily PRN allergy  melatonin 3 milliGRAM(s) Oral at bedtime PRN Insomnia  oxyCODONE    IR 2.5 milliGRAM(s) Oral every 6 hours PRN Moderate Pain (4 - 6)  oxyCODONE    IR 5 milliGRAM(s) Oral every 6 hours PRN Severe Pain (7 - 10)        Vital Signs Last 24 Hrs  T(C): 36.7 (01 May 2023 09:47), Max: 37.1 (30 Apr 2023 17:44)  T(F): 98 (01 May 2023 09:47), Max: 98.8 (30 Apr 2023 17:44)  HR: 103 (01 May 2023 09:47) (101 - 108)  BP: 136/71 (01 May 2023 09:47) (123/58 - 137/63)  BP(mean): --  RR: 17 (01 May 2023 09:47) (17 - 17)  SpO2: 100% (01 May 2023 09:47) (96% - 100%)    Parameters below as of 01 May 2023 09:47  Patient On (Oxygen Delivery Method): room air        PE  NAD  Awake, alert, pleasant   Anicteric, MMM  No c/c/e  No rash grossly  FROM                          10.5   8.24  )-----------( 121      ( 01 May 2023 05:50 )             31.0       05-01    135  |  101  |  11  ----------------------------<  92  4.2   |  24  |  0.89    Ca    8.5      01 May 2023 05:50  Phos  3.2     05-01  Mg     1.50     05-01        ACC: [id number] EXAM:  US DPLX LWR EXT VEINS COMPL BI   ORDERED BY: RENETTA VELEZ     PROCEDURE DATE:  04/30/2023          INTERPRETATION:  CLINICAL INFORMATION: Immobile. GI bleed off   anticoagulation.    COMPARISON: Bilateral lower extremity venous Doppler 8/31/2022    TECHNIQUE: Duplex sonography of the BILATERAL LOWER extremity veins with   color and spectral Doppler, with and without compression.    FINDINGS:    RIGHT:  Normal compressibility of the RIGHT common femoral, femoral and popliteal   veins.  Doppler examination shows normal spontaneous and phasic flow.  Limited evaluation of the calf veins. No RIGHT calf vein thrombosis is   detected.    LEFT:  Normal compressibility of the LEFT common femoral, femoral and popliteal   veins.  Doppler examination shows normal spontaneous and phasic flow.  Limited evaluation of the calf veins. No LEFT calf vein thrombosis is   detected.    IMPRESSION:  No evidence of deep venous thrombosis in either lower extremity.            --- End of Report ---

## 2023-05-01 NOTE — CONSULT NOTE ADULT - SUBJECTIVE AND OBJECTIVE BOX
HPI:  87F with PMHx HTN, CAD (s/p PCI 2021), TAVR, hypothyroidism, and HLD, S4 serous endometrial carcinoma [place] [place], chest [place] port, last chemo 12/2022, s/p 2/2023 resection)i),c/b PE in 9/2021 on Eliquis admitted for acute blood loss anemia 2/2 GIB, CTA a/p showed acute bleeding of mid small bowel loop within the left lower abdomen. EGD/colonoscopy no active bleeding found. Hgb now stable. tolerating low dose ac.     Medicine consulted for new sinus tachycardia this am and co-management of pt's multiple co-morbidities     PAST MEDICAL & SURGICAL HISTORY:  Endometrial ca  S4 serous endometrial carcinoma, [facility] [place], chemotherapy      HTN (hypertension)      CAD (coronary artery disease)      S/P AVR      Hypothyroidism      HLD (hyperlipidemia)      S/P AVR (aortic valve replacement)      History of endometrial biopsy          Review of Systems:   CONSTITUTIONAL: No fever, weight loss, or fatigue  EYES: No eye pain, visual disturbances, or discharge  ENMT:  No difficulty hearing, tinnitus, vertigo; No sinus or throat pain  NECK: No pain or stiffness  BREASTS: No pain, masses, or nipple discharge  RESPIRATORY: No cough, wheezing, chills or hemoptysis; No shortness of breath  CARDIOVASCULAR: No chest pain, palpitations, dizziness, or leg swelling  GASTROINTESTINAL: No abdominal or epigastric pain. No nausea, vomiting, or hematemesis; No diarrhea or constipation. No melena or hematochezia.  GENITOURINARY: No dysuria, frequency, hematuria, or incontinence  NEUROLOGICAL: No headaches, memory loss, loss of strength, numbness, or tremors  SKIN: No itching, burning, rashes, or lesions   LYMPH NODES: No enlarged glands  ENDOCRINE: No heat or cold intolerance; No hair loss  MUSCULOSKELETAL: L shoulder pain  PSYCHIATRIC: No depression, anxiety, mood swings, or difficulty sleeping  HEME/LYMPH: No easy bruising, or bleeding gums  ALLERGY AND IMMUNOLOGIC: No hives or eczema    Allergies    No Known Allergies    Intolerances        Social History:     FAMILY HISTORY:  Family history of parotid cancer (Child)        MEDICATIONS  (STANDING):  apixaban 2.5 milliGRAM(s) Oral two times a day  atorvastatin 80 milliGRAM(s) Oral at bedtime  furosemide    Tablet 20 milliGRAM(s) Oral daily  levothyroxine 75 MICROGram(s) Oral daily  pantoprazole    Tablet 40 milliGRAM(s) Oral two times a day    MEDICATIONS  (PRN):  albuterol    90 MICROgram(s) HFA Inhaler 2 Puff(s) Inhalation every 6 hours PRN Shortness of Breath and/or Wheezing  loratadine 10 milliGRAM(s) Oral daily PRN allergy  melatonin 3 milliGRAM(s) Oral at bedtime PRN Insomnia  oxyCODONE    IR 2.5 milliGRAM(s) Oral every 6 hours PRN Moderate Pain (4 - 6)  oxyCODONE    IR 5 milliGRAM(s) Oral every 6 hours PRN Severe Pain (7 - 10)      T(C): 36.7 (05-01-23 @ 09:47), Max: 37.1 (04-30-23 @ 17:44)  HR: 103 (05-01-23 @ 09:47) (101 - 108)  BP: 136/71 (05-01-23 @ 09:47) (123/58 - 137/63)  RR: 17 (05-01-23 @ 09:47) (17 - 17)  SpO2: 100% (05-01-23 @ 09:47) (96% - 100%)    CAPILLARY BLOOD GLUCOSE        I&O's Summary    30 Apr 2023 07:01  -  01 May 2023 07:00  --------------------------------------------------------  IN: 480 mL / OUT: 1450 mL / NET: -970 mL    01 May 2023 07:01  -  01 May 2023 13:21  --------------------------------------------------------  IN: 50 mL / OUT: 900 mL / NET: -850 mL        PHYSICAL EXAM:  GENERAL: NAD, well-developed  HEAD:  Atraumatic, Normocephalic  EYES: EOMI, PERRLA, conjunctiva and sclera clear  NECK: Supple, No elevated JVD  CHEST/LUNG: basilar crackles bilaterally; No wheeze  HEART: Regular rate and rhythm; No murmurs, rubs, or gallops  ABDOMEN: Soft, Nontender, Nondistended; Bowel sounds present  EXTREMITIES:  LUE mildly TTP. 2+ Peripheral Pulses, No clubbing, cyanosis, or edema  PSYCH: AAOx3  NEUROLOGY: CN II-XII grossly intact, moving all extremities  SKIN: No rashes or lesions    LABS:                        10.5   8.24  )-----------( 121      ( 01 May 2023 05:50 )             31.0     05-01    135  |  101  |  11  ----------------------------<  92  4.2   |  24  |  0.89    Ca    8.5      01 May 2023 05:50  Phos  3.2     05-01  Mg     1.50     05-01                  RADIOLOGY & ADDITIONAL TESTS:    ECG Personally Reviewed     Imaging Personally Reviewed:

## 2023-05-02 NOTE — PROGRESS NOTE ADULT - ASSESSMENT
The patient is a 87y Female complaining of rectal bleeding.    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 Barton on 02/10/23, clear margins, stromal involvement.  -- Follow up with Dr. 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 Graves of [facility]    Rectal bleed  -- Hgb 5.4 on admission, s/p 5u PRBC's, 1u plasma. Hg now stable.   -- CTA w/ small bowel extravagation  -- S/p enteroscopy and colonoscopy 4/24 demonstrating no active bleed, old blood in colon, likely distal small bowel source.  -- CRS following, holding ASA, atenolol. Resumed Eliquis.   -- awaiting bowel movement     Anemia  -- Longstanding, 2/2 malignancy, treatment, mechanical valve. Now worsened by GIB  -- Hgb stable, overall improved.   -- Monitor CBC and transfuse to maintain hg >7    Will continue to follow.    Twyla Pitts PA-C  Hematology/Oncology  [facility]  [phone] (office)  [phone] (alt office)  Evenings and weekends please call MD on call or office

## 2023-05-02 NOTE — CONSULT NOTE ADULT - CONSULT REASON
GI bleed
Cardiac management , tachycardia
uterine carcinosarcoma
Active extravasation in the small bowel
GI bleed
comanagement

## 2023-05-02 NOTE — CONSULT NOTE ADULT - SUBJECTIVE AND OBJECTIVE BOX
CHIEF COMPLAINT:Patient is a 87y old  Female who presents with a chief complaint of GI bleed (01 May 2023 13:19)      HISTORY OF PRESENT ILLNESS:  87F with PMHx HTN, CAD (s/p PCI 2021), TAVR, hypothyroidism, and HLD, S4 serous [place] [place] last chemo 12/2022, s/p 2/2023 resection)i),c/b PE in 9/2021 on Eliquis admitted for acute blood loss anemia 2/2 GIB, CTA a/p showed acute bleeding of mid small bowel loop within the left lower abdomen. EGD/colonoscopy no active bleeding found. Hgb now stable. tolerating low dose ac. cardiology is called for evaluation of tachycardia   pt denies any chest pain, sob, palpitation, dizziness or syncope.         PAST MEDICAL & SURGICAL HISTORY:  Endometrial ca  S4 serous endometrial carcinoma, [facility] [place]      HTN (hypertension)      CAD (coronary artery disease)      S/P AVR      Hypothyroidism      HLD (hyperlipidemia)      S/P AVR (aortic valve replacement)      History of endometrial biopsy              MEDICATIONS:  apixaban 2.5 milliGRAM(s) Oral two times a day  atenolol  Tablet 50 milliGRAM(s) Oral two times a day  furosemide    Tablet 20 milliGRAM(s) Oral daily      albuterol    90 MICROgram(s) HFA Inhaler 2 Puff(s) Inhalation every 6 hours PRN  loratadine 10 milliGRAM(s) Oral daily PRN    acetaminophen     Tablet .. 650 milliGRAM(s) Oral every 6 hours PRN  melatonin 3 milliGRAM(s) Oral at bedtime PRN  oxyCODONE    IR 2.5 milliGRAM(s) Oral every 6 hours PRN  oxyCODONE    IR 5 milliGRAM(s) Oral every 6 hours PRN    pantoprazole    Tablet 40 milliGRAM(s) Oral two times a day    atorvastatin 80 milliGRAM(s) Oral at bedtime  levothyroxine 75 MICROGram(s) Oral daily    magnesium sulfate  IVPB 1 Gram(s) IV Intermittent once  potassium chloride    Tablet ER 40 milliEquivalent(s) Oral once      FAMILY HISTORY:  Family history of parotid cancer (Child)        Non-contributory    SOCIAL HISTORY:    No tobacco, drugs or etoh    Allergies    No Known Allergies    Intolerances    	    REVIEW OF SYSTEMS:  as above  The rest of the 14 points ROS reviewed and except above they are unremarkable.        PHYSICAL EXAM:  T(C): 36.4 (05-02-23 @ 05:24), Max: 36.7 (05-01-23 @ 09:47)  HR: 87 (05-02-23 @ 05:24) (87 - 124)  BP: 132/69 (05-02-23 @ 05:24) (119/54 - 145/86)  RR: 16 (05-02-23 @ 05:24) (16 - 18)  SpO2: 100% (05-02-23 @ 05:24) (99% - 100%)  Wt(kg): --  I&O's Summary    30 Apr 2023 07:01  -  01 May 2023 07:00  --------------------------------------------------------  IN: 480 mL / OUT: 1450 mL / NET: -970 mL    01 May 2023 07:01  -  02 May 2023 06:16  --------------------------------------------------------  IN: 350 mL / OUT: 1500 mL / NET: -1150 mL      JVP: Normal  Neck: supple  Lung: dec bs on right   CV: S1 S2 , Murmur:  Abd: soft  Ext: No edema  neuro: Awake / alert  Psych: flat affect  Skin: normal    LABS/DATA:    TELEMETRY: 	    ECG:  	   	< from: Xray Chest 1 View- PORTABLE-Urgent (Xray Chest 1 View- PORTABLE-Urgent .) (05.01.23 @ 07:47) >  ACC: [id number] EXAM:  XR CHEST PORTABLE URGENT 1V   ORDERED BY: LEO BARNES     PROCEDURE DATE:  05/01/2023          INTERPRETATION:  Chest one view    HISTORY: Tachycardia    COMPARISON STUDY: 4/22/2023    Frontal expiratory view of the chest shows the heart to be similar in   size. Right chest port, coronary artery stent and aortic valve stent are   again noted.    The lungs show progression of basilar congestion with similar right   effusion and there is no evidence of pneumothorax.    IMPRESSION:  Progression of congestion.        Thank you for the courtesy of this referral.    --- End of Report ---    < end of copied text >    CARDIAC MARKERS:    < from: Transthoracic Echocardiogram (05.01.23 @ 16:33) >  1. Transcatheter aortic valve replacement. Peak transaortic  valve gradient equals 22 mm Hg, mean transaortic valve  gradient equals 11 mm Hg, which is probably normal in the  presence of a prosthetic valve. Minimal aortic  regurgitation.  2. Moderate concentric left ventricular hypertrophy.  3. Normal left ventricular systolic function.  4. Normal right ventricular size with decreased right  ventricular systolic function.  5. Estimated pulmonary artery systolic pressure equals 28  mm Hg, assuming right atrial pressure equals 10  mmHg,  consistent with normal pulmonary pressures.  6. Normal pericardium with small pericardial effusion  adjacent to the right atrium. Right atrial inversion seen  in early systole, likley due to elevaed pericardial  pressure.  7. Bilateral pleural effusions.  ------------------------------------------------------------------------  Confirmed on  5/1/2023 - 22:28:48 by Barbara Arguelles M.D. RPVI  ------------------------------------------------------------------------    < end of copied text >                      52 <<== 05-02-23 @ 01:42                55 <<== 05-01-23 @ 17:09                              10.8   8.40  )-----------( 108      ( 02 May 2023 01:42 )             32.4     05-02    136  |  98  |  11  ----------------------------<  105<H>  3.4<L>   |  26  |  0.98    Ca    8.8      02 May 2023 01:42  Phos  3.7     05-02  Mg     1.60     05-02    TPro  7.6  /  Alb  3.5  /  TBili  0.6  /  DBili  <0.2  /  AST  29  /  ALT  39<H>  /  AlkPhos  98  05-01    proBNP:   Lipid Profile:   HgA1c:   TSH:

## 2023-05-02 NOTE — CONSULT NOTE ADULT - ASSESSMENT
Sinus tachycardia   likely due to compressive atelectasis from pleural effusion   now much better from resuming her BB    CAD history of stent  normal LV function   cont statin   resume low dose asa if no objection     history of PE   on a/c   consider Heme eval to comment on need to continue a/c     s/p TAVR  stable     Pericardial effusion   small on echo   no clinical sign of tamponade   CT reviewed trace effusion  repeat echo in few weeks

## 2023-05-02 NOTE — PROGRESS NOTE ADULT - PROBLEM SELECTOR PLAN 3
L shoulder/arm pain this am like MSK etiology. currently no chest pain. low suspicion for cardiac etiology  -EKG this am sinus tach with L axis deviation, no new STT changes   -echo reviewed. normal LVEF, no significant change from priro  -CXR 5/1 showing pulm congestion. received additional lasix 40 iv x 1 today, diuresed well. c/w home dose lasix 20   -c/w lipitor  -c/whome atenolol with hold parameters   -ASA on hold in the setting of recent GIB. c/w Eliquis.

## 2023-05-02 NOTE — ADVANCED PRACTICE NURSE CONSULT - ASSESSMENT
General: A&Ox4, moves in bed independently, ambulates with a walker at baseline, bedbound, incontinent of urine and stool at times. Skin warm, dry poor skin turgor, scattered areas of hyperpigmentation and hypopigmentation, Good foot hygiene with painted toes.     Sacral/gluteal fold with an area of denudation consistent with Moisture/Incontinence dermatitis, increased moisture, hyperpigmentation to bilateral buttocks likely secondary to chronicity. No suspicion of candidiasis at this time. Tender to touch.

## 2023-05-02 NOTE — CONSULT NOTE ADULT - CONSULT REQUESTED DATE/TIME
23-Apr-2023 13:02
02-May-2023 06:16
23-Apr-2023 07:51
23-Apr-2023 09:48
23-Apr-2023 19:53
01-May-2023 13:20

## 2023-05-02 NOTE — PROGRESS NOTE ADULT - SUBJECTIVE AND OBJECTIVE BOX
A TEAM SURGERY DAILY PROGRESS NOTE    S: Patient seen and examine at bedside. No complaints. Feels well. Denies difficulty breathing or SOB. Denies chest pain.     O: Vital Signs Last 24 Hrs  T(C): 36.4 (02 May 2023 05:24), Max: 36.7 (01 May 2023 09:47)  T(F): 97.6 (02 May 2023 05:24), Max: 98 (01 May 2023 09:47)  HR: 87 (02 May 2023 05:24) (87 - 124)  BP: 132/69 (02 May 2023 05:24) (119/54 - 145/86)  BP(mean): --  RR: 16 (02 May 2023 05:24) (16 - 18)  SpO2: 100% (02 May 2023 05:24) (99% - 100%)    Parameters below as of 02 May 2023 05:24  Patient On (Oxygen Delivery Method): room air        I&O's Detail    01 May 2023 07:01  -  02 May 2023 07:00  --------------------------------------------------------  IN:    IV PiggyBack: 50 mL    Oral Fluid: 300 mL  Total IN: 350 mL    OUT:    Voided (mL): 1500 mL  Total OUT: 1500 mL    Total NET: -1150 mL      EXAM  General: alert and oriented, NAD  Resp: airway patent, respirations unlabored  CVS: regular rate and rhythm  Abdomen: soft, nontender, nondistended  Extremities: no edema  Skin: warm, dry, appropriate color      LABS             10.8   8.40  )-----------( 108      ( 02 May 2023 01:42 )             32.4   05-02    136  |  98  |  11  ----------------------------<  105<H>  3.4<L>   |  26  |  0.98    Ca    8.8      02 May 2023 01:42  Phos  3.7     05-02  Mg     1.60     05-02    TPro  7.6  /  Alb  3.5  /  TBili  0.6  /  DBili  <0.2  /  AST  29  /  ALT  39<H>  /  AlkPhos  98  05-01

## 2023-05-02 NOTE — PROGRESS NOTE ADULT - ASSESSMENT
87F with PMHx HTN, CAD (s/p PCI 2021), TAVR, hypothyroidism, and HLD, S4 serous [place] [place], last chemo 12/2022, s/p 2/2023 resection)i),c/b PE in 9/2021 on Eliquis admitted for acute blood loss anemia 2/2 GIB, now s/p CTA a/p, egd, colonoscopy no active bleeding found. Hgb now stable. tolerating low dose ac.

## 2023-05-02 NOTE — PROGRESS NOTE ADULT - PROBLEM SELECTOR PLAN 1
d/t acute bleeding from small bowel in the setting of systemic AC. now appears to have stopped. Hgb stable ~10  -s/p kcentra, vit k  -s/p 7 unit PRBC   -CTA ab 4/24 active bleed distal small bowel   -EGD/ coloscopy 4/24 no additional bleeding source found  -restarted on eliquis 2.5 bid 4/30. monitor signs of bleeding. asa remains on hold   -c/w PPI bid.

## 2023-05-02 NOTE — ADVANCED PRACTICE NURSE CONSULT - RECOMMEDATIONS
Topical recommendations:     Sacral/gluteal fold- Cleanse with skin cleanser, pat dry. Apply [employer] moisture barrier ointment twice a day and PRN with incontinent episodes.     Continue low air loss bed therapy,  heel elevation while in bed,  encourage to turn & reposition q2h, continue moisture management with barrier creams as specified above & single breathable pad, continue measures to decrease friction/shear.   Plan discussed with patient- educated on topical wound therapy to optimize wound healing and importance of offloading to promote circulation to pressure points. Questions answered.     Please contact Wound/Ostomy Care Service Line if we can be of further assistance (ext [phone]).  Topical recommendations:     Sacral/gluteal fold- Cleanse with skin cleanser, pat dry. Apply [employer] moisture barrier ointment twice a day and PRN with incontinent episodes.     Continue low air loss bed therapy,  heel elevation while in bed,  encourage to turn & reposition q2h, continue moisture management with barrier creams as specified above & single breathable pad, continue measures to decrease friction/shear.   Plan discussed with patient- educated on topical wound therapy to optimize wound healing and importance of offloading to promote circulation to pressure points. Questions answered.     Recs discussed with Radha De La O (SUrgical PA).     Please contact Wound/Ostomy Care Service Line if we can be of further assistance (ext [phone]).

## 2023-05-02 NOTE — PROGRESS NOTE ADULT - ASSESSMENT
87F with PMHx HTN, CAD (s/p PCI 2021), AV replacement, hypothyroidism, and HLD, [place] [place] last chemo 12/2022, s/p 2/2023 resection ?hyst), right hemicolectomy (13 years ago with Dr. Del Cid per patient) PE in 9/2021 presented with GI bleed. 4/24 S/p enteroscopy and colonoscopy demonstrating no active bleed, old blood in colon, likely distal small bowel source. LE duplex 4/31 negative for DVT.    Plan  - Eliquis restarted at 2.5mg BID 4/31 evening - monitor for bleeding  - F/u AM CXR read; possible further diuresis  - F/u medicine consult  - f/u cardiology consult  - Monitor H/H  - Appreciate heme/onc recs  - Continue holding ASA  - Continue regular diet  - PT: Home PT  - OOB/ambulate    A team surgery   [id number]

## 2023-05-02 NOTE — PROGRESS NOTE ADULT - SUBJECTIVE AND OBJECTIVE BOX
[facility] Division of [facility] Medicine  Es Ahmadi MD  Pager [phone]      Patient is a 87y old  Female who presents with a chief complaint of GI bleed (02 May 2023 07:49)      SUBJECTIVE / OVERNIGHT EVENTS:    no acute event o/n. tachycardia and LUE pain resolved. pt reports feeling well. no new complaints    ADDITIONAL REVIEW OF SYSTEMS:    RESPIRATORY: No cough, wheezing, chills or hemoptysis; No shortness of breath  CARDIOVASCULAR: No chest pain, palpitations, dizziness, or leg swelling  GASTROINTESTINAL: No abdominal or epigastric pain. No nausea, vomiting, or hematemesis; No diarrhea or constipation. No melena or hematochezia.      MEDICATIONS  (STANDING):  apixaban 2.5 milliGRAM(s) Oral two times a day  atenolol  Tablet 50 milliGRAM(s) Oral two times a day  atorvastatin 80 milliGRAM(s) Oral at bedtime  furosemide    Tablet 20 milliGRAM(s) Oral every 12 hours  levothyroxine 75 MICROGram(s) Oral daily  magnesium sulfate  IVPB 1 Gram(s) IV Intermittent once  pantoprazole    Tablet 40 milliGRAM(s) Oral two times a day    MEDICATIONS  (PRN):  acetaminophen     Tablet .. 650 milliGRAM(s) Oral every 6 hours PRN Mild Pain (1 - 3)  albuterol    90 MICROgram(s) HFA Inhaler 2 Puff(s) Inhalation every 6 hours PRN Shortness of Breath and/or Wheezing  loratadine 10 milliGRAM(s) Oral daily PRN allergy  melatonin 3 milliGRAM(s) Oral at bedtime PRN Insomnia  oxyCODONE    IR 2.5 milliGRAM(s) Oral every 6 hours PRN Moderate Pain (4 - 6)  oxyCODONE    IR 5 milliGRAM(s) Oral every 6 hours PRN Severe Pain (7 - 10)      CAPILLARY BLOOD GLUCOSE        I&O's Summary    01 May 2023 07:01  -  02 May 2023 07:00  --------------------------------------------------------  IN: 650 mL / OUT: 1700 mL / NET: -1050 mL        PHYSICAL EXAM:  Vital Signs Last 24 Hrs  T(C): 36.4 (02 May 2023 05:24), Max: 36.7 (01 May 2023 15:30)  T(F): 97.6 (02 May 2023 05:24), Max: 98 (01 May 2023 15:30)  HR: 85 (02 May 2023 06:40) (85 - 124)  BP: 123/76 (02 May 2023 06:40) (119/54 - 145/86)  BP(mean): --  RR: 16 (02 May 2023 05:24) (16 - 18)  SpO2: 100% (02 May 2023 05:24) (99% - 100%)    Parameters below as of 02 May 2023 05:24  Patient On (Oxygen Delivery Method): room air      PHYSICAL EXAM:  GENERAL: NAD, well-developed  HEAD:  Atraumatic, Normocephalic  EYES: EOMI, PERRLA, conjunctiva and sclera clear  NECK: Supple, No elevated JVD  CHEST/LUNG: basilar crackles bilaterally; No wheeze  HEART: Regular rate and rhythm; No murmurs, rubs, or gallops  ABDOMEN: Soft, Nontender, Nondistended; Bowel sounds present  EXTREMITIES:  LUE mildly TTP. 2+ Peripheral Pulses, No clubbing, cyanosis, or edema  PSYCH: AAOx3  NEUROLOGY: CN II-XII grossly intact, moving all extremities  SKIN: No rashes or lesions      LABS:                        10.8   8.40  )-----------( 108      ( 02 May 2023 01:42 )             32.4     05-02    136  |  98  |  11  ----------------------------<  105<H>  3.4<L>   |  26  |  0.98    Ca    8.8      02 May 2023 01:42  Phos  3.7     05-02  Mg     1.60     05-02    TPro  7.6  /  Alb  3.5  /  TBili  0.6  /  DBili  <0.2  /  AST  29  /  ALT  39<H>  /  AlkPhos  98  05-01      CARDIAC MARKERS ( 01 May 2023 17:09 )  x     / x     / x     / x     / 1.5 ng/mL            RADIOLOGY & ADDITIONAL TESTS:  Results Reviewed:   Imaging Personally Reviewed:  Electrocardiogram Personally Reviewed:    COORDINATION OF CARE:  Care Discussed with Consultants/Other Providers [Y/N]:  Prior or Outpatient Records Reviewed [Y/N]:

## 2023-05-02 NOTE — PROGRESS NOTE ADULT - SUBJECTIVE AND OBJECTIVE BOX
Patient is a 87y old  Female who presents with a chief complaint of GI bleed (02 May 2023 12:58)    Patient seen this afternoon. She reports feeling very well and has no complaints at this time. She has still not yet had a bowel movement. No bleeding reported.     MEDICATIONS  (STANDING):  apixaban 2.5 milliGRAM(s) Oral two times a day  atenolol  Tablet 50 milliGRAM(s) Oral two times a day  atorvastatin 80 milliGRAM(s) Oral at bedtime  furosemide    Tablet 20 milliGRAM(s) Oral every 12 hours  levothyroxine 75 MICROGram(s) Oral daily  magnesium sulfate  IVPB 1 Gram(s) IV Intermittent once  pantoprazole    Tablet 40 milliGRAM(s) Oral two times a day    MEDICATIONS  (PRN):  acetaminophen     Tablet .. 650 milliGRAM(s) Oral every 6 hours PRN Mild Pain (1 - 3)  albuterol    90 MICROgram(s) HFA Inhaler 2 Puff(s) Inhalation every 6 hours PRN Shortness of Breath and/or Wheezing  loratadine 10 milliGRAM(s) Oral daily PRN allergy  melatonin 3 milliGRAM(s) Oral at bedtime PRN Insomnia  oxyCODONE    IR 2.5 milliGRAM(s) Oral every 6 hours PRN Moderate Pain (4 - 6)  oxyCODONE    IR 5 milliGRAM(s) Oral every 6 hours PRN Severe Pain (7 - 10)        Vital Signs Last 24 Hrs  T(C): 36.4 (02 May 2023 05:24), Max: 36.7 (01 May 2023 15:30)  T(F): 97.6 (02 May 2023 05:24), Max: 98 (01 May 2023 15:30)  HR: 85 (02 May 2023 06:40) (85 - 124)  BP: 123/76 (02 May 2023 06:40) (119/54 - 145/86)  BP(mean): --  RR: 16 (02 May 2023 05:24) (16 - 18)  SpO2: 100% (02 May 2023 05:24) (99% - 100%)    Parameters below as of 02 May 2023 05:24  Patient On (Oxygen Delivery Method): room air        PE  NAD  Awake, alert  Anicteric, MMM  No c/c/e  No rash grossly  FROM                          10.8   8.40  )-----------( 108      ( 02 May 2023 01:42 )             32.4       05-02    136  |  98  |  11  ----------------------------<  105<H>  3.4<L>   |  26  |  0.98    Ca    8.8      02 May 2023 01:42  Phos  3.7     05-02  Mg     1.60     05-02    TPro  7.6  /  Alb  3.5  /  TBili  0.6  /  DBili  <0.2  /  AST  29  /  ALT  39<H>  /  AlkPhos  98  05-01

## 2023-05-03 NOTE — PROGRESS NOTE ADULT - ASSESSMENT
Sinus tachycardia   likely due to compressive atelectasis from pleural effusion   now much better from resuming her BB    CAD history of stent  normal LV function   cont statin   resume low dose asa if no objection     history of PE   on a/c   consider Heme eval to comment on need to continue a/c     s/p TAVR  stable     Pericardial effusion   small on echo   no clinical sign of tamponade   CT reviewed trace effusion  repeat echo in few weeks   agree with diuresis

## 2023-05-03 NOTE — PROGRESS NOTE ADULT - NS ATTEND OPT1 GEN_ALL_CORE
I independently performed the documented:
I attest my time as attending is greater than 50% of the total combined time spent on qualifying patient care activities by the PA/NP and attending.
I independently performed the documented:
I attest my time as attending is greater than 50% of the total combined time spent on qualifying patient care activities by the PA/NP and attending.

## 2023-05-03 NOTE — PROGRESS NOTE ADULT - ASSESSMENT
The patient is a 87y Female complaining of rectal bleeding.    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 Barton on 02/10/23, clear margins, stromal involvement.  -- Follow up with Dr. Alvarado of [facility] [place] after discharge     History of PE  -- RUL segmental PE noted on CTA 8/2022  -- She will require long term anticoagulation due to her active disease and immobility  -- LE duplex negative  -- Continue Eliquis 2.5mg BID, monitor closely for bleeding   -- follows w/ Dr. Graves of [facility]    Rectal bleed  -- Hgb 5.4 on admission, s/p 5u PRBC's, 1u plasma. Hg now stable.   -- CTA w/ small bowel extravagation  -- S/p enteroscopy and colonoscopy 4/24 demonstrating no active bleed, old blood in colon, likely distal small bowel source.  -- CRS following, holding ASA, atenolol. Resumed Eliquis with no further bleeding noted.    Anemia  -- Longstanding, 2/2 malignancy, treatment, mechanical valve, worsened by GIB  -- Bleeding has resolved, now hg has remained stable   -- Monitor CBC and transfuse to maintain hg >7    Planned for discharge today. Follow up with [facility] [place] [facility] after discharge.     Twyla Pitts PA-C  Hematology/Oncology  [facility]  [phone] (office)  [phone] (alt office)  Evenings and weekends please call MD on call or office

## 2023-05-03 NOTE — DISCHARGE NOTE NURSING/CASE MANAGEMENT/SOCIAL WORK - NSDCFUADDAPPT_GEN_ALL_CORE_FT
Follow-up with cardiologist within 1 week following discharge, you will need a repeat echo.     [place] your [place]ologist[place] [facility] [place] 1-2 weeks following discharge.     Follow-up with your PCP within 1 -2 weeks following discharge.

## 2023-05-03 NOTE — PROGRESS NOTE ADULT - SUBJECTIVE AND OBJECTIVE BOX
Subjective: Patient seen and examined. No new events except as noted.     SUBJECTIVE/ROS:  feels ok       MEDICATIONS:  MEDICATIONS  (STANDING):  apixaban 2.5 milliGRAM(s) Oral two times a day  atenolol  Tablet 50 milliGRAM(s) Oral two times a day  atorvastatin 80 milliGRAM(s) Oral at bedtime  furosemide    Tablet 20 milliGRAM(s) Oral every 12 hours  levothyroxine 75 MICROGram(s) Oral daily  pantoprazole    Tablet 40 milliGRAM(s) Oral two times a day      PHYSICAL EXAM:  T(C): 36.3 (05-03-23 @ 05:03), Max: 36.9 (05-02-23 @ 16:54)  HR: 78 (05-03-23 @ 06:40) (78 - 83)  BP: 105/60 (05-03-23 @ 06:40) (103/62 - 120/60)  RR: 18 (05-03-23 @ 05:03) (18 - 18)  SpO2: 100% (05-03-23 @ 05:03) (100% - 100%)  Wt(kg): --  I&O's Summary    02 May 2023 07:01  -  03 May 2023 07:00  --------------------------------------------------------  IN: 550 mL / OUT: 1650 mL / NET: -1100 mL            JVP: Normal  Neck: supple  Lung: clear   CV: S1 S2 , Murmur:  Abd: soft  Ext: No edema  neuro: Awake / alert  Psych: flat affect  Skin: normal``    LABS/DATA:    CARDIAC MARKERS:  CARDIAC MARKERS ( 01 May 2023 17:09 )  x     / x     / x     / x     / 1.5 ng/mL                                10.8   8.40  )-----------( 108      ( 02 May 2023 01:42 )             32.4     05-02    136  |  98  |  11  ----------------------------<  105<H>  3.4<L>   |  26  |  0.98    Ca    8.8      02 May 2023 01:42  Phos  3.7     05-02  Mg     1.60     05-02    TPro  7.6  /  Alb  3.5  /  TBili  0.6  /  DBili  <0.2  /  AST  29  /  ALT  39<H>  /  AlkPhos  98  05-01    proBNP:   Lipid Profile:   HgA1c:   TSH:     TELE:  EKG:

## 2023-05-03 NOTE — PROGRESS NOTE ADULT - NS ATTEND AMEND GEN_ALL_CORE FT
0
Agree with above
DATE OF SERVICE: 05-02-23 @ 13:48    Seen and examined, feeling well. Tachycardia resolved.  No bloody BM  Hb stable  Abd soft NT/ND    Appreciate med/cards recs  Diet as tolerated  Monitor for bleeding  Monitor HR with tele
Pt seen/examined, c/o feeling tired, but overall feeling well. No further bloody BM bu HH decreased.     - transfuse 1u PRBC  - repeat CBC if bloody BM, otherwise f/u with AM labs  - PT for ambulation   - thrombocytemia, d/w heme-onc
As above.    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 Hemoglobin stable at this time. Apixaban restarted. Remains on IV diuresis. Monitoring counts closely.
Hg holding stable, no bleeding.  Recommend to continue off anticoagulation for now.    If duplex consistent with DVT, then would recommend IVC filter- otherwise would not place filter.
Pt seen/examined, agree above
on ppx eliquis now. monitoring for bleeding. follow up with [facility] [place]

## 2023-05-03 NOTE — DISCHARGE NOTE NURSING/CASE MANAGEMENT/SOCIAL WORK - PATIENT PORTAL LINK FT
You can access the FollowMyHealth Patient Portal offered by [facility] by registering at the following website: [email or web address]. By joining [facility]’s FollowMyHealth portal, you will also be able to view your health information using other applications (apps) compatible with our system.

## 2023-05-03 NOTE — PROGRESS NOTE ADULT - SUBJECTIVE AND OBJECTIVE BOX
Patient is a 87y old  Female who presents with a chief complaint of GI bleed (03 May 2023 07:49)    Patient seen this morning. She reports feeling very well and is ready to go home. Hg remains stable, no further bleeding. No bowel movements yet.     MEDICATIONS  (STANDING):  apixaban 2.5 milliGRAM(s) Oral two times a day  atenolol  Tablet 50 milliGRAM(s) Oral two times a day  atorvastatin 80 milliGRAM(s) Oral at bedtime  furosemide    Tablet 20 milliGRAM(s) Oral every 12 hours  levothyroxine 75 MICROGram(s) Oral daily  pantoprazole    Tablet 40 milliGRAM(s) Oral two times a day    MEDICATIONS  (PRN):  acetaminophen     Tablet .. 650 milliGRAM(s) Oral every 6 hours PRN Mild Pain (1 - 3)  albuterol    90 MICROgram(s) HFA Inhaler 2 Puff(s) Inhalation every 6 hours PRN Shortness of Breath and/or Wheezing  loratadine 10 milliGRAM(s) Oral daily PRN allergy  melatonin 3 milliGRAM(s) Oral at bedtime PRN Insomnia  oxyCODONE    IR 2.5 milliGRAM(s) Oral every 6 hours PRN Moderate Pain (4 - 6)  oxyCODONE    IR 5 milliGRAM(s) Oral every 6 hours PRN Severe Pain (7 - 10)        Vital Signs Last 24 Hrs  T(C): 36.6 (03 May 2023 12:25), Max: 36.9 (02 May 2023 16:54)  T(F): 97.9 (03 May 2023 12:25), Max: 98.5 (02 May 2023 16:54)  HR: 79 (03 May 2023 12:25) (78 - 87)  BP: 123/62 (03 May 2023 12:25) (105/58 - 123/62)  BP(mean): --  RR: 17 (03 May 2023 12:25) (17 - 18)  SpO2: 100% (03 May 2023 12:25) (100% - 100%)    Parameters below as of 03 May 2023 12:25  Patient On (Oxygen Delivery Method): room air        PE  NAD  Awake, alert, pleasant   Anicteric, MMM  Abd soft, NT, ND  No c/c/e  No rash grossly  FROM                          10.7   7.87  )-----------( 124      ( 03 May 2023 05:51 )             29.6       05-03    133<L>  |  97<L>  |  12  ----------------------------<  95  3.4<L>   |  25  |  0.99    Ca    8.4      03 May 2023 05:51  Phos  3.2     05-03  Mg     1.40     05-03    TPro  7.6  /  Alb  3.5  /  TBili  0.6  /  DBili  <0.2  /  AST  29  /  ALT  39<H>  /  AlkPhos  98  05-01

## 2023-05-03 NOTE — DISCHARGE NOTE NURSING/CASE MANAGEMENT/SOCIAL WORK - NSDCPEFALRISK_GEN_ALL_CORE
For information on Fall & Injury Prevention, visit: [email or web address] OR  [email or web address] OR  https://www.cdc.gov/steadi/patient.html

## 2023-05-03 NOTE — PROGRESS NOTE ADULT - SUBJECTIVE AND OBJECTIVE BOX
A Team (General Surgery) Daily Progress Note    SUBJECTIVE:  Pt seen and examined, and is resting comfortably in bed. No acute events overnight. Pain is adequately controlled on current regimen. Pt has no complaints at this time.     OBJECTIVE:  Vital Signs Last 24 Hrs  T(C): 36.3 (03 May 2023 05:03), Max: 36.9 (02 May 2023 16:54)  T(F): 97.3 (03 May 2023 05:03), Max: 98.5 (02 May 2023 16:54)  HR: 78 (03 May 2023 06:40) (78 - 83)  BP: 105/60 (03 May 2023 06:40) (103/62 - 120/60)  BP(mean): --  RR: 18 (03 May 2023 05:03) (18 - 18)  SpO2: 100% (03 May 2023 05:03) (100% - 100%)    Parameters below as of 03 May 2023 05:03  Patient On (Oxygen Delivery Method): room air        I&O's Detail    02 May 2023 07:01  -  03 May 2023 07:00  --------------------------------------------------------  IN:    Oral Fluid: 550 mL  Total IN: 550 mL    OUT:    Voided (mL): 1650 mL  Total OUT: 1650 mL    Total NET: -1100 mL        Exam:  General: alert and oriented, NAD  Resp: airway patent, respirations unlabored  CVS: regular rate and rhythm  Abdomen: soft, nontender, nondistended  Extremities: no edema  Skin: warm, dry, appropriate color                        10.7   7.87  )-----------( 124      ( 03 May 2023 05:51 )             29.6       05-03    133<L>  |  97<L>  |  12  ----------------------------<  95  3.4<L>   |  25  |  0.99    Ca    8.4      03 May 2023 05:51  Phos  3.2     05-03  Mg     1.40     05-03    TPro  7.6  /  Alb  3.5  /  TBili  0.6  /  DBili  <0.2  /  AST  29  /  ALT  39<H>  /  AlkPhos  98  05-01          ASSESSMENT:  87F with PMHx HTN, CAD (s/p PCI 2021), AV replacement, hypothyroidism, and HLD, 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 Del Cid per patient) PE in 9/2021 presented with GI bleed. 4/24 S/p enteroscopy and colonoscopy demonstrating no active bleed, old blood in colon, likely distal small bowel source. LE duplex 4/31 negative for DVT.    PLAN:  - Eliquis restarted at 2.5mg BID 4/31 evening - monitor for bleeding  - F/u AM CXR read  - appreciate medicine recommendations  - appreciate cardiology recommendations  - Monitor H/H  - Appreciate heme/onc recs  - restart ASA on discharge  - Continue regular diet  - PT: Home PT  - OOB/ambulate  - Home Today        A Team Surgery  pager: [phone]

## 2023-05-04 NOTE — PROGRESS NOTE ADULT - REASON FOR ADMISSION
GI bleed

## 2023-05-04 NOTE — PROGRESS NOTE ADULT - SUBJECTIVE AND OBJECTIVE BOX
TEAM [ A ] Surgery Daily Progress Note  =====================================================    SUBJECTIVE: Patient seen and examined at bedside on AM rounds. Patient reports that they're feeling well. She is tolerating regular diet. She is passing gas and had 2 episodes of stool last night. Denies fever, chills, N/V, chest pain, SOB    ALLERGIES:  No Known Allergies      --------------------------------------------------------------------------------------    MEDICATIONS:    Neurologic Medications  acetaminophen     Tablet .. 650 milliGRAM(s) Oral every 6 hours PRN Mild Pain (1 - 3)  melatonin 3 milliGRAM(s) Oral at bedtime PRN Insomnia  oxyCODONE    IR 2.5 milliGRAM(s) Oral every 6 hours PRN Moderate Pain (4 - 6)  oxyCODONE    IR 5 milliGRAM(s) Oral every 6 hours PRN Severe Pain (7 - 10)    Respiratory Medications  albuterol    90 MICROgram(s) HFA Inhaler 2 Puff(s) Inhalation every 6 hours PRN Shortness of Breath and/or Wheezing  loratadine 10 milliGRAM(s) Oral daily PRN allergy    Cardiovascular Medications  atenolol  Tablet 50 milliGRAM(s) Oral two times a day  furosemide    Tablet 20 milliGRAM(s) Oral every 12 hours    Gastrointestinal Medications  pantoprazole    Tablet 40 milliGRAM(s) Oral two times a day    Genitourinary Medications    Hematologic/Oncologic Medications  apixaban 2.5 milliGRAM(s) Oral two times a day    Antimicrobial/Immunologic Medications    Endocrine/Metabolic Medications  atorvastatin 80 milliGRAM(s) Oral at bedtime  levothyroxine 75 MICROGram(s) Oral daily    Topical/Other Medications    --------------------------------------------------------------------------------------    VITAL SIGNS:  ICU Vital Signs Last 24 Hrs  T(C): 36.3 (04 May 2023 04:10), Max: 36.7 (03 May 2023 16:01)  T(F): 97.4 (04 May 2023 04:10), Max: 98.1 (03 May 2023 16:01)  HR: 80 (04 May 2023 04:10) (60 - 87)  BP: 114/60 (04 May 2023 04:10) (100/47 - 123/62)  BP(mean): --  ABP: --  ABP(mean): --  RR: 18 (04 May 2023 04:10) (17 - 18)  SpO2: 100% (04 May 2023 04:10) (100% - 100%)    O2 Parameters below as of 04 May 2023 04:10  Patient On (Oxygen Delivery Method): room air  --------------------------------------------------------------------------------------    EXAM    General: NAD, resting in bed comfortably.  Cardiac: regular rate, warm and well perfused  Respiratory: Nonlabored respirations, normal cw expansion.  Abdomen: soft, nontender, nondistended  Extremities: normal strength, FROM, no deformities    --------------------------------------------------------------------------------------    LABS                        11.2   7.32  )-----------( 152      ( 04 May 2023 06:30 )             33.9   05-04    136  |  98  |  13  ----------------------------<  111<H>  4.1   |  26  |  1.01    Ca    8.6      04 May 2023 06:30  Phos  2.9     05-04  Mg     2.10     05-04        --------------------------------------------------------------------------------------    INS AND OUTS:  I&O's Detail    03 May 2023 07:01  -  04 May 2023 07:00  --------------------------------------------------------  IN:    IV PiggyBack: 50 mL    Oral Fluid: 910 mL  Total IN: 960 mL    OUT:    Voided (mL): 1000 mL  Total OUT: 1000 mL    Total NET: -40 mL        --------------------------------------------------------------------------------------

## 2023-05-04 NOTE — PROGRESS NOTE ADULT - SUBJECTIVE AND OBJECTIVE BOX
Subjective: Patient seen and examined. No new events except as noted.     SUBJECTIVE/ROS:  nad      MEDICATIONS:  MEDICATIONS  (STANDING):  apixaban 2.5 milliGRAM(s) Oral two times a day  atenolol  Tablet 50 milliGRAM(s) Oral two times a day  atorvastatin 80 milliGRAM(s) Oral at bedtime  furosemide    Tablet 20 milliGRAM(s) Oral every 12 hours  levothyroxine 75 MICROGram(s) Oral daily  pantoprazole    Tablet 40 milliGRAM(s) Oral two times a day      PHYSICAL EXAM:  T(C): 36.3 (05-04-23 @ 04:10), Max: 36.7 (05-03-23 @ 16:01)  HR: 80 (05-04-23 @ 04:10) (60 - 87)  BP: 114/60 (05-04-23 @ 04:10) (100/47 - 123/62)  RR: 18 (05-04-23 @ 04:10) (17 - 18)  SpO2: 100% (05-04-23 @ 04:10) (100% - 100%)  Wt(kg): --  I&O's Summary    03 May 2023 07:01  -  04 May 2023 07:00  --------------------------------------------------------  IN: 960 mL / OUT: 1000 mL / NET: -40 mL            JVP: Normal  Neck: supple  Lung: clear   CV: S1 S2 , Murmur:  Abd: soft  Ext: No edema  neuro: Awake / alert  Psych: flat affect  Skin: normal``    LABS/DATA:    CARDIAC MARKERS:  CARDIAC MARKERS ( 01 May 2023 17:09 )  x     / x     / x     / x     / 1.5 ng/mL                                10.7   7.87  )-----------( 124      ( 03 May 2023 05:51 )             29.6     05-04    136  |  98  |  13  ----------------------------<  111<H>  4.1   |  26  |  1.01    Ca    8.6      04 May 2023 06:30  Phos  2.9     05-04  Mg     2.10     05-04      proBNP:   Lipid Profile:   HgA1c:   TSH:     TELE:  EKG:

## 2023-05-04 NOTE — PROGRESS NOTE ADULT - ATTENDING COMMENTS
DATE OF SERVICE: 05-03-23 @ 15:29    No events, feeling well. No blood BM. Hb stable. No SOB  Abd soft NT/ND  Continue eliquis  Outpt fu with hematologist   DC pending clearance from medicine/cardiology
DATE OF SERVICE: 04-24-23 @ 16:13    Seen and examined. S/p colonoscopy and enteroscopy   Colonoscopy with old blood intact anastomosis, no active bleeding  Enteroscopy - no bleeding noted to examined portion of jejunum    Comfortable in bed, no abdominal pain, nondistended    Transfer to surgical service  Monitor Hb  Monitor for addl signs of bleeding  Transfuse PRN
DATE OF SERVICE: 04-29-23 @ 15:41    No events, Hb stable today.  VSS  Abd soft NT/ND  Hb 10.2 this AM    Continue diet as tolerated  Hold AC, will D/W Heme regarding need for possible IVCF; LE duplex  Am labs  DC planning
Patient seen/examined. Daughter at bedside. Patient reports feeling better-no nausea, vomiting, abdominal pain. Stable status post colonoscopy/EGD on 4/24. Definite source of bleeding not detected. Possibility of small bowel etiology in view of CT. Appears to have stopped bleeding. Stable at current time. Continue to monitor her current time with serial CBC and observation has any recurrent bleeding. No further GI workup planned but pending clinical course can consider capsule endoscopy. No contraindication to aspirin. Reassess need for Eliquis.
Stable at current time without indication of active ongoing GI bleeding. Recommendations as noted-monitor serial hemoglobin/hematocrit. Observe for any recurrence of overt GI bleeding. No further intervention planned from GI standpoint.
DATE OF SERVICE: 05-01-23 @ 09:24    Feeling overall well, having arthritis pain, no BM or bleeding  Hb pending today  Resumed 2.5mg BID eliquis yesterday     Fu CBC  DC planning pending stability  Heme FU
DATE OF SERVICE: 05-04-23 @ 14:21    No events, remains stable  Hb stable  For DC home today
DATE OF SERVICE: 04-26-23 @ 14:50    Seen and examined, feeling better today. small melena overnight  VSS  Hb 8.4 this AM -> repeat 8.5 in PM  Abd soft NT/ND    Continue diet  AM CBC  Hold eliquis for now
DATE OF SERVICE: 04-30-23 @ 15:08    Seen and examined, feeling good today. 2 non bloody BM yesterday  Hb stable today  VSS  abd soft NT/ND    Heme onc recommending resuming 2.5mg eliquis, will d/w family, if family in agreement with plan will start  diet as tolerated  DC planning

## 2023-05-04 NOTE — PROGRESS NOTE ADULT - ASSESSMENT
Sinus tachycardia   likely due to compressive atelectasis from pleural effusion   now much better from resuming her BB  cont lasix     CAD history of stent  normal LV function   cont statin   resume low dose asa if no objection     history of PE   on a/c   consider Heme eval to comment on need to continue a/c     s/p TAVR  stable     Pericardial effusion   small on echo   no clinical sign of tamponade   CT reviewed trace effusion  repeat echo in few weeks   agree with diuresis

## 2023-05-04 NOTE — PROGRESS NOTE ADULT - ASSESSMENT
87F with PMHx HTN, CAD (s/p PCI 2021), AV replacement, hypothyroidism, and HLD, S4 serous endometrial carcinoma ([facility] [place], chest wall port, last chemo 12/2022, s/p 2/2023 resection ?hyst), right hemicolectomy (13 years ago with Dr. Del Cid per patient) PE in 9/2021 presented with GI bleed. 4/24 S/p enteroscopy and colonoscopy demonstrating no active bleed, old blood in colon, likely distal small bowel source. LE duplex 4/31 negative for DVT.    PLAN:  - Diet: regular  - Eliquis restarted at 2.5mg BID  - PT: Home PT  - OOB/ambulate  - Home Today    A team surgery [id number]

## 2023-05-04 NOTE — PROGRESS NOTE ADULT - PROVIDER SPECIALTY LIST ADULT
Cardiology
Colorectal Surgery
Gastroenterology
Gastroenterology
Heme/Onc
Surgery
Colorectal Surgery
Colorectal Surgery
Heme/Onc
Cardiology
Colorectal Surgery
Heme/Onc
Colorectal Surgery
Heme/Onc
Hospitalist

## 2023-06-20 NOTE — H&P ADULT - CONVERSATION DETAILS
Goals of care discussed at length with patient, who was A&Ox4 at the time. This conversation included explanation of current condition, prognosis, options for therapy, and code status. Patient expressed that she would want chest compressions and intubation if her condition deteriorated to the point that she required such measures. Patient is, therefore, full code. Patient does not have any health care proxies, though she has children who would be her surrogate decision makers.

## 2023-06-20 NOTE — ED ADULT NURSE NOTE - NSFALLHARMRISKINTERV_ED_ALL_ED

## 2023-06-20 NOTE — H&P ADULT - PROBLEM SELECTOR PLAN 7
Pt diagnosed with RUL segmental PE in Aug 2022, now with possible new PE while on Eliquis 2.5 mg BID.  - Will hold Eliquis for now given possibility of thoracentesis, chest tube placement, and other procedures during admission. Will start heparin gtt for AC instead (weight based on previous admission in May, will need to obtain actual weight).

## 2023-06-20 NOTE — H&P ADULT - PROBLEM SELECTOR PLAN 4
WBC mildly elevated to 11.13. Pt afebrile with no localizing S/S of infection. Possibly in setting of malignancy, though can also be from hemoconcentration or reactive 2/2 moderate-to-large R pleural effusion.  - Procalcitonin checked overnight and only mildly elevated to 0.22. Pt afebrile. Will monitor off antibiotics for now.  - F/u CTA chest with IV contrast to better characterize pleural effusion and to r/o PE  - Check UA, though pt with no urinary symptoms other than episodes of urinary incontinence 2/2 debulking surgery  - Trend CBC with diff

## 2023-06-20 NOTE — H&P ADULT - PROBLEM SELECTOR PLAN 10
- C/w rosuvastatin 20 mg qHS  - Resume Zetia 10 mg daily upon discharge - C/w atorvastatin 80 mg qHS (interchange for rosuvastatin 20 mg)  - Resume Zetia 10 mg daily upon discharge

## 2023-06-20 NOTE — ED PROVIDER NOTE - CARE PLAN
1 Principal Discharge DX:	Weakness  Secondary Diagnosis:	INDIANA (acute kidney injury)  Secondary Diagnosis:	Pleural effusion

## 2023-06-20 NOTE — H&P ADULT - PROBLEM SELECTOR PLAN 6
Pt with history of stage 4 serous endometrial carcinoma (dx 5/2022) s/p chemo (last in 12/2022) and debulking surgery with POOL/BSO (2/10/23), now recently found to have recurrence of disease with new liver mets.  - Pt follows with Dr. Alvarado of [facility] [place]. Will consult [facility] in AM.   - GOC discussion with pt and family given likely poor prognosis

## 2023-06-20 NOTE — H&P ADULT - PROBLEM SELECTOR PLAN 12
- DVT ppx: Pt now on heparin gtt  - Diet: Soft and bite sized  - GI ppx: On PO Protonix 40 mg daily and PO Pepcid 40 mg daily   - C/w Alendronate 70 mg once weekly for now (though benefit unclear given advanced age) - DVT ppx: Pt now on heparin gtt  - Diet: Soft and bite sized  - GI ppx: On PO Protonix 40 mg daily. Hold PO Pepcid for now given [place] (CrCl <30).   - C/w Alendronate 70 mg once weekly for now (though benefit unclear given advanced age)

## 2023-06-20 NOTE — ED PROVIDER NOTE - OBJECTIVE STATEMENT
Patient is a 88 year-old-female with history of serous endometrial carcinoma, CAD s/p PCI, HTN, TAVR, hypothyroidism and HLD presents with weakness. Accompanied by daughter Shayla at bedside, also obtained collateral information from daughter Shani @[phone]. Reports that she has been feeling weak for a while now; recently had workup done and was told by her oncologist that her Hgb has dropped from 12 to 10, has a new INDIANA (Cr elevated at 1.9) and also worsening pleural effusion. Denies fever at home.

## 2023-06-20 NOTE — ED ADULT TRIAGE NOTE - CHIEF COMPLAINT QUOTE
pt brought in by EMS from home complaining of generalized weakness and states she was told to come to the ED for fluid around her heart. Pt denies chest pain, sob, n/v/d, fever or chills. Pt has a hx of endometrial cancer, last tx was few months ago.

## 2023-06-20 NOTE — H&P ADULT - NSHPLABSRESULTS_GEN_ALL_CORE
EKG personally reviewed.  NSR at 85 bpm, LAD, low voltage QRS, QTc 430 ms.    Labs personally reviewed.                        9.8    11.13 )-----------( 102      ( 20 Jun 2023 13:02 )             29.7     06-20    135  |  98  |  32<H>  ----------------------------<  100<H>  4.1   |  27  |  1.38<H>    Ca    8.1<L>      20 Jun 2023 13:02  Mg     1.60     06-20    TPro  7.2  /  Alb  2.5<L>  /  TBili  0.6  /  DBili  x   /  AST  35<H>  /  ALT  14  /  AlkPhos  76  06-20    Imaging personally reviewed.  ACC: [id number] EXAM:  XR CHEST PA LAT 2V   ORDERED BY: ANGELITA LUND   PROCEDURE DATE:  06/20/2023    FINDINGS:  There is been an increase in the right effusion that is now moderate to large. Visualized lungs are clear.  There is no pneumothorax.    IMPRESSION: Increased right pleural effusion.  EXAM:  ER TTE LIMITED    ORDER COMMENTS:    PROCEDURE DATE:  06/20/2023    FOCUSED ED ULTRASOUND REPORT  INTERPRETATION:  A focused transthoracic cardiac ultrasound examination   was performed.  No significant pericardial effusion was present.  No global wall motion abnormality was identified  Left ventricular hypertrophy    IVC with respiratory variation.  Large right pleural effusion  Small left pleural effusion    Incidental intraperitoneal free fluid and liver hypodensity noted.    IMPRESSION:  No significant pericardial effusion  Left ventricular hypertrophy    Large right pleural effusion and small left pleural effusion.    Incidental intraperitoneal free fluid and liver hypodensity noted.  No   ascites noted during last CT imaging in system 04/23/2023.  Consider   additional CT or MRI imaging as clinically warranted.    Findings discussed with ED team in real-time.

## 2023-06-20 NOTE — ED PROVIDER NOTE - PROGRESS NOTE DETAILS
David PGY2  Hgb 9.8 today, not meeting transfusion criteria. Cr elevated at 1.38. CXR showed R pleural effusion, however patient is sating 100% on RA. Discussed with Dr. Olivier with the above results and agreeable for admission for further management.

## 2023-06-20 NOTE — H&P ADULT - PROBLEM SELECTOR PLAN 3
Serum Cr 1.38 on admission. Per pt and her daughter, serum Cr was 1.9 on Friday. [place] now resolving. Likely prerenal from poor PO intake vs. third spacing given hypoalbuminemia.   - Check UA and urine lytes  - Check bladder scan to r/o urinary retention, as pt with episodes of urinary incontinence 2/2 debulking surgery  - S/p 1L bolus of NS in ED. F/u AM CMP.  - Monitor serum Cr and urine output  - Avoid NSAIDs and nephrotoxic agents  - Renally dose meds

## 2023-06-20 NOTE — H&P ADULT - PROBLEM SELECTOR PLAN 2
Pt with ~2 weeks of shortness of breath with minimal exertion, now requiring supplemental O2 with 2L NC. Suspect 2/2 moderate-to-large R pleural effusion, likely malignant, and worsening anemia, though CHF and PE also on the differential.  - Pulm and/or thoracic surgery to be called in AM for possible diagnostic +/- therapeutic thoracentesis. Pt might also require chest tube placement during admission given recurrence of R pleural effusion.   - Pro-BNP added on and found to be elevated to 8894 on admission, was previously 953 in May. Repeat TTE ordered to evaluate for new structural heart disease.  - Trops 40 -> 48. EKG with no acute ischemic changes. Pt with no complaints of chest pain. Doubt shortness of breath is anginal equivalent. Will trend trops to peak.  - CTA chest with IV contrast ordered to better characterize R pleural effusion (previously loculated) and to r/o PE. Although pt is compliant with Eliquis 2.5 mg BID, given elevated pro-BNP and new supplemental O2 requirements, PE a possibility. Pt's [place] resolving at this time, with benefits of obtaining CTA chest with IV contrast outweighing risks.   - Plan as below for anemia  - C/w supplemental O2  - Monitor on tele and continuous pulse ox

## 2023-06-20 NOTE — H&P ADULT - PROBLEM SELECTOR PLAN 8
S/p stent in 3/2021. On ASA and statin.   - C/w ASA 81 mg daily  - C/w rosuvastatin 20 mg qHS  - Resume Zetia 10 mg daily upon discharge S/p stent in 3/2021. On ASA and statin.   - C/w ASA 81 mg daily  - C/w atorvastatin 80 mg qHS (interchange for rosuvastatin 20 mg)  - Resume Zetia 10 mg daily upon discharge

## 2023-06-20 NOTE — ED PROVIDER NOTE - CLINICAL SUMMARY MEDICAL DECISION MAKING FREE TEXT BOX
Patient is a 88 year-old-female with history of serous endometrial carcinoma, CAD s/p PCI, HTN, TAVR, hypothyroidism and HLD presents with concerns for weakness, elevated Cr and worsening pleural effusion. Per pre-mora chart review, patient also has a history of pericardial effusion in May. Likely secondary to worsening metastatic disease vs. worsening pleural effusion vs infectious vs metabolic etiologies. Will obtain labs, ECG, CXR and ECHO to evaluate for effusion. Anticipate admission.

## 2023-06-20 NOTE — H&P ADULT - NSHPREVIEWOFSYSTEMS_GEN_ALL_CORE
Constitutional: No generalized weakness, fevers, chills, or weight loss  Eyes: No visual changes, double vision, or eye pain  Ears, Nose, Mouth, Throat: No runny nose, sinus pain, ear pain, tinnitus, sore throat, dysphagia, or odynophagia  Cardiovascular: No chest pain, palpitations, or LE edema  Respiratory: No cough, wheezing, hemoptysis, or shortness of breath  Gastrointestinal: No abdominal pain, dysphagia, anorexia, nausea/vomiting, diarrhea/constipation, hematemesis, melena, or BRBPR  Genitourinary: No dysuria, frequency, urgency, or hematuria  Musculoskeletal: No neck pain or back pain. No joint pain, swelling, or decreased ROM.  Skin: No pruritus, rashes, lesions, or wounds  Neurologic: No syncope, seizures, headache, paresthesias, numbness, or limb weakness  Psychiatric: No depression, anxiety, difficulty concentrating, anhedonia, or lack of energy  Endocrine: No heat/cold intolerance, mood swings, sweats, polydipsia, or polyuria  Hematologic/lymphatic: No purpura, petechia, or prolonged or excessive bleeding after dental extraction / injury  Allergic/Immunologic: No anaphylaxis or allergic response to materials, foods, animals    Positives and pertinent negatives noted and all other systems negative. Constitutional: +Generalized weakness. No fevers, chills, or weight loss.  Eyes: No visual changes, double vision, or eye pain  Ears, Nose, Mouth, Throat: No runny nose, sinus pain, ear pain, tinnitus, sore throat, dysphagia, or odynophagia  Cardiovascular: +B/l LE edema. No chest pain or palpitations.  Respiratory: +Shortness of breath with minimal exertion. No cough, wheezing, or hemoptysis.   Gastrointestinal: +Occasional nausea and vomiting, none recently. No abdominal pain, diarrhea/constipation, hematemesis, melena, or BRBPR.  Genitourinary: No dysuria, frequency, urgency, or hematuria  Musculoskeletal: No neck pain or back pain. No joint pain, swelling, or decreased ROM.  Skin: No pruritus, rashes, lesions, or wounds  Neurologic: No syncope, seizures, headache, paresthesias, numbness, or limb weakness  Psychiatric: No depression, anxiety, difficulty concentrating, anhedonia, or lack of energy  Endocrine: No heat/cold intolerance, mood swings, sweats, polydipsia, or polyuria  Hematologic/lymphatic: No purpura, petechia, or prolonged or excessive bleeding after dental extraction / injury  Allergic/Immunologic: No anaphylaxis or allergic response to materials, foods, animals    Positives and pertinent negatives noted and all other systems negative.

## 2023-06-20 NOTE — H&P ADULT - PROBLEM SELECTOR PLAN 5
Hgb 9.8 on admission, was 11.2 on 5/4/23, though baseline Hgb appears to be ~10-11. No S/S of active bleed.  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 Check LDH, haptoglobin, retic count, coags, D-dimer, and fibrinogen, though low suspicion for TTP at this time.  - Monitor CBC with diff  - C/w vitamin B12

## 2023-06-20 NOTE — ED PROVIDER NOTE - PHYSICAL EXAMINATION
Vitals: I have reviewed the patients vital signs  General: Well dressed, chronically ill appearing   HEENT: Atraumatic, normocephalic, airway patent  Eyes: EOMI, tracking appropriately  Neck: no tracheal deviation, no JVD  Chest/Lungs: symmetric chest rise, speaking in complete sentences, no WOB, lung sounds clear bilaterally   Heart: skin and extremities well perfused, regular rate and rhythm  Abdomen: soft, nontender and nondistended   Neuro: A+Ox3, CN II-XI intact  MSK: strength at baseline in all extremities, no muscle wasting or atrophy  Skin: no cyanosis, no jaundice, no new emergent lesions

## 2023-06-20 NOTE — H&P ADULT - PROBLEM SELECTOR PLAN 1
Pt with 3 weeks of progressive generalized weakness, now barely able to get out of bed. On exam, no motor weakness detected. Likely in setting of recurrence/progression of endometrial cancer with new liver mets, exacerbated by poor PO intake, recurrence of moderate-to-large R pleural effusion, and worsening anemia.   - Management as below for endometrial cancer, R pleural effusion, and anemia  - Check TSH  - Nutrition consult  - PT/OT eval  - Aspiration precautions and fall risk protocol

## 2023-06-20 NOTE — H&P ADULT - PROBLEM SELECTOR PLAN 9
BPs in acceptable range on admission. Pt currently on PO Lasix 20 mg BID. Pt previously on atenolol, which was discontinued by pt's PCP recently due to relative hypotension.  - Will hold PO Lasix for now given [place]  - Continue to hold atenolol  - Monitor VS q4hrs

## 2023-06-20 NOTE — H&P ADULT - NSICDXPASTMEDICALHX_GEN_ALL_CORE_FT
PAST MEDICAL HISTORY:  CAD (coronary artery disease)     Endometrial ca S4 serous endometrial carcinoma, [facility], chemotherapy    HLD (hyperlipidemia)     HTN (hypertension)     Hypothyroidism     S/P AVR

## 2023-06-20 NOTE — H&P ADULT - HISTORY OF PRESENT ILLNESS
87 yo woman with history of stage 4 serous endometrial carcinoma (dx 5/2022) 87 yo woman with history of stage 4 serous endometrial carcinoma (dx 5/2022) s/p chemo (last in 12/2022) and debulking surgery with POOL/BSO (2/10/23), RUL segmental PE (8/2022, on Eliquis), GI bleed (4/2023), CAD s/p stent (3/2021), aortic stenosis s/p TAVR, HTN, and HLD presents with 3 weeks of worsening generalized weakness. HPI obtained from both pt and her daughter at bedside, also named Shani. Prior to 3 weeks ago, pt was able to ambulate  89 yo woman with history of stage 4 serous endometrial carcinoma (dx 5/2022) s/p chemo (last in 12/2022) and debulking surgery with POOL/BSO (2/10/23) c/b moderate to large R pleural effusion (on 2L NC PRN), RUL segmental PE (8/2022, on Eliquis), GI bleed (4/2023), CAD s/p stent (3/2021), aortic stenosis s/p TAVR, HTN, and HLD presents with 3 weeks of worsening generalized weakness. HPI obtained from both pt and her daughter at bedside, also named Shani. Prior to 3 weeks ago, pt was able to ambulate  87 yo woman with history of stage 4 serous endometrial carcinoma (dx 5/2022) s/p chemo (last in 12/2022) and debulking surgery with POOL/BSO (2/10/23) c/b b/l pleural effusions (R > L, on 2L NC PRN), RUL segmental PE (8/2022, on Eliquis), GI bleed (4/2023), CAD s/p stent (3/2021), aortic stenosis s/p TAVR, HTN, and HLD presents with 3 weeks of worsening generalized weakness. HPI obtained from both pt and her daughter at bedside, also named Shani. Prior to 3 weeks ago, pt was able to ambulate using her rolling walker without any additional assistance and even go down the 5 steps in her house on her own to head out for doctors' appointments. Over the last 3 weeks, however, pt's functional status has been progressively declining, with pt now barely able to get out of bed on most days. Pt states that she got out of bed only once on Monday, and it was to use the bathroom for a BM. Since ~2 weeks ago, pt has also been becoming short of breath with minimal exertion, such as when walking from her bed to the bathroom, and has had to rely on supplemental O2 with 2L NC. Pt was recently admitted at [facility], from 4/23-5/4/23, Pt, however, started feeling weak after she had a therapeutic thoracentesis with removal of 1L of pleural fluid in early June at her oncologist's office.  87 yo woman with history of stage 4 serous endometrial carcinoma (dx 5/2022) s/p chemo (last in 12/2022) and debulking surgery with POOL/BSO (2/10/23) c/b b/l pleural effusions (R > L, on 2L NC PRN), RUL segmental PE (8/2022, on Eliquis), GI bleed (4/2023), CAD s/p stent (3/2021), aortic stenosis s/p TAVR, HTN, and HLD presents with 3 weeks of worsening generalized weakness. HPI obtained from both pt and her daughter at bedside, also named Shani. Prior to 3 weeks ago, pt was able to ambulate using her rolling walker without any additional assistance and even go down the 5 steps in her house on her own to head out for doctors' appointments. Over the last 3 weeks, however, pt's functional status has been progressively declining, with pt now barely able to get out of bed on most days. Pt states that she got out of bed only once on Monday, and it was to use the bathroom for a BM. Since ~2 weeks ago, pt has also been becoming short of breath with minimal exertion, such as when walking from her bed to the bathroom, and has had to rely on supplemental O2 with 2L NC, which she didn't require for the past several months. Pt was recently admitted at [facility], from 4/23-5/4/23, for a GI bleed requiring multiple pRBC transfusions. No clear source of bleed was identified despite pt getting both an enteroscopy and a colonoscopy, although the GI bleed was presumed to be from the distal small bowel. Pt's Eliquis was initially discontinued during the admission but eventually resumed at a lower dose of 2.5 mg BID prior to discharge. Soon after pt's discharge from the hospital, pt had a follow-up visit with her oncologist in which she had a PET/CT that showed recurrence of disease with new liver mets. About 2 weeks after the PET/CT, in early June, pt required a therapeutic thoracentesis with removal of 1L of pleural fluid. Since the therapeutic thoracentesis, pt has been feeling progressively weaker.  89 yo woman with history of stage 4 serous endometrial carcinoma (dx 5/2022) s/p chemo (last in 12/2022) and debulking surgery with POOL/BSO (2/10/23) c/b b/l pleural effusions (R > L, on 2L NC PRN), RUL segmental PE (8/2022, on Eliquis), GI bleed (4/2023), CAD s/p stent (3/2021), aortic stenosis s/p TAVR, HTN, and HLD presents with 3 weeks of worsening generalized weakness. HPI obtained from both pt and her daughter at bedside, also named Shani. Prior to 3 weeks ago, pt was able to ambulate using her rolling walker without any additional assistance and even go down the 5 steps in her house on her own to head out for doctors' appointments. Over the last 3 weeks, however, pt's functional status has been progressively declining, ever since pt got a therapeutic thoracentesis with removal of 1L of pleural fluid in early June at her oncologist's office. Pt is now barely able to get out of bed on most days. Pt states that she got out of bed only once on Monday, and it was to use the bathroom for a BM. Since ~2 weeks ago, pt has also been becoming short of breath with minimal exertion, such as when walking from her bed to the bathroom, and has had to rely on supplemental O2 with 2L NC, which she didn't require for the past several months. Pt was recently admitted at [facility], from 4/23-5/4/23, for a GI bleed requiring multiple pRBC transfusions. No clear source of bleed was identified despite pt getting both an enteroscopy and a colonoscopy, although the GI bleed was presumed to be from the distal small bowel. Pt's Eliquis was initially discontinued during the admission but eventually resumed at a lower dose of 2.5 mg BID prior to discharge. Soon after pt's discharge from the hospital, pt had a follow-up visit with her oncologist in which she had a PET/CT that showed recurrence of disease with new liver mets. Pt was being arranged for chemo as outpatient  87 yo woman with history of stage 4 serous endometrial carcinoma (dx 5/2022) s/p chemo (last in 12/2022) and debulking surgery with POOL/BSO (2/10/23) c/b b/l pleural effusions (R > L, on 2L NC PRN), RUL segmental PE (8/2022, on Eliquis), GI bleed (4/2023), CAD s/p stent (3/2021), aortic stenosis s/p TAVR, HTN, HLD, and hypothyroidism presents with 3 weeks of worsening generalized weakness. HPI obtained from both pt and her daughter at bedside, also named Shani. Prior to 3 weeks ago, pt was able to ambulate using her rolling walker without any additional assistance and even go down the 5 steps in her house on her own to head out for doctors' appointments. Over the last 3 weeks, however, pt's functional status has been progressively declining, ever since pt got a therapeutic thoracentesis with removal of 1L of pleural fluid in early June at her oncologist's office. Pt is now barely able to get out of bed on most days. Pt states that she got out of bed only once on Monday, and it was to use the bathroom for a BM. Since ~2 weeks ago, pt has also been becoming short of breath with minimal exertion, such as when walking from her bed to the bathroom, and has had to rely on supplemental O2 with 2L NC, which she didn't require for the past several months. Pt denies any recent chest pain, palpitations, lightheadedness, dizziness, headaches, vision changes, abdominal pain, nausea, vomiting, diarrhea, constipation, melena, BRBPR, or dysuria. Pt has chronic b/l LE edema and had b/l venous dopplers this past Friday that were negative for a DVT, though pt and her daughter note that pt's RLE seems more swollen than usual, as it is the LLE that is typically larger in size than the RLE. Pt was recently admitted at [facility], from 4/23-5/4/23, for a GI bleed requiring multiple pRBC transfusions. No clear source of bleed was identified despite pt getting both an enteroscopy and a colonoscopy, although the GI bleed was presumed to be from the distal small bowel. Pt's Eliquis was initially discontinued during the admission but eventually resumed at a lower dose of 2.5 mg BID prior to discharge. Soon after pt's discharge from the hospital, pt had a follow-up visit with her oncologist in which she had a PET/CT that showed recurrence of disease with new liver mets. Pt was being arranged for chemo as outpatient, but workup this past Friday revealed [place] with serum Cr of 1.9 (baseline 0.85-1) and recurrence of the R pleural effusion that was previously drained.     In the ED,   T 98.3-99, HR 82-87, -126/61-64, RR 18-20, SpO2 % RA and 2L NC.  CXR showing moderate to large R pleural effusion.

## 2023-06-20 NOTE — ED ADULT NURSE NOTE - OBJECTIVE STATEMENT
Patient A&o X4, history of HTN, HLD, endometrial cancer, received in room 7, with multiple medical complaints. Patient states, "I have been feeling weak since they removed a liter of fluid from around my heart on June 6th". Patient also complains of JOH[place], reports that walking from bed to restroom causes her to become SOB. Patient currently denies SOB at rest, sPo2 100% on RA. Family member at bedside reports concern that patient might have fluid around heart again. Patient has b/l leg swelling noted, +2 pedal edema palpated on b/l foot. Patient denies any chemo or radiation at this time for endometrial cancer, right upper chest wall port noted. Patient able to speak in clear sentences, respirations equal and unlabored. Lung sounds clear b/l, equal chest rise and fall noted. Denies CP/SOB, fever, chills, nausea, vomiting and diarrhea at this time. Skin warm and dry. Provider at bedside for eval, pending further orders.

## 2023-06-20 NOTE — H&P ADULT - NSHPPHYSICALEXAM_GEN_ALL_CORE
Vital Signs Last 24 Hrs  T(C): 37.2 (21 Jun 2023 01:40), Max: 37.2 (20 Jun 2023 16:38)  T(F): 98.9 (21 Jun 2023 01:40), Max: 99 (20 Jun 2023 16:38)  HR: 87 (21 Jun 2023 01:40) (82 - 88)  BP: 106/59 (21 Jun 2023 01:40) (106/59 - 127/62)  BP(mean): --  RR: 20 (21 Jun 2023 01:40) (17 - 20)  SpO2: 100% (21 Jun 2023 01:40) (96% - 100%)    PHYSICAL EXAM:  General: Awake and alert.  No acute distress.  Head: Normocephalic, atraumatic.    Eyes: PERRL.  EOMI.  No scleral icterus.  No conjunctival pallor.  Mouth: Moist MM.  No oropharyngeal exudates.    Neck: Supple.  Full range of motion.  No JVD.  No LAD.  No thyromegaly.  Trachea midline.    Heart: RRR.  Normal S1 and S2.  No murmurs, rubs, or gallops.  No LE edema b/l.   Lungs: Nonlabored breathing.  Good inspiratory effort.  CTAB.  No wheezes, crackles, or rhonchi.    Abdomen: BS+, soft, nontender with no rebound or guarding, nondistended.  No hepatomegaly.   Skin: Warm and dry.  No rashes.  Extremities: No cyanosis.  2+ peripheral pulses b/l.  Musculoskeletal: No joint deformities.  No spinal or paraspinal tenderness.  Neuro: A&Ox3.  CN II-XII intact.  5/5 motor strength in UE and LE b/l.  Tactile sensation intact in UE and LE b/l.  Cerebellar function intact as assessed by finger-to-nose test.  No focal deficits.

## 2023-06-20 NOTE — ED ADULT NURSE REASSESSMENT NOTE - NSFALLHARMRISKINTERV_ED_ALL_ED
Assistance OOB with selected safe patient handling equipment if applicable/Assistance with ambulation/Communicate risk of Fall with Harm to all staff, patient, and family/Monitor gait and stability/Provide patient with walking aids/Provide visual cue: red socks, yellow wristband, yellow gown, etc/Reinforce activity limits and safety measures with patient and family/Bed in lowest position, wheels locked, appropriate side rails in place/Call bell, personal items and telephone in reach/Instruct patient to call for assistance before getting out of bed/chair/stretcher/Non-slip footwear applied when patient is off stretcher/[place] to call system/Physically safe environment - no spills, clutter or unnecessary equipment/Purposeful Proactive Rounding/Room/bathroom lighting operational, light cord in reach
Assistance OOB with selected safe patient handling equipment if applicable/Assistance with ambulation/Communicate risk of Fall with Harm to all staff, patient, and family/Encourage patient to sit up slowly, dangle for a short time, stand at bedside before walking/Monitor gait and stability/Orthostatic vital signs/Provide patient with walking aids/Provide visual cue: red socks, yellow wristband, yellow gown, etc/Reinforce activity limits and safety measures with patient and family/Bed in lowest position, wheels locked, appropriate side rails in place/Call bell, personal items and telephone in reach/Instruct patient to call for assistance before getting out of bed/chair/stretcher/Non-slip footwear applied when patient is off stretcher/[place] to call system/Physically safe environment - no spills, clutter or unnecessary equipment/Purposeful Proactive Rounding/Room/bathroom lighting operational, light cord in reach

## 2023-06-20 NOTE — H&P ADULT - ASSESSMENT
87 yo woman with history of stage 4 serous endometrial carcinoma (dx 5/2022) s/p chemo (last in 12/2022) and debulking surgery with POOL/BSO (2/10/23) c/b b/l pleural effusions (R > L, on 2L NC PRN), RUL segmental PE (8/2022, on Eliquis), GI bleed (4/2023), CAD s/p stent (3/2021), aortic stenosis s/p TAVR, HTN, HLD, and hypothyroidism presents with 3 weeks of worsening generalized weakness.

## 2023-06-21 NOTE — PHYSICAL THERAPY INITIAL EVALUATION ADULT - PERTINENT HX OF CURRENT PROBLEM, REHAB EVAL
patient is a 88 year old female admitted for 3 weeks of generalized weakness, found to have pleural effusion likely malignant as per chart

## 2023-06-21 NOTE — OCCUPATIONAL THERAPY INITIAL EVALUATION ADULT - PERTINENT HX OF CURRENT PROBLEM, REHAB EVAL
88 year old female with history of stage 4 serous endometrial carcinoma (dx 5/2022) s/p chemo (last in 12/2022) and debulking surgery with POOL/BSO (2/10/23) c/b b/l pleural effusions (R > L, on 2L NC PRN), RUL segmental PE (8/2022), GI bleed (4/2023), CAD s/p stent (3/2021), aortic stenosis s/p TAVR, HTN, HLD, and hypothyroidism presents with 3 weeks of worsening generalized weakness. CT chest revealing right pleural effusion, no evidence of PE.

## 2023-06-21 NOTE — OCCUPATIONAL THERAPY INITIAL EVALUATION ADULT - PRECAUTIONS/LIMITATIONS, REHAB EVAL
2L/min O2 via NC/fall precautions/oxygen therapy device and L/min 2L/min O2 via NC/aspiration precautions/fall precautions/oxygen therapy device and L/min

## 2023-06-21 NOTE — PROGRESS NOTE ADULT - PROBLEM SELECTOR PLAN 2
Pt with ~2 weeks of shortness of breath with minimal exertion, now requiring supplemental O2 with 2L NC. Suspect 2/2 moderate-to-large R pleural effusion, likely malignant, and worsening anemia, though CHF and PE also on the differential.  - Pulm and/or thoracic surgery to be called in AM for possible diagnostic +/- therapeutic thoracentesis. Pt might also require chest tube placement during admission given recurrence of R pleural effusion.   - Pro-BNP added on and found to be elevated to 8894 on admission, was previously 953 in May. Repeat TTE ordered to evaluate for new structural heart disease.  - Trops 40 -> 48. EKG with no acute ischemic changes. Pt with no complaints of chest pain. Doubt shortness of breath is anginal equivalent. Will trend trops to peak.  - CTA chest with IV contrast ordered to better characterize R pleural effusion (previously loculated) and to r/o PE. Although pt is compliant with Eliquis 2.5 mg BID, given elevated pro-BNP and new supplemental O2 requirements, PE a possibility. Pt's [place] resolving at this time, with benefits of obtaining CTA chest with IV contrast outweighing risks.   - Plan as below for anemia  - C/w supplemental O2  -cards / pulm f/u

## 2023-06-21 NOTE — OCCUPATIONAL THERAPY INITIAL EVALUATION ADULT - GENERAL OBSERVATIONS, REHAB EVAL
Patient received semisupine in bed in NAD; agreeable to participate in OT evaluation. +IV. +2L/min O2 via NC. Daughter at bedside.

## 2023-06-21 NOTE — CONSULT NOTE ADULT - ASSESSMENT
87 yo woman with history of stage 4 serous endometrial carcinoma (dx 5/2022) s/p chemo (last in 12/2022) and debulking surgery with POOL/BSO (2/10/23) c/b b/l pleural effusions (R > L, on 2L NC PRN), RUL segmental PE (8/2022, on Eliquis), GI bleed (4/2023), CAD s/p stent (3/2021), aortic stenosis s/p TAVR, HTN, HLD, and hypothyroidism presents with 3 weeks of worsening generalized weakness. Found to have large Rt pleural effusion.

## 2023-06-21 NOTE — CONSULT NOTE ADULT - SUBJECTIVE AND OBJECTIVE BOX
Reason for consult:    HPI:  89 yo woman with history of stage 4 serous endometrial carcinoma (dx 5/2022) s/p chemo (last in 12/2022) and debulking surgery with POOL/BSO (2/10/23) c/b b/l pleural effusions (R > L, on 2L NC PRN), RUL segmental PE (8/2022, on Eliquis), GI bleed (4/2023), CAD s/p stent (3/2021), aortic stenosis s/p TAVR, HTN, HLD, and hypothyroidism presents with 3 weeks of worsening generalized weakness. HPI obtained from both pt and her daughter at bedside, also named Shani. Prior to 3 weeks ago, pt was able to ambulate using her rolling walker without any additional assistance and even go down the 5 steps in her house on her own to head out for doctors' appointments. Over the last 3 weeks, however, pt's functional status has been progressively declining, ever since pt got a therapeutic thoracentesis with removal of 1L of pleural fluid in early June at her oncologist's office. Pt is now barely able to get out of bed on most days. Pt states that she got out of bed only once on Monday, and it was to use the bathroom for a BM. Since ~2 weeks ago, pt has also been becoming short of breath with minimal exertion, such as when walking from her bed to the bathroom, and has had to rely on supplemental O2 with 2L NC, which she didn't require for the past several months. Pt denies any recent chest pain, palpitations, lightheadedness, dizziness, headaches, vision changes, abdominal pain, nausea, vomiting, diarrhea, constipation, melena, BRBPR, or dysuria. Pt has chronic b/l LE edema and had b/l venous dopplers this past Friday that were negative for a DVT, though pt and her daughter note that pt's RLE seems more swollen than usual, as it is the LLE that is typically larger in size than the RLE. Pt was recently admitted at [facility], from 4/23-5/4/23, for a GI bleed requiring multiple pRBC transfusions. No clear source of bleed was identified despite pt getting both an enteroscopy and a colonoscopy, although the GI bleed was presumed to be from the distal small bowel. Pt's Eliquis was initially discontinued during the admission but eventually resumed at a lower dose of 2.5 mg BID prior to discharge. Soon after pt's discharge from the hospital, pt had a follow-up visit with her oncologist in which she had a PET/CT that showed recurrence of disease with new liver mets. Pt was being arranged for chemo as outpatient, but workup this past Friday revealed [place] with serum Cr of 1.9 (baseline 0.85-1) and recurrence of the R pleural effusion that was previously drained.     In the ED,   T 98.3-99, HR 82-87, -126/61-64, RR 18-20, SpO2 % RA and 2L NC.  CXR showing moderate to large R pleural effusion. (20 Jun 2023 23:40)    This is an 88 year old female who is under care of Dr. Praveena Alvarado of [place] for the management of stage IV serous endometrial carcinosarcoma. She was diagnosed 03/2022 via endometrial biopsy, received neoadjuvant chemotherapy completed in 12/2022, s/p POOL-BSO 02/10/2023. She was under surveillance until recently when imaging showed recurrence of disease. She is planned to begin single agent carboplatin. She presents here with complaint of generalized weakness, fatigue and dyspnea on exertion.   Pt seen this afternoon in the ED. She reports feeling some improvement compared to initial presentation, no dyspnea at rest. Denies any pain.    PAST MEDICAL & SURGICAL HISTORY:  Endometrial ca  S4 serous endometrial carcinoma, [facility], chemotherapy      HTN (hypertension)      CAD (coronary artery disease)      S/P AVR      Hypothyroidism      HLD (hyperlipidemia)      S/P AVR (aortic valve replacement)      History of endometrial biopsy          FAMILY HISTORY:  Family history of parotid cancer (Child)        Alochol: Denied  Smoking: Nonsmoker  Drug Use: Denied  Marital Status:         Allergies    No Known Allergies    Intolerances        MEDICATIONS  (STANDING):  aspirin enteric coated 81 milliGRAM(s) Oral daily  atorvastatin 80 milliGRAM(s) Oral at bedtime  cholecalciferol 2000 Unit(s) Oral daily  cyanocobalamin 1000 MICROGram(s) Oral daily  heparin  Infusion.  Unit(s)/Hr (11 mL/Hr) IV Continuous <Continuous>  levothyroxine 75 MICROGram(s) Oral daily  loratadine 10 milliGRAM(s) Oral daily  misoprostol 200 MICROGram(s) Oral <User Schedule>  multivitamin 1 Tablet(s) Oral daily  pantoprazole    Tablet 40 milliGRAM(s) Oral before breakfast    MEDICATIONS  (PRN):  albuterol    90 MICROgram(s) HFA Inhaler 2 Puff(s) Inhalation every 6 hours PRN Shortness of Breath and/or Wheezing  heparin   Injectable 4500 Unit(s) IV Push every 6 hours PRN For aPTT less than 40  heparin   Injectable 2000 Unit(s) IV Push every 6 hours PRN For aPTT between 40 - 57  melatonin 3 milliGRAM(s) Oral at bedtime PRN Sleep  oxyCODONE    IR 5 milliGRAM(s) Oral every 6 hours PRN Moderate Pain (4 - 6)  polyethylene glycol 3350 17 Gram(s) Oral daily PRN for constipation  senna 2 Tablet(s) Oral at bedtime PRN for constipation      ROS  +fatigue, generalized weakness   No fever, sweats, chills  No epistaxis, HA, sore throat  +dyspnea on exertion. No CP, cough, sputum  No n/v/d, abd pain, melena, hematochezia  No edema  No rash  No anxiety  No back pain, joint pain  No bleeding, bruising  No dysuria, hematuria    T(C): 36.7 (06-21-23 @ 09:50), Max: 37.2 (06-20-23 @ 16:38)  HR: 88 (06-21-23 @ 09:50) (87 - 89)  BP: 127/60 (06-21-23 @ 09:50) (106/59 - 127/62)  RR: 18 (06-21-23 @ 09:50) (17 - 20)  SpO2: 100% (06-21-23 @ 09:50) (100% - 100%)  Wt(kg): --    PE  NAD  Awake, alert  Anicteric, MMM  +abdomen mildly distended, nontender   No c/c/e  No rash grossly                            9.4    10.26 )-----------( 116      ( 21 Jun 2023 06:56 )             28.3       06-21    135  |  98  |  29<H>  ----------------------------<  78  3.7   |  28  |  1.32<H>    Ca    8.1<L>      21 Jun 2023 06:56  Phos  2.8     06-21  Mg     1.60     06-21    TPro  7.0  /  Alb  2.6<L>  /  TBili  0.6  /  DBili  x   /  AST  38<H>  /  ALT  10  /  AlkPhos  75  06-21        ACC: [id number] EXAM:  CT ANGIO CHEST PULM [facility]   ORDERED BY: YADIEL DONALDSON     PROCEDURE DATE:  06/21/2023          INTERPRETATION:  CLINICAL INFORMATION: Dyspnea.    COMPARISON: CTA chest 8/31/2022    CONTRAST/COMPLICATIONS:  IV Contrast: Omnipaque 350  30 cc administered   70 cc discarded  Oral Contrast: NONE  Complications: Intravenous tubing malfunctioned during contrast   administration. Only 30 cc of IV contrast were administered.    PROCEDURE:  CT Angiography of the Chest.  Sagittal and coronal reformats were performed as well as 3D (MIP)   reconstructions.    FINDINGS:    LUNGS/AIRWAYS/PLEURA: Patent central airways.  Large right and trace left   pleural effusions, increased on the right and decreased on the left when   compared to prior exam. Associated complete atelectasis of the right   middle and lower lobes and partial atelectasis of the right upper and   left lower lobes.  MEDIASTINUM AND DANK: A right anterior cardiophrenic lymph node measures   1.6 x 1.6 cm (301, 85). Mediastinal lymph nodes measuring less than 1.5   cm in short axis diameter.  VESSELS: Suboptimal contrast bolus. Within these constraints, no main,   right main, left main, or lobar pulmonary embolism. Limited evaluation of   segmental and subsegmental pulmonary arteries secondary to suboptimal   contrast bolus, motion and mixing artifact. Coronary artery   calcifications.  HEART: Cardiomegaly. No pericardial effusion. Status post TAVR.  CHEST WALL AND LOWER NECK: Generalized subcutaneous edema.  VISUALIZED UPPER ABDOMEN: Subcentimeter hepatic hypodense foci too small   characterize. Perihepatic and perisplenic ascites.  BONES: Degenerative changes.    IMPRESSION:  Suboptimal contrast bolus. Within these constraints, no main, right main,   left main, or lobar pulmonary embolism. Limited evaluation of segmental   and subsegmental pulmonary arteries secondary to suboptimal contrast   bolus, motion and mixing artifact.    Large right and trace left pleural effusions, increased on the right and   decrease on the left when compared to prior exam.    Anasarca.    --- End of Report ---      ACC: [id number] EXAM:  US DPLX LWR EXT VEINS COMPL BI   ORDERED BY: YADIEL DONALDSON     PROCEDURE DATE:  06/21/2023          INTERPRETATION:  CLINICAL INFORMATION: Lower extremity swelling, right   greater than left.    COMPARISON: None available.    TECHNIQUE: Duplex sonography of the BILATERAL LOWER extremity veins with   color and spectral Doppler, with and without compression.    FINDINGS:    RIGHT:  Normal compressibility of the RIGHT common femoral, femoral and popliteal   veins.  Doppler examination shows normal spontaneous and phasic flow.  Limited visualized of the RIGHT calf veins. No RIGHT calf vein thrombosis   detected.    LEFT:  Normal compressibility of the LEFT common femoral, femoral and popliteal   veins.  Doppler examination shows normal spontaneous and phasic flow.  Limited visualized of the LEFT calf veins. No LEFT calf vein thrombosis   detected.    IMPRESSION:  No evidence of deep venous thrombosis in either lower extremity.            --- End of Report ---

## 2023-06-21 NOTE — PHYSICAL THERAPY INITIAL EVALUATION ADULT - LIVES WITH, PROFILE
son and daughter +HHA 6 hours per day son and daughter +HHA 6 hours per day. +6 steps to enter, chair lift to second floor bedroom

## 2023-06-21 NOTE — PROGRESS NOTE ADULT - PROBLEM SELECTOR PLAN 12
- DVT ppx: Pt now on heparin gtt  - Diet: Soft and bite sized  - GI ppx: On PO Protonix 40 mg daily. Hold PO Pepcid for now given [place] (CrCl <30).   - C/w Alendronate 70 mg once weekly for now (though benefit unclear given advanced age)

## 2023-06-21 NOTE — PROGRESS NOTE ADULT - SUBJECTIVE AND OBJECTIVE BOX
SUBJECTIVE / OVERNIGHT EVENTS:pt seen and examined  06-21-23     MEDICATIONS  (STANDING):  aspirin enteric coated 81 milliGRAM(s) Oral daily  atorvastatin 80 milliGRAM(s) Oral at bedtime  cholecalciferol 2000 Unit(s) Oral daily  cyanocobalamin 1000 MICROGram(s) Oral daily  heparin  Infusion.  Unit(s)/Hr (11 mL/Hr) IV Continuous <Continuous>  levothyroxine 75 MICROGram(s) Oral daily  loratadine 10 milliGRAM(s) Oral daily  misoprostol 200 MICROGram(s) Oral <User Schedule>  multivitamin 1 Tablet(s) Oral daily  pantoprazole    Tablet 40 milliGRAM(s) Oral before breakfast    MEDICATIONS  (PRN):  albuterol    90 MICROgram(s) HFA Inhaler 2 Puff(s) Inhalation every 6 hours PRN Shortness of Breath and/or Wheezing  heparin   Injectable 4500 Unit(s) IV Push every 6 hours PRN For aPTT less than 40  heparin   Injectable 2000 Unit(s) IV Push every 6 hours PRN For aPTT between 40 - 57  melatonin 3 milliGRAM(s) Oral at bedtime PRN Sleep  oxyCODONE    IR 5 milliGRAM(s) Oral every 6 hours PRN Moderate Pain (4 - 6)  polyethylene glycol 3350 17 Gram(s) Oral daily PRN for constipation  senna 2 Tablet(s) Oral at bedtime PRN for constipation    T(C): 36.9 (06-21-23 @ 19:20), Max: 37.2 (06-21-23 @ 01:11)  HR: 95 (06-21-23 @ 19:20) (87 - 97)  BP: 121/62 (06-21-23 @ 19:20) (106/59 - 137/68)  RR: 17 (06-21-23 @ 19:20) (17 - 20)  SpO2: 100% (06-21-23 @ 19:20) (100% - 100%)    CAPILLARY BLOOD GLUCOSE        I&O's Summary      Constitutional: No fever, fatigue  Skin: No rash.  Eyes: No recent vision problems or eye pain.  ENT: No congestion, ear pain, or sore throat.  Cardiovascular: No chest pain or palpation.  Respiratory: No cough, shortness of breath, congestion, or wheezing.  Gastrointestinal: No abdominal pain, nausea, vomiting, or diarrhea.  Genitourinary: No dysuria.  Musculoskeletal: No joint swelling.  Neurologic: No headache.    PHYSICAL EXAM:  GENERAL: NAD  EYES: EOMI, PERRLA  NECK: Supple, No JVD  CHEST/LUNG: dec breath sounds at bases  HEART:  S1 , S2 +  ABDOMEN: soft , bs+  EXTREMITIES:  edema+  NEUROLOGY:alert awake      LABS:                        10.0   11.91 )-----------( 108      ( 21 Jun 2023 16:48 )             28.6     06-21    135  |  98  |  29<H>  ----------------------------<  78  3.7   |  28  |  1.32<H>    Ca    8.1<L>      21 Jun 2023 06:56  Phos  2.8     06-21  Mg     1.60     06-21    TPro  7.0  /  Alb  2.6<L>  /  TBili  0.6  /  DBili  x   /  AST  38<H>  /  ALT  10  /  AlkPhos  75  06-21    PT/INR - ( 21 Jun 2023 09:20 )   PT: 23.3 sec;   INR: 1.99 ratio               Urinalysis Basic - ( 21 Jun 2023 06:56 )    Color: x / Appearance: x / SG: x / pH: x  Gluc: 78 mg/dL / Ketone: x  / Bili: x / Urobili: x   Blood: x / Protein: x / Nitrite: x   Leuk Esterase: x / RBC: x / WBC x   Sq Epi: x / Non Sq Epi: x / Bacteria: x        RADIOLOGY & ADDITIONAL TESTS:    Imaging Personally Reviewed:yes    Consultant(s) Notes Reviewed:  yes    Care Discussed with Consultants/Other Providers:yes

## 2023-06-21 NOTE — ED ADULT NURSE REASSESSMENT NOTE - NS ED NURSE REASSESS COMMENT FT1
PT is resting in stretcher, easily arousable to verbal stimuli. no apparent distress noted at this time. hand off will be given to primary nurse when return to area.
Patient has stage 2 pressure injury on sacrum. Patient cleaned, turned and repositioned for comfort. Will continue to monitor.
Received pt from previous shift, pt admitted to medicine for weakness; aristeo and pleural effusion. On assessment, pt is alert and oriented x 3, pt knows she needs to get a pleural effusion but does not know when. [place] ACP [place] [phone], provider mentioned that hospitalist attending has to examine the patient. Patient made aware of plan, pending further disposition.
Patient resting in stretcher, no acute distress noted at this time. Patient updated on plan of care, will continue to monitor.

## 2023-06-21 NOTE — PROGRESS NOTE ADULT - PROBLEM SELECTOR PLAN 6
Pt with history of stage 4 serous endometrial carcinoma (dx 5/2022) s/p chemo (last in 12/2022) and debulking surgery with POOL/BSO (2/10/23), now recently found to have recurrence of disease with new liver mets.  - Pt follows with Dr. Alvarado of [facility]. Will consult [facility] in AM.   - GOC discussion with pt and family given likely poor prognosis

## 2023-06-21 NOTE — PHYSICAL THERAPY INITIAL EVALUATION ADULT - ADDITIONAL COMMENTS
uses walker at baseline, household distances. owns walker, wheelchair, shower chair, commode. reports she currently performs home physical therapy

## 2023-06-21 NOTE — PROGRESS NOTE ADULT - PROBLEM SELECTOR PLAN 1
Pt with 3 weeks of progressive generalized weakness, now barely able to get out of bed. On exam, no motor weakness detected. Likely in setting of recurrence/progression of endometrial cancer with new liver mets, exacerbated by poor PO intake, recurrence of moderate-to-large R pleural effusion, and worsening anemia.   - Management as below for endometrial cancer, R pleural effusion, and anemia  -  - Nutrition consult  - PT/OT eval  - Aspiration precautions and fall risk protocol

## 2023-06-21 NOTE — CONSULT NOTE ADULT - SUBJECTIVE AND OBJECTIVE BOX
HPI:  87 yo woman with history of stage 4 serous endometrial carcinoma (dx 5/2022) s/p chemo (last in 12/2022) and debulking surgery with POOL/BSO (2/10/23) c/b b/l pleural effusions (R > L, on 2L NC PRN), RUL segmental PE (8/2022, on Eliquis), GI bleed (4/2023), CAD s/p stent (3/2021), aortic stenosis s/p TAVR, HTN, HLD, and hypothyroidism presents with 3 weeks of worsening generalized weakness. HPI obtained from both pt and her daughter at bedside, also named Shani. Prior to 3 weeks ago, pt was able to ambulate using her rolling walker without any additional assistance and even go down the 5 steps in her house on her own to head out for doctors' appointments. Over the last 3 weeks, however, pt's functional status has been progressively declining, ever since pt got a therapeutic thoracentesis with removal of 1L of pleural fluid in early June at her oncologist's office. Pt is now barely able to get out of bed on most days. Pt states that she got out of bed only once on Monday, and it was to use the bathroom for a BM. Since ~2 weeks ago, pt has also been becoming short of breath with minimal exertion, such as when walking from her bed to the bathroom, and has had to rely on supplemental O2 with 2L NC, which she didn't require for the past several months. Pt denies any recent chest pain, palpitations, lightheadedness, dizziness, headaches, vision changes, abdominal pain, nausea, vomiting, diarrhea, constipation, melena, BRBPR, or dysuria. Pt has chronic b/l LE edema and had b/l venous dopplers this past Friday that were negative for a DVT, though pt and her daughter note that pt's RLE seems more swollen than usual, as it is the LLE that is typically larger in size than the RLE. Pt was recently admitted at [facility], from 4/23-5/4/23, for a GI bleed requiring multiple pRBC transfusions. No clear source of bleed was identified despite pt getting both an enteroscopy and a colonoscopy, although the GI bleed was presumed to be from the distal small bowel. Pt's Eliquis was initially discontinued during the admission but eventually resumed at a lower dose of 2.5 mg BID prior to discharge. Soon after pt's discharge from the hospital, pt had a follow-up visit with her oncologist in which she had a PET/CT that showed recurrence of disease with new liver mets. Pt was being arranged for chemo as outpatient, but workup this past Friday revealed [place] with serum Cr of 1.9 (baseline 0.85-1) and recurrence of the R pleural effusion that was previously drained.   Pt admitted to medicine service. Work up revealed large right pleural effusion on CT scan. Thoracic surgery consulted for drainage and intervention. Pt seen and examined in ER. Pt's daughter at bedside. Pt states she generally feels weak and would have some JOHNSON at home. Currently denies any SOB. Pt laying on stretcher, wearing O2 NC, in no distress. Pt is aware she has fluid on her lung again and it needs to be drained. Denies any HA, dizziness, back pain, chest pain, n/v/d. HAs some LE edema. Per pt/daughter- last dose of Eliquis was yesterday morning.       PAST MEDICAL & SURGICAL HISTORY:  Endometrial ca  S4 serous endometrial carcinoma, [facility], chemotherapy      HTN (hypertension)      CAD (coronary artery disease)      S/P AVR      Hypothyroidism      HLD (hyperlipidemia)      S/P AVR (aortic valve replacement)      History of endometrial biopsy          REVIEW OF SYSTEMS      Negative except for what's noted above.     MEDICATIONS  (STANDING):  aspirin enteric coated 81 milliGRAM(s) Oral daily  atorvastatin 80 milliGRAM(s) Oral at bedtime  cholecalciferol 2000 Unit(s) Oral daily  cyanocobalamin 1000 MICROGram(s) Oral daily  heparin  Infusion.  Unit(s)/Hr (11 mL/Hr) IV Continuous <Continuous>  levothyroxine 75 MICROGram(s) Oral daily  loratadine 10 milliGRAM(s) Oral daily  misoprostol 200 MICROGram(s) Oral <User Schedule>  multivitamin 1 Tablet(s) Oral daily  pantoprazole    Tablet 40 milliGRAM(s) Oral before breakfast    MEDICATIONS  (PRN):  albuterol    90 MICROgram(s) HFA Inhaler 2 Puff(s) Inhalation every 6 hours PRN Shortness of Breath and/or Wheezing  heparin   Injectable 2000 Unit(s) IV Push every 6 hours PRN For aPTT between 40 - 57  heparin   Injectable 4500 Unit(s) IV Push every 6 hours PRN For aPTT less than 40  melatonin 3 milliGRAM(s) Oral at bedtime PRN Sleep  oxyCODONE    IR 5 milliGRAM(s) Oral every 6 hours PRN Moderate Pain (4 - 6)  polyethylene glycol 3350 17 Gram(s) Oral daily PRN for constipation  senna 2 Tablet(s) Oral at bedtime PRN for constipation      Allergies    No Known Allergies    Intolerances        SOCIAL HISTORY:   Social History:  · Substance use	No  · Social History (marital status, living situation, occupation, and sexual history)	Denies any alcohol, tobacco, or illicit drug use.   Ambulates with a rolling walker.     Tobacco Screening:  · Core Measure Site	No  · Has the patient used tobacco in the past 30 days?	No    FAMILY HISTORY:  Family history of parotid cancer (Child)        Vital Signs Last 24 Hrs  T(C): 36.7 (21 Jun 2023 15:22), Max: 37.2 (21 Jun 2023 01:11)  T(F): 98 (21 Jun 2023 15:22), Max: 99 (21 Jun 2023 01:11)  HR: 97 (21 Jun 2023 15:22) (87 - 97)  BP: 137/68 (21 Jun 2023 15:22) (106/59 - 137/68)  BP(mean): --  RR: 18 (21 Jun 2023 15:22) (17 - 20)  SpO2: 100% (21 Jun 2023 15:22) (100% - 100%)    Parameters below as of 21 Jun 2023 15:22  Patient On (Oxygen Delivery Method): nasal cannula  O2 Flow (L/min): 2      PHYSICAL EXAM:  General: WD NAD  Neurology: A&Ox3, nonfocal, MOFFETT x 4  Eyes: PERRLA/ EOMI, Gross vision intact  ENT/Neck: Neck supple, trachea midline, No JVD, Gross hearing intact  Respiratory: dec'd BS throughout Rt side. Slight dec at left base  CV: RRR, S1S2, no murmurs, rubs or gallops  Abdominal: Soft, NT, ND +BS,   Extremities: Trace LE edema, + peripheral pulses  Skin: No Rashes, Hematoma, Ecchymosis          LABS:                        10.0   11.91 )-----------( 108      ( 21 Jun 2023 16:48 )             28.6     06-21    135  |  98  |  29<H>  ----------------------------<  78  3.7   |  28  |  1.32<H>    Ca    8.1<L>      21 Jun 2023 06:56  Phos  2.8     06-21  Mg     1.60     06-21    TPro  7.0  /  Alb  2.6<L>  /  TBili  0.6  /  DBili  x   /  AST  38<H>  /  ALT  10  /  AlkPhos  75  06-21    PT/INR - ( 21 Jun 2023 09:20 )   PT: 23.3 sec;   INR: 1.99 ratio           Urinalysis Basic - ( 21 Jun 2023 06:56 )    Color: x / Appearance: x / SG: x / pH: x  Gluc: 78 mg/dL / Ketone: x  / Bili: x / Urobili: x   Blood: x / Protein: x / Nitrite: x   Leuk Esterase: x / RBC: x / WBC x   Sq Epi: x / Non Sq Epi: x / Bacteria: x        RADIOLOGY & ADDITIONAL STUDIES:    ACC: [id number] [facility]   ORDERED BY: YADIEL DONALDSON     PROCEDURE DATE:  06/21/2023          INTERPRETATION:  CLINICAL INFORMATION: Dyspnea.    COMPARISON: CTA chest 8/31/2022    CONTRAST/COMPLICATIONS:  IV Contrast: Omnipaque 350  30 cc administered   70 cc discarded  Oral Contrast: NONE  Complications: Intravenous tubing malfunctioned during contrast   administration. Only 30 cc of IV contrast were administered.    PROCEDURE:  CT Angiography of the Chest.  Sagittal and coronal reformats were performed as well as 3D (MIP)   reconstructions.    FINDINGS:    LUNGS/AIRWAYS/PLEURA: Patent central airways.  Large right and trace left   pleural effusions, increased on the right and decreased on the left when   compared to prior exam. Associated complete atelectasis of the right   middle and lower lobes and partial atelectasis of the right upper and   left lower lobes.  MEDIASTINUM AND DANK: A right anterior cardiophrenic lymph node measures   1.6 x 1.6 cm (301, 85). Mediastinal lymph nodes measuring less than 1.5   cm in short axis diameter.  VESSELS: Suboptimal contrast bolus. Within these constraints, no main,   right main, left main, or lobar pulmonary embolism. Limited evaluation of   segmental and subsegmental pulmonary arteries secondary to suboptimal   contrast bolus, motion and mixing artifact. Coronary artery   calcifications.  HEART: Cardiomegaly. No pericardial effusion. Status post TAVR.  CHEST WALL AND LOWER NECK: Generalized subcutaneous edema.  VISUALIZED UPPER ABDOMEN: Subcentimeter hepatic hypodense foci too small   characterize. Perihepatic and perisplenic ascites.  BONES: Degenerative changes.    IMPRESSION:  Suboptimal contrast bolus. Within these constraints, no main, right main,   left main, or lobar pulmonary embolism. Limited evaluation of segmental   and subsegmental pulmonary arteries secondary to suboptimal contrast   bolus, motion and mixing artifact.    Large right and trace left pleural effusions, increased on the right and   decrease on the left when compared to prior exam.    Anasarca.    --- End of Report ---

## 2023-06-21 NOTE — CONSULT NOTE ADULT - ASSESSMENT
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    1. Uterine carcinosarcoma   -- s/p neoadjuvant chemotherapy completed 12/2022, s/p POOL-BSO 02/10/2023   -- Recent imaging suggests disease recurrence- planned to begin carboplatin as an outpatient   -- No systemic treatment while admitted  -- Follow up with Dr. Alvarado after discharge     2. Dyspnea on exertion   -- CTA chest w/o PE; large right pleural effusion   -- LE duplex without DVT   -- Follow up pulm/thoracic eval for thoracentesis, may need pleurX placement for recurrent effusions   -- proBNP elevated, f/u TTE, consider cardiology eval if abnml  -- Continue supplemental O2 as needed, wean as tolerated     3. Anemia, thrombocytopenia   -- Chronic, intermittent- stable counts compared to admission from 04/2023   -- Likely secondary to malignancy, chronic disease, acute illness   -- No evidence of iron, B12, or folate deficiencies. Ddimer elevated but fibrinogen wnl. LDH elevated. Follow up haptoglobin- unlikely hemolysis   -- Monitor CBC and transfuse to maintain hg >7, platelet count >10K, >15K if febrile, or >50K if bleeding     Will continue to follow.    Twyla Pitts PA-C  Hematology/Oncology  [facility]  [phone] (office)  [phone] (alt office)  Evenings and weekends please call MD on call or office

## 2023-06-21 NOTE — OCCUPATIONAL THERAPY INITIAL EVALUATION ADULT - LIVES WITH, PROFILE
Patient lives in a private home with her son and daughter, 6 steps to enter + chair lift to 2nd floor.

## 2023-06-21 NOTE — OCCUPATIONAL THERAPY INITIAL EVALUATION ADULT - BED MOBILITY/TRANSFERS, PREVIOUS LEVEL OF FUNCTION, OT EVAL
uses rolling walker short distances inside the home, also owns a wheelchair/independent/needs device

## 2023-06-21 NOTE — CHART NOTE - NSCHARTNOTEFT_GEN_A_CORE
House pulmonology consulted for thoracentesis. Provider inform patient known to Dr Jami Denny. Pulmonology Dr. Denny consulted. Awaiting recommendations.

## 2023-06-22 NOTE — DIETITIAN INITIAL EVALUATION ADULT - ADD RECOMMEND
1. Encourage & assist Pt with meals; Monitor PO diet tolerance;   2. Honor food preferences;  3. Add appetite stimulant to boost Pt's PO intake/appetite;  4. Monitor labs, hydration status;

## 2023-06-22 NOTE — DIETITIAN INITIAL EVALUATION ADULT - SIGNS/SYMPTOMS
decreased PO intake Sacrum pressure injury: stage II per flow sheet  Pt with physical findings described above; <75% of Kcal intake x months

## 2023-06-22 NOTE — DIETITIAN INITIAL EVALUATION ADULT - NSICDXPASTMEDICALHX_GEN_ALL_CORE_FT
PAST MEDICAL HISTORY:  CAD (coronary artery disease)     Endometrial ca S4 serous endometrial carcinoma, [facility], chemotherapy    HLD (hyperlipidemia)     HTN (hypertension)     Hypothyroidism     S/P AVR

## 2023-06-22 NOTE — PROGRESS NOTE ADULT - SUBJECTIVE AND OBJECTIVE BOX
PULMONARY PROGRESS NOTE    FRIDA WELCH  MRN-[id number]    Patient is a 88y old  Female who presents with a chief complaint of Generalized weakness (21 Jun 2023 17:18)      HPI:  -  -    ROS:   -    ACTIVE MEDICATION LIST:  MEDICATIONS  (STANDING):  aspirin enteric coated 81 milliGRAM(s) Oral daily  atorvastatin 80 milliGRAM(s) Oral at bedtime  cholecalciferol 2000 Unit(s) Oral daily  cyanocobalamin 1000 MICROGram(s) Oral daily  heparin  Infusion.  Unit(s)/Hr (11 mL/Hr) IV Continuous <Continuous>  levothyroxine 75 MICROGram(s) Oral daily  loratadine 10 milliGRAM(s) Oral daily  misoprostol 200 MICROGram(s) Oral <User Schedule>  multivitamin 1 Tablet(s) Oral daily  pantoprazole    Tablet 40 milliGRAM(s) Oral before breakfast    MEDICATIONS  (PRN):  albuterol    90 MICROgram(s) HFA Inhaler 2 Puff(s) Inhalation every 6 hours PRN Shortness of Breath and/or Wheezing  heparin   Injectable 4500 Unit(s) IV Push every 6 hours PRN For aPTT less than 40  heparin   Injectable 2000 Unit(s) IV Push every 6 hours PRN For aPTT between 40 - 57  melatonin 3 milliGRAM(s) Oral at bedtime PRN Sleep  oxyCODONE    IR 5 milliGRAM(s) Oral every 6 hours PRN Moderate Pain (4 - 6)  polyethylene glycol 3350 17 Gram(s) Oral daily PRN for constipation  senna 2 Tablet(s) Oral at bedtime PRN for constipation      EXAM:  Vital Signs Last 24 Hrs  T(C): 37 (22 Jun 2023 04:40), Max: 37 (22 Jun 2023 04:40)  T(F): 98.6 (22 Jun 2023 04:40), Max: 98.6 (22 Jun 2023 04:40)  HR: 98 (22 Jun 2023 04:40) (88 - 98)  BP: 117/64 (22 Jun 2023 04:40) (116/73 - 137/68)  BP(mean): --  RR: 18 (22 Jun 2023 04:40) (17 - 18)  SpO2: 100% (22 Jun 2023 04:40) (100% - 100%)    Parameters below as of 22 Jun 2023 04:40  Patient On (Oxygen Delivery Method): room air        GENERAL: The patient is awake and alert in no apparent distress.     LUNGS: Clear to auscultation without wheezing, rales or rhonchi; respirations unlabored    HEART: Regular rate and rhythm without murmur.                            10.0   11.91 )-----------( 108      ( 21 Jun 2023 16:48 )             28.6       06-22    132<L>  |  96<L>  |  27<H>  ----------------------------<  99  4.4   |  22  |  1.21    Ca    8.1<L>      22 Jun 2023 06:02  Phos  3.0     06-22  Mg     1.60     06-22    TPro  7.0  /  Alb  2.6<L>  /  TBili  0.6  /  DBili  x   /  AST  38<H>  /  ALT  10  /  AlkPhos  75  06-21    >>> <<<    PROBLEM LIST:  88y Female with HEALTH ISSUES - PROBLEM Dx:  Generalized weakness    Shortness of breath    [place] (acute kidney injury)    Leukocytosis    Anemia    Endometrial cancer    Pulmonary embolism    CAD (coronary artery disease)    Hypertension    Hyperlipidemia    Hypothyroidism    Need for prophylactic measure              RECS:        Please call with any questions.    Jami Denny DO  [facility]  [phone]   PULMONARY PROGRESS NOTE    FRIDA WELCH  MRN-[id number]    Patient is a 88y old  Female who presents with a chief complaint of Generalized weakness (21 Jun 2023 17:18)      HPI:  -87 yo female last seen sept 2022  PMH: stage 4 serous endometrial carcinoma (dx 5/2022) s/p chemo (last in 12/2022) and debulking surgery with POOL/BSO (2/10/23) c/b b/l pleural effusions (R > L, on 2L NC PRN), RUL segmental PE (8/2022, on Eliquis), GI bleed (4/2023), CAD s/p stent (3/2021), aortic stenosis s/p TAVR, HTN, HLD, and hypothyroidism presents with 3 weeks of worsening generalized weakness.   found to have b/l effusion (R>L)  seen today on 4L, + cough, + weak  feels ok but is on higher 02 requirements now    pulm called for CT findings        ROS:   -    ACTIVE MEDICATION LIST:  MEDICATIONS  (STANDING):  aspirin enteric coated 81 milliGRAM(s) Oral daily  atorvastatin 80 milliGRAM(s) Oral at bedtime  cholecalciferol 2000 Unit(s) Oral daily  cyanocobalamin 1000 MICROGram(s) Oral daily  heparin  Infusion.  Unit(s)/Hr (11 mL/Hr) IV Continuous <Continuous>  levothyroxine 75 MICROGram(s) Oral daily  loratadine 10 milliGRAM(s) Oral daily  misoprostol 200 MICROGram(s) Oral <User Schedule>  multivitamin 1 Tablet(s) Oral daily  pantoprazole    Tablet 40 milliGRAM(s) Oral before breakfast    MEDICATIONS  (PRN):  albuterol    90 MICROgram(s) HFA Inhaler 2 Puff(s) Inhalation every 6 hours PRN Shortness of Breath and/or Wheezing  heparin   Injectable 4500 Unit(s) IV Push every 6 hours PRN For aPTT less than 40  heparin   Injectable 2000 Unit(s) IV Push every 6 hours PRN For aPTT between 40 - 57  melatonin 3 milliGRAM(s) Oral at bedtime PRN Sleep  oxyCODONE    IR 5 milliGRAM(s) Oral every 6 hours PRN Moderate Pain (4 - 6)  polyethylene glycol 3350 17 Gram(s) Oral daily PRN for constipation  senna 2 Tablet(s) Oral at bedtime PRN for constipation      EXAM:  Vital Signs Last 24 Hrs  T(C): 37 (22 Jun 2023 04:40), Max: 37 (22 Jun 2023 04:40)  T(F): 98.6 (22 Jun 2023 04:40), Max: 98.6 (22 Jun 2023 04:40)  HR: 98 (22 Jun 2023 04:40) (88 - 98)  BP: 117/64 (22 Jun 2023 04:40) (116/73 - 137/68)  BP(mean): --  RR: 18 (22 Jun 2023 04:40) (17 - 18)  SpO2: 100% (22 Jun 2023 04:40) (100% - 100%)    Parameters below as of 22 Jun 2023 04:40  Patient On (Oxygen Delivery Method): room air        GENERAL: The patient is awake and alert in no apparent distress.     LUNGS: mildly tachypneic  decreased right                            10.0   11.91 )-----------( 108      ( 21 Jun 2023 16:48 )             28.6       06-22    132<L>  |  96<L>  |  27<H>  ----------------------------<  99  4.4   |  22  |  1.21    Ca    8.1<L>      22 Jun 2023 06:02  Phos  3.0     06-22  Mg     1.60     06-22    TPro  7.0  /  Alb  2.6<L>  /  TBili  0.6  /  DBili  x   /  AST  38<H>  /  ALT  10  /  AlkPhos  75  06-21     < from: US Duplex Venous Lower Ext Complete, Bilateral (06.21.23 @ 11:10) >    ACC: [id number] EXAM:  US DPLX LWR EXT VEINS COMPL BI   ORDERED BY: YADIEL DONALDSON     PROCEDURE DATE:  06/21/2023          INTERPRETATION:  CLINICAL INFORMATION: Lower extremity swelling, right   greater than left.    COMPARISON: None available.    TECHNIQUE: Duplex sonography of the BILATERAL LOWER extremity veins with   color and spectral Doppler, with and without compression.    FINDINGS:    RIGHT:  Normal compressibility of the RIGHT common femoral, femoral and popliteal   veins.  Doppler examination shows normal spontaneous and phasic flow.  Limited visualized of the RIGHT calf veins. No RIGHT calf vein thrombosis   detected.    LEFT:  Normal compressibility of the LEFT common femoral, femoral and popliteal   veins.  Doppler examination shows normal spontaneous and phasic flow.  Limited visualized of the LEFT calf veins. No LEFT calf vein thrombosis   detected.    IMPRESSION:  No evidence of deep venous thrombosis in either lower extremity.            --- End of Report ---            BETH CARRASCO MD; Attending Radiologist  This document has been electronically signed. Jun 21 2023 11:44AM    < end of copied text >  < from: CT Angio Chest PE Protocol w/ IV Cont (06.21.23 @ 06:38) >    ACC: [id number] EXAM:  CT ANGIO CHEST PULM ART [facility]   ORDERED BY: YADIEL DONALDSON     PROCEDURE DATE:  06/21/2023          INTERPRETATION:  CLINICAL INFORMATION: Dyspnea.    COMPARISON: CTA chest 8/31/2022    CONTRAST/COMPLICATIONS:  IV Contrast: Omnipaque 350  30 cc administered   70 cc discarded  Oral Contrast: NONE  Complications: Intravenous tubing malfunctioned during contrast   administration. Only 30 cc of IV contrast were administered.    PROCEDURE:  CT Angiography of the Chest.  Sagittal and coronal reformats were performed as well as 3D (MIP)   reconstructions.    FINDINGS:    LUNGS/AIRWAYS/PLEURA: Patent central airways.  Large right and trace left   pleural effusions, increased on the right and decreased on the left when   comparedto prior exam. Associated complete atelectasis of the right   middle and lower lobes and partial atelectasis of the right upper and   left lower lobes.  MEDIASTINUM AND DANK: A right anterior cardiophrenic lymph node measures   1.6 x 1.6 cm (301, 85). Mediastinal lymph nodes measuring less than 1.5   cm in short axis diameter.  VESSELS: Suboptimal contrast bolus. Within these constraints, no main,   right main, left main, or lobar pulmonary embolism. Limited evaluation of   segmental and subsegmental pulmonary arteries secondary to suboptimal   contrast bolus, motion and mixing artifact. Coronary artery   calcifications.  HEART: Cardiomegaly. No pericardial effusion. Status post TAVR.  CHEST WALL AND LOWER NECK: Generalized subcutaneous edema.  VISUALIZED UPPER ABDOMEN: Subcentimeter hepatic hypodense foci too small   characterize. Perihepatic and perisplenic ascites.  BONES: Degenerative changes.    IMPRESSION:  Suboptimal contrast bolus. Within these constraints, no main, right main,   left main, or lobar pulmonary embolism. Limited evaluation of segmental   and subsegmental pulmonary arteries secondary to suboptimal contrast   bolus, motion and mixing artifact.    Large right and trace left pleural effusions, increased on the rightand   decrease on the left when compared to prior exam.    Anasarca.    --- End of Report ---          BACILIO KASPER MD; Resident Radiologist  This document has been electronically signed.  BARBRA QUISPE MD; Attending Radiologist  This document has been electronically signed. Jun 21 2023 10:47AM    < end of copied text >  < from: Transthoracic Echocardiogram (06.21.23 @ 15:15) >  1. Mitral annular calcification, otherwise normal mitral  valve. Mild-moderate mitral regurgitation.  2. Transcatheter aortic valve replacement Peak transaortic  valve gradient equals 42 mm Hg, mean transaortic valve  gradient equals 23 mm Hg, which is probably normal in the  presence of a prosthetic valve. Moderate valvular aortic  regurgitation.  3. Normal left ventricular systolic function. No segmental  wall motion abnormalities. Septal motion consistent with  right ventricular overload.  4. Mild diastolic dysfunction (Stage I).  5. Right ventricular enlargement with decreased right  ventricular systolic function.  6. Bilateral pleural effusions.  *** Compared with echocardiogram of 5/1/2023 which on  review of images likely showed mild aortic regurgitation,  today's echo shows increase of TAVR gradients likely due to  increase in aortic regurgitation.  ------------------------------------------------------------------------    < end of copied text >      PROBLEM LIST:  88y Female with HEALTH ISSUES - PROBLEM Dx:  Generalized weakness    Shortness of breath    [place] (acute kidney injury)    Leukocytosis    Anemia    Endometrial cancer    Pulmonary embolism    CAD (coronary artery disease)    Hypertension    Hyperlipidemia    Hypothyroidism    Need for prophylactic measure              RECS:  appreciate CTS input/care  02  IS  no pulm objection to necessary vats procedure        Please call with any questions.    Jami Denny,   [facility]Sleep [facility]  [phone]

## 2023-06-22 NOTE — DIETITIAN INITIAL EVALUATION ADULT - PROBLEM SELECTOR PLAN 8
S/p stent in 3/2021. On ASA and statin.   - C/w ASA 81 mg daily  - C/w atorvastatin 80 mg qHS (interchange for rosuvastatin 20 mg)  - Resume Zetia 10 mg daily upon discharge

## 2023-06-22 NOTE — DIETITIAN INITIAL EVALUATION ADULT - PROBLEM SELECTOR PLAN 6
Pt with history of stage 4 serous endometrial carcinoma (dx 5/2022) s/p chemo (last in 12/2022) and debulking surgery with POOL/BSO (2/10/23), now recently found to have recurrence of disease with new liver mets.  - Pt follows with Dr. Alvarado of [facility]. Will consult [facility] in AM.   - GOC discussion with pt and family given likely poor prognosis

## 2023-06-22 NOTE — DIETITIAN NUTRITION RISK NOTIFICATION - TREATMENT: THE FOLLOWING DIET HAS BEEN RECOMMENDED
Soft and Bite Sized:   DASH/TLC {Sodium & Cholesterol Restricted} (DASH) (06-21-23 @ 06:21) [Active]

## 2023-06-22 NOTE — PROGRESS NOTE ADULT - SUBJECTIVE AND OBJECTIVE BOX
SUBJECTIVE / OVERNIGHT EVENTS:pt seen and examined  06-22-23     MEDICATIONS  (STANDING):  aspirin enteric coated 81 milliGRAM(s) Oral daily  atorvastatin 80 milliGRAM(s) Oral at bedtime  cholecalciferol 2000 Unit(s) Oral daily  cyanocobalamin 1000 MICROGram(s) Oral daily  heparin  Infusion.  Unit(s)/Hr (11 mL/Hr) IV Continuous <Continuous>  levothyroxine 75 MICROGram(s) Oral daily  loratadine 10 milliGRAM(s) Oral daily  misoprostol 200 MICROGram(s) Oral <User Schedule>  multivitamin 1 Tablet(s) Oral daily  pantoprazole    Tablet 40 milliGRAM(s) Oral before breakfast    MEDICATIONS  (PRN):  albuterol    90 MICROgram(s) HFA Inhaler 2 Puff(s) Inhalation every 6 hours PRN Shortness of Breath and/or Wheezing  heparin   Injectable 4500 Unit(s) IV Push every 6 hours PRN For aPTT less than 40  heparin   Injectable 2000 Unit(s) IV Push every 6 hours PRN For aPTT between 40 - 57  melatonin 3 milliGRAM(s) Oral at bedtime PRN Sleep  oxyCODONE    IR 5 milliGRAM(s) Oral every 6 hours PRN Moderate Pain (4 - 6)  polyethylene glycol 3350 17 Gram(s) Oral daily PRN for constipation  senna 2 Tablet(s) Oral at bedtime PRN for constipation    Vital Signs Last 24 Hrs  T(C): 36.4 (06-22-23 @ 21:06), Max: 37.1 (06-22-23 @ 08:40)  T(F): 97.5 (06-22-23 @ 21:06), Max: 98.7 (06-22-23 @ 08:40)  HR: 105 (06-22-23 @ 21:06) (92 - 115)  BP: 113/71 (06-22-23 @ 21:06) (107/69 - 120/66)  BP(mean): --  RR: 18 (06-22-23 @ 21:06) (17 - 18)  SpO2: 92% (06-22-23 @ 21:06) (92% - 100%)      I&O's Summary      Constitutional: No fever, fatigue  Skin: No rash.  Eyes: No recent vision problems or eye pain.  ENT: No congestion, ear pain, or sore throat.  Cardiovascular: No chest pain or palpation.  Respiratory: No cough, shortness of breath, congestion, or wheezing.  Gastrointestinal: No abdominal pain, nausea, vomiting, or diarrhea.  Genitourinary: No dysuria.  Musculoskeletal: No joint swelling.  Neurologic: No headache.    PHYSICAL EXAM:  GENERAL: NAD  EYES: EOMI, PERRLA  NECK: Supple, No JVD  CHEST/LUNG: dec breath sounds at bases  HEART:  S1 , S2 +  ABDOMEN: soft , bs+  EXTREMITIES:  edema+  NEUROLOGY:alert awake      LABS:  06-22    132<L>  |  96<L>  |  27<H>  ----------------------------<  99  4.4   |  22  |  1.21    Ca    8.1<L>      22 Jun 2023 06:02  Phos  3.0     06-22  Mg     1.60     06-22    TPro  7.0  /  Alb  2.6<L>  /  TBili  0.6  /  DBili      /  AST  38<H>  /  ALT  10  /  AlkPhos  75  06-21    Creatinine Trend: 1.21 <--, 1.32 <--, 1.38 <--                        11.7   13.61 )-----------( 112      ( 22 Jun 2023 06:02 )             37.3     Urine Studies:  Urinalysis Basic - ( 22 Jun 2023 06:02 )    Color:  / Appearance:  / SG:  / pH:   Gluc: 99 mg/dL / Ketone:   / Bili:  / Urobili:    Blood:  / Protein:  / Nitrite:    Leuk Esterase:  / RBC:  / WBC    Sq Epi:  / Non Sq Epi:  / Bacteria:               LIVER FUNCTIONS - ( 21 Jun 2023 06:56 )  Alb: 2.6 g/dL / Pro: 7.0 g/dL / ALK PHOS: 75 U/L / ALT: 10 U/L / AST: 38 U/L / GGT: x           PT/INR - ( 21 Jun 2023 09:20 )   PT: 23.3 sec;   INR: 1.99 ratio         PTT - ( 22 Jun 2023 16:50 )  PTT:>200.0 sec      RADIOLOGY & ADDITIONAL TESTS:    Imaging Personally Reviewed:yes    Consultant(s) Notes Reviewed:  yes    Care Discussed with Consultants/Other Providers:yes

## 2023-06-22 NOTE — PROGRESS NOTE ADULT - SUBJECTIVE AND OBJECTIVE BOX
Pt seen and examined with Dr Boyd  no new complaints  + SOB    Vital Signs Last 24 Hrs  T(C): 36.9 (22 Jun 2023 11:35), Max: 37.1 (22 Jun 2023 08:40)  T(F): 98.5 (22 Jun 2023 11:35), Max: 98.7 (22 Jun 2023 08:40)  HR: 105 (22 Jun 2023 11:35) (92 - 115)  BP: 110/63 (22 Jun 2023 11:35) (110/63 - 137/68)  BP(mean): --  RR: 18 (22 Jun 2023 11:35) (17 - 18)  SpO2: 100% (22 Jun 2023 11:35) (100% - 100%)    Parameters below as of 22 Jun 2023 11:35  Patient On (Oxygen Delivery Method): room air    PE  General:  NAD  Neurology: A&Ox3, nonfocal, MOFFETT x 4  Respiratory: CTA B/L  CV: RRR, S1S2, no murmurs, rubs or gallops  Abdominal: Soft, NT, ND +BS, Last BM  Extremities: No edema, + peripheral pulses    A/P  89 yo woman with history of stage 4 serous endometrial carcinoma (dx 5/2022) s/p chemo (last in 12/2022) and debulking surgery with POOL/BSO (2/10/23) c/b b/l pleural effusions (R > L, on 2L NC PRN), RUL segmental PE (8/2022, on Eliquis), GI bleed (4/2023), CAD s/p stent (3/2021), aortic stenosis s/p TAVR, HTN, HLD, and hypothyroidism presents with 3 weeks of worsening generalized weakness. Found to have large Rt pleural effusion.   - OR tomorrow for RVATS, R pleurX catheter placement with Dr Boyd at 7:30a  - NPO p MN  - valid type and screen, valid coagulation profile,  - please hold heparin gtt at 1am ******  - d/w pt and family at bedside    Megan MATUTE [id number]

## 2023-06-22 NOTE — PROGRESS NOTE ADULT - ASSESSMENT
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    1. Uterine carcinosarcoma   -- s/p neoadjuvant chemotherapy completed 12/2022, s/p POOL-BSO 02/10/2023   -- Recent imaging suggests disease recurrence- planned to begin carboplatin as an outpatient   -- No systemic treatment while admitted  -- Follow up with Dr. Alvarado after discharge     2. Dyspnea on exertion   -- CTA chest w/o PE; large right pleural effusion   -- LE duplex without DVT   -- Thoracic consulted, plan for R VATS, R pleurX catheter placement tomorrow 06/23  -- Continue supplemental O2 as needed, wean as tolerated     3. Anemia, thrombocytopenia   -- Chronic, intermittent- stable counts compared to admission from 04/2023   -- Likely secondary to malignancy, chronic disease, acute illness   -- No evidence of iron, B12, or folate deficiencies. Ddimer elevated but fibrinogen wnl. LDH elevated. Follow up haptoglobin- unlikely hemolysis   -- Monitor CBC and transfuse to maintain hg >7, platelet count >10K, >15K if febrile, or >50K if bleeding     Will continue to follow.    Twyla Pitts PA-C  Hematology/Oncology  [facility]  [phone] (office)  [phone] (alt office)  Evenings and weekends please call MD on call or office

## 2023-06-22 NOTE — DIETITIAN INITIAL EVALUATION ADULT - PROBLEM SELECTOR PLAN 10
- C/w atorvastatin 80 mg qHS (interchange for rosuvastatin 20 mg)  - Resume Zetia 10 mg daily upon discharge

## 2023-06-22 NOTE — DIETITIAN INITIAL EVALUATION ADULT - ORAL NUTRITION SUPPLEMENTS
Ensure Plus  ml (350 kcal, 20 gm Protein) 2x daily  No Carb Prosource (1 pkg = 15 gms Protein) Qty per day: 2

## 2023-06-22 NOTE — PROGRESS NOTE ADULT - PROBLEM SELECTOR PLAN 6
Pt with history of stage 4 serous endometrial carcinoma (dx 5/2022) s/p chemo (last in 12/2022) and debulking surgery with POOL/BSO (2/10/23), now recently found to have recurrence of disease with new liver mets.  - Pt follows with Dr. Alvarado of [facility]. Will consult [facility] in AM.   - GOC discussion with pt and family given likely poor prognosis

## 2023-06-22 NOTE — PROGRESS NOTE ADULT - SUBJECTIVE AND OBJECTIVE BOX
Patient is a 88y old  Female who presents with a chief complaint of Generalized weakness (22 Jun 2023 12:52)    Pt seen this morning. She continues to feel weak but overall doing okay, wants to get the procedure over with.    MEDICATIONS  (STANDING):  aspirin enteric coated 81 milliGRAM(s) Oral daily  atorvastatin 80 milliGRAM(s) Oral at bedtime  cholecalciferol 2000 Unit(s) Oral daily  cyanocobalamin 1000 MICROGram(s) Oral daily  heparin  Infusion.  Unit(s)/Hr (11 mL/Hr) IV Continuous <Continuous>  levothyroxine 75 MICROGram(s) Oral daily  loratadine 10 milliGRAM(s) Oral daily  misoprostol 200 MICROGram(s) Oral <User Schedule>  multivitamin 1 Tablet(s) Oral daily  pantoprazole    Tablet 40 milliGRAM(s) Oral before breakfast    MEDICATIONS  (PRN):  albuterol    90 MICROgram(s) HFA Inhaler 2 Puff(s) Inhalation every 6 hours PRN Shortness of Breath and/or Wheezing  heparin   Injectable 4500 Unit(s) IV Push every 6 hours PRN For aPTT less than 40  heparin   Injectable 2000 Unit(s) IV Push every 6 hours PRN For aPTT between 40 - 57  melatonin 3 milliGRAM(s) Oral at bedtime PRN Sleep  oxyCODONE    IR 5 milliGRAM(s) Oral every 6 hours PRN Moderate Pain (4 - 6)  polyethylene glycol 3350 17 Gram(s) Oral daily PRN for constipation  senna 2 Tablet(s) Oral at bedtime PRN for constipation        Vital Signs Last 24 Hrs  T(C): 36.9 (22 Jun 2023 11:35), Max: 37.1 (22 Jun 2023 08:40)  T(F): 98.5 (22 Jun 2023 11:35), Max: 98.7 (22 Jun 2023 08:40)  HR: 105 (22 Jun 2023 11:35) (92 - 115)  BP: 110/63 (22 Jun 2023 11:35) (110/63 - 137/68)  BP(mean): --  RR: 18 (22 Jun 2023 11:35) (17 - 18)  SpO2: 100% (22 Jun 2023 11:35) (100% - 100%)    Parameters below as of 22 Jun 2023 11:35  Patient On (Oxygen Delivery Method): room air        PE  NAD  Awake, alert  Anicteric, MMM  No c/c/e  No rash grossly  FROM                          11.7   13.61 )-----------( 112      ( 22 Jun 2023 06:02 )             37.3       06-22    132<L>  |  96<L>  |  27<H>  ----------------------------<  99  4.4   |  22  |  1.21    Ca    8.1<L>      22 Jun 2023 06:02  Phos  3.0     06-22  Mg     1.60     06-22    TPro  7.0  /  Alb  2.6<L>  /  TBili  0.6  /  DBili  x   /  AST  38<H>  /  ALT  10  /  AlkPhos  75  06-21

## 2023-06-22 NOTE — DIETITIAN INITIAL EVALUATION ADULT - OTHER INFO
88 year-old-female with history of serous endometrial carcinoma, CAD s/p PCI, HTN, TAVR, hypothyroidism and HLD presents with weakness, anemia, and unilateral pleural effusion Pt 87 yo female with PMHx of serous endometrial carcinoma, CAD s/p PCI, HTN, TAVR, hypothyroidism, HLD presented with weakness, anemia, unilateral pleural effusion - per chart review.     At time of visit, Pt awake, alert but weak; Pt's grand daughter at bed side. Per Pt, her appetite not well for past 6 months or so. Food preferences discussed. Pt on Soft & bite sized consistency food/meal option at present; Pt denies having any chewing/swallowing difficulty. No report of nausea, vomiting or diarrhea @ this time. +Last BM (6/20) per flow sheet.    Per Pt her height: 4'9"; Pt could not confirm her actual body weight. Pt with unintended weight loss PTA, but unable to quantify. Of note, Pt's weights: 60 kg (6/21/23), 63.5 kg (HIE - 9/22/22). Rec to add PO supplement: Ensure Plus HP - 2x daily to diet rx. Pt with pressure injury to sacrum - stage II per flow sheet. Rec to add Prosource - 2x daily, to aide in wound healing. RDN answered grand daughter's questions/concerns related to diet. RDN remains available.

## 2023-06-22 NOTE — DIETITIAN INITIAL EVALUATION ADULT - ETIOLOGY
physiological cause for wound healing  Pt meets criteria for severe malnutrition in the context of acute in chronic illness

## 2023-06-23 NOTE — PROGRESS NOTE ADULT - ASSESSMENT
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

## 2023-06-23 NOTE — CONSULT NOTE ADULT - ASSESSMENT
88 F with Stage 4 serous endometrial ca diagnosed in 5/2022 s/p chemo last in 12/2022 s/p POOL/ BSO 2/2023 complicated with bilateral pleural effusion R>L on 2L NC, PE, GIB, CAD s/p stent, aortic stenosis s/p TAVR, HTN, HLD, hypothyroidism presenting with generalized weakness for the past three weeks. Recent PET/CT with recurrence of disease with new liver mets. Pt was to start chemo but developed [place] and recurrence of R pleural effusion. Today with fever 101.2F. Leukocytosis. [place] better. CTA chest with no PE, but with large right and trace left pleural effusions, increased on the right and decrease on the left when compared to prior exam, anasarca.    Recommend:  #Fever, leukocytosis (SIRS), R pleural effusion  -No clear source - has occasional dry cough, no other complaints, ?recurrence of disease  -Large R pleural effusion planned for VATS today with CTSx, but postponed due to fever  -Continue zosyn  -Check blood cultures x 2 sets  -RVP  -MRSA nasal swab  -CT A/P with IV contrast  -Monitor fever curve  -Trend WBC  -Sputum culture    #Endometrial ca  -onc following    Pop Chappell MD  Available through MS Teams  If no response, or after 5pm/weekends, call [phone]

## 2023-06-23 NOTE — CHART NOTE - NSCHARTNOTEFT_GEN_A_CORE
Notified by RN patient is febrile to 101.2 rectally. Patient is asymptomatic denies cough, chest pain, worsening shortness of breath, abdominal pain, urinary symptoms.  Blood cultures, lactate, UA , urine culture, chest xray ordered, antibiotics started empirically.  Discussed with  , ID consulted.

## 2023-06-23 NOTE — PROGRESS NOTE ADULT - SUBJECTIVE AND OBJECTIVE BOX
SUBJECTIVE / OVERNIGHT EVENTS:pt seen and examined  06-23-23     MEDICATIONS  (STANDING):  aspirin enteric coated 81 milliGRAM(s) Oral daily  atorvastatin 80 milliGRAM(s) Oral at bedtime  cholecalciferol 2000 Unit(s) Oral daily  cyanocobalamin 1000 MICROGram(s) Oral daily  levothyroxine 75 MICROGram(s) Oral daily  loratadine 10 milliGRAM(s) Oral daily  misoprostol 200 MICROGram(s) Oral <User Schedule>  multivitamin 1 Tablet(s) Oral daily  pantoprazole    Tablet 40 milliGRAM(s) Oral before breakfast  piperacillin/tazobactam IVPB.. 3.375 Gram(s) IV Intermittent every 8 hours    MEDICATIONS  (PRN):  acetaminophen     Tablet .. 650 milliGRAM(s) Oral every 6 hours PRN Temp greater or equal to 38C (100.4F), Mild Pain (1 - 3)  albuterol    90 MICROgram(s) HFA Inhaler 2 Puff(s) Inhalation every 6 hours PRN Shortness of Breath and/or Wheezing  melatonin 3 milliGRAM(s) Oral at bedtime PRN Sleep  oxyCODONE    IR 5 milliGRAM(s) Oral every 6 hours PRN Moderate Pain (4 - 6)  polyethylene glycol 3350 17 Gram(s) Oral daily PRN for constipation  senna 2 Tablet(s) Oral at bedtime PRN for constipation    Vital Signs Last 24 Hrs  T(C): 36.9 (06-23-23 @ 18:08), Max: 38.4 (06-23-23 @ 09:20)  T(F): 98.5 (06-23-23 @ 18:08), Max: 101.2 (06-23-23 @ 09:20)  HR: 104 (06-23-23 @ 18:08) (96 - 115)  BP: 105/62 (06-23-23 @ 18:08) (96/52 - 123/82)  BP(mean): --  RR: 18 (06-23-23 @ 18:08) (17 - 19)  SpO2: 100% (06-23-23 @ 18:08) (92% - 100%)        Constitutional: No fever, fatigue  Skin: No rash.  Eyes: No recent vision problems or eye pain.  ENT: No congestion, ear pain, or sore throat.  Cardiovascular: No chest pain or palpation.  Respiratory: No cough, shortness of breath, congestion, or wheezing.  Gastrointestinal: No abdominal pain, nausea, vomiting, or diarrhea.  Genitourinary: No dysuria.  Musculoskeletal: No joint swelling.  Neurologic: No headache.    PHYSICAL EXAM:  GENERAL: NAD  EYES: EOMI, PERRLA  NECK: Supple, No JVD  CHEST/LUNG: dec breath sounds at bases  HEART:  S1 , S2 +  ABDOMEN: soft , bs+  EXTREMITIES:  edema+  NEUROLOGY:alert awake    LABS:  06-23    133<L>  |  96<L>  |  25<H>  ----------------------------<  106<H>  3.8   |  28  |  1.23    Ca    7.8<L>      23 Jun 2023 02:32  Phos  2.9     06-23  Mg     1.40     06-23      Creatinine Trend: 1.23 <--, 1.21 <--, 1.32 <--, 1.38 <--                        8.8    13.26 )-----------( 120      ( 23 Jun 2023 18:15 )             26.5     Urine Studies:  Urinalysis Basic - ( 23 Jun 2023 02:32 )    Color:  / Appearance:  / SG:  / pH:   Gluc: 106 mg/dL / Ketone:   / Bili:  / Urobili:    Blood:  / Protein:  / Nitrite:    Leuk Esterase:  / RBC:  / WBC    Sq Epi:  / Non Sq Epi:  / Bacteria:                 PT/INR - ( 23 Jun 2023 02:32 )   PT: 20.8 sec;   INR: 1.78 ratio         PTT - ( 23 Jun 2023 02:32 )  PTT:89.9 sec    RADIOLOGY & ADDITIONAL TESTS:    Imaging Personally Reviewed:yes    Consultant(s) Notes Reviewed:  yes    Care Discussed with Consultants/Other Providers:yes

## 2023-06-23 NOTE — CHART NOTE - NSCHARTNOTEFT_GEN_A_CORE
Pt seen by DR. Boyd  OR for Pleurx today cancelled due to fever  Will place bedside PTC to drain effusion  Recommend daily CXR  Will tentatively reschedule OR for Tuesday pending  ID clearance.   Will follow. Pt seen by DR. Boyd  OR for Pleurx today cancelled due to fever  Will place bedside PTC to drain effusion  Recommend daily CXR  Will tentatively reschedule OR for Tuesday pending  ID clearance.   Will follow.      patient cancelled secondary to fever this AM. would defer placement until confirmed no source of infection. tentatively rescheduled for Tuesday.   will place pigtail for temporary relief

## 2023-06-23 NOTE — PROGRESS NOTE ADULT - ASSESSMENT
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    1. Uterine carcinosarcoma   -- s/p neoadjuvant chemotherapy completed 12/2022, s/p POOL-BSO 02/10/2023   -- Recent imaging suggests disease recurrence- planned to begin carboplatin as an outpatient   -- No systemic treatment while admitted  -- Follow up with Dr. Alvarado after discharge     2. Dyspnea on exertion   -- CTA chest w/o PE; large right pleural effusion   -- LE duplex without DVT   -- Thoracic consulted, plan for R VATS, R pleurX catheter placement - OR postponed as pt was febrile today   -- Continue supplemental O2 as needed, wean as tolerated     3. Anemia, thrombocytopenia   -- Chronic, intermittent- stable counts compared to admission from 04/2023   -- Likely secondary to malignancy, chronic disease, acute illness   -- No evidence of iron, B12, or folate deficiencies. Ddimer elevated but fibrinogen wnl. LDH elevated. Follow up haptoglobin- unlikely hemolysis   -- Monitor CBC and transfuse to maintain hg >7, platelet count >10K, >15K if febrile, or >50K if bleeding     4. Fever   -- Pt febrile to 101.2F this AM   -- started on empiric Zosyn, follow up RVP, cultures   -- ID consulted, await recommendations     Will continue to follow.    Twyla Pitts PA-C  Hematology/Oncology  [facility]  [phone] (office)  [phone] (alt office)  Evenings and weekends please call MD on call or office

## 2023-06-23 NOTE — CONSULT NOTE ADULT - SUBJECTIVE AND OBJECTIVE BOX
HPI:  89 yo woman with history of stage 4 serous endometrial carcinoma (dx 5/2022) s/p chemo (last in 12/2022) and debulking surgery with POOL/BSO (2/10/23) c/b b/l pleural effusions (R > L, on 2L NC PRN), RUL segmental PE (8/2022, on Eliquis), GI bleed (4/2023), CAD s/p stent (3/2021), aortic stenosis s/p TAVR, HTN, HLD, and hypothyroidism presents with 3 weeks of worsening generalized weakness. Prior to 3 weeks ago, pt was able to ambulate using her rolling walker without any additional assistance and even go down the 5 steps in her house on her own to head out for doctors' appointments. Over the last 3 weeks, however, pt's functional status has been progressively declining, ever since pt got a therapeutic thoracentesis with removal of 1L of pleural fluid in early June at her oncologist's office. Pt is now barely able to get out of bed on most days. Pt states that she got out of bed only once on Monday, and it was to use the bathroom for a BM. Since ~2 weeks ago, pt has also been becoming short of breath with minimal exertion, such as when walking from her bed to the bathroom, and has had to rely on supplemental O2 with 2L NC, which she didn't require for the past several months. Pt denies any recent chest pain, palpitations, lightheadedness, dizziness, headaches, vision changes, abdominal pain, nausea, vomiting, diarrhea, constipation, melena, BRBPR, or dysuria. Pt has chronic b/l LE edema and had b/l venous dopplers this past Friday that were negative for a DVT, though pt and her daughter note that pt's RLE seems more swollen than usual, as it is the LLE that is typically larger in size than the RLE. Pt was recently admitted at [facility], from 4/23-5/4/23, for a GI bleed requiring multiple pRBC transfusions. No clear source of bleed was identified despite pt getting both an enteroscopy and a colonoscopy, although the GI bleed was presumed to be from the distal small bowel. Pt's Eliquis was initially discontinued during the admission but eventually resumed at a lower dose of 2.5 mg BID prior to discharge. Soon after pt's discharge from the hospital, pt had a follow-up visit with her oncologist in which she had a PET/CT that showed recurrence of disease with new liver mets. Pt was being arranged for chemo as outpatient, but workup this past Friday revealed [place] with serum Cr of 1.9 (baseline 0.85-1) and recurrence of the R pleural effusion that was previously drained.     In the ED,   T 98.3-99, HR 82-87, -126/61-64, RR 18-20, SpO2 % RA and 2L NC.  CXR showing moderate to large R pleural effusion.   LE dopplers bilaterally neg for DVT  CTA chest with no PE, large trace and trace L pleural effusions, increased on the R and decreased on the L. Anasarca.  Febrile to 101.2F, tachycardic, leukocytosis.  PCT 0.22.      PAST MEDICAL & SURGICAL HISTORY:  Endometrial ca  S4 serous endometrial carcinoma, [facility] [place], chemotherapy      HTN (hypertension)      CAD (coronary artery disease)      S/P AVR      Hypothyroidism      HLD (hyperlipidemia)      S/P AVR (aortic valve replacement)      History of endometrial biopsy          Allergies    No Known Allergies    Intolerances        ANTIMICROBIALS:  piperacillin/tazobactam IVPB. 3.375 once  piperacillin/tazobactam IVPB.- 3.375 once  piperacillin/tazobactam IVPB.. 3.375 every 8 hours      OTHER MEDS:  albuterol    90 MICROgram(s) HFA Inhaler 2 Puff(s) Inhalation every 6 hours PRN  aspirin enteric coated 81 milliGRAM(s) Oral daily  atorvastatin 80 milliGRAM(s) Oral at bedtime  cholecalciferol 2000 Unit(s) Oral daily  cyanocobalamin 1000 MICROGram(s) Oral daily  levothyroxine 75 MICROGram(s) Oral daily  loratadine 10 milliGRAM(s) Oral daily  melatonin 3 milliGRAM(s) Oral at bedtime PRN  misoprostol 200 MICROGram(s) Oral <User Schedule>  multivitamin 1 Tablet(s) Oral daily  oxyCODONE    IR 5 milliGRAM(s) Oral every 6 hours PRN  pantoprazole    Tablet 40 milliGRAM(s) Oral before breakfast  polyethylene glycol 3350 17 Gram(s) Oral daily PRN  senna 2 Tablet(s) Oral at bedtime PRN      SOCIAL HISTORY:    FAMILY HISTORY:  Family history of parotid cancer (Child)        Drug Dosing Weight  Height (cm): 144.8 (20 Jun 2023 11:38)  Weight (kg): 60 (21 Jun 2023 06:16)  BMI (kg/m2): 28.6 (21 Jun 2023 06:16)  BSA (m2): 1.51 (21 Jun 2023 06:16)    PE:    Vital Signs Last 24 Hrs  T(C): 38.4 (23 Jun 2023 09:20), Max: 38.4 (23 Jun 2023 09:20)  T(F): 101.2 (23 Jun 2023 09:20), Max: 101.2 (23 Jun 2023 09:20)  HR: 115 (23 Jun 2023 09:20) (96 - 115)  BP: 119/60 (23 Jun 2023 09:20) (102/51 - 123/82)  BP(mean): --  RR: 18 (23 Jun 2023 09:20) (17 - 19)  SpO2: 100% (23 Jun 2023 09:20) (92% - 100%)    Parameters below as of 23 Jun 2023 09:20  Patient On (Oxygen Delivery Method): nasal cannula  O2 Flow (L/min): 2      Gen: AOx3, NAD, non-toxic, pleasant  CV: S1+S2 normal, no murmurs, nontachycardic  Resp: Clear bilat, no resp distress, no crackles/wheezes  Abd: Soft, nontender, +BS  Ext: No LE edema, no wounds  : No Birch  IV/Skin: No thrombophlebitis  Msk: No low back pain, no arthralgias, no joint swelling  Neuro: No sensory deficits, no motor deficits    LABS:                          9.4    14.59 )-----------( 128      ( 23 Jun 2023 09:10 )             26.7       06-23    133<L>  |  96<L>  |  25<H>  ----------------------------<  106<H>  3.8   |  28  |  1.23    Ca    7.8<L>      23 Jun 2023 02:32  Phos  2.9     06-23  Mg     1.40     06-23        Urinalysis Basic - ( 23 Jun 2023 02:32 )    Color: x / Appearance: x / SG: x / pH: x  Gluc: 106 mg/dL / Ketone: x  / Bili: x / Urobili: x   Blood: x / Protein: x / Nitrite: x   Leuk Esterase: x / RBC: x / WBC x   Sq Epi: x / Non Sq Epi: x / Bacteria: x    MICROBIOLOGY:  v    RADIOLOGY:    < from: US Duplex Venous Lower Ext Complete, Bilateral (06.21.23 @ 11:10) >  IMPRESSION:  No evidence of deep venous thrombosis in either lower extremity.    < end of copied text >    < from: CT Angio Chest PE Protocol w/ IV Cont (06.21.23 @ 06:38) >  IMPRESSION:  Suboptimal contrast bolus. Within these constraints, no main, right main,   left main, or lobar pulmonary embolism. Limited evaluation of segmental   and subsegmental pulmonary arteries secondary to suboptimal contrast   bolus, motion and mixing artifact.    Large right and trace left pleural effusions, increased on the rightand   decrease on the left when compared to prior exam.    Anasarca.    < end of copied text >    < from: Xray Chest 2 Views PA/Lat (06.20.23 @ 13:27) >  IMPRESSION: Increased right pleural effusion.    < end of copied text >   HPI:  87 yo woman with history of stage 4 serous endometrial carcinoma (dx 5/2022) s/p chemo (last in 12/2022) and debulking surgery with POOL/BSO (2/10/23) c/b b/l pleural effusions (R > L, on 2L NC PRN), RUL segmental PE (8/2022, on Eliquis), GI bleed (4/2023), CAD s/p stent (3/2021), aortic stenosis s/p TAVR, HTN, HLD, and hypothyroidism presents with 3 weeks of worsening generalized weakness. Prior to 3 weeks ago, pt was able to ambulate using her rolling walker without any additional assistance and even go down the 5 steps in her house on her own to head out for doctors' appointments. Over the last 3 weeks, however, pt's functional status has been progressively declining, ever since pt got a therapeutic thoracentesis with removal of 1L of pleural fluid in early June at her oncologist's office. Pt is now barely able to get out of bed on most days. Pt states that she got out of bed only once on Monday, and it was to use the bathroom for a BM. Since ~2 weeks ago, pt has also been becoming short of breath with minimal exertion, such as when walking from her bed to the bathroom, and has had to rely on supplemental O2 with 2L NC, which she didn't require for the past several months. Pt denies any recent chest pain, palpitations, lightheadedness, dizziness, headaches, vision changes, abdominal pain, nausea, vomiting, diarrhea, constipation, melena, BRBPR, or dysuria. Pt has chronic b/l LE edema and had b/l venous dopplers this past Friday that were negative for a DVT, though pt and her daughter note that pt's RLE seems more swollen than usual, as it is the LLE that is typically larger in size than the RLE. Pt was recently admitted at [facility], from 4/23-5/4/23, for a GI bleed requiring multiple pRBC transfusions. No clear source of bleed was identified despite pt getting both an enteroscopy and a colonoscopy, although the GI bleed was presumed to be from the distal small bowel. Pt's Eliquis was initially discontinued during the admission but eventually resumed at a lower dose of 2.5 mg BID prior to discharge. Soon after pt's discharge from the hospital, pt had a follow-up visit with her oncologist in which she had a PET/CT that showed recurrence of disease with new liver mets. Pt was being arranged for chemo as outpatient, but workup this past Friday revealed [place] with serum Cr of 1.9 (baseline 0.85-1) and recurrence of the R pleural effusion that was previously drained.     In the ED,   T 98.3-99, HR 82-87, -126/61-64, RR 18-20, SpO2 % RA and 2L NC.  CXR showing moderate to large R pleural effusion.   LE dopplers bilaterally neg for DVT  CTA chest with no PE, large trace and trace L pleural effusions, increased on the R and decreased on the L. Anasarca.  Febrile to 101.2F, tachycardic, leukocytosis.  PCT 0.22.  On 2L NC.      PAST MEDICAL & SURGICAL HISTORY:  Endometrial ca  S4 serous endometrial carcinoma, [facility], chemotherapy      HTN (hypertension)      CAD (coronary artery disease)      S/P AVR      Hypothyroidism      HLD (hyperlipidemia)      S/P AVR (aortic valve replacement)      History of endometrial biopsy    Allergies    No Known Allergies    Intolerances    ANTIMICROBIALS:  piperacillin/tazobactam IVPB. 3.375 once  piperacillin/tazobactam IVPB.- 3.375 once  piperacillin/tazobactam IVPB.. 3.375 every 8 hours    OTHER MEDS:  albuterol    90 MICROgram(s) HFA Inhaler 2 Puff(s) Inhalation every 6 hours PRN  aspirin enteric coated 81 milliGRAM(s) Oral daily  atorvastatin 80 milliGRAM(s) Oral at bedtime  cholecalciferol 2000 Unit(s) Oral daily  cyanocobalamin 1000 MICROGram(s) Oral daily  levothyroxine 75 MICROGram(s) Oral daily  loratadine 10 milliGRAM(s) Oral daily  melatonin 3 milliGRAM(s) Oral at bedtime PRN  misoprostol 200 MICROGram(s) Oral <User Schedule>  multivitamin 1 Tablet(s) Oral daily  oxyCODONE    IR 5 milliGRAM(s) Oral every 6 hours PRN  pantoprazole    Tablet 40 milliGRAM(s) Oral before breakfast  polyethylene glycol 3350 17 Gram(s) Oral daily PRN  senna 2 Tablet(s) Oral at bedtime PRN    SOCIAL HISTORY: Denies smoking, alcohol, drug use.    FAMILY HISTORY:  Family history of parotid cancer (Child)  Parents with cardiac disease    Drug Dosing Weight  Height (cm): 144.8 (20 Jun 2023 11:38)  Weight (kg): 60 (21 Jun 2023 06:16)  BMI (kg/m2): 28.6 (21 Jun 2023 06:16)  BSA (m2): 1.51 (21 Jun 2023 06:16)    PE:    Vital Signs Last 24 Hrs  T(C): 38.4 (23 Jun 2023 09:20), Max: 38.4 (23 Jun 2023 09:20)  T(F): 101.2 (23 Jun 2023 09:20), Max: 101.2 (23 Jun 2023 09:20)  HR: 115 (23 Jun 2023 09:20) (96 - 115)  BP: 119/60 (23 Jun 2023 09:20) (102/51 - 123/82)  BP(mean): --  RR: 18 (23 Jun 2023 09:20) (17 - 19)  SpO2: 100% (23 Jun 2023 09:20) (92% - 100%)    Parameters below as of 23 Jun 2023 09:20  Patient On (Oxygen Delivery Method): nasal cannula  O2 Flow (L/min): 2      Gen: AOx3, NAD, non-toxic, pleasant, on 2L NC  CV: S1+S2 normal, no murmurs  Resp: Clear bilat, no resp distress  Abd: Soft, nontender, +BS  Ext: No LE edema, no wounds  : No Birch  IV/Skin: No thrombophlebitis  Msk: No low back pain, no arthralgias, no joint swelling  Neuro: No sensory deficits, no motor deficits    LABS:                          9.4    14.59 )-----------( 128      ( 23 Jun 2023 09:10 )             26.7       06-23    133<L>  |  96<L>  |  25<H>  ----------------------------<  106<H>  3.8   |  28  |  1.23    Ca    7.8<L>      23 Jun 2023 02:32  Phos  2.9     06-23  Mg     1.40     06-23        Urinalysis Basic - ( 23 Jun 2023 02:32 )    Color: x / Appearance: x / SG: x / pH: x  Gluc: 106 mg/dL / Ketone: x  / Bili: x / Urobili: x   Blood: x / Protein: x / Nitrite: x   Leuk Esterase: x / RBC: x / WBC x   Sq Epi: x / Non Sq Epi: x / Bacteria: x    MICROBIOLOGY:  v    RADIOLOGY:    < from: US Duplex Venous Lower Ext Complete, Bilateral (06.21.23 @ 11:10) >  IMPRESSION:  No evidence of deep venous thrombosis in either lower extremity.    < end of copied text >    < from: CT Angio Chest PE Protocol w/ IV Cont (06.21.23 @ 06:38) >  IMPRESSION:  Suboptimal contrast bolus. Within these constraints, no main, right main,   left main, or lobar pulmonary embolism. Limited evaluation of segmental   and subsegmental pulmonary arteries secondary to suboptimal contrast   bolus, motion and mixing artifact.    Large right and trace left pleural effusions, increased on the rightand   decrease on the left when compared to prior exam.    Anasarca.    < end of copied text >    < from: Xray Chest 2 Views PA/Lat (06.20.23 @ 13:27) >  IMPRESSION: Increased right pleural effusion.    < end of copied text >

## 2023-06-23 NOTE — PROGRESS NOTE ADULT - PROBLEM SELECTOR PLAN 6
Pt with history of stage 4 serous endometrial carcinoma (dx 5/2022) s/p chemo (last in 12/2022) and debulking surgery with POOL/BSO (2/10/23), now recently found to have recurrence of disease with new liver mets.  - Pt follows with Dr. Alvarado of [facility]. Will consult [facility] in AM.   - GOC discussion with pt and family given likely poor prognosis

## 2023-06-23 NOTE — PROGRESS NOTE ADULT - PROBLEM SELECTOR PLAN 2
Pt with ~2 weeks of shortness of breath with minimal exertion, now requiring supplemental O2 with 2L NC. Suspect 2/2 moderate-to-large R pleural effusion, likely malignant, and worsening anemia, though CHF and PE also on the differential.  - Pulm and/or thoracic surgery to be called in AM for possible diagnostic +/- therapeutic thoracentesis. Pt might also require chest tube placement during admission given recurrence of R pleural effusion.   - Pro-BNP added on and found to be elevated to 8894 on admission, was previously 953 in May. Repeat TTE ordered to evaluate for new structural heart disease.  - Trops 40 -> 48. EKG with no acute ischemic changes. Pt with no complaints of chest pain. Doubt shortness of breath is anginal equivalent. Will trend trops to peak.  - CTA chest with IV contrast ordered to better characterize R pleural effusion (previously loculated) and to r/o PE. Although pt is compliant with Eliquis 2.5 mg BID, given elevated pro-BNP and new supplemental O2 requirements, PE a possibility. Pt's [place] resolving at this time, with benefits of obtaining CTA chest with IV contrast outweighing risks.   thoracic sx f/u   pt spiked fever today   pan cx, abx , iD eval

## 2023-06-23 NOTE — PROGRESS NOTE ADULT - SUBJECTIVE AND OBJECTIVE BOX
Patient is a 88y old  Female who presents with a chief complaint of Generalized weakness (23 Jun 2023 10:50)    Patient seen this morning, with her daughter at bedside. She was febrile to 101.2F this morning, so not taken to the OR. She reports feeling better, denies dyspnea.    MEDICATIONS  (STANDING):  aspirin enteric coated 81 milliGRAM(s) Oral daily  atorvastatin 80 milliGRAM(s) Oral at bedtime  cholecalciferol 2000 Unit(s) Oral daily  cyanocobalamin 1000 MICROGram(s) Oral daily  levothyroxine 75 MICROGram(s) Oral daily  loratadine 10 milliGRAM(s) Oral daily  misoprostol 200 MICROGram(s) Oral <User Schedule>  multivitamin 1 Tablet(s) Oral daily  pantoprazole    Tablet 40 milliGRAM(s) Oral before breakfast  piperacillin/tazobactam IVPB.- 3.375 Gram(s) IV Intermittent once  piperacillin/tazobactam IVPB.. 3.375 Gram(s) IV Intermittent every 8 hours    MEDICATIONS  (PRN):  acetaminophen     Tablet .. 650 milliGRAM(s) Oral every 6 hours PRN Temp greater or equal to 38C (100.4F), Mild Pain (1 - 3)  albuterol    90 MICROgram(s) HFA Inhaler 2 Puff(s) Inhalation every 6 hours PRN Shortness of Breath and/or Wheezing  melatonin 3 milliGRAM(s) Oral at bedtime PRN Sleep  oxyCODONE    IR 5 milliGRAM(s) Oral every 6 hours PRN Moderate Pain (4 - 6)  polyethylene glycol 3350 17 Gram(s) Oral daily PRN for constipation  senna 2 Tablet(s) Oral at bedtime PRN for constipation        Vital Signs Last 24 Hrs  T(C): 37.3 (23 Jun 2023 12:13), Max: 38.4 (23 Jun 2023 09:20)  T(F): 99.1 (23 Jun 2023 12:13), Max: 101.2 (23 Jun 2023 09:20)  HR: 105 (23 Jun 2023 11:13) (96 - 115)  BP: 111/66 (23 Jun 2023 11:13) (102/51 - 123/82)  BP(mean): --  RR: 18 (23 Jun 2023 11:13) (17 - 19)  SpO2: 100% (23 Jun 2023 11:13) (92% - 100%)    Parameters below as of 23 Jun 2023 11:13  Patient On (Oxygen Delivery Method): nasal cannula  O2 Flow (L/min): 2      PE  NAD  Awake, alert  Anicteric, MMM  +nasal cannula   No c/c/e  No rash grossly                            9.4    14.59 )-----------( 128      ( 23 Jun 2023 09:10 )             26.7       06-23    133<L>  |  96<L>  |  25<H>  ----------------------------<  106<H>  3.8   |  28  |  1.23    Ca    7.8<L>      23 Jun 2023 02:32  Phos  2.9     06-23  Mg     1.40     06-23

## 2023-06-23 NOTE — CONSULT NOTE ADULT - ALLERGIC/IMMUNOLOGIC
[id number] [address]  Outpatient Physical Therapy    Treatment Note        Date: 2020  Patient: Anu Shelby  : 2016  ACCT #: [de-identified]  Referring Practitioner: Dr. Carmen Pal  Diagnosis: trisomy 21, hypotonia     Visit Information:  PT Visit Information  PT Insurance Information: MMO, [place], [facility]  Total # of Visits Approved: (unlimited )  Total # of Visits to Date: 34  No Show: 1  Canceled Appointment: 7  Progress Note Counter:     Subjective: Mom reports she received OT referral. Is now on whole milk. HEP Compliance:  [x] Good [] Fair [] Poor [] Reports not doing due to:    Vital Signs  Patient Currently in Pain: No   Pain Screening  Patient Currently in Pain: No    OBJECTIVE:   Exercises  Exercise 1: Standing with improved LE WBing and overall extension- able to stand with decreased assist to block knees, up to 40''  Exercise 3: Prone with elbows extended- compression/approximation (maintains up to~10sec )  Exercise 5: approximation in sitting and prone with elbows extended  Exercise 7: Supported sitting at hips 60+ sec  Exercise 10: Sidesit with play with emphasis on UE WBing  Exercise 12: facilitated quadruped with Max A- improved WBing through LEs and decreased core lag  Exercise 16: Tripod sit up to 60''  Exercise 17: Supine <> Sit Min/Mod A [place]- pt attempts to initiate and use UE to assist  Exercise 18: Sitting without support up to 15'' x2  Exercise 19: Tall kneel with play- Max A to maintain           *Indicates exercise, modality, or manual techniques to be initiated when appropriate    Assessment: Body structures, Functions, Activity limitations: Decreased functional mobility , Decreased strength, Decreased balance  Assessment: Pt with improved sitting, able to maintain with 1 UE while playing up to 60''. Improved WBing through UEs with quadruped activities as well as LE WBing in standing.  Pt able to maintain improved overall extension in standing with negative

## 2023-06-24 NOTE — PROGRESS NOTE ADULT - ASSESSMENT
88F with history of stage 4 serous endometrial carcinoma (dx 5/2022) s/p chemo (last in 12/2022) and debulking surgery with POOL/BSO (2/10/23) c/b b/l pleural effusions (R > L, on 2L NC PRN), RUL segmental PE (8/2022, on Eliquis), GI bleed (4/2023), CAD s/p stent (3/2021), aortic stenosis s/p TAVR, HTN, HLD, and hypothyroidism presents with 3 weeks of worsening generalized weakness. Found to have large Rt pleural effusion. S/p right pigtail placement.     - Continue chest pigtail catheter to waterseal  - Wean oxygen as tolerated  - pleural fluid gram stain negative, f/u final cultures and cytology  - Daily Chest Xray  - D/w patient and family at bedside

## 2023-06-24 NOTE — PROGRESS NOTE ADULT - ASSESSMENT
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    1. Uterine carcinosarcoma   -- s/p neoadjuvant chemotherapy completed 12/2022, s/p POOL-BSO 02/10/2023   -- Recent imaging suggests disease recurrence- planned to begin carboplatin as an outpatient   -- No systemic treatment while admitted  -- Follow up with Dr. Alvarado after discharge     2. Dyspnea on exertion   -- CTA chest w/o PE; large right pleural effusion   -- LE duplex without DVT   -- Thoracic consulted, plan for R VATS, R pleurX catheter placement - OR postponed to Tuesday due to fevers. Pigtail placed for temporary draining.  -- Continue supplemental O2 as needed, wean as tolerated     3. Anemia, thrombocytopenia   -- Chronic, intermittent- stable counts compared to admission from 04/2023   -- Likely secondary to malignancy, chronic disease, acute illness   -- No evidence of iron, B12, or folate deficiencies. Ddimer elevated but fibrinogen wnl. LDH elevated w/ normal hapto  -- Monitor CBC and transfuse to maintain hg >7, platelet count >10K, >15K if febrile, or >50K if bleeding     4. Fever   -- Afebrile overnight  -- Per ID, no clear source. CT a/p pending. Follow up cultures    Will continue to follow.    Juancarlos Esqueda PA-C  Hematology/Oncology  [facility]  [phone] (office)

## 2023-06-24 NOTE — PROGRESS NOTE ADULT - ASSESSMENT
88 F with stage 4 serous endometrial Ca diagnosed in 5/2022 s/p chemo last in 12/2022, s/p POOL/ BSO 2/2023 complicated with bilateral pleural effusion R>L on 2L NC, PE, GIB, CAD s/p stent, aortic stenosis s/p TAVR, HTN, HLD, hypothyroidism, presenting with generalized weakness.     Recent PET/CT with recurrence of disease, new liver mets   Fevers, leukocytosis, [place]   CTA chest with no PE, but with large right and trace left pleural effusions, increased on the right and decrease on the left when compared to prior exam, anasarca.  R VATS postponed 2/2 fever   S/p R Chest Tube placement by Thoracic on 6/23, 1.1L serosanguineous fluid removed     Overall;  Fevers, leukocytosis, SIRS, R sided pleural effusion s/p R chest tube     Recommend:  -No clear source - has occasional dry cough, no other complaints, ?recurrence of disease  -F/u R pleural effusion fluid cx   -Continue Zosyn  -F/u blood cx, MRSA PCR  -RVP negative   -Check sputum cx if pt able to expectorate   -CT A/P pending   -Monitor fever curve  -Trend WBC  -Onc following

## 2023-06-24 NOTE — PROGRESS NOTE ADULT - PROBLEM SELECTOR PLAN 5
Hgb 9.8 on admission, was 11.2 on 5/4/23, though baseline Hgb appears to be ~10-11. No S/S of active bleed.  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 Check LDH, haptoglobin, retic count, coags, D-dimer, and fibrinogen, though low suspicion for TTP at this time.  - Monitor CBC with diff  - C/w vitamin B12 Self

## 2023-06-24 NOTE — PROGRESS NOTE ADULT - SUBJECTIVE AND OBJECTIVE BOX
Patient is a 88y old  Female who presents with a chief complaint of Generalized weakness (24 Jun 2023 09:51)    Pt seen and examined at bedside, resting comfortably. No fevers overnight.    MEDICATIONS  (STANDING):  aspirin enteric coated 81 milliGRAM(s) Oral daily  atorvastatin 80 milliGRAM(s) Oral at bedtime  cholecalciferol 2000 Unit(s) Oral daily  cyanocobalamin 1000 MICROGram(s) Oral daily  levothyroxine 75 MICROGram(s) Oral daily  loratadine 10 milliGRAM(s) Oral daily  misoprostol 200 MICROGram(s) Oral <User Schedule>  multivitamin 1 Tablet(s) Oral daily  pantoprazole    Tablet 40 milliGRAM(s) Oral before breakfast  piperacillin/tazobactam IVPB.. 3.375 Gram(s) IV Intermittent every 8 hours    MEDICATIONS  (PRN):  acetaminophen     Tablet .. 650 milliGRAM(s) Oral every 6 hours PRN Temp greater or equal to 38C (100.4F), Mild Pain (1 - 3)  albuterol    90 MICROgram(s) HFA Inhaler 2 Puff(s) Inhalation every 6 hours PRN Shortness of Breath and/or Wheezing  melatonin 3 milliGRAM(s) Oral at bedtime PRN Sleep  oxyCODONE    IR 5 milliGRAM(s) Oral every 6 hours PRN Moderate Pain (4 - 6)  polyethylene glycol 3350 17 Gram(s) Oral daily PRN for constipation  senna 2 Tablet(s) Oral at bedtime PRN for constipation      Vital Signs Last 24 Hrs  T(C): 36.5 (24 Jun 2023 09:24), Max: 37.3 (23 Jun 2023 12:13)  T(F): 97.7 (24 Jun 2023 09:24), Max: 99.1 (23 Jun 2023 12:13)  HR: 135 (24 Jun 2023 09:24) (104 - 135)  BP: 127/71 (24 Jun 2023 09:24) (96/52 - 127/71)  BP(mean): --  RR: 18 (24 Jun 2023 09:24) (16 - 19)  SpO2: 100% (24 Jun 2023 09:24) (100% - 100%)    Parameters below as of 24 Jun 2023 09:24  Patient On (Oxygen Delivery Method): nasal cannula  O2 Flow (L/min): 2    PE  NAD  Awake, alert  Anicteric, MMM  No c/c/e  No rash grossly  FROM                          9.9    13.07 )-----------( 134      ( 24 Jun 2023 05:21 )             31.0       06-24    136  |  97<L>  |  32<H>  ----------------------------<  150<H>  4.5   |  23  |  1.87<H>    Ca    8.6      24 Jun 2023 09:52  Phos  4.2     06-24  Mg     1.90     06-24

## 2023-06-24 NOTE — PROGRESS NOTE ADULT - SUBJECTIVE AND OBJECTIVE BOX
Follow up: Fever, pleural effusion    Interval History: S/p R sided chest tube placement 6/23     REVIEW OF SYSTEMS  Unchanged from prior  No SOB  No CP  No abd pain  No dysuria  No diarrhea    prior hospital charts reviewed [V]  primary team notes reviewed [V]  other consultant notes reviewed [V]    PAST MEDICAL & SURGICAL HISTORY:  Endometrial ca  S4 serous endometrial carcinoma, [facility] [place], chemotherapy    HTN (hypertension)    CAD (coronary artery disease)    S/P AVR    Hypothyroidism    HLD (hyperlipidemia)    S/P AVR (aortic valve replacement)    History of endometrial biopsy    Allergies  No Known Allergies    ANTIMICROBIALS:  piperacillin/tazobactam IVPB.. 3.375 every 8 hours    ANTIMICROBIALS (past 90 days):  MEDICATIONS  (STANDING):  piperacillin/tazobactam IVPB.   200 mL/Hr IV Intermittent (06-23-23 @ 11:39)    piperacillin/tazobactam IVPB.-   25 mL/Hr IV Intermittent (06-23-23 @ 15:35)    piperacillin/tazobactam IVPB..   25 mL/Hr IV Intermittent (06-24-23 @ 06:09)   25 mL/Hr IV Intermittent (06-23-23 @ 21:57)    OTHER MEDS:   MEDICATIONS  (STANDING):  acetaminophen     Tablet .. 650 every 6 hours PRN  albuterol    90 MICROgram(s) HFA Inhaler 2 every 6 hours PRN  aspirin enteric coated 81 daily  atorvastatin 80 at bedtime  levothyroxine 75 daily  loratadine 10 daily  melatonin 3 at bedtime PRN  misoprostol 200 <User Schedule>  oxyCODONE    IR 5 every 6 hours PRN  pantoprazole    Tablet 40 before breakfast  polyethylene glycol 3350 17 daily PRN  senna 2 at bedtime PRN    VITALS:  Vital Signs Last 24 Hrs  T(F): 97.7 (06-24-23 @ 09:24), Max: 101.2 (06-23-23 @ 09:20)    Vital Signs Last 24 Hrs  HR: 135 (06-24-23 @ 09:24) (104 - 135)  BP: 127/71 (06-24-23 @ 09:24) (96/52 - 127/71)  RR: 18 (06-24-23 @ 09:24)  SpO2: 100% (06-24-23 @ 09:24) (100% - 100%)  Wt(kg): --    EXAM:  Gen: AOx3, NAD, non-toxic, pleasant, on NC O2  CV: S1+S2 normal, no murmurs  Resp: Clear bilat, no resp distress  S/p R chest tube   Abd: Soft, nontender, +BS  Ext: No LE edema, no wounds  : No Birch  IV/Skin: No thrombophlebitis  MSK: No low back pain, no arthralgias, no joint swelling  Neuro: Calm     Labs:                        9.9    13.07 )-----------( 134      ( 24 Jun 2023 05:21 )             31.0     06-24    130<L>  |  96<L>  |  29<H>  ----------------------------<  110<H>  5.8<H>   |  26  |  1.65<H>    Ca    7.9<L>      24 Jun 2023 05:21  Phos  3.5     06-24  Mg     1.90     06-24    WBC Trend:  WBC Count: 13.07 (06-24-23 @ 05:21)  WBC Count: 13.26 (06-23-23 @ 18:15)  WBC Count: 14.59 (06-23-23 @ 09:10)  WBC Count: 11.82 (06-23-23 @ 02:32)    Auto Neutrophil #: 8.96 K/uL (06-20-23 @ 13:02)  Auto Neutrophil #: 6.42 K/uL (04-22-23 @ 20:34)  Auto Neutrophil #: 30.54 K/uL (09-07-22 @ 07:26)  Auto Neutrophil #: 31.47 K/uL (09-06-22 @ 06:34)  Auto Neutrophil #: 38.95 K/uL (09-05-22 @ 15:05)    Creatine Trend:  Creatinine: 1.65 (06-24)  Creatinine: 1.23 (06-23)  Creatinine: 1.21 (06-22)  Creatinine: 1.32 (06-21)    Liver Biochemical Testing Trend:  Alanine Aminotransferase (ALT/SGPT): 10 (06-21)  Alanine Aminotransferase (ALT/SGPT): 14 (06-20)  Alanine Aminotransferase (ALT/SGPT): 39 *H* (05-01)  Alanine Aminotransferase (ALT/SGPT): 243 *H* (04-22)  Alanine Aminotransferase (ALT/SGPT): 24 (09-07)  Aspartate Aminotransferase (AST/SGOT): 38 (06-21-23 @ 06:56)  Aspartate Aminotransferase (AST/SGOT): 35 (06-20-23 @ 13:02)  Aspartate Aminotransferase (AST/SGOT): 29 (05-01-23 @ 17:09)  Aspartate Aminotransferase (AST/SGOT): 192 (04-22-23 @ 20:34)  Aspartate Aminotransferase (AST/SGOT): 31 (09-07-22 @ 07:26)  Bilirubin Total: 0.6 (06-21)  Bilirubin Total, Serum: 0.6 (06-20)  Bilirubin Direct, Serum: <0.2 (05-01)  Bilirubin Total, Serum: 0.6 (05-01)  Bilirubin Total, Serum: 0.2 (04-22)    Trend LDH  06-22-23 @ 06:02  739<H>  06-21-23 @ 09:20  768<H>  04-22-23 @ 20:34  371<H>    Urinalysis Basic - ( 24 Jun 2023 05:21 )    Color: x / Appearance: x / SG: x / pH: x  Gluc: 110 mg/dL / Ketone: x  / Bili: x / Urobili: x   Blood: x / Protein: x / Nitrite: x   Leuk Esterase: x / RBC: x / WBC x   Sq Epi: x / Non Sq Epi: x / Bacteria: x    MICROBIOLOGY:    MRSA PCR Result.: NotDetec (09-06-22 @ 07:36)    Culture - Body Fluid with Gram Stain (collected 23 Jun 2023 12:48)  Source: Pleural Fl Pleural Fluid    Culture - Urine (collected 04 Sep 2022 18:40)  Source: Clean Catch Clean Catch (Midstream)  Final Report:    10,000 - 49,000 CFU/mL Candida albicans "Susceptibilities not performed"    Culture - Blood (collected 04 Sep 2022 17:53)  Source: .Blood Blood-Peripheral  Final Report:    No Growth Final    Culture - Blood (collected 04 Sep 2022 17:33)  Source: .Blood Blood-Peripheral  Final Report:    No Growth Final    Culture - Blood (collected 30 Aug 2022 22:00)  Source: .Blood Blood-Peripheral  Final Report:    No Growth Final    Culture - Blood (collected 30 Aug 2022 21:50)  Source: .Blood Port Device  Final Report:    No Growth Final    Culture - Blood (collected 30 Aug 2022 21:40)  Source: .Blood Blood-Peripheral  Final Report:    No Growth Final    Culture - Urine (collected 30 Aug 2022 21:05)  Source: Clean Catch Clean Catch (Midstream)  Final Report:    No growth    Rapid RVP Result: NotDetec (06-23 @ 11:52)    Procalcitonin, Serum: 0.22 (06-20)    Ferritin: 89 (06-21)    D-Dimer Assay, Quantitative: 2351 (06-21)    Lactate Dehydrogenase, Serum: 739 (06-22)  Lactate Dehydrogenase, Serum: 768 (06-21)    Troponin T, High Sensitivity Result: 45 (06-21)  Troponin T, High Sensitivity Result: 48 (06-20)  Troponin T, High Sensitivity Result: 40 (06-20)    Lactate, Blood: 2.3 (06-23 @ 18:15)  Lactate, Blood: 2.2 (06-23 @ 11:20)    RADIOLOGY:  imaging below personally reviewed    < from: Xray Chest 1 View- PORTABLE-Urgent (Xray Chest 1 View- PORTABLE-Urgent .) (06.23.23 @ 11:40) >  IMPRESSION: Follow-up large right pleural effusion.    < end of copied text >

## 2023-06-24 NOTE — CONSULT NOTE ADULT - SUBJECTIVE AND OBJECTIVE BOX
Rony Mcgarry MD  Interventional Cardiology / Endovascular Specialist  [place] Office : [address] [place] [address] [place] [address]  Tel:   [place] Office : [address] [facility] [place] [address]  Tel: [phone]    HPI:  87 yo woman with history of stage 4 serous endometrial carcinoma (dx 5/2022) s/p chemo (last in 12/2022) and debulking surgery with POOL/BSO (2/10/23) c/b b/l pleural effusions (R > L, on 2L NC PRN), RUL segmental PE (8/2022, on Eliquis), GI bleed (4/2023), CAD s/p stent (3/2021), aortic stenosis s/p TAVR, HTN, HLD, and hypothyroidism presents with 3 weeks of worsening generalized weakness. Prior to 3 weeks ago, pt was able to ambulate using her rolling walker without any additional assistance and even go down the 5 steps in her house on her own to head out for doctors' appointments. Over the last 3 weeks, however, pt's functional status has been progressively declining, ever since pt got a therapeutic thoracentesis with removal of 1L of pleural fluid in early June at her oncologist's office. Pt is now barely able to get out of bed on most days. Pt states that she got out of bed only once on Monday, and it was to use the bathroom for a BM. Since ~2 weeks ago, pt has also been becoming short of breath with minimal exertion, such as when walking from her bed to the bathroom, and has had to rely on supplemental O2 with 2L NC, which she didn't require for the past several months. Pt denies any recent chest pain, palpitations, lightheadedness, dizziness, headaches, vision changes, abdominal pain, nausea, vomiting, diarrhea, constipation, melena, BRBPR, or dysuria. Pt has chronic b/l LE edema and had b/l venous dopplers this past Friday that were negative for a DVT, though pt and her daughter note that pt's RLE seems more swollen than usual, as it is the LLE that is typically larger in size than the RLE. Pt was recently admitted at [facility], from 4/23-5/4/23, for a GI bleed requiring multiple pRBC transfusions. No clear source of bleed was identified despite pt getting both an enteroscopy and a colonoscopy, although the GI bleed was presumed to be from the distal small bowel. Pt's Eliquis was initially discontinued during the admission but eventually resumed at a lower dose of 2.5 mg BID prior to discharge. Soon after pt's discharge from the hospital, pt had a follow-up visit with her oncologist in which she had a PET/CT that showed recurrence of disease with new liver mets. Pt was being arranged for chemo as outpatient, but workup this past Friday revealed [place] with serum Cr of 1.9 (baseline 0.85-1) and recurrence of the R pleural effusion that was previously drained.     In the ED,   T 98.3-99, HR 82-87, -126/61-64, RR 18-20, SpO2 % RA and 2L NC.  CXR showing moderate to large R pleural effusion.   LE dopplers bilaterally neg for DVT  CTA chest with no PE, large trace and trace L pleural effusions, increased on the R and decreased on the L. Anasarca.  Febrile to 101.2F, tachycardic, leukocytosis.  PCT 0.22.  On 2L NC.  	  MEDICATIONS:  aspirin enteric coated 81 milliGRAM(s) Oral daily  diltiazem    Tablet 30 milliGRAM(s) Oral every 6 hours  heparin   Injectable 4500 Unit(s) IV Push every 6 hours PRN  heparin   Injectable 2000 Unit(s) IV Push every 6 hours PRN  heparin  Infusion. 700 Unit(s)/Hr IV Continuous <Continuous>    piperacillin/tazobactam IVPB.. 3.375 Gram(s) IV Intermittent every 8 hours    albuterol    90 MICROgram(s) HFA Inhaler 2 Puff(s) Inhalation every 6 hours PRN  loratadine 10 milliGRAM(s) Oral daily    acetaminophen     Tablet .. 650 milliGRAM(s) Oral every 6 hours PRN  melatonin 3 milliGRAM(s) Oral at bedtime PRN  oxyCODONE    IR 5 milliGRAM(s) Oral every 6 hours PRN    misoprostol 200 MICROGram(s) Oral <User Schedule>  pantoprazole    Tablet 40 milliGRAM(s) Oral before breakfast  polyethylene glycol 3350 17 Gram(s) Oral daily PRN  senna 2 Tablet(s) Oral at bedtime PRN    atorvastatin 80 milliGRAM(s) Oral at bedtime  levothyroxine 75 MICROGram(s) Oral daily    cholecalciferol 2000 Unit(s) Oral daily  cyanocobalamin 1000 MICROGram(s) Oral daily  multivitamin 1 Tablet(s) Oral daily      PAST MEDICAL/SURGICAL HISTORY  PAST MEDICAL & SURGICAL HISTORY:  Endometrial ca  S4 serous endometrial carcinoma, [facility] [place], chemotherapy      HTN (hypertension)      CAD (coronary artery disease)      S/P AVR      Hypothyroidism      HLD (hyperlipidemia)      S/P AVR (aortic valve replacement)      History of endometrial biopsy          SOCIAL HISTORY: Substance Use (street drugs): ( x ) never used  (  ) other:    FAMILY HISTORY:  Family history of parotid cancer (Child)        REVIEW OF SYSTEMS:  CONSTITUTIONAL: No fever, weight loss, or fatigue  EYES: No eye pain, visual disturbances, or discharge  ENMT:  No difficulty hearing, tinnitus, vertigo; No sinus or throat pain  BREASTS: No pain, masses, or nipple discharge  GASTROINTESTINAL: No abdominal or epigastric pain. No nausea, vomiting, or hematemesis; No diarrhea or constipation. No melena or hematochezia.  GENITOURINARY: No dysuria, frequency, hematuria, or incontinence  NEUROLOGICAL: No headaches, memory loss, loss of strength, numbness, or tremors  ENDOCRINE: No heat or cold intolerance; No hair loss  MUSCULOSKELETAL: No joint pain or swelling; No muscle, back, or extremity pain  PSYCHIATRIC: No depression, anxiety, mood swings, or difficulty sleeping  HEME/LYMPH: No easy bruising, or bleeding gums  All others negative    PHYSICAL EXAM:  T(C): 37.1 (06-24-23 @ 12:02), Max: 37.1 (06-24-23 @ 12:02)  HR: 118 (06-24-23 @ 12:02) (104 - 135)  BP: 105/70 (06-24-23 @ 12:02) (104/79 - 127/71)  RR: 18 (06-24-23 @ 12:02) (16 - 18)  SpO2: 100% (06-24-23 @ 12:02) (100% - 100%)  Wt(kg): --  I&O's Summary    23 Jun 2023 07:01  -  24 Jun 2023 07:00  --------------------------------------------------------  IN: 0 mL / OUT: 300 mL / NET: -300 mL    24 Jun 2023 07:01  -  24 Jun 2023 17:20  --------------------------------------------------------  IN: 0 mL / OUT: 30 mL / NET: -30 mL          GENERAL: NAD  EYES: conjunctiva and sclera clear  ENMT: No tonsillar erythema, exudates, or enlargement  Cardiovascular: Normal S1 S2, No JVD, No murmurs, No edema  Respiratory: Decreased b/s b/l   Gastrointestinal:  Soft, Non-tender, + BS	  Extremities: Normal edema                                    9.9    13.07 )-----------( 134      ( 24 Jun 2023 05:21 )             31.0     06-24    136  |  97<L>  |  32<H>  ----------------------------<  150<H>  4.5   |  23  |  1.87<H>    Ca    8.6      24 Jun 2023 09:52  Phos  4.2     06-24  Mg     1.90     06-24      proBNP:   Lipid Profile:   HgA1c:   TSH:     Consultant(s) Notes Reviewed:  [x ] YES  [ ] NO    Care Discussed with Consultants/Other Providers [ x] YES  [ ] NO    Imaging Personally Reviewed independently:  [x] YES  [ ] NO    All labs, radiologic studies, vitals, orders and medications list reviewed. Patient is seen and examined at bedside. Case discussed with medical team.

## 2023-06-24 NOTE — CHART NOTE - NSCHARTNOTEFT_GEN_A_CORE
Called by RN, pt tachycardic 130s, but asymptomatic.   Pt seen and evaluated at bedside, pt is "feeling better than yesterday". Denies chest pain, palpitations or shortness of breath.     Afebrile, other VSS.   EKG obtained reviewed with Dr. Mcgarry- consistent with MAT   Will continue to monitor as pt is asymptomatic and follow up cardiology recommendations     Discussed with Dr. Olivier who is in agreement with plan.     Peg Buchanan PA-C  Department of Medicine  Pager [phone]

## 2023-06-24 NOTE — CHART NOTE - NSCHARTNOTEFT_GEN_A_CORE
hgb 9.9 today  - Will resume heparin ggt for unilateral PE as discussed with Dr. Olivier.     [place] worsening  - renal/ bladder us ordered and urine studies ordered     Dr. Olivier in agreement with plan as above.     Peg Buchanan PA-C  Department of Medicine  Pager [phone]

## 2023-06-24 NOTE — CONSULT NOTE ADULT - ASSESSMENT
EKG MAT     Echo < from: Transthoracic Echocardiogram (06.21.23 @ 15:15) >  1. Mitral annular calcification, otherwise normal mitral  valve. Mild-moderate mitral regurgitation.  2. Transcatheter aortic valve replacement Peak transaortic  valve gradient equals 42 mm Hg, mean transaortic valve  gradient equals 23 mm Hg, which is probably normal in the  presence of a prosthetic valve. Moderate valvular aortic  regurgitation.  3. Normal left ventricular systolic function. No segmental  wall motion abnormalities. Septal motion consistent with  right ventricular overload.  4. Mild diastolic dysfunction (Stage I).  5. Right ventricular enlargement with decreased right  ventricular systolic function.  6. Bilateral pleural effusions.  *** Compared with echocardiogram of 5/1/2023 which on  review of images likely showed mild aortic regurgitation,  today's echo shows increase of TAVR gradients likely due to  increase in aortic regurgitation.    < end of copied text >      Assessment and Plan     1) Multifocal Atrial Tachycardia : on a/c 2/2 PE , start Dilt 30 mg po q6 hrs     2) Pleural effuison : s/p Pig tail f/u pulm recs     3) DVT PPX heparin     3)

## 2023-06-24 NOTE — PROGRESS NOTE ADULT - SUBJECTIVE AND OBJECTIVE BOX
Thoracic Surgery Progress Note    Interval: No acute overnight events.    SUBJECTIVE: Patient seen and examined at the bedside. States that she is feeling better this morning, breathing well without shortness of breath. Total of 1100 serous fluid in the pleurvac since placement, currently to waterseal.    VITALS  T(C): 36.8 (06-24-23 @ 07:52), Max: 38.4 (06-23-23 @ 09:20)  HR: 109 (06-24-23 @ 07:52) (104 - 118)  BP: 117/70 (06-24-23 @ 07:52) (96/52 - 119/60)  RR: 18 (06-24-23 @ 07:52) (16 - 19)  SpO2: 100% (06-24-23 @ 07:52) (100% - 100%)  CAPILLARY BLOOD GLUCOSE        Is/Os    06-23 @ 07:01  -  06-24 @ 07:00  --------------------------------------------------------  IN:  Total IN: 0 mL    OUT:    Incontinent per Collection Bag (mL): 300 mL  Total OUT: 300 mL    Total NET: -300 mL    PHYSICAL EXAM:   General: NAD, Lying in bed comfortably, alert, oriented x3  Pulm: Non-labored breathing on 2LNC  Chest: right pigtail in place to WS, scant amount of serous fluid in the PleurX tubing, no airleak    GI/Abd: Soft, NT/ND     MEDICATIONS (STANDING): aspirin enteric coated 81 milliGRAM(s) Oral daily  atorvastatin 80 milliGRAM(s) Oral at bedtime  cholecalciferol 2000 Unit(s) Oral daily  cyanocobalamin 1000 MICROGram(s) Oral daily  levothyroxine 75 MICROGram(s) Oral daily  loratadine 10 milliGRAM(s) Oral daily  misoprostol 200 MICROGram(s) Oral <User Schedule>  multivitamin 1 Tablet(s) Oral daily  pantoprazole    Tablet 40 milliGRAM(s) Oral before breakfast  piperacillin/tazobactam IVPB.. 3.375 Gram(s) IV Intermittent every 8 hours    MEDICATIONS (PRN):acetaminophen     Tablet .. 650 milliGRAM(s) Oral every 6 hours PRN Temp greater or equal to 38C (100.4F), Mild Pain (1 - 3)  albuterol    90 MICROgram(s) HFA Inhaler 2 Puff(s) Inhalation every 6 hours PRN Shortness of Breath and/or Wheezing  melatonin 3 milliGRAM(s) Oral at bedtime PRN Sleep  oxyCODONE    IR 5 milliGRAM(s) Oral every 6 hours PRN Moderate Pain (4 - 6)  polyethylene glycol 3350 17 Gram(s) Oral daily PRN for constipation  senna 2 Tablet(s) Oral at bedtime PRN for constipation    LABS  CBC (06-23 @ 18:15)                              8.8<L>                         13.26<H>  )----------------(  120<L>     --    % Neutrophils, --    % Lymphocytes, ANC: --                                  26.5<L>  CBC (06-23 @ 09:10)                              9.4<L>                         14.59<H>  )----------------(  128<L>     --    % Neutrophils, --    % Lymphocytes, ANC: --                                  26.7<L>    BMP (06-24 @ 05:21)             130<L>  |  96<L>   |  29<H> 		Ca++ --      Ca 7.9<L>             ---------------------------------( 110<H>		Mg 1.90               5.8<H>  |  26      |  1.65<H>			Ph 3.5     BMP (06-23 @ 02:32)             133<L>  |  96<L>   |  25<H> 		Ca++ --      Ca 7.8<L>             ---------------------------------( 106<H>		Mg 1.40<L>             3.8     |  28      |  1.23  			Ph 2.9         Coags (06-23 @ 02:32)  aPTT 89.9<H> / INR 1.78<H> / PT 20.8<H>  Coags (06-23 @ 00:37)  aPTT >200.0<HH> / INR -- / PT --      ABG (06-23 @ 18:15)      /  /  /  /  / %     Lactate:  2.3<H>  ABG (06-23 @ 11:20)      /  /  /  /  / %     Lactate:  2.2<H>

## 2023-06-24 NOTE — PROGRESS NOTE ADULT - SUBJECTIVE AND OBJECTIVE BOX
SUBJECTIVE / OVERNIGHT EVENTS:pt seen and examined  06-24-23     MEDICATIONS  (STANDING):  aspirin enteric coated 81 milliGRAM(s) Oral daily  atorvastatin 80 milliGRAM(s) Oral at bedtime  cholecalciferol 2000 Unit(s) Oral daily  cyanocobalamin 1000 MICROGram(s) Oral daily  diltiazem    Tablet 30 milliGRAM(s) Oral every 6 hours  heparin  Infusion. 700 Unit(s)/Hr (7 mL/Hr) IV Continuous <Continuous>  levothyroxine 75 MICROGram(s) Oral daily  loratadine 10 milliGRAM(s) Oral daily  misoprostol 200 MICROGram(s) Oral <User Schedule>  multivitamin 1 Tablet(s) Oral daily  pantoprazole    Tablet 40 milliGRAM(s) Oral before breakfast  piperacillin/tazobactam IVPB.. 3.375 Gram(s) IV Intermittent every 8 hours    MEDICATIONS  (PRN):  acetaminophen     Tablet .. 650 milliGRAM(s) Oral every 6 hours PRN Temp greater or equal to 38C (100.4F), Mild Pain (1 - 3)  albuterol    90 MICROgram(s) HFA Inhaler 2 Puff(s) Inhalation every 6 hours PRN Shortness of Breath and/or Wheezing  heparin   Injectable 4500 Unit(s) IV Push every 6 hours PRN For aPTT less than 40  heparin   Injectable 2000 Unit(s) IV Push every 6 hours PRN For aPTT between 40 - 57  melatonin 3 milliGRAM(s) Oral at bedtime PRN Sleep  oxyCODONE    IR 5 milliGRAM(s) Oral every 6 hours PRN Moderate Pain (4 - 6)  polyethylene glycol 3350 17 Gram(s) Oral daily PRN for constipation  senna 2 Tablet(s) Oral at bedtime PRN for constipation    Vital Signs Last 24 Hrs  T(C): 36.6 (06-24-23 @ 21:04), Max: 37.1 (06-24-23 @ 12:02)  T(F): 97.9 (06-24-23 @ 21:04), Max: 98.7 (06-24-23 @ 12:02)  HR: 105 (06-24-23 @ 21:04) (105 - 135)  BP: 105/55 (06-24-23 @ 21:04) (105/55 - 127/71)  BP(mean): --  RR: 18 (06-24-23 @ 21:04) (16 - 18)  SpO2: 99% (06-24-23 @ 21:04) (99% - 100%)        Constitutional: No fever, fatigue  Skin: No rash.  Eyes: No recent vision problems or eye pain.  ENT: No congestion, ear pain, or sore throat.  Cardiovascular: No chest pain or palpation.  Respiratory: No cough, shortness of breath, congestion, or wheezing.  Gastrointestinal: No abdominal pain, nausea, vomiting, or diarrhea.  Genitourinary: No dysuria.  Musculoskeletal: No joint swelling.  Neurologic: No headache.    PHYSICAL EXAM:  GENERAL: NAD  EYES: EOMI, PERRLA  NECK: Supple, No JVD  CHEST/LUNG: dec breath sounds at bases  HEART:  S1 , S2 +  ABDOMEN: soft , bs+  EXTREMITIES:  edema+  NEUROLOGY:alert awake    LABS:  06-24    136  |  97<L>  |  32<H>  ----------------------------<  150<H>  4.5   |  23  |  1.87<H>    Ca    8.6      24 Jun 2023 09:52  Phos  4.2     06-24  Mg     1.90     06-24      Creatinine Trend: 1.87 <--, 1.65 <--, 1.23 <--, 1.21 <--, 1.32 <--, 1.38 <--                        9.9    13.07 )-----------( 134      ( 24 Jun 2023 05:21 )             31.0     Urine Studies:  Urinalysis Basic - ( 24 Jun 2023 09:52 )    Color:  / Appearance:  / SG:  / pH:   Gluc: 150 mg/dL / Ketone:   / Bili:  / Urobili:    Blood:  / Protein:  / Nitrite:    Leuk Esterase:  / RBC:  / WBC    Sq Epi:  / Non Sq Epi:  / Bacteria:                 PT/INR - ( 23 Jun 2023 02:32 )   PT: 20.8 sec;   INR: 1.78 ratio         PTT - ( 24 Jun 2023 16:29 )  PTT:36.4 sec    RADIOLOGY & ADDITIONAL TESTS:    Imaging Personally Reviewed:yes    Consultant(s) Notes Reviewed:  yes    Care Discussed with Consultants/Other Providers:yes

## 2023-06-24 NOTE — PROGRESS NOTE ADULT - PROBLEM SELECTOR PLAN 6
Pt with history of stage 4 serous endometrial carcinoma (dx 5/2022) s/p chemo (last in 12/2022) and debulking surgery with POOL/BSO (2/10/23), now recently found to have recurrence of disease with new liver mets.  - Pt follows with Dr. Alvarado of [facility]. Will consult [facility] in AM.   - GOC discussion with pt and family given likely poor prognosis

## 2023-06-24 NOTE — PROGRESS NOTE ADULT - PROBLEM SELECTOR PLAN 1
Pt with 3 weeks of progressive generalized weakness, now barely able to get out of bed. On exam, no motor weakness detected. Likely in setting of recurrence/progression of endometrial cancer with new liver mets, exacerbated by poor PO intake, recurrence of moderate-to-large R pleural effusion, and worsening anemia.   - Management as below for endometrial cancer, R pleural effusion, and anemia  -  - PT/OT eval  - Aspiration precautions and fall risk protocol

## 2023-06-25 NOTE — PROGRESS NOTE ADULT - SUBJECTIVE AND OBJECTIVE BOX
PULMONARY PROGRESS NOTE    FRIDA WELCH  MRN-[id number]    Patient is a 88y old  Female who presents with a chief complaint of Generalized weakness (25 Jun 2023 13:05)      HPI:  -breathing ok  on 2L  -    ROS:   -    ACTIVE MEDICATION LIST:  MEDICATIONS  (STANDING):  albumin human 25% IVPB 50 milliLiter(s) IV Intermittent every 6 hours  aspirin enteric coated 81 milliGRAM(s) Oral daily  atorvastatin 80 milliGRAM(s) Oral at bedtime  cholecalciferol 2000 Unit(s) Oral daily  cyanocobalamin 1000 MICROGram(s) Oral daily  diltiazem    Tablet 30 milliGRAM(s) Oral every 6 hours  heparin  Infusion. 700 Unit(s)/Hr (7 mL/Hr) IV Continuous <Continuous>  levothyroxine 75 MICROGram(s) Oral daily  loratadine 10 milliGRAM(s) Oral daily  misoprostol 200 MICROGram(s) Oral <User Schedule>  multivitamin 1 Tablet(s) Oral daily  pantoprazole    Tablet 40 milliGRAM(s) Oral before breakfast  piperacillin/tazobactam IVPB.. 3.375 Gram(s) IV Intermittent every 8 hours    MEDICATIONS  (PRN):  acetaminophen     Tablet .. 650 milliGRAM(s) Oral every 6 hours PRN Temp greater or equal to 38C (100.4F), Mild Pain (1 - 3)  albuterol    90 MICROgram(s) HFA Inhaler 2 Puff(s) Inhalation every 6 hours PRN Shortness of Breath and/or Wheezing  heparin   Injectable 4500 Unit(s) IV Push every 6 hours PRN For aPTT less than 40  heparin   Injectable 2000 Unit(s) IV Push every 6 hours PRN For aPTT between 40 - 57  melatonin 3 milliGRAM(s) Oral at bedtime PRN Sleep  oxyCODONE    IR 5 milliGRAM(s) Oral every 6 hours PRN Moderate Pain (4 - 6)  polyethylene glycol 3350 17 Gram(s) Oral daily PRN for constipation  senna 2 Tablet(s) Oral at bedtime PRN for constipation      EXAM:  Vital Signs Last 24 Hrs  T(C): 36.7 (25 Jun 2023 18:25), Max: 36.7 (25 Jun 2023 11:19)  T(F): 98 (25 Jun 2023 18:25), Max: 98.1 (25 Jun 2023 11:19)  HR: 106 (25 Jun 2023 18:25) (91 - 111)  BP: 108/61 (25 Jun 2023 18:25) (102/60 - 119/68)  BP(mean): --  RR: 18 (25 Jun 2023 18:25) (18 - 18)  SpO2: 100% (25 Jun 2023 18:25) (98% - 100%)    Parameters below as of 25 Jun 2023 18:25  Patient On (Oxygen Delivery Method): nasal cannula  O2 Flow (L/min): 2      GENERAL: The patient is awake and alert in no apparent distress.     LUNGS: respirations unlabored                              8.7    15.75 )-----------( 147      ( 25 Jun 2023 06:18 )             26.7       06-25    x   |  x   |  x   ----------------------------<  x   x    |  x   |  1.89<H>    Ca    7.7<L>      25 Jun 2023 06:18  Phos  3.6     06-25  Mg     1.90     06-25    <    PROBLEM LIST:  88y Female with HEALTH ISSUES - PROBLEM Dx:  Generalized weakness    Shortness of breath    [place] (acute kidney injury)    Leukocytosis    Anemia    Endometrial cancer    Pulmonary embolism    CAD (coronary artery disease)    Hypertension    Hyperlipidemia    Hypothyroidism    Need for prophylactic measure            RECS:  appreciate CTS  exudate basedon LDH  f/u path  abx  prognosis guarded        Please call with any questions.    Jami Denny DO  [facility] Pulmonary/Sleep Medicine  [phone]

## 2023-06-25 NOTE — PROGRESS NOTE ADULT - ASSESSMENT
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    1. Uterine carcinosarcoma   -- s/p neoadjuvant chemotherapy completed 12/2022, s/p POOL-BSO 02/10/2023   -- Recent imaging suggests disease recurrence- planned to begin carboplatin as an outpatient   -- No systemic treatment while admitted  -- Follow up with Dr. Alvarado at [facility] [place] after discharge     2. Dyspnea on exertion   -- CTA chest w/o PE (H/o PE from Aug 2022) - hep gtt resumed; large right pleural effusion   -- LE duplex without DVT   -- Thoracic consulted, plan for R VATS, R pleurX catheter placement - OR postponed to Tuesday due to fevers. Pigtail placed for temporary draining- f/u cultures  -- Continue supplemental O2 as needed, wean as tolerated     3. Anemia, thrombocytopenia   -- Chronic, intermittent- stable counts compared to admission from 04/2023   -- Likely secondary to malignancy, chronic disease, acute illness   -- No evidence of iron, B12, or folate deficiencies. Ddimer elevated but fibrinogen wnl. LDH elevated w/ normal hapto  -- Monitor CBC and transfuse to maintain hg >7, platelet count >10K, >15K if febrile, or >50K if bleeding     4. Fever   -- Afebrile   -- Per ID, no clear source. CT a/p ordered. Follow up cultures    5. Worsening [place]  -- US kidney/bladder showing mild right hydronephrosis, small ascites    Will continue to follow.    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    1. Uterine carcinosarcoma   -- s/p neoadjuvant chemotherapy completed 12/2022, s/p POOL-BSO 02/10/2023   -- Recent imaging suggests disease recurrence- planned to begin carboplatin as an outpatient   -- No systemic treatment while admitted  -- Follow up with Dr. Alvarado at [facility] after discharge     2. Dyspnea on exertion   -- CTA chest w/o PE (H/o PE from Aug 2022) - hep gtt resumed; large right pleural effusion   -- LE duplex without DVT   -- Thoracic consulted, plan for R VATS, R pleurX catheter placement - OR postponed to Tuesday due to fevers. Pigtail placed for temporary draining- f/u cultures  -- Continue supplemental O2 as needed, wean as tolerated     3. Anemia, thrombocytopenia   -- Chronic, intermittent- stable counts compared to admission from 04/2023   -- Likely secondary to malignancy, chronic disease, acute illness   -- No evidence of iron, B12, or folate deficiencies. Ddimer elevated but fibrinogen wnl. LDH elevated w/ normal hapto  -- Monitor CBC and transfuse to maintain hg >7, platelet count >10K, >15K if febrile, or >50K if bleeding     4. Fever   -- Afebrile   -- Per ID, no clear source. CT a/p ordered. Follow up cultures    5. Worsening [place]  -- US kidney/bladder showing mild right hydronephrosis, small ascites  -- Nephrology consulted, f/u recommendations and work up    Will continue to follow.    Juancarlos Esqueda PA-C  Hematology/Oncology  [facility]  [phone] (office)

## 2023-06-25 NOTE — CONSULT NOTE ADULT - SUBJECTIVE AND OBJECTIVE BOX
[facility] NEPHROLOGY- CONSULTATION NOTE    88y Female with history of below presents with weakness. Nephrology consulted for elevated Scr.    Patient with Scr noted to be 1.2-1.3 on admission which has been increasing over the last 2-3 days. Patient did get CT with IV contrast on admission with Scr remaining stable 48 hours post imaging. Patient febrile the day prior to abrupt increase in Scr. Patient with R chest tube for R pleural effusion likely secondary to metastatic CA. Patient also with episodes of tachycardia for which cardiology following.    REVIEW OF SYSTEMS:  Gen: no fevers  HEENT: no rhinorrhea  Neck: no sore throat  Cards: no chest pain  Resp: + dyspnea  GI: no nausea or vomiting or diarrhea, + decreased PO intake  : no dysuria or hematuria  Vascular: + LE edema  Derm: no rashes  Neuro: no numbness/tingling    No Known Allergies      Home Medications Reviewed  Hospital Medications:   MEDICATIONS  (STANDING):  aspirin enteric coated 81 milliGRAM(s) Oral daily  atorvastatin 80 milliGRAM(s) Oral at bedtime  cholecalciferol 2000 Unit(s) Oral daily  cyanocobalamin 1000 MICROGram(s) Oral daily  diltiazem    Tablet 30 milliGRAM(s) Oral every 6 hours  heparin  Infusion. 700 Unit(s)/Hr (7 mL/Hr) IV Continuous <Continuous>  levothyroxine 75 MICROGram(s) Oral daily  loratadine 10 milliGRAM(s) Oral daily  misoprostol 200 MICROGram(s) Oral <User Schedule>  multivitamin 1 Tablet(s) Oral daily  pantoprazole    Tablet 40 milliGRAM(s) Oral before breakfast  piperacillin/tazobactam IVPB.. 3.375 Gram(s) IV Intermittent every 8 hours      PAST MEDICAL & SURGICAL HISTORY:  Endometrial ca  S4 serous endometrial carcinoma, [facility] [place], chemotherapy      HTN (hypertension)      CAD (coronary artery disease)      S/P AVR      Hypothyroidism      HLD (hyperlipidemia)      S/P AVR (aortic valve replacement)      History of endometrial biopsy          FAMILY HISTORY:  Family history of parotid cancer (Child)        SOCIAL HISTORY:  Denies toxic substance use     VITALS:  T(F): 98.1 (06-25-23 @ 11:19), Max: 98.1 (06-25-23 @ 11:19)  HR: 111 (06-25-23 @ 11:19)  BP: 111/61 (06-25-23 @ 11:19)  RR: 18 (06-25-23 @ 11:19)  SpO2: 100% (06-25-23 @ 11:19)  Wt(kg): --    06-24 @ 07:01  -  06-25 @ 07:00  --------------------------------------------------------  IN: 181 mL / OUT: 390 mL / NET: -209 mL    06-25 @ 07:01  -  06-25 @ 12:33  --------------------------------------------------------  IN: 0 mL / OUT: 100 mL / NET: -100 mL          PHYSICAL EXAM:  Gen: NAD, calm  HEENT: MMM  Neck: no JVD  Cards: RRR, +S1/S2, no M/G/R  Resp: Decreased BS @ R base, + R CT  GI: soft, NT/ND, NABS  : no CVA tenderness + suprapubic distention  Vascular: trace LE edema B/L  Derm: no rashes  Neuro: non-focal    LABS:  06-25    131<L>  |  93<L>  |  32<H>  ----------------------------<  114<H>  4.0   |  25  |  1.92<H>    Ca    7.7<L>      25 Jun 2023 06:18  Phos  3.6     06-25  Mg     1.90     06-25      Creatinine Trend: 1.92 <--, 1.87 <--, 1.65 <--, 1.23 <--, 1.21 <--, 1.32 <--, 1.38 <--                        8.7    15.75 )-----------( 147      ( 25 Jun 2023 06:18 )             26.7     Urine Studies:  Urinalysis Basic - ( 25 Jun 2023 06:18 )    Color:  / Appearance:  / SG:  / pH:   Gluc: 114 mg/dL / Ketone:   / Bili:  / Urobili:    Blood:  / Protein:  / Nitrite:    Leuk Esterase:  / RBC:  / WBC    Sq Epi:  / Non Sq Epi:  / Bacteria:           RADIOLOGY & ADDITIONAL STUDIES:    < from: US Kidney and Bladder (06.24.23 @ 17:21) >  IMPRESSION:  *  Mild right hydronephrosis.  *  Small amount of ascites.          --- End of Report ---    < end of copied text >      < from: Xray Chest 1 View- PORTABLE-Routine (Xray Chest 1 View- PORTABLE-Routine in AM.) (06.24.23 @ 11:56) >  IMPRESSION: Follow-up studies post right-sided pigtail catheter placement   for large pleural effusion that has decreased.    --- End of Report ---    < end of copied text >      < from: US Duplex Venous Lower Ext Complete, Bilateral (06.21.23 @ 11:10) >    IMPRESSION:  No evidence of deep venous thrombosis in either lower extremity.            --- End of Report ---    < end of copied text >

## 2023-06-25 NOTE — CONSULT NOTE ADULT - ASSESSMENT
88y Female with history of endometrial carcinoma presents with weakness. Nephrology consulted for elevated Scr.    1) [place]: likely hemodynamically mediated in setting of relative hypotension/tachycardia and hypoalbuminemia. Scr increasing. Check UA, urine sodium and urine. Renal US with mild R hydronephrosis for which would check CT A/P to clarify. Trial of IV albumin. TMA work up negative. Avoid nephrotoxins. Monitor electrolytes.    2) HTN: BP low normal with tachycardia. As per cardiology.    3) LE edema: Due to hypoalbuminemia. Start protein supplements. Check spot urine TP/CR to quantify proteinuria. Can give lasix 40 mg IV X 1 dose if patient develops dyspnea with IV albumin. TTE with normal LVSF. Monitor UO.    4) Hyponatremia: Mild. Change Zosyn to NS base to limit free water. Monitor serum Na.      [facility] NEPHROLOGY  Brennon Cates M.D.  Addison Nieto D.O.  Guerline Dias M.D.  Merissa Parsons, GRACIA, ANP-C    Telephone: [phone]  Facsimile: [phone]    [address] [place] [id number]

## 2023-06-25 NOTE — PROGRESS NOTE ADULT - SUBJECTIVE AND OBJECTIVE BOX
Patient is a 88y old  Female who presents with a chief complaint of Generalized weakness (25 Jun 2023 10:57)    Patient seen and examined at bedside. Tachycardic overnight, cardiology consulted for MAT.    MEDICATIONS  (STANDING):  aspirin enteric coated 81 milliGRAM(s) Oral daily  atorvastatin 80 milliGRAM(s) Oral at bedtime  cholecalciferol 2000 Unit(s) Oral daily  cyanocobalamin 1000 MICROGram(s) Oral daily  diltiazem    Tablet 30 milliGRAM(s) Oral every 6 hours  heparin  Infusion. 700 Unit(s)/Hr (7 mL/Hr) IV Continuous <Continuous>  levothyroxine 75 MICROGram(s) Oral daily  loratadine 10 milliGRAM(s) Oral daily  misoprostol 200 MICROGram(s) Oral <User Schedule>  multivitamin 1 Tablet(s) Oral daily  pantoprazole    Tablet 40 milliGRAM(s) Oral before breakfast  piperacillin/tazobactam IVPB.. 3.375 Gram(s) IV Intermittent every 8 hours    MEDICATIONS  (PRN):  acetaminophen     Tablet .. 650 milliGRAM(s) Oral every 6 hours PRN Temp greater or equal to 38C (100.4F), Mild Pain (1 - 3)  albuterol    90 MICROgram(s) HFA Inhaler 2 Puff(s) Inhalation every 6 hours PRN Shortness of Breath and/or Wheezing  heparin   Injectable 4500 Unit(s) IV Push every 6 hours PRN For aPTT less than 40  heparin   Injectable 2000 Unit(s) IV Push every 6 hours PRN For aPTT between 40 - 57  melatonin 3 milliGRAM(s) Oral at bedtime PRN Sleep  oxyCODONE    IR 5 milliGRAM(s) Oral every 6 hours PRN Moderate Pain (4 - 6)  polyethylene glycol 3350 17 Gram(s) Oral daily PRN for constipation  senna 2 Tablet(s) Oral at bedtime PRN for constipation      Vital Signs Last 24 Hrs  T(C): 36.5 (25 Jun 2023 09:00), Max: 37.1 (24 Jun 2023 12:02)  T(F): 97.7 (25 Jun 2023 09:00), Max: 98.7 (24 Jun 2023 12:02)  HR: 91 (25 Jun 2023 09:00) (91 - 118)  BP: 102/60 (25 Jun 2023 09:00) (102/60 - 111/67)  BP(mean): --  RR: 18 (25 Jun 2023 09:00) (18 - 18)  SpO2: 100% (25 Jun 2023 09:00) (98% - 100%)    Parameters below as of 25 Jun 2023 09:00  Patient On (Oxygen Delivery Method): nasal cannula  O2 Flow (L/min): 2      PE  NAD  Awake, alert  Anicteric, MMM  No c/c/e  No rash grossly  FROM                          8.7    15.75 )-----------( 147      ( 25 Jun 2023 06:18 )             26.7       06-25    131<L>  |  93<L>  |  32<H>  ----------------------------<  114<H>  4.0   |  25  |  1.92<H>    Ca    7.7<L>      25 Jun 2023 06:18  Phos  3.6     06-25  Mg     1.90     06-25

## 2023-06-25 NOTE — PROGRESS NOTE ADULT - SUBJECTIVE AND OBJECTIVE BOX
Rony Mcgarry MD  Interventional Cardiology / Endovascular Specialist  [place] Office : [address] [place] [address] [place] [address]  Tel:   [place] Office : [address] [facility] [place] [address]  Tel: [phone]    Pt lying in bed in NAD denies CP SOB   	  MEDICATIONS:  aspirin enteric coated 81 milliGRAM(s) Oral daily  diltiazem    Tablet 30 milliGRAM(s) Oral every 6 hours  heparin   Injectable 4500 Unit(s) IV Push every 6 hours PRN  heparin   Injectable 2000 Unit(s) IV Push every 6 hours PRN  heparin  Infusion. 700 Unit(s)/Hr IV Continuous <Continuous>    piperacillin/tazobactam IVPB.. 3.375 Gram(s) IV Intermittent every 8 hours    albuterol    90 MICROgram(s) HFA Inhaler 2 Puff(s) Inhalation every 6 hours PRN  loratadine 10 milliGRAM(s) Oral daily    acetaminophen     Tablet .. 650 milliGRAM(s) Oral every 6 hours PRN  melatonin 3 milliGRAM(s) Oral at bedtime PRN  oxyCODONE    IR 5 milliGRAM(s) Oral every 6 hours PRN    misoprostol 200 MICROGram(s) Oral <User Schedule>  pantoprazole    Tablet 40 milliGRAM(s) Oral before breakfast  polyethylene glycol 3350 17 Gram(s) Oral daily PRN  senna 2 Tablet(s) Oral at bedtime PRN    atorvastatin 80 milliGRAM(s) Oral at bedtime  levothyroxine 75 MICROGram(s) Oral daily    albumin human 25% IVPB 50 milliLiter(s) IV Intermittent every 6 hours  cholecalciferol 2000 Unit(s) Oral daily  cyanocobalamin 1000 MICROGram(s) Oral daily  multivitamin 1 Tablet(s) Oral daily      PAST MEDICAL/SURGICAL HISTORY  PAST MEDICAL & SURGICAL HISTORY:  Endometrial ca  S4 serous endometrial carcinoma, 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[place], chemotherapy      HTN (hypertension)      CAD (coronary artery disease)      S/P AVR      Hypothyroidism      HLD (hyperlipidemia)      S/P AVR (aortic valve replacement)      History of endometrial biopsy          SOCIAL HISTORY: Substance Use (street drugs): ( x ) never used  (  ) other:    FAMILY HISTORY:  Family history of parotid cancer (Child)        REVIEW OF SYSTEMS:  CONSTITUTIONAL: No fever, weight loss, or fatigue  EYES: No eye pain, visual disturbances, or discharge  ENMT:  No difficulty hearing, tinnitus, vertigo; No sinus or throat pain  BREASTS: No pain, masses, or nipple discharge  GASTROINTESTINAL: No abdominal or epigastric pain. No nausea, vomiting, or hematemesis; No diarrhea or constipation. No melena or hematochezia.  GENITOURINARY: No dysuria, frequency, hematuria, or incontinence  NEUROLOGICAL: No headaches, memory loss, loss of strength, numbness, or tremors  ENDOCRINE: No heat or cold intolerance; No hair loss  MUSCULOSKELETAL: No joint pain or swelling; No muscle, back, or extremity pain  PSYCHIATRIC: No depression, anxiety, mood swings, or difficulty sleeping  HEME/LYMPH: No easy bruising, or bleeding gums  All others negative    PHYSICAL EXAM:  T(C): 36.7 (06-25-23 @ 11:19), Max: 36.7 (06-24-23 @ 16:30)  HR: 111 (06-25-23 @ 11:19) (91 - 111)  BP: 111/61 (06-25-23 @ 11:19) (102/60 - 111/67)  RR: 18 (06-25-23 @ 11:19) (18 - 18)  SpO2: 100% (06-25-23 @ 11:19) (98% - 100%)  Wt(kg): --  I&O's Summary    24 Jun 2023 07:01  -  25 Jun 2023 07:00  --------------------------------------------------------  IN: 181 mL / OUT: 390 mL / NET: -209 mL    25 Jun 2023 07:01  -  25 Jun 2023 13:06  --------------------------------------------------------  IN: 320 mL / OUT: 325 mL / NET: -5 mL    GENERAL: NAD  EYES: conjunctiva and sclera clear  ENMT: No tonsillar erythema, exudates, or enlargement  Cardiovascular: Normal S1 S2, No JVD, No murmurs, No edema  Respiratory: Decreased b/s b/l   Gastrointestinal:  Soft, Non-tender, + BS	  Extremities: Normal edema                              8.7    15.75 )-----------( 147      ( 25 Jun 2023 06:18 )             26.7     06-25    131<L>  |  93<L>  |  32<H>  ----------------------------<  114<H>  4.0   |  25  |  1.92<H>    Ca    7.7<L>      25 Jun 2023 06:18  Phos  3.6     06-25  Mg     1.90     06-25      proBNP:   Lipid Profile:   HgA1c:   TSH:     Consultant(s) Notes Reviewed:  [x ] YES  [ ] NO    Care Discussed with Consultants/Other Providers [ x] YES  [ ] NO    Imaging Personally Reviewed independently:  [x] YES  [ ] NO    All labs, radiologic studies, vitals, orders and medications list reviewed. Patient is seen and examined at bedside. Case discussed with medical team.

## 2023-06-25 NOTE — PROGRESS NOTE ADULT - SUBJECTIVE AND OBJECTIVE BOX
SUBJECTIVE / OVERNIGHT EVENTS:pt seen and examined  06-25-23     MEDICATIONS  (STANDING):  albumin human 25% IVPB 50 milliLiter(s) IV Intermittent every 6 hours  aspirin enteric coated 81 milliGRAM(s) Oral daily  atorvastatin 80 milliGRAM(s) Oral at bedtime  cholecalciferol 2000 Unit(s) Oral daily  cyanocobalamin 1000 MICROGram(s) Oral daily  diltiazem    Tablet 30 milliGRAM(s) Oral every 6 hours  heparin  Infusion. 700 Unit(s)/Hr (7 mL/Hr) IV Continuous <Continuous>  levothyroxine 75 MICROGram(s) Oral daily  loratadine 10 milliGRAM(s) Oral daily  misoprostol 200 MICROGram(s) Oral <User Schedule>  multivitamin 1 Tablet(s) Oral daily  pantoprazole    Tablet 40 milliGRAM(s) Oral before breakfast  piperacillin/tazobactam IVPB.. 3.375 Gram(s) IV Intermittent every 8 hours    MEDICATIONS  (PRN):  acetaminophen     Tablet .. 650 milliGRAM(s) Oral every 6 hours PRN Temp greater or equal to 38C (100.4F), Mild Pain (1 - 3)  albuterol    90 MICROgram(s) HFA Inhaler 2 Puff(s) Inhalation every 6 hours PRN Shortness of Breath and/or Wheezing  heparin   Injectable 4500 Unit(s) IV Push every 6 hours PRN For aPTT less than 40  heparin   Injectable 2000 Unit(s) IV Push every 6 hours PRN For aPTT between 40 - 57  melatonin 3 milliGRAM(s) Oral at bedtime PRN Sleep  oxyCODONE    IR 5 milliGRAM(s) Oral every 6 hours PRN Moderate Pain (4 - 6)  polyethylene glycol 3350 17 Gram(s) Oral daily PRN for constipation  senna 2 Tablet(s) Oral at bedtime PRN for constipation    Vital Signs Last 24 Hrs  T(C): 36.7 (06-25-23 @ 18:25), Max: 36.7 (06-25-23 @ 11:19)  T(F): 98 (06-25-23 @ 18:25), Max: 98.1 (06-25-23 @ 11:19)  HR: 106 (06-25-23 @ 18:25) (91 - 111)  BP: 108/61 (06-25-23 @ 18:25) (102/60 - 119/68)  BP(mean): --  RR: 18 (06-25-23 @ 18:25) (18 - 18)  SpO2: 100% (06-25-23 @ 18:25) (98% - 100%)          Constitutional: No fever, fatigue  Skin: No rash.  Eyes: No recent vision problems or eye pain.  ENT: No congestion, ear pain, or sore throat.  Cardiovascular: No chest pain or palpation.  Respiratory: No cough, shortness of breath, congestion, or wheezing.  Gastrointestinal: No abdominal pain, nausea, vomiting, or diarrhea.  Genitourinary: No dysuria.  Musculoskeletal: No joint swelling.  Neurologic: No headache.    PHYSICAL EXAM:  GENERAL: NAD  EYES: EOMI, PERRLA  NECK: Supple, No JVD  CHEST/LUNG: dec breath sounds at bases  HEART:  S1 , S2 +  ABDOMEN: soft , bs+  EXTREMITIES:  edema+  NEUROLOGY:alert awake    LABS:  06-25    x   |  x   |  x   ----------------------------<  x   x    |  x   |  1.89<H>    Ca    7.7<L>      25 Jun 2023 06:18  Phos  3.6     06-25  Mg     1.90     06-25      Creatinine Trend: 1.89 <--, 1.92 <--, 1.87 <--, 1.65 <--, 1.23 <--, 1.21 <--, 1.32 <--, 1.38 <--                        8.7    15.75 )-----------( 147      ( 25 Jun 2023 06:18 )             26.7     Urine Studies:  Urinalysis Basic - ( 25 Jun 2023 06:18 )    Color:  / Appearance:  / SG:  / pH:   Gluc: 114 mg/dL / Ketone:   / Bili:  / Urobili:    Blood:  / Protein:  / Nitrite:    Leuk Esterase:  / RBC:  / WBC    Sq Epi:  / Non Sq Epi:  / Bacteria:                 PTT - ( 25 Jun 2023 15:05 )  PTT:134.1 sec          PT/INR - ( 23 Jun 2023 02:32 )   PT: 20.8 sec;   INR: 1.78 ratio         PTT - ( 24 Jun 2023 16:29 )  PTT:36.4 sec    RADIOLOGY & ADDITIONAL TESTS:    Imaging Personally Reviewed:yes    Consultant(s) Notes Reviewed:  yes    Care Discussed with Consultants/Other Providers:yes

## 2023-06-25 NOTE — PROGRESS NOTE ADULT - SUBJECTIVE AND OBJECTIVE BOX
Subjective: no acute complaints    Vital Signs:  Vital Signs Last 24 Hrs  T(C): 36.5 (06-25-23 @ 09:00), Max: 37.1 (06-24-23 @ 12:02)  T(F): 97.7 (06-25-23 @ 09:00), Max: 98.7 (06-24-23 @ 12:02)  HR: 91 (06-25-23 @ 09:00) (91 - 118)  BP: 102/60 (06-25-23 @ 09:00) (102/60 - 111/67)  RR: 18 (06-25-23 @ 09:00) (18 - 18)  SpO2: 100% (06-25-23 @ 09:00) (98% - 100%) on (O2)    PE  General: NAD  Neurology: Awake, nonfocal, MOFFETT x 4  Eyes: Scleras clear, PERRLA/ EOMI, Gross vision intact  ENT:Gross hearing intact, grossly patent pharynx, no stridor  Neck: Neck supple, trachea midline, No JVD,   Respiratory: CTA B/L, No wheezing, rales, rhonchi  CV: RRR, S1S2, no murmurs, rubs or gallops  Abdominal: Soft, NT, ND +BS,   Extremities: No edema, + peripheral pulses  Skin: No Rashes, Hematoma, Ecchymosis  Lymphatic: No Neck, axilla, groin LAD  Psych: Oriented x 3, normal affect  Incisions: c,d,i  Tubes: tube stripped to remove clot, 170cc drained  Relevant labs, radiology and Medications reviewed                        8.7    15.75 )-----------( 147      ( 25 Jun 2023 06:18 )             26.7     06-25    131<L>  |  93<L>  |  32<H>  ----------------------------<  114<H>  4.0   |  25  |  1.92<H>    Ca    7.7<L>      25 Jun 2023 06:18  Phos  3.6     06-25  Mg     1.90     06-25            Assessment  88F with history of stage 4 serous endometrial carcinoma (dx 5/2022) s/p chemo (last in 12/2022) and debulking surgery with POOL/BSO (2/10/23) c/b b/l pleural effusions (R > L, on 2L NC PRN), RUL segmental PE (8/2022, on Eliquis), GI bleed (4/2023), CAD s/p stent (3/2021), aortic stenosis s/p TAVR, HTN, HLD, and hypothyroidism presents with 3 weeks of worsening generalized weakness. Found to have large Rt pleural effusion. S/p right pigtail placement.     - Continue chest pigtail catheter to waterseal  - Wean oxygen as tolerated  - pleural fluid gram stain negative, f/u final cultures and cytology  - Daily Chest Xray  - D/w patient and family at bedside

## 2023-06-25 NOTE — PROGRESS NOTE ADULT - ASSESSMENT
EKG MAT     Echo < from: Transthoracic Echocardiogram (06.21.23 @ 15:15) >  1. Mitral annular calcification, otherwise normal mitral  valve. Mild-moderate mitral regurgitation.  2. Transcatheter aortic valve replacement Peak transaortic  valve gradient equals 42 mm Hg, mean transaortic valve  gradient equals 23 mm Hg, which is probably normal in the  presence of a prosthetic valve. Moderate valvular aortic  regurgitation.  3. Normal left ventricular systolic function. No segmental  wall motion abnormalities. Septal motion consistent with  right ventricular overload.  4. Mild diastolic dysfunction (Stage I).  5. Right ventricular enlargement with decreased right  ventricular systolic function.  6. Bilateral pleural effusions.  *** Compared with echocardiogram of 5/1/2023 which on  review of images likely showed mild aortic regurgitation,  today's echo shows increase of TAVR gradients likely due to  increase in aortic regurgitation.    < end of copied text >      Assessment and Plan     1) Multifocal Atrial Tachycardia : on a/c 2/2 PE , start Dilt 30 mg po q6 hrs     2) Pleural effuison : s/p Pig tail f/u pulm recs     3) DVT PPX heparin

## 2023-06-25 NOTE — PROGRESS NOTE ADULT - PROBLEM SELECTOR PLAN 6
Pt with history of stage 4 serous endometrial carcinoma (dx 5/2022) s/p chemo (last in 12/2022) and debulking surgery with POOL/BSO (2/10/23), now recently found to have recurrence of disease with new liver mets.  - Pt follows with Dr. Alvarado of [facility]. Will consult [facility] in AM.   - GOC discussion with pt and family given likely poor prognosis

## 2023-06-26 NOTE — DISCHARGE NOTE PROVIDER - NSDCFUADDAPPT_GEN_ALL_CORE_FT
-----------------------------------------------  Podiatry Discharge Instructions:  - Follow up: Please follow up with Dr. Mayer within 1 week of discharge from the hospital, please call [phone] for appointment and discuss that you recently were seen in the hospital.  - Wound Care: please apply Mupirocin to R foot hallux medial boarder followed by band aid. DAILY   - Weight bearing: Please weight bearing as tolerated in a surgical shoes to bilateral foot  - Antibiotics: Please continue as instructed.    -----------------------------------------------

## 2023-06-26 NOTE — CONSULT NOTE ADULT - SUBJECTIVE AND OBJECTIVE BOX
HPI:  89 yo woman with history of stage 4 serous endometrial carcinoma (dx 5/2022) s/p chemo (last in 12/2022) and debulking surgery with POOL/BSO (2/10/23) c/b b/l pleural effusions (R > L, on 2L NC PRN), RUL segmental PE (8/2022, on Eliquis), GI bleed (4/2023), CAD s/p stent (3/2021), aortic stenosis s/p TAVR, HTN, HLD, and hypothyroidism presents with 3 weeks of worsening generalized weakness. HPI obtained from both pt and her daughter at bedside, also named Shani. Prior to 3 weeks ago, pt was able to ambulate using her rolling walker without any additional assistance and even go down the 5 steps in her house on her own to head out for doctors' appointments. Over the last 3 weeks, however, pt's functional status has been progressively declining, ever since pt got a therapeutic thoracentesis with removal of 1L of pleural fluid in early June at her oncologist's office. Pt is now barely able to get out of bed on most days. Pt states that she got out of bed only once on Monday, and it was to use the bathroom for a BM. Since ~2 weeks ago, pt has also been becoming short of breath with minimal exertion, such as when walking from her bed to the bathroom, and has had to rely on supplemental O2 with 2L NC, which she didn't require for the past several months. Pt denies any recent chest pain, palpitations, lightheadedness, dizziness, headaches, vision changes, abdominal pain, nausea, vomiting, diarrhea, constipation, melena, BRBPR, or dysuria. Pt has chronic b/l LE edema and had b/l venous dopplers this past Friday that were negative for a DVT, though pt and her daughter note that pt's RLE seems more swollen than usual, as it is the LLE that is typically larger in size than the RLE. Pt was recently admitted at [facility] from 4/23-5/4/23, for a GI bleed requiring multiple pRBC transfusions. No clear source of bleed was identified despite pt getting both an enteroscopy and a colonoscopy, although the GI bleed was presumed to be from the distal small bowel. Pt's Eliquis was initially discontinued during the admission but eventually resumed at a lower dose of 2.5 mg BID prior to discharge. Soon after pt's discharge from the hospital, pt had a follow-up visit with her oncologist in which she had a PET/CT that showed recurrence of disease with new liver mets. Pt was being arranged for chemo as outpatient, but workup this past Friday revealed [place] with serum Cr of 1.9 (baseline 0.85-1) and recurrence of the R pleural effusion that was previously drained.     In the ED,   T 98.3-99, HR 82-87, -126/61-64, RR 18-20, SpO2 % RA and 2L NC.  CXR showing moderate to large R pleural effusion. (20 Jun 2023 23:40)    PERTINENT PM/SXH:   Endometrial ca    HTN (hypertension)    CAD (coronary artery disease)    S/P AVR    Hypothyroidism    HLD (hyperlipidemia)    S/P AVR (aortic valve replacement)    History of endometrial biopsy    FAMILY HISTORY:  Family history of parotid cancer (Child)    Family Hx substance abuse [ ]yes [ ]no  ITEMS NOT CHECKED ARE NOT PRESENT    SOCIAL HISTORY:   Significant other/partner[ ]   Children[x ]  Presybeterian/Spirituality: highly supported by her dee dee/Pentecostal   Substance hx:  [ ]   Tobacco hx:  [ ]   Alcohol hx: [ ]   Home Opioid hx:  [ ] I-Stop Reference No:  Living Situation: [ x]Home  [ ]Long term care  [ ]Rehab [ ]Other    ADVANCE DIRECTIVES:    DNR/MOLST  [ ]  Living Will  [ ]   DECISION MAKER(s):  [ ] Health Care Proxy(s)  [ x] Surrogate(s)  [ ] Guardian           Name(s): Phone Number(s):  Shani Bright #[phone]    BASELINE (I)ADL(s) (prior to admission):  [place]: [ ]Total  [ x] Moderate [ ]Dependent    Allergies    No Known Allergies    Intolerances    MEDICATIONS  (STANDING):  albumin human 25% IVPB 50 milliLiter(s) IV Intermittent every 6 hours  ALPRAZolam 0.25 milliGRAM(s) Oral every 8 hours  aspirin enteric coated 81 milliGRAM(s) Oral daily  atorvastatin 80 milliGRAM(s) Oral at bedtime  cholecalciferol 2000 Unit(s) Oral daily  cyanocobalamin 1000 MICROGram(s) Oral daily  diltiazem    Tablet 30 milliGRAM(s) Oral every 6 hours  levothyroxine 75 MICROGram(s) Oral daily  loratadine 10 milliGRAM(s) Oral daily  misoprostol 200 MICROGram(s) Oral <User Schedule>  multivitamin 1 Tablet(s) Oral daily  mupirocin 2% Ointment 1 Application(s) Topical <User Schedule>  pantoprazole    Tablet 40 milliGRAM(s) Oral before breakfast  piperacillin/tazobactam IVPB.. 3.375 Gram(s) IV Intermittent every 8 hours    MEDICATIONS  (PRN):  acetaminophen     Tablet .. 650 milliGRAM(s) Oral every 6 hours PRN Temp greater or equal to 38C (100.4F), Mild Pain (1 - 3)  albuterol    90 MICROgram(s) HFA Inhaler 2 Puff(s) Inhalation every 6 hours PRN Shortness of Breath and/or Wheezing  melatonin 3 milliGRAM(s) Oral at bedtime PRN Sleep  oxyCODONE    IR 5 milliGRAM(s) Oral every 6 hours PRN Moderate Pain (4 - 6)  polyethylene glycol 3350 17 Gram(s) Oral daily PRN for constipation  senna 2 Tablet(s) Oral at bedtime PRN for constipation    PRESENT SYMPTOMS: [ ]Unable to self-report  [ ] CPOT [ ] PAINADs [ ] RDOS  Source if other than patient:  [ ]Family   [ ]Team     Pain: [ ]yes [x ]no  QOL impact -   Location -                    Aggravating factors -  Quality -  Radiation -  Timing-  Severity (0-10 scale):  Minimal acceptable level (0-10 scale):     CPOT:    https://www.[email or web address]/getattachment/[email or web address]/Critical-Care-Pain-Observation-Tool-(CPOT)    PAIN AD Score:   [email or web address].[email or web address]/cog/painad.pdf (press ctrl +  left click to view)    Dyspnea:                           [ ]Mild [x ]Moderate [ ]Severe    RDOS:  0 to 2  minimal or no respiratory distress   3  mild distress  4 to 6 moderate distress  >7 severe distress  https://[email or web address]/handouts/hen/Respiratory_Distress_Observation_Scale.pdf (Ctrl +  left click to view)     Anxiety:                             [ ]Mild [x ]Moderate [ ]Severe  Fatigue:                             [ ]Mild [ ]Moderate [ ]Severe  Nausea:                             [ ]Mild [ ]Moderate [ ]Severe  Loss of appetite:              [ ]Mild [x ]Moderate [ ]Severe  Constipation:                    [ ]Mild [ ]Moderate [ ]Severe    PCSSQ[Palliative Care Spiritual Screening Question]   Severity (0-10): 5  Score of 4 or > indicate consideration of Chaplaincy referral.    Chaplaincy Referral: [x ] yes [ ] refused [ ] following    Other Symptoms:  [x ]All other review of systems negative     Palliative Performance Status Version 2: 40  %    http://[email or web address]/files/news/palliative_performance_scale_[email or web address].pdf    PHYSICAL EXAM:  Vital Signs Last 24 Hrs  T(C): 36.5 (26 Jun 2023 11:49), Max: 36.8 (26 Jun 2023 09:10)  T(F): 97.7 (26 Jun 2023 11:49), Max: 98.2 (26 Jun 2023 09:10)  HR: 102 (26 Jun 2023 11:49) (92 - 110)  BP: 92/54 (26 Jun 2023 11:49) (92/54 - 116/63)  BP(mean): --  RR: 17 (26 Jun 2023 11:49) (17 - 18)  SpO2: 100% (26 Jun 2023 11:49) (98% - 100%)    Parameters below as of 26 Jun 2023 11:49  Patient On (Oxygen Delivery Method): nasal cannula  O2 Flow (L/min): 2   I&O's Summary    25 Jun 2023 07:01  -  26 Jun 2023 07:00  --------------------------------------------------------  IN: 776 mL / OUT: 610 mL / NET: 166 mL    26 Jun 2023 07:01  -  26 Jun 2023 16:08  --------------------------------------------------------  IN: 0 mL / OUT: 200 mL / NET: -200 mL    GENERAL: [ ]Cachexia    [x ]Alert  [ x]Oriented x 4  [ ]Lethargic  [ ]Unarousable  [ x]Verbal  [ ]Non-Verbal  Behavioral:   [x ] Anxiety  [ ] Delirium [ ] Agitation [ ] Other  HEENT:  [ ]Normal   [x ]Dry mouth   [ ]ET Tube/Trach  [ ]Oral lesions  PULMONARY:   [ ]Clear [ ]Tachypnea  [ ]Audible excessive secretions   [x ]Rhonchi        [ ]Right [ ]Left [ ]Bilateral  [ ]Crackles        [ ]Right [ ]Left [ ]Bilateral  [ ]Wheezing     [ ]Right [ ]Left [ ]Bilateral  [ ]Diminished breath sounds [ ]right [ ]left [ ]bilateral  CARDIOVASCULAR:    [ ]Regular [ ]Irregular [x ]Tachy  [ ]Alejandro [ ]Murmur [ ]Other  GASTROINTESTINAL:  [x ]Soft  [ ]Distended   [x ]+BS  [ ]Non tender [ ]Tender  [ ]Other [ ]PEG [ ]OGT/ NGT  Last BM: 6/25  GENITOURINARY:  [ ]Normal [x ] Incontinent   [ ]Oliguria/Anuria   [ ]Birch  MUSCULOSKELETAL:   [ ]Normal   [x ]Weakness  [ ]Bed/Wheelchair bound [ ]Edema  NEUROLOGIC:   [x ]No focal deficits  [ ]Cognitive impairment  [ ]Dysphagia [ ]Dysarthria [ ]Paresis [ ]Other   SKIN:   [x ]Normal  [ ]Rash  [ ]Other  [ ]Pressure ulcer(s)       Present on admission [ ]y [ ]n    CRITICAL CARE:  [ ] Shock Present  [ ]Septic [ ]Cardiogenic [ ]Neurologic [ ]Hypovolemic  [ ]  Vasopressors [ ]  Inotropes   [ ]Respiratory failure present [ ]Mechanical ventilation [ ]Non-invasive ventilatory support [ ]High flow    [ ]Acute  [ ]Chronic [ ]Hypoxic  [ ]Hypercarbic [ ]Other  [ ]Other organ failure     LABS:                        7.5    13.84 )-----------( 140      ( 26 Jun 2023 06:20 )             22.8   06-26    129<L>  |  91<L>  |  30<H>  ----------------------------<  100<H>  3.4<L>   |  25  |  1.81<H>    Ca    8.1<L>      26 Jun 2023 06:20  Phos  2.9     06-26  Mg     1.70     06-26    PTT - ( 26 Jun 2023 06:20 )  PTT:64.1 sec    Urinalysis Basic - ( 26 Jun 2023 06:20 )    Color: x / Appearance: x / SG: x / pH: x  Gluc: 100 mg/dL / Ketone: x  / Bili: x / Urobili: x   Blood: x / Protein: x / Nitrite: x   Leuk Esterase: x / RBC: x / WBC x   Sq Epi: x / Non Sq Epi: x / Bacteria: x    RADIOLOGY & ADDITIONAL STUDIES:  < from: CT Abdomen and Pelvis No Cont (06.26.23 @ 08:40) >  IMPRESSION:  Hemorrhage fluid level at the posterior aspect of the spleen and likely   at the posterior aspect of the liver with evaluation limited on this   noncontrast study; correlation is recommended with hematocrit levels for   active bleeding.  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  Multiple bilobar liver lesions measuring up to 4.9 x 3.7 cm in the right   hepatic lobe suspicious for metastases.  Partially imaged moderate right and small left pleural effusions.  Right middle lobe pulmonary nodules measuring up to 4 mm, mildly enlarged   para-aortic lymph nodes, and nodularity in the subpleural region;   metastatic disease cannot be excluded.  Mild right-sided hydronephrosis; no urinary tract calculi; evaluation for   the etiology of the hydronephrosis limited on this noncontrast study.    PROTEIN CALORIE MALNUTRITION PRESENT: [ ]mild [ ]moderate [ ]severe [ ]underweight [ ]morbid obesity  https://www.andeal.org/vault/2440/web/files/ONC/Table_Clinical%20Characteristics%20to%20Document%20Malnutrition-White%20JV%20et%20al%202012.pdf    Height (cm): 144.8 (06-20-23 @ 11:38), 144 (04-24-23 @ 10:50), 144.8 (08-30-22 @ 18:34)  Weight (kg): 60 (06-21-23 @ 06:16), 60.4 (04-24-23 @ 10:50), 63.2 (08-31-22 @ 03:27)  BMI (kg/m2): 28.6 (06-21-23 @ 06:16), 28.8 (06-20-23 @ 11:38), 29.1 (04-24-23 @ 10:50)    [ ]PPSV2 < or = to 30% [ ]significant weight loss  [ ]poor nutritional intake  [ ]anasarca[ ]Artificial Nutrition      Other REFERRALS:  [ ]Hospice  [ ]Child Life  [ ]Social Work  [ ]Case management [ ]Holistic Therapy     Goals of Care Document:

## 2023-06-26 NOTE — PROGRESS NOTE ADULT - PROBLEM SELECTOR PLAN 2
Pt with ~2 weeks of shortness of breath with minimal exertion, now requiring supplemental O2 with 2L NC. Suspect 2/2 moderate-to-large R pleural effusion, likely malignant, and worsening anemia, though CHF and PE also on the differential.  - Pulm and/or thoracic surgery to be called in AM for possible diagnostic +/- therapeutic thoracentesis. Pt might also require chest tube placement during admission given recurrence of R pleural effusion.   - < from: CT Abdomen and Pelvis No Cont (06.26.23 @ 08:40) >    Hemorrhage fluid level at the posterior aspect of the spleen and likely   at the posterior aspect of the liver with evaluation limited on this   noncontrast study; correlation is recommended with hematocrit levels for   active bleeding.    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    Multiple bilobar liver lesions measuring up to 4.9 x 3.7 cm in the right   hepatic lobe suspicious for metastases.      < end of copied text >    thoracic sx cancelled VATS sec to hemorrhage in ct abd

## 2023-06-26 NOTE — PROGRESS NOTE ADULT - SUBJECTIVE AND OBJECTIVE BOX
CC: Patient is a 88y old  Female who presents with a chief complaint of Generalized weakness (26 Jun 2023 13:52)    ID following for fever    Interval History/ROS: Denies fever. On 2L NC. Remains with R sided chest tube.     Rest of ROS negative.    Allergies  No Known Allergies    ANTIMICROBIALS:  piperacillin/tazobactam IVPB.. 3.375 every 8 hours    OTHER MEDS:  acetaminophen     Tablet .. 650 milliGRAM(s) Oral every 6 hours PRN  albumin human 25% IVPB 50 milliLiter(s) IV Intermittent every 6 hours  albuterol    90 MICROgram(s) HFA Inhaler 2 Puff(s) Inhalation every 6 hours PRN  ALPRAZolam 0.25 milliGRAM(s) Oral every 8 hours  aspirin enteric coated 81 milliGRAM(s) Oral daily  atorvastatin 80 milliGRAM(s) Oral at bedtime  cholecalciferol 2000 Unit(s) Oral daily  cyanocobalamin 1000 MICROGram(s) Oral daily  diltiazem    Tablet 30 milliGRAM(s) Oral every 6 hours  levothyroxine 75 MICROGram(s) Oral daily  loratadine 10 milliGRAM(s) Oral daily  melatonin 3 milliGRAM(s) Oral at bedtime PRN  misoprostol 200 MICROGram(s) Oral <User Schedule>  multivitamin 1 Tablet(s) Oral daily  mupirocin 2% Ointment 1 Application(s) Topical <User Schedule>  oxyCODONE    IR 5 milliGRAM(s) Oral every 6 hours PRN  pantoprazole    Tablet 40 milliGRAM(s) Oral before breakfast  polyethylene glycol 3350 17 Gram(s) Oral daily PRN  senna 2 Tablet(s) Oral at bedtime PRN    PE:    Vital Signs Last 24 Hrs  T(C): 36.5 (26 Jun 2023 11:49), Max: 36.8 (26 Jun 2023 09:10)  T(F): 97.7 (26 Jun 2023 11:49), Max: 98.2 (26 Jun 2023 09:10)  HR: 102 (26 Jun 2023 11:49) (92 - 110)  BP: 92/54 (26 Jun 2023 11:49) (92/54 - 119/68)  BP(mean): --  RR: 17 (26 Jun 2023 11:49) (17 - 18)  SpO2: 100% (26 Jun 2023 11:49) (98% - 100%)    Parameters below as of 26 Jun 2023 11:49  Patient On (Oxygen Delivery Method): nasal cannula  O2 Flow (L/min): 2    Gen: AOx3, NAD  CV: S1+S2 normal, no murmurs  Resp: R chest tube in place  Abd: Soft, nontender, +BS  Ext: No LE edema, no wounds  : No Birch  IV/Skin: No thrombophlebitis  Neuro: no focal deficits    LABS:                          7.5    13.84 )-----------( 140      ( 26 Jun 2023 06:20 )             22.8       06-26    129<L>  |  91<L>  |  30<H>  ----------------------------<  100<H>  3.4<L>   |  25  |  1.81<H>    Ca    8.1<L>      26 Jun 2023 06:20  Phos  2.9     06-26  Mg     1.70     06-26        Urinalysis Basic - ( 26 Jun 2023 06:20 )    Color: x / Appearance: x / SG: x / pH: x  Gluc: 100 mg/dL / Ketone: x  / Bili: x / Urobili: x   Blood: x / Protein: x / Nitrite: x   Leuk Esterase: x / RBC: x / WBC x   Sq Epi: x / Non Sq Epi: x / Bacteria: x        MICROBIOLOGY:  v  Pleural Fl Pleural Fluid  06-23-23   No growth  --    No polymorphonuclear cells seen per low power field  No organisms seen per oil power field      .Blood Blood-Peripheral  06-23-23   No growth to date.  --  --      .Blood Blood-Peripheral  06-23-23   No growth to date.  --  --    Rapid RVP Result: NotDetec (06-23 @ 11:52)    RADIOLOGY:    < from: CT Abdomen and Pelvis No Cont (06.26.23 @ 08:40) >    IMPRESSION:    Hemorrhage fluid level at the posterior aspect of the spleen and likely   at the posterior aspect of the liver with evaluation limited on this   noncontrast study; correlation is recommended with hematocrit levels for   active bleeding.    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    Multiple bilobar liver lesions measuring up to 4.9 x 3.7 cm in the right   hepatic lobe suspicious for metastases.    Partially imaged moderate right and small left pleural effusions.    Right middle lobe pulmonary nodules measuring up to 4 mm, mildly enlarged   para-aortic lymph nodes, and nodularity in the subpleural region;   metastatic disease cannot be excluded.    Mild right-sided hydronephrosis; no urinary tract calculi; evaluation for   the etiology of the hydronephrosis limited on this noncontrast study.    < end of copied text >

## 2023-06-26 NOTE — PROGRESS NOTE ADULT - SUBJECTIVE AND OBJECTIVE BOX
Rony Mcgarry MD  Interventional Cardiology / Endovascular Specialist  [place] Office : [address] [place] [id number] Tel:   [place] Office : [address] [facility] [address] [address]  Tel: [phone]      Subjective/Overnight events: Patient lying in bed. Denies chest pain, SOB or palpitations  	  MEDICATIONS:  aspirin enteric coated 81 milliGRAM(s) Oral daily  diltiazem    Tablet 30 milliGRAM(s) Oral every 6 hours  heparin   Injectable 4500 Unit(s) IV Push every 6 hours PRN  heparin   Injectable 2000 Unit(s) IV Push every 6 hours PRN  heparin  Infusion. 700 Unit(s)/Hr IV Continuous <Continuous>    piperacillin/tazobactam IVPB.. 3.375 Gram(s) IV Intermittent every 8 hours    albuterol    90 MICROgram(s) HFA Inhaler 2 Puff(s) Inhalation every 6 hours PRN  loratadine 10 milliGRAM(s) Oral daily    acetaminophen     Tablet .. 650 milliGRAM(s) Oral every 6 hours PRN  melatonin 3 milliGRAM(s) Oral at bedtime PRN  oxyCODONE    IR 5 milliGRAM(s) Oral every 6 hours PRN    misoprostol 200 MICROGram(s) Oral <User Schedule>  pantoprazole    Tablet 40 milliGRAM(s) Oral before breakfast  polyethylene glycol 3350 17 Gram(s) Oral daily PRN  senna 2 Tablet(s) Oral at bedtime PRN    atorvastatin 80 milliGRAM(s) Oral at bedtime  levothyroxine 75 MICROGram(s) Oral daily    albumin human 25% IVPB 50 milliLiter(s) IV Intermittent every 6 hours  cholecalciferol 2000 Unit(s) Oral daily  cyanocobalamin 1000 MICROGram(s) Oral daily  multivitamin 1 Tablet(s) Oral daily  mupirocin 2% Ointment 1 Application(s) Topical <User Schedule>  potassium chloride   Powder 40 milliEquivalent(s) Oral once      PAST MEDICAL/SURGICAL HISTORY  PAST MEDICAL & SURGICAL HISTORY:  Endometrial ca  S4 serous endometrial carcinoma, [facility], chemotherapy      HTN (hypertension)      CAD (coronary artery disease)      S/P AVR      Hypothyroidism      HLD (hyperlipidemia)      S/P AVR (aortic valve replacement)      History of endometrial biopsy          SOCIAL HISTORY: Substance Use (street drugs): ( x ) never used  (  ) other:    FAMILY HISTORY:  Family history of parotid cancer (Child)          PHYSICAL EXAM:  T(C): 36.8 (06-26-23 @ 09:10), Max: 36.8 (06-26-23 @ 09:10)  HR: 107 (06-26-23 @ 09:10) (92 - 111)  BP: 98/51 (06-26-23 @ 09:10) (96/60 - 119/68)  RR: 17 (06-26-23 @ 09:10) (17 - 18)  SpO2: 100% (06-26-23 @ 09:10) (98% - 100%)  Wt(kg): --  I&O's Summary    25 Jun 2023 07:01  -  26 Jun 2023 07:00  --------------------------------------------------------  IN: 776 mL / OUT: 610 mL / NET: 166 mL        GENERAL: NAD  EYES: conjunctiva and sclera clear  ENMT: No tonsillar erythema, exudates, or enlargement  Cardiovascular: Normal S1 S2, No JVD, No murmurs, No edema  Respiratory: Decreased b/s b/l   Gastrointestinal:  Soft, Non-tender, + BS	  Extremities: Normal edema                                    7.5    13.84 )-----------( 140      ( 26 Jun 2023 06:20 )             22.8     06-26    129<L>  |  91<L>  |  30<H>  ----------------------------<  100<H>  3.4<L>   |  25  |  1.81<H>    Ca    8.1<L>      26 Jun 2023 06:20  Phos  2.9     06-26  Mg     1.70     06-26      proBNP:   Lipid Profile:   HgA1c:   TSH:     Consultant(s) Notes Reviewed:  [x ] YES  [ ] NO    Care Discussed with Consultants/Other Providers [ x] YES  [ ] NO    Imaging Personally Reviewed independently:  [x] YES  [ ] NO    All labs, radiologic studies, vitals, orders and medications list reviewed. Patient is seen and examined at bedside. Case discussed with medical team.

## 2023-06-26 NOTE — PROGRESS NOTE ADULT - SUBJECTIVE AND OBJECTIVE BOX
Patient is a 88y old  Female who presents with a chief complaint of Generalized weakness (26 Jun 2023 10:27)    Patient seen this morning. She feels okay though states she is tired. Denies chest pain or dyspnea.     MEDICATIONS  (STANDING):  albumin human 25% IVPB 50 milliLiter(s) IV Intermittent every 6 hours  aspirin enteric coated 81 milliGRAM(s) Oral daily  atorvastatin 80 milliGRAM(s) Oral at bedtime  cholecalciferol 2000 Unit(s) Oral daily  cyanocobalamin 1000 MICROGram(s) Oral daily  diltiazem    Tablet 30 milliGRAM(s) Oral every 6 hours  levothyroxine 75 MICROGram(s) Oral daily  loratadine 10 milliGRAM(s) Oral daily  misoprostol 200 MICROGram(s) Oral <User Schedule>  multivitamin 1 Tablet(s) Oral daily  mupirocin 2% Ointment 1 Application(s) Topical <User Schedule>  pantoprazole    Tablet 40 milliGRAM(s) Oral before breakfast  piperacillin/tazobactam IVPB.. 3.375 Gram(s) IV Intermittent every 8 hours  potassium chloride   Powder 40 milliEquivalent(s) Oral once    MEDICATIONS  (PRN):  acetaminophen     Tablet .. 650 milliGRAM(s) Oral every 6 hours PRN Temp greater or equal to 38C (100.4F), Mild Pain (1 - 3)  albuterol    90 MICROgram(s) HFA Inhaler 2 Puff(s) Inhalation every 6 hours PRN Shortness of Breath and/or Wheezing  melatonin 3 milliGRAM(s) Oral at bedtime PRN Sleep  oxyCODONE    IR 5 milliGRAM(s) Oral every 6 hours PRN Moderate Pain (4 - 6)  polyethylene glycol 3350 17 Gram(s) Oral daily PRN for constipation  senna 2 Tablet(s) Oral at bedtime PRN for constipation        Vital Signs Last 24 Hrs  T(C): 36.5 (26 Jun 2023 11:49), Max: 36.8 (26 Jun 2023 09:10)  T(F): 97.7 (26 Jun 2023 11:49), Max: 98.2 (26 Jun 2023 09:10)  HR: 102 (26 Jun 2023 11:49) (92 - 110)  BP: 92/54 (26 Jun 2023 11:49) (92/54 - 119/68)  BP(mean): --  RR: 17 (26 Jun 2023 11:49) (17 - 18)  SpO2: 100% (26 Jun 2023 11:49) (98% - 100%)    Parameters below as of 26 Jun 2023 11:49  Patient On (Oxygen Delivery Method): nasal cannula  O2 Flow (L/min): 2      PE  NAD  Awake, alert  Anicteric, MMM  +nasal cannula   +pigtail catheter draining   No rash grossly                            7.5    13.84 )-----------( 140      ( 26 Jun 2023 06:20 )             22.8       06-26    129<L>  |  91<L>  |  30<H>  ----------------------------<  100<H>  3.4<L>   |  25  |  1.81<H>    Ca    8.1<L>      26 Jun 2023 06:20  Phos  2.9     06-26  Mg     1.70     06-26        ACC: [id number] EXAM:  CT ABDOMEN AND PELVIS   ORDERED BY: LEYLA MYERS     PROCEDURE DATE:  06/26/2023          INTERPRETATION:  CLINICAL INFORMATION: Right hydronephrosis.    COMPARISON: Renal ultrasound 6/24/2023, CT chest 6/21/2023, and CT   abdomen/pelvis 4/23/2020.    CONTRAST/COMPLICATIONS:  IV Contrast: NONE  Oral Contrast: NONE  Complications: None reported at time of study completion    PROCEDURE:  CT of the Abdomen and Pelvis was performed.  Sagittal and coronal reformats were performed.    FINDINGS:  LOWER CHEST: Partially imaged moderate right and small left pleural   effusions with compressive atelectasis in the lower lobes. Nonspecific   groundglass opacities in the right middle lobe. Scattered right middle   lobe nodules measuring up to 4 mm (series 2 image 10), new. Coronary   artery calcifications. TAVR. Nodularity in the posterior medial   subpleural region bilaterally including 1.1 cm on the right (series 2   image 13), possibly metastasis.    LIVER: Low-attenuation lesions throughout the liver measuring up to 4.9 x   3.7 cm in the right hepatic lobe (series 2 image 23) suspicious for   metastasis.  BILE DUCTS: Normal caliber.  GALLBLADDER: Limited secondary to adjacent ascites. Layering high density   in the gallbladder, likely gallstones.  SPLEEN: Within normal limits.  PANCREAS: Limited. No focal lesion.  ADRENALS: Left adrenal gland thickening. Unremarkable right adrenal gland.  KIDNEYS/URETERS: Mild right hydronephrosis. No hydronephrosis on the   left. No urinary tract calculi bilaterally. Evaluation of the right   ureter is limited in the absence of intravenous contrast.    BLADDER: Distended with a normal caliber wall.  REPRODUCTIVE ORGANS: Hysterectomy.    BOWEL: Right hemicolectomy with ileocolic anastomosis in the midabdomen.   No evidence of a bowel obstruction.  PERITONEUM: Moderate amount of abdominal and pelvic ascites with   peritoneal nodularity consistent with carcinomatosis (series 2 image 76,   predominantly anteriorly in the midline and on the left). Hemorrhage   fluid level at the posterior aspect of the spleen (series 2 image 32) and   also likely at the posterior aspect of the liver (series 2 image 25).  VESSELS: Atherosclerotic changes.  RETROPERITONEUM/LYMPH NODES: Limited in the absence of intravenous   contrast; mildly enlarged para-aortic lymph nodes, increased in size   since 4/23/2023 including a left para-aortic rectal measuring 1.2 cm in   short axis (series 2 image 57), previously 0.8 cm. Presacral edema and   fluid.  ABDOMINAL WALL: Anasarca.  BONES: Degenerative changes in the spine.    IMPRESSION:    Hemorrhage fluid level at the posterior aspect of the spleen and likely   at the posterior aspect of the liver with evaluation limited on this   noncontrast study; correlation is recommended with hematocrit levels for   active bleeding.    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    Multiple bilobar liver lesions measuring up to 4.9 x 3.7 cm in the right   hepatic lobe suspicious for metastases.    Partially imaged moderate right and small left pleural effusions.    Right middle lobe pulmonary nodules measuring up to 4 mm, mildly enlarged   para-aortic lymph nodes, and nodularity in the subpleural region;   metastatic disease cannot be excluded.    Mild right-sided hydronephrosis; no urinary tract calculi; evaluation for   the etiology of the hydronephrosis limited on this noncontrast study.    The findings were discussed with JUJU Calire at 9:35 AM on 6/26/2023   with read back confirmation.    --- End of Report ---

## 2023-06-26 NOTE — CONSULT NOTE ADULT - SUBJECTIVE AND OBJECTIVE BOX
SHANI WELCH  [id number] 88yF   --------------------------------------  Patient is a 88y old  Female who presents with a chief complaint of Generalized weakness (25 Jun 2023 18:46)      HPI:  87 yo woman with history of stage 4 serous endometrial carcinoma (dx 5/2022) s/p chemo (last in 12/2022) and debulking surgery with POOL/BSO (2/10/23) c/b b/l pleural effusions (R > L, on 2L NC PRN), RUL segmental PE (8/2022, on Eliquis), GI bleed (4/2023), CAD s/p stent (3/2021), aortic stenosis s/p TAVR, HTN, HLD, and hypothyroidism presents with 3 weeks of worsening generalized weakness. HPI obtained from both pt and her daughter at bedside, also named Shani. Prior to 3 weeks ago, pt was able to ambulate using her rolling walker without any additional assistance and even go down the 5 steps in her house on her own to head out for doctors' appointments. Over the last 3 weeks, however, pt's functional status has been progressively declining, ever since pt got a therapeutic thoracentesis with removal of 1L of pleural fluid in early June at her oncologist's office. Pt is now barely able to get out of bed on most days. Pt states that she got out of bed only once on Monday, and it was to use the bathroom for a BM. Since ~2 weeks ago, pt has also been becoming short of breath with minimal exertion, such as when walking from her bed to the bathroom, and has had to rely on supplemental O2 with 2L NC, which she didn't require for the past several months. Pt denies any recent chest pain, palpitations, lightheadedness, dizziness, headaches, vision changes, abdominal pain, nausea, vomiting, diarrhea, constipation, melena, BRBPR, or dysuria. Pt has chronic b/l LE edema and had b/l venous dopplers this past Friday that were negative for a DVT, though pt and her daughter note that pt's RLE seems more swollen than usual, as it is the LLE that is typically larger in size than the RLE. Pt was recently admitted at [facility], from 4/23-5/4/23, for a GI bleed requiring multiple pRBC transfusions. No clear source of bleed was identified despite pt getting both an enteroscopy and a colonoscopy, although the GI bleed was presumed to be from the distal small bowel. Pt's Eliquis was initially discontinued during the admission but eventually resumed at a lower dose of 2.5 mg BID prior to discharge. Soon after pt's discharge from the hospital, pt had a follow-up visit with her oncologist in which she had a PET/CT that showed recurrence of disease with new liver mets. Pt was being arranged for chemo as outpatient, but workup this past Friday revealed [place] with serum Cr of 1.9 (baseline 0.85-1) and recurrence of the R pleural effusion that was previously drained.     In the ED,   T 98.3-99, HR 82-87, -126/61-64, RR 18-20, SpO2 % RA and 2L NC.  CXR showing moderate to large R pleural effusion. (20 Jun 2023 23:40)      PAST MEDICAL & SURGICAL HISTORY:  Endometrial ca  [place] [facility] [place]      HTN (hypertension)      CAD (coronary artery disease)      S/P AVR      Hypothyroidism      HLD (hyperlipidemia)      S/P AVR (aortic valve replacement)      History of endometrial biopsy          MEDICATIONS  (STANDING):  albumin human 25% IVPB 50 milliLiter(s) IV Intermittent every 6 hours  aspirin enteric coated 81 milliGRAM(s) Oral daily  atorvastatin 80 milliGRAM(s) Oral at bedtime  cholecalciferol 2000 Unit(s) Oral daily  cyanocobalamin 1000 MICROGram(s) Oral daily  diltiazem    Tablet 30 milliGRAM(s) Oral every 6 hours  heparin  Infusion. 700 Unit(s)/Hr (7 mL/Hr) IV Continuous <Continuous>  levothyroxine 75 MICROGram(s) Oral daily  loratadine 10 milliGRAM(s) Oral daily  misoprostol 200 MICROGram(s) Oral <User Schedule>  multivitamin 1 Tablet(s) Oral daily  mupirocin 2% Ointment 1 Application(s) Topical <User Schedule>  pantoprazole    Tablet 40 milliGRAM(s) Oral before breakfast  piperacillin/tazobactam IVPB.. 3.375 Gram(s) IV Intermittent every 8 hours    MEDICATIONS  (PRN):  acetaminophen     Tablet .. 650 milliGRAM(s) Oral every 6 hours PRN Temp greater or equal to 38C (100.4F), Mild Pain (1 - 3)  albuterol    90 MICROgram(s) HFA Inhaler 2 Puff(s) Inhalation every 6 hours PRN Shortness of Breath and/or Wheezing  heparin   Injectable 4500 Unit(s) IV Push every 6 hours PRN For aPTT less than 40  heparin   Injectable 2000 Unit(s) IV Push every 6 hours PRN For aPTT between 40 - 57  melatonin 3 milliGRAM(s) Oral at bedtime PRN Sleep  oxyCODONE    IR 5 milliGRAM(s) Oral every 6 hours PRN Moderate Pain (4 - 6)  polyethylene glycol 3350 17 Gram(s) Oral daily PRN for constipation  senna 2 Tablet(s) Oral at bedtime PRN for constipation      Allergies    No Known Allergies    Intolerances        --------------------------------------  VITALS:    Vital Signs Last 24 Hrs  T(C): 36.7 (25 Jun 2023 18:25), Max: 36.7 (25 Jun 2023 11:19)  T(F): 98 (25 Jun 2023 18:25), Max: 98.1 (25 Jun 2023 11:19)  HR: 106 (25 Jun 2023 18:25) (91 - 111)  BP: 108/61 (25 Jun 2023 18:25) (102/60 - 119/68)  BP(mean): --  RR: 18 (25 Jun 2023 18:25) (18 - 18)  SpO2: 100% (25 Jun 2023 18:25) (98% - 100%)    Parameters below as of 25 Jun 2023 18:25  Patient On (Oxygen Delivery Method): nasal cannula  O2 Flow (L/min): 2      --------------------------------------  LABS:                          8.7    15.75 )-----------( 147      ( 25 Jun 2023 06:18 )             26.7       06-25    x   |  x   |  x   ----------------------------<  x   x    |  x   |  1.89<H>    Ca    7.7<L>      25 Jun 2023 06:18  Phos  3.6     06-25  Mg     1.90     06-25        CAPILLARY BLOOD GLUCOSE          PTT - ( 25 Jun 2023 23:50 )  PTT:75.3 sec    LOWER EXTREMITY PHYSICAL EXAM:    Vascular: DP/PT 1/4, B/L, CFT < 3seconds B/L, Temperature gradient warm to cool, B/L.   Neuro: Epicritic sensation intact to the level of digit , B/L.  Musculoskeletal/Ortho: pain on palpation to hallux medial boarder R foot   Skin: R foot hallux nail medial boarder dry hyperkeratotic skin noted, no drainage, no tracking/ tunnelling/ signs of infection, L foot no acute signs of infection     RADIOLOGY & ADDITIONAL STUDIES:

## 2023-06-26 NOTE — DISCHARGE NOTE PROVIDER - NSDCFUSCHEDAPPT_GEN_ALL_CORE_FT
Peg Gomez  [facility]  [address]  Scheduled Appointment: 06/27/2023    Analia Boyd  [facility] [address]  Scheduled Appointment: 06/27/2023

## 2023-06-26 NOTE — CONSULT NOTE ADULT - ASSESSMENT
88y Female with painful medial boarder to R foot hallux nail    - Patient seen and evaluated  - Afebrile, WBC 15.75  - R foot hallux nail medial boarder dry hyperkeratotic skin noted, no drainage, no tracking/ tunnelling/ signs of infection, L foot no acute signs of infection   - R foot hallux nail medial boarder hyperkeratotic surrounding skin removed aseptically with suture removal kit, applied Alcohol pad, patient states immediate pain relief.  - Mupirocin to R foot hallux nail medial boarder DAILY followed by [facility]   - Podiatry stable for discharge, follow up info in Discharge provider note.     While in house   - STRICT decubitus precautions, Z- FLOWS boots at all time when in bed and in chair resting.   - Discussed with attending. 88y Female with painful medial boarder to R foot hallux nail    - Patient seen and evaluated  - Afebrile, WBC 15.75  - R foot hallux nail medial boarder dry hyperkeratotic skin noted, no drainage, no tracking/ tunnelling/ signs of infection, L foot no acute signs of infection   - R foot hallux nail medial boarder hyperkeratotic surrounding skin excisionally debrided to the level of skin and not beyond aseptically with sterile suture removal kit, applied Alcohol pad before / afterwards, patient states immediate pain relief.  - Mupirocin to R foot hallux nail medial boarder DAILY followed by [facility]   - Podiatry stable for discharge, follow up info in Discharge provider note.   - reconsult PRN for any foot wounds and symptoms.   - STRICT decubitus precautions, Z- FLOWS boots at all time when in bed and in chair resting.   - Discussed with attending.

## 2023-06-26 NOTE — CONSULT NOTE ADULT - PROBLEM SELECTOR RECOMMENDATION 4
Pt indicates that this symptom along with anxiety bothers her the most  Likely multifactorial acute medical illness + malignancy  Discussed with pt that we'll await for medical stabilization of her acute medical conditions and if lack of appetite persists despite this then we'll discuss options to address lack of appetite.

## 2023-06-26 NOTE — PROGRESS NOTE ADULT - PROBLEM SELECTOR PLAN 6
Pt with history of stage 4 serous endometrial carcinoma (dx 5/2022) s/p chemo (last in 12/2022) and debulking surgery with POOL/BSO (2/10/23), now recently found to have recurrence of disease with new liver mets.  - [place] [facility] [place]   - GOC discussion with pt and family given likely poor prognosis

## 2023-06-26 NOTE — PROGRESS NOTE ADULT - SUBJECTIVE AND OBJECTIVE BOX
SUBJECTIVE / OVERNIGHT EVENTS:pt seen and examined  06-26-23     MEDICATIONS  (STANDING):  albumin human 25% IVPB 50 milliLiter(s) IV Intermittent every 6 hours  ALPRAZolam 0.25 milliGRAM(s) Oral every 8 hours  aspirin enteric coated 81 milliGRAM(s) Oral daily  atorvastatin 80 milliGRAM(s) Oral at bedtime  cholecalciferol 2000 Unit(s) Oral daily  cyanocobalamin 1000 MICROGram(s) Oral daily  diltiazem    Tablet 30 milliGRAM(s) Oral every 6 hours  levothyroxine 75 MICROGram(s) Oral daily  loratadine 10 milliGRAM(s) Oral daily  misoprostol 200 MICROGram(s) Oral <User Schedule>  multivitamin 1 Tablet(s) Oral daily  mupirocin 2% Ointment 1 Application(s) Topical <User Schedule>  pantoprazole    Tablet 40 milliGRAM(s) Oral before breakfast  piperacillin/tazobactam IVPB.. 3.375 Gram(s) IV Intermittent every 8 hours    MEDICATIONS  (PRN):  acetaminophen     Tablet .. 650 milliGRAM(s) Oral every 6 hours PRN Temp greater or equal to 38C (100.4F), Mild Pain (1 - 3)  albuterol    90 MICROgram(s) HFA Inhaler 2 Puff(s) Inhalation every 6 hours PRN Shortness of Breath and/or Wheezing  melatonin 3 milliGRAM(s) Oral at bedtime PRN Sleep  oxyCODONE    IR 5 milliGRAM(s) Oral every 6 hours PRN Moderate Pain (4 - 6)  polyethylene glycol 3350 17 Gram(s) Oral daily PRN for constipation  senna 2 Tablet(s) Oral at bedtime PRN for constipation    Vital Signs Last 24 Hrs  T(C): 36.8 (06-26-23 @ 18:20), Max: 36.8 (06-26-23 @ 09:10)  T(F): 98.2 (06-26-23 @ 18:20), Max: 98.2 (06-26-23 @ 09:10)  HR: 109 (06-26-23 @ 18:20) (92 - 109)  BP: 103/64 (06-26-23 @ 18:20) (92/54 - 116/63)  BP(mean): --  RR: 17 (06-26-23 @ 18:20) (17 - 17)  SpO2: 100% (06-26-23 @ 18:20) (98% - 100%)            Constitutional: No fever, fatigue  Skin: No rash.  Eyes: No recent vision problems or eye pain.  ENT: No congestion, ear pain, or sore throat.  Cardiovascular: No chest pain or palpation.  Respiratory: No cough, shortness of breath, congestion, or wheezing.  Gastrointestinal: No abdominal pain, nausea, vomiting, or diarrhea.  Genitourinary: No dysuria.  Musculoskeletal: No joint swelling.  Neurologic: No headache.    PHYSICAL EXAM:  GENERAL: NAD  EYES: EOMI, PERRLA  NECK: Supple, No JVD  CHEST/LUNG: dec breath sounds at bases  HEART:  S1 , S2 +  ABDOMEN: soft , bs+  EXTREMITIES:  edema+  NEUROLOGY:alert awake    LABS:  06-26    129<L>  |  91<L>  |  30<H>  ----------------------------<  100<H>  3.4<L>   |  25  |  1.81<H>    Ca    8.1<L>      26 Jun 2023 06:20  Phos  2.9     06-26  Mg     1.70     06-26      Creatinine Trend: 1.81 <--, 1.89 <--, 1.92 <--, 1.87 <--, 1.65 <--, 1.23 <--, 1.21 <--, 1.32 <--, 1.38 <--                        7.5    13.84 )-----------( 140      ( 26 Jun 2023 06:20 )             22.8     Urine Studies:  Urinalysis Basic - ( 26 Jun 2023 06:20 )    Color:  / Appearance:  / SG:  / pH:   Gluc: 100 mg/dL / Ketone:   / Bili:  / Urobili:    Blood:  / Protein:  / Nitrite:    Leuk Esterase:  / RBC:  / WBC    Sq Epi:  / Non Sq Epi:  / Bacteria:       Sodium, Random Urine: <20 mmol/L (06-26 @ 00:01)  Creatinine, Random Urine: 118 mg/dL (06-26 @ 00:01)  Protein/Creatinine Ratio Calculation: 1.2 Ratio (06-26 @ 00:01)            PTT - ( 26 Jun 2023 06:20 )  PTT:64.1 sec  Consultant(s) Notes Reviewed:  yes    Care Discussed with Consultants/Other Providers:yes

## 2023-06-26 NOTE — CONSULT NOTE ADULT - PROBLEM SELECTOR RECOMMENDATION 5
Pt reports feeling nervous about the future   Indicates that she has had anxiety in the past, however not at this level  Support provided  Referral to chaplaincy given Yarsani background  Discussed option of using BZD low dose to address acute episodes of anxiety  Would recommend:  - xanax 0.25mg PO q8hrs PRN

## 2023-06-26 NOTE — CONSULT NOTE ADULT - PROBLEM SELECTOR RECOMMENDATION 9
Initially dx in 5/2022 s/p chemo and debuling sx with POOL on 2/10/23  Now with findings of recurrence and progression of disease  Following with [place]. Alvarado at [facility] [place]  Pt understands current medical events  Is concerned about her future  Oncology following and pending input
Pt with large right effusion  Discussed with DR. Boyd  Will plan for RT VATS/Pleurx placement on Friday 6/23  Please cont to hold Eliquis  Heparin gtt will have to be held 6hrs prior to OR  Please medically optimize and clear prior to OR  Will continue to follow  Pt/daughter in agreement to plan  Discussed w team.

## 2023-06-26 NOTE — PROGRESS NOTE ADULT - ASSESSMENT
87 yo woman with history of stage 4 serous endometrial carcinoma (dx 5/2022) s/p chemo (last in 12/2022) and debulking surgery with POOL/BSO (2/10/23) c/b b/l pleural effusions (R > L, on 2L NC PRN), RUL segmental PE (8/2022, on Eliquis), GI bleed (4/2023), CAD s/p stent (3/2021), aortic stenosis s/p TAVR, HTN, HLD, and hypothyroidism presents with 3 weeks of worsening generalized weakness.     EKG: MAT   Tele: MAT    1) Multifocal Atrial Tachycardia   -on a/c 2/2 hx of PE  -started Dilt 30 mg po q6 hrs, BP soft  -continue to monitor on tele     2) Pleural effusion  -s/p Pig tail   -plan for VATS  -f/u pulm recs     3) hx of PE  - RUL segmental PE (8/2022  -eliquis on hold currently on hep gtt

## 2023-06-26 NOTE — CONSULT NOTE ADULT - PROBLEM SELECTOR RECOMMENDATION 3
Likely multifactorial pre-renal + CT abdomen showing mild hydro ?2/2 malignancy  Nephrology following

## 2023-06-26 NOTE — DISCHARGE NOTE PROVIDER - NSDCMRMEDTOKEN_GEN_ALL_CORE_FT
alendronate 70 mg oral tablet: 1 tab(s) orally once a week  apixaban 2.5 mg oral tablet: 1 tab(s) orally 2 times a day  aspirin 81 mg oral delayed release tablet: 1 tab(s) orally once a day  cetirizine 10 mg oral tablet: 1 orally once a day  ezetimibe 10 mg oral tablet: 1 tab(s) orally once a day  famotidine 40 mg oral tablet: 1 tab(s) orally once a day (at bedtime)  furosemide 20 mg oral tablet: 1 tab(s) orally every 12 hours  levothyroxine 75 mcg (0.075 mg) oral tablet: 1 tab(s) orally once a day  melatonin 3 mg oral tablet: 1 tab(s) orally once a day (at bedtime)  miSOPROStol 200 mcg oral tablet: 1 tab(s) orally once a day  One A Day Women&#x27;s Complete oral tablet: 1 tab(s) orally once a day  pantoprazole 40 mg oral delayed release tablet: 1 tab(s) orally once a day  polyethylene glycol 3350 oral powder for reconstitution: 17 gram(s) orally once a day as needed for  constipation  rosuvastatin 20 mg oral tablet: 1 tab(s) orally once a day  senna leaf extract oral tablet: 2 tab(s) orally once a day (at bedtime) as needed for  constipation  Ventolin HFA 90 mcg/inh inhalation aerosol: 2 puff(s) inhaled every 6 hours, As Needed  Vitamin B12: 5000 microgram(s) sublingual once a day  Vitamin D3 50 mcg (2000 intl units) oral tablet: 1 tab(s) orally once a day

## 2023-06-26 NOTE — CONSULT NOTE ADULT - PROBLEM SELECTOR RECOMMENDATION 2
On oxygen now  Likely multifactorial  Discussed with primary team   Pt indicates that dyspnea improved after oxygen supplementation

## 2023-06-26 NOTE — CONSULT NOTE ADULT - CONVERSATION DETAILS
Met with pt at bedside, she shared that she has been overwhelmed with all the new medical information being given to her. She shared that the drs told her "the cancer has spread". This is new information for her and unexpected as she thought with her previous tx she would be cured. Her priority is to feel better, stronger. What bothers her most is her inability to eat and feel full quickly and her anxiety. She has not had a peaceful moment since she found out the news about the cancer.  Support provided, offered chaplaincy services given her [employer] background, pt was appreciative, referral made.

## 2023-06-26 NOTE — CONSULT NOTE ADULT - PROBLEM SELECTOR RECOMMENDATION 6
PPSV 40%  Needs assistance with most ADLs  Skin care  Frequent repositioning  High risk of further decline given comorbidities

## 2023-06-26 NOTE — PROGRESS NOTE ADULT - ASSESSMENT
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    1. Uterine carcinosarcoma   -- s/p neoadjuvant chemotherapy completed 12/2022, s/p POOL-BSO 02/10/2023   -- Recent imaging suggests disease recurrence- planned to begin carboplatin as an outpatient   -- No systemic treatment while admitted  -- Follow up with Dr. Alvarado at [facility] after discharge     2. Dyspnea on exertion   -- CTA chest w/o PE; + large right pleural effusion   -- LE duplex without DVT   -- Thoracic consulted, plan for R VATS, R pleurX catheter placement - OR postponed to Tuesday due to fevers. Pigtail placed for temporary draining- f/u cytology   -- Eliquis held, currently on heparin gtt with anticipated procedures   -- Continue supplemental O2 as needed, wean as tolerated     3. Anemia, thrombocytopenia   -- Chronic, intermittent- stable counts compared to admission from 04/2023   -- Likely secondary to malignancy, chronic disease, acute illness   -- No evidence of iron, B12, or folate deficiencies. Ddimer elevated but fibrinogen wnl. LDH elevated w/ normal hapto  -- Monitor CBC and transfuse to maintain hg >7, platelet count >10K, >15K if febrile, or >50K if bleeding   -- hg lower today, continue to monitor and transfuse as needed     4. Fever   -- has been afebrile, cultures NGTD, on empiric Zosyn   -- ID following, no clear source of fever     Will continue to follow.    Twyla Pitts PA-C  Hematology/Oncology  [facility]  [phone] (office)  [phone] (alt office)  Evenings and weekends please call MD on call or office

## 2023-06-26 NOTE — PROGRESS NOTE ADULT - SUBJECTIVE AND OBJECTIVE BOX
patient was on schedule for tomorrow for pleurx catheter insertion - patient noted to have drop in h/h.   scan of abdomen demonstrated possible bleeding around spleen/liver.   would defer placement of pleurx for now until medically stable. consider goals of care discussion.   discussed with daughter over phone - patient was diagnosed with stage IV endometrial ca last year - showing disease throughout liver/carcinomatosis.

## 2023-06-26 NOTE — DISCHARGE NOTE PROVIDER - DETAILS OF MALNUTRITION DIAGNOSIS/DIAGNOSES
This patient has been assessed with a concern for Malnutrition and was treated during this hospitalization for the following Nutrition diagnosis/diagnoses:     -  06/23/2023: Severe protein-calorie malnutrition

## 2023-06-26 NOTE — CONSULT NOTE ADULT - PROBLEM/RECOMMENDATION-1
Mother states her daughter's pcp prescribe the prescription. JOSLYN Lang RN  
DISPLAY PLAN FREE TEXT
DISPLAY PLAN FREE TEXT

## 2023-06-26 NOTE — PROGRESS NOTE ADULT - SUBJECTIVE AND OBJECTIVE BOX
[facility] NEPHROLOGY- PROGRESS NOTE    88y Female with history of endometrial carcinoma presents with weakness. Nephrology consulted for elevated Scr.    REVIEW OF SYSTEMS:  Gen: no fevers  Cards: no chest pain  Resp: no dyspnea  GI: no nausea or vomiting or diarrhea  Vascular: + LE edema    No Known Allergies      Hospital Medications: Medications reviewed    VITALS:  T(F): 97.7 (06-26-23 @ 11:49), Max: 98.2 (06-26-23 @ 09:10)  HR: 102 (06-26-23 @ 11:49)  BP: 92/54 (06-26-23 @ 11:49)  RR: 17 (06-26-23 @ 11:49)  SpO2: 100% (06-26-23 @ 11:49)  Wt(kg): --  Height (cm): 144.8 (06-20 @ 11:38)  Weight (kg): 60 (06-21 @ 06:16)  BMI (kg/m2): 28.6 (06-21 @ 06:16)  BSA (m2): 1.51 (06-21 @ 06:16)    06-25 @ 07:01  -  06-26 @ 07:00  --------------------------------------------------------  IN: 776 mL / OUT: 610 mL / NET: 166 mL        PHYSICAL EXAM:    Gen: NAD, calm  Cards: RRR, +S1/S2, no M/G/R  Resp: Decreased BS @ R base, + R CT  GI: soft, NT/ND, NABS  Vascular: 1+ LE edema B/L    LABS:  06-26    129<L>  |  91<L>  |  30<H>  ----------------------------<  100<H>  3.4<L>   |  25  |  1.81<H>    Ca    8.1<L>      26 Jun 2023 06:20  Phos  2.9     06-26  Mg     1.70     06-26      Creatinine Trend: 1.81 <--, 1.89 <--, 1.92 <--, 1.87 <--, 1.65 <--, 1.23 <--, 1.21 <--, 1.32 <--, 1.38 <--                        7.5    13.84 )-----------( 140      ( 26 Jun 2023 06:20 )             22.8     Urine Studies:  Urinalysis Basic - ( 26 Jun 2023 06:20 )    Color:  / Appearance:  / SG:  / pH:   Gluc: 100 mg/dL / Ketone:   / Bili:  / Urobili:    Blood:  / Protein:  / Nitrite:    Leuk Esterase:  / RBC:  / WBC    Sq Epi:  / Non Sq Epi:  / Bacteria:       Sodium, Random Urine: <20 mmol/L (06-26 @ 00:01)  Creatinine, Random Urine: 118 mg/dL (06-26 @ 00:01)  Protein/Creatinine Ratio Calculation: 1.2 Ratio (06-26 @ 00:01)      RADIOLOGY & ADDITIONAL STUDIES:    < from: CT Abdomen and Pelvis No Cont (06.26.23 @ 08:40) >  IMPRESSION:    Hemorrhage fluid level at the posterior aspect of the spleen and likely   at the posterior aspect of the liver with evaluation limited on this   noncontrast study; correlation is recommended with hematocrit levels for   active bleeding.    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    Multiple bilobar liver lesions measuring up to 4.9 x 3.7 cm in the right   hepatic lobe suspicious for metastases.    Partially imaged moderate right and small left pleural effusions.    Right middle lobe pulmonary nodules measuring up to 4 mm, mildly enlarged   para-aortic lymph nodes, and nodularity in the subpleural region;   metastatic disease cannot be excluded.    Mild right-sided hydronephrosis; no urinary tract calculi; evaluation for   the etiology of the hydronephrosis limited on this noncontrast study.    The findings were discussed with JUJU Claire at 9:35 AM on 6/26/2023   with read back confirmation.    --- End of Report ---    < end of copied text >

## 2023-06-26 NOTE — PROGRESS NOTE ADULT - ASSESSMENT
88y Female with history of endometrial carcinoma presents with weakness. Nephrology consulted for elevated Scr.    1) [place]: likely hemodynamically mediated in setting of relative hypotension/tachycardia and hypoalbuminemia. Scr improving on IV albumin. UA bland. FeNa low. Renal US and CT with mild R hydronephrosis suspect due to underlying CA. Patient will need outpatient Urology monitoring and if no improvement with chemotherapy may need R NT. TMA work up negative. Avoid nephrotoxins. Monitor electrolytes.    2) HTN: BP low normal with tachycardia. As per cardiology.    3) LE edema: Due to hypoalbuminemia from malnutrition (patient without nephrotic range proteinuria). Continue with protein supplements. Can give lasix 40 mg IV X 1 dose if patient develops dyspnea with IV albumin. TTE with normal LVSF. Monitor UO.    4) Hyponatremia: Mild. Start 1L FR. Continue with Zosyn in NS base. Monitor serum Na.      [facility] NEPHROLOGY  Brennon Cates M.D.  Addison Nieto D.O.  Guerline Dias M.D.  Merissa Parsons, MSN, ANP-C    Telephone: [phone]  Facsimile: [phone]    [address] [place] [address]

## 2023-06-26 NOTE — DISCHARGE NOTE PROVIDER - EXTENDED VTE YES NO FOR MLM ENOXAPARIN
Physical Therapy Visit    Visit Type: Daily Treatment Note  Visit: 3  Referring Provider: Ana Paula Brunner MD  Medical Diagnosis (from order): Diagnosis Information    Diagnosis  799.3 (ICD-9-CM) - R53.81 (ICD-10-CM) - Physical deconditioning         SUBJECTIVE                                                                                                               -patient reports feeling good after session earlier this week  -sore yesterday and more today, but feels that is to be expected  Functional Change: -sore yesterday and more today, but feels that is to be expected      OBJECTIVE                                                                                                                                       Treatment     Therapeutic Exercise  -Nu-Step (level 5) x 5 minutes 422 steps, 9 RPE (short rest break at 2, 3, 4 minutes mostly due to cramping and soreness in his legs)  -UBE (level 2.0) x3 minutes each forward and backwards, 7 RPE  -Recumbent Bike (level 2.2) x 6 minutes, 1.4 miles, 5 RPE  -Leg Press 70# 2 x10  -Single Leg Press 40# x10 BLE  -standing marching 2 x 20  -sit to stand (yellow loop at knees) 2 x 10  -standing hip extension (yellow loop at knees) with UE support 2 x 10 BLE  -standing hip abduction (yellow loop at knees) with UE support 2 x10 BLE  -lateral walk (yellow loop at ankles) 2 x 25 feet each direction  -monster walk (yellow loop at ankles) 2 x 25 feet   -retro monster walk (yellow loop at ankles) 2 x 25 feet    Therapeutic Activity        Skilled input: verbal instruction/cues, as detailed above and posture correction    Writer verbally educated and received verbal consent for hand placement, positioning of patient, and techniques to be performed today from patient for therapist position for techniques and hand placement and palpation for techniques as described above and how they are pertinent to the patient's plan of care.    Home Exercise Program  Access Code:  [email or web address]  URL: [email or web address]  Date: 10/19/2022  Prepared by: Sheree Bryant    Exercises  · Standing Hip Extension with Counter Support - 2 x daily - 7 x weekly - 2 sets - 10 reps  · Sit to Stand - 2 x daily - 7 x weekly - 3 sets - 10 reps  · Standing March with Counter Support - 2 x daily - 7 x weekly - 3 sets - 20 reps  · Standing Hip Abduction with Counter Support - 1 x daily - 7 x weekly - 2 sets - 20 reps  · Side Stepping with Resistance at Ankles and Counter Support - 1 x daily - 7 x weekly - 3 sets - 10 reps      ASSESSMENT                                                                                                            Patient presents with reports of soreness. Patient is compliant with HEP. Session consisted of alternating between cardio and strength exercises; on cardio exercises of 5-6 minutes, patient reports RPE of 6-9/10. On leg press, patient needs verbal cues for eccentric control. Added lateral walks with band to HEP. Overall fatigue from session was 6/10. Patient will continue to benefit from skilled intervention to allow for return to PLOF.    Patient Education:   Results of above outlined education: Demonstrates understanding    PLAN                                                                                                                           Suggestions for next session as indicated: Progress per plan of care, Nu Step/bike/UBE, alternate cardio with strength, lateral walk with red band, w step leg press & hip abduction (increase weight 5-10#), multi-hip extension, step ups       Therapy procedure time and total treatment time can be found documented on the Time Entry flowsheet     ,

## 2023-06-26 NOTE — CONSULT NOTE ADULT - ASSESSMENT
87 yo woman with history of stage 4 serous endometrial carcinoma (dx 5/2022) s/p chemo (last in 12/2022) and debulking surgery with POOL/BSO (2/10/23) c/b b/l pleural effusions (R > L, on 2L NC PRN), RUL segmental PE (8/2022, on Eliquis), GI bleed (4/2023), CAD s/p stent (3/2021), aortic stenosis s/p TAVR, HTN, HLD, and hypothyroidism presents with 3 weeks of worsening generalized weakness. Palliative Care consulted for complex decision making and symptom management in the setting of advanced illness.

## 2023-06-26 NOTE — PROGRESS NOTE ADULT - ASSESSMENT
88 F with stage 4 serous endometrial Ca diagnosed in 5/2022 s/p chemo last in 12/2022, s/p POOL/ BSO 2/2023 complicated with bilateral pleural effusion R>L on 2L NC, PE, GIB, CAD s/p stent, aortic stenosis s/p TAVR, HTN, HLD, hypothyroidism, presenting with generalized weakness.     Recent PET/CT with recurrence of disease, new liver mets   Fevers, leukocytosis, [place]   CTA chest with no PE, but with large right and trace left pleural effusions, increased on the right and decrease on the left when compared to prior exam, anasarca.  R chest tube in place 6/23, 1.1L serosanguineous fluid removed - cultures remain neg    CT A/P with hemorrhage fluid level at the posterior aspect of the spleen and likely   at the posterior aspect of the liver concerning for active bleeding. Peroneal carcinomatosis with a moderate amount of abdominal and pelvic ascites. Multiple bilobar liver lesions measuring up to 4.9 x 3.7 cm in the right hepatic lobe suspicious for metastases.  Partially imaged moderate right and small left pleural effusions.    Right middle lobe pulmonary nodules measuring up to 4 mm, mildly enlarged   para-aortic lymph nodes, and nodularity in the subpleural region;   metastatic disease cannot be excluded.     Recommend:  -Suspect from underlying metastatic malignancy vs bleed  -F/u R pleural effusion fluid cx/ path   -Continue Zosyn for now  -Monitor fever curve  -Trend WBC  -Onc following   -Trend Hgb/ Hct - possible hemorrhage fluid on CT - vascular evaluation    Pop Chappell MD  Available through MS Teams  If no response, or after 5pm/weekends, call [phone]       88 F with stage 4 serous endometrial Ca diagnosed in 5/2022 s/p chemo last in 12/2022, s/p POOL/ BSO 2/2023 complicated with bilateral pleural effusion R>L on 2L NC, PE, GIB, CAD s/p stent, aortic stenosis s/p TAVR, HTN, HLD, hypothyroidism, presenting with generalized weakness.     Recent PET/CT with recurrence of disease, new liver mets   Fevers, leukocytosis, [place]   CTA chest with no PE, but with large right and trace left pleural effusions, increased on the right and decrease on the left when compared to prior exam, anasarca.  R chest tube in place 6/23, 1.1L serosanguineous fluid removed - cultures remain neg    CT A/P with hemorrhage fluid level at the posterior aspect of the spleen and likely   at the posterior aspect of the liver concerning for active bleeding. Peroneal carcinomatosis with a moderate amount of abdominal and pelvic ascites. Multiple bilobar liver lesions measuring up to 4.9 x 3.7 cm in the right hepatic lobe suspicious for metastases.  Partially imaged moderate right and small left pleural effusions.    Right middle lobe pulmonary nodules measuring up to 4 mm, mildly enlarged   para-aortic lymph nodes, and nodularity in the subpleural region;   metastatic disease cannot be excluded.     Recommend:  -Suspect from underlying metastatic malignancy vs bleed  -F/u R pleural effusion fluid cx/ path   -Continue Zosyn for now  -Monitor fever curve  -Trend WBC  -Onc following   -Trend Hgb/ Hct - possible hemorrhage fluid on CT - if continues to drop consider vascular evaluation    Pop Chappell MD  Available through MS Teams  If no response, or after 5pm/weekends, call [phone]    Plan discussed with primary team.

## 2023-06-26 NOTE — DISCHARGE NOTE PROVIDER - CARE PROVIDER_API CALL
Moi Mayer  Podiatric Medicine and Surgery  [address] [place] [address]  Phone: [phone]  Fax: [phone]  Follow Up Time:

## 2023-06-27 NOTE — PROGRESS NOTE ADULT - SUBJECTIVE AND OBJECTIVE BOX
SUBJECTIVE / OVERNIGHT EVENTS:pt seen and examined  06-27-23   MEDICATIONS  (STANDING):  albumin human 25% IVPB 50 milliLiter(s) IV Intermittent every 6 hours  ALPRAZolam 0.25 milliGRAM(s) Oral every 8 hours  atorvastatin 80 milliGRAM(s) Oral at bedtime  cholecalciferol 2000 Unit(s) Oral daily  cyanocobalamin 1000 MICROGram(s) Oral daily  diltiazem    Tablet 30 milliGRAM(s) Oral every 6 hours  levothyroxine 75 MICROGram(s) Oral daily  loratadine 10 milliGRAM(s) Oral daily  misoprostol 200 MICROGram(s) Oral <User Schedule>  multivitamin 1 Tablet(s) Oral daily  mupirocin 2% Ointment 1 Application(s) Topical <User Schedule>  pantoprazole    Tablet 40 milliGRAM(s) Oral before breakfast  piperacillin/tazobactam IVPB.. 3.375 Gram(s) IV Intermittent every 8 hours    MEDICATIONS  (PRN):  acetaminophen     Tablet .. 650 milliGRAM(s) Oral every 6 hours PRN Temp greater or equal to 38C (100.4F), Mild Pain (1 - 3)  albuterol    90 MICROgram(s) HFA Inhaler 2 Puff(s) Inhalation every 6 hours PRN Shortness of Breath and/or Wheezing  melatonin 3 milliGRAM(s) Oral at bedtime PRN Sleep  oxyCODONE    IR 5 milliGRAM(s) Oral every 6 hours PRN Moderate Pain (4 - 6)  polyethylene glycol 3350 17 Gram(s) Oral daily PRN for constipation  senna 2 Tablet(s) Oral at bedtime PRN for constipation    Vital Signs Last 24 Hrs  T(C): 36.8 (06-27-23 @ 21:21), Max: 37.4 (06-27-23 @ 09:30)  T(F): 98.2 (06-27-23 @ 21:21), Max: 99.4 (06-27-23 @ 11:48)  HR: 100 (06-27-23 @ 21:21) (89 - 112)  BP: 106/52 (06-27-23 @ 21:21) (90/45 - 106/52)  BP(mean): --  RR: 18 (06-27-23 @ 21:21) (17 - 18)  SpO2: 98% (06-27-23 @ 21:21) (98% - 100%)          Constitutional: No fever, fatigue  Skin: No rash.  Eyes: No recent vision problems or eye pain.  ENT: No congestion, ear pain, or sore throat.  Cardiovascular: No chest pain or palpation.  Respiratory: No cough, shortness of breath, congestion, or wheezing.  Gastrointestinal: No abdominal pain, nausea, vomiting, or diarrhea.  Genitourinary: No dysuria.  Musculoskeletal: No joint swelling.  Neurologic: No headache.    PHYSICAL EXAM:  GENERAL: NAD  EYES: EOMI, PERRLA  NECK: Supple, No JVD  CHEST/LUNG: dec breath sounds at bases  chest tube draining +  HEART:  S1 , S2 +  ABDOMEN: soft , bs+  EXTREMITIES:  edema+  NEUROLOGY:alert awake    LABS:  06-27    133<L>  |  94<L>  |  24<H>  ----------------------------<  87  3.6   |  27  |  1.47<H>    Ca    7.8<L>      27 Jun 2023 05:16  Phos  2.9     06-26  Mg     1.70     06-26      Creatinine Trend: 1.47 <--, 1.81 <--, 1.89 <--, 1.92 <--, 1.87 <--, 1.65 <--, 1.23 <--, 1.21 <--, 1.32 <--                        7.3    14.18 )-----------( 128      ( 27 Jun 2023 19:50 )             21.8     Urine Studies:  Urinalysis Basic - ( 27 Jun 2023 05:16 )    Color:  / Appearance:  / SG:  / pH:   Gluc: 87 mg/dL / Ketone:   / Bili:  / Urobili:    Blood:  / Protein:  / Nitrite:    Leuk Esterase:  / RBC:  / WBC    Sq Epi:  / Non Sq Epi:  / Bacteria:       Sodium, Random Urine: <20 mmol/L (06-26 @ 00:01)  Creatinine, Random Urine: 118 mg/dL (06-26 @ 00:01)  Protein/Creatinine Ratio Calculation: 1.2 Ratio (06-26 @ 00:01)            PTT - ( 27 Jun 2023 05:16 )  PTT:40.9 sec

## 2023-06-27 NOTE — PROGRESS NOTE ADULT - SUBJECTIVE AND OBJECTIVE BOX
Subjective: pt being evaluated by surgery for retroperitoneal hematoma    Vital Signs:  Vital Signs Last 24 Hrs  T(C): 36.6 (06-27-23 @ 16:47), Max: 37.4 (06-27-23 @ 09:30)  T(F): 97.8 (06-27-23 @ 16:47), Max: 99.4 (06-27-23 @ 11:48)  HR: 111 (06-27-23 @ 16:47) (89 - 112)  BP: 100/65 (06-27-23 @ 16:47) (90/45 - 103/64)  RR: 18 (06-27-23 @ 16:47) (17 - 18)  SpO2: 100% (06-27-23 @ 16:47) (98% - 100%) on (O2)    PE  awake and alert  weak, in bed  R PTC ws with minimal drainage no air leak                        7.0    12.14 )-----------( 132      ( 27 Jun 2023 07:31 )             21.0     06-27    133<L>  |  94<L>  |  24<H>  ----------------------------<  87  3.6   |  27  |  1.47<H>    Ca    7.8<L>      27 Jun 2023 05:16  Phos  2.9     06-26  Mg     1.70     06-26      PTT - ( 27 Jun 2023 05:16 )  PTT:40.9 sec  MEDICATIONS  (STANDING):  albumin human 25% IVPB 50 milliLiter(s) IV Intermittent every 6 hours  ALPRAZolam 0.25 milliGRAM(s) Oral every 8 hours  aspirin enteric coated 81 milliGRAM(s) Oral daily  atorvastatin 80 milliGRAM(s) Oral at bedtime  cholecalciferol 2000 Unit(s) Oral daily  cyanocobalamin 1000 MICROGram(s) Oral daily  diltiazem    Tablet 30 milliGRAM(s) Oral every 6 hours  levothyroxine 75 MICROGram(s) Oral daily  loratadine 10 milliGRAM(s) Oral daily  misoprostol 200 MICROGram(s) Oral <User Schedule>  multivitamin 1 Tablet(s) Oral daily  mupirocin 2% Ointment 1 Application(s) Topical <User Schedule>  pantoprazole    Tablet 40 milliGRAM(s) Oral before breakfast  piperacillin/tazobactam IVPB.. 3.375 Gram(s) IV Intermittent every 8 hours    MEDICATIONS  (PRN):  acetaminophen     Tablet .. 650 milliGRAM(s) Oral every 6 hours PRN Temp greater or equal to 38C (100.4F), Mild Pain (1 - 3)  albuterol    90 MICROgram(s) HFA Inhaler 2 Puff(s) Inhalation every 6 hours PRN Shortness of Breath and/or Wheezing  melatonin 3 milliGRAM(s) Oral at bedtime PRN Sleep  oxyCODONE    IR 5 milliGRAM(s) Oral every 6 hours PRN Moderate Pain (4 - 6)  polyethylene glycol 3350 17 Gram(s) Oral daily PRN for constipation  senna 2 Tablet(s) Oral at bedtime PRN for constipation    Pertinent Physical Exam  I&O's Summary    26 Jun 2023 07:01 - 27 Jun 2023 07:00  --------------------------------------------------------  IN: 750 mL / OUT: 870 mL / NET: -120 mL    27 Jun 2023 07:01  -  27 Jun 2023 17:33  --------------------------------------------------------  IN: 420 mL / OUT: 400 mL / NET: 20 mL        Assessment  88y Female  w/ PAST MEDICAL & SURGICAL HISTORY:  Endometrial ca  S4 serous endometrial carcinoma, [facility], chemotherapy      HTN (hypertension)      CAD (coronary artery disease)      S/P AVR      Hypothyroidism      HLD (hyperlipidemia)      S/P AVR (aortic valve replacement)      History of endometrial biopsy      Thoracic surgery consulted to evaluate Right pleural effusion    R PTC on waterseal        PLAN  no plan for thoracic surgery intervention at this time  continue PTC to waterseal documenting output  f/u general surgery consult  f/u palliative care consult  will continue to follow    Megan MATUTE [id number]

## 2023-06-27 NOTE — PROGRESS NOTE ADULT - SUBJECTIVE AND OBJECTIVE BOX
Rony Mcgarry MD  Interventional Cardiology / Advance Heart Failure and Cardiac Transplant Specialist  [place] Office : [phone] [place] [id number]  Tel:   [place] Office : [address] [facility] [address]  Tel: [phone]      Subjective/Overnight events: Pt is lying in bed somnolent  	  MEDICATIONS:  aspirin enteric coated 81 milliGRAM(s) Oral daily  diltiazem    Tablet 30 milliGRAM(s) Oral every 6 hours    piperacillin/tazobactam IVPB.. 3.375 Gram(s) IV Intermittent every 8 hours    albuterol    90 MICROgram(s) HFA Inhaler 2 Puff(s) Inhalation every 6 hours PRN  loratadine 10 milliGRAM(s) Oral daily    acetaminophen     Tablet .. 650 milliGRAM(s) Oral every 6 hours PRN  ALPRAZolam 0.25 milliGRAM(s) Oral every 8 hours  melatonin 3 milliGRAM(s) Oral at bedtime PRN  oxyCODONE    IR 5 milliGRAM(s) Oral every 6 hours PRN    misoprostol 200 MICROGram(s) Oral <User Schedule>  pantoprazole    Tablet 40 milliGRAM(s) Oral before breakfast  polyethylene glycol 3350 17 Gram(s) Oral daily PRN  senna 2 Tablet(s) Oral at bedtime PRN    atorvastatin 80 milliGRAM(s) Oral at bedtime  levothyroxine 75 MICROGram(s) Oral daily    albumin human 25% IVPB 50 milliLiter(s) IV Intermittent every 6 hours  cholecalciferol 2000 Unit(s) Oral daily  cyanocobalamin 1000 MICROGram(s) Oral daily  multivitamin 1 Tablet(s) Oral daily  mupirocin 2% Ointment 1 Application(s) Topical <User Schedule>      PAST MEDICAL/SURGICAL HISTORY  PAST MEDICAL & SURGICAL HISTORY:  Endometrial ca  S4 serous [place] carcinoma[place] [facility] [place]      HTN (hypertension)      CAD (coronary artery disease)      S/P AVR      Hypothyroidism      HLD (hyperlipidemia)      S/P AVR (aortic valve replacement)      History of endometrial biopsy          SOCIAL HISTORY: Substance Use (street drugs): ( x ) never used  (  ) other:    FAMILY HISTORY:  Family history of parotid cancer (Child)        PHYSICAL EXAM:  T(C): 37 (06-27-23 @ 12:47), Max: 37.4 (06-27-23 @ 09:30)  HR: 112 (06-27-23 @ 12:47) (89 - 112)  BP: 94/52 (06-27-23 @ 12:47) (90/45 - 103/64)  RR: 18 (06-27-23 @ 12:47) (17 - 18)  SpO2: 100% (06-27-23 @ 12:47) (98% - 100%)  Wt(kg): --  I&O's Summary    26 Jun 2023 07:01  -  27 Jun 2023 07:00  --------------------------------------------------------  IN: 750 mL / OUT: 870 mL / NET: -120 mL    27 Jun 2023 07:01  -  27 Jun 2023 15:16  --------------------------------------------------------  IN: 420 mL / OUT: 400 mL / NET: 20 mL        GENERAL: NAD  EYES: conjunctiva and sclera clear  ENMT: No tonsillar erythema, exudates, or enlargement  Cardiovascular: Normal S1 S2, No JVD, No murmurs, No edema  Respiratory: Decreased b/s b/l   Gastrointestinal:  Soft, Non-tender, + BS	  Extremities: Normal edema                                      7.0    12.14 )-----------( 132      ( 27 Jun 2023 07:31 )             21.0     06-27    133<L>  |  94<L>  |  24<H>  ----------------------------<  87  3.6   |  27  |  1.47<H>    Ca    7.8<L>      27 Jun 2023 05:16  Phos  2.9     06-26  Mg     1.70     06-26      proBNP:   Lipid Profile:   HgA1c:   TSH:     Consultant(s) Notes Reviewed:  [x ] YES  [ ] NO    Care Discussed with Consultants/Other Providers [ x] YES  [ ] NO    Imaging Personally Reviewed independently:  [x] YES  [ ] NO    All labs, radiologic studies, vitals, orders and medications list reviewed. Patient is seen and examined at bedside. Case discussed with medical team.

## 2023-06-27 NOTE — PROGRESS NOTE ADULT - ASSESSMENT
87 yo woman with history of stage 4 serous endometrial carcinoma (dx 5/2022) s/p chemo (last in 12/2022) and debulking surgery with POOL/BSO (2/10/23) c/b b/l pleural effusions (R > L, on 2L NC PRN), RUL segmental PE (8/2022, on Eliquis), GI bleed (4/2023), CAD s/p stent (3/2021), aortic stenosis s/p TAVR, HTN, HLD, and hypothyroidism presents with 3 weeks of worsening generalized weakness.     EKG: MAT   Tele: MAT    1) Multifocal Atrial Tachycardia   -started Dilt 30 mg po q6 hrs, BP soft  -continue to monitor on tele     2) Pleural effusion  -s/p Pig tail   -plan for VATS  -f/u pulm recs     3) hx of PE  - RUL segmental PE (8/2022  -AC on hold 2/2 anemia    4) CAD s/p PCI  -denies CP  -TTE with normal LV function    5) TAVR  -TTE transcatheter aortic valve replacement Peak transaortic valve gradient equals 42 mm Hg, mean transaortic valve gradient equals 23 mm Hg, which is probably normal in the presence of a prosthetic valve. Moderate valvular aortic regurgitation.    6) Anemia  -hemoglobin 6.7--7.0  -transfuse to keep hemoglobin >8  -CT scan non con showing bleeding near the spleen and liver. surgery on board, pending CTA abd/pelvis

## 2023-06-27 NOTE — PROVIDER CONTACT NOTE (CRITICAL VALUE NOTIFICATION) - ACTION/TREATMENT ORDERED:
Continue to monitor pt
Provider notified, CBC to be repeated, awaiting additional orders at this time.
Heparin nomogram followed
follow heparin nomogram; continue to monitor for signs of active bleeding.
Transfuse PRBC

## 2023-06-27 NOTE — PROGRESS NOTE ADULT - PROBLEM SELECTOR PLAN 6
Pt with history of stage 4 serous endometrial carcinoma (dx 5/2022) s/p chemo (last in 12/2022) and debulking surgery with POOL/BSO (2/10/23), now recently found to have recurrence of disease with new liver mets.  - Pt follows with Dr. Alvarado of [facility].   - GOC discussion with pt and family given likely poor prognosis

## 2023-06-27 NOTE — PROVIDER CONTACT NOTE (CRITICAL VALUE NOTIFICATION) - BACKGROUND
87yo F with hx of serous endometrial carcinoma, CAD, HTN, TAVR, hypothyroidism, HLD. Admitted with weakness, anemia, and unilateral pleural effusion
patient on heparin gtt; previously elevated aPTT. awaiting procedure on friday
Pt had posterior spleen hemorrhage on CT of abdomen. 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
87 yo female with hx of serous endometrial carcinoma, presented with weakness, anemia, and unilateral pleural effusion
87yo F with hx of serous endometrial carcinoma, CAD, HTN, TAVR, hypothyroidism, HLD. Admitted with weakness, anemia, and unilateral pleural effusion
87yo F with hx of serous endometrial carcinoma, CAD, HTN, TAVR, hypothyroidism, HLD. Admitted with weakness, anemia, and unilateral pleural effusion
69 y/o F w/ PMHx of serous endometrial carcinoma, CAD, HTN, TAVR, hypothyroidism, and HLD presenting w/ weakness, anemia, and unilateral pleural effusion.

## 2023-06-27 NOTE — PROGRESS NOTE ADULT - SUBJECTIVE AND OBJECTIVE BOX
Patient is a 88y old  Female who presents with a chief complaint of Generalized weakness (27 Jun 2023 13:01)    Patient seen this afternoon, after returning from CT. She reports feeling fine and denies any pain or discomfort. Noted that she is very sleepy, dozing off mid-conversation.     MEDICATIONS  (STANDING):  albumin human 25% IVPB 50 milliLiter(s) IV Intermittent every 6 hours  ALPRAZolam 0.25 milliGRAM(s) Oral every 8 hours  aspirin enteric coated 81 milliGRAM(s) Oral daily  atorvastatin 80 milliGRAM(s) Oral at bedtime  cholecalciferol 2000 Unit(s) Oral daily  cyanocobalamin 1000 MICROGram(s) Oral daily  diltiazem    Tablet 30 milliGRAM(s) Oral every 6 hours  levothyroxine 75 MICROGram(s) Oral daily  loratadine 10 milliGRAM(s) Oral daily  misoprostol 200 MICROGram(s) Oral <User Schedule>  multivitamin 1 Tablet(s) Oral daily  mupirocin 2% Ointment 1 Application(s) Topical <User Schedule>  pantoprazole    Tablet 40 milliGRAM(s) Oral before breakfast  piperacillin/tazobactam IVPB.. 3.375 Gram(s) IV Intermittent every 8 hours    MEDICATIONS  (PRN):  acetaminophen     Tablet .. 650 milliGRAM(s) Oral every 6 hours PRN Temp greater or equal to 38C (100.4F), Mild Pain (1 - 3)  albuterol    90 MICROgram(s) HFA Inhaler 2 Puff(s) Inhalation every 6 hours PRN Shortness of Breath and/or Wheezing  melatonin 3 milliGRAM(s) Oral at bedtime PRN Sleep  oxyCODONE    IR 5 milliGRAM(s) Oral every 6 hours PRN Moderate Pain (4 - 6)  polyethylene glycol 3350 17 Gram(s) Oral daily PRN for constipation  senna 2 Tablet(s) Oral at bedtime PRN for constipation        Vital Signs Last 24 Hrs  T(C): 37 (27 Jun 2023 12:47), Max: 37.4 (27 Jun 2023 09:30)  T(F): 98.6 (27 Jun 2023 12:47), Max: 99.4 (27 Jun 2023 11:48)  HR: 112 (27 Jun 2023 12:47) (89 - 112)  BP: 94/52 (27 Jun 2023 12:47) (90/45 - 103/64)  BP(mean): --  RR: 18 (27 Jun 2023 12:47) (17 - 18)  SpO2: 100% (27 Jun 2023 12:47) (98% - 100%)    Parameters below as of 27 Jun 2023 12:47  Patient On (Oxygen Delivery Method): nasal cannula  O2 Flow (L/min): 2      PE  NAD  Awake, somnolent   Anicteric, MMM  Abd soft, NT, ND  No c/c/e  No rash grossly                            7.0    12.14 )-----------( 132      ( 27 Jun 2023 07:31 )             21.0       06-27    133<L>  |  94<L>  |  24<H>  ----------------------------<  87  3.6   |  27  |  1.47<H>    Ca    7.8<L>      27 Jun 2023 05:16  Phos  2.9     06-26  Mg     1.70     06-26

## 2023-06-27 NOTE — PROGRESS NOTE ADULT - SUBJECTIVE AND OBJECTIVE BOX
PULMONARY PROGRESS NOTE    FRIDA WELCH  MRN-[id number]    Patient is a 88y old  Female who presents with a chief complaint of Generalized weakness (26 Jun 2023 16:07)      HPI:  -seen  this morning. weak. lethargic. opens her eyes to her name  on 2L  pleurx in place  overnight events noted  -    ROS:   -    ACTIVE MEDICATION LIST:  MEDICATIONS  (STANDING):  albumin human 25% IVPB 50 milliLiter(s) IV Intermittent every 6 hours  ALPRAZolam 0.25 milliGRAM(s) Oral every 8 hours  aspirin enteric coated 81 milliGRAM(s) Oral daily  atorvastatin 80 milliGRAM(s) Oral at bedtime  cholecalciferol 2000 Unit(s) Oral daily  cyanocobalamin 1000 MICROGram(s) Oral daily  diltiazem    Tablet 30 milliGRAM(s) Oral every 6 hours  levothyroxine 75 MICROGram(s) Oral daily  loratadine 10 milliGRAM(s) Oral daily  misoprostol 200 MICROGram(s) Oral <User Schedule>  multivitamin 1 Tablet(s) Oral daily  mupirocin 2% Ointment 1 Application(s) Topical <User Schedule>  pantoprazole    Tablet 40 milliGRAM(s) Oral before breakfast  piperacillin/tazobactam IVPB.. 3.375 Gram(s) IV Intermittent every 8 hours    MEDICATIONS  (PRN):  acetaminophen     Tablet .. 650 milliGRAM(s) Oral every 6 hours PRN Temp greater or equal to 38C (100.4F), Mild Pain (1 - 3)  albuterol    90 MICROgram(s) HFA Inhaler 2 Puff(s) Inhalation every 6 hours PRN Shortness of Breath and/or Wheezing  melatonin 3 milliGRAM(s) Oral at bedtime PRN Sleep  oxyCODONE    IR 5 milliGRAM(s) Oral every 6 hours PRN Moderate Pain (4 - 6)  polyethylene glycol 3350 17 Gram(s) Oral daily PRN for constipation  senna 2 Tablet(s) Oral at bedtime PRN for constipation      EXAM:  Vital Signs Last 24 Hrs  T(C): 36.2 (27 Jun 2023 04:30), Max: 36.8 (26 Jun 2023 09:10)  T(F): 97.1 (27 Jun 2023 04:30), Max: 98.2 (26 Jun 2023 09:10)  HR: 89 (27 Jun 2023 04:30) (89 - 109)  BP: 97/52 (27 Jun 2023 04:30) (92/54 - 103/64)  BP(mean): --  RR: 17 (27 Jun 2023 04:30) (17 - 17)  SpO2: 100% (27 Jun 2023 04:30) (100% - 100%)    Parameters below as of 27 Jun 2023 04:30  Patient On (Oxygen Delivery Method): nasal cannula  O2 Flow (L/min): 2      GENERAL: The patient is   alert to her name and location  unable to answer all qx appropriately  more somnolent compared to previous visit    LUNGS:; respirations unlabored                                6.7    11.92 )-----------( 129      ( 27 Jun 2023 05:16 )             19.9       06-27    133<L>  |  94<L>  |  24<H>  ----------------------------<  87  3.6   |  27  |  1.47<H>    Ca    7.8<L>      27 Jun 2023 05:16  Phos  2.9     06-26  Mg     1.70     06-26    < from: CT Abdomen and Pelvis No Cont (06.26.23 @ 08:40) >    ACC: [id number] EXAM:  CT ABDOMEN AND PELVIS   ORDERED BY: LEYLA MYERS     PROCEDURE DATE:  06/26/2023          INTERPRETATION:  CLINICAL INFORMATION: Right hydronephrosis.    COMPARISON: Renal ultrasound 6/24/2023, CT chest 6/21/2023, and CT   abdomen/pelvis 4/23/2020.    CONTRAST/COMPLICATIONS:  IV Contrast: NONE  Oral Contrast: NONE  Complications: None reported at time of study completion    PROCEDURE:  CT of the Abdomen and Pelvis was performed.  Sagittal and coronal reformats were performed.    FINDINGS:  LOWER CHEST: Partially imaged moderate right and small left pleural   effusions with compressive atelectasis in the lower lobes. Nonspecific   groundglass opacities in the right middle lobe. Scattered right middle   lobe nodules measuring up to 4 mm (series 2 image 10), new. Coronary   artery calcifications. TAVR. Nodularity in the posterior medial   subpleural region bilaterally including 1.1 cm on the right (series 2   image 13), possibly metastasis.    LIVER: Low-attenuation lesions throughout the liver measuring up to 4.9 x   3.7 cm in the right hepatic lobe (series 2 image 23) suspicious for   metastasis.  BILE DUCTS: Normal caliber.  GALLBLADDER: Limited secondary to adjacent ascites. Layering high density   in the gallbladder, likely gallstones.  SPLEEN: Within normal limits.  PANCREAS: Limited. No focal lesion.  ADRENALS: Left adrenal gland thickening. Unremarkable right adrenal gland.  KIDNEYS/URETERS: Mild right hydronephrosis. No hydronephrosis on the   left. No urinary tract calculi bilaterally. Evaluation of the right   ureter is limited in the absence of intravenous contrast.    BLADDER: Distended with a normal caliber wall.  REPRODUCTIVE ORGANS: Hysterectomy.    BOWEL: Right hemicolectomy with ileocolic anastomosis in the midabdomen.   No evidence of a bowel obstruction.  PERITONEUM: Moderate amount of abdominal and pelvic ascites with   peritoneal nodularity consistent with carcinomatosis (series 2 image 76,   predominantly anteriorly in the midline and on the left). Hemorrhage   fluid level at the posterior aspect of the spleen (series 2 image 32) and   also likely at the posterior aspect of the liver (series 2 image 25).  VESSELS: Atherosclerotic changes.  RETROPERITONEUM/LYMPH NODES: Limited in the absence of intravenous   contrast; mildly enlarged para-aortic lymph nodes, increased in size   since 4/23/2023 including a left para-aortic rectal measuring 1.2 cm in   short axis (series 2 image 57), previously 0.8 cm. Presacral edema and   fluid.  ABDOMINAL WALL: Anasarca.  BONES: Degenerative changes in the spine.    IMPRESSION:    Hemorrhage fluid level at the posterior aspect of the spleen and likely   at the posterior aspect of the liver with evaluation limited on this   noncontrast study; correlation is recommended with hematocrit levels for   active bleeding.    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    Multiple bilobar liver lesions measuring up to 4.9 x 3.7 cm in the right   hepatic lobe suspicious for metastases.    Partially imaged moderate right and small left pleural effusions.    Right middle lobe pulmonary nodules measuring up to 4 mm, mildly enlarged   para-aortic lymph nodes, and nodularity in the subpleural region;   metastatic disease cannot be excluded.    Mild right-sided hydronephrosis; no urinary tract calculi; evaluation for   the etiology of the hydronephrosis limited on this noncontrast study.    The findings were discussed with JUJU Claire at 9:35 AM on 6/26/2023   with read back confirmation.    --- End of Report ---            DACIA WOODY MD; Attending Radiologist  This document has been electronically signed. Jun 26 2023  9:50AM    < end of copied text >  < from: Xray Chest 1 View- PORTABLE-Routine (Xray Chest 1 View- PORTABLE-Routine in AM.) (06.26.23 @ 08:02) >    ACC: [id number] EXAM:  XR CHEST PORTABLE ROUTINE 1V   ORDERED BY: MANDY BRINK     PROCEDURE DATE:  06/26/2023          INTERPRETATION:  Chest one view    HISTORY: Postop    COMPARISON STUDY: 6/25/2023    Frontal expiratory view of the chest shows the heart to be similar in   size. Right chest port, right pigtail catheter and aortic valve stent are   again noted.    The lungs show small left effusion with reduction of right effusion and   there is no evidence of pneumothorax.    IMPRESSION:  Smaller right effusion. No pneumothorax.        Thank you for the courtesy of this referral.    --- End of Report ---            PRITESH REYNOLDS MD; Attending Interventional Radiologist  This document has been electronically signed. Jun 26 2023  3:45PM    < end of copied text >      PROBLEM LIST:  88y Female with HEALTH ISSUES - PROBLEM Dx:  Generalized weakness    Shortness of breath    [place] (acute kidney injury)    Leukocytosis    Anemia    Endometrial cancer    Pulmonary embolism    CAD (coronary artery disease)    Hypertension    Hyperlipidemia    Hypothyroidism    Need for prophylactic measure    Anxiety    Lack of appetite    Acute respiratory failure with hypoxia    Palliative care encounter              RECS:  suggest stopping aspirin also  PRBC / fluid resuscitation  surg? ICU eval?  palliative follow up  prognosis is guarded          Please call with any questions.    Jami Denny DO  [facility] Pulmonary/Sleep Medicine  [phone]

## 2023-06-27 NOTE — PROVIDER CONTACT NOTE (CRITICAL VALUE NOTIFICATION) - RECOMMENDATIONS
DANA Villar notified, Heparin nomogram followed
Transfuse PRBC
Notify provider, administer blood, await further orders.
Continue to monitor pt
follow heparin nomogram.
DANA Martínez made aware. Heparin nomogram followed
DANA Villar notified, Heparin nomogram followed

## 2023-06-27 NOTE — CHART NOTE - NSCHARTNOTEFT_GEN_A_CORE
Notified by RN Hmg is 6.7.  Patient examined bedside. Is alert and oriented x3. Admits to feeling weak. Denies SOB, Chest pain, palpitations, N/V, dizziness, headache, abdomen pain.    PHYSICAL EXAM:  HEAD:  Atraumatic, Normocephalic  EYES: EOMI, PERRLA  NECK: Supple, No JVD  CHEST/LUNG: Clear to auscultation bilaterally; No wheeze/rhonchi/rale  HEART: Tachycardic to 100s, normal rhythm; No murmurs, rubs, or gallops  ABDOMEN: mildly distended, Nontender, Bowel sounds present  EXTREMITIES:  2+ Peripheral Pulses, No clubbing, cyanosis, or edema  PSYCH: AAOx4  NEUROLOGY: non-focal      Per RN patient abdomen has been mildly distended the past day.  Discussed with  hemoglobin, exam, and CT abdomen findings.     Plan:    Repeat CBC,  Call surgery consult.      Follow up:  Repeat HMG is 7, discussed with  informed to give 1/2 unit for 3 hours.    Plan discussed with Patient and RN.

## 2023-06-27 NOTE — PROVIDER CONTACT NOTE (CRITICAL VALUE NOTIFICATION) - ASSESSMENT
Patient A+Ox4, denies chest pain or SOB. No acute s/s of distress noted. On heparin drip
Patient is A&Ox4 lethargic at times, patient denies chest pain, shortness of breath or pain at this time. No s/s of bleeding at this time. VS as documented.
Pt is AOx4; no s/s of active bleeding. Pt was on heparin gtt but stopped at 1 am as ordered.
Patient A+Ox4, denies chest pain or SOB. No acute s/s of distress noted. On heparin drip
Patient A+Ox4, denies chest pain or SOB. No acute s/s of distress noted. On heparin drip
Pt resting comfortably in bed at this time. AxO4. Denies chest pain, SOB. VS as documented, Pt slightly hypotensive. No active s/sx of bleeding
no signs of active bleeding noted; see A&I for full assessment

## 2023-06-27 NOTE — CHART NOTE - NSCHARTNOTEFT_GEN_A_CORE
Surgery consult was called.   Per surgery , informed to obtain CTA abdomen and pelvic with contrast.  Discussed plan with attending,  patient,  and RN.

## 2023-06-27 NOTE — CONSULT NOTE ADULT - SUBJECTIVE AND OBJECTIVE BOX
Surgery Consultation Note  =====================================================  HPI: 88y female     HISTORY  89 yo woman with history of stage 4 serous endometrial carcinoma (dx 5/2022) s/p chemo (last in 12/2022) and debulking surgery with POOL/BSO (2/10/23) c/b b/l pleural effusions (R > L, on 2L NC PRN), RUL segmental PE (8/2022, on Eliquis), GI bleed (4/2023), CAD s/p stent (3/2021), aortic stenosis s/p TAVR, HTN, HLD, and hypothyroidism presents with 3 weeks of worsening generalized weakness. HPI obtained from both pt and her daughter at bedside, also named Shani. Prior to 3 weeks ago, pt was able to ambulate using her rolling walker without any additional assistance and even go down the 5 steps in her house on her own to head out for doctors' appointments. Over the last 3 weeks, however, pt's functional status has been progressively declining, ever since pt got a therapeutic thoracentesis with removal of 1L of pleural fluid in early June at her oncologist's office. Pt is now barely able to get out of bed on most days. Pt states that she got out of bed only once on Monday, and it was to use the bathroom for a BM. Since ~2 weeks ago, pt has also been becoming short of breath with minimal exertion, such as when walking from her bed to the bathroom, and has had to rely on supplemental O2 with 2L NC, which she didn't require for the past several months. Pt denies any recent chest pain, palpitations, lightheadedness, dizziness, headaches, vision changes, abdominal pain, nausea, vomiting, diarrhea, constipation, melena, BRBPR, or dysuria. Pt has chronic b/l LE edema and had b/l venous dopplers this past Friday that were negative for a DVT, though pt and her daughter note that pt's RLE seems more swollen than usual, as it is the LLE that is typically larger in size than the RLE. Pt was recently admitted at [facility], from 4/23-5/4/23, for a GI bleed requiring multiple pRBC transfusions. No clear source of bleed was identified despite pt getting both an enteroscopy and a colonoscopy, although the GI bleed was presumed to be from the distal small bowel. Pt's Eliquis was initially discontinued during the admission but eventually resumed at a lower dose of 2.5 mg BID prior to discharge. Soon after pt's discharge from the hospital, pt had a follow-up visit with her oncologist in which she had a PET/CT that showed recurrence of disease with new liver mets. Pt was being arranged for chemo as outpatient, but workup this past Friday revealed [place] with serum Cr of 1.9 (baseline 0.85-1) and recurrence of the R pleural effusion that was previously drained.     In the ED,   T 98.3-99, HR 82-87, -126/61-64, RR 18-20, SpO2 % RA and 2L NC.  CXR showing moderate to large R pleural effusion. (20 Jun 2023 23:40)    Allergies: No Known Allergies    PAST MEDICAL & SURGICAL HISTORY:  Endometrial ca  S4 serous endometrial carcinoma, [facility] [place], chemotherapy      HTN (hypertension)      CAD (coronary artery disease)      S/P AVR      Hypothyroidism      HLD (hyperlipidemia)      S/P AVR (aortic valve replacement)      History of endometrial biopsy        FAMILY HISTORY:  Family history of parotid cancer (Child)    SOCIAL HISTORY:      ADVANCE DIRECTIVES: Presumed Full Code      REVIEW OF SYSTEMS:    General: Non-Contributory  Skin/Breast: Non-Contributory  Ophthalmologic: Non-Contributory  ENMT: Non-Contributory  Respiratory and Thorax: Non-Contributory  Cardiovascular: Non-Contributory  Gastrointestinal: Non-Contributory  Genitourinary: Non-Contributory  Musculoskeletal: Non-Contributory  Neurological: Non-Contributory  Psychiatric: Non-Contributory  Hematology/Lymphatics: Non-Contributory  Endocrine: Non-Contributory  Allergic/Immunologic: Non-Contributory    HOME MEDICATIONS:    CURRENT MEDICATIONS:   --------------------------------------------------------------------------------------  Neurologic Medications  acetaminophen     Tablet .. 650 milliGRAM(s) Oral every 6 hours PRN Temp greater or equal to 38C (100.4F), Mild Pain (1 - 3)  ALPRAZolam 0.25 milliGRAM(s) Oral every 8 hours  melatonin 3 milliGRAM(s) Oral at bedtime PRN Sleep  oxyCODONE    IR 5 milliGRAM(s) Oral every 6 hours PRN Moderate Pain (4 - 6)    Respiratory Medications  albuterol    90 MICROgram(s) HFA Inhaler 2 Puff(s) Inhalation every 6 hours PRN Shortness of Breath and/or Wheezing  loratadine 10 milliGRAM(s) Oral daily    Cardiovascular Medications  diltiazem    Tablet 30 milliGRAM(s) Oral every 6 hours    Gastrointestinal Medications  albumin human 25% IVPB 50 milliLiter(s) IV Intermittent every 6 hours  cholecalciferol 2000 Unit(s) Oral daily  cyanocobalamin 1000 MICROGram(s) Oral daily  misoprostol 200 MICROGram(s) Oral <User Schedule>  multivitamin 1 Tablet(s) Oral daily  pantoprazole    Tablet 40 milliGRAM(s) Oral before breakfast  polyethylene glycol 3350 17 Gram(s) Oral daily PRN for constipation  senna 2 Tablet(s) Oral at bedtime PRN for constipation    Genitourinary Medications    Hematologic/Oncologic Medications  aspirin enteric coated 81 milliGRAM(s) Oral daily    Antimicrobial/Immunologic Medications  piperacillin/tazobactam IVPB.. 3.375 Gram(s) IV Intermittent every 8 hours    Endocrine/Metabolic Medications  atorvastatin 80 milliGRAM(s) Oral at bedtime  levothyroxine 75 MICROGram(s) Oral daily    Topical/Other Medications  mupirocin 2% Ointment 1 Application(s) Topical <User Schedule>    EXAM  NEUROLOGY  Exam: Normal, NAD, alert, oriented x 3, no focal deficits.   HEENT  Exam: Normocephalic, atraumatic.   RESPIRATORY  Exam: Lungs clear to auscultation, Normal expansion/effort.   CARDIOVASCULAR  Exam: S1, S2.  Regular rate and rhythm.    GI/NUTRITION  Exam: Abdomen soft, Non-tender, Non-distended.   VASCULAR  Exam: Extremities warm, pink, well-perfused.    MUSCULOSKELETAL  Exam: All extremities moving spontaneously without limitations.  ***  SKIN:  Exam: Good skin turgor, no skin breakdown.      METABOLIC/FLUIDS/ELECTROLYTES  albumin human 25% IVPB 50 milliLiter(s) IV Intermittent every 6 hours  cholecalciferol 2000 Unit(s) Oral daily  cyanocobalamin 1000 MICROGram(s) Oral daily  multivitamin 1 Tablet(s) Oral daily  HEMATOLOGIC  [] DVT Prophylaxis: aspirin enteric coated 81 milliGRAM(s) Oral daily    Transfusions:	[] PRBC	[] Platelets		[] FFP	[] Cryoprecipitate    INFECTIOUS DISEASE  Antimicrobials/Immunologic Medications:  piperacillin/tazobactam IVPB.. 3.375 Gram(s) IV Intermittent every 8 hours    IMAGING RESULTS  Echo:   CT: IMPRESSION:    Hemorrhage fluid level at the posterior aspect of the spleen and likely at the posterior aspect of the liver with evaluation limited on this noncontrast study; correlation is recommended with hematocrit levels for active bleeding.    Peroneal carcinomatosis with a moderate amount of abdominal and pelvic ascites.    Multiple bilobar liver lesions measuring up to 4.9 x 3.7 cm in the right hepatic lobe suspicious for metastases.    Partially imaged moderate right and small left pleural effusions.    Right middle lobe pulmonary nodules measuring up to 4 mm, mildly enlarged para-aortic lymph nodes, and nodularity in the subpleural region; metastatic disease cannot be excluded.    Mild right-sided hydronephrosis; no urinary tract calculi; evaluation for the etiology of the hydronephrosis limited on this noncontrast study.    The findings were discussed with JUJU Claire at 9:35 AM on 6/26/2023 with read back confirmation.  Xray:     ASSESSMENT: 88F yo woman with history of stage 4 serous endometrial carcinoma (dx 5/2022) s/p chemo (last in 12/2022) and debulking surgery with POOL/BSO (2/10/23) c/b b/l pleural effusions (R > L, on 2L NC PRN), RUL segmental PE (8/2022, on Eliquis), GI bleed (4/2023), CAD s/p stent (3/2021), aortic stenosis s/p TAVR, HTN, HLD, and hypothyroidism presents with 3 weeks of worsening generalized weakness. Palliative Care consulted for complex decision making and symptom management in the setting of advanced illness. Pt was on Hep gtt for PE low h/h CT scan non con showing bleeding near the spleen and liver; pt is hemodynamically stable.              Surgery Consultation Note  =====================================================  HPI: 88y female     HISTORY  87 yo woman with history of stage 4 serous endometrial carcinoma (dx 5/2022) s/p chemo (last in 12/2022) and debulking surgery with POOL/BSO (2/10/23) c/b b/l pleural effusions (R > L, on 2L NC PRN), RUL segmental PE (8/2022, on Eliquis), GI bleed (4/2023), CAD s/p stent (3/2021), aortic stenosis s/p TAVR, HTN, HLD, and hypothyroidism presents with 3 weeks of worsening generalized weakness. HPI obtained from both pt and her daughter at bedside, also named Shani. Prior to 3 weeks ago, pt was able to ambulate using her rolling walker without any additional assistance and even go down the 5 steps in her house on her own to head out for doctors' appointments. Over the last 3 weeks, however, pt's functional status has been progressively declining, ever since pt got a therapeutic thoracentesis with removal of 1L of pleural fluid in early June at her oncologist's office. Pt is now barely able to get out of bed on most days. Pt states that she got out of bed only once on Monday, and it was to use the bathroom for a BM. Since ~2 weeks ago, pt has also been becoming short of breath with minimal exertion, such as when walking from her bed to the bathroom, and has had to rely on supplemental O2 with 2L NC, which she didn't require for the past several months. Pt denies any recent chest pain, palpitations, lightheadedness, dizziness, headaches, vision changes, abdominal pain, nausea, vomiting, diarrhea, constipation, melena, BRBPR, or dysuria. Pt has chronic b/l LE edema and had b/l venous dopplers this past Friday that were negative for a DVT, though pt and her daughter note that pt's RLE seems more swollen than usual, as it is the LLE that is typically larger in size than the RLE. Pt was recently admitted at [facility], from 4/23-5/4/23, for a GI bleed requiring multiple pRBC transfusions. No clear source of bleed was identified despite pt getting both an enteroscopy and a colonoscopy, although the GI bleed was presumed to be from the distal small bowel. Pt's Eliquis was initially discontinued during the admission but eventually resumed at a lower dose of 2.5 mg BID prior to discharge. Soon after pt's discharge from the hospital, pt had a follow-up visit with her oncologist in which she had a PET/CT that showed recurrence of disease with new liver mets. Pt was being arranged for chemo as outpatient, but workup this past Friday revealed [place] with serum Cr of 1.9 (baseline 0.85-1) and recurrence of the R pleural effusion that was previously drained.     In the ED,   T 98.3-99, HR 82-87, -126/61-64, RR 18-20, SpO2 % RA and 2L NC.  CXR showing moderate to large R pleural effusion. (20 Jun 2023 23:40)    Allergies: No Known Allergies    PAST MEDICAL & SURGICAL HISTORY:  Endometrial ca  [place]4 serous [place] [facility] [place]      HTN (hypertension)      CAD (coronary artery disease)      S/P AVR      Hypothyroidism      HLD (hyperlipidemia)      S/P AVR (aortic valve replacement)      History of endometrial biopsy        FAMILY HISTORY:  Family history of parotid cancer (Child)    SOCIAL HISTORY:      ADVANCE DIRECTIVES: Presumed Full Code      REVIEW OF SYSTEMS:    General: Non-Contributory  Skin/Breast: Non-Contributory  Ophthalmologic: Non-Contributory  ENMT: Non-Contributory  Respiratory and Thorax: Non-Contributory  Cardiovascular: Non-Contributory  Gastrointestinal: Non-Contributory  Genitourinary: Non-Contributory  Musculoskeletal: Non-Contributory  Neurological: Non-Contributory  Psychiatric: Non-Contributory  Hematology/Lymphatics: Non-Contributory  Endocrine: Non-Contributory  Allergic/Immunologic: Non-Contributory    HOME MEDICATIONS:    CURRENT MEDICATIONS:   --------------------------------------------------------------------------------------  Neurologic Medications  acetaminophen     Tablet .. 650 milliGRAM(s) Oral every 6 hours PRN Temp greater or equal to 38C (100.4F), Mild Pain (1 - 3)  ALPRAZolam 0.25 milliGRAM(s) Oral every 8 hours  melatonin 3 milliGRAM(s) Oral at bedtime PRN Sleep  oxyCODONE    IR 5 milliGRAM(s) Oral every 6 hours PRN Moderate Pain (4 - 6)    Respiratory Medications  albuterol    90 MICROgram(s) HFA Inhaler 2 Puff(s) Inhalation every 6 hours PRN Shortness of Breath and/or Wheezing  loratadine 10 milliGRAM(s) Oral daily    Cardiovascular Medications  diltiazem    Tablet 30 milliGRAM(s) Oral every 6 hours    Gastrointestinal Medications  albumin human 25% IVPB 50 milliLiter(s) IV Intermittent every 6 hours  cholecalciferol 2000 Unit(s) Oral daily  cyanocobalamin 1000 MICROGram(s) Oral daily  misoprostol 200 MICROGram(s) Oral <User Schedule>  multivitamin 1 Tablet(s) Oral daily  pantoprazole    Tablet 40 milliGRAM(s) Oral before breakfast  polyethylene glycol 3350 17 Gram(s) Oral daily PRN for constipation  senna 2 Tablet(s) Oral at bedtime PRN for constipation    Genitourinary Medications    Hematologic/Oncologic Medications  aspirin enteric coated 81 milliGRAM(s) Oral daily    Antimicrobial/Immunologic Medications  piperacillin/tazobactam IVPB.. 3.375 Gram(s) IV Intermittent every 8 hours    Endocrine/Metabolic Medications  atorvastatin 80 milliGRAM(s) Oral at bedtime  levothyroxine 75 MICROGram(s) Oral daily    Topical/Other Medications  mupirocin 2% Ointment 1 Application(s) Topical <User Schedule>    EXAM  NEUROLOGY  Exam: Normal, NAD, alert, oriented x 3, no focal deficits.   HEENT  Exam: Normocephalic, atraumatic.   RESPIRATORY  Exam: Lungs clear to auscultation, Normal expansion/effort.   CARDIOVASCULAR  Exam: S1, S2.  Regular rate and rhythm.    GI/NUTRITION  Exam: Abdomen soft, Non-tender, Non-distended.   VASCULAR  Exam: Extremities warm, pink, well-perfused.    MUSCULOSKELETAL  Exam: All extremities moving spontaneously without limitations.  ***  SKIN:  Exam: Good skin turgor, no skin breakdown.      METABOLIC/FLUIDS/ELECTROLYTES  albumin human 25% IVPB 50 milliLiter(s) IV Intermittent every 6 hours  cholecalciferol 2000 Unit(s) Oral daily  cyanocobalamin 1000 MICROGram(s) Oral daily  multivitamin 1 Tablet(s) Oral daily  HEMATOLOGIC  [] DVT Prophylaxis: aspirin enteric coated 81 milliGRAM(s) Oral daily    Transfusions:	[] PRBC	[] Platelets		[] FFP	[] Cryoprecipitate    INFECTIOUS DISEASE  Antimicrobials/Immunologic Medications:  piperacillin/tazobactam IVPB.. 3.375 Gram(s) IV Intermittent every 8 hours    IMAGING RESULTS  Echo:   CT: IMPRESSION:    Hemorrhage fluid level at the posterior aspect of the spleen and likely at the posterior aspect of the liver with evaluation limited on this noncontrast study; correlation is recommended with hematocrit levels for active bleeding.    Peroneal carcinomatosis with a moderate amount of abdominal and pelvic ascites.    Multiple bilobar liver lesions measuring up to 4.9 x 3.7 cm in the right hepatic lobe suspicious for metastases.    Partially imaged moderate right and small left pleural effusions.    Right middle lobe pulmonary nodules measuring up to 4 mm, mildly enlarged para-aortic lymph nodes, and nodularity in the subpleural region; metastatic disease cannot be excluded.    Mild right-sided hydronephrosis; no urinary tract calculi; evaluation for the etiology of the hydronephrosis limited on this noncontrast study.    The findings were discussed with JUJU Claire at 9:35 AM on 6/26/2023 with read back confirmation.  Xray:     ASSESSMENT: 88F yo woman with history of stage 4 serous endometrial carcinoma (dx 5/2022) s/p chemo (last in 12/2022) and debulking surgery with POOL/BSO (2/10/23) c/b b/l pleural effusions (R > L, on 2L NC PRN), RUL segmental PE (8/2022, on Eliquis), GI bleed (4/2023), CAD s/p stent (3/2021), aortic stenosis s/p TAVR, HTN, HLD, and hypothyroidism presents with 3 weeks of worsening generalized weakness. Palliative Care consulted for complex decision making and symptom management in the setting of advanced illness. Pt was on Hep gtt for PE low h/h CT scan non con showing bleeding near the spleen and liver; pt is hemodynamically stable.     -STat CT Angio ABD/PEL   -transfuse as needed   -hold AC   -Strict Is and Os   -Case discussed with Dr. Liz ACS (B team surgery)   Please page [phone] as needed

## 2023-06-27 NOTE — PROGRESS NOTE ADULT - ASSESSMENT
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    1. Uterine carcinosarcoma   -- s/p neoadjuvant chemotherapy completed 12/2022, s/p POOL-BSO 02/10/2023   -- Recent imaging suggests disease recurrence- planned to begin carboplatin as an outpatient   -- No systemic treatment while admitted  -- Follow up with Dr. Alvarado at [facility] [place] [facility] after discharge     2. Dyspnea on exertion   -- CTA chest w/o PE; + large right pleural effusion   -- LE duplex without DVT   -- Thoracic consulted, plan for R VATS, R pleurX catheter placement, postponed due to fevers. Pigtail placed for temporary draining- f/u cytology   -- Continue supplemental O2 as needed, wean as tolerated     3. Anemia, thrombocytopenia   -- Likely secondary to malignancy, chronic disease, acute illness   -- No evidence of iron, B12, or folate deficiencies. Ddimer elevated but fibrinogen wnl. LDH elevated w/ normal hapto  -- Monitor CBC and transfuse to maintain hg >7, platelet count >10K, >15K if febrile, or >50K if bleeding   -- Hg acutely decreased. CT a/p shows possible RP bleed. Surgery consulted, CTA AP performed, f/u read     4. Fever   -- has been afebrile, cultures NGTD, on empiric Zosyn   -- ID following, no clear source of fever     Will continue to follow.    Twyla Pitts PA-C  Hematology/Oncology  [facility]  [phone] (office)  [phone] (alt office)  Evenings and weekends please call MD on call or office

## 2023-06-27 NOTE — PROGRESS NOTE ADULT - SUBJECTIVE AND OBJECTIVE BOX
[facility] NEPHROLOGY- PROGRESS NOTE    88y Female with history of endometrial carcinoma presents with weakness. Nephrology consulted for elevated Scr.    For CT with IV contrast. Brown placed for intermittent urinary retention.    REVIEW OF SYSTEMS:  Gen: no fevers  Cards: no chest pain  Resp: no dyspnea  GI: no nausea or vomiting or diarrhea  Vascular: + LE edema    No Known Allergies      Hospital Medications: Medications reviewed      VITALS:  T(F): 99.4 (06-27-23 @ 11:48), Max: 99.4 (06-27-23 @ 11:48)  HR: 111 (06-27-23 @ 11:48)  BP: 98/55 (06-27-23 @ 11:48)  RR: 18 (06-27-23 @ 11:48)  SpO2: 98% (06-27-23 @ 11:48)  Wt(kg): --    06-26 @ 07:01  -  06-27 @ 07:00  --------------------------------------------------------  IN: 750 mL / OUT: 870 mL / NET: -120 mL        PHYSICAL EXAM:    Gen: NAD, calm  Cards: RRR, +S1/S2, no M/G/R  Resp: Decreased BS @ R base, + R CT  GI: soft, NT/ND, NABS  : + brown  Vascular: 1+ LE edema B/L      LABS:  06-27    133<L>  |  94<L>  |  24<H>  ----------------------------<  87  3.6   |  27  |  1.47<H>    Ca    7.8<L>      27 Jun 2023 05:16  Phos  2.9     06-26  Mg     1.70     06-26      Creatinine Trend: 1.47 <--, 1.81 <--, 1.89 <--, 1.92 <--, 1.87 <--, 1.65 <--, 1.23 <--, 1.21 <--, 1.32 <--                        7.0    12.14 )-----------( 132      ( 27 Jun 2023 07:31 )             21.0     Urine Studies:  Urinalysis Basic - ( 27 Jun 2023 05:16 )    Color:  / Appearance:  / SG:  / pH:   Gluc: 87 mg/dL / Ketone:   / Bili:  / Urobili:    Blood:  / Protein:  / Nitrite:    Leuk Esterase:  / RBC:  / WBC    Sq Epi:  / Non Sq Epi:  / Bacteria:       Sodium, Random Urine: <20 mmol/L (06-26 @ 00:01)  Creatinine, Random Urine: 118 mg/dL (06-26 @ 00:01)  Protein/Creatinine Ratio Calculation: 1.2 Ratio (06-26 @ 00:01)

## 2023-06-27 NOTE — PROGRESS NOTE ADULT - PROBLEM SELECTOR PLAN 2
Pt with ~2 weeks of shortness of breath with minimal exertion, now requiring supplemental O2 with 2L NC. Suspect 2/2 moderate-to-large R pleural effusion, likely malignant, and worsening anemia, though CHF and PE also on the differential.  - Pulm and/or thoracic surgery to be called in AM for possible diagnostic +/- therapeutic thoracentesis. Pt might also require chest tube placement during admission given recurrence of R pleural effusion.   - < from: CT Abdomen and Pelvis No Cont (06.26.23 @ 08:40) >    Hemorrhage fluid level at the posterior aspect of the spleen and likely   at the posterior aspect of the liver with evaluation limited on this   noncontrast study; correlation is recommended with hematocrit levels for   active bleeding.    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    Multiple bilobar liver lesions measuring up to 4.9 x 3.7 cm in the right   hepatic lobe suspicious for metastases.      < end of copied text >  surgery eval for hemorrhage in abd

## 2023-06-27 NOTE — CONSULT NOTE ADULT - NS ATTEND AMEND GEN_ALL_CORE FT
Pt seen and examined.  Hospital course reviewed and CT angiography reviewed with radiology.  Imp: No active intraparenchymal seen on CT scan.  No acute surgical intervention at this time.  Medical management.
Agree with above
patient with moderate effusion h/o endometrial cancer. plan for pleurx Friday after off AC 48 hours.

## 2023-06-27 NOTE — PROGRESS NOTE ADULT - ASSESSMENT
88y Female with history of endometrial carcinoma presents with weakness. Nephrology consulted for elevated Scr.    1) [place]: likely hemodynamically mediated in setting of relative hypotension/tachycardia and hypoalbuminemia. Scr improving on IV albumin. Given plans for CT with IV contrast, recommend continue with IV albumin to decrease risk of DAVID. UA bland. FeNa low. Renal US and CT with mild R hydronephrosis suspect due to underlying CA. Patient will need outpatient Urology monitoring and if no improvement with chemotherapy may need R NT. TMA work up negative. Avoid nephrotoxins. Monitor electrolytes.    2) HTN: BP low normal with tachycardia. As per cardiology.    3) LE edema: Due to hypoalbuminemia from malnutrition (patient without nephrotic range proteinuria). Continue with protein supplements. Can give lasix 40 mg IV X 1 dose if patient develops dyspnea with IV albumin. TTE with normal LVSF. Monitor UO.    4) Hyponatremia: Improving. Continue with 1L FR. Continue with Zosyn in NS base. Monitor serum Na.      [facility] NEPHROLOGY  Brennon Cates M.D.  Addison Nieto D.O.  Guerline Dias M.D.  Merissa Parsons, GRACIA, ANP-C    Telephone: [phone]  Facsimile: [phone]    [address] [place] [address]

## 2023-06-27 NOTE — PROVIDER CONTACT NOTE (CRITICAL VALUE NOTIFICATION) - SITUATION
aptt >200
critical value called to unit
aptt 134.1
aptt 157.9
hgb 7.0 hgb 21.0
Patient has critical lab value of Hgb 6.7 Hct 19.9
aPTT >200

## 2023-06-27 NOTE — PROGRESS NOTE ADULT - PROBLEM SELECTOR PLAN 1
Pt with 3 weeks of progressive generalized weakness, now barely able to get out of bed. On exam, no motor weakness detected. Likely in setting of recurrence/progression of endometrial cancer with new liver mets, exacerbated by poor PO intake, recurrence of moderate-to-large R pleural effusion, and worsening anemia.   - Management as below for endometrial cancer, R pleural effusion, and anemia  -

## 2023-06-28 NOTE — CHART NOTE - NSCHARTNOTEFT_GEN_A_CORE
CTA reviewed. Negative for active bleeding.  No acute surgical intervention.  Please reconsult as necessary.    B Team Surgery  [id number] CTA reviewed. Negative for active bleeding.  No acute surgical intervention. Please reconsult as necessary.    d/w Attending Dr. Nir STEWARD Team Surgery  [id number]

## 2023-06-28 NOTE — PROGRESS NOTE ADULT - SUBJECTIVE AND OBJECTIVE BOX
pt being evaluated by surgery for retroperitoneal hematoma    Vital Signs Last 24 Hrs  T(C): 37.1 (28 Jun 2023 11:12), Max: 37.1 (28 Jun 2023 11:12)  T(F): 98.7 (28 Jun 2023 11:12), Max: 98.7 (28 Jun 2023 11:12)  HR: 120 (28 Jun 2023 11:12) (92 - 120)  BP: 116/70 (28 Jun 2023 11:12) (89/49 - 117/67)  BP(mean): --  RR: 18 (28 Jun 2023 11:12) (18 - 18)  SpO2: 96% (28 Jun 2023 11:12) (96% - 100%)    Parameters below as of 28 Jun 2023 11:12  Patient On (Oxygen Delivery Method): nasal cannula  O2 Flow (L/min): 2    PE  awake and alert  weak, in bed  R PTC ws with minimal drainage no air leak                         8.5    15.38 )-----------( 135      ( 28 Jun 2023 05:53 )             25.0     06-28    134<L>  |  96<L>  |  20  ----------------------------<  71  3.3<L>   |  26  |  1.20    Ca    8.3<L>      28 Jun 2023 05:53      PTT - ( 27 Jun 2023 05:16 )  PTT:40.9 sec  MEDICATIONS  (STANDING):  albumin human 25% IVPB 50 milliLiter(s) IV Intermittent every 6 hours  ALPRAZolam 0.25 milliGRAM(s) Oral every 8 hours  atorvastatin 80 milliGRAM(s) Oral at bedtime  cholecalciferol 2000 Unit(s) Oral daily  cyanocobalamin 1000 MICROGram(s) Oral daily  diltiazem    Tablet 30 milliGRAM(s) Oral every 6 hours  levothyroxine 75 MICROGram(s) Oral daily  loratadine 10 milliGRAM(s) Oral daily  misoprostol 200 MICROGram(s) Oral <User Schedule>  multivitamin 1 Tablet(s) Oral daily  mupirocin 2% Ointment 1 Application(s) Topical <User Schedule>  pantoprazole    Tablet 40 milliGRAM(s) Oral before breakfast  piperacillin/tazobactam IVPB.. 3.375 Gram(s) IV Intermittent every 8 hours    MEDICATIONS  (PRN):  acetaminophen     Tablet .. 650 milliGRAM(s) Oral every 6 hours PRN Temp greater or equal to 38C (100.4F), Mild Pain (1 - 3)  albuterol    90 MICROgram(s) HFA Inhaler 2 Puff(s) Inhalation every 6 hours PRN Shortness of Breath and/or Wheezing  melatonin 3 milliGRAM(s) Oral at bedtime PRN Sleep  oxyCODONE    IR 5 milliGRAM(s) Oral every 6 hours PRN Moderate Pain (4 - 6)  polyethylene glycol 3350 17 Gram(s) Oral daily PRN for constipation  senna 2 Tablet(s) Oral at bedtime PRN for constipation    Pertinent Physical Exam  I&O's Summary    27 Jun 2023 07:01  -  28 Jun 2023 07:00  --------------------------------------------------------  IN: 420 mL / OUT: 400 mL / NET: 20 mL      Assessment  88F presents with a chief complaint of Generalized weakness, underwent R PTC placement. Course c/b retroperitoneal hematoma on CT scan.     PLAN  no plan for thoracic surgery intervention at this time  continue PTC to waterseal documenting output  f/u general surgery consult  f/u palliative care consult  will continue to follow

## 2023-06-28 NOTE — PROGRESS NOTE ADULT - ASSESSMENT
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    1. Uterine carcinosarcoma   -- Under care of Dr. Alvarado at [facility]  -- s/p neoadjuvant chemotherapy completed 12/2022, s/p POOL-BSO 02/10/2023   -- Recent imaging suggests disease recurrence- planned to begin carboplatin as an outpatient   -- No systemic treatment while admitted  -- Pt declining, would likely not be a candidate for further treatment. Recommend palliative care consult to discuss goals of care with patient and her family.    2. Dyspnea on exertion   -- CTA chest w/o PE; + large right pleural effusion   -- LE duplex without DVT   -- Thoracic consulted, pigtail catheter placed for temporary draining- f/u cytology. No further intervention planned at this time.  -- Continue supplemental O2 as needed, wean as tolerated     3. Anemia, thrombocytopenia   -- Likely secondary to malignancy, chronic disease, acute illness   -- No evidence of iron, B12, or folate deficiencies. Ddimer elevated but fibrinogen wnl. LDH elevated w/ normal hapto  -- Monitor CBC and transfuse to maintain hg >7, platelet count >10K, >15K if febrile, or >50K if bleeding   -- Hg acutely decreased. CT a/p shows possible RP bleed. Surgery consulted, CTA AP without active bleed- no intervention.    Recommend palliative care team to follow up with patient and her family.  Will continue to follow.    Twyla Pitts PA-C  Hematology/Oncology  [facility]  [phone] (office)  [phone] (alt office)  Evenings and weekends please call MD on call or office

## 2023-06-28 NOTE — PROGRESS NOTE ADULT - SUBJECTIVE AND OBJECTIVE BOX
[facility] NEPHROLOGY- PROGRESS NOTE    88y Female with history of endometrial carcinoma presents with weakness. Nephrology consulted for elevated Scr.      REVIEW OF SYSTEMS:  Gen: no fevers  Cards: no chest pain  Resp: + dyspnea  GI: no nausea or vomiting or diarrhea  Vascular: + LE edema    No Known Allergies      Hospital Medications: Medications reviewed      VITALS:  T(F): 97.7 (06-28-23 @ 07:27), Max: 99.4 (06-27-23 @ 11:48)  HR: 108 (06-28-23 @ 07:27)  BP: 117/67 (06-28-23 @ 07:27)  RR: 18 (06-28-23 @ 07:27)  SpO2: 99% (06-28-23 @ 07:27)  Wt(kg): --    06-27 @ 07:01  -  06-28 @ 07:00  --------------------------------------------------------  IN: 420 mL / OUT: 400 mL / NET: 20 mL        PHYSICAL EXAM:    Gen: NAD, calm  Cards: RRR, +S1/S2, no M/G/R  Resp: Decreased BS @ R base, + R CT  GI: soft, NT/ND, NABS  : + brown  Vascular: 1+ LE edema B/L        LABS:  06-28    134<L>  |  96<L>  |  20  ----------------------------<  71  3.3<L>   |  26  |  1.20    Ca    8.3<L>      28 Jun 2023 05:53      Creatinine Trend: 1.20 <--, 1.47 <--, 1.81 <--, 1.89 <--, 1.92 <--, 1.87 <--, 1.65 <--, 1.23 <--, 1.21 <--                        8.5    15.38 )-----------( 135      ( 28 Jun 2023 05:53 )             25.0     Urine Studies:  Urinalysis Basic - ( 28 Jun 2023 05:53 )    Color:  / Appearance:  / SG:  / pH:   Gluc: 71 mg/dL / Ketone:   / Bili:  / Urobili:    Blood:  / Protein:  / Nitrite:    Leuk Esterase:  / RBC:  / WBC    Sq Epi:  / Non Sq Epi:  / Bacteria:       Sodium, Random Urine: <20 mmol/L (06-26 @ 00:01)  Creatinine, Random Urine: 118 mg/dL (06-26 @ 00:01)  Protein/Creatinine Ratio Calculation: 1.2 Ratio (06-26 @ 00:01)          < from: CT Angio Abdomen and Pelvis w/ IV Cont (06.27.23 @ 12:31) >  IMPRESSION:  *  Status post hysterectomy with extensive peritoneal carcinomatosis and   moderate ascites.  *  Redemonstrated fluid hematocrit level posterior to the spleen,   consistent with presence of hemorrhagic products.  *  No evidence of active bleed.        --- End of Report ---    < end of copied text >      < from: CT Angio Abdomen and Pelvis w/ IV Cont (06.27.23 @ 12:31) >  KIDNEYS/URETERS: Mild right hydronephrosis.    BLADDER: Surgically decompressed with a Brown catheter.    < end of copied text >

## 2023-06-28 NOTE — PROGRESS NOTE ADULT - PROBLEM SELECTOR PLAN 6
Pt with history of stage 4 serous endometrial carcinoma (dx 5/2022) s/p chemo (last in 12/2022) and debulking surgery with POOL/BSO (2/10/23), now recently found to have recurrence of disease with new liver mets.  - Pt follows with Dr. Alvarado of [facility].   - GOC discussion with pt and family given likely poor prognosis

## 2023-06-28 NOTE — PROGRESS NOTE ADULT - SUBJECTIVE AND OBJECTIVE BOX
Patient is a 88y old  Female who presents with a chief complaint of Generalized weakness (28 Jun 2023 09:59)    Patient seen this afternoon, with her daughter at bedside. Patient reports feeling okay, though she appears weaker than she did earlier this admission.   Spoke with her daughter who states patient would like to avoid any more invasive measures, states she is tired and would like to just be comfortable for the remainder of her time. She is aware that she has declined significantly. Daughter would like discussion with palliative care team regarding options.    MEDICATIONS  (STANDING):  albumin human 25% IVPB 50 milliLiter(s) IV Intermittent every 6 hours  ALPRAZolam 0.25 milliGRAM(s) Oral every 8 hours  atorvastatin 80 milliGRAM(s) Oral at bedtime  cholecalciferol 2000 Unit(s) Oral daily  cyanocobalamin 1000 MICROGram(s) Oral daily  diltiazem    Tablet 30 milliGRAM(s) Oral every 6 hours  levothyroxine 75 MICROGram(s) Oral daily  loratadine 10 milliGRAM(s) Oral daily  misoprostol 200 MICROGram(s) Oral <User Schedule>  multivitamin 1 Tablet(s) Oral daily  mupirocin 2% Ointment 1 Application(s) Topical <User Schedule>  pantoprazole    Tablet 40 milliGRAM(s) Oral before breakfast  piperacillin/tazobactam IVPB.. 3.375 Gram(s) IV Intermittent every 8 hours    MEDICATIONS  (PRN):  acetaminophen     Tablet .. 650 milliGRAM(s) Oral every 6 hours PRN Temp greater or equal to 38C (100.4F), Mild Pain (1 - 3)  albuterol    90 MICROgram(s) HFA Inhaler 2 Puff(s) Inhalation every 6 hours PRN Shortness of Breath and/or Wheezing  melatonin 3 milliGRAM(s) Oral at bedtime PRN Sleep  oxyCODONE    IR 5 milliGRAM(s) Oral every 6 hours PRN Moderate Pain (4 - 6)  polyethylene glycol 3350 17 Gram(s) Oral daily PRN for constipation  senna 2 Tablet(s) Oral at bedtime PRN for constipation        Vital Signs Last 24 Hrs  T(C): 37.1 (28 Jun 2023 11:12), Max: 37.1 (28 Jun 2023 11:12)  T(F): 98.7 (28 Jun 2023 11:12), Max: 98.7 (28 Jun 2023 11:12)  HR: 120 (28 Jun 2023 11:12) (92 - 120)  BP: 116/70 (28 Jun 2023 11:12) (89/49 - 117/67)  BP(mean): --  RR: 18 (28 Jun 2023 11:12) (18 - 18)  SpO2: 96% (28 Jun 2023 11:12) (96% - 100%)    Parameters below as of 28 Jun 2023 11:12  Patient On (Oxygen Delivery Method): nasal cannula  O2 Flow (L/min): 2      PE  NAD  Awake, alert, lethargic   Anicteric, MMM  +chest tube   No c/c/e  No rash grossly                            8.5    15.38 )-----------( 135      ( 28 Jun 2023 05:53 )             25.0       06-28    134<L>  |  96<L>  |  20  ----------------------------<  71  3.3<L>   |  26  |  1.20    Ca    8.3<L>      28 Jun 2023 05:53        ACC: [id number] EXAM:  CT ANGIO ABD PELV (W)AW IC   ORDERED BY: JOEL SHETH     PROCEDURE DATE:  06/27/2023          INTERPRETATION:  CLINICAL INFORMATION: Endometrial cancer. Evaluate for   active bleeding.    COMPARISON: 6/26/2023    CONTRAST/COMPLICATIONS:  IV Contrast: Omnipaque 350  90 cc administered   10 cc discarded  Oral Contrast: NONE  Complications: None reported at time of study completion    PROCEDURE:  CT of the Abdomen and Pelvis was performed.  Precontrast, Arterial and Delayed phases were performed.  Sagittal and coronal reformats were performed.    FINDINGS:  LOWER CHEST: Bilateral pleural effusions and right pleural metastases.   Partially visualized right pleural drainage catheter. TAVR.    LIVER: Liver lesions consistent with metastasis most prominent measuring   2.2 x 1.9 cm in the right hepatic lobe. A larger ill-defined lesion in   the dome of the right hepatic lobe is partially obscured due to beam   hardening artifact from the patient's arms. Subcentimeter lesions too   small to characterize.  BILE DUCTS: Normal caliber.  GALLBLADDER: Cholelithiasis.  SPLEEN: Within normal limits.  PANCREAS: Within normal limits.  ADRENALS: Within normal limits.  KIDNEYS/URETERS: Mild right hydronephrosis.    BLADDER: Surgically decompressed with a Birch catheter.  REPRODUCTIVE ORGANS: Hysterectomy.    BOWEL/PERITONEUM: Right hemicolectomy. No bowel obstruction. Moderate   ascites. Redemonstrated fluid hematocrit level posterior to the spleen.   Peritoneal carcinomatosis with confluent implants along the course of the   transverse colon. Additional confluent peritoneal metastasis in the   cul-de-sac. Nodularity along the course of the right hemidiaphragm, in   the left upper quadrant, and within the lesser sac. No evidence of active   bleed.    VESSELS: Atherosclerotic calcifications.  RETROPERITONEUM/LYMPH NODES: Retroperitoneal lymphadenopathy. For   reference, left periaortic lymph node measures 1.4 x 1.0 cm.  ABDOMINAL WALL: Anasarca.  BONES: Degenerative changes.    IMPRESSION:  *  Status post hysterectomy with extensive peritoneal carcinomatosis and   moderate ascites.  *  Redemonstrated fluid hematocrit level posterior to the spleen,   consistent with presence of hemorrhagic products.  *  No evidence of active bleed.        --- End of Report ---

## 2023-06-28 NOTE — PROGRESS NOTE ADULT - ASSESSMENT
88y Female with history of endometrial carcinoma presents with weakness. Nephrology consulted for elevated Scr.    1) [place]: likely hemodynamically mediated in setting of relative hypotension/tachycardia and hypoalbuminemia. Scr improving with IV albumin despite CT with IV contrast on 6/27. UA bland. FeNa low. Renal US and CT with mild R hydronephrosis suspect due to underlying CA. Patient will need outpatient Urology monitoring and if no improvement with chemotherapy may need R NT. TMA work up negative. Avoid nephrotoxins. Monitor electrolytes.    2) HTN: BP low normal with tachycardia. As per cardiology.    3) LE edema: Due to hypoalbuminemia from malnutrition (patient without nephrotic range proteinuria). Continue with protein supplements. Will give lasix 40 mg IV X 1 dose today with IV albumin given complaints of dyspnea. TTE with normal LVSF. Monitor UO.    4) Hyponatremia: Improving. Continue with 1L FR. Continue with Zosyn in NS base. Monitor serum Na.      [facility] NEPHROLOGY  Brennon Cates M.D.  Addison Nieto D.O.  Guerline Dias M.D.  Merissa Parsons, GRACIA, ANP-C    Telephone: [phone]  Facsimile: [phone]    [address] [place] [address]

## 2023-06-29 NOTE — PROGRESS NOTE ADULT - PROBLEM SELECTOR PLAN 2
On oxygen now  Likely multifactorial  Discussed with primary team   Pt indicates that dyspnea improved after oxygen supplementation.

## 2023-06-29 NOTE — PROGRESS NOTE ADULT - PROBLEM SELECTOR PLAN 6
PPSV 30-40%  Needs assistance with most ADLs  Skin care  Frequent repositioning  High risk of further decline given comorbidities.

## 2023-06-29 NOTE — PROGRESS NOTE ADULT - PROBLEM SELECTOR PLAN 5
Currently pt with worsening agitation/discomfort  Pt had shared that she has had anxiety in the past  Discussed option of using BZD low dose to address acute episodes of anxiety  Would recommend:  - xanax 0.25mg PO q8hrs PRN.  - Can add Dilaudid 0.2mg IV q2-3hrs PRN for discomfort/pain

## 2023-06-29 NOTE — PROGRESS NOTE ADULT - SUBJECTIVE AND OBJECTIVE BOX
[facility] NEPHROLOGY- PROGRESS NOTE    88y Female with history of endometrial carcinoma presents with weakness. Nephrology consulted for elevated Scr.      REVIEW OF SYSTEMS:  Gen: no fevers  Cards: no chest pain  Resp: + dyspnea improving  GI: no nausea or vomiting or diarrhea  Vascular: + LE edema improving    No Known Allergies      Hospital Medications: Medications reviewed      VITALS:  T(F): 98.2 (23 @ 09:00), Max: 98.7 (23 @ 11:12)  HR: 103 (23 @ 09:00)  BP: 117/67 (23 @ 09:00)  RR: 18 (23 @ 09:00)  SpO2: 100% (23 @ 09:00)  Wt(kg): --     @ 07:01  -   @ 07:00  --------------------------------------------------------  IN: 0 mL / OUT: 0 mL / NET: 0 mL      PHYSICAL EXAM:    Gen: NAD, calm  Cards: RRR, +S1/S2, no M/G/R  Resp: Decreased BS @ R base, + R CT  GI: soft, NT/ND, NABS  : + brown  Vascular: trace LE edema B/L        LABS:      134<L>  |  96<L>  |  20  ----------------------------<  71  3.3<L>   |  26  |  1.20    Ca    8.3<L>      2023 05:53      Creatinine Trend: 1.20 <--, 1.47 <--, 1.81 <--, 1.89 <--, 1.92 <--, 1.87 <--, 1.65 <--, 1.23 <--                        8.9    15.89 )-----------( 146      ( 2023 07:59 )             26.4     Urine Studies:  Urinalysis Basic - ( 2023 15:25 )    Color: Light Yellow / Appearance: Clear / S.027 / pH:   Gluc:  / Ketone: Trace  / Bili: Negative / Urobili: <2 mg/dL   Blood:  / Protein: 30 mg/dL / Nitrite: Negative   Leuk Esterase: Negative / RBC: 117 /HPF / WBC 9 /HPF   Sq Epi:  / Non Sq Epi:  / Bacteria: Negative      Sodium, Random Urine: <20 mmol/L ( @ 00:01)  Creatinine, Random Urine: 118 mg/dL ( @ 00:01)  Protein/Creatinine Ratio Calculation: 1.2 Ratio ( @ 00:01)

## 2023-06-29 NOTE — PROGRESS NOTE ADULT - SUBJECTIVE AND OBJECTIVE BOX
SUBJECTIVE / OVERNIGHT EVENTS:pt seen and examined  23     MEDICATIONS  (STANDING):  ALPRAZolam 0.25 milliGRAM(s) Oral every 8 hours  atorvastatin 80 milliGRAM(s) Oral at bedtime  cholecalciferol 2000 Unit(s) Oral daily  cyanocobalamin 1000 MICROGram(s) Oral daily  diltiazem    Tablet 30 milliGRAM(s) Oral every 6 hours  levothyroxine 75 MICROGram(s) Oral daily  loratadine 10 milliGRAM(s) Oral daily  misoprostol 200 MICROGram(s) Oral <User Schedule>  multivitamin 1 Tablet(s) Oral daily  mupirocin 2% Ointment 1 Application(s) Topical <User Schedule>  pantoprazole    Tablet 40 milliGRAM(s) Oral before breakfast  piperacillin/tazobactam IVPB.. 3.375 Gram(s) IV Intermittent every 8 hours    MEDICATIONS  (PRN):  acetaminophen     Tablet .. 650 milliGRAM(s) Oral every 6 hours PRN Temp greater or equal to 38C (100.4F), Mild Pain (1 - 3)  albuterol    90 MICROgram(s) HFA Inhaler 2 Puff(s) Inhalation every 6 hours PRN Shortness of Breath and/or Wheezing  HYDROmorphone  Injectable 0.2 milliGRAM(s) IV Push every 4 hours PRN Severe Pain (7 - 10)  melatonin 3 milliGRAM(s) Oral at bedtime PRN Sleep  oxyCODONE    IR 5 milliGRAM(s) Oral every 4 hours PRN Moderate Pain (4 - 6)  polyethylene glycol 3350 17 Gram(s) Oral daily PRN for constipation  senna 2 Tablet(s) Oral at bedtime PRN for constipation    Vital Signs Last 24 Hrs  T(C): 37.3 (23 @ 21:13), Max: 37.3 (23 @ 21:13)  T(F): 99.2 (23 @ 21:13), Max: 99.2 (23 @ 21:13)  HR: 105 (23 @ 21:13) (102 - 110)  BP: 119/68 (23 @ 21:13) (110/57 - 125/78)  BP(mean): --  RR: 18 (23 @ 21:13) (18 - 18)  SpO2: 100% (23 @ 21:13) (100% - 100%)        Constitutional: No fever, fatigue  Skin: No rash.  Eyes: No recent vision problems or eye pain.  ENT: No congestion, ear pain, or sore throat.  Cardiovascular: No chest pain or palpation.  Respiratory: No cough, shortness of breath, congestion, or wheezing.  Gastrointestinal: No abdominal pain, nausea, vomiting, or diarrhea.  Genitourinary: No dysuria.  Musculoskeletal: No joint swelling.  Neurologic: No headache.    PHYSICAL EXAM:  GENERAL: NAD  EYES: EOMI, PERRLA  NECK: Supple, No JVD  CHEST/LUNG: dec breath sounds at bases  chest tube draining +  HEART:  S1 , S2 +  ABDOMEN: soft , bs+  EXTREMITIES:  edema+  NEUROLOGY:alert awake    LABS:      134<L>  |  96<L>  |  20  ----------------------------<  71  3.3<L>   |  26  |  1.20    Ca    8.3<L>      2023 05:53      Creatinine Trend: 1.20 <--, 1.47 <--, 1.81 <--, 1.89 <--, 1.92 <--, 1.87 <--, 1.65 <--, 1.23 <--                        8.9    15.89 )-----------( 146      ( 2023 07:59 )             26.4     Urine Studies:  Urinalysis Basic - ( 2023 15:25 )    Color: Light Yellow / Appearance: Clear / S.027 / pH:   Gluc:  / Ketone: Trace  / Bili: Negative / Urobili: <2 mg/dL   Blood:  / Protein: 30 mg/dL / Nitrite: Negative   Leuk Esterase: Negative / RBC: 117 /HPF / WBC 9 /HPF   Sq Epi:  / Non Sq Epi:  / Bacteria: Negative      Sodium, Random Urine: <20 mmol/L ( @ 00:01)  Creatinine, Random Urine: 118 mg/dL ( @ 00:01)  Protein/Creatinine Ratio Calculation: 1.2 Ratio ( @ 00:01)

## 2023-06-29 NOTE — PROGRESS NOTE ADULT - SUBJECTIVE AND OBJECTIVE BOX
Patient is a 88y old  Female who presents with a chief complaint of Generalized weakness (29 Jun 2023 10:44)    Patient seen this morning. She reports feeling fine and denies pain or discomfort. Appears very weak and tired.    MEDICATIONS  (STANDING):  albumin human 25% IVPB 50 milliLiter(s) IV Intermittent every 6 hours  ALPRAZolam 0.25 milliGRAM(s) Oral every 8 hours  atorvastatin 80 milliGRAM(s) Oral at bedtime  cholecalciferol 2000 Unit(s) Oral daily  cyanocobalamin 1000 MICROGram(s) Oral daily  diltiazem    Tablet 30 milliGRAM(s) Oral every 6 hours  levothyroxine 75 MICROGram(s) Oral daily  loratadine 10 milliGRAM(s) Oral daily  misoprostol 200 MICROGram(s) Oral <User Schedule>  multivitamin 1 Tablet(s) Oral daily  mupirocin 2% Ointment 1 Application(s) Topical <User Schedule>  pantoprazole    Tablet 40 milliGRAM(s) Oral before breakfast  piperacillin/tazobactam IVPB.. 3.375 Gram(s) IV Intermittent every 8 hours    MEDICATIONS  (PRN):  acetaminophen     Tablet .. 650 milliGRAM(s) Oral every 6 hours PRN Temp greater or equal to 38C (100.4F), Mild Pain (1 - 3)  albuterol    90 MICROgram(s) HFA Inhaler 2 Puff(s) Inhalation every 6 hours PRN Shortness of Breath and/or Wheezing  melatonin 3 milliGRAM(s) Oral at bedtime PRN Sleep  polyethylene glycol 3350 17 Gram(s) Oral daily PRN for constipation  senna 2 Tablet(s) Oral at bedtime PRN for constipation        Vital Signs Last 24 Hrs  T(C): 37.1 (29 Jun 2023 11:22), Max: 37.1 (29 Jun 2023 11:22)  T(F): 98.8 (29 Jun 2023 11:22), Max: 98.8 (29 Jun 2023 11:22)  HR: 109 (29 Jun 2023 11:22) (99 - 111)  BP: 110/57 (29 Jun 2023 11:22) (97/55 - 121/65)  BP(mean): --  RR: 18 (29 Jun 2023 11:22) (18 - 18)  SpO2: 100% (29 Jun 2023 11:22) (97% - 100%)    Parameters below as of 29 Jun 2023 09:00  Patient On (Oxygen Delivery Method): nasal cannula  O2 Flow (L/min): 2      PE  tired-appearing   awake, alert, NAD   Anicteric, MMM  Abd soft, NT, ND  No c/c/e  No rash grossly                            8.9    15.89 )-----------( 146      ( 29 Jun 2023 07:59 )             26.4       06-28    134<L>  |  96<L>  |  20  ----------------------------<  71  3.3<L>   |  26  |  1.20    Ca    8.3<L>      28 Jun 2023 05:53

## 2023-06-29 NOTE — PROGRESS NOTE ADULT - SUBJECTIVE AND OBJECTIVE BOX
SUBJECTIVE AND OBJECTIVE: pt c/o pain and discomfort, indicates that she's tired    Indication for Geriatrics and Palliative Care Services/INTERVAL HPI: complex symptom management and decision making in the setting of advanced illness    OVERNIGHT EVENTS: worsening MS    DNR on chart: full code  Allergies    No Known Allergies    Intolerances    MEDICATIONS  (STANDING):  ALPRAZolam 0.25 milliGRAM(s) Oral every 8 hours  atorvastatin 80 milliGRAM(s) Oral at bedtime  cholecalciferol 2000 Unit(s) Oral daily  cyanocobalamin 1000 MICROGram(s) Oral daily  diltiazem    Tablet 30 milliGRAM(s) Oral every 6 hours  levothyroxine 75 MICROGram(s) Oral daily  loratadine 10 milliGRAM(s) Oral daily  misoprostol 200 MICROGram(s) Oral <User Schedule>  multivitamin 1 Tablet(s) Oral daily  mupirocin 2% Ointment 1 Application(s) Topical <User Schedule>  pantoprazole    Tablet 40 milliGRAM(s) Oral before breakfast  piperacillin/tazobactam IVPB.. 3.375 Gram(s) IV Intermittent every 8 hours  potassium phosphate / sodium phosphate Tablet (K-PHOS No. 2) 1 Tablet(s) Oral three times a day with meals    MEDICATIONS  (PRN):  acetaminophen     Tablet .. 650 milliGRAM(s) Oral every 6 hours PRN Temp greater or equal to 38C (100.4F), Mild Pain (1 - 3)  albuterol    90 MICROgram(s) HFA Inhaler 2 Puff(s) Inhalation every 6 hours PRN Shortness of Breath and/or Wheezing  HYDROmorphone  Injectable 0.2 milliGRAM(s) IV Push every 4 hours PRN Severe Pain (7 - 10)  melatonin 3 milliGRAM(s) Oral at bedtime PRN Sleep  oxyCODONE    IR 5 milliGRAM(s) Oral every 4 hours PRN Moderate Pain (4 - 6)  polyethylene glycol 3350 17 Gram(s) Oral daily PRN for constipation  senna 2 Tablet(s) Oral at bedtime PRN for constipation      ITEMS UNCHECKED ARE NOT PRESENT    PRESENT SYMPTOMS: [x ]Unable to self-report - see [ ] CPOT [ x] PAINADS [x ] RDOS  Source if other than patient:  [ ]Family   [ ]Team     Pain:  [ ]yes [ ]no  QOL impact -   Location -                    Aggravating factors -  Quality -  Radiation -  Timing-  Severity (0-10 scale):  Minimal acceptable level (0-10 scale):     CPOT:    https://www.[facility].org/getattachment/[email or web address]/Critical-Care-Pain-Observation-Tool-(CPOT)    PAIN AD Score:	6  http://geriatrictoolkit.[facility]/cog/painad.pdf (Ctrl + left click to view)    Dyspnea:                           [ ]Mild [ ]Moderate [ ]Severe    RDOS:0  0 to 2  minimal or no respiratory distress   3  mild distress  4 to 6 moderate distress  >7 severe distress  https://homecareinformation.net/handouts/hen/Respiratory_Distress_Observation_Scale.pdf (Ctrl +  left click to view)     Anxiety:                             [ ]Mild [ ]Moderate [ ]Severe  Fatigue:                             [ ]Mild [ ]Moderate [ ]Severe  Nausea:                             [ ]Mild [ ]Moderate [ ]Severe  Loss of appetite:              [ ]Mild [ ]Moderate [ ]Severe  Constipation:                    [ ]Mild [ ]Moderate [ ]Severe    PCSSQ[Palliative Care Spiritual Screening Question]   Severity (0-10): deferred  Score of 4 or > indicate consideration of Chaplaincy referral.    Chaplaincy Referral: [ ] yes [ ] refused [ ] following [x] deferred    Other Symptoms:  [ ]All other review of systems negative     Palliative Performance Status Version 2:  30-40  %      http://[facility].org/files/news/palliative_performance_scale_ppsv2.pdf    Vital Signs Last 24 Hrs  T(C): 37.3 (06-29-23 @ 21:13), Max: 37.3 (06-29-23 @ 21:13)  T(F): 99.2 (06-29-23 @ 21:13), Max: 99.2 (06-29-23 @ 21:13)  HR: 105 (06-29-23 @ 21:13) (102 - 110)  BP: 119/68 (06-29-23 @ 21:13) (110/57 - 125/78)  BP(mean): --  RR: 18 (06-29-23 @ 21:13) (18 - 18)  SpO2: 100% (06-29-23 @ 21:13) (100% - 100%)     GENERAL: [ ]Cachexia    [x ]Alert  [ ]Oriented x   [ ]Lethargic  [ ]Unarousable  [x ]Verbal  [ ]Non-Verbal  Behavioral:   [ ]Anxiety  [x ]Delirium [x ]Agitation [ ]Other  HEENT:  [ ]Normal   [x ]Dry mouth   [ ]ET Tube/Trach  [ ]Oral lesions  PULMONARY:   [ ]Clear [ ]Tachypnea  [ ]Audible excessive secretions   [x ]Rhonchi        [ ]Right [ ]Left [ ]Bilateral  [ ]Crackles        [ ]Right [ ]Left [ ]Bilateral  [ ]Wheezing     [ ]Right [ ]Left [ ]Bilateral  [ ]Diminished BS [ ] Right [ ]Left [ ]Bilateral  CARDIOVASCULAR:    [x ]Regular [ ]Irregular [ ]Tachy  [ ]Alejandro [ ]Murmur [ ]Other  GASTROINTESTINAL:  [x ]Soft  [ ]Distended   [x ]+BS  [ x]Non tender [ ]Tender  [ ]Other [ ]PEG [ ]OGT/ NGT   Last BM:   GENITOURINARY:  [ ]Normal [ x]Incontinent   [ ]Oliguria/Anuria   [ ]Birch  MUSCULOSKELETAL:   [ ]Normal   [ ]Weakness  [ x]Bed/Wheelchair bound [ ]Edema  NEUROLOGIC:   [ ]No focal deficits  [x ] Cognitive impairment  [ ] Dysphagia [ ]Dysarthria [ ] Paresis [ ]Other   SKIN:   [x ]Normal  [ ]Rash  [ ]Other  [ ]Pressure ulcer(s) [ ]y [ ]n present on admission    CRITICAL CARE:  [ ]Shock Present  [ ]Septic [ ]Cardiogenic [ ]Neurologic [ ]Hypovolemic  [ ]Vasopressors [ ]Inotropes  [ ]Respiratory failure present [ ]Mechanical Ventilation [ ]Non-invasive ventilatory support [ ]High-Flow   [ ]Acute  [ ]Chronic [ ]Hypoxic  [ ]Hypercarbic [ ]Other  [ ]Other organ failure     LABS:                        9.4    15.28 )-----------( 135      ( 30 Jun 2023 04:52 )             25.5   06-30    135  |  95<L>  |  12  ----------------------------<  96  3.3<L>   |  27  |  0.96    Ca    8.4      30 Jun 2023 04:52  Phos  1.8     06-30  Mg     1.40     06-30    Urinalysis Basic - ( 30 Jun 2023 04:52 )    Color: x / Appearance: x / SG: x / pH: x  Gluc: 96 mg/dL / Ketone: x  / Bili: x / Urobili: x   Blood: x / Protein: x / Nitrite: x   Leuk Esterase: x / RBC: x / WBC x   Sq Epi: x / Non Sq Epi: x / Bacteria: x    RADIOLOGY & ADDITIONAL STUDIES: reviewed    Protein Calorie Malnutrition Present: [ ]mild [ ]moderate [ ]severe [ ]underweight [ ]morbid obesity  https://www.andeal.org/vault/2440/web/files/ONC/Table_Clinical%20Characteristics%20to%20Document%20Malnutrition-White%20JV%20et%20al%202012.pdf    Height (cm): 144.8 (06-20-23 @ 11:38), 144 (04-24-23 @ 10:50), 144.8 (08-30-22 @ 18:34)  Weight (kg): 60 (06-21-23 @ 06:16), 60.4 (04-24-23 @ 10:50), 63.2 (08-31-22 @ 03:27)  BMI (kg/m2): 28.6 (06-21-23 @ 06:16), 28.8 (06-20-23 @ 11:38), 29.1 (04-24-23 @ 10:50)    [ ]PPSV2 < or = 30%  [ ]significant weight loss [ ]poor nutritional intake [ ]anasarca[ ]Artificial Nutrition    Other REFERRALS:  [ ]Hospice  [ ]Child Life  [ ]Social Work  [ ]Case management [ ]Holistic Therapy     Goals of Care Document:

## 2023-06-29 NOTE — PROGRESS NOTE ADULT - PROBLEM SELECTOR PLAN 7
Pt remains full code see GOC  Family interested in LTC placement, in agreement with recommendation with hospice services at LTC  Will continue to follow  Page for uncontrolled symptoms [id number]  Discussed with primary team.

## 2023-06-29 NOTE — PROGRESS NOTE ADULT - SUBJECTIVE AND OBJECTIVE BOX
CC: Patient is a 88y old  Female who presents with a chief complaint of Generalized weakness (2023 12:31)    ID following for fever    Interval History/ROS: Patient has no new complaints. No fevers, no chills. Remains on 2L NC. R chest tube in place.     Rest of ROS negative.    Allergies  No Known Allergies    ANTIMICROBIALS:  piperacillin/tazobactam IVPB.. 3.375 every 8 hours    OTHER MEDS:  acetaminophen     Tablet .. 650 milliGRAM(s) Oral every 6 hours PRN  albuterol    90 MICROgram(s) HFA Inhaler 2 Puff(s) Inhalation every 6 hours PRN  ALPRAZolam 0.25 milliGRAM(s) Oral every 8 hours  atorvastatin 80 milliGRAM(s) Oral at bedtime  cholecalciferol 2000 Unit(s) Oral daily  cyanocobalamin 1000 MICROGram(s) Oral daily  diltiazem    Tablet 30 milliGRAM(s) Oral every 6 hours  HYDROmorphone  Injectable 0.2 milliGRAM(s) IV Push every 4 hours PRN  levothyroxine 75 MICROGram(s) Oral daily  loratadine 10 milliGRAM(s) Oral daily  melatonin 3 milliGRAM(s) Oral at bedtime PRN  misoprostol 200 MICROGram(s) Oral <User Schedule>  multivitamin 1 Tablet(s) Oral daily  mupirocin 2% Ointment 1 Application(s) Topical <User Schedule>  oxyCODONE    IR 5 milliGRAM(s) Oral every 4 hours PRN  pantoprazole    Tablet 40 milliGRAM(s) Oral before breakfast  polyethylene glycol 3350 17 Gram(s) Oral daily PRN  senna 2 Tablet(s) Oral at bedtime PRN    PE:    Vital Signs Last 24 Hrs  T(C): 36.3 (2023 13:12), Max: 37.1 (2023 11:22)  T(F): 97.3 (2023 13:12), Max: 98.8 (2023 11:22)  HR: 110 (2023 13:12) (99 - 111)  BP: 114/72 (2023 13:12) (97/55 - 121/65)  BP(mean): --  RR: 18 (2023 13:12) (18 - 18)  SpO2: 100% (2023 13:12) (97% - 100%)    Parameters below as of 2023 13:12  Patient On (Oxygen Delivery Method): nasal cannula  O2 Flow (L/min): 2    Gen: AOx3, NAD  CV: S1+S2 normal, no murmurs  Resp: R chest tube in place  Abd: Soft, nontender, +BS  Ext: No LE edema, no wounds  : +Birch  IV/Skin: No thrombophlebitis  Neuro: no focal deficits    LABS:                          8.9    15.89 )-----------( 146      ( 2023 07:59 )             26.4       06    134<L>  |  96<L>  |  20  ----------------------------<  71  3.3<L>   |  26  |  1.20    Ca    8.3<L>      2023 05:53        Urinalysis Basic - ( 2023 15:25 )    Color: Light Yellow / Appearance: Clear / S.027 / pH: x  Gluc: x / Ketone: Trace  / Bili: Negative / Urobili: <2 mg/dL   Blood: x / Protein: 30 mg/dL / Nitrite: Negative   Leuk Esterase: Negative / RBC: 117 /HPF / WBC 9 /HPF   Sq Epi: x / Non Sq Epi: x / Bacteria: Negative        MICROBIOLOGY:  v  Clean Catch Clean Catch (Midstream)  23   No growth  --  --      Pleural Fl Pleural Fluid  23   No growth at 5 days  --    No polymorphonuclear cells seen per low power field  No organisms seen per oil power field      .Blood Blood-Peripheral  23   No growth at 5 days  --  --      .Blood Blood-Peripheral  23   No growth at 5 days  --  --    Rapid RVP Result: NotDetec ( @ 11:52)    RADIOLOGY:    < from: CT Abdomen and Pelvis No Cont (23 @ 12:39) >  IMPRESSION:  Mild right hydroureteronephrosis, likely due to peritoneal disease,   unchanged.    Moderate volume ascites, unchanged.    Persistent bilateral renal nephrograms which can be seen with renal   dysfunction.    < end of copied text >

## 2023-06-29 NOTE — CHART NOTE - NSCHARTNOTEFT_GEN_A_CORE
Patient seen at bedside, resting in bed, on 2L O2 via NC.  Patient states her breathing is okay, and her pain is well controlled.  Denies any new complaints at the time.    Vital Signs Last 24 Hrs  T(C): 36.3 (29 Jun 2023 13:12), Max: 37.1 (29 Jun 2023 11:22)  T(F): 97.3 (29 Jun 2023 13:12), Max: 98.8 (29 Jun 2023 11:22)  HR: 110 (29 Jun 2023 13:12) (102 - 111)  BP: 114/72 (29 Jun 2023 13:12) (97/55 - 121/65)  BP(mean): --  RR: 18 (29 Jun 2023 13:12) (18 - 18)  SpO2: 100% (29 Jun 2023 13:12) (97% - 100%)    Parameters below as of 29 Jun 2023 13:12  Patient On (Oxygen Delivery Method): nasal cannula  O2 Flow (L/min): 2    Weak, thin  R PTC in place, no AL.                            8.9    15.89 )-----------( 146      ( 29 Jun 2023 07:59 )             26.4       06-28    134<L>  |  96<L>  |  20  ----------------------------<  71  3.3<L>   |  26  |  1.20    Ca    8.3<L>      28 Jun 2023 05:53    Assessment  88F presents with a chief complaint of Generalized weakness, underwent R PTC placement. Course c/b retroperitoneal hematoma on CT scan.   6/29 - CT to WS, still with low output    PLAN  - no plan for thoracic surgery intervention at this time  - continue PTC to waterseal, output with continued low volume  - Will speak with family and patient regarding possible removal of PTC  - Continue care per primary team  - Thoracic to follow  - Above d/w Dr. Boyd Patient seen at bedside, resting in bed, on 2L O2 via NC.  Patient states her breathing is okay, and her pain is well controlled.  Denies any new complaints at the time.    Vital Signs Last 24 Hrs  T(C): 36.3 (29 Jun 2023 13:12), Max: 37.1 (29 Jun 2023 11:22)  T(F): 97.3 (29 Jun 2023 13:12), Max: 98.8 (29 Jun 2023 11:22)  HR: 110 (29 Jun 2023 13:12) (102 - 111)  BP: 114/72 (29 Jun 2023 13:12) (97/55 - 121/65)  BP(mean): --  RR: 18 (29 Jun 2023 13:12) (18 - 18)  SpO2: 100% (29 Jun 2023 13:12) (97% - 100%)    Parameters below as of 29 Jun 2023 13:12  Patient On (Oxygen Delivery Method): nasal cannula  O2 Flow (L/min): 2    Weak, thin  R PTC in place, no AL.                            8.9    15.89 )-----------( 146      ( 29 Jun 2023 07:59 )             26.4       06-28    134<L>  |  96<L>  |  20  ----------------------------<  71  3.3<L>   |  26  |  1.20    Ca    8.3<L>      28 Jun 2023 05:53    Assessment  88F presents with a chief complaint of Generalized weakness, underwent R PTC placement. Course c/b retroperitoneal hematoma on CT scan.   6/29 - CT to WS, still with low output    PLAN  - no plan for thoracic surgery intervention at this time  - continue PTC to waterseal, output with continued low volume  - Will speak with family and patient regarding possible removal of PTC  - Continue care per primary team  - Thoracic to follow  - Above d/w Dr. Boyd      patient seen, frail/debiliated . daughter at bedside, inquiring about tube removal for pain relief. we were in agreement - but she wanted to speak to her family as patient may reaccumulate. given recent events w/ drop in h/h and questionable abdominal bleed - deferring any invasive procedures for right pleural effusion.   consider goals of care discussion .

## 2023-06-29 NOTE — PROGRESS NOTE ADULT - PROBLEM SELECTOR PLAN 6
Pt with history of stage 4 serous endometrial carcinoma (dx 5/2022) s/p chemo (last in 12/2022) and debulking surgery with POOL/BSO (2/10/23), now recently found to have recurrence of disease with new liver mets.  - Pt follows with Dr. Alvarado of [facility].   - GOC discussion with pt and family given likely poor prognosis

## 2023-06-29 NOTE — PROGRESS NOTE ADULT - ASSESSMENT
88y Female with history of endometrial carcinoma presents with weakness. Nephrology consulted for elevated Scr.    1) [place]: likely hemodynamically mediated in setting of relative hypotension/tachycardia and hypoalbuminemia. Scr improving with IV albumin despite CT with IV contrast on 6/27. F/U labs this morning. UA bland. FeNa low. Renal US and CT with mild R hydronephrosis suspect due to underlying CA. Patient will need outpatient Urology monitoring and if no improvement with chemotherapy may need R NT. TMA work up negative. Avoid nephrotoxins. Monitor electrolytes.    2) HTN: BP low normal with tachycardia. As per cardiology.    3) LE edema: Due to hypoalbuminemia from malnutrition (patient without nephrotic range proteinuria). S/P lasix 40 mg IV X 1 dose on 6/28. Continue with protein supplements. TTE with normal LVSF. Monitor UO.    4) Hyponatremia: Improving. Continue with 1L FR. Continue with Zosyn in NS base. Monitor serum Na.      [facility] NEPHROLOGY  Brennon Cates M.D.  Addison Nieto D.O.  Guerline Dias M.D.  Merissa Parsons, MSN, ANP-C    Telephone: [phone]  Facsimile: [phone]    [address] [place] [address]

## 2023-06-29 NOTE — PROGRESS NOTE ADULT - ASSESSMENT
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    1. Uterine carcinosarcoma   -- Under care of Dr. Alvarado at [facility]  -- s/p neoadjuvant chemotherapy completed 12/2022, s/p POOL-BSO 02/10/2023   -- Recent imaging suggests disease recurrence- had plans to begin carboplatin as an outpatient but now pt and her family would like her to be comfortable   -- Pt declining, would likely not be a candidate for further treatment. Recommend palliative care follow-up to readdress goals of care with patient and her family.    2. Dyspnea on exertion   -- CTA chest w/o PE; + large right pleural effusion   -- LE duplex without DVT   -- Thoracic consulted, pigtail catheter placed for temporary draining- f/u cytology. No further intervention planned at this time.  -- Continue supplemental O2 as needed, wean as tolerated     3. Anemia, thrombocytopenia   -- Likely secondary to malignancy, chronic disease, acute illness   -- No evidence of iron, B12, or folate deficiencies.  -- Monitor CBC and transfuse to maintain hg >7, platelet count >10K, >15K if febrile, or >50K if bleeding   -- Hg acutely decreased. CT a/p shows possible RP bleed. Surgery consulted, CTA AP without active bleed- no intervention.    Recommend palliative care team to follow up with patient and her family.  Will continue to follow.    Twyla Pitts PA-C  Hematology/Oncology  [facility]  [phone] (office)  [phone] (alt office)  Evenings and weekends please call MD on call or office

## 2023-06-29 NOTE — PROGRESS NOTE ADULT - SUBJECTIVE AND OBJECTIVE BOX
Rony Mcgarry MD  Interventional Cardiology / Advance Heart Failure and Cardiac Transplant Specialist  [place] Office : [address] [place] [id number]  Tel:   [place] Office : [phone] [facility] [address]  Tel: [phone]      Subjective/Overnight events: Pt is lying in bed no chest pains no SOB no palpitations  	  MEDICATIONS:  diltiazem    Tablet 30 milliGRAM(s) Oral every 6 hours    piperacillin/tazobactam IVPB.. 3.375 Gram(s) IV Intermittent every 8 hours    albuterol    90 MICROgram(s) HFA Inhaler 2 Puff(s) Inhalation every 6 hours PRN  loratadine 10 milliGRAM(s) Oral daily    acetaminophen     Tablet .. 650 milliGRAM(s) Oral every 6 hours PRN  ALPRAZolam 0.25 milliGRAM(s) Oral every 8 hours  melatonin 3 milliGRAM(s) Oral at bedtime PRN    misoprostol 200 MICROGram(s) Oral <User Schedule>  pantoprazole    Tablet 40 milliGRAM(s) Oral before breakfast  polyethylene glycol 3350 17 Gram(s) Oral daily PRN  senna 2 Tablet(s) Oral at bedtime PRN    atorvastatin 80 milliGRAM(s) Oral at bedtime  levothyroxine 75 MICROGram(s) Oral daily    albumin human 25% IVPB 50 milliLiter(s) IV Intermittent every 6 hours  cholecalciferol 2000 Unit(s) Oral daily  cyanocobalamin 1000 MICROGram(s) Oral daily  multivitamin 1 Tablet(s) Oral daily  mupirocin 2% Ointment 1 Application(s) Topical <User Schedule>      PAST MEDICAL/SURGICAL HISTORY  PAST MEDICAL & SURGICAL HISTORY:  Endometrial ca  S4 serous endometrial carcinoma, [facility], chemotherapy      HTN (hypertension)      CAD (coronary artery disease)      S/P AVR      Hypothyroidism      HLD (hyperlipidemia)      S/P AVR (aortic valve replacement)      History of endometrial biopsy              PHYSICAL EXAM:  T(C): 36.8 (06-29-23 @ 09:00), Max: 37.1 (06-28-23 @ 11:12)  HR: 103 (06-29-23 @ 09:00) (99 - 120)  BP: 117/67 (06-29-23 @ 09:00) (97/55 - 121/65)  RR: 18 (06-29-23 @ 09:00) (18 - 18)  SpO2: 100% (06-29-23 @ 09:00) (96% - 100%)  Wt(kg): --  I&O's Summary    28 Jun 2023 07:01  -  29 Jun 2023 07:00  --------------------------------------------------------  IN: 0 mL / OUT: 0 mL / NET: 0 mL          GENERAL: NAD  EYES: conjunctiva and sclera clear  ENMT: No tonsillar erythema, exudates, or enlargement  Cardiovascular: Normal S1 S2, No JVD, No murmurs, No edema  Respiratory: Decreased b/s b/l   Gastrointestinal:  Soft, Non-tender, + BS	  Extremities: Normal edema                                    8.9    15.89 )-----------( 146      ( 29 Jun 2023 07:59 )             26.4     06-28    134<L>  |  96<L>  |  20  ----------------------------<  71  3.3<L>   |  26  |  1.20    Ca    8.3<L>      28 Jun 2023 05:53      proBNP:   Lipid Profile:   HgA1c:   TSH:     Consultant(s) Notes Reviewed:  [x ] YES  [ ] NO    Care Discussed with Consultants/Other Providers [ x] YES  [ ] NO    Imaging Personally Reviewed independently:  [x] YES  [ ] NO    All labs, radiologic studies, vitals, orders and medications list reviewed. Patient is seen and examined at bedside. Case discussed with medical team.

## 2023-06-29 NOTE — PROGRESS NOTE ADULT - ASSESSMENT
88 F with stage 4 serous endometrial Ca diagnosed in 5/2022 s/p chemo last in 12/2022, s/p POOL/ BSO 2/2023 complicated with bilateral pleural effusion R>L on 2L NC, PE, GIB, CAD s/p stent, aortic stenosis s/p TAVR, HTN, HLD, hypothyroidism, presenting with generalized weakness.     Recent PET/CT with recurrence of disease, new liver mets   Fevers, leukocytosis, [place]   CTA chest with no PE, but with large right and trace left pleural effusions, increased on the right and decrease on the left when compared to prior exam, anasarca.  R chest tube in place 6/23, 1.1L serosanguineous fluid removed - cultures remain neg    CT A/P with hemorrhage fluid level at the posterior aspect of the spleen and likely   at the posterior aspect of the liver concerning for active bleeding. Peroneal carcinomatosis with a moderate amount of abdominal and pelvic ascites. Multiple bilobar liver lesions measuring up to 4.9 x 3.7 cm in the right hepatic lobe suspicious for metastases.  Partially imaged moderate right and small left pleural effusions.    Right middle lobe pulmonary nodules measuring up to 4 mm, mildly enlarged   para-aortic lymph nodes, and nodularity in the subpleural region;   metastatic disease cannot be excluded.     Recommend:  -Suspect from underlying metastatic malignancy vs bleed  -F/u R pleural effusion fluid cx/ path   -Continue Zosyn to complete 7 days tomorrow 6/30  -Monitor fever curve  -Trend WBC  -Onc following   -Trend Hgb/ Hct - possible hemorrhage fluid on CT -> CT angio negative for active bleed    Pop Chappell MD  Available through MS Teams  If no response, or after 5pm/weekends, call [phone]    Will sign off. Please call with questions.

## 2023-06-29 NOTE — PROGRESS NOTE ADULT - SUBJECTIVE AND OBJECTIVE BOX
PULMONARY PROGRESS NOTE    FRIDA WELCH  MRN-[id number]    Patient is a 88y old  Female who presents with a chief complaint of Generalized weakness (29 Jun 2023 09:24)      HPI:  on 5L  awake/alert  more responsive   ROS:   -    ACTIVE MEDICATION LIST:  MEDICATIONS  (STANDING):  albumin human 25% IVPB 50 milliLiter(s) IV Intermittent every 6 hours  ALPRAZolam 0.25 milliGRAM(s) Oral every 8 hours  atorvastatin 80 milliGRAM(s) Oral at bedtime  cholecalciferol 2000 Unit(s) Oral daily  cyanocobalamin 1000 MICROGram(s) Oral daily  diltiazem    Tablet 30 milliGRAM(s) Oral every 6 hours  levothyroxine 75 MICROGram(s) Oral daily  loratadine 10 milliGRAM(s) Oral daily  misoprostol 200 MICROGram(s) Oral <User Schedule>  multivitamin 1 Tablet(s) Oral daily  mupirocin 2% Ointment 1 Application(s) Topical <User Schedule>  pantoprazole    Tablet 40 milliGRAM(s) Oral before breakfast  piperacillin/tazobactam IVPB.. 3.375 Gram(s) IV Intermittent every 8 hours    MEDICATIONS  (PRN):  acetaminophen     Tablet .. 650 milliGRAM(s) Oral every 6 hours PRN Temp greater or equal to 38C (100.4F), Mild Pain (1 - 3)  albuterol    90 MICROgram(s) HFA Inhaler 2 Puff(s) Inhalation every 6 hours PRN Shortness of Breath and/or Wheezing  melatonin 3 milliGRAM(s) Oral at bedtime PRN Sleep  polyethylene glycol 3350 17 Gram(s) Oral daily PRN for constipation  senna 2 Tablet(s) Oral at bedtime PRN for constipation      EXAM:  Vital Signs Last 24 Hrs  T(C): 36.8 (29 Jun 2023 09:00), Max: 37.1 (28 Jun 2023 11:12)  T(F): 98.2 (29 Jun 2023 09:00), Max: 98.7 (28 Jun 2023 11:12)  HR: 103 (29 Jun 2023 09:00) (99 - 120)  BP: 117/67 (29 Jun 2023 09:00) (97/55 - 121/65)  BP(mean): --  RR: 18 (29 Jun 2023 09:00) (18 - 18)  SpO2: 100% (29 Jun 2023 09:00) (96% - 100%)    Parameters below as of 29 Jun 2023 09:00  Patient On (Oxygen Delivery Method): nasal cannula  O2 Flow (L/min): 2      GENERAL: The patient is awake and alert in no apparent distress.     LUNGS:  ; respirations unlabored                             8.9    15.89 )-----------( 146      ( 29 Jun 2023 07:59 )             26.4       06-28    134<L>  |  96<L>  |  20  ----------------------------<  71  3.3<L>   |  26  |  1.20    Ca    8.3<L>      28 Jun 2023 05:53       < from: CT Angio Abdomen and Pelvis w/ IV Cont (06.27.23 @ 12:31) >    ACC: [id number] EXAM:  CT ANGIO ABD PELV (W)AW IC   ORDERED BY: JOEL SHETH     PROCEDURE DATE:  06/27/2023          INTERPRETATION:  CLINICAL INFORMATION: Endometrial cancer. Evaluate for   active bleeding.    COMPARISON: 6/26/2023    CONTRAST/COMPLICATIONS:  IV Contrast: Omnipaque 350  90 cc administered   10 cc discarded  Oral Contrast: NONE  Complications: None reported at time of study completion    PROCEDURE:  CT of the Abdomen and Pelvis was performed.  Precontrast, Arterial and Delayed phases were performed.  Sagittal and coronal reformats were performed.    FINDINGS:  LOWER CHEST: Bilateral pleural effusions and right pleural metastases.   Partially visualized right pleural drainage catheter. TAVR.    LIVER: Liver lesions consistent with metastasis most prominent measuring   2.2 x 1.9 cm in the right hepatic lobe. A larger ill-defined lesion in   the dome of the right hepatic lobe is partially obscured due to beam   hardening artifact from the patient's arms. Subcentimeter lesions too   small to characterize.  BILE DUCTS: Normal caliber.  GALLBLADDER: Cholelithiasis.  SPLEEN: Within normal limits.  PANCREAS: Within normal limits.  ADRENALS: Within normal limits.  KIDNEYS/URETERS: Mild right hydronephrosis.    BLADDER: Surgically decompressed with a Birch catheter.  REPRODUCTIVE ORGANS: Hysterectomy.    BOWEL/PERITONEUM: Right hemicolectomy. No bowel obstruction. Moderate   ascites. Redemonstrated fluid hematocrit level posterior to the spleen.   Peritoneal carcinomatosis with confluent implants along the course of the   transverse colon. Additional confluent peritoneal metastasis in the   cul-de-sac. Nodularity along the course of the right hemidiaphragm, in   the left upper quadrant, and within the lesser sac. No evidence of active   bleed.    VESSELS: Atherosclerotic calcifications.  RETROPERITONEUM/LYMPH NODES: Retroperitoneal lymphadenopathy. For   reference, left periaortic lymph node measures 1.4 x 1.0 cm.  ABDOMINAL WALL: Anasarca.  BONES: Degenerative changes.    IMPRESSION:  *  Status post hysterectomy with extensive peritoneal carcinomatosis and   moderate ascites.  *  Redemonstrated fluid hematocrit level posterior to the spleen,   consistent with presence of hemorrhagic products.  *  No evidence of active bleed.        --- End of Report ---            BETH CARRASCO MD; Attending Radiologist  This document has been electronically signed. Jun 27 2023  3:26PM    < end of copied text >  < from: Xray Chest 1 View- PORTABLE-Routine (Xray Chest 1 View- PORTABLE-Routine in AM.) (06.26.23 @ 08:02) >    ACC: [id number] EXAM:  XR CHEST PORTABLE ROUTINE 1V   ORDERED BY: MANDY BRINK     PROCEDURE DATE:  06/26/2023          INTERPRETATION:  Chest one view    HISTORY: Postop    COMPARISON STUDY: 6/25/2023    Frontal expiratory view of the chest shows the heart to be similar in   size. Right chest port, right pigtail catheter and aortic valve stent are   again noted.    The lungs show small left effusion with reduction of right effusion and   there is no evidence of pneumothorax.    IMPRESSION:  Smaller right effusion. No pneumothorax.        Thank you for the courtesy of this referral.    --- End of Report ---            PRITESH REYNOLDS MD; Attending Interventional Radiologist  This document has been electronically signed. Jun 26 2023  3:45PM    < end of copied text >      PROBLEM LIST:  88y Female with HEALTH ISSUES - PROBLEM Dx:  Generalized weakness    Shortness of breath    INDIANA (acute kidney injury)    Leukocytosis    Anemia    Endometrial cancer    Pulmonary embolism    CAD (coronary artery disease)    Hypertension    Hyperlipidemia    Hypothyroidism    Need for prophylactic measure    Anxiety    Lack of appetite    Acute respiratory failure with hypoxia    Palliative care encounter        RECS:  f/u path on the pleural fluid  f/u palliative  prognosis guarded      Please call with any questions.    Jami Denny,   [facility] Pulmonary/Sleep Medicine  [phone]

## 2023-06-29 NOTE — PROGRESS NOTE ADULT - ASSESSMENT
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     EKG: MAT   Tele: MAT    1) Multifocal Atrial Tachycardia   -started Dilt 30 mg po q6 hrs, BP soft HR low 100s  -continue to monitor on tele     2) Pleural effusion  -s/p Pig tail   -f/u pulm and thoracic recs    3) hx of PE  - RUL segmental PE (8/2022  -AC on hold 2/2 anemia    4) CAD s/p PCI  -denies CP  -TTE with normal LV function    5) TAVR  -TTE transcatheter aortic valve replacement Peak transaortic valve gradient equals 42 mm Hg, mean transaortic valve gradient equals 23 mm Hg, which is probably normal in the presence of a prosthetic valve. Moderate valvular aortic regurgitation.    6) Anemia  -transfuse to keep hemoglobin >8  -CT scan non con showing bleeding near the spleen and liver. surgery on board, CTA abd/pelvis with no acute bleed

## 2023-06-29 NOTE — PROGRESS NOTE ADULT - PROBLEM SELECTOR PLAN 1
Initially dx in 5/2022 s/p chemo and debulking sx with POOL on 2/10/23  Now with findings of recurrence and progression of disease  [place] [facility] [place] following and pending input.

## 2023-06-30 NOTE — PROGRESS NOTE ADULT - ASSESSMENT
87 yo woman with history of stage 4 serous endometrial carcinoma (dx 5/2022) s/p chemo (last in 12/2022) and debulking surgery with POOL/BSO (2/10/23) c/b b/l pleural effusions (R > L, on 2L NC PRN), RUL segmental PE (8/2022, on Eliquis), GI bleed (4/2023), CAD s/p stent (3/2021), aortic stenosis s/p TAVR, HTN, HLD, and hypothyroidism presents with 3 weeks of worsening generalized weakness.     EKG: MAT   Tele: MAT    1) Multifocal Atrial Tachycardia   -started Dilt 30 mg po q6 hrs, BP soft HR low 100s  -continue to monitor on tele     2) Pleural effusion  -s/p Pig tail   -f/u pulm and thoracic recs    3) hx of PE  - RUL segmental PE (8/2022  -AC on hold 2/2 anemia    4) CAD s/p PCI  -denies CP  -TTE with normal LV function    5) TAVR  -TTE transcatheter aortic valve replacement Peak transaortic valve gradient equals 42 mm Hg, mean transaortic valve gradient equals 23 mm Hg, which is probably normal in the presence of a prosthetic valve. Moderate valvular aortic regurgitation.    6) Anemia  -transfuse to keep hemoglobin >8  -CT scan non con showing bleeding near the spleen and liver. surgery on board, CTA abd/pelvis with no acute bleed

## 2023-06-30 NOTE — PROGRESS NOTE ADULT - SUBJECTIVE AND OBJECTIVE BOX
[facility] NEPHROLOGY- PROGRESS NOTE    88y Female with history of endometrial carcinoma presents with weakness. Nephrology consulted for elevated Scr.      REVIEW OF SYSTEMS:  Gen: no fevers  Cards: no chest pain  Resp: + dyspnea improving  GI: no nausea or vomiting or diarrhea  Vascular: + LE edema improving    No Known Allergies      Hospital Medications: Medications reviewed      VITALS:  T(F): 98.9 (06-30-23 @ 09:00), Max: 99.2 (06-29-23 @ 21:13)  HR: 106 (06-30-23 @ 09:00)  BP: 117/63 (06-30-23 @ 09:00)  RR: 18 (06-30-23 @ 09:00)  SpO2: 100% (06-30-23 @ 09:00)  Wt(kg): --    06-29 @ 07:01  -  06-30 @ 07:00  --------------------------------------------------------  IN: 0 mL / OUT: 790 mL / NET: -790 mL        PHYSICAL EXAM:    Gen: NAD, calm  Cards: RRR, +S1/S2, no M/G/R  Resp: Decreased BS @ R base, + R CT  GI: soft, NT/ND, NABS  : + brown  Vascular: 2+ LE edema B/L        LABS:  06-30    135  |  95<L>  |  12  ----------------------------<  96  3.3<L>   |  27  |  0.96    Ca    8.4      30 Jun 2023 04:52  Phos  1.8     06-30  Mg     1.40     06-30      Creatinine Trend: 0.96 <--, 1.20 <--, 1.47 <--, 1.81 <--, 1.89 <--, 1.92 <--, 1.87 <--, 1.65 <--                        9.4    15.28 )-----------( 135      ( 30 Jun 2023 04:52 )             25.5     Urine Studies:  Urinalysis Basic - ( 30 Jun 2023 04:52 )    Color:  / Appearance:  / SG:  / pH:   Gluc: 96 mg/dL / Ketone:   / Bili:  / Urobili:    Blood:  / Protein:  / Nitrite:    Leuk Esterase:  / RBC:  / WBC    Sq Epi:  / Non Sq Epi:  / Bacteria:       Sodium, Random Urine: <20 mmol/L (06-26 @ 00:01)  Creatinine, Random Urine: 118 mg/dL (06-26 @ 00:01)  Protein/Creatinine Ratio Calculation: 1.2 Ratio (06-26 @ 00:01)

## 2023-06-30 NOTE — GOALS OF CARE CONVERSATION - ADVANCED CARE PLANNING - CONVERSATION DETAILS
Called patient's daughter Shani for follow up after our initial meeting yesterday to see if further conversations were had among pts family to determine next steps including code status. Shani shared that at this time they had not reached a decision and were working on LTC placement with hospice care.   Questions answered, support provided.

## 2023-06-30 NOTE — PROGRESS NOTE ADULT - ASSESSMENT
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    1. Uterine carcinosarcoma   -- [place] [facility] [place]  -- s/p neoadjuvant chemotherapy completed 12/2022, s/p POOL-BSO 02/10/2023   -- Recent imaging suggests disease recurrence- had plans to begin carboplatin as an outpatient but now pt and her family would like her to be comfortable   -- Pt declining, would likely not be a candidate for further treatment. Patient and her family are interested in hospice.     2. Dyspnea on exertion   -- CTA chest w/o PE; + large right pleural effusion   -- LE duplex without DVT   -- Thoracic consulted, pigtail catheter placed for temporary draining- f/u cytology. No further intervention planned at this time.  -- Continue supplemental O2 as needed, wean as tolerated     3. Anemia, thrombocytopenia   -- Likely secondary to malignancy, chronic disease, acute illness   -- No evidence of iron, B12, or folate deficiencies.  -- Monitor CBC and transfuse to maintain hg >7, platelet count >10K, >15K if febrile, or >50K if bleeding   -- Hg acutely decreased 06/27. CT a/p shows possible RP bleed. Surgery consulted, CTA AP without active bleed- no intervention.  -- Hg improved now. Continue to monitor.    Ongoing GOC discussions.  Will continue to follow.    Twyla Pitts PA-C  Hematology/Oncology  [facility]  [phone] (office)  [phone] (alt office)  Evenings and weekends please call MD on call or office

## 2023-06-30 NOTE — PROGRESS NOTE ADULT - PROBLEM SELECTOR PLAN 6
Pt with history of stage 4 serous endometrial carcinoma (dx 5/2022) s/p chemo (last in 12/2022) and debulking surgery with POOL/BSO (2/10/23), now recently found to have recurrence of disease with new liver mets.  - Pt follows with Dr. Alvarado of [facility] [place].   - GOC discussion with pt and family given likely poor prognosis

## 2023-06-30 NOTE — PROGRESS NOTE ADULT - SUBJECTIVE AND OBJECTIVE BOX
Patient seen at bedside, resting in bed, on 2L O2 via NC.  Patient states her breathing is okay, and her pain is well controlled.  Denies any new complaints at the time.    Vital Signs Last 24 Hrs  T(C): 37.2 (30 Jun 2023 09:00), Max: 37.3 (29 Jun 2023 21:13)  T(F): 98.9 (30 Jun 2023 09:00), Max: 99.2 (29 Jun 2023 21:13)  HR: 106 (30 Jun 2023 09:00) (105 - 123)  BP: 117/63 (30 Jun 2023 09:00) (110/57 - 125/90)  BP(mean): --  RR: 18 (30 Jun 2023 09:00) (18 - 18)  SpO2: 100% (30 Jun 2023 09:00) (100% - 100%)    Parameters below as of 30 Jun 2023 09:00  Patient On (Oxygen Delivery Method): nasal cannula  O2 Flow (L/min): 2    General: frail, elderly female, laying in bed, NAD  Neurology: Awake, nonfocal, MOFFETT x 4  Eyes: Scleras clear, PERRLA/ EOMI, Gross vision intact  ENT:Gross hearing intact, grossly patent pharynx, no stridor  Neck: Neck supple, trachea midline, No JVD,   Respiratory: CTA B/L, No wheezing, rales, rhonchi  CV: RRR, S1S2, no murmurs, rubs or gallops  Abdominal: Soft, NT, ND +BS,   Extremities: No edema, + peripheral pulses  Skin: No Rashes, Hematoma, Ecchymosis  Lymphatic: No Neck, axilla, groin LAD  Psych: Oriented x 3, normal affect  Incisions:   Tubes: R PTC, drainage serosanguinous     Assessment  88F presents with a chief complaint of Generalized weakness, underwent R PTC placement. Course c/b retroperitoneal hematoma on CT scan.   6/29 - CT to WS, still with low output    PLAN  - no plan for thoracic surgery intervention at this time  - continue PTC to waterseal, output with continued low volume  - Will speak with family and patient regarding possible removal of PTC  - Continue care per primary team  - Thoracic to follow  - d/w Dr. Boyd

## 2023-06-30 NOTE — PROGRESS NOTE ADULT - SUBJECTIVE AND OBJECTIVE BOX
Rony Mcgarry MD  Interventional Cardiology / Advance Heart Failure and Cardiac Transplant Specialist  [place] Office : [phone] [place] [address] [place]. [address]  Tel:   [place] Office : [address] [facility] [place] [address]  Tel: [phone]       Pt is lying in bed comfortable not in distress   	  MEDICATIONS:  diltiazem    Tablet 30 milliGRAM(s) Oral every 6 hours  furosemide   Injectable 40 milliGRAM(s) IV Push once    piperacillin/tazobactam IVPB.. 3.375 Gram(s) IV Intermittent every 8 hours    albuterol    90 MICROgram(s) HFA Inhaler 2 Puff(s) Inhalation every 6 hours PRN  loratadine 10 milliGRAM(s) Oral daily    acetaminophen     Tablet .. 650 milliGRAM(s) Oral every 6 hours PRN  ALPRAZolam 0.25 milliGRAM(s) Oral every 8 hours  HYDROmorphone  Injectable 0.2 milliGRAM(s) IV Push every 4 hours PRN  melatonin 3 milliGRAM(s) Oral at bedtime PRN  oxyCODONE    IR 5 milliGRAM(s) Oral every 4 hours PRN    misoprostol 200 MICROGram(s) Oral <User Schedule>  pantoprazole    Tablet 40 milliGRAM(s) Oral before breakfast  polyethylene glycol 3350 17 Gram(s) Oral daily PRN  senna 2 Tablet(s) Oral at bedtime PRN    atorvastatin 80 milliGRAM(s) Oral at bedtime  levothyroxine 75 MICROGram(s) Oral daily    albumin human 25% IVPB 50 milliLiter(s) IV Intermittent once  cholecalciferol 2000 Unit(s) Oral daily  cyanocobalamin 1000 MICROGram(s) Oral daily  magnesium sulfate  IVPB 2 Gram(s) IV Intermittent once  multivitamin 1 Tablet(s) Oral daily  mupirocin 2% Ointment 1 Application(s) Topical <User Schedule>  potassium phosphate / sodium phosphate Tablet (K-PHOS No. 2) 1 Tablet(s) Oral three times a day with meals      PAST MEDICAL/SURGICAL HISTORY  PAST MEDICAL & SURGICAL HISTORY:  Endometrial ca  S4 serous endometrial carcinoma, [facility], chemotherapy      HTN (hypertension)      CAD (coronary artery disease)      S/P AVR      Hypothyroidism      HLD (hyperlipidemia)      S/P AVR (aortic valve replacement)      History of endometrial biopsy          SOCIAL HISTORY: Substance Use (street drugs): ( x ) never used  (  ) other:    FAMILY HISTORY:  Family history of parotid cancer (Child)         PHYSICAL EXAM:  T(C): 37.2 (06-30-23 @ 09:00), Max: 37.3 (06-29-23 @ 21:13)  HR: 106 (06-30-23 @ 09:00) (105 - 123)  BP: 117/63 (06-30-23 @ 09:00) (114/72 - 125/90)  RR: 18 (06-30-23 @ 09:00) (18 - 18)  SpO2: 100% (06-30-23 @ 09:00) (100% - 100%)  Wt(kg): --  I&O's Summary    29 Jun 2023 07:01  -  30 Jun 2023 07:00  --------------------------------------------------------  IN: 0 mL / OUT: 790 mL / NET: -790 mL           EYES:   PERRLA   ENMT:   Moist mucous membranes, Good dentition, No lesions  Cardiovascular: Normal S1 S2, No JVD, No murmurs, No edema  Respiratory: Lungs clear to auscultation	  Gastrointestinal:  Soft, Non-tender, + BS	  Extremities: no edema                                    9.4    15.28 )-----------( 135      ( 30 Jun 2023 04:52 )             25.5     06-30    135  |  95<L>  |  12  ----------------------------<  96  3.3<L>   |  27  |  0.96    Ca    8.4      30 Jun 2023 04:52  Phos  1.8     06-30  Mg     1.40     06-30      proBNP:   Lipid Profile:   HgA1c:   TSH:     Consultant(s) Notes Reviewed:  [x ] YES  [ ] NO    Care Discussed with Consultants/Other Providers [ x] YES  [ ] NO    Imaging Personally Reviewed independently:  [x] YES  [ ] NO    All labs, radiologic studies, vitals, orders and medications list reviewed. Patient is seen and examined at bedside. Case discussed with medical team.

## 2023-06-30 NOTE — PROGRESS NOTE ADULT - SUBJECTIVE AND OBJECTIVE BOX
SUBJECTIVE / OVERNIGHT EVENTS:pt seen and examined  06-30-23     MEDICATIONS  (STANDING):  ALPRAZolam 0.25 milliGRAM(s) Oral every 8 hours  atorvastatin 80 milliGRAM(s) Oral at bedtime  cholecalciferol 2000 Unit(s) Oral daily  cyanocobalamin 1000 MICROGram(s) Oral daily  diltiazem    Tablet 30 milliGRAM(s) Oral every 6 hours  levothyroxine 75 MICROGram(s) Oral daily  loratadine 10 milliGRAM(s) Oral daily  misoprostol 200 MICROGram(s) Oral <User Schedule>  multivitamin 1 Tablet(s) Oral daily  mupirocin 2% Ointment 1 Application(s) Topical <User Schedule>  pantoprazole    Tablet 40 milliGRAM(s) Oral before breakfast  piperacillin/tazobactam IVPB.. 3.375 Gram(s) IV Intermittent every 8 hours  potassium phosphate / sodium phosphate Tablet (K-PHOS No. 2) 1 Tablet(s) Oral three times a day with meals    MEDICATIONS  (PRN):  acetaminophen     Tablet .. 650 milliGRAM(s) Oral every 6 hours PRN Temp greater or equal to 38C (100.4F), Mild Pain (1 - 3)  albuterol    90 MICROgram(s) HFA Inhaler 2 Puff(s) Inhalation every 6 hours PRN Shortness of Breath and/or Wheezing  HYDROmorphone  Injectable 0.2 milliGRAM(s) IV Push every 4 hours PRN Severe Pain (7 - 10)  melatonin 3 milliGRAM(s) Oral at bedtime PRN Sleep  oxyCODONE    IR 5 milliGRAM(s) Oral every 4 hours PRN Moderate Pain (4 - 6)  polyethylene glycol 3350 17 Gram(s) Oral daily PRN for constipation  senna 2 Tablet(s) Oral at bedtime PRN for constipation    Vital Signs Last 24 Hrs  T(C): 37.1 (07-01-23 @ 01:05), Max: 37.2 (06-30-23 @ 09:00)  T(F): 98.8 (07-01-23 @ 01:05), Max: 99 (06-30-23 @ 23:20)  HR: 112 (07-01-23 @ 01:05) (106 - 122)  BP: 111/66 (07-01-23 @ 01:05) (105/68 - 132/79)  BP(mean): --  RR: 18 (07-01-23 @ 01:05) (17 - 18)  SpO2: 100% (07-01-23 @ 01:05) (100% - 100%)          Constitutional: No fever, fatigue  Skin: No rash.  Eyes: No recent vision problems or eye pain.  ENT: No congestion, ear pain, or sore throat.  Cardiovascular: No chest pain or palpation.  Respiratory: No cough, shortness of breath, congestion, or wheezing.  Gastrointestinal: No abdominal pain, nausea, vomiting, or diarrhea.  Genitourinary: No dysuria.  Musculoskeletal: No joint swelling.  Neurologic: No headache.    PHYSICAL EXAM:  GENERAL: NAD  EYES: EOMI, PERRLA  NECK: Supple, No JVD  CHEST/LUNG: dec breath sounds at bases  chest tube draining +  HEART:  S1 , S2 +  ABDOMEN: soft , bs+  EXTREMITIES:  edema+  NEUROLOGY:alert awake    LABS:  06-30    135  |  95<L>  |  12  ----------------------------<  96  3.3<L>   |  27  |  0.96    Ca    8.4      30 Jun 2023 04:52  Phos  1.8     06-30  Mg     1.40     06-30      Creatinine Trend: 0.96 <--, 1.20 <--, 1.47 <--, 1.81 <--, 1.89 <--, 1.92 <--, 1.87 <--, 1.65 <--                        9.4    15.28 )-----------( 135      ( 30 Jun 2023 04:52 )             25.5     Urine Studies:  Urinalysis Basic - ( 30 Jun 2023 04:52 )    Color:  / Appearance:  / SG:  / pH:   Gluc: 96 mg/dL / Ketone:   / Bili:  / Urobili:    Blood:  / Protein:  / Nitrite:    Leuk Esterase:  / RBC:  / WBC    Sq Epi:  / Non Sq Epi:  / Bacteria:       Sodium, Random Urine: <20 mmol/L (06-26 @ 00:01)  Creatinine, Random Urine: 118 mg/dL (06-26 @ 00:01)  Protein/Creatinine Ratio Calculation: 1.2 Ratio (06-26 @ 00:01)

## 2023-06-30 NOTE — PROGRESS NOTE ADULT - ASSESSMENT
88y Female with history of endometrial carcinoma presents with weakness. Nephrology consulted for elevated Scr.    1) INDIA[place]: likely hemodynamically mediated in setting of relative hypotension/tachycardia and hypoalbuminemia. [place] resolved with IV albumin. UA bland. FeNa low. Renal US and CT with mild R hydronephrosis suspect due to underlying CA. Patient will need outpatient Urology monitoring and if no improvement with chemotherapy may need R NT. TMA work up negative. Avoid nephrotoxins. Monitor electrolytes.    2) HTN: BP low normal with tachycardia. As per cardiology.    3) LE edema: Due to hypoalbuminemia from malnutrition (patient without nephrotic range proteinuria). Will give additional lasix 40 mg IV X 1 dose with concurrent IV albumin today. Continue with protein supplements. TTE with normal LVSF. Monitor UO.    4) Hyponatremia: Resolved. Continue with 1L FR. Continue with Zosyn in NS base. Monitor serum Na.      [facility] NEPHROLOGY  Brennon Cates M.D.  Addison Nieto D.O.  Guerline Dias M.D.  Merissa Parsons, MSN, ANP-C    Telephone: [phone]  Facsimile: [phone]    [address] [place] [address]

## 2023-06-30 NOTE — PROGRESS NOTE ADULT - SUBJECTIVE AND OBJECTIVE BOX
Patient is a 88y old  Female who presents with a chief complaint of Generalized weakness (30 Jun 2023 12:02)    Patient seen this afternoon, with multiple family members at bedside (her 2 sisters and niece). Pt feels tired but has no other complaints. Denies pain or discomfort.     MEDICATIONS  (STANDING):  albumin human 25% IVPB 50 milliLiter(s) IV Intermittent once  ALPRAZolam 0.25 milliGRAM(s) Oral every 8 hours  atorvastatin 80 milliGRAM(s) Oral at bedtime  cholecalciferol 2000 Unit(s) Oral daily  cyanocobalamin 1000 MICROGram(s) Oral daily  diltiazem    Tablet 30 milliGRAM(s) Oral every 6 hours  furosemide   Injectable 40 milliGRAM(s) IV Push once  levothyroxine 75 MICROGram(s) Oral daily  loratadine 10 milliGRAM(s) Oral daily  misoprostol 200 MICROGram(s) Oral <User Schedule>  multivitamin 1 Tablet(s) Oral daily  mupirocin 2% Ointment 1 Application(s) Topical <User Schedule>  pantoprazole    Tablet 40 milliGRAM(s) Oral before breakfast  piperacillin/tazobactam IVPB.. 3.375 Gram(s) IV Intermittent every 8 hours  potassium phosphate / sodium phosphate Tablet (K-PHOS No. 2) 1 Tablet(s) Oral three times a day with meals    MEDICATIONS  (PRN):  acetaminophen     Tablet .. 650 milliGRAM(s) Oral every 6 hours PRN Temp greater or equal to 38C (100.4F), Mild Pain (1 - 3)  albuterol    90 MICROgram(s) HFA Inhaler 2 Puff(s) Inhalation every 6 hours PRN Shortness of Breath and/or Wheezing  HYDROmorphone  Injectable 0.2 milliGRAM(s) IV Push every 4 hours PRN Severe Pain (7 - 10)  melatonin 3 milliGRAM(s) Oral at bedtime PRN Sleep  oxyCODONE    IR 5 milliGRAM(s) Oral every 4 hours PRN Moderate Pain (4 - 6)  polyethylene glycol 3350 17 Gram(s) Oral daily PRN for constipation  senna 2 Tablet(s) Oral at bedtime PRN for constipation        Vital Signs Last 24 Hrs  T(C): 37.2 (30 Jun 2023 12:15), Max: 37.3 (29 Jun 2023 21:13)  T(F): 98.9 (30 Jun 2023 12:15), Max: 99.2 (29 Jun 2023 21:13)  HR: 109 (30 Jun 2023 12:15) (105 - 123)  BP: 110/64 (30 Jun 2023 12:15) (110/64 - 125/90)  BP(mean): --  RR: 17 (30 Jun 2023 12:15) (17 - 18)  SpO2: 100% (30 Jun 2023 12:15) (100% - 100%)    Parameters below as of 30 Jun 2023 09:00  Patient On (Oxygen Delivery Method): nasal cannula  O2 Flow (L/min): 2      PE  NAD  Awake, alert, tired-appearing   Anicteric, MMM  No c/c/e  No rash grossly                            9.4    15.28 )-----------( 135      ( 30 Jun 2023 04:52 )             25.5       06-30    135  |  95<L>  |  12  ----------------------------<  96  3.3<L>   |  27  |  0.96    Ca    8.4      30 Jun 2023 04:52  Phos  1.8     06-30  Mg     1.40     06-30

## 2023-07-01 NOTE — PROGRESS NOTE ADULT - NSPROGADDITIONALINFOA_GEN_ALL_CORE

## 2023-07-01 NOTE — PROGRESS NOTE ADULT - SUBJECTIVE AND OBJECTIVE BOX
JOHANA WELCHORES  88y  Female      Patient is a 88y old  Female who presents with a chief complaint of Generalized weakness (01 Jul 2023 13:20)  Patient was seen and examined.chart reivewed.comfortable,nad    REVIEW OF SYSTEMS:  CONSTITUTIONAL: No fever  RESPIRATORY: No cough, hemoptysis or shortness of breath  no cp,no vomiting or diarrhoea,not in any painINTERVAL HPI/OVERNIGHT EVENTS:  T(C): 37.1 (07-01-23 @ 12:15), Max: 37.4 (07-01-23 @ 08:15)  HR: 114 (07-01-23 @ 12:15) (112 - 120)  BP: 112/61 (07-01-23 @ 12:15) (104/65 - 122/78)  RR: 18 (07-01-23 @ 12:15) (17 - 18)  SpO2: 100% (07-01-23 @ 12:15) (98% - 100%)  Wt(kg): --  I&O's Summary    30 Jun 2023 07:01  -  01 Jul 2023 07:00  --------------------------------------------------------  IN: 390 mL / OUT: 260 mL / NET: 130 mL    01 Jul 2023 07:01  -  01 Jul 2023 16:27  --------------------------------------------------------  IN: 50 mL / OUT: 150 mL / NET: -100 mL      T(C): 37.1 (07-01-23 @ 12:15), Max: 37.4 (07-01-23 @ 08:15)  HR: 114 (07-01-23 @ 12:15) (112 - 120)  BP: 112/61 (07-01-23 @ 12:15) (104/65 - 122/78)  RR: 18 (07-01-23 @ 12:15) (17 - 18)  SpO2: 100% (07-01-23 @ 12:15) (98% - 100%)  Wt(kg): --Vital Signs Last 24 Hrs  T(C): 37.1 (01 Jul 2023 12:15), Max: 37.4 (01 Jul 2023 08:15)  T(F): 98.7 (01 Jul 2023 12:15), Max: 99.4 (01 Jul 2023 08:15)  HR: 114 (01 Jul 2023 12:15) (112 - 120)  BP: 112/61 (01 Jul 2023 12:15) (104/65 - 122/78)  BP(mean): --  RR: 18 (01 Jul 2023 12:15) (17 - 18)  SpO2: 100% (01 Jul 2023 12:15) (98% - 100%)    Parameters below as of 01 Jul 2023 12:15  Patient On (Oxygen Delivery Method): nasal cannula  O2 Flow (L/min): 2      LABS:                        9.4    15.97 )-----------( 120      ( 01 Jul 2023 04:49 )             28.1     07-01    133<L>  |  96<L>  |  14  ----------------------------<  112<H>  3.8   |  27  |  1.08    Ca    8.1<L>      01 Jul 2023 04:49  Phos  1.8     06-30  Mg     1.70     07-01        Urinalysis Basic - ( 01 Jul 2023 04:49 )    Color: x / Appearance: x / SG: x / pH: x  Gluc: 112 mg/dL / Ketone: x  / Bili: x / Urobili: x   Blood: x / Protein: x / Nitrite: x   Leuk Esterase: x / RBC: x / WBC x   Sq Epi: x / Non Sq Epi: x / Bacteria: x      CAPILLARY BLOOD GLUCOSE            Urinalysis Basic - ( 01 Jul 2023 04:49 )    Color: x / Appearance: x / SG: x / pH: x  Gluc: 112 mg/dL / Ketone: x  / Bili: x / Urobili: x   Blood: x / Protein: x / Nitrite: x   Leuk Esterase: x / RBC: x / WBC x   Sq Epi: x / Non Sq Epi: x / Bacteria: x        PAST MEDICAL & SURGICAL HISTORY:  Endometrial ca  S4 serous endometrial carcinoma, [facility] [place]      HTN (hypertension)      CAD (coronary artery disease)      S/P AVR      Hypothyroidism      HLD (hyperlipidemia)      S/P AVR (aortic valve replacement)      History of endometrial biopsy          MEDICATIONS  (STANDING):  albumin human 25% IVPB 50 milliLiter(s) IV Intermittent every 8 hours  ALPRAZolam 0.25 milliGRAM(s) Oral every 8 hours  atorvastatin 80 milliGRAM(s) Oral at bedtime  cholecalciferol 2000 Unit(s) Oral daily  cyanocobalamin 1000 MICROGram(s) Oral daily  diltiazem    Tablet 30 milliGRAM(s) Oral every 6 hours  levothyroxine 75 MICROGram(s) Oral daily  loratadine 10 milliGRAM(s) Oral daily  misoprostol 200 MICROGram(s) Oral <User Schedule>  multivitamin 1 Tablet(s) Oral daily  mupirocin 2% Ointment 1 Application(s) Topical <User Schedule>  pantoprazole    Tablet 40 milliGRAM(s) Oral before breakfast  piperacillin/tazobactam IVPB.. 3.375 Gram(s) IV Intermittent every 8 hours    MEDICATIONS  (PRN):  acetaminophen     Tablet .. 650 milliGRAM(s) Oral every 6 hours PRN Temp greater or equal to 38C (100.4F), Mild Pain (1 - 3)  albuterol    90 MICROgram(s) HFA Inhaler 2 Puff(s) Inhalation every 6 hours PRN Shortness of Breath and/or Wheezing  HYDROmorphone  Injectable 0.2 milliGRAM(s) IV Push every 4 hours PRN Severe Pain (7 - 10)  melatonin 3 milliGRAM(s) Oral at bedtime PRN Sleep  oxyCODONE    IR 5 milliGRAM(s) Oral every 4 hours PRN Moderate Pain (4 - 6)  polyethylene glycol 3350 17 Gram(s) Oral daily PRN for constipation  senna 2 Tablet(s) Oral at bedtime PRN for constipation        RADIOLOGY & ADDITIONAL TESTS:    Imaging Personally Reviewed:  [ ] YES  [ ] NO    Consultant(s) Notes Reviewed:  [x ] YES  [ ] NO    PHYSICAL EXAM:  GENERAL: Alert and awake lying in bed in no distress  HEAD:  Atraumatic, Normocephalic  EYES: EOMI, JENNIE, conjunctiva and sclera clear  NECK: Supple, No JVD, Normal thyroid  NERVOUS SYSTEM:  Alert & Oriented X2, Motor and sensory systems are intact,   CHEST/LUNG: Bilateral clear breath sounds, no rhochi, no wheezing, no crepitations,  HEART: Regular rate and rhythm; No murmurs, rubs, or gallops  ABDOMEN: Soft, Nontender, Nondistended; Bowel sounds present  EXTREMITIES:   Peripheral Pulses are palpable,   edema +        Care Discussed with Consultants/Other Providers [ ] YES  [ ] NO      Code Status: [] Full Code [] DNR [] DNI [] Goals of Care:   Disposition: [] ICU [] Stroke Unit [] RCU []PCU []Floor [] Discharge Home         ROMA [email or web address].FACP

## 2023-07-01 NOTE — PROGRESS NOTE ADULT - PROBLEM SELECTOR PLAN 1
Placed heat on abscess on back- caused increased pain Pt with 3 weeks of progressive generalized weakness, now barely able to get out of bed. On exam, no motor weakness detected. Likely in setting of recurrence/progression of endometrial cancer with new liver mets, exacerbated by poor PO intake, recurrence of moderate-to-large R pleural effusion, and worsening anemia.   - Management as below for endometrial cancer, R pleural effusion, and anemia  -

## 2023-07-01 NOTE — PROGRESS NOTE ADULT - SUBJECTIVE AND OBJECTIVE BOX
Rony Mcgarry MD  Interventional Cardiology / Advance Heart Failure and Cardiac Transplant Specialist  [place] Office : [address] [place] [address] [place]. [address]  Tel:   [place] Office : [address] [facility] [place] [address]  Tel: [phone]       Pt is lying in bed comfortable not in distress, no chest pains no SOB no palpitations  	  MEDICATIONS:  diltiazem    Tablet 30 milliGRAM(s) Oral every 6 hours    piperacillin/tazobactam IVPB.. 3.375 Gram(s) IV Intermittent every 8 hours    albuterol    90 MICROgram(s) HFA Inhaler 2 Puff(s) Inhalation every 6 hours PRN  loratadine 10 milliGRAM(s) Oral daily    acetaminophen     Tablet .. 650 milliGRAM(s) Oral every 6 hours PRN  ALPRAZolam 0.25 milliGRAM(s) Oral every 8 hours  HYDROmorphone  Injectable 0.2 milliGRAM(s) IV Push every 4 hours PRN  melatonin 3 milliGRAM(s) Oral at bedtime PRN  oxyCODONE    IR 5 milliGRAM(s) Oral every 4 hours PRN    misoprostol 200 MICROGram(s) Oral <User Schedule>  pantoprazole    Tablet 40 milliGRAM(s) Oral before breakfast  polyethylene glycol 3350 17 Gram(s) Oral daily PRN  senna 2 Tablet(s) Oral at bedtime PRN    atorvastatin 80 milliGRAM(s) Oral at bedtime  levothyroxine 75 MICROGram(s) Oral daily    albumin human 25% IVPB 50 milliLiter(s) IV Intermittent every 8 hours  cholecalciferol 2000 Unit(s) Oral daily  cyanocobalamin 1000 MICROGram(s) Oral daily  multivitamin 1 Tablet(s) Oral daily  mupirocin 2% Ointment 1 Application(s) Topical <User Schedule>      PAST MEDICAL/SURGICAL HISTORY  PAST MEDICAL & SURGICAL HISTORY:  Endometrial ca  S4 serous endometrial carcinoma, [facility] [place], chemotherapy      HTN (hypertension)      CAD (coronary artery disease)      S/P AVR      Hypothyroidism      HLD (hyperlipidemia)      S/P AVR (aortic valve replacement)      History of endometrial biopsy          SOCIAL HISTORY: Substance Use (street drugs): ( x ) never used  (  ) other:    FAMILY HISTORY:  Family history of parotid cancer (Child)         PHYSICAL EXAM:  T(C): 37.4 (07-01-23 @ 21:30), Max: 37.4 (07-01-23 @ 08:15)  HR: 116 (07-01-23 @ 21:30) (112 - 120)  BP: 110/69 (07-01-23 @ 21:30) (104/65 - 118/67)  RR: 17 (07-01-23 @ 21:30) (17 - 18)  SpO2: 100% (07-01-23 @ 21:30) (98% - 100%)  Wt(kg): --  I&O's Summary    30 Jun 2023 07:01  -  01 Jul 2023 07:00  --------------------------------------------------------  IN: 390 mL / OUT: 260 mL / NET: 130 mL    01 Jul 2023 07:01  -  01 Jul 2023 23:13  --------------------------------------------------------  IN: 50 mL / OUT: 150 mL / NET: -100 mL             EYES:   PERRLA   ENMT:   Moist mucous membranes, Good dentition, No lesions  Cardiovascular: Normal S1 S2, No JVD, No murmurs, No edema  Respiratory: Lungs clear to auscultation	  Gastrointestinal:  Soft, Non-tender, + BS	  Extremities: no edema                                    9.4    15.97 )-----------( 120      ( 01 Jul 2023 04:49 )             28.1     07-01    133<L>  |  96<L>  |  14  ----------------------------<  112<H>  3.8   |  27  |  1.08    Ca    8.1<L>      01 Jul 2023 04:49  Phos  1.8     06-30  Mg     1.70     07-01      proBNP:   Lipid Profile:   HgA1c:   TSH:     Consultant(s) Notes Reviewed:  [x ] YES  [ ] NO    Care Discussed with Consultants/Other Providers [ x] YES  [ ] NO    Imaging Personally Reviewed independently:  [x] YES  [ ] NO    All labs, radiologic studies, vitals, orders and medications list reviewed. Patient is seen and examined at bedside. Case discussed with medical team.

## 2023-07-01 NOTE — PROGRESS NOTE ADULT - PROBLEM SELECTOR PLAN 6
Pt with history of stage 4 serous endometrial carcinoma (dx 5/2022) s/p chemo (last in 12/2022) and debulking surgery with POOL/BSO (2/10/23), now recently found to have recurrence of disease with new liver mets.  - Pt follows with Dr. Alvarado of [facility] [place].   - GOC discussion with pt and family given likely poor prognosis

## 2023-07-01 NOTE — PROGRESS NOTE ADULT - ASSESSMENT
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    1. Uterine carcinosarcoma   -- Under care of Dr. Alvarado at [facility] [place]  -- s/p neoadjuvant chemotherapy completed 12/2022, s/p POOL-BSO 02/10/2023   -- Recent imaging suggests disease recurrence- had plans to begin carboplatin as an outpatient but now pt and her family would like her to be comfortable   -- Pt declining, would likely not be a candidate for further treatment. Patient and her family are interested in hospice.     2. Dyspnea on exertion   -- CTA chest w/o PE; + large right pleural effusion   -- LE duplex without DVT   -- Thoracic consulted, pigtail catheter placed for temporary draining- f/u cytology. No further intervention planned at this time.  -- Continue supplemental O2 as needed, wean as tolerated     3. Anemia, thrombocytopenia   -- Likely secondary to malignancy, chronic disease, acute illness   -- No evidence of iron, B12, or folate deficiencies.  -- Monitor CBC and transfuse to maintain hg >7, platelet count >10K, >15K if febrile, or >50K if bleeding   -- Hg acutely decreased 06/27. CT a/p shows possible RP bleed. Surgery consulted, CTA AP without active bleed- no intervention.  -- Hg improved now. Continue to monitor.    Ongoing GOC discussions.  Discharge planning in progress   Will continue to follow.    Ronda Wright NP  Hematology/ Oncology  [facility]  [phone] (office)  [phone] (alt office)  Evenings and weekends please call MD on call or office

## 2023-07-01 NOTE — PROGRESS NOTE ADULT - SUBJECTIVE AND OBJECTIVE BOX
[facility] NEPHROLOGY- PROGRESS NOTE    88y Female with history of endometrial carcinoma presents with weakness. Nephrology consulted for elevated Scr.      REVIEW OF SYSTEMS:  Gen: no fevers  Cards: no chest pain  Resp: + dyspnea improving  GI: no nausea or vomiting or diarrhea  Vascular: + LE edema improving    No Known Allergies      Hospital Medications: Medications reviewed      VITALS:  T(F): 99.4 (07-01-23 @ 08:15), Max: 99.4 (07-01-23 @ 08:15)  HR: 114 (07-01-23 @ 08:15)  BP: 115/69 (07-01-23 @ 08:15)  RR: 18 (07-01-23 @ 08:15)  SpO2: 99% (07-01-23 @ 08:15)  Wt(kg): --    06-30 @ 07:01  -  07-01 @ 07:00  --------------------------------------------------------  IN: 390 mL / OUT: 260 mL / NET: 130 mL      PHYSICAL EXAM:    Gen: NAD, calm  Cards: RRR, +S1/S2, no M/G/R  Resp: Decreased BS @ R base, + R CT  GI: soft, NT/ND, NABS  : + brown  Vascular: + LE edema B/L improving      LABS:  07-01    133<L>  |  96<L>  |  14  ----------------------------<  112<H>  3.8   |  27  |  1.08    Ca    8.1<L>      01 Jul 2023 04:49  Phos  1.8     06-30  Mg     1.70     07-01      Creatinine Trend: 1.08 <--, 0.96 <--, 1.20 <--, 1.47 <--, 1.81 <--, 1.89 <--, 1.92 <--                        9.4    15.97 )-----------( 120      ( 01 Jul 2023 04:49 )             28.1     Urine Studies:  Urinalysis Basic - ( 01 Jul 2023 04:49 )    Color:  / Appearance:  / SG:  / pH:   Gluc: 112 mg/dL / Ketone:   / Bili:  / Urobili:    Blood:  / Protein:  / Nitrite:    Leuk Esterase:  / RBC:  / WBC    Sq Epi:  / Non Sq Epi:  / Bacteria:       Sodium, Random Urine: <20 mmol/L (06-26 @ 00:01)  Creatinine, Random Urine: 118 mg/dL (06-26 @ 00:01)  Protein/Creatinine Ratio Calculation: 1.2 Ratio (06-26 @ 00:01)

## 2023-07-01 NOTE — PROGRESS NOTE ADULT - ASSESSMENT
88y Female with history of endometrial carcinoma presents with weakness. Nephrology consulted for elevated Scr.    1) [place]: likely hemodynamically mediated in setting of relative hypotension/tachycardia and hypoalbuminemia. [place] resolved with IV albumin. UA bland. FeNa low. Renal US and CT with mild R hydronephrosis suspect due to underlying CA. Patient will need outpatient Urology monitoring and if no improvement with chemotherapy may need R NT. TMA work up negative. Avoid nephrotoxins. Monitor electrolytes.    2) HTN: BP low normal with tachycardia. As per cardiology.    3) LE edema: Due to hypoalbuminemia from malnutrition (patient without nephrotic range proteinuria). s/p additional lasix 40 mg IV X 1 dose with concurrent IV albumin 6/30. Continue with protein supplements. TTE with normal LVSF. Monitor UO.    4) Hyponatremia: borderline. Continue with 1L FR. Continue with Zosyn in NS base. Monitor serum Na.      [facility] NEPHROLOGY  Brennon Cates M.D.  Addison Nieto D.O.  Guerline Dias M.D.  Merissa Parsons, GRACIA, ANP-C    Telephone: [phone]  Facsimile: [phone]    [address] [place] [address]   88y Female with history of endometrial carcinoma presents with weakness. Nephrology consulted for elevated Scr.    1) [place]: likely hemodynamically mediated in setting of relative hypotension/tachycardia and hypoalbuminemia. [place] resolved with IV albumin. UA bland. FeNa low. Renal US and CT with mild R hydronephrosis suspect due to underlying CA. Patient will need outpatient Urology monitoring and if no improvement with chemotherapy may need R NT. TMA work up negative. Avoid nephrotoxins. Monitor electrolytes.    2) HTN: BP low normal with tachycardia. As per cardiology.    3) LE edema: Due to hypoalbuminemia from malnutrition (patient without nephrotic range proteinuria). s/p additional lasix 40 mg IV X 1 dose with concurrent IV albumin 6/30. Continue with protein supplements. TTE with normal LVSF. Monitor UO.    4) Hyponatremia: borderline. Continue with 1L FR. Continue with Zosyn in NS base. Will give IV albumin q 8hrs x 3 doses. Monitor serum Na.      [facility] NEPHROLOGY  Brennon Cates M.D.  Addison Nieto D.O.  Guerline Dias M.D.  Merissa Parsons, MSN, ANP-C    Telephone: [phone]  Facsimile: [phone]    [address] [place] [address]

## 2023-07-01 NOTE — PROGRESS NOTE ADULT - SUBJECTIVE AND OBJECTIVE BOX
Patient is a 88y old  Female who presents with a chief complaint of Generalized weakness (01 Jul 2023 10:47)    Patient examined at bedside this morning.  No complaints reported, pt appears comfortable      MEDICATIONS  (STANDING):  albumin human 25% IVPB 50 milliLiter(s) IV Intermittent every 8 hours  ALPRAZolam 0.25 milliGRAM(s) Oral every 8 hours  atorvastatin 80 milliGRAM(s) Oral at bedtime  cholecalciferol 2000 Unit(s) Oral daily  cyanocobalamin 1000 MICROGram(s) Oral daily  diltiazem    Tablet 30 milliGRAM(s) Oral every 6 hours  levothyroxine 75 MICROGram(s) Oral daily  loratadine 10 milliGRAM(s) Oral daily  misoprostol 200 MICROGram(s) Oral <User Schedule>  multivitamin 1 Tablet(s) Oral daily  mupirocin 2% Ointment 1 Application(s) Topical <User Schedule>  pantoprazole    Tablet 40 milliGRAM(s) Oral before breakfast  piperacillin/tazobactam IVPB.. 3.375 Gram(s) IV Intermittent every 8 hours    MEDICATIONS  (PRN):  acetaminophen     Tablet .. 650 milliGRAM(s) Oral every 6 hours PRN Temp greater or equal to 38C (100.4F), Mild Pain (1 - 3)  albuterol    90 MICROgram(s) HFA Inhaler 2 Puff(s) Inhalation every 6 hours PRN Shortness of Breath and/or Wheezing  HYDROmorphone  Injectable 0.2 milliGRAM(s) IV Push every 4 hours PRN Severe Pain (7 - 10)  melatonin 3 milliGRAM(s) Oral at bedtime PRN Sleep  oxyCODONE    IR 5 milliGRAM(s) Oral every 4 hours PRN Moderate Pain (4 - 6)  polyethylene glycol 3350 17 Gram(s) Oral daily PRN for constipation  senna 2 Tablet(s) Oral at bedtime PRN for constipation      Vital Signs Last 24 Hrs  T(C): 37.1 (01 Jul 2023 12:15), Max: 37.4 (01 Jul 2023 08:15)  T(F): 98.7 (01 Jul 2023 12:15), Max: 99.4 (01 Jul 2023 08:15)  HR: 114 (01 Jul 2023 12:15) (112 - 122)  BP: 112/61 (01 Jul 2023 12:15) (104/65 - 132/79)  BP(mean): --  RR: 18 (01 Jul 2023 12:15) (17 - 18)  SpO2: 100% (01 Jul 2023 12:15) (98% - 100%)    Parameters below as of 01 Jul 2023 12:15  Patient On (Oxygen Delivery Method): nasal cannula  O2 Flow (L/min): 2      PE  NAD  Awake, alert  Anicteric, MMM  RRR  CTAB  Abd soft, NT, ND  No c/c/e                            9.4    15.97 )-----------( 120      ( 01 Jul 2023 04:49 )             28.1       07-01    133<L>  |  96<L>  |  14  ----------------------------<  112<H>  3.8   |  27  |  1.08    Ca    8.1<L>      01 Jul 2023 04:49  Phos  1.8     06-30  Mg     1.70     07-01

## 2023-07-02 NOTE — PROGRESS NOTE ADULT - ASSESSMENT
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    1. Uterine carcinosarcoma   -- [place] [facility] [place] s/p neoadjuvant chemotherapy completed 12/2022, s/p POOL-BSO 02/10/2023   -- Recent imaging suggests disease recurrence- had plans to begin carboplatin as an outpatient but now pt and her family would like her to be comfortable   -- Pt declining, would likely not be a candidate for further treatment. Patient and her family are interested in hospice.     2. Dyspnea on exertion   -- CTA chest w/o PE; + large right pleural effusion   -- LE duplex without DVT   -- Thoracic following, pigtail catheter placed for temporary draining. Team discussed pigtail removal d/t low output, per family request will keep PTC for now  -- Cytology from R pleural effusion shows atypical cells suspicious for malignancy.   -- Continue supplemental O2 as needed, wean as tolerated     3. Anemia, thrombocytopenia   -- Likely secondary to malignancy, chronic disease, acute illness   -- No evidence of iron, B12, or folate deficiencies.  -- Monitor CBC and transfuse to maintain hg >7, platelet count >10K, >15K if febrile, or >50K if bleeding   -- Hg acutely decreased 06/27. CT a/p shows possible RP bleed. Surgery consulted, CTA AP without active bleed- no intervention.  -- Hg improved now. Continue to monitor.    Ongoing GOC discussions.  Discharge planning in progress   Will continue to follow.    Ronda Wright NP  Hematology/ Oncology  [facility]  [phone] (office)  [phone] (alt office)  Evenings and weekends please call MD on call or office

## 2023-07-02 NOTE — PROGRESS NOTE ADULT - TIME BILLING
-d/w ACP.DAUGHTER ATBEDSIDE.Answered all questions  -s/p RRT FOR ALTERED MENTAL STATUS-Patient is awake,responsive.CT HEAD,CXR-R/O ASPIRATION.Aspiration precautions  .Family wants patient to remain full code.Palliative care consult  -d/w ACP,Daughter at bedside.
-d/w ACP.DAUGHTER ATBEDSIDE.Answered all questions

## 2023-07-02 NOTE — PROGRESS NOTE ADULT - PROBLEM SELECTOR PLAN 6
Pt with history of stage 4 serous endometrial carcinoma (dx 5/2022) s/p chemo (last in 12/2022) and debulking surgery with POOL/BSO (2/10/23), now recently found to have recurrence of disease with new liver mets.  - Pt follows with Dr. Alvarado of [facility] [place].   - GOC discussion with pt and family given likely poor prognosis

## 2023-07-02 NOTE — PROGRESS NOTE ADULT - PROBLEM SELECTOR PLAN 2
Pt with ~2 weeks of shortness of breath with minimal exertion, now requiring supplemental O2 with 2L NC. Suspect 2/2 moderate-to-large R pleural effusion, likely malignant, and worsening anemia, though CHF and PE also on the differential.  - Pulm and/or thoracic surgery to be called in AM for possible diagnostic +/- therapeutic thoracentesis. Pt might also require chest tube placement during admission given recurrence of R pleural effusion.   - < from: CT Abdomen and Pelvis No Cont (06.26.23 @ 08:40) >    Hemorrhage fluid level at the posterior aspect of the spleen and likely   at the posterior aspect of the liver with evaluation limited on this   noncontrast study; correlation is recommended with hematocrit levels for   active bleeding.    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    Multiple bilobar liver lesions measuring up to 4.9 x 3.7 cm in the right   hepatic lobe suspicious for metastases.

## 2023-07-02 NOTE — PROGRESS NOTE ADULT - SUBJECTIVE AND OBJECTIVE BOX
Patient is a 88y old  Female who presents with a chief complaint of Generalized weakness (01 Jul 2023 14:27)    No acute overnight events.  Patient resting in recliner, no new complaints reported.    MEDICATIONS  (STANDING):  ALPRAZolam 0.25 milliGRAM(s) Oral every 8 hours  atorvastatin 80 milliGRAM(s) Oral at bedtime  cholecalciferol 2000 Unit(s) Oral daily  cyanocobalamin 1000 MICROGram(s) Oral daily  diltiazem    Tablet 30 milliGRAM(s) Oral every 6 hours  levothyroxine 75 MICROGram(s) Oral daily  loratadine 10 milliGRAM(s) Oral daily  misoprostol 200 MICROGram(s) Oral <User Schedule>  multivitamin 1 Tablet(s) Oral daily  mupirocin 2% Ointment 1 Application(s) Topical <User Schedule>  pantoprazole    Tablet 40 milliGRAM(s) Oral before breakfast  potassium chloride  10 mEq/100 mL IVPB 10 milliEquivalent(s) IV Intermittent every 1 hour  saline laxative (FLEET) Rectal Enema 1 Enema Rectal once    MEDICATIONS  (PRN):  acetaminophen     Tablet .. 650 milliGRAM(s) Oral every 6 hours PRN Temp greater or equal to 38C (100.4F), Mild Pain (1 - 3)  albuterol    90 MICROgram(s) HFA Inhaler 2 Puff(s) Inhalation every 6 hours PRN Shortness of Breath and/or Wheezing  HYDROmorphone  Injectable 0.2 milliGRAM(s) IV Push every 4 hours PRN Severe Pain (7 - 10)  lactulose Syrup 10 Gram(s) Oral daily PRN constipation  melatonin 3 milliGRAM(s) Oral at bedtime PRN Sleep  oxyCODONE    IR 5 milliGRAM(s) Oral every 4 hours PRN Moderate Pain (4 - 6)  polyethylene glycol 3350 17 Gram(s) Oral daily PRN for constipation  senna 2 Tablet(s) Oral at bedtime PRN for constipation        Vital Signs Last 24 Hrs  T(C): 37.3 (02 Jul 2023 10:00), Max: 37.4 (01 Jul 2023 21:30)  T(F): 99.2 (02 Jul 2023 10:00), Max: 99.4 (01 Jul 2023 23:21)  HR: 113 (02 Jul 2023 10:00) (109 - 120)  BP: 123/68 (02 Jul 2023 10:00) (110/69 - 127/75)  BP(mean): --  RR: 18 (02 Jul 2023 10:00) (17 - 18)  SpO2: 100% (02 Jul 2023 10:00) (100% - 100%)    Parameters below as of 02 Jul 2023 10:00  Patient On (Oxygen Delivery Method): nasal cannula  O2 Flow (L/min): 4      PE  Anicteric, MMM  RRR  CTAB  +PT, 2L NC  Abd soft, NT, ND  No c/c                            9.2    17.25 )-----------( 129      ( 02 Jul 2023 04:55 )             27.2       07-02    134<L>  |  95<L>  |  16  ----------------------------<  169<H>  3.0<L>   |  26  |  1.08    Ca    7.9<L>      02 Jul 2023 04:55  Mg     1.70     07-01

## 2023-07-02 NOTE — PROGRESS NOTE ADULT - SUBJECTIVE AND OBJECTIVE BOX
FRIDA WELCH  88y  Female      Patient is a 88y old  Female who presents with a chief complaint of Generalized weakness (02 Jul 2023 11:33)  Patient was seen and examined.chart reviewed.  Abov noted.s/p RRT earlier for unresponsiveness.Patient was responsive upon arrival ot RRT team.nad,no dyspnea    REVIEW OF SYSTEMS:  CONSTITUTIONAL: No fever  no dyspnea,no cough,no vomiting or diarrhoea  INTERVAL HPI/OVERNIGHT EVENTS:  T(C): 36.4 (07-02-23 @ 13:22), Max: 37.4 (07-01-23 @ 21:30)  HR: 106 (07-02-23 @ 13:22) (106 - 120)  BP: 105/53 (07-02-23 @ 13:22) (105/53 - 127/75)  RR: 18 (07-02-23 @ 13:22) (17 - 18)  SpO2: 98% (07-02-23 @ 13:22) (98% - 100%)  Wt(kg): --  I&O's Summary    01 Jul 2023 07:01  -  02 Jul 2023 07:00  --------------------------------------------------------  IN: 150 mL / OUT: 270 mL / NET: -120 mL    02 Jul 2023 07:01  -  02 Jul 2023 16:28  --------------------------------------------------------  IN: 0 mL / OUT: 200 mL / NET: -200 mL      T(C): 36.4 (07-02-23 @ 13:22), Max: 37.4 (07-01-23 @ 21:30)  HR: 106 (07-02-23 @ 13:22) (106 - 120)  BP: 105/53 (07-02-23 @ 13:22) (105/53 - 127/75)  RR: 18 (07-02-23 @ 13:22) (17 - 18)  SpO2: 98% (07-02-23 @ 13:22) (98% - 100%)  Wt(kg): --Vital Signs Last 24 Hrs  T(C): 36.4 (02 Jul 2023 13:22), Max: 37.4 (01 Jul 2023 21:30)  T(F): 97.6 (02 Jul 2023 13:22), Max: 99.4 (01 Jul 2023 23:21)  HR: 106 (02 Jul 2023 13:22) (106 - 120)  BP: 105/53 (02 Jul 2023 13:22) (105/53 - 127/75)  BP(mean): --  RR: 18 (02 Jul 2023 13:22) (17 - 18)  SpO2: 98% (02 Jul 2023 13:22) (98% - 100%)    Parameters below as of 02 Jul 2023 13:22  Patient On (Oxygen Delivery Method): nasal cannula  O2 Flow (L/min): 4      LABS:                        8.8    16.07 )-----------( 121      ( 02 Jul 2023 13:38 )             25.1     07-02    134<L>  |  95<L>  |  16  ----------------------------<  124<H>  4.4   |  26  |  1.05    Ca    8.2<L>      02 Jul 2023 13:38  Phos  1.8     07-02  Mg     1.60     07-02    TPro  6.6  /  Alb  3.7  /  TBili  1.1  /  DBili  x   /  AST  61<H>  /  ALT  15  /  AlkPhos  76  07-02    PT/INR - ( 02 Jul 2023 13:38 )   PT: 16.4 sec;   INR: 1.41 ratio         PTT - ( 02 Jul 2023 13:38 )  PTT:42.7 sec  Urinalysis Basic - ( 02 Jul 2023 13:38 )    Color: x / Appearance: x / SG: x / pH: x  Gluc: 124 mg/dL / Ketone: x  / Bili: x / Urobili: x   Blood: x / Protein: x / Nitrite: x   Leuk Esterase: x / RBC: x / WBC x   Sq Epi: x / Non Sq Epi: x / Bacteria: x      CAPILLARY BLOOD GLUCOSE      POCT Blood Glucose.: 163 mg/dL (02 Jul 2023 13:20)        Urinalysis Basic - ( 02 Jul 2023 13:38 )    Color: x / Appearance: x / SG: x / pH: x  Gluc: 124 mg/dL / Ketone: x  / Bili: x / Urobili: x   Blood: x / Protein: x / Nitrite: x   Leuk Esterase: x / RBC: x / WBC x   Sq Epi: x / Non Sq Epi: x / Bacteria: x        PAST MEDICAL & SURGICAL HISTORY:  Endometrial ca  [place] [facility] [place]      HTN (hypertension)      CAD (coronary artery disease)      S/P AVR      Hypothyroidism      HLD (hyperlipidemia)      S/P AVR (aortic valve replacement)      History of endometrial biopsy          MEDICATIONS  (STANDING):  ALPRAZolam 0.25 milliGRAM(s) Oral every 8 hours  atorvastatin 80 milliGRAM(s) Oral at bedtime  cholecalciferol 2000 Unit(s) Oral daily  cyanocobalamin 1000 MICROGram(s) Oral daily  diltiazem    Tablet 30 milliGRAM(s) Oral every 6 hours  levothyroxine Injectable 56 MICROGram(s) IV Push at bedtime  loratadine 10 milliGRAM(s) Oral daily  misoprostol 200 MICROGram(s) Oral <User Schedule>  multivitamin 1 Tablet(s) Oral daily  mupirocin 2% Ointment 1 Application(s) Topical <User Schedule>  pantoprazole    Tablet 40 milliGRAM(s) Oral before breakfast  saline laxative (FLEET) Rectal Enema 1 Enema Rectal once    MEDICATIONS  (PRN):  acetaminophen     Tablet .. 650 milliGRAM(s) Oral every 6 hours PRN Temp greater or equal to 38C (100.4F), Mild Pain (1 - 3)  albuterol    90 MICROgram(s) HFA Inhaler 2 Puff(s) Inhalation every 6 hours PRN Shortness of Breath and/or Wheezing  HYDROmorphone  Injectable 0.2 milliGRAM(s) IV Push every 4 hours PRN Severe Pain (7 - 10)  lactulose Syrup 10 Gram(s) Oral daily PRN constipation  melatonin 3 milliGRAM(s) Oral at bedtime PRN Sleep  oxyCODONE    IR 5 milliGRAM(s) Oral every 4 hours PRN Moderate Pain (4 - 6)  polyethylene glycol 3350 17 Gram(s) Oral daily PRN for constipation  senna 2 Tablet(s) Oral at bedtime PRN for constipation        RADIOLOGY & ADDITIONAL TESTS:    Imaging Personally Reviewed:  [ ] YES  [ ] NO    Consultant(s) Notes Reviewed:  [x] YES  [ ] NO    PHYSICAL EXAM:  GENERAL: Alert and awake lying in bed in no distress,able to recognize her Daughter at bedside  HEAD:  Atraumatic, Normocephalic  EYES: EOMI, JENNIE, conjunctiva and sclera clear  NECK: Supple, No JVD, Normal thyroid  NERVOUS SYSTEM:  Alert & Oriented X1, Motor and sensory systems are intact,   CHEST/LUNG: Bilateral clear breath sounds, no rhochi, no wheezing, no crepitations,  HEART: Regular rate and rhythm; No murmurs, rubs, or gallops  ABDOMEN: Soft, Nontender, Nondistended; Bowel sounds present  EXTREMITIES:   Peripheral Pulses are palpable, no  edema        Care Discussed with Consultants/Other Providers [ ] YES  [ ] NO      Code Status: [] Full Code [] DNR [] DNI [] Goals of Care:   Disposition: [] ICU [] Stroke Unit [] RCU []PCU []Floor [] Discharge Home         SUMMER BritoP

## 2023-07-02 NOTE — CHART NOTE - NSCHARTNOTEFT_GEN_A_CORE
Patient with AMS while sitting in recliner chair , family chairside. Found unresponsive to verbal, tactile stimuli. Pupils pinpoint, though reactive. FS glucose 163, /53, HR 70's, tele NSR, T-97.6, resp rate  20, with O2 sat 98 % RA., RRT called. Team at the bedside, patient slowly arousing though lethargic, found to have food debris on her tongue. Patient case discussed with Dr Brito covering for Dr Olviier. RRT ended, patient to remain on the unit. NPO, CXR, and CTH noncon pending, will follow up repeat labs, and continue to monitor. GOC discussed with son and daughter who were at the bedside, requesting patient to remain a full code at this time.    Miguelina Oconnor NP  Department of Medicine  Pager [phone] Returned call to the patient. He got real dizzy after being bent over he sat back up and fell gashed his arm. His BP's have been in the 182 systolic range but after his episode was 65 and BP was 914 systolic.     He has not been having any issues since his last visit     He tried to send a transmission but it has not been yet received

## 2023-07-02 NOTE — RAPID RESPONSE TEAM SUMMARY - NSSITUATIONBACKGROUNDRRT_GEN_ALL_CORE
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 Found to have moderate-to-large R pleural effusion s/p R PTC placement, course c/b retroperitoneal hematoma on CT scan. RRT called for acute change in mental status. Per RN, Pt A&O x4 at baseline and was doing conversing well but lethargic at noon. She states that pt was unresponsive right before the RRT and was not responding to sternal rub. Pt was afebrile, VSS stable and normal FSG. On exam, pt was slightly responsive and able to follow commands but lethargic on exam. Pupils reactive but pinpoint (per RN and primary team, this is her baseline), no focal deficits seen. Pt with food particles in her mouth during the RRT. Morning labs inremarkable, expect for uptrending WBC. Labs collect during RRT. Given patients improvement and stability, decision was made to end the RRT in collaboration with primary team, DAVID, and the nursing staff. All were in agreement and RRT was ended. Recommend primary team to follow up labs, keep patient NPO until improvement in mental status, monitor signs of infection, get CT head to r/o any neurological pathology causing AMS and continue GOC with family.

## 2023-07-03 NOTE — PROGRESS NOTE ADULT - SUBJECTIVE AND OBJECTIVE BOX
Patient is a 88y old  Female who presents with a chief complaint of Generalized weakness (03 Jul 2023 12:33)    Patient seen this afternoon, with her daughter at bedside. She has no complaints, denies pain/discomfort. RRT called yesterday for increased lethargy and altered mental status.    MEDICATIONS  (STANDING):  ALPRAZolam 0.25 milliGRAM(s) Oral every 8 hours  atorvastatin 80 milliGRAM(s) Oral at bedtime  cholecalciferol 2000 Unit(s) Oral daily  cyanocobalamin 1000 MICROGram(s) Oral daily  diltiazem    Tablet 30 milliGRAM(s) Oral every 6 hours  levothyroxine Injectable 56 MICROGram(s) IV Push at bedtime  loratadine 10 milliGRAM(s) Oral daily  misoprostol 200 MICROGram(s) Oral <User Schedule>  multivitamin 1 Tablet(s) Oral daily  mupirocin 2% Ointment 1 Application(s) Topical <User Schedule>  pantoprazole    Tablet 40 milliGRAM(s) Oral before breakfast    MEDICATIONS  (PRN):  acetaminophen     Tablet .. 650 milliGRAM(s) Oral every 6 hours PRN Temp greater or equal to 38C (100.4F), Mild Pain (1 - 3)  albuterol    90 MICROgram(s) HFA Inhaler 2 Puff(s) Inhalation every 6 hours PRN Shortness of Breath and/or Wheezing  HYDROmorphone  Injectable 0.2 milliGRAM(s) IV Push every 4 hours PRN Severe Pain (7 - 10)  lactulose Syrup 10 Gram(s) Oral daily PRN constipation  melatonin 3 milliGRAM(s) Oral at bedtime PRN Sleep  oxyCODONE    IR 5 milliGRAM(s) Oral every 4 hours PRN Moderate Pain (4 - 6)  polyethylene glycol 3350 17 Gram(s) Oral daily PRN for constipation  senna 2 Tablet(s) Oral at bedtime PRN for constipation        Vital Signs Last 24 Hrs  T(C): 36.6 (03 Jul 2023 12:00), Max: 37.2 (03 Jul 2023 08:00)  T(F): 97.9 (03 Jul 2023 12:00), Max: 99 (03 Jul 2023 08:00)  HR: 118 (03 Jul 2023 12:00) (112 - 118)  BP: 113/62 (03 Jul 2023 12:00) (107/62 - 144/83)  BP(mean): --  RR: 18 (03 Jul 2023 12:00) (16 - 18)  SpO2: 100% (03 Jul 2023 12:00) (100% - 100%)    Parameters below as of 03 Jul 2023 12:00  Patient On (Oxygen Delivery Method): nasal cannula  O2 Flow (L/min): 4      PE  NAD  Awake, alert, lethargic   Anicteric, MMM  No c/c/e  No rash grossly                            9.2    14.97 )-----------( 115      ( 03 Jul 2023 05:46 )             26.8       07-03    131<L>  |  94<L>  |  16  ----------------------------<  126<H>  3.5   |  26  |  0.97    Ca    7.9<L>      03 Jul 2023 05:46  Phos  3.6     07-03  Mg     2.20     07-03    TPro  6.6  /  Alb  3.7  /  TBili  1.1  /  DBili  x   /  AST  61<H>  /  ALT  15  /  AlkPhos  76  07-02

## 2023-07-03 NOTE — PROGRESS NOTE ADULT - SUBJECTIVE AND OBJECTIVE BOX
[facility] NEPHROLOGY- PROGRESS NOTE    88y Female with history of endometrial carcinoma presents with weakness. Nephrology consulted for elevated Scr.    s/p RRT this am for AMS; found to have food particles in mouth; concern for Aspiration      REVIEW OF SYSTEMS:  Gen: no fevers  Cards: no chest pain  Resp: + dyspnea   GI: no nausea or vomiting or diarrhea  Vascular: + LE edema improving    No Known Allergies      Hospital Medications: Medications reviewed      VITALS:  T(F): 99 (07-03-23 @ 08:00), Max: 99.2 (07-02-23 @ 10:00)  HR: 113 (07-03-23 @ 08:00)  BP: 122/77 (07-03-23 @ 08:00)  RR: 18 (07-03-23 @ 08:00)  SpO2: 100% (07-03-23 @ 08:00)  Wt(kg): --    07-02 @ 07:01  -  07-03 @ 07:00  --------------------------------------------------------  IN: 0 mL / OUT: 350 mL / NET: -350 mL          PHYSICAL EXAM:    Gen: NAD, calm  Cards: RRR, +S1/S2, no M/G/R  Resp: Decreased BS @ R base, + R CT  GI: soft, NT/ND, NABS  : + brown  Vascular: + LE edema B/L improving      LABS:  07-03  131 <--, 134 <--, 134 <--, 133 <--, 135 <--, 134 <--, 133 <--  131<L>  |  94<L>  |  16  ----------------------------<  126<H>  3.5   |  26  |  0.97    Ca    7.9<L>      03 Jul 2023 05:46  Phos  3.6     07-03  Mg     2.20     07-03    TPro  6.6  /  Alb  3.7  /  TBili  1.1  /  DBili      /  AST  61<H>  /  ALT  15  /  AlkPhos  76  07-02    Creatinine Trend: 0.97 <--, 1.05 <--, 1.08 <--, 1.08 <--, 0.96 <--, 1.20 <--, 1.47 <--                        9.2    14.97 )-----------( 115      ( 03 Jul 2023 05:46 )             26.8     Urine Studies:  Urinalysis Basic - ( 03 Jul 2023 05:46 )    Color:  / Appearance:  / SG:  / pH:   Gluc: 126 mg/dL / Ketone:   / Bili:  / Urobili:    Blood:  / Protein:  / Nitrite:    Leuk Esterase:  / RBC:  / WBC    Sq Epi:  / Non Sq Epi:  / Bacteria:

## 2023-07-03 NOTE — PROGRESS NOTE ADULT - ASSESSMENT
87 yo woman with history of stage 4 serous endometrial carcinoma (dx 5/2022) s/p chemo (last in 12/2022) and debulking surgery with POOL/BSO (2/10/23) c/b b/l pleural effusions (R > L, on 2L NC PRN), RUL segmental PE (8/2022, on Eliquis), GI bleed (4/2023), CAD s/p stent (3/2021), aortic stenosis s/p TAVR, HTN, HLD, and hypothyroidism presents with 3 weeks of worsening generalized weakness.     EKG: MAT   Tele: MAT    1) Multifocal Atrial Tachycardia   -started Dilt 30 mg po q6 hrs, --currently not receiving it as patient is NPO  -continue to monitor on tele     2) Pleural effusion  -s/p Pig tail   -f/u pulm and thoracic recs    3) hx of PE  - RUL segmental PE (8/2022  -AC on hold 2/2 anemia    4) CAD s/p PCI  -denies CP  -TTE with normal LV function    5) TAVR  -TTE transcatheter aortic valve replacement Peak transaortic valve gradient equals 42 mm Hg, mean transaortic valve gradient equals 23 mm Hg, which is probably normal in the presence of a prosthetic valve. Moderate valvular aortic regurgitation.    6) Anemia  -transfuse to keep hemoglobin >8  -CT scan non con showing bleeding near the spleen and liver. surgery on board, CTA abd/pelvis with no acute bleed

## 2023-07-03 NOTE — PROGRESS NOTE ADULT - SUBJECTIVE AND OBJECTIVE BOX
Patient is a 88y old  Female who presents with a chief complaint of Generalized weakness (03 Jul 2023 12:33)    Patient seen this afternoon, with her daughter at bedside. She has no complaints, denies pain/discomfort. RRT called yesterday for increased lethargy and altered mental status.    MEDICATIONS  (STANDING):  ALPRAZolam 0.25 milliGRAM(s) Oral every 8 hours  atorvastatin 80 milliGRAM(s) Oral at bedtime  cholecalciferol 2000 Unit(s) Oral daily  cyanocobalamin 1000 MICROGram(s) Oral daily  diltiazem    Tablet 30 milliGRAM(s) Oral every 6 hours  levothyroxine Injectable 56 MICROGram(s) IV Push at bedtime  loratadine 10 milliGRAM(s) Oral daily  misoprostol 200 MICROGram(s) Oral <User Schedule>  multivitamin 1 Tablet(s) Oral daily  mupirocin 2% Ointment 1 Application(s) Topical <User Schedule>  pantoprazole    Tablet 40 milliGRAM(s) Oral before breakfast    MEDICATIONS  (PRN):  acetaminophen     Tablet .. 650 milliGRAM(s) Oral every 6 hours PRN Temp greater or equal to 38C (100.4F), Mild Pain (1 - 3)  albuterol    90 MICROgram(s) HFA Inhaler 2 Puff(s) Inhalation every 6 hours PRN Shortness of Breath and/or Wheezing  HYDROmorphone  Injectable 0.2 milliGRAM(s) IV Push every 4 hours PRN Severe Pain (7 - 10)  lactulose Syrup 10 Gram(s) Oral daily PRN constipation  melatonin 3 milliGRAM(s) Oral at bedtime PRN Sleep  oxyCODONE    IR 5 milliGRAM(s) Oral every 4 hours PRN Moderate Pain (4 - 6)  polyethylene glycol 3350 17 Gram(s) Oral daily PRN for constipation  senna 2 Tablet(s) Oral at bedtime PRN for constipation        Vital Signs Last 24 Hrs  T(C): 36.6 (03 Jul 2023 12:00), Max: 37.2 (03 Jul 2023 08:00)  T(F): 97.9 (03 Jul 2023 12:00), Max: 99 (03 Jul 2023 08:00)  HR: 118 (03 Jul 2023 12:00) (112 - 118)  BP: 113/62 (03 Jul 2023 12:00) (107/62 - 144/83)  BP(mean): --  RR: 18 (03 Jul 2023 12:00) (16 - 18)  SpO2: 100% (03 Jul 2023 12:00) (100% - 100%)    Parameters below as of 03 Jul 2023 12:00  Patient On (Oxygen Delivery Method): nasal cannula  O2 Flow (L/min): 4      PE  NAD  Awake, alert, lethargic   Anicteric, MMM  cvs- s1, s2+  rs - dec breath sounds at bases  abd - soft, bs+  ext - edema+                          9.2    14.97 )-----------( 115      ( 03 Jul 2023 05:46 )             26.8       07-03    131<L>  |  94<L>  |  16  ----------------------------<  126<H>  3.5   |  26  |  0.97    Ca    7.9<L>      03 Jul 2023 05:46  Phos  3.6     07-03  Mg     2.20     07-03    TPro  6.6  /  Alb  3.7  /  TBili  1.1  /  DBili  x   /  AST  61<H>  /  ALT  15  /  AlkPhos  76  07-02

## 2023-07-03 NOTE — SWALLOW BEDSIDE ASSESSMENT ADULT - COMMENTS
Progress Note- Internal Medicine Attending 7/2: "87 yo woman with history of stage 4 serous endometrial carcinoma (dx 5/2022) s/p chemo (last in 12/2022) and debulking surgery with POOL/BSO (2/10/23) c/b b/l pleural effusions (R > L, on 2L NC PRN), RUL segmental PE (8/2022, on Eliquis), GI bleed (4/2023), CAD s/p stent (3/2021), aortic stenosis s/p TAVR, HTN, HLD, and hypothyroidism presents with 3 weeks of worsening generalized weakness."    Chart Note- Event Note NP 7/2: "Patient with AMS while sitting in recliner chair , family chairside. Found unresponsive to verbal, tactile stimuli. Pupils pinpoint, though reactive. FS glucose 163, /53, HR 70's, tele NSR, T-97.6, resp rate  20, with O2 sat 98 % RA., RRT called. Team at the bedside, patient slowly arousing though lethargic, found to have food debris on her tongue. Patient case discussed with Dr Brito covering for Dr Olivire. RRT ended, patient to remain on the unit. NPO, CXR, and CTH noncon pending, will follow up repeat labs, and continue to monitor..."    CXR 7/2: results pending at this time    Patient seen awake/alert during clinical swallow evaluation this PM with patient's daughter present at bedside. Patient is able to follow simple one step directives and make basic needs/wants known. Patient denies difficultly swallowing and denies eating/drinking before rapid response was called. Patient states she was "very tired." Patient with nasal cannula. Progress Note- Internal Medicine Attending 7/2: "87 yo woman with history of stage 4 serous endometrial carcinoma (dx 5/2022) s/p chemo (last in 12/2022) and debulking surgery with POOL/BSO (2/10/23) c/b b/l pleural effusions (R > L, on 2L NC PRN), RUL segmental PE (8/2022, on Eliquis), GI bleed (4/2023), CAD s/p stent (3/2021), aortic stenosis s/p TAVR, HTN, HLD, and hypothyroidism presents with 3 weeks of worsening generalized weakness."    Chart Note- Event Note NP 7/2: "Patient with AMS while sitting in recliner chair , family chairside. Found unresponsive to verbal, tactile stimuli. Pupils pinpoint, though reactive. FS glucose 163, /53, HR 70's, tele NSR, T-97.6, resp rate  20, with O2 sat 98 % RA., RRT called. Team at the bedside, patient slowly arousing though lethargic, found to have food debris on her tongue. Patient case discussed with Dr Brito covering for Dr Olivier. RRT ended, patient to remain on the unit. NPO, CXR, and CTH noncon pending, will follow up repeat labs, and continue to monitor..."    CXR 7/2: IMPRESSION: Large right and small left pleural effusions with adjacent atelectasis/infiltrate, unchanged. Right chest tube in place with no pneumothorax, unchanged."    Patient seen awake/alert during clinical swallow evaluation this PM with patient's daughter present at bedside. Patient is able to follow simple one step directives and make basic needs/wants known. Patient denies difficultly swallowing and denies eating/drinking before rapid response was called. Patient states she was "very tired." Patient with nasal cannula.

## 2023-07-03 NOTE — CHART NOTE - NSCHARTNOTEFT_GEN_A_CORE
Patient is 87 yo woman with history of stage 4 serous endometrial carcinoma (dx 5/2022) s/p chemo (last in 12/2022) and debulking surgery with POOL/BSO (2/10/23) c/b b/l pleural effusions (R > L, on 2L NC PRN), RUL segmental PE (8/2022, on Eliquis), GI bleed (4/2023), CAD s/p stent (3/2021), aortic stenosis s/p TAVR, HTN, HLD, and hypothyroidism presents with 3 weeks of worsening generalized weakness. Found to have moderate-to-large R pleural effusion s/p R PTC placement, course c/b retroperitoneal hematoma on CT scan. RRT called for acute change in mental status. Per RN, Pt A&O x4 at baseline and was doing conversing well   RRT was called for acute change in mental status, episode of unresponsiveness even with noxious stimuli/sternal rub. Pt afebrile, VSS stable and normal FSG. Patient w/ tachycardia to 110s, but that been attributed to her multifocal tachycardia, which is baseline. During the rapid, patient quickly regained mental status and became AOx3, but with some lethargy without any intervention. As per RN, patient received alprazolam 0.25 mg 45 mins prior to RRT. Alprazolam ordered as standing q8, but patient has not received prior doses of the alprazolam. Timing consistent with sedation 2/2 xanax. To rule out other causes, obtained CBC, CMP, VBG, and prolactin.  Xanax discontinued Patient is 89 yo woman with history of stage 4 serous endometrial carcinoma (dx 5/2022) s/p chemo (last in 12/2022) and debulking surgery with POOL/BSO (2/10/23) c/b b/l pleural effusions (R > L, on 2L NC PRN), RUL segmental PE (8/2022, on Eliquis), GI bleed (4/2023), CAD s/p stent (3/2021), aortic stenosis s/p TAVR, HTN, HLD, and hypothyroidism presents with 3 weeks of worsening generalized weakness. Found to have moderate-to-large R pleural effusion s/p R PTC placement, course c/b retroperitoneal hematoma on CT scan. RRT called for acute change in mental status. Per RN, Pt A&O x4 at baseline and was doing conversing well   RRT was called for acute change in mental status, episode of unresponsiveness even with noxious stimuli/sternal rub. Pt afebrile, VSS stable and normal FSG. Patient w/ tachycardia to 110s, but that been attributed to her multifocal tachycardia, which is baseline. During the rapid, patient quickly regained mental status and became AOx3, but with some lethargy without any intervention. As per RN, patient received alprazolam 0.25 mg 45 mins prior to RRT. Alprazolam ordered as standing q8, but patient has not received prior doses of the alprazolam. Timing consistent with sedation 2/2 xanax. To rule out other causes, obtained CBC, CMP, VBG, and prolactin.  Xanax discontinued.    Addendum: Post RRT labs reviewed. Procalcitonin 0.39, Patient is s/p zosyn x7 days for fevers, currently rectal temp-99.F. Otherwise unremarkable with labs.  Notified Dr Olivier regarding the event and RRT

## 2023-07-03 NOTE — RAPID RESPONSE TEAM SUMMARY - NSSITUATIONBACKGROUNDRRT_GEN_ALL_CORE
Patient is an 87 yo woman with history of stage 4 serous endometrial carcinoma (dx 5/2022) s/p chemo (last in 12/2022) and debulking surgery with POOL/BSO (2/10/23) c/b b/l pleural effusions (R > L, on 2L NC PRN), RUL segmental PE (8/2022, on Eliquis), GI bleed (4/2023), CAD s/p stent (3/2021), aortic stenosis s/p TAVR, HTN, HLD, and hypothyroidism presented with 3 weeks of worsening generalized weakness. Found to have moderate-to-large R pleural effusion s/p R PTC placement, course c/b retroperitoneal hematoma on CT scan. RRT called for acute change in mental status. Per RN bedside, Pt A&O x4 at baseline and was doing conversing well. Patient is on neurochecks q4, last neuro check was 8pm and patient was at baseline.  Patient was now unresponsive, RRT was called. Pt afebrile, VSS stable and normal FSG. Patient w/ tachycardia to 110s, but that been attributed to her multifocal tachycardia, and has been ongoing in similar rates for multiple days. On exam, pupils reactive but pinpoint (per RN and primary team, this is her baseline), no focal deficits seen. Patient quickly regained mental status and became AOx3, but with some lethargy. Chart record received alprazolam 0.25 45 mins prior to RRT. Alprazolam ordered as standing q8, but patient has not received prior doses of the alprazolam. Timing consistent with sedation 2/2 xanax. To rule out other causes, obtained CBC, CMP, VBG, and prolactin.      Recommendations:   - Discontinue xanax. If this is a chronic medication, monitor for withdrawal or consider taper. Recommend other alternatives if patient has anxiety.   - Follow up labs drawn at RRT for other reversible causes      -

## 2023-07-03 NOTE — PROGRESS NOTE ADULT - ASSESSMENT
88y Female with history of endometrial carcinoma presents with weakness. Nephrology consulted for elevated Scr.    1) [place]: likely hemodynamically mediated in setting of relative hypotension/tachycardia and hypoalbuminemia. [place] resolved with IV albumin. UA bland. FeNa low. Renal US and CT with mild R hydronephrosis suspect due to underlying CA. Patient will need outpatient Urology monitoring and if no improvement with chemotherapy may need R NT. TMA work up negative. Avoid nephrotoxins. Monitor electrolytes.    2) HTN: BP low normal with tachycardia. As per cardiology.    3) LE edema: Due to hypoalbuminemia from malnutrition (patient without nephrotic range proteinuria). Will given an additional lasix 40 mg IV X 1 dose with concurrent IV albumin today. Continue with protein supplements. TTE with normal LVSF. Monitor UO.    4) Hyponatremia: worsening. Continue with 1L FR when diet is resumed. Check urine sodium, urine osmolality and serum osmolality. Monitor serum Na.      [facility] NEPHROLOGY  Brennon Cates M.D.  Addison Nieto D.O.  Guerline Dias M.D.  Merissa Parsons, MSN, ANP-C    Telephone: [phone]  Facsimile: [phone]    [address] [place] [address]

## 2023-07-03 NOTE — PROGRESS NOTE ADULT - SUBJECTIVE AND OBJECTIVE BOX
Rony Mcgarry MD  Interventional Cardiology / Endovascular Specialist  [place] Office : [phone] [place] [address] Tel:   [place] Office : [address] [facility] [address]  Tel: [phone]      Subjective/Overnight events: Patient lying in bed somnolent, no acute distress  	  MEDICATIONS:  diltiazem    Tablet 30 milliGRAM(s) Oral every 6 hours      albuterol    90 MICROgram(s) HFA Inhaler 2 Puff(s) Inhalation every 6 hours PRN  loratadine 10 milliGRAM(s) Oral daily    acetaminophen     Tablet .. 650 milliGRAM(s) Oral every 6 hours PRN  ALPRAZolam 0.25 milliGRAM(s) Oral every 8 hours  HYDROmorphone  Injectable 0.2 milliGRAM(s) IV Push every 4 hours PRN  melatonin 3 milliGRAM(s) Oral at bedtime PRN  oxyCODONE    IR 5 milliGRAM(s) Oral every 4 hours PRN    lactulose Syrup 10 Gram(s) Oral daily PRN  misoprostol 200 MICROGram(s) Oral <User Schedule>  pantoprazole    Tablet 40 milliGRAM(s) Oral before breakfast  polyethylene glycol 3350 17 Gram(s) Oral daily PRN  senna 2 Tablet(s) Oral at bedtime PRN    atorvastatin 80 milliGRAM(s) Oral at bedtime  levothyroxine Injectable 56 MICROGram(s) IV Push at bedtime    cholecalciferol 2000 Unit(s) Oral daily  cyanocobalamin 1000 MICROGram(s) Oral daily  multivitamin 1 Tablet(s) Oral daily  mupirocin 2% Ointment 1 Application(s) Topical <User Schedule>      PAST MEDICAL/SURGICAL HISTORY  PAST MEDICAL & SURGICAL HISTORY:  Endometrial ca  S4 serous endometrial carcinoma, [facility], chemotherapy      HTN (hypertension)      CAD (coronary artery disease)      S/P AVR      Hypothyroidism      HLD (hyperlipidemia)      S/P AVR (aortic valve replacement)      History of endometrial biopsy          SOCIAL HISTORY: Substance Use (street drugs): ( x ) never used  (  ) other:    FAMILY HISTORY:  Family history of parotid cancer (Child)          PHYSICAL EXAM:  T(C): 36.6 (07-03-23 @ 12:00), Max: 37.2 (07-03-23 @ 08:00)  HR: 118 (07-03-23 @ 12:00) (106 - 118)  BP: 113/62 (07-03-23 @ 12:00) (105/53 - 144/83)  RR: 18 (07-03-23 @ 12:00) (16 - 18)  SpO2: 100% (07-03-23 @ 12:00) (98% - 100%)  Wt(kg): --  I&O's Summary    02 Jul 2023 07:01  -  03 Jul 2023 07:00  --------------------------------------------------------  IN: 0 mL / OUT: 350 mL / NET: -350 mL          GENERAL: NAD  EYES: EOMI, PERRLA, conjunctiva and sclera clear  ENMT: No tonsillar erythema, exudates, or enlargement  Cardiovascular: Normal S1 S2, No JVD, No murmurs, No edema  Respiratory: Lungs clear to auscultation	  Gastrointestinal:  Soft, Non-tender, + BS	  Extremities: No edema                                     9.2    14.97 )-----------( 115      ( 03 Jul 2023 05:46 )             26.8     07-03    131<L>  |  94<L>  |  16  ----------------------------<  126<H>  3.5   |  26  |  0.97    Ca    7.9<L>      03 Jul 2023 05:46  Phos  3.6     07-03  Mg     2.20     07-03    TPro  6.6  /  Alb  3.7  /  TBili  1.1  /  DBili  x   /  AST  61<H>  /  ALT  15  /  AlkPhos  76  07-02    proBNP:   Lipid Profile:   HgA1c:   TSH:     Consultant(s) Notes Reviewed:  [x ] YES  [ ] NO    Care Discussed with Consultants/Other Providers [ x] YES  [ ] NO    Imaging Personally Reviewed independently:  [x] YES  [ ] NO    All labs, radiologic studies, vitals, orders and medications list reviewed. Patient is seen and examined at bedside. Case discussed with medical team.

## 2023-07-03 NOTE — SWALLOW BEDSIDE ASSESSMENT ADULT - ASR SWALLOW RECOMMEND DIAG
Objective testing NOT warranted at this time given no overt signs of aspiration Objective testing NOT warranted at this time given no overt signs of aspiration and CXR 7/2 with unchanged findings without aspiration noted

## 2023-07-03 NOTE — PROGRESS NOTE ADULT - ASSESSMENT
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    1. Uterine carcinosarcoma   -- Under care of Dr. Alvarado at [facility]  -- s/p neoadjuvant chemotherapy completed 12/2022, s/p POOL-BSO 02/10/2023   -- Recent imaging suggests disease recurrence  -- Pt declining, would likely not be a candidate for further treatment.   -- Palliative care team following, will plan for LTC placement with hospice.     2. Dyspnea on exertion   -- CTA chest w/o PE; + large right pleural effusion   -- LE duplex without DVT   -- Thoracic following, pigtail catheter placed for temporary draining. Will keep PTC in place for now per family request, though minimal output.   -- Cytology from R pleural effusion shows atypical cells suspicious for malignancy.   -- Continue supplemental O2 as needed, wean as tolerated     3. Anemia, thrombocytopenia   -- Likely secondary to malignancy, chronic disease, acute illness   -- No evidence of iron, B12, or folate deficiencies.  -- Monitor CBC and transfuse to maintain hg >7, platelet count >10K, >15K if febrile, or >50K if bleeding   -- Hg acutely decreased 06/27. CT a/p shows possible RP bleed. Surgery consulted, CTA AP without active bleed- no intervention.  -- Hg improved now. Continue to monitor.    4. AMS, lethargy   -- f/u CT head     Will continue to follow.    Twyla Pitts PA-C  Hematology/Oncology  [facility]  [phone] (office)  [phone] (alt office)  Evenings and weekends please call MD on call or office

## 2023-07-03 NOTE — PROGRESS NOTE ADULT - SUBJECTIVE AND OBJECTIVE BOX
Pt seen. Resting in bed. Daughter at bedside  Pt minimally responsive, lethargic.   Appears comfortable though, no distess  No s/s of pain    Vital Signs Last 24 Hrs  T(C): 37.2 (03 Jul 2023 08:00), Max: 37.2 (03 Jul 2023 08:00)  T(F): 99 (03 Jul 2023 08:00), Max: 99 (03 Jul 2023 08:00)  HR: 113 (03 Jul 2023 08:00) (106 - 116)  BP: 122/77 (03 Jul 2023 08:00) (105/53 - 144/83)  BP(mean): --  RR: 18 (03 Jul 2023 08:00) (16 - 18)  SpO2: 100% (03 Jul 2023 08:00) (98% - 100%)    Parameters below as of 03 Jul 2023 08:00  Patient On (Oxygen Delivery Method): nasal cannula  O2 Flow (L/min): 4      Minimally alert  Rt PTC site c/d/i  Output minimal x multiple days.   Last 24hrs only 30cc. On waterseal. No air leak.   CXR on 7/2 shows sml Rt effusion    A/P: 89yo F with Rt pleural effusion, s/p PTC insertion    -PTC output minimal for multiple days  -No plans for any thoracic surgical intervention  -PTC can be removed once pt ready for discharge. Will keep in place   for now per family request to not risk reaccumulation of pleural effusion  -Care per primary team  -Will follow

## 2023-07-03 NOTE — PROGRESS NOTE ADULT - PROBLEM SELECTOR PLAN 6
Pt with history of stage 4 serous endometrial carcinoma (dx 5/2022) s/p chemo (last in 12/2022) and debulking surgery with POOL/BSO (2/10/23), now recently found to have recurrence of disease with new liver mets.  - Pt follows with Dr. Alvarado of [facility].   - GOC discussion with pt and family given likely poor prognosis

## 2023-07-04 NOTE — PROGRESS NOTE ADULT - PROBLEM SELECTOR PLAN 6
Hgb 9.8 on admission, was 11.2 on 5/4/23, though baseline Hgb appears to be ~10-11. No S/S of active bleed.  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 Check LDH, haptoglobin, retic count, coags, D-dimer, and fibrinogen, though low suspicion for TTP at this time.  - Monitor CBC with diff

## 2023-07-04 NOTE — CONSULT NOTE ADULT - ASSESSMENT
88y ([id number]) woman with recurrent stage 4 serous endometrial carcinoma (dx 5/2022) (neoadjuvant chemotherapy completed 12/2022, s/p POOL-BSO 02/10/2023), RUL segmental PE (8/2022, on Eliquis), GI bleed (4/2023), CAD s/p stent (3/2021), aortic stenosis s/p TAVR, HTN, HLD, and hypothyroidism admitted for generalized weakness, found to have moderate-to-large R pleural effusion. Neurology consulted for AMS and lethargy. Daughter at bedside providing collateral history. Patient at baseline is oriented x 3 and walks with walker. Since being admitted to the hospital she has become lethargic with decreased verbal output. After getting hysterectomy she felt her mother slowly declining. She is not eating as much and not participating in conversation as she used to. RRT was called for AMS, patient was given xanax prior and now has been discontinued. Exam nonfocal.       Impression:     Recommendation:     88y ([id number]) woman with recurrent stage 4 serous endometrial carcinoma (dx 5/2022) (neoadjuvant chemotherapy completed 12/2022, s/p POOL-BSO 02/10/2023), RUL segmental PE (8/2022, on Eliquis), GI bleed (4/2023), CAD s/p stent (3/2021), aortic stenosis s/p TAVR, HTN, HLD, and hypothyroidism admitted for generalized weakness, found to have moderate-to-large R pleural effusion. Neurology consulted for AMS and lethargy. Daughter at bedside providing collateral history. Patient at baseline is oriented x 3 and walks with walker. Since being admitted to the hospital she has become lethargic with decreased verbal output. After getting hysterectomy she felt her mother slowly declining. She is not eating as much and not participating in conversation as she used to. RRT was called for AMS, patient was given xanax prior and now has been discontinued. Exam nonfocal. VPA      Impression: Lethargy and episode of confusion likely toxic, metabolic, infectious etiology with component of hospital delirium vs medication induced from sedatives    Recommendation:   [] If no improvement in mental status can obtain rEEG-2 hours and MRI B w/wo to r/o leptomeningeal enhancement   [] NH3, Folate, B12, B1, B6, vitamin D, TSH, free t3    Discussed with Dr. Woo.   88y ([id number]) woman with recurrent stage 4 serous endometrial carcinoma (dx 5/2022) (neoadjuvant chemotherapy completed 12/2022, s/p POOL-BSO 02/10/2023), RUL segmental PE (8/2022, on Eliquis), GI bleed (4/2023), CAD s/p stent (3/2021), aortic stenosis s/p TAVR, HTN, HLD, and hypothyroidism admitted for generalized weakness, found to have moderate-to-large R pleural effusion. Neurology consulted for AMS and lethargy. Daughter at bedside providing collateral history. Patient at baseline is oriented x 3 and walks with walker. Since being admitted to the hospital she has become lethargic with decreased verbal output. After getting hysterectomy she felt her mother slowly declining. She is not eating as much and not participating in conversation as she used to. RRT was called for AMS, patient was given xanax prior and now has been discontinued. Exam nonfocal. VPA      Impression: Lethargy and episode of confusion likely toxic, metabolic, infectious etiology with component of hospital delirium vs medication induced from sedatives    Recommendation:   [x] B12 over 2000, Folate over 20, TSH 25, free t3 42  [] If no improvement in mental status can obtain rEEG-2 hours and MRI B w/wo to r/o leptomeningeal enhancement   [] Labs: NH3, B1, B6, vitamin D    To be seen and discussed with attending.

## 2023-07-04 NOTE — PROGRESS NOTE ADULT - ASSESSMENT
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    1. Uterine carcinosarcoma   -- Under care of [place]. Alvarado [place] [facility] [place]  -- s/p neoadjuvant chemotherapy completed 12/2022, s/p POOL-BSO 02/10/2023   -- Recent imaging suggests disease recurrence  -- Prognosis is poor, likely not  a candidate for further treatment.   -- Palliative care team following, will plan for LTC placement with hospice.     2. Dyspnea on exertion   -- CTA chest w/o PE; + large right pleural effusion   -- LE duplex without DVT   -- Thoracic following, pigtail catheter placed for temporary draining. Will keep PTC in place for now per family request, though minimal output.   -- Cytology from R pleural effusion shows atypical cells suspicious for malignancy.   -- Continue supplemental O2 as needed, wean as tolerated     3. Anemia, thrombocytopenia   -- Likely secondary to malignancy, AOCD  -- No evidence of iron, B12, or folate deficiencies.  -- Monitor CBC and transfuse to maintain hg >7, platelet count >10K, >15K if febrile, or >50K if bleeding   -- Hg acutely decreased 06/27. CT a/p shows possible RP bleed.  CTA AP without active bleed- no intervention, surgery recs appreciated  -- Hg improved now. Continue to monitor.    4. AMS, lethargy   -- CT Head negative  --s/p RRT overnight, found unresponsivem quickly regained consciousness, A&0 x 3 with persistent lethargy  --standing order for xanax was discontinued     Will continue to follow.    Erica Cullen NP  Hematology/Oncology  [facility]  [phone] (Office)  [phone] (Alt office)  Evenings and weekends please call MD on call or office

## 2023-07-04 NOTE — PROGRESS NOTE ADULT - PROBLEM SELECTOR PLAN 7
Pt with history of stage 4 serous endometrial carcinoma (dx 5/2022) s/p chemo (last in 12/2022) and debulking surgery with POOL/BSO (2/10/23), now recently found to have recurrence of disease with new liver mets.  - Pt follows with Dr. Alvarado of [facility] [place].   - GOC discussion with pt and family given poor prognosis - pt is fullcode

## 2023-07-04 NOTE — PROGRESS NOTE ADULT - SUBJECTIVE AND OBJECTIVE BOX
pt seen and examined this morning on rounds    Vital Signs Last 24 Hrs  T(C): 36.9 (04 Jul 2023 11:50), Max: 37.7 (04 Jul 2023 04:00)  T(F): 98.5 (04 Jul 2023 11:50), Max: 99.8 (04 Jul 2023 04:00)  HR: 137 (04 Jul 2023 11:50) (114 - 137)  BP: 143/80 (04 Jul 2023 11:50) (107/67 - 143/80)  BP(mean): --  RR: 16 (04 Jul 2023 11:50) (16 - 18)  SpO2: 98% (04 Jul 2023 11:50) (98% - 100%)    Parameters below as of 04 Jul 2023 08:00  Patient On (Oxygen Delivery Method): nasal cannula  O2 Flow (L/min): 4      PE  Minimally alert  Rt PTC site c/d/i  Output minimal x multiple days.   0cc drainage in pleurovac. On waterseal. No air leak appreciated    CXR on 7/2 shows sml Rt effusion    A/P: 89yo F with Rt pleural effusion, s/p PTC insertion    -PTC output minimal for multiple days  -No plans for any thoracic surgical intervention  -PTC can be removed once pt ready for discharge. Will keep in place   for now per family request to not risk reaccumulation of pleural effusion  -Care per primary team  -Will follow
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
## 2023-07-04 NOTE — PROGRESS NOTE ADULT - SUBJECTIVE AND OBJECTIVE BOX
Patient is a 88y old  Female who presents with a chief complaint of Generalized weakness (03 Jul 2023 13:24)      MEDICATIONS  (STANDING):  atorvastatin 80 milliGRAM(s) Oral at bedtime  cholecalciferol 2000 Unit(s) Oral daily  cyanocobalamin 1000 MICROGram(s) Oral daily  diltiazem    Tablet 30 milliGRAM(s) Oral every 6 hours  levothyroxine Injectable 56 MICROGram(s) IV Push at bedtime  loratadine 10 milliGRAM(s) Oral daily  misoprostol 200 MICROGram(s) Oral <User Schedule>  multivitamin 1 Tablet(s) Oral daily  mupirocin 2% Ointment 1 Application(s) Topical <User Schedule>  pantoprazole    Tablet 40 milliGRAM(s) Oral before breakfast    MEDICATIONS  (PRN):  acetaminophen     Tablet .. 650 milliGRAM(s) Oral every 6 hours PRN Temp greater or equal to 38C (100.4F), Mild Pain (1 - 3)  albuterol    90 MICROgram(s) HFA Inhaler 2 Puff(s) Inhalation every 6 hours PRN Shortness of Breath and/or Wheezing  HYDROmorphone  Injectable 0.2 milliGRAM(s) IV Push every 4 hours PRN Severe Pain (7 - 10)  lactulose Syrup 10 Gram(s) Oral daily PRN constipation  melatonin 3 milliGRAM(s) Oral at bedtime PRN Sleep  oxyCODONE    IR 5 milliGRAM(s) Oral every 4 hours PRN Moderate Pain (4 - 6)  polyethylene glycol 3350 17 Gram(s) Oral daily PRN for constipation  senna 2 Tablet(s) Oral at bedtime PRN for constipation      ROS  No fever, sweats, chills  No n/v/d, abd pain, melena  No back pain, joint pain  No bleeding, bruising  No dysuria, hematuria    Vital Signs Last 24 Hrs  T(C): 37.7 (04 Jul 2023 04:00), Max: 37.7 (04 Jul 2023 04:00)  T(F): 99.8 (04 Jul 2023 04:00), Max: 99.8 (04 Jul 2023 04:00)  HR: 115 (04 Jul 2023 04:00) (114 - 125)  BP: 119/61 (04 Jul 2023 04:00) (107/67 - 129/75)  BP(mean): --  RR: 17 (04 Jul 2023 04:00) (17 - 18)  SpO2: 100% (04 Jul 2023 04:00) (100% - 100%)    Parameters below as of 04 Jul 2023 04:00  Patient On (Oxygen Delivery Method): nasal cannula  O2 Flow (L/min): 4      PE  NAD  Lethargic  Anicteric, MMM  Abd soft, NT  No c/c/e  No rash grossly                            9.6    15.56 )-----------( 121      ( 03 Jul 2023 22:45 )             26.5       07-04    134<L>  |  95<L>  |  18  ----------------------------<  79  4.2   |  25  |  1.02    Ca    8.1<L>      04 Jul 2023 04:11  Phos  3.3     07-04  Mg     2.10     07-04    TPro  6.6  /  Alb  3.1<L>  /  TBili  1.3<H>  /  DBili  x   /  AST  120<H>  /  ALT  32  /  AlkPhos  86  07-04

## 2023-07-04 NOTE — CONSULT NOTE ADULT - ATTENDING COMMENTS
patient seen and examined. daughter at bedside. states mom is feeling better, just wants to be left alone, quite, not as interested in watching TV. denies sleepiness, headache, confusion, weakness, hallucinations, speech/ swallow difficulty  patient is awake alert oriented, face symmetric, reports feeling tired limited effort at times but moves all 4 equally, plantars downgoing,sensory grossly intact, no dysmetria  CT head age related changes  Impression: Lethargy and episode of confusion likely toxic, metabolic, infectious etiology with component of hospital delirium vs medication induced from sedatives  if pt has any further episodes of altered mental status can obtain EEG  MRI Brain  w/wo filemon as inpatient or outpt
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
## 2023-07-04 NOTE — PROGRESS NOTE ADULT - PROBLEM SELECTOR PLAN 5
WBC mildly elevated to 11.13. Pt afebrile with no localizing S/S of infection. Possibly in setting of malignancy, though can also be from hemoconcentration or reactive 2/2 moderate-to-large R pleural effusion.  - Procalcitonin checked overnight and only mildly elevated to 0.22. Pt afebrile. Will monitor off antibiotics for now.  - < from: CT Angio Chest PE Protocol w/ IV Cont (06.21.23 @ 06:38) >    Suboptimal contrast bolus. Within these constraints, no main, right main,   left main, or lobar pulmonary embolism. Limited evaluation of segmental   and subsegmental pulmonary arteries secondary to suboptimal contrast   bolus, motion and mixing artifact.    Large right and trace left pleural effusions, increased on the rightand   decrease on the left when compared to prior exam.    < end of copied text >    thoracic sx eval  -c/w zosyn

## 2023-07-04 NOTE — CONSULT NOTE ADULT - SUBJECTIVE AND OBJECTIVE BOX
Neurology - Consult Note    -  Spectra: [phone] ([facility]), [phone] ([facility])  -    HPI: Patient FRIDA WELCH is a 88y ([id number]) woman with recurrent stage 4 serous endometrial carcinoma (dx 5/2022) (neoadjuvant chemotherapy completed 12/2022, s/p POOL-BSO 02/10/2023), RUL segmental PE (8/2022, on Eliquis), GI bleed (4/2023), CAD s/p stent (3/2021), aortic stenosis s/p TAVR, HTN, HLD, and hypothyroidism admitted for generalized weakness, found to have moderate-to-large R pleural effusion. Neurology consulted for AMS and lethargy. Daughter at bedside providing collateral history. Patient at baseline is oriented x 3 and walks with walker. Since being admitted to the hospital she has become lethargic with decreased verbal output. After getting hysterectomy she felt her mother slowly declining. She is not eating as much and not participating in conversation as she used to. RRT was called for AMS, patient was given xanax prior and now has been discontinued.     Of note, patient was seen by neurology in 2022 for syncope and MRI B was done at that time. She was told to follow up with neurology for further imaging.      Review of Systems:   All other review of systems is negative unless indicated above.    Allergies:  No Known Allergies      PMHx/PSHx/Family Hx: As above, otherwise see below   Endometrial ca    HTN (hypertension)    CAD (coronary artery disease)    S/P AVR    Hypothyroidism    HLD (hyperlipidemia)        Social Hx:  No current use of tobacco, alcohol, or illicit drugs      Medications:  MEDICATIONS  (STANDING):  atorvastatin 80 milliGRAM(s) Oral at bedtime  cholecalciferol 2000 Unit(s) Oral daily  cyanocobalamin 1000 MICROGram(s) Oral daily  diltiazem    Tablet 30 milliGRAM(s) Oral every 6 hours  levothyroxine Injectable 56 MICROGram(s) IV Push at bedtime  loratadine 10 milliGRAM(s) Oral daily  misoprostol 200 MICROGram(s) Oral <User Schedule>  multivitamin 1 Tablet(s) Oral daily  mupirocin 2% Ointment 1 Application(s) Topical <User Schedule>  pantoprazole    Tablet 40 milliGRAM(s) Oral before breakfast    MEDICATIONS  (PRN):  acetaminophen     Tablet .. 650 milliGRAM(s) Oral every 6 hours PRN Temp greater or equal to 38C (100.4F), Mild Pain (1 - 3)  albuterol    90 MICROgram(s) HFA Inhaler 2 Puff(s) Inhalation every 6 hours PRN Shortness of Breath and/or Wheezing  HYDROmorphone  Injectable 0.2 milliGRAM(s) IV Push every 4 hours PRN Severe Pain (7 - 10)  lactulose Syrup 10 Gram(s) Oral daily PRN constipation  melatonin 3 milliGRAM(s) Oral at bedtime PRN Sleep  oxyCODONE    IR 5 milliGRAM(s) Oral every 4 hours PRN Moderate Pain (4 - 6)  polyethylene glycol 3350 17 Gram(s) Oral daily PRN for constipation  senna 2 Tablet(s) Oral at bedtime PRN for constipation      Vitals:  T(C): 36.9 (07-04-23 @ 11:50), Max: 37.7 (07-04-23 @ 04:00)  HR: 137 (07-04-23 @ 11:50) (114 - 137)  BP: 143/80 (07-04-23 @ 11:50) (107/67 - 143/80)  RR: 16 (07-04-23 @ 11:50) (16 - 18)  SpO2: 98% (07-04-23 @ 11:50) (98% - 100%)    Physical Examination:   General - Laying in bed, cachetic,, NAD    Neurologic Exam:  Mental status - Eyes initially closed, opens with verbal stimuli, Oriented to person, place, and time (Month and year). Speech clear, hypophonic fluent, repetition and naming intact. Follows simple and complex commands. Attention/concentration, recent and remote memory 1/3 items, and fund of knowledge intact    Cranial nerves - PERRL, BTT b/l, EOMI, face sensation (V1-V3) intact b/l, facial strength intact without asymmetry b/l, hearing intact b/l, palate with symmetric elevation,  sternocleidomastiod 5/5 strength b/l, tongue midline on protrusion with full lateral movement    Motor - Normal bulk and tone throughout.   Strength testing  Able to lift arms antigravity mild drift   Able to lift lower extremity antigravity briefly     Sensation - Light touch/temperature intact throughout    DTR's -             Biceps      Triceps     Brachioradialis          Patellar    Ankle    Toes/plantar response  R             1+             1+                  0                       1+            0                Down  L              1+             1+                 0                        1+           0                 Down    Coordination - Finger to Nose intact b/l. No tremors appreciated    Gait and station - Deffered   Labs:                        9.7    16.57 )-----------( 112      ( 04 Jul 2023 06:48 )             29.2     07-04    134<L>  |  95<L>  |  18  ----------------------------<  79  4.2   |  25  |  1.02    Ca    8.1<L>      04 Jul 2023 04:11  Phos  3.3     07-04  Mg     2.10     07-04    TPro  6.6  /  Alb  3.1<L>  /  TBili  1.3<H>  /  DBili  x   /  AST  120<H>  /  ALT  32  /  AlkPhos  86  07-04    CAPILLARY BLOOD GLUCOSE      POCT Blood Glucose.: 126 mg/dL (03 Jul 2023 22:26)    LIVER FUNCTIONS - ( 04 Jul 2023 04:11 )  Alb: 3.1 g/dL / Pro: 6.6 g/dL / ALK PHOS: 86 U/L / ALT: 32 U/L / AST: 120 U/L / GGT: x             Culture - Blood (collected 02 Jul 2023 15:23)  Source: .Blood Blood-Peripheral  Preliminary Report (03 Jul 2023 19:01):    No growth at 24 hours      PT/INR - ( 02 Jul 2023 13:38 )   PT: 16.4 sec;   INR: 1.41 ratio         PTT - ( 02 Jul 2023 13:38 )  PTT:42.7 sec        Radiology:  CT Head No Cont:  (03 Jul 2023 17:33)  < from: CT Head No Cont (07.03.23 @ 17:33) >  Age-appropriate involutional change and microvascular   ischemic disease. No large vessel occlusion. No intracranial hemorrhage   no space-occupying lesion.

## 2023-07-05 NOTE — PROGRESS NOTE ADULT - ASSESSMENT
88y Female with history of endometrial carcinoma presents with weakness. Nephrology consulted for elevated Scr.    1) [place]: likely hemodynamically mediated in setting of relative hypotension/tachycardia and hypoalbuminemia. [place] resolved now with recurrent [place] likely due to hypovolemia. Restart IV albumin. UA bland. FeNa low. Renal US and CT with mild R hydronephrosis suspect due to underlying CA. Patient will need outpatient Urology monitoring and if no improvement with chemotherapy may need R NT. TMA work up negative. Avoid nephrotoxins. Monitor electrolytes.    2) HTN: BP low normal with tachycardia. As per cardiology.    3) LE edema: Due to hypoalbuminemia from malnutrition (patient without nephrotic range proteinuria). LE edema resolved. Hold off on IV lasix. Continue with protein supplements. TTE with normal LVSF. Monitor UO.    4) Hyponatremia: Mild and likely due to hypovolemia. IV albumin as above. Monitor serum Na.      [facility] NEPHROLOGY  Brennon Cates M.D.  Addison Nieto D.O.  Guerline Dias M.D.  Merissa Parsons, GRACIA, ANP-C    Telephone: [phone]  Facsimile: [phone]    [address] [place] [address]

## 2023-07-05 NOTE — PROGRESS NOTE ADULT - ASSESSMENT
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     EKG: MAT   Tele: MAT    1) Multifocal Atrial Tachycardia   -started Dilt 30 mg po q6 hrs,  -continue to monitor on tele     2) Pleural effusion  -s/p Pig tail   -f/u pulm and thoracic recs    3) hx of PE  - RUL segmental PE (8/2022  -AC on hold 2/2 anemia    4) CAD s/p PCI  -denies CP  -TTE with normal LV function    5) TAVR  -TTE transcatheter aortic valve replacement Peak transaortic valve gradient equals 42 mm Hg, mean transaortic valve gradient equals 23 mm Hg, which is probably normal in the presence of a prosthetic valve. Moderate valvular aortic regurgitation.    6) Anemia  -transfuse to keep hemoglobin >8  -CT scan non con showing bleeding near the spleen and liver. surgery on board, CTA abd/pelvis with no acute bleed

## 2023-07-05 NOTE — PROGRESS NOTE ADULT - SUBJECTIVE AND OBJECTIVE BOX
[facility] NEPHROLOGY- PROGRESS NOTE    88y Female with history of endometrial carcinoma presents with weakness. Nephrology consulted for elevated Scr.      REVIEW OF SYSTEMS: limited due to mental status however patient denies any chest pain, dyspnea, vomiting or diarrhea. + Decreased PO intake    No Known Allergies      Hospital Medications: Medications reviewed      VITALS:  T(F): 97.9 (07-05-23 @ 08:00), Max: 98.6 (07-04-23 @ 15:50)  HR: 115 (07-05-23 @ 08:00)  BP: 132/78 (07-05-23 @ 08:00)  RR: 16 (07-05-23 @ 08:00)  SpO2: 97% (07-05-23 @ 08:00)  Wt(kg): --    07-04 @ 07:01  -  07-05 @ 07:00  --------------------------------------------------------  IN: 70 mL / OUT: 210 mL / NET: -140 mL        PHYSICAL EXAM:    Gen: NAD, calm with dry MM  Cards: RRR, +S1/S2, no M/G/R  Resp: Decreased BS @ R base, + R CT  GI: soft, NT/ND, NABS  : + brown  Vascular: no LE edema B/L      LABS:  07-05    133<L>  |  94<L>  |  24<H>  ----------------------------<  125<H>  4.0   |  26  |  1.48<H>    Ca    8.4      05 Jul 2023 06:01  Phos  3.7     07-05  Mg     1.80     07-05    TPro  6.7  /  Alb  3.1<L>  /  TBili  1.0  /  DBili      /  AST  112<H>  /  ALT  34<H>  /  AlkPhos  97  07-05    Creatinine Trend: 1.48 <--, 1.02 <--, 1.00 <--, 0.97 <--, 1.05 <--, 1.08 <--, 1.08 <--, 0.96 <--                        9.6    17.66 )-----------( 92       ( 05 Jul 2023 06:01 )             28.3     Urine Studies:  Urinalysis Basic - ( 05 Jul 2023 06:01 )    Color:  / Appearance:  / SG:  / pH:   Gluc: 125 mg/dL / Ketone:   / Bili:  / Urobili:    Blood:  / Protein:  / Nitrite:    Leuk Esterase:  / RBC:  / WBC    Sq Epi:  / Non Sq Epi:  / Bacteria:       Sodium, Random Urine: 36 mmol/L (07-03 @ 12:26)

## 2023-07-05 NOTE — CHART NOTE - NSCHARTNOTEFT_GEN_A_CORE
Patient seen at bedside.  Still with PTC in place, minimal output.    Vital Signs Last 24 Hrs  T(C): 36.6 (05 Jul 2023 11:39), Max: 37 (04 Jul 2023 15:50)  T(F): 97.8 (05 Jul 2023 11:39), Max: 98.6 (04 Jul 2023 15:50)  HR: 118 (05 Jul 2023 11:39) (112 - 125)  BP: 119/71 (05 Jul 2023 11:39) (107/66 - 132/78)  BP(mean): --  RR: 16 (05 Jul 2023 11:39) (16 - 17)  SpO2: 100% (05 Jul 2023 11:39) (96% - 100%)    Parameters below as of 05 Jul 2023 11:39  Patient On (Oxygen Delivery Method): nasal cannula    General: A&Ox3, NAD  HEENT: Normocephalic. Vision and hearing grossly intact, EOMI. Airway grossly patent, no stridor.  CV: Tachy, well perfused  Resp: Comfortable on NC, no resp distress, no accessory muscle use  GI: Soft, NT, ND  MSK: MOFFETT x4  Pysch: Appropriate affect  Tubes: R PTC to WS                          9.6    17.66 )-----------( 92       ( 05 Jul 2023 06:01 )             28.3       07-05    133<L>  |  94<L>  |  24<H>  ----------------------------<  125<H>  4.0   |  26  |  1.48<H>    Ca    8.4      05 Jul 2023 06:01  Phos  3.7     07-05  Mg     1.80     07-05    TPro  6.7  /  Alb  3.1<L>  /  TBili  1.0  /  DBili  x   /  AST  112<H>  /  ALT  34<H>  /  AlkPhos  97  07-05      A/P: 89yo F with Rt pleural effusion, s/p R PTC insertion    -PTC output minimal for multiple days  -No plans for any thoracic surgical intervention  -PTC can be removed once pt ready for discharge. Will keep in place   for now per family request to not risk reaccumulation of pleural effusion  -Care per primary team  -Will follow

## 2023-07-05 NOTE — PROGRESS NOTE ADULT - SUBJECTIVE AND OBJECTIVE BOX
Rony Mcgarry MD  Interventional Cardiology / Endovascular Specialist  [place] Office : [address] [place] [address] Tel:   [place] Office : [address] [facility] [address]  Tel: [phone]      Subjective/Overnight events: Patient lying in bed. No acute distress.   	  MEDICATIONS:  diltiazem    Tablet 30 milliGRAM(s) Oral every 6 hours      albuterol    90 MICROgram(s) HFA Inhaler 2 Puff(s) Inhalation every 6 hours PRN  loratadine 10 milliGRAM(s) Oral daily    acetaminophen     Tablet .. 650 milliGRAM(s) Oral every 6 hours PRN  HYDROmorphone  Injectable 0.2 milliGRAM(s) IV Push every 4 hours PRN  melatonin 3 milliGRAM(s) Oral at bedtime PRN  oxyCODONE    IR 5 milliGRAM(s) Oral every 4 hours PRN    lactulose Syrup 10 Gram(s) Oral daily PRN  misoprostol 200 MICROGram(s) Oral <User Schedule>  pantoprazole    Tablet 40 milliGRAM(s) Oral before breakfast  polyethylene glycol 3350 17 Gram(s) Oral daily PRN  senna 2 Tablet(s) Oral at bedtime PRN    atorvastatin 80 milliGRAM(s) Oral at bedtime  levothyroxine Injectable 56 MICROGram(s) IV Push at bedtime    albumin human 25% IVPB 50 milliLiter(s) IV Intermittent every 6 hours  cholecalciferol 2000 Unit(s) Oral daily  cyanocobalamin 1000 MICROGram(s) Oral daily  multivitamin 1 Tablet(s) Oral daily  mupirocin 2% Ointment 1 Application(s) Topical <User Schedule>      PAST MEDICAL/SURGICAL HISTORY  PAST MEDICAL & SURGICAL HISTORY:  Endometrial ca  S4 serous endometrial carcinoma, [facility], chemotherapy      HTN (hypertension)      CAD (coronary artery disease)      S/P AVR      Hypothyroidism      HLD (hyperlipidemia)      S/P AVR (aortic valve replacement)      History of endometrial biopsy          SOCIAL HISTORY: Substance Use (street drugs): ( x ) never used  (  ) other:    FAMILY HISTORY:  Family history of parotid cancer (Child)          PHYSICAL EXAM:  T(C): 36.6 (07-05-23 @ 11:39), Max: 37 (07-04-23 @ 15:50)  HR: 118 (07-05-23 @ 11:39) (112 - 125)  BP: 119/71 (07-05-23 @ 11:39) (107/66 - 132/78)  RR: 16 (07-05-23 @ 11:39) (16 - 17)  SpO2: 100% (07-05-23 @ 11:39) (96% - 100%)  Wt(kg): --  I&O's Summary    04 Jul 2023 07:01  -  05 Jul 2023 07:00  --------------------------------------------------------  IN: 70 mL / OUT: 210 mL / NET: -140 mL          GENERAL: NAD  EYES: EOMI, PERRLA, conjunctiva and sclera clear  ENMT: No tonsillar erythema, exudates, or enlargement  Cardiovascular: Normal S1 S2, No JVD, No murmurs, No edema  Respiratory: Lungs clear to auscultation	  Gastrointestinal:  Soft, Non-tender, + BS	  Extremities: No edema                                     9.6    17.66 )-----------( 92       ( 05 Jul 2023 06:01 )             28.3     07-05    133<L>  |  94<L>  |  24<H>  ----------------------------<  125<H>  4.0   |  26  |  1.48<H>    Ca    8.4      05 Jul 2023 06:01  Phos  3.7     07-05  Mg     1.80     07-05    TPro  6.7  /  Alb  3.1<L>  /  TBili  1.0  /  DBili  x   /  AST  112<H>  /  ALT  34<H>  /  AlkPhos  97  07-05    proBNP:   Lipid Profile:   HgA1c:   TSH:     Consultant(s) Notes Reviewed:  [x ] YES  [ ] NO    Care Discussed with Consultants/Other Providers [ x] YES  [ ] NO    Imaging Personally Reviewed independently:  [x] YES  [ ] NO    All labs, radiologic studies, vitals, orders and medications list reviewed. Patient is seen and examined at bedside. Case discussed with medical team.

## 2023-07-05 NOTE — PROGRESS NOTE ADULT - ASSESSMENT
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    1. Uterine carcinosarcoma   -- Under care of Dr. Alvarado at [facility]  -- s/p neoadjuvant chemotherapy completed 12/2022, s/p POOL-BSO 02/10/2023   -- Recent imaging suggests disease recurrence  -- Prognosis is poor, likely not a candidate for further treatment.   -- Palliative care team following, will plan for LTC placement with hospice.     2. Dyspnea on exertion   -- CTA chest w/o PE; + large right pleural effusion   -- LE duplex without DVT   -- Thoracic following, pigtail catheter placed for temporary draining. Will keep PTC in place for now per family request, though minimal output.   -- Cytology from R pleural effusion shows atypical cells suspicious for malignancy.   -- Continue supplemental O2 as needed, wean as tolerated     3. Anemia, thrombocytopenia   -- Likely secondary to malignancy, AOCD  -- No evidence of iron, B12, or folate deficiencies.  -- Monitor CBC and transfuse to maintain hg >7, platelet count >10K, >15K if febrile, or >50K if bleeding   -- Hg acutely decreased 06/27. CT a/p shows possible RP bleed.  CTA AP without active bleed- no intervention, surgery recs appreciated  -- Hg improved now. Continue to monitor.    4. AMS, lethargy   -- CT Head negative  -- Neurology consulted, EEG running, may consider MRI head     Will continue to follow.    Twyla Pitts PA-C  Hematology/Oncology  [facility]  [phone] (office)  [phone] (alt office)  Evenings and weekends please call MD on call or office

## 2023-07-05 NOTE — EEG REPORT - NS EEG TEXT BOX
FRIDA WELCH [id number]     Study Date: 07-05-23  Duration: 2 hr 16 min  --------------------------------------------------------------------------------------------------  History:  CC/ HPI Patient is a 88y old  Female who presents with a chief complaint of Generalized weakness (05 Jul 2023 13:12)    MEDICATIONS  (STANDING):  albumin human 25% IVPB 50 milliLiter(s) IV Intermittent every 6 hours  atorvastatin 80 milliGRAM(s) Oral at bedtime  cholecalciferol 2000 Unit(s) Oral daily  cyanocobalamin 1000 MICROGram(s) Oral daily  diltiazem    Tablet 30 milliGRAM(s) Oral every 6 hours  levothyroxine Injectable 56 MICROGram(s) IV Push at bedtime  loratadine 10 milliGRAM(s) Oral daily  misoprostol 200 MICROGram(s) Oral <User Schedule>  multivitamin 1 Tablet(s) Oral daily  mupirocin 2% Ointment 1 Application(s) Topical <User Schedule>  pantoprazole    Tablet 40 milliGRAM(s) Oral before breakfast    --------------------------------------------------------------------------------------------------  Study Interpretation:    [[[Abbreviation Key:  PDR=alpha rhythm/posterior dominant rhythm. A-P=anterior posterior.  Amplitude: ‘very low’:<20; ‘low’:20-49; ‘medium’:; ‘high’:>150uV.  Persistence for periodic/rhythmic patterns (% of epoch) ‘rare’:<1%; ‘occasional’:1-10%; ‘frequent’:10-50%; ‘abundant’:50-90%; ‘continuous’:>90%.  Persistence for sporadic discharges: ‘rare’:<1/hr; ‘occasional’:1/min-1/hr; ‘frequent’:>1/min; ‘abundant’:>1/10 sec.  RPP=rhythmic and periodic patterns; GRDA=generalized rhythmic delta activity; FIRDA=frontal intermittent GRDA; LRDA=lateralized rhythmic delta activity; TIRDA=temporal intermittent rhythmic delta activity;  LPD=PLED=lateralized periodic discharges; GPD=generalized periodic discharges; BIPDs =bilateral independent periodic discharges; Mf=multifocal; SIRPDs=stimulus induced rhythmic, periodic, or ictal appearing discharges; BIRDs=brief potentially ictal rhythmic discharges >4 Hz, lasting .5-10s; PFA (paroxysmal bursts >13 Hz or =8 Hz <10s).  Modifiers: +F=with fast component; +S=with spike component; +R=with rhythmic component.  S-B=burst suppression pattern.  Max=maximal. N1-drowsy; N2-stage II sleep; N3-slow wave sleep. SSS/BETS=small sharp spikes/benign epileptiform transients of sleep. HV=hyperventilation; PS=photic stimulation]]]    Daily EEG Visual Analysis    FINDINGS:      Background:  Continuity: Continuous  Symmetry: Symmetric  Posterior dominant rhythm (PDR): 10-10.5 Hz, reactive to eye closure. Symmetric low-amplitude frontal beta in wakefulness.  State Change: Present  Voltage: Normal  Anterior Posterior Gradient: Present  Other background findings: Intermittent diffuse polymorphic delta and theta slowing in wakefulness.  Breach: Absent    Background Slowing:  Generalized slowing: As above  Focal slowing: No clear focal slowing    State Changes:   Drowsiness is characterized by fragmentation, attenuation, and slowing of the background activity.  Stage 2 sleep is characterized by symmetric K complexes and sleep spindles.    Sporadic Epileptiform Discharges:    None    Rhythmic and Periodic Patterns (RPPs):  None     Electrographic and Electroclinical seizures:  None    Other Clinical Events:  None    Activation Procedures:   Hyperventilation was not performed.    Photic stimulation was not performed.    Artifacts:  Intermittent myogenic and movement artifacts are present.    EKG:  Single-lead EKG shows regular rhythm with occasional PVCs and premature narrow complexes at approximately 110 bpm.    EEG Classification / Summary:  Abnormal EEG in the awake, drowsy, and asleep states.  -Mild diffuse slowing  -No epileptiform abnormalities    Clinical Impression:  -Mild diffuse cerebral dysfunction is nonspecific in etiology.          -------------------------------------------------------------------------------------------------------  [facility] EEG Reading Room Ph#: [phone]  Epilepsy Answering Service after 5PM and before 8:30AM: Ph#: [phone]    Linnette Jolley MD  Attending Physician, [facility] Epilepsy [place]

## 2023-07-05 NOTE — PROGRESS NOTE ADULT - PROBLEM SELECTOR PLAN 7
Pt with history of stage 4 serous endometrial carcinoma (dx 5/2022) s/p chemo (last in 12/2022) and debulking surgery with POOL/BSO (2/10/23), now recently found to have recurrence of disease with new liver mets.  - Pt follows with Dr. Alvarado of [facility] [place].   - GOC discussion with pt and family given poor prognosis - pt is fullcode

## 2023-07-05 NOTE — PROGRESS NOTE ADULT - PROBLEM SELECTOR PLAN 2
Pt with 3 weeks of progressive generalized weakness, now barely able to get out of bed. On exam, no motor weakness detected. Likely in setting of recurrence/progression of endometrial cancer with new liver mets, exacerbated by poor PO intake, recurrence of moderate-to-large R pleural effusion, and worsening anemia.   -

## 2023-07-05 NOTE — PROGRESS NOTE ADULT - SUBJECTIVE AND OBJECTIVE BOX
Patient is a 88y old  Female who presents with a chief complaint of Generalized weakness (05 Jul 2023 11:37)    Patient seen this afternoon with her daughter at bedside. She remains very lethargic, opens eyes when name called but otherwise did not respond to questions.    MEDICATIONS  (STANDING):  albumin human 25% IVPB 50 milliLiter(s) IV Intermittent every 6 hours  atorvastatin 80 milliGRAM(s) Oral at bedtime  cholecalciferol 2000 Unit(s) Oral daily  cyanocobalamin 1000 MICROGram(s) Oral daily  diltiazem    Tablet 30 milliGRAM(s) Oral every 6 hours  levothyroxine Injectable 56 MICROGram(s) IV Push at bedtime  loratadine 10 milliGRAM(s) Oral daily  misoprostol 200 MICROGram(s) Oral <User Schedule>  multivitamin 1 Tablet(s) Oral daily  mupirocin 2% Ointment 1 Application(s) Topical <User Schedule>  pantoprazole    Tablet 40 milliGRAM(s) Oral before breakfast    MEDICATIONS  (PRN):  acetaminophen     Tablet .. 650 milliGRAM(s) Oral every 6 hours PRN Temp greater or equal to 38C (100.4F), Mild Pain (1 - 3)  albuterol    90 MICROgram(s) HFA Inhaler 2 Puff(s) Inhalation every 6 hours PRN Shortness of Breath and/or Wheezing  HYDROmorphone  Injectable 0.2 milliGRAM(s) IV Push every 4 hours PRN Severe Pain (7 - 10)  lactulose Syrup 10 Gram(s) Oral daily PRN constipation  melatonin 3 milliGRAM(s) Oral at bedtime PRN Sleep  oxyCODONE    IR 5 milliGRAM(s) Oral every 4 hours PRN Moderate Pain (4 - 6)  polyethylene glycol 3350 17 Gram(s) Oral daily PRN for constipation  senna 2 Tablet(s) Oral at bedtime PRN for constipation        Vital Signs Last 24 Hrs  T(C): 36.6 (05 Jul 2023 11:39), Max: 37 (04 Jul 2023 15:50)  T(F): 97.8 (05 Jul 2023 11:39), Max: 98.6 (04 Jul 2023 15:50)  HR: 118 (05 Jul 2023 11:39) (112 - 125)  BP: 119/71 (05 Jul 2023 11:39) (107/66 - 132/78)  BP(mean): --  RR: 16 (05 Jul 2023 11:39) (16 - 17)  SpO2: 100% (05 Jul 2023 11:39) (96% - 100%)    Parameters below as of 05 Jul 2023 11:39  Patient On (Oxygen Delivery Method): nasal cannula        PE  NAD  lethargic  Anicteric, MMM  No c/c/e  No rash grossly                            9.6    17.66 )-----------( 92       ( 05 Jul 2023 06:01 )             28.3       07-05    133<L>  |  94<L>  |  24<H>  ----------------------------<  125<H>  4.0   |  26  |  1.48<H>    Ca    8.4      05 Jul 2023 06:01  Phos  3.7     07-05  Mg     1.80     07-05    TPro  6.7  /  Alb  3.1<L>  /  TBili  1.0  /  DBili  x   /  AST  112<H>  /  ALT  34<H>  /  AlkPhos  97  07-05

## 2023-07-05 NOTE — PROGRESS NOTE ADULT - SUBJECTIVE AND OBJECTIVE BOX
Patient is a 88y old  Female who presents with a chief complaint of Generalized weakness (05 Jul 2023 )    Patient seen and examined    MEDICATIONS  (STANDING):  albumin human 25% IVPB 50 milliLiter(s) IV Intermittent every 6 hours  atorvastatin 80 milliGRAM(s) Oral at bedtime  cholecalciferol 2000 Unit(s) Oral daily  cyanocobalamin 1000 MICROGram(s) Oral daily  diltiazem    Tablet 30 milliGRAM(s) Oral every 6 hours  levothyroxine Injectable 56 MICROGram(s) IV Push at bedtime  loratadine 10 milliGRAM(s) Oral daily  misoprostol 200 MICROGram(s) Oral <User Schedule>  multivitamin 1 Tablet(s) Oral daily  mupirocin 2% Ointment 1 Application(s) Topical <User Schedule>  pantoprazole    Tablet 40 milliGRAM(s) Oral before breakfast    MEDICATIONS  (PRN):  acetaminophen     Tablet .. 650 milliGRAM(s) Oral every 6 hours PRN Temp greater or equal to 38C (100.4F), Mild Pain (1 - 3)  albuterol    90 MICROgram(s) HFA Inhaler 2 Puff(s) Inhalation every 6 hours PRN Shortness of Breath and/or Wheezing  HYDROmorphone  Injectable 0.2 milliGRAM(s) IV Push every 4 hours PRN Severe Pain (7 - 10)  lactulose Syrup 10 Gram(s) Oral daily PRN constipation  melatonin 3 milliGRAM(s) Oral at bedtime PRN Sleep  oxyCODONE    IR 5 milliGRAM(s) Oral every 4 hours PRN Moderate Pain (4 - 6)  polyethylene glycol 3350 17 Gram(s) Oral daily PRN for constipation  senna 2 Tablet(s) Oral at bedtime PRN for constipation    Vital Signs Last 24 Hrs  T(C): 36.3 (07-05-23 @ 21:52), Max: 36.7 (07-05-23 @ 15:40)  T(F): 97.4 (07-05-23 @ 21:52), Max: 98 (07-05-23 @ 15:40)  HR: 117 (07-05-23 @ 21:52) (113 - 118)  BP: 124/77 (07-05-23 @ 21:52) (110/71 - 132/78)  BP(mean): --  RR: 17 (07-05-23 @ 21:52) (16 - 17)  SpO2: 100% (07-05-23 @ 21:52) (97% - 100%)      PE  NAD  Awake, alert, lethargic   Anicteric, MMM  cvs- s1, s2+  rs - dec breath sounds at bases  abd - soft, bs+  ext - edema+  Neuro - alert awake oriented to person, place  no signs of external injury     LABS:  07-05    133<L>  |  94<L>  |  24<H>  ----------------------------<  125<H>  4.0   |  26  |  1.48<H>    Ca    8.4      05 Jul 2023 06:01  Phos  3.7     07-05  Mg     1.80     07-05    TPro  6.7  /  Alb  3.1<L>  /  TBili  1.0  /  DBili      /  AST  112<H>  /  ALT  34<H>  /  AlkPhos  97  07-05    Creatinine Trend: 1.48 <--, 1.02 <--, 1.00 <--, 0.97 <--, 1.05 <--, 1.08 <--, 1.08 <--, 0.96 <--                        9.6    17.66 )-----------( 92       ( 05 Jul 2023 06:01 )             28.3     Urine Studies:  Urinalysis Basic - ( 05 Jul 2023 06:01 )    Color:  / Appearance:  / SG:  / pH:   Gluc: 125 mg/dL / Ketone:   / Bili:  / Urobili:    Blood:  / Protein:  / Nitrite:    Leuk Esterase:  / RBC:  / WBC    Sq Epi:  / Non Sq Epi:  / Bacteria:       Sodium, Random Urine: 36 mmol/L (07-03 @ 12:26)          LIVER FUNCTIONS - ( 05 Jul 2023 06:01 )  Alb: 3.1 g/dL / Pro: 6.7 g/dL / ALK PHOS: 97 U/L / ALT: 34 U/L / AST: 112 U/L / GGT: x

## 2023-07-06 NOTE — PROGRESS NOTE ADULT - PROBLEM SELECTOR PLAN 6
PPSV 30-40%  Needs assistance with most ADLs  Skin care  Frequent repositioning  High risk of further decline given comorbidities. PPSV 30  Needs assistance with most ADLs  Skin care  Frequent repositioning  High risk of further decline given comorbidities.

## 2023-07-06 NOTE — CHART NOTE - NSCHARTNOTEFT_GEN_A_CORE
Patient seen and examined at bedside. No complaints.     Vital Signs Last 24 Hrs  T(C): 36.4 (06 Jul 2023 11:44), Max: 36.7 (05 Jul 2023 15:40)  T(F): 97.6 (06 Jul 2023 11:44), Max: 98 (05 Jul 2023 15:40)  HR: 114 (06 Jul 2023 11:44) (113 - 117)  BP: 104/58 (06 Jul 2023 11:44) (98/61 - 127/76)  BP(mean): --  RR: 18 (06 Jul 2023 11:44) (16 - 18)  SpO2: 100% (06 Jul 2023 11:44) (97% - 100%)    Parameters below as of 06 Jul 2023 08:15  Patient On (Oxygen Delivery Method): nasal cannula  O2 Flow (L/min): 4      General: A&Ox3, NAD  HEENT: Normocephalic. Vision and hearing grossly intact, EOMI. Airway grossly patent, no stridor.  CV: Tachycardic, BP stable   Resp: Comfortable on NC, no resp distress, no accessory muscle use  GI: Soft, NT, ND  MSK: MOFFETT x4  Pysch: Appropriate affect  Tubes: R PTC to WS                          8.4    15.51 )-----------( 67       ( 06 Jul 2023 06:04 )             25.5   BMI: BMI (kg/m2): 28.6 (06-21-23 @ 06:16)    133<L>  |  94<L>  |  24<H>  ----------------------------<  125<H>  4.0   |  26  |  1.48<H>    Ca    8.4      05 Jul 2023 06:01  Phos  3.7     07-05  Mg     1.80     07-05    TPro  6.7  /  Alb  3.1<L>  /  TBili  1.0  /  DBili  x   /  AST  112<H>  /  ALT  34<H>  /  AlkPhos  97  07-05      A/P: 87yo F with Rt pleural effusion, s/p R PTC insertion    - PTC output minimal for multiple days  - No plans for any thoracic surgical intervention  - PTC to be removed once pt ready for discharge. Will keep in place for now per family request to not risk reaccumulation of pleural effusion  - Care per primary team

## 2023-07-06 NOTE — PROGRESS NOTE ADULT - PROBLEM SELECTOR PLAN 7
Pt with history of stage 4 serous endometrial carcinoma (dx 5/2022) s/p chemo (last in 12/2022) and debulking surgery with POOL/BSO (2/10/23), now recently found to have recurrence of disease with new liver mets.  - Pt follows with Dr. Alvarado of [facility] [place].   - GOC discussion with pt and family given poor prognosis - pt is fullcode

## 2023-07-06 NOTE — CHART NOTE - NSCHARTNOTEFT_GEN_A_CORE
Spoke w/ patient's daughter/primary HCP, Shani, and updated with current plan of care. All other medical questions were answered to the best of my ability and teach-back offered with demonstrated understanding.     Provider discussed with Shani that patient has progressive disease. Shani states that she is aware that her mother has been rapidly declining over the last year. Per discussion, Shani states she will talk to her family further about GOC and code status. At this time, patient was alert to place, person, and situation and states that she wishes to remain full code.

## 2023-07-06 NOTE — PROGRESS NOTE ADULT - SUBJECTIVE AND OBJECTIVE BOX
SUBJECTIVE AND OBJECTIVE: denies any pain or discomfort    Indication for Geriatrics and Palliative Care Services/INTERVAL HPI: complex symptom management and decision making in the setting of advanced illness    OVERNIGHT EVENTS: discussion with family    DNR on chart: full code  Allergies    No Known Allergies    Intolerances    MEDICATIONS  (STANDING):  albumin human 25% IVPB 100 milliLiter(s) IV Intermittent every 6 hours  atorvastatin 80 milliGRAM(s) Oral at bedtime  cholecalciferol 2000 Unit(s) Oral daily  cyanocobalamin 1000 MICROGram(s) Oral daily  diltiazem Injectable 5 milliGRAM(s) IV Push every 6 hours  levothyroxine Injectable 56 MICROGram(s) IV Push at bedtime  loratadine 10 milliGRAM(s) Oral daily  misoprostol 200 MICROGram(s) Oral <User Schedule>  multivitamin 1 Tablet(s) Oral daily  mupirocin 2% Ointment 1 Application(s) Topical <User Schedule>  pantoprazole    Tablet 40 milliGRAM(s) Oral before breakfast    MEDICATIONS  (PRN):  acetaminophen     Tablet .. 650 milliGRAM(s) Oral every 6 hours PRN Temp greater or equal to 38C (100.4F), Mild Pain (1 - 3)  albuterol    90 MICROgram(s) HFA Inhaler 2 Puff(s) Inhalation every 6 hours PRN Shortness of Breath and/or Wheezing  HYDROmorphone  Injectable 0.2 milliGRAM(s) IV Push every 4 hours PRN Severe Pain (7 - 10)  lactulose Syrup 10 Gram(s) Oral daily PRN constipation  melatonin 3 milliGRAM(s) Oral at bedtime PRN Sleep  oxyCODONE    IR 5 milliGRAM(s) Oral every 4 hours PRN Moderate Pain (4 - 6)  polyethylene glycol 3350 17 Gram(s) Oral daily PRN for constipation  senna 2 Tablet(s) Oral at bedtime PRN for constipation    ITEMS UNCHECKED ARE NOT PRESENT    PRESENT SYMPTOMS: [x ]Unable to self-report - see [ ] CPOT [ x] PAINADS [x ] RDOS  Source if other than patient:  [ ]Family   [ ]Team     Pain:  [ ]yes [ ]no  QOL impact -   Location -                    Aggravating factors -  Quality -  Radiation -  Timing-  Severity (0-10 scale):  Minimal acceptable level (0-10 scale):     CPOT:    https://www.sccm.org/getattachment/ztg80z77-7e7s-[email or web address]-0t1g-9444n6448j6b/Critical-Care-Pain-Observation-Tool-(CPOT)    PAIN AD Score:	0  http://geriatrictoolkit.[facility]/cog/painad.pdf (Ctrl + left click to view)    Dyspnea:                           [ ]Mild [ ]Moderate [ ]Severe    RDOS: 0  0 to 2  minimal or no respiratory distress   3  mild distress  4 to 6 moderate distress  >7 severe distress  https://homecareinformation.net/handouts/hen/Respiratory_Distress_Observation_Scale.pdf (Ctrl +  left click to view)     Anxiety:                             [ ]Mild [ ]Moderate [ ]Severe  Fatigue:                             [ ]Mild [ ]Moderate [ ]Severe  Nausea:                             [ ]Mild [ ]Moderate [ ]Severe  Loss of appetite:              [ ]Mild [ ]Moderate [ ]Severe  Constipation:                    [ ]Mild [ ]Moderate [ ]Severe    PCSSQ[Palliative Care Spiritual Screening Question]   Severity (0-10): deferred  Score of 4 or > indicate consideration of Chaplaincy referral.    Chaplaincy Referral: [ ] yes [ ] refused [ ] following [x] deferred    Other Symptoms:  [ ]All other review of systems negative     Palliative Performance Status Version 2:  30-40  %      http://npcrc.org/files/news/palliative_performance_scale_ppsv2.pdf    PHYSICAL EXAM:  Vital Signs Last 24 Hrs  T(C): 36.4 (06 Jul 2023 11:44), Max: 36.7 (05 Jul 2023 15:40)  T(F): 97.6 (06 Jul 2023 11:44), Max: 98 (05 Jul 2023 15:40)  HR: 114 (06 Jul 2023 11:44) (113 - 117)  BP: 104/58 (06 Jul 2023 11:44) (98/61 - 127/76)  BP(mean): --  RR: 18 (06 Jul 2023 11:44) (16 - 18)  SpO2: 100% (06 Jul 2023 11:44) (97% - 100%)    Parameters below as of 06 Jul 2023 11:44  Patient On (Oxygen Delivery Method): nasal cannula  O2 Flow (L/min): 4   I&O's Summary    05 Jul 2023 07:01  -  06 Jul 2023 07:00  --------------------------------------------------------  IN: 0 mL / OUT: 16 mL / NET: -16 mL     GENERAL: [ ]Cachexia    [x ]Alert  [ ]Oriented x   [ ]Lethargic  [ ]Unarousable  [x ]Verbal  [ ]Non-Verbal  Behavioral:   [ ]Anxiety  [x ]Delirium [x ]Agitation [ ]Other  HEENT:  [ ]Normal   [x ]Dry mouth   [ ]ET Tube/Trach  [ ]Oral lesions  PULMONARY:   [ ]Clear [ ]Tachypnea  [ ]Audible excessive secretions   [x ]Rhonchi        [ ]Right [ ]Left [ ]Bilateral  [ ]Crackles        [ ]Right [ ]Left [ ]Bilateral  [ ]Wheezing     [ ]Right [ ]Left [ ]Bilateral  [ ]Diminished BS [ ] Right [ ]Left [ ]Bilateral  CARDIOVASCULAR:    [x ]Regular [ ]Irregular [ ]Tachy  [ ]Alejandro [ ]Murmur [ ]Other  GASTROINTESTINAL:  [x ]Soft  [ ]Distended   [x ]+BS  [ x]Non tender [ ]Tender  [ ]Other [ ]PEG [ ]OGT/ NGT     GENITOURINARY:  [ ]Normal [ x]Incontinent   [ ]Oliguria/Anuria   [ ]Birch  MUSCULOSKELETAL:   [ ]Normal   [ ]Weakness  [ x]Bed/Wheelchair bound [ ]Edema  NEUROLOGIC:   [ ]No focal deficits  [x ] Cognitive impairment  [ ] Dysphagia [ ]Dysarthria [ ] Paresis [ ]Other   SKIN:   [x ]Normal  [ ]Rash  [ ]Other  [ ]Pressure ulcer(s) [ ]y [ ]n present on admission    CRITICAL CARE:  [ ]Shock Present  [ ]Septic [ ]Cardiogenic [ ]Neurologic [ ]Hypovolemic  [ ]Vasopressors [ ]Inotropes  [ ]Respiratory failure present [ ]Mechanical Ventilation [ ]Non-invasive ventilatory support [ ]High-Flow   [ ]Acute  [ ]Chronic [ ]Hypoxic  [ ]Hypercarbic [ ]Other  [ ]Other organ failure     LABS:                        8.4    15.51 )-----------( 67       ( 06 Jul 2023 06:04 )             25.5     07-06    134<L>  |  95<L>  |  30<H>  ----------------------------<  99  5.8<H>   |  26  |  1.84<H>    Ca    8.5      06 Jul 2023 13:15  Phos  3.9     07-06  Mg     1.80     07-06    TPro  7.1  /  Alb  3.5  /  TBili  1.1  /  DBili  x   /  AST  139<H>  /  ALT  36<H>  /  AlkPhos  89  07-06    PT/INR - ( 06 Jul 2023 13:15 )   PT: 14.5 sec;   INR: 1.25 ratio         PTT - ( 06 Jul 2023 13:15 )  PTT:28.1 sec  Urinalysis Basic - ( 06 Jul 2023 13:15 )    Color: x / Appearance: x / SG: x / pH: x  Gluc: 99 mg/dL / Ketone: x  / Bili: x / Urobili: x   Blood: x / Protein: x / Nitrite: x   Leuk Esterase: x / RBC: x / WBC x   Sq Epi: x / Non Sq Epi: x / Bacteria: x    RADIOLOGY & ADDITIONAL STUDIES: reviewed    Protein Calorie Malnutrition Present: [ ]mild [ ]moderate [ ]severe [ ]underweight [ ]morbid obesity  https://www.andeal.org/vault/2440/web/files/ONC/Table_Clinical%20Characteristics%20to%20Document%20Malnutrition-White%20JV%20et%20al%202012.pdf    Height (cm): 144.8 (06-20-23 @ 11:38), 144 (04-24-23 @ 10:50), 144.8 (08-30-22 @ 18:34)  Weight (kg): 60 (06-21-23 @ 06:16), 60.4 (04-24-23 @ 10:50), 63.2 (08-31-22 @ 03:27)  BMI (kg/m2): 28.6 (06-21-23 @ 06:16), 28.8 (06-20-23 @ 11:38), 29.1 (04-24-23 @ 10:50)    [ x]PPSV2 < or = 30%  [ ]significant weight loss [ ]poor nutritional intake [ ]anasarca[ ]Artificial Nutrition    Other REFERRALS:  [ ]Hospice  [ ]Child Life  [ ]Social Work  [ ]Case management [ ]Holistic Therapy     Goals of Care Document: SUBJECTIVE AND OBJECTIVE: denies any pain or discomfort    Indication for Geriatrics and Palliative Care Services/INTERVAL HPI: complex symptom management and decision making in the setting of advanced illness    OVERNIGHT EVENTS: discussion with family    DNR on chart: full code  Allergies    No Known Allergies    Intolerances    MEDICATIONS  (STANDING):  albumin human 25% IVPB 100 milliLiter(s) IV Intermittent every 6 hours  atorvastatin 80 milliGRAM(s) Oral at bedtime  cholecalciferol 2000 Unit(s) Oral daily  cyanocobalamin 1000 MICROGram(s) Oral daily  diltiazem Injectable 5 milliGRAM(s) IV Push every 6 hours  levothyroxine Injectable 56 MICROGram(s) IV Push at bedtime  loratadine 10 milliGRAM(s) Oral daily  misoprostol 200 MICROGram(s) Oral <User Schedule>  multivitamin 1 Tablet(s) Oral daily  mupirocin 2% Ointment 1 Application(s) Topical <User Schedule>  pantoprazole    Tablet 40 milliGRAM(s) Oral before breakfast    MEDICATIONS  (PRN):  acetaminophen     Tablet .. 650 milliGRAM(s) Oral every 6 hours PRN Temp greater or equal to 38C (100.4F), Mild Pain (1 - 3)  albuterol    90 MICROgram(s) HFA Inhaler 2 Puff(s) Inhalation every 6 hours PRN Shortness of Breath and/or Wheezing  HYDROmorphone  Injectable 0.2 milliGRAM(s) IV Push every 4 hours PRN Severe Pain (7 - 10)  lactulose Syrup 10 Gram(s) Oral daily PRN constipation  melatonin 3 milliGRAM(s) Oral at bedtime PRN Sleep  oxyCODONE    IR 5 milliGRAM(s) Oral every 4 hours PRN Moderate Pain (4 - 6)  polyethylene glycol 3350 17 Gram(s) Oral daily PRN for constipation  senna 2 Tablet(s) Oral at bedtime PRN for constipation    ITEMS UNCHECKED ARE NOT PRESENT    PRESENT SYMPTOMS: [x ]Unable to self-report - see [ ] CPOT [ x] PAINADS [x ] RDOS  Source if other than patient:  [ ]Family   [ ]Team     Pain:  [ ]yes [ ]no  QOL impact -   Location -                    Aggravating factors -  Quality -  Radiation -  Timing-  Severity (0-10 scale):  Minimal acceptable level (0-10 scale):     CPOT:    https://www.sccm.org/getattachment/gtq77k31-3i3x-4k9u-6h6c-0410o4371t8h/Critical-Care-Pain-Observation-Tool-(CPOT)    PAIN AD Score:	0  http://geriatrictoolkit.[facility]/cog/painad.pdf (Ctrl + left click to view)    Dyspnea:                           [ ]Mild [ ]Moderate [ ]Severe    RDOS: 0  0 to 2  minimal or no respiratory distress   3  mild distress  4 to 6 moderate distress  >7 severe distress  https://homecareinformation.net/handouts/hen/Respiratory_Distress_Observation_Scale.pdf (Ctrl +  left click to view)     Anxiety:                             [ ]Mild [ ]Moderate [ ]Severe  Fatigue:                             [ ]Mild [ ]Moderate [ ]Severe  Nausea:                             [ ]Mild [ ]Moderate [ ]Severe  Loss of appetite:              [ ]Mild [ ]Moderate [ ]Severe  Constipation:                    [ ]Mild [ ]Moderate [ ]Severe    PCSSQ[Palliative Care Spiritual Screening Question]   Severity (0-10): deferred  Score of 4 or > indicate consideration of Chaplaincy referral.    Chaplaincy Referral: [ ] yes [ ] refused [ ] following [x] deferred    Other Symptoms:  [ ]All other review of systems negative     Palliative Performance Status Version 2:  30-40  %      http://npcrc.org/files/news/palliative_performance_scale_ppsv2.pdf    PHYSICAL EXAM:  Vital Signs Last 24 Hrs  T(C): 36.4 (06 Jul 2023 11:44), Max: 36.7 (05 Jul 2023 15:40)  T(F): 97.6 (06 Jul 2023 11:44), Max: 98 (05 Jul 2023 15:40)  HR: 114 (06 Jul 2023 11:44) (113 - 117)  BP: 104/58 (06 Jul 2023 11:44) (98/61 - 127/76)  BP(mean): --  RR: 18 (06 Jul 2023 11:44) (16 - 18)  SpO2: 100% (06 Jul 2023 11:44) (97% - 100%)    Parameters below as of 06 Jul 2023 11:44  Patient On (Oxygen Delivery Method): nasal cannula  O2 Flow (L/min): 4   I&O's Summary    05 Jul 2023 07:01  -  06 Jul 2023 07:00  --------------------------------------------------------  IN: 0 mL / OUT: 16 mL / NET: -16 mL     GENERAL: [ ]Cachexia    [ ]Alert  [ ]Oriented x   [x ]Lethargic  [ ]Unarousable  [x ]Verbal  [ ]Non-Verbal  Behavioral:   [ ]Anxiety  [x ]Delirium [x ]Agitation [ ]Other  HEENT:  [ ]Normal   [x ]Dry mouth   [ ]ET Tube/Trach  [ ]Oral lesions  PULMONARY:   [ ]Clear [ ]Tachypnea  [ ]Audible excessive secretions   [x ]Rhonchi        [ ]Right [ ]Left [ ]Bilateral  [ ]Crackles        [ ]Right [ ]Left [ ]Bilateral  [ ]Wheezing     [ ]Right [ ]Left [ ]Bilateral  [ ]Diminished BS [ ] Right [ ]Left [ ]Bilateral  CARDIOVASCULAR:    [ ]Regular [ ]Irregular [ x]Tachy  [ ]Alejandro [ ]Murmur [ ]Other  GASTROINTESTINAL:  [x ]Soft  [ ]Distended   [x ]+BS  [ x]Non tender [ ]Tender  [ ]Other [ ]PEG [ ]OGT/ NGT     GENITOURINARY:  [ ]Normal [ x]Incontinent   [ ]Oliguria/Anuria   [ ]Birch  MUSCULOSKELETAL:   [ ]Normal   [ ]Weakness  [ x]Bed/Wheelchair bound [ ]Edema  NEUROLOGIC:   [ ]No focal deficits  [x ] Cognitive impairment  [ ] Dysphagia [ ]Dysarthria [ ] Paresis [ ]Other   SKIN:   [x ]Normal  [ ]Rash  [ ]Other  [ ]Pressure ulcer(s) [ ]y [ ]n present on admission    CRITICAL CARE:  [ ]Shock Present  [ ]Septic [ ]Cardiogenic [ ]Neurologic [ ]Hypovolemic  [ ]Vasopressors [ ]Inotropes  [ ]Respiratory failure present [ ]Mechanical Ventilation [ ]Non-invasive ventilatory support [ ]High-Flow   [ ]Acute  [ ]Chronic [ ]Hypoxic  [ ]Hypercarbic [ ]Other  [ ]Other organ failure     LABS:                        8.4    15.51 )-----------( 67       ( 06 Jul 2023 06:04 )             25.5     07-06    134<L>  |  95<L>  |  30<H>  ----------------------------<  99  5.8<H>   |  26  |  1.84<H>    Ca    8.5      06 Jul 2023 13:15  Phos  3.9     07-06  Mg     1.80     07-06    TPro  7.1  /  Alb  3.5  /  TBili  1.1  /  DBili  x   /  AST  139<H>  /  ALT  36<H>  /  AlkPhos  89  07-06    PT/INR - ( 06 Jul 2023 13:15 )   PT: 14.5 sec;   INR: 1.25 ratio         PTT - ( 06 Jul 2023 13:15 )  PTT:28.1 sec  Urinalysis Basic - ( 06 Jul 2023 13:15 )    Color: x / Appearance: x / SG: x / pH: x  Gluc: 99 mg/dL / Ketone: x  / Bili: x / Urobili: x   Blood: x / Protein: x / Nitrite: x   Leuk Esterase: x / RBC: x / WBC x   Sq Epi: x / Non Sq Epi: x / Bacteria: x    RADIOLOGY & ADDITIONAL STUDIES: reviewed    Protein Calorie Malnutrition Present: [ ]mild [ ]moderate [ ]severe [ ]underweight [ ]morbid obesity  https://www.andeal.org/vault/2440/web/files/ONC/Table_Clinical%20Characteristics%20to%20Document%20Malnutrition-White%20JV%20et%20al%202012.pdf    Height (cm): 144.8 (06-20-23 @ 11:38), 144 (04-24-23 @ 10:50), 144.8 (08-30-22 @ 18:34)  Weight (kg): 60 (06-21-23 @ 06:16), 60.4 (04-24-23 @ 10:50), 63.2 (08-31-22 @ 03:27)  BMI (kg/m2): 28.6 (06-21-23 @ 06:16), 28.8 (06-20-23 @ 11:38), 29.1 (04-24-23 @ 10:50)    [ x]PPSV2 < or = 30%  [ ]significant weight loss [ ]poor nutritional intake [ ]anasarca[ ]Artificial Nutrition    Other REFERRALS:  [ ]Hospice  [ ]Child Life  [ ]Social Work  [ ]Case management [ ]Holistic Therapy     Goals of Care Document:

## 2023-07-06 NOTE — PROGRESS NOTE ADULT - ASSESSMENT
88y Female with history of endometrial carcinoma presents with weakness. Nephrology consulted for elevated Scr.    1) INDIANA: initially resolved now with recurrent INDIANA in setting of AMS and decreased PO intake. Scr increasing despite IV albumin. Change to IV albumin loading dose and check UA and urine sodium and urine creatinine. Repeat renal US given prior R hydronephrosis and to rule out bilateral hydronephrosis given decreasing UO. TMA work up negative. Avoid nephrotoxins. Monitor electrolytes.    2) HTN: BP low normal with tachycardia. As per cardiology.    3) LE edema: Due to hypoalbuminemia improved with diuresis. Can restart protein supplements once diet resumed. TTE with normal LVSF. Monitor UO.    4) Hyponatremia: Mild and likely due to hypovolemia. IV albumin as above. Monitor serum Na.      [facility] NEPHROLOGY  Brennon Cates M.D.  DIO HdzO.  Guerline Dias M.D.  Merissa Parsons, MSN, ANP-C    Telephone: [phone]  Facsimile: [phone]    [address] [place] [address]

## 2023-07-06 NOTE — CHART NOTE - NSCHARTNOTEFT_GEN_A_CORE
Per discussion with medicine attending, empiric zosyn started. Dose adjusted per pharmacy recommendations.

## 2023-07-06 NOTE — PROGRESS NOTE ADULT - PROBLEM SELECTOR PLAN 5
Currently pt with worsening agitation/discomfort  Pt had shared that she has had anxiety in the past  Discussed option of using BZD low dose to address acute episodes of anxiety  Would recommend:  - xanax 0.25mg PO q8hrs PRN.  - Can add Dilaudid 0.2mg IV q2-3hrs PRN for discomfort/pain Currently pt with worsening agitation/discomfort  Pt had shared that she has had anxiety in the past  Discussed option of using BZD low dose to address acute episodes of anxiety  Would recommend:  - xanax 0.25mg PO q8hrs PRN / Ativan 0.25mg IV q6hrs PRN   - Can add Dilaudid 0.2mg IV q2-3hrs PRN for discomfort/pain/dyspnea

## 2023-07-06 NOTE — RAPID RESPONSE TEAM SUMMARY - NSSITUATIONBACKGROUNDRRT_GEN_ALL_CORE
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     RRT called in the US dept while pt was getting a renal US for worsening INDIANA for AMS. Pt was lethargic with BP 99/56, . Pt noted to be tachycardic to 160's in SVT on the zolle. Unable to get an EKG machine. Repeat /76, SPO2 100% on 4L NC. Patient noted with heart rate of 160s. S/p vagal maneuvers, heart rate in 110s, appears to be multifocal atrial tachycardia. Neurologic exam non-focal.    Patient with WBC of 15 and Cr of 1.89 on AM labs. Pending urine studies, renal ultrasound. Repeat CBC, CMP, and VBG pending. Infectious work up ordered.     89 Y/O F with history of stage 4 serous endometrial carcinoma (dx 5/2022) s/p chemo (last in 12/2022) and debulking surgery with POOL/BSO (2/10/23) c/b b/l pleural effusions (R > L, on 2L NC PRN), RUL segmental PE (8/2022, on Eliquis), GI bleed (4/2023), CAD s/p stent (3/2021), aortic stenosis s/p TAVR, HTN, HLD, and hypothyroidism presents with 3 weeks of worsening generalized weakness and admitted for AMS workup.     RRT called in the US dept while pt was getting a renal US for worsening INDIA[place] for AMS. Pt was lethargic with BP 99/56, . Pt noted to be tachycardic to 160's - noted to be SVT on the zolle. Vagal maneuver performed. Unable to get an EKG machine in the US unit. Repeat /76, SPO2 100%. Decided to move pt to her room in 7N for further assessment and care. Neurologic exam non-focal. Rectal temp showed that pt was afebrile. Repeat labs with BMP, VBG with lactate, ammonia, CBC ordered. Pt noted to have improvement in mental status as the RRT was progressing and family was in the room. GOC revisited with family at bedside, stated she was full code. Pt was protecting airway with multiple repeat vitals stable including HR: 110-120, BP:110/60. On chart review, pt did not receive any opioids or benzos since yest. Pt had a CT head a few days ago without evidence of metastatic disease or acute hemorrhage. Repeat EKG showed sinus tach. Differentials in this patient remains broad including an infectious etiology with tachycardia and elevated WBC (although can be elevated due to multitude of reasons in this sick patient), nonconvulsive seizure, MAHA given worsening thrombocytopenia, worsening metastatic disease, metabolic derangements including uremia vs hyperammonemia, hypercarbia vs TIA vs central pathology     Recommendations:  [ ] Pt is at very high risk of decompensation and has a very poor prognosis with her metastatic disease. Please continue to readdress GOC with palliative input  [ ] f/u labs ordered and order infectious w/u  [ ] consider broad spectrum abx and deescalate PRN   [ ] consider repeat 24 hour video EEG  [ ] f/u peripheral smear to r/o schistocytes

## 2023-07-06 NOTE — CHART NOTE - NSCHARTNOTEFT_GEN_A_CORE
Source: Patient [x]    Family [x] daughter at bed side    other [x] medical chart   Diet rx: NPO (07-05-23 @ 16:15) [Active]    Pt 89 yo female with PMHx of serous endometrial carcinoma, PE, CAD s/p PCI, HTN, TAVR, hypothyroidism, HLD presented with weakness, anemia and unilateral pleural effusion - per chart review.    At time of visit, Pt awake, alert but weak; Pt's daughter at bed side answered questions during interview. Pt NPO at time of visit; Speech Bedside Swallow Evaluation pending. No report of vomiting or diarrhea @ this time but Pt C/O abdominal pain at time of visit. +Last BM (7/4) per flow sheet. RD answered daughter's concerns related to diet; RD remains available, daughter made aware.     Pt's height: 4'9" (6/20)       Pt's weight: 60 kg (6/21)  Pertinent Medications: Miralax, Protonix, Senna, Lactulose Syrup (PRN), Lipitor, Vit D3, Vit B12, Multivitamin,   Pertinent Labs: (7/6) Na 134 L, Cl 95 L, Albumin 3.7,  H, BUN 28 H, Creat 1.89 H, Glu 110 H;     (7/5) WBC 17.66 H, H/H 9.6/28.3 L, PLT 92 L  Skin: Per flow sheet: +pressure injury to Sacral spine, stage II   2+ edema: R/L leg, R/L ankle     Estimated Needs: [x] no change since previous assessment  Previous Nutrition Diagnosis: [x] Malnutrition, severe    Nutrition Diagnosis is [x] ongoing   New Nutrition Diagnosis: [x] not applicable    Nutrition Interventions/Recommendations:   1. Conduct Speech Bedside Swallow Evaluation as ordered; PO diet/Fluid consistency per SLP rec;  2. Once PO diet resumes, monitor PO diet tolerance & assist Pt with meals as needed;   3. Bowel regimen per MD discretion;   4. Monitor labs, weekly weights, hydration status;  5. If Pt to remain NPO, suggest initiating alternative means of nutrition support; Source: Patient [x]    Family [x] daughter at bed side    other [x] medical chart   Diet rx: NPO (07-05-23 @ 16:15) [Active]    Pt 89 yo female with PMHx of serous endometrial carcinoma, PE, CAD s/p PCI, HTN, TAVR, hypothyroidism, HLD presented with weakness, anemia and unilateral pleural effusion - per chart review.    At time of visit, Pt awake, alert but weak; Pt's daughter at bed side answered questions during interview. Pt NPO at time of visit; Speech Bedside Swallow Evaluation pending. No report of vomiting or diarrhea @ this time but Pt C/O abdominal pain at time of visit. +Last BM (7/4) per flow sheet. RD answered daughter's concerns related to diet; RD remains available, daughter made aware.     Pt's height: 4'9" (6/20)       Pt's weight: 60 kg (6/21)  Pertinent Medications: Miralax, Protonix, Senna, Lactulose Syrup (PRN), Lipitor, Vit D3, Vit B12, Multivitamin,   Pertinent Labs: (7/6) Na 134 L, Cl 95 L, Albumin 3.7,  H, BUN 28 H, Creat 1.89 H, Glu 110 H;     (7/5) WBC 17.66 H, H/H 9.6/28.3 L, PLT 92 L  Skin: Per flow sheet: +pressure injury to Sacral spine, stage II   2+ edema: R/L leg, R/L ankle     Estimated Needs: [x] no change since previous assessment  Previous Nutrition Diagnosis: [x] Malnutrition, severe    Nutrition Diagnosis is [x] ongoing   New Nutrition Diagnosis: [x] not applicable    Nutrition Interventions/Recommendations:   1. Conduct Speech Bedside Swallow Evaluation as ordered; PO diet/Fluid consistency per SLP rec;  2. Once PO diet resumes, monitor PO diet tolerance & assist Pt with meals as needed;   3. Bowel regimen per MD discretion;   4. Monitor labs, weekly weights, hydration status;  5. If Pt to remain NPO, suggest initiating alternative means of nutrition support according to GOC;

## 2023-07-06 NOTE — PROGRESS NOTE ADULT - PROBLEM SELECTOR PLAN 1
Initially dx in 5/2022 s/p chemo and debulking sx with POOL on 2/10/23  Now with findings of recurrence and progression of disease  [place] [facility] [place] in 5/2022 s/p chemo and debulking sx with POOL on 2/10/23  Now with findings of recurrence and progression of disease  Follows with Dr. Alvarado at [facility] [place]

## 2023-07-06 NOTE — PROGRESS NOTE ADULT - ASSESSMENT
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    1. Uterine carcinosarcoma   -- [place] [facility] [place] s/p neoadjuvant chemotherapy completed 12/2022, s/p POOL-BSO 02/10/2023   -- Recent imaging suggests disease recurrence  -- Prognosis is poor. She is not a candidate for further treatment.   -- Palliative care team following, ongoing GOC discussions. Pt remains full code.    2. Dyspnea on exertion   -- CTA chest w/o PE; + large right pleural effusion   -- LE duplex without DVT   -- Thoracic following, pigtail catheter placed for temporary draining. Will keep PTC in place for now per family request, though minimal output.   -- Cytology from R pleural effusion shows atypical cells suspicious for malignancy.   -- Continue supplemental O2 as needed, wean as tolerated     3. Anemia, thrombocytopenia   -- Likely secondary to malignancy, AOCD  -- No evidence of iron, B12, or folate deficiencies.  -- Monitor CBC and transfuse to maintain hg >7, platelet count >10K, >15K if febrile, or >50K if bleeding     4. AMS, lethargy   -- CT Head negative  -- Neurology consulted, EEG w/o seizure activity    5. [place]   -- Renal US shows moderate R hydronephrosis, moderate to large ascites   -- Nephrology following, appreciate recs. Likely due to poor PO intake     Will continue to follow.    Twyla Pitts PA-C  Hematology/Oncology  [facility]  [phone] (office)  [phone] (alt office)  Evenings and weekends please call MD on call or office

## 2023-07-06 NOTE — PROGRESS NOTE ADULT - SUBJECTIVE AND OBJECTIVE BOX
[facility] NEPHROLOGY- PROGRESS NOTE    88y Female with history of endometrial carcinoma presents with weakness. Nephrology consulted for elevated Scr.      REVIEW OF SYSTEMS: limited due to mental status however patient denies any chest pain, dyspnea, vomiting or diarrhea. + Decreased PO intake    No Known Allergies      Hospital Medications: Medications reviewed      VITALS:  T(F): 97.9 (07-06-23 @ 08:15), Max: 98 (07-05-23 @ 15:40)  HR: 115 (07-06-23 @ 08:15)  BP: 98/61 (07-06-23 @ 08:15)  RR: 18 (07-06-23 @ 08:15)  SpO2: 97% (07-06-23 @ 08:15)  Wt(kg): --    07-05 @ 07:01  -  07-06 @ 07:00  --------------------------------------------------------  IN: 0 mL / OUT: 16 mL / NET: -16 mL        PHYSICAL EXAM:    Gen: NAD, calm with dry MM  Cards: + tachycardia, +S1/S2, no M/G/R  Resp: Decreased BS @ R base, + R CT  GI: soft, NT/ND, NABS  : + brown  Vascular: no LE edema B/L      LABS:  07-06    134<L>  |  95<L>  |  28<H>  ----------------------------<  110<H>  4.0   |  27  |  1.89<H>    Ca    8.5      06 Jul 2023 06:04  Phos  3.6     07-06  Mg     1.80     07-06    TPro  7.1  /  Alb  3.7  /  TBili  1.0  /  DBili      /  AST  103<H>  /  ALT  31  /  AlkPhos  91  07-06    Creatinine Trend: 1.89 <--, 1.48 <--, 1.02 <--, 1.00 <--, 0.97 <--, 1.05 <--, 1.08 <--, 1.08 <--, 0.96 <--                        8.4    15.51 )-----------( 67       ( 06 Jul 2023 06:04 )             25.5     Urine Studies:  Urinalysis Basic - ( 06 Jul 2023 06:04 )    Color:  / Appearance:  / SG:  / pH:   Gluc: 110 mg/dL / Ketone:   / Bili:  / Urobili:    Blood:  / Protein:  / Nitrite:    Leuk Esterase:  / RBC:  / WBC    Sq Epi:  / Non Sq Epi:  / Bacteria:       Sodium, Random Urine: 36 mmol/L (07-03 @ 12:26)

## 2023-07-06 NOTE — PROGRESS NOTE ADULT - SUBJECTIVE AND OBJECTIVE BOX
Patient is a 88y old  Female who presents with a chief complaint of Generalized weakness (06 Jul 2023 )    Patient seen and examined    MEDICATIONS  (STANDING):  albumin human 25% IVPB 100 milliLiter(s) IV Intermittent every 6 hours  atorvastatin 80 milliGRAM(s) Oral at bedtime  cholecalciferol 2000 Unit(s) Oral daily  cyanocobalamin 1000 MICROGram(s) Oral daily  diltiazem Injectable 5 milliGRAM(s) IV Push every 6 hours  levothyroxine Injectable 56 MICROGram(s) IV Push at bedtime  loratadine 10 milliGRAM(s) Oral daily  misoprostol 200 MICROGram(s) Oral <User Schedule>  multivitamin 1 Tablet(s) Oral daily  mupirocin 2% Ointment 1 Application(s) Topical <User Schedule>  pantoprazole    Tablet 40 milliGRAM(s) Oral before breakfast  piperacillin/tazobactam IVPB.. 3.375 Gram(s) IV Intermittent every 12 hours  sodium chloride 0.9%. 1000 milliLiter(s) (50 mL/Hr) IV Continuous <Continuous>    MEDICATIONS  (PRN):  acetaminophen     Tablet .. 650 milliGRAM(s) Oral every 6 hours PRN Temp greater or equal to 38C (100.4F), Mild Pain (1 - 3)  albuterol    90 MICROgram(s) HFA Inhaler 2 Puff(s) Inhalation every 6 hours PRN Shortness of Breath and/or Wheezing  HYDROmorphone  Injectable 0.2 milliGRAM(s) IV Push every 4 hours PRN Severe Pain (7 - 10)  lactulose Syrup 10 Gram(s) Oral daily PRN constipation  melatonin 3 milliGRAM(s) Oral at bedtime PRN Sleep  oxyCODONE    IR 5 milliGRAM(s) Oral every 4 hours PRN Moderate Pain (4 - 6)  polyethylene glycol 3350 17 Gram(s) Oral daily PRN for constipation  senna 2 Tablet(s) Oral at bedtime PRN for constipation    Vital Signs Last 24 Hrs  T(C): 36.4 (07-06-23 @ 11:44), Max: 36.6 (07-06-23 @ 08:15)  T(F): 97.6 (07-06-23 @ 11:44), Max: 97.9 (07-06-23 @ 08:15)  HR: 120 (07-06-23 @ 15:30) (114 - 165)  BP: 102/67 (07-06-23 @ 15:19) (98/61 - 114/68)  BP(mean): --  RR: 16 (07-06-23 @ 15:19) (16 - 18)  SpO2: 100% (07-06-23 @ 15:19) (97% - 100%)        PE  NAD  Awake, alert, lethargic   Anicteric, MMM  cvs- s1, s2+  rs - dec breath sounds at bases  abd - soft, bs+  ext - edema+  Neuro - alert awake oriented to person, place  no signs of external injury     LABS:  07-06    135  |  95<L>  |  28<H>  ----------------------------<  89  3.9   |  27  |  1.76<H>    Ca    8.4      06 Jul 2023 17:15  Phos  3.4     07-06  Mg     1.90     07-06    TPro  7.1  /  Alb  3.5  /  TBili  1.1  /  DBili      /  AST  139<H>  /  ALT  36<H>  /  AlkPhos  89  07-06    Creatinine Trend: 1.76 <--, 1.84 <--, 1.89 <--, 1.48 <--, 1.02 <--, 1.00 <--, 0.97 <--, 1.05 <--, 1.08 <--, 1.08 <--, 0.96 <--                        8.9    17.09 )-----------( 89       ( 06 Jul 2023 13:15 )             26.1     Urine Studies:  Urinalysis Basic - ( 06 Jul 2023 17:15 )    Color:  / Appearance:  / SG:  / pH:   Gluc: 89 mg/dL / Ketone:   / Bili:  / Urobili:    Blood:  / Protein:  / Nitrite:    Leuk Esterase:  / RBC:  / WBC    Sq Epi:  / Non Sq Epi:  / Bacteria:       Sodium, Random Urine: 36 mmol/L (07-03 @ 12:26)          LIVER FUNCTIONS - ( 06 Jul 2023 13:15 )  Alb: 3.5 g/dL / Pro: 7.1 g/dL / ALK PHOS: 89 U/L / ALT: 36 U/L / AST: 139 U/L / GGT: x           PT/INR - ( 06 Jul 2023 13:15 )   PT: 14.5 sec;   INR: 1.25 ratio         PTT - ( 06 Jul 2023 13:15 )  PTT:28.1 sec

## 2023-07-06 NOTE — PROGRESS NOTE ADULT - PROBLEM SELECTOR PLAN 2
On oxygen now  Likely multifactorial  Discussed with primary team   Pt indicates that dyspnea improved after oxygen supplementation. On oxygen   Likely multifactorial  Discussed with primary team   Pt indicates that dyspnea improved after oxygen supplementation, can try opioids PRN for dyspnea if needed.

## 2023-07-06 NOTE — PROGRESS NOTE ADULT - CONVERSATION DETAILS
Met with pts daughters Shani and Wendi in family room as well as pts granddaughter over the phone. We discussed patients current medical condition, Shani shared her concerns regarding pts initial care, expressed she was shocked and taken aback about patient's met disease after her initial surgery. We discussed patient's major symptoms such as tiredness, fatigue and lack of appetite, and how they could all be traced back to met malignancy along with acute medical conditions.  They want pts symptoms managed such as agitation, anxiety and pain/discomfort. Family interested in LTC placement, recommended hospice care, discussed philosophy of care and how main goal is comfort and symptom management, in agreement with referral to CM.   Addressed code status, shared how given her acute medical issues and met malignancy measures such as CPR and intubation would likely cause more harm and suffering with no significant impact on patient's prognosis, they will discuss amongst themselves before coming to a conclusion.   Questions answered, support provided.
Met with pts granddaughter Stephanie at bedside, she understands that pt is critically ill and her underlying malignancy is advanced. She shared that she wants to honor pts wishes and wants "everything done, wants the pt to remain full code".   As in the past my recommendation given pts incurable malignancy and not eligible for further DMT, is for pt to be DNR/DNI and comfort care. Family understands this, however want pt to remain full and want all medical interventions to continue.

## 2023-07-06 NOTE — PROGRESS NOTE ADULT - ASSESSMENT
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     EKG: MAT   Tele: MAT    1) Multifocal Atrial Tachycardia   -started Dilt 30 mg po q6 hrs,  -continue to monitor on tele     2) Pleural effusion  -s/p Pig tail   -f/u pulm and thoracic recs    3) hx of PE  - RUL segmental PE (8/2022  -AC on hold 2/2 anemia    4) CAD s/p PCI  -denies CP  -TTE with normal LV function    5) TAVR  -TTE transcatheter aortic valve replacement Peak transaortic valve gradient equals 42 mm Hg, mean transaortic valve gradient equals 23 mm Hg, which is probably normal in the presence of a prosthetic valve. Moderate valvular aortic regurgitation.    6) Anemia  -transfuse to keep hemoglobin >8  -CT scan non con showing bleeding near the spleen and liver. surgery on board, CTA abd/pelvis with no acute bleed   87 yo woman with history of stage 4 serous endometrial carcinoma (dx 5/2022) s/p chemo (last in 12/2022) and debulking surgery with POOL/BSO (2/10/23) c/b b/l pleural effusions (R > L, on 2L NC PRN), RUL segmental PE (8/2022, on Eliquis), GI bleed (4/2023), CAD s/p stent (3/2021), aortic stenosis s/p TAVR, HTN, HLD, and hypothyroidism presents with 3 weeks of worsening generalized weakness.     EKG: MAT   Tele: MAT    1) Multifocal Atrial Tachycardia   -started Dilt 30 mg po q6 hrs,  -continue to monitor on tele   -7/6 RRT for tachycardia to 160s while in US, EKG shows multifocal atrial tachycardia. switch dilt to IV 5mg [id number] while patient is NPO    2) Pleural effusion  -s/p Pig tail   -f/u pulm and thoracic recs    3) hx of PE  - RUL segmental PE (8/2022  -AC on hold 2/2 anemia    4) CAD s/p PCI  -denies CP  -TTE with normal LV function    5) TAVR  -TTE transcatheter aortic valve replacement Peak transaortic valve gradient equals 42 mm Hg, mean transaortic valve gradient equals 23 mm Hg, which is probably normal in the presence of a prosthetic valve. Moderate valvular aortic regurgitation.    6) Anemia  -transfuse to keep hemoglobin >8  -CT scan non con showing bleeding near the spleen and liver. surgery on board, CTA abd/pelvis with no acute bleed

## 2023-07-06 NOTE — PROGRESS NOTE ADULT - PROBLEM SELECTOR PLAN 7
Pt remains full code see GOC  Page for uncontrolled symptoms [id number]  Discussed with primary team. Pt remains full code see GOC  Given patient's metastatic malignancy and guarded prognosis despite DMT, attempts at resuscitation/intubation/MV would likely not impact significantly pts prognosis, shared with family.  Discussed with primary team  At this time goals are established, symptoms are controlled, will sign off, reconsult as needed.

## 2023-07-06 NOTE — CONSULT NOTE ADULT - SUBJECTIVE AND OBJECTIVE BOX
Interventional Radiology    HPI:  87 yo woman with history of stage 4 serous endometrial carcinoma (dx 5/2022) s/p chemo (last in 12/2022) and debulking surgery with POOL/BSO (2/10/23)     INDIANA: initially resolved now with recurrent INDIA[place] in setting of AMS and decreased PO intake. Scr increasing despite IV albumin. Change to IV albumin loading dose and check UA and urine sodium and urine creatinine. Repeat renal US showed "moderate hydronephrosis," IR consulted for right nephrostomy tube placement.     RRT called in the US dept while pt was getting a renal US for worsening [place] for AMS. Pt was lethargic with BP 99/56, . Pt noted to be tachycardic to 160's .  GOC discussion today:  Met with pts granddaughter Stephanie at bedside, she understands that pt is critically ill and her underlying malignancy is advanced. Despite recommendation given pts incurable malignancy, pt to remain full and want all medical interventions to continue.    Team starting empiric Zosyn      Allergies: No Known Allergies      Medications (Abx/Cardiac/Anticoagulation/Blood Products)    diltiazem    Tablet: 30 milliGRAM(s) Oral (07-05 @ 11:13)  diltiazem Injectable: 5 milliGRAM(s) IV Push (07-06 @ 15:11)  piperacillin/tazobactam IVPB.: 200 mL/Hr IV Intermittent (07-06 @ 17:49)      Home Medications  alendronate 70 mg oral tablet: 1 tab(s) orally once a week  apixaban 2.5 mg oral tablet: 1 tab(s) orally 2 times a day  aspirin 81 mg oral delayed release tablet: 1 tab(s) orally once a day  cetirizine 10 mg oral tablet: 1 orally once a day  ezetimibe 10 mg oral tablet: 1 tab(s) orally once a day  famotidine 40 mg oral tablet: 1 tab(s) orally once a day (at bedtime)  furosemide 20 mg oral tablet: 1 tab(s) orally every 12 hours  levothyroxine 75 mcg (0.075 mg) oral tablet: 1 tab(s) orally once a day  melatonin 3 mg oral tablet: 1 tab(s) orally once a day (at bedtime)  miSOPROStol 200 mcg oral tablet: 1 tab(s) orally once a day  One A Day Women&#x27;s Complete oral tablet: 1 tab(s) orally once a day  pantoprazole 40 mg oral delayed release tablet: 1 tab(s) orally once a day  polyethylene glycol 3350 oral powder for reconstitution: 17 gram(s) orally once a day as needed for  constipation  rosuvastatin 20 mg oral tablet: 1 tab(s) orally once a day  senna leaf extract oral tablet: 2 tab(s) orally once a day (at bedtime) as needed for  constipation  Ventolin HFA 90 mcg/inh inhalation aerosol: 2 puff(s) inhaled every 6 hours, As Needed  Vitamin B12: 5000 microgram(s) sublingual once a day  Vitamin D3 50 mcg (2000 intl units) oral tablet: 1 tab(s) orally once a day      Data:    T(C): 36.4  HR: 120  BP: 102/67  RR: 16  SpO2: 100%    -WBC 17.09 / HgB 8.9 / Hct 26.1 / Plt 89  -Na 134 / Cl 95 / BUN 30 / Glucose 99  -K 5.8 / CO2 26 / Cr 1.84  -ALT 36 / Alk Phos 89 / T.Bili 1.1  -INR 1.25 / PTT 28.1          ASSESMENT/PLAN:   87 yo woman with history of stage 4 serous endometrial carcinoma (dx 5/2022) s/p chemo (last in 12/2022) and debulking surgery with POOL/BSO (2/10/23)     INDIANA: initially resolved now with recurrent INDIA[place] in setting of AMS and decreased PO intake. Scr increasing despite IV albumin. Change to IV albumin loading dose and check UA and urine sodium and urine creatinine. Repeat renal US showed "moderate hydronephrosis," IR consulted for right nephrostomy tube placement.     Reviewed US 6/24/23 and 7/6/23,Despite previously read as "mild hydro"  and today as "moderate", on our review, patient has unchanged right hydronephrosis from 6/24 to 7/6.   If nephrology still feels after additional pre-renal interventions that obstruction may be significantly contributing to recurrent [place], can evaluate for right nephrostomy tube placement for the indication of obstructive INDIA[place].   Given significant comorbidities, moderate to large ascites, would want repeat CT to re-evaluate    IR will re-evaluate after further nephrology recommendations and once CT is complete, will follow for now. Discussed plan with primary team          Kris Donahue MD  Interventional Radiology PGY5  Contact on [employer] Teams for nonemergent issues    - Nonemergent consults:  [facility] order "Consult- Interventional Radiology", no page required  - Emergent issues (pager): [facility] [phone]; [facility] [phone]; [phone]; DO NOT PAGE FOR SCHEDULING QUESTIONS  - Scheduling questions 8am-6pm : [facility] [phone]; [facility] [phone],   - Clinic/outpatient booking: [facility] [phone]; [facility] [phone]  Interventional Radiology    HPI:  87 yo woman with history of stage 4 serous endometrial carcinoma (dx 5/2022) s/p chemo (last in 12/2022) and debulking surgery with POOL/BSO (2/10/23)     [place]: initially resolved now with recurrent [place] in setting of AMS and decreased PO intake. Scr increasing despite IV albumin. Change to IV albumin loading dose and check UA and urine sodium and urine creatinine. Repeat renal US showed "moderate hydronephrosis," IR consulted for right nephrostomy tube placement.     RRT called in the US dept while pt was getting a renal US for worsening [place] for AMS. Pt was lethargic with BP 99/56, . Pt noted to be tachycardic to 160's .  GOC discussion today:  Met with pts granddaughter Stephanie at bedside, she understands that pt is critically ill and her underlying malignancy is advanced. Despite recommendation given pts incurable malignancy, pt to remain full and want all medical interventions to continue.    Team starting empiric Zosyn      Allergies: No Known Allergies      Medications (Abx/Cardiac/Anticoagulation/Blood Products)    diltiazem    Tablet: 30 milliGRAM(s) Oral (07-05 @ 11:13)  diltiazem Injectable: 5 milliGRAM(s) IV Push (07-06 @ 15:11)  piperacillin/tazobactam IVPB.: 200 mL/Hr IV Intermittent (07-06 @ 17:49)      Home Medications  alendronate 70 mg oral tablet: 1 tab(s) orally once a week  apixaban 2.5 mg oral tablet: 1 tab(s) orally 2 times a day  aspirin 81 mg oral delayed release tablet: 1 tab(s) orally once a day  cetirizine 10 mg oral tablet: 1 orally once a day  ezetimibe 10 mg oral tablet: 1 tab(s) orally once a day  famotidine 40 mg oral tablet: 1 tab(s) orally once a day (at bedtime)  furosemide 20 mg oral tablet: 1 tab(s) orally every 12 hours  levothyroxine 75 mcg (0.075 mg) oral tablet: 1 tab(s) orally once a day  melatonin 3 mg oral tablet: 1 tab(s) orally once a day (at bedtime)  miSOPROStol 200 mcg oral tablet: 1 tab(s) orally once a day  One A Day Women&#x27;s Complete oral tablet: 1 tab(s) orally once a day  pantoprazole 40 mg oral delayed release tablet: 1 tab(s) orally once a day  polyethylene glycol 3350 oral powder for reconstitution: 17 gram(s) orally once a day as needed for  constipation  rosuvastatin 20 mg oral tablet: 1 tab(s) orally once a day  senna leaf extract oral tablet: 2 tab(s) orally once a day (at bedtime) as needed for  constipation  Ventolin HFA 90 mcg/inh inhalation aerosol: 2 puff(s) inhaled every 6 hours, As Needed  Vitamin B12: 5000 microgram(s) sublingual once a day  Vitamin D3 50 mcg (2000 intl units) oral tablet: 1 tab(s) orally once a day      Data:    T(C): 36.4  HR: 120  BP: 102/67  RR: 16  SpO2: 100%    -WBC 17.09 / HgB 8.9 / Hct 26.1 / Plt 89  -Na 134 / Cl 95 / BUN 30 / Glucose 99  -K 5.8 / CO2 26 / Cr 1.84  -ALT 36 / Alk Phos 89 / T.Bili 1.1  -INR 1.25 / PTT 28.1          ASSESMENT/PLAN:   87 yo woman with history of stage 4 serous endometrial carcinoma (dx 5/2022) s/p chemo (last in 12/2022) and debulking surgery with POOL/BSO (2/10/23)     [place]IA[place]: initially resolved now with recurrent [place] in setting of AMS and decreased PO intake. Scr increasing despite IV albumin. Change to IV albumin loading dose and check UA and urine sodium and urine creatinine. Repeat renal US showed "moderate hydronephrosis," IR consulted for right nephrostomy tube placement.     Reviewed US 6/24/23 and 7/6/23,patient has persistent stable right hydronephrosis from 6/24 to 7/6. If nephrology still feels after additional pre-renal interventions that obstruction may be significantly contributing to recurrent [place], can evaluate for right nephrostomy tube placement for the indication of obstructive [place].   Given significant comorbidities, moderate to large ascites, would want repeat CT to re-evaluate. Overall benefit may be limited given underlying malignancy, and nephrostomy insertion carries significant morbidity including catheter occlusion, dislodgement, and malfunction, in addition to procedural risks.    IR will re-evaluate after further nephrology recommendations and once CT is complete, will follow for now. Discussed plan with primary team          Kris Donahue MD  Interventional Radiology PGY5  Contact on [employer] Teams for nonemergent issues    - Nonemergent consults:  [facility] order "Consult- Interventional Radiology", no page required  - Emergent issues (pager): [facility] [phone]; [facility] [phone]; [phone]; DO NOT PAGE FOR SCHEDULING QUESTIONS  - Scheduling questions 8am-6pm : [facility] [phone]; [facility] [phone],   - Clinic/outpatient booking: [facility] [phone]; [facility] [phone]

## 2023-07-06 NOTE — PROGRESS NOTE ADULT - SUBJECTIVE AND OBJECTIVE BOX
Patient is a 88y old  Female who presents with a chief complaint of Generalized weakness (06 Jul 2023 14:54)    Patient seen this afternoon. She continues to appear very tired and weak. States she is thirsty and wants water but is currently NPO. RRT called earlier for increased lethargy.     MEDICATIONS  (STANDING):  albumin human 25% IVPB 100 milliLiter(s) IV Intermittent every 6 hours  atorvastatin 80 milliGRAM(s) Oral at bedtime  cholecalciferol 2000 Unit(s) Oral daily  cyanocobalamin 1000 MICROGram(s) Oral daily  diltiazem Injectable 5 milliGRAM(s) IV Push every 6 hours  levothyroxine Injectable 56 MICROGram(s) IV Push at bedtime  loratadine 10 milliGRAM(s) Oral daily  misoprostol 200 MICROGram(s) Oral <User Schedule>  multivitamin 1 Tablet(s) Oral daily  mupirocin 2% Ointment 1 Application(s) Topical <User Schedule>  pantoprazole    Tablet 40 milliGRAM(s) Oral before breakfast    MEDICATIONS  (PRN):  acetaminophen     Tablet .. 650 milliGRAM(s) Oral every 6 hours PRN Temp greater or equal to 38C (100.4F), Mild Pain (1 - 3)  albuterol    90 MICROgram(s) HFA Inhaler 2 Puff(s) Inhalation every 6 hours PRN Shortness of Breath and/or Wheezing  HYDROmorphone  Injectable 0.2 milliGRAM(s) IV Push every 4 hours PRN Severe Pain (7 - 10)  lactulose Syrup 10 Gram(s) Oral daily PRN constipation  melatonin 3 milliGRAM(s) Oral at bedtime PRN Sleep  oxyCODONE    IR 5 milliGRAM(s) Oral every 4 hours PRN Moderate Pain (4 - 6)  polyethylene glycol 3350 17 Gram(s) Oral daily PRN for constipation  senna 2 Tablet(s) Oral at bedtime PRN for constipation        Vital Signs Last 24 Hrs  T(C): 36.4 (06 Jul 2023 11:44), Max: 36.6 (06 Jul 2023 08:15)  T(F): 97.6 (06 Jul 2023 11:44), Max: 97.9 (06 Jul 2023 08:15)  HR: 163 (06 Jul 2023 15:19) (114 - 165)  BP: 102/67 (06 Jul 2023 15:19) (98/61 - 124/77)  BP(mean): --  RR: 16 (06 Jul 2023 15:19) (16 - 18)  SpO2: 100% (06 Jul 2023 15:19) (97% - 100%)    Parameters below as of 06 Jul 2023 15:19  Patient On (Oxygen Delivery Method): nasal cannula  O2 Flow (L/min): 4      PE  awake, alert, NAD.   Tired-appearing  Anicteric, MMM  No c/c/e  No rash grossly                            8.9    17.09 )-----------( 89       ( 06 Jul 2023 13:15 )             26.1       07-06    134<L>  |  95<L>  |  30<H>  ----------------------------<  99  5.8<H>   |  26  |  1.84<H>    Ca    8.5      06 Jul 2023 13:15  Phos  3.9     07-06  Mg     1.80     07-06    TPro  7.1  /  Alb  3.5  /  TBili  1.1  /  DBili  x   /  AST  139<H>  /  ALT  36<H>  /  AlkPhos  89  07-06      ACC: [id number] EXAM:  US KIDNEYS AND BLADDER   ORDERED BY: LEYLA MYERS     PROCEDURE DATE:  07/06/2023          INTERPRETATION:  CLINICAL INFORMATION: Follow-up of right hydronephrosis.    COMPARISON: None available.    TECHNIQUE: Sonography of the kidneys and bladder.    FINDINGS:  Right kidney: 10.0 cm. Moderate hydronephrosis with specular debris in   the collecting system. No mass or calculus.    Left kidney: 8.6 cm. No renal mass, hydronephrosis or calculi.    Urinary bladder: Within normal limits.    Other: Moderate to large ascites, largest pocket in the right lower   quadrant. Multiple hypoechoic liver lesions consistent with known   metastasis.    IMPRESSION:  *  Moderate hydronephrosis with specular debris. Correlate with   urinalysis.  *  Moderate to large ascites.        --- End of Report ---

## 2023-07-06 NOTE — PROGRESS NOTE ADULT - SUBJECTIVE AND OBJECTIVE BOX
Rony Mcgarry MD  Interventional Cardiology / Endovascular Specialist  [place] Office : [phone] [place] [id number] Tel:   [place] Office : [address] [facility] [address]  Tel: [phone]      Subjective/Overnight events: Patient sitting up in recliner. No acute distress.   	  MEDICATIONS:  diltiazem    Tablet 30 milliGRAM(s) Oral every 6 hours      albuterol    90 MICROgram(s) HFA Inhaler 2 Puff(s) Inhalation every 6 hours PRN  loratadine 10 milliGRAM(s) Oral daily    acetaminophen     Tablet .. 650 milliGRAM(s) Oral every 6 hours PRN  HYDROmorphone  Injectable 0.2 milliGRAM(s) IV Push every 4 hours PRN  melatonin 3 milliGRAM(s) Oral at bedtime PRN  oxyCODONE    IR 5 milliGRAM(s) Oral every 4 hours PRN    lactulose Syrup 10 Gram(s) Oral daily PRN  misoprostol 200 MICROGram(s) Oral <User Schedule>  pantoprazole    Tablet 40 milliGRAM(s) Oral before breakfast  polyethylene glycol 3350 17 Gram(s) Oral daily PRN  senna 2 Tablet(s) Oral at bedtime PRN    atorvastatin 80 milliGRAM(s) Oral at bedtime  levothyroxine Injectable 56 MICROGram(s) IV Push at bedtime    albumin human 25% IVPB 100 milliLiter(s) IV Intermittent every 6 hours  cholecalciferol 2000 Unit(s) Oral daily  cyanocobalamin 1000 MICROGram(s) Oral daily  multivitamin 1 Tablet(s) Oral daily  mupirocin 2% Ointment 1 Application(s) Topical <User Schedule>      PAST MEDICAL/SURGICAL HISTORY  PAST MEDICAL & SURGICAL HISTORY:  Endometrial ca  S[place]ous [place] [facility] [place]      HTN (hypertension)      CAD (coronary artery disease)      S/P AVR      Hypothyroidism      HLD (hyperlipidemia)      S/P AVR (aortic valve replacement)      History of endometrial biopsy          SOCIAL HISTORY: Substance Use (street drugs): ( x ) never used  (  ) other:    FAMILY HISTORY:  Family history of parotid cancer (Child)          PHYSICAL EXAM:  T(C): 36.6 (07-06-23 @ 08:15), Max: 36.7 (07-05-23 @ 15:40)  HR: 115 (07-06-23 @ 08:15) (113 - 118)  BP: 98/61 (07-06-23 @ 08:15) (98/61 - 127/76)  RR: 18 (07-06-23 @ 08:15) (16 - 18)  SpO2: 97% (07-06-23 @ 08:15) (97% - 100%)  Wt(kg): --  I&O's Summary    05 Jul 2023 07:01  -  06 Jul 2023 07:00  --------------------------------------------------------  IN: 0 mL / OUT: 16 mL / NET: -16 mL          GENERAL: NAD  EYES: conjunctiva and sclera clear  ENMT: No tonsillar erythema, exudates, or enlargement  Cardiovascular: Normal S1 S2, No JVD, No murmurs, No edema  Respiratory: Lungs diminished to auscultation	  Gastrointestinal:  Soft, Non-tender, + BS	  Extremities: No edema                                     8.4    15.51 )-----------( 67       ( 06 Jul 2023 06:04 )             25.5     07-06    134<L>  |  95<L>  |  28<H>  ----------------------------<  110<H>  4.0   |  27  |  1.89<H>    Ca    8.5      06 Jul 2023 06:04  Phos  3.6     07-06  Mg     1.80     07-06    TPro  7.1  /  Alb  3.7  /  TBili  1.0  /  DBili  x   /  AST  103<H>  /  ALT  31  /  AlkPhos  91  07-06    proBNP:   Lipid Profile:   HgA1c:   TSH:     Consultant(s) Notes Reviewed:  [x ] YES  [ ] NO    Care Discussed with Consultants/Other Providers [ x] YES  [ ] NO    Imaging Personally Reviewed independently:  [x] YES  [ ] NO    All labs, radiologic studies, vitals, orders and medications list reviewed. Patient is seen and examined at bedside. Case discussed with medical team.

## 2023-07-06 NOTE — CHART NOTE - NSCHARTNOTEFT_GEN_A_CORE
RRT called for patient while at ultrasound for renal ultrasound due to lethargy. Patient lethargic, not alert. BP 99/56, . Repeat /76, SPO2 100% on 4L NC. Patient noted with heart rate of 160s. S/p vagal maneuvers, heart rate in 110s, appears to be multifocal atrial tachycardia. Neurologic exam non-focal.    Patient with WBC of 15 and Cr of 1.89 on AM labs. Pending urine studies, renal ultrasound. Repeat CBC, CMP, and VBG pending. Infectious work up ordered.    Spoke w/ patient's daughter/secondary HCP, Stephanie, and patien'ts granddaughter, Stephanie, and updated with current plan of care. All other medical questions were answered to the best of my ability and teach-back offered with demonstrated understanding. At this time, patient's family wishes for her to remain full code.    Medicine attending made aware. RRT called for patient while at ultrasound for renal ultrasound due to lethargy. Patient lethargic, not alert on provider arrival. BP 99/56, . Repeat /76, SPO2 100% on 4L NC. Patient noted with heart rate of 160s. S/p vagal maneuvers, heart rate in 110s, appears to be multifocal atrial tachycardia. Of note, patient did not receive PO diltiazem as currently NPO pending swallow evaluation. IV diltiazem ordered. Neurologic exam non-focal. Mental status improved to baseline prior to completion of rapid response.    Patient with WBC of 15 and Cr of 1.89 on AM labs. Pending urine studies, renal ultrasound. Repeat CBC, CMP, and VBG pending. Infectious work up ordered.    Spoke w/ patient's daughter/secondary HCP, Stephanie, and patien'ts granddaughter, Stephanie, and updated with current plan of care. All other medical questions were answered to the best of my ability and teach-back offered with demonstrated understanding. At this time, patient's family wishes for her to remain full code.    Medicine attending made aware.

## 2023-07-07 NOTE — PROGRESS NOTE ADULT - PROBLEM SELECTOR PLAN 11
- C/w atorvastatin 80 mg qHS (interchange for rosuvastatin 20 mg)  - Resume Zetia 10 mg daily upon discharge
- C/w levothyroxine 75 mcg daily
- C/w atorvastatin 80 mg qHS (interchange for rosuvastatin 20 mg)  - Resume Zetia 10 mg daily upon discharge
- C/w levothyroxine 75 mcg daily
- C/w atorvastatin 80 mg qHS (interchange for rosuvastatin 20 mg)  - Resume Zetia 10 mg daily upon discharge
- C/w levothyroxine 75 mcg daily
- C/w atorvastatin 80 mg qHS (interchange for rosuvastatin 20 mg)  - Resume Zetia 10 mg daily upon discharge
- C/w levothyroxine 75 mcg daily
- C/w levothyroxine 75 mcg daily

## 2023-07-07 NOTE — SWALLOW BEDSIDE ASSESSMENT ADULT - COMMENTS
Per Internal Medicine 7/6, "87 yo woman with history of stage 4 serous endometrial carcinoma (dx 5/2022) s/p chemo (last in 12/2022) and debulking surgery with POOL/BSO (2/10/23) c/b b/l pleural effusions (R > L, on 2L NC PRN), RUL segmental PE (8/2022, on Eliquis), GI bleed (4/2023), CAD s/p stent (3/2021), aortic stenosis s/p TAVR, HTN, HLD, and hypothyroidism presents with 3 weeks of worsening generalized weakness."     RRT 7/6: RRT called in the US dept while pt was getting a renal US for worsening [place] for AMS. Pt was lethargic with BP 99/56, . Pt noted to be tachycardic to 160's - noted to be SVT on the zolle... Neurologic exam non-focal. Rectal temp showed that pt was afebrile... Pt noted to have improvement in mental status as the RRT was progressing and family was in the room. GOC revisited with family at bedside, stated she was full code. Pt was protecting airway with multiple repeat vitals stable including HR: 110-120, BP:110/60...Pt had a CT head a few days ago without evidence of metastatic disease or acute hemorrhage. Repeat EKG showed sinus tach. Differentials in this patient remains broad including an infectious etiology with tachycardia and elevated WBC (although can be elevated due to multitude of reasons in this sick patient), nonconvulsive seizure, MAHA given worsening thrombocytopenia, worsening metastatic disease, metabolic derangements including uremia vs hyperammonemia, hypercarbia vs TIA vs central pathology"     CXR 7/6: "IMPRESSION: Large layering right pleural effusion..."    Of Note: Patient known to this service, seen for a bedside swallow evaluation with recommendations for Puree with Thin Liquids (see report for details). RN indicating patient made NPO due to RRT called 7/6 due to lethargy.     Patient received upright awake/ alert, able to follow simple commands and make basic wants/ needs known. Patient noted with O2 via NC and daughter present at bedside. Patient denied pain and was left as received NAD.

## 2023-07-07 NOTE — PROGRESS NOTE ADULT - PROBLEM SELECTOR PLAN 8
Pt diagnosed with RUL segmental PE in Aug 2022, now with possible new PE while on Eliquis 2.5 mg BID.  - Will hold Eliquis for now given possibility of thoracentesis, chest tube placement, and other procedures during admission. Will start heparin gtt for AC instead (weight based on previous admission in May, will need to obtain actual weight).
S/p stent in 3/2021. On ASA and statin.   - C/w ASA 81 mg daily  - C/w atorvastatin 80 mg qHS (interchange for rosuvastatin 20 mg)  - Resume Zetia 10 mg daily upon discharge
Pt diagnosed with RUL segmental PE in Aug 2022, now with possible new PE while on Eliquis 2.5 mg BID.  - Will hold Eliquis for now given possibility of thoracentesis, chest tube placement, and other procedures during admission. Will start heparin gtt for AC instead (weight based on previous admission in May, will need to obtain actual weight).
S/p stent in 3/2021. On ASA and statin.   - C/w ASA 81 mg daily  - C/w atorvastatin 80 mg qHS (interchange for rosuvastatin 20 mg)  - Resume Zetia 10 mg daily upon discharge
Pt diagnosed with RUL segmental PE in Aug 2022, now with possible new PE while on Eliquis 2.5 mg BID.  - Will hold Eliquis for now given possibility of thoracentesis, chest tube placement, and other procedures during admission. Will start heparin gtt for AC instead (weight based on previous admission in May, will need to obtain actual weight).
Pt diagnosed with RUL segmental PE in Aug 2022, now with possible new PE while on Eliquis 2.5 mg BID.  - Will hold Eliquis for now given possibility of thoracentesis, chest tube placement, and other procedures during admission. Will start heparin gtt for AC instead (weight based on previous admission in May, will need to obtain actual weight).
S/p stent in 3/2021. On ASA and statin.   - C/w ASA 81 mg daily  - C/w atorvastatin 80 mg qHS (interchange for rosuvastatin 20 mg)  - Resume Zetia 10 mg daily upon discharge

## 2023-07-07 NOTE — PROVIDER CONTACT NOTE (OTHER) - ACTION/TREATMENT ORDERED:
EKG ordered and completed as ordered. Repeat BMP ordered. Lokelma 10 mg ordered and administered. ACP to see patient at bedside.
patient and family educated to keep patient NPO until speech and swallow are able to assess.  aspiration precautions in place.
As per cardiology recommendations to ACP, 6 mg adenosine IVP ordered. RRT called for help with administration of adenosine. See rapid response flowsheet for additional information.
IWONA Leos called and assessed/interviewed pt in presence of SW, ACP B, Bonds, management and daughter, Yanni, at bedside. Investigation initiated, 2 RNs present with medical care, female staff.

## 2023-07-07 NOTE — PROVIDER CONTACT NOTE (OTHER) - RECOMMENDATIONS
NPO, speech and swallow
10 mg of IVP Cardizem administered with temporary relief for seven minutes before patient noted to be back in SVT to the 160s.
Continue to monitor patient on telemetry. Monitor for s/s of chest pain, SOB or discomfort.
escalate accusation, ensure patient feels safe in the hospital

## 2023-07-07 NOTE — PROGRESS NOTE ADULT - PROBLEM SELECTOR PLAN 9
BPs in acceptable range on admission. Pt currently on PO Lasix 20 mg BID. Pt previously on atenolol, which was discontinued by pt's PCP recently due to relative hypotension.  - Will hold PO Lasix for now given [place]  - Continue to hold atenolol  - Monitor VS q4hrs
S/p stent in 3/2021. On ASA and statin.   - C/w ASA 81 mg daily  - C/w atorvastatin 80 mg qHS (interchange for rosuvastatin 20 mg)  - Resume Zetia 10 mg daily upon discharge
BPs in acceptable range on admission. Pt currently on PO Lasix 20 mg BID. Pt previously on atenolol, which was discontinued by pt's PCP recently due to relative hypotension.  - Will hold PO Lasix for now given [place]  - Continue to hold atenolol  - Monitor VS q4hrs
BPs in acceptable range on admission. Pt currently on PO Lasix 20 mg BID. Pt previously on atenolol, which was discontinued by pt's PCP recently due to relative hypotension.  - Will hold PO Lasix for now given [place]  - Continue to hold atenolol  - Monitor VS q4hrs
S/p stent in 3/2021. On ASA and statin.   - C/w ASA 81 mg daily  - C/w atorvastatin 80 mg qHS (interchange for rosuvastatin 20 mg)    SVT - cards f/u
S/p stent in 3/2021. On ASA and statin.   - C/w ASA 81 mg daily  - C/w atorvastatin 80 mg qHS (interchange for rosuvastatin 20 mg)  - Resume Zetia 10 mg daily upon discharge
BPs in acceptable range on admission. Pt currently on PO Lasix 20 mg BID. Pt previously on atenolol, which was discontinued by pt's PCP recently due to relative hypotension.  - Will hold PO Lasix for now given [place]  - Continue to hold atenolol  - Monitor VS q4hrs
BPs in acceptable range on admission. Pt currently on PO Lasix 20 mg BID. Pt previously on atenolol, which was discontinued by pt's PCP recently due to relative hypotension.  - Will hold PO Lasix for now given [place]  - Continue to hold atenolol  - Monitor VS q4hrs
S/p stent in 3/2021. On ASA and statin.   - C/w ASA 81 mg daily  - C/w atorvastatin 80 mg qHS (interchange for rosuvastatin 20 mg)  - Resume Zetia 10 mg daily upon discharge
BPs in acceptable range on admission. Pt currently on PO Lasix 20 mg BID. Pt previously on atenolol, which was discontinued by pt's PCP recently due to relative hypotension.  - Will hold PO Lasix for now given [place]  - Continue to hold atenolol  - Monitor VS q4hrs

## 2023-07-07 NOTE — PROGRESS NOTE ADULT - PROBLEM SELECTOR PROBLEM 7
Pulmonary embolism
Endometrial cancer
Endometrial cancer
Palliative care encounter
Pulmonary embolism
Pulmonary embolism
Palliative care encounter
Endometrial cancer
Pulmonary embolism
Endometrial cancer
Pulmonary embolism

## 2023-07-07 NOTE — PROGRESS NOTE ADULT - PROBLEM SELECTOR PLAN 3
Serum Cr 1.38 on admission. Per pt and her daughter, serum Cr was 1.9 on Friday. [place] now resolving. Likely prerenal from poor PO intake vs. third spacing given hypoalbuminemia.   -  renal f/u
Serum Cr 1.38 on admission. Per pt and her daughter, serum Cr was 1.9 on Friday. [place] now resolving. Likely prerenal from poor PO intake vs. third spacing given hypoalbuminemia.   --Resolving  -renal f/u noted
Pt with ~2 weeks of shortness of breath with minimal exertion, now requiring supplemental O2 with 2L NC. Suspect 2/2 moderate-to-large R pleural effusion, likely malignant, and worsening anemia, though CHF and PE also on the differential.  - Pulm and/or thoracic surgery to be called in AM for possible diagnostic +/- therapeutic thoracentesis. Pt might also require chest tube placement during admission given recurrence of R pleural effusion.   - < from: CT Abdomen and Pelvis No Cont (06.26.23 @ 08:40) >    Hemorrhage fluid level at the posterior aspect of the spleen and likely   at the posterior aspect of the liver with evaluation limited on this   noncontrast study; correlation is recommended with hematocrit levels for   active bleeding.    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    Multiple bilobar liver lesions measuring up to 4.9 x 3.7 cm in the right   hepatic lobe suspicious for metastases.
Serum Cr 1.38 on admission. Per pt and her daughter, serum Cr was 1.9 on Friday. [place] now resolving. Likely prerenal from poor PO intake vs. third spacing given hypoalbuminemia.   --Resolving  -renal f/u noted
Likely multifactorial pre-renal + CT abdomen showing mild hydro ?2/2 malignancy  Nephrology following.
Pt with ~2 weeks of shortness of breath with minimal exertion, now requiring supplemental O2 with 2L NC. Suspect 2/2 moderate-to-large R pleural effusion, likely malignant, and worsening anemia, though CHF and PE also on the differential.  - Pulm and/or thoracic surgery to be called in AM for possible diagnostic +/- therapeutic thoracentesis. Pt might also require chest tube placement during admission given recurrence of R pleural effusion.   - < from: CT Abdomen and Pelvis No Cont (06.26.23 @ 08:40) >    Hemorrhage fluid level at the posterior aspect of the spleen and likely   at the posterior aspect of the liver with evaluation limited on this   noncontrast study; correlation is recommended with hematocrit levels for   active bleeding.    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    Multiple bilobar liver lesions measuring up to 4.9 x 3.7 cm in the right   hepatic lobe suspicious for metastases.
Serum Cr 1.38 on admission. Per pt and her daughter, serum Cr was 1.9 on Friday. [place] now resolving. Likely prerenal from poor PO intake vs. third spacing given hypoalbuminemia.   -  renal eval
Serum Cr 1.38 on admission. Per pt and her daughter, serum Cr was 1.9 on Friday. [place] now resolving. Likely prerenal from poor PO intake vs. third spacing given hypoalbuminemia.   -  renal f/u
Serum Cr 1.38 on admission. Per pt and her daughter, serum Cr was 1.9 on Friday. [place] now resolving. Likely prerenal from poor PO intake vs. third spacing given hypoalbuminemia.   --Resolving  -renal f/u noted
Serum Cr 1.38 on admission. Per pt and her daughter, serum Cr was 1.9 on Friday. [place] now resolving. Likely prerenal from poor PO intake vs. third spacing given hypoalbuminemia.   -  - Check bladder scan to r/o urinary retention, as pt with episodes of urinary incontinence 2/2 debulking surgery  - S/p 1L bolus of NS in ED. F/u AM CMP.  - Monitor serum Cr and urine output  - Avoid NSAIDs and nephrotoxic agents  - Renally dose meds
Serum Cr 1.38 on admission. Per pt and her daughter, serum Cr was 1.9 on Friday. [place] now resolving. Likely prerenal from poor PO intake vs. third spacing given hypoalbuminemia.   -  renal f/u
Serum Cr 1.38 on admission. Per pt and her daughter, serum Cr was 1.9 on Friday. [place] now resolving. Likely prerenal from poor PO intake vs. third spacing given hypoalbuminemia.   -  - Check bladder scan to r/o urinary retention, as pt with episodes of urinary incontinence 2/2 debulking surgery  - S/p 1L bolus of NS in ED. F/u AM CMP.  - Monitor serum Cr and urine output  - Avoid NSAIDs and nephrotoxic agents  - Renally dose meds
Serum Cr 1.38 on admission. Per pt and her daughter, serum Cr was 1.9 on Friday. [place] now resolving. Likely prerenal from poor PO intake vs. third spacing given hypoalbuminemia.   -  renal f/u
Likely multifactorial pre-renal + CT abdomen showing mild hydro ?2/2 malignancy  Nephrology following.
Pt with ~2 weeks of shortness of breath with minimal exertion, now requiring supplemental O2 with 2L NC. Suspect 2/2 moderate-to-large R pleural effusion, likely malignant, and worsening anemia, though CHF and PE also on the differential.  - Pulm and/or thoracic surgery to be called in AM for possible diagnostic +/- therapeutic thoracentesis. Pt might also require chest tube placement during admission given recurrence of R pleural effusion.   - < from: CT Abdomen and Pelvis No Cont (06.26.23 @ 08:40) >    Hemorrhage fluid level at the posterior aspect of the spleen and likely   at the posterior aspect of the liver with evaluation limited on this   noncontrast study; correlation is recommended with hematocrit levels for   active bleeding.    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    Multiple bilobar liver lesions measuring up to 4.9 x 3.7 cm in the right   hepatic lobe suspicious for metastases.
Serum Cr 1.38 on admission. Per pt and her daughter, serum Cr was 1.9 on Friday. [place] now resolving. Likely prerenal from poor PO intake vs. third spacing given hypoalbuminemia.   -  renal eval
Serum Cr 1.38 on admission. Per pt and her daughter, serum Cr was 1.9 on Friday. [place] now resolving. Likely prerenal from poor PO intake vs. third spacing given hypoalbuminemia.   -  renal f/u
Pt with ~2 weeks of shortness of breath with minimal exertion, now requiring supplemental O2 with 2L NC. Suspect 2/2 moderate-to-large R pleural effusion, likely malignant, and worsening anemia, though CHF and PE also on the differential.  - Pulm and/or thoracic surgery to be called in AM for possible diagnostic +/- therapeutic thoracentesis. Pt might also require chest tube placement during admission given recurrence of R pleural effusion.   - < from: CT Abdomen and Pelvis No Cont (06.26.23 @ 08:40) >    Hemorrhage fluid level at the posterior aspect of the spleen and likely   at the posterior aspect of the liver with evaluation limited on this   noncontrast study; correlation is recommended with hematocrit levels for   active bleeding.    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    Multiple bilobar liver lesions measuring up to 4.9 x 3.7 cm in the right   hepatic lobe suspicious for metastases.
Serum Cr 1.38 on admission. Per pt and her daughter, serum Cr was 1.9 on Friday. [place] now resolving. Likely prerenal from poor PO intake vs. third spacing given hypoalbuminemia.   -  - Check bladder scan to r/o urinary retention, as pt with episodes of urinary incontinence 2/2 debulking surgery  - S/p 1L bolus of NS in ED. F/u AM CMP.  - Monitor serum Cr and urine output  - Avoid NSAIDs and nephrotoxic agents  - Renally dose meds

## 2023-07-07 NOTE — PROGRESS NOTE ADULT - PROBLEM SELECTOR PLAN 10
- C/w atorvastatin 80 mg qHS (interchange for rosuvastatin 20 mg)  - Resume Zetia 10 mg daily upon discharge
- C/w atorvastatin 80 mg qHS (interchange for rosuvastatin 20 mg)  - Resume Zetia 10 mg daily upon discharge
BPs in acceptable range on admission. Pt currently on PO Lasix 20 mg BID. Pt previously on atenolol, which was discontinued by pt's PCP recently due to relative hypotension.  - Will hold PO Lasix for now given [place]  - Continue to hold atenolol
- C/w atorvastatin 80 mg qHS (interchange for rosuvastatin 20 mg)  - Resume Zetia 10 mg daily upon discharge
BPs in acceptable range on admission. Pt currently on PO Lasix 20 mg BID. Pt previously on atenolol, which was discontinued by pt's PCP recently due to relative hypotension.  - Will hold PO Lasix for now given [place]  - Continue to hold atenolol
- C/w atorvastatin 80 mg qHS (interchange for rosuvastatin 20 mg)  - Resume Zetia 10 mg daily upon discharge
BPs in acceptable range on admission. Pt currently on PO Lasix 20 mg BID. Pt previously on atenolol, which was discontinued by pt's PCP recently due to relative hypotension.  - Will hold PO Lasix for now given [place]  - Continue to hold atenolol
- C/w atorvastatin 80 mg qHS (interchange for rosuvastatin 20 mg)  - Resume Zetia 10 mg daily upon discharge
BPs in acceptable range on admission. Pt currently on PO Lasix 20 mg BID. Pt previously on atenolol, which was discontinued by pt's PCP recently due to relative hypotension.  - Will hold PO Lasix for now given [place]  - Continue to hold atenolol
- C/w atorvastatin 80 mg qHS (interchange for rosuvastatin 20 mg)  - Resume Zetia 10 mg daily upon discharge

## 2023-07-07 NOTE — PROVIDER CONTACT NOTE (OTHER) - BACKGROUND
88 year old female admitted with weakness. PMH endometrail Ca, CAD and HTN. patient has had had intermittent episodes of AMS and lethargy the past few days and RRT have been called.
pmhx of serous endometrial carcinoma presenting with anemia, weakness and unilateral pleural effusion.
88 year old female admitted with weakness. PMH of endometrail cncer, CAD and HTN.
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

## 2023-07-07 NOTE — SWALLOW BEDSIDE ASSESSMENT ADULT - ADDITIONAL RECOMMENDATIONS
This department to follow up as schedule permits to assess for diet tolerance. Medical team further advised to reconsult if there is a change in medical status and/or observed change in patient's tolerance of recommended PO diet.
This service to follow up to ensure tolerance of recommended PO as schedule permits. Medical team further advised to reconsult this service if patient is with a change in medical status or change in tolerance of recommended PO.

## 2023-07-07 NOTE — PROGRESS NOTE ADULT - PROBLEM SELECTOR PROBLEM 9
Hypertension
CAD (coronary artery disease)
Hypertension
CAD (coronary artery disease)
Hypertension
CAD (coronary artery disease)
Hypertension
CAD (coronary artery disease)
Hypertension

## 2023-07-07 NOTE — PROGRESS NOTE ADULT - PROBLEM SELECTOR PROBLEM 12
Need for prophylactic measure
Hypothyroidism
Need for prophylactic measure
Hypothyroidism
Need for prophylactic measure
Hypothyroidism
Need for prophylactic measure
Hypothyroidism
Need for prophylactic measure

## 2023-07-07 NOTE — PROGRESS NOTE ADULT - PROBLEM SELECTOR PROBLEM 2
Shortness of breath
Acute respiratory failure with hypoxia
Shortness of breath
Acute respiratory failure with hypoxia
Shortness of breath
Generalized weakness
Shortness of breath
Generalized weakness
Shortness of breath
Generalized weakness
Generalized weakness
Shortness of breath

## 2023-07-07 NOTE — CHART NOTE - NSCHARTNOTEFT_GEN_A_CORE
Provider contacted by RN as patient back in SVT sustaining rates of 160-170. Per discussion with cardiology attending, Adenosine 6mg x1 dose ordered STAT. Provider contacted by RN as patient back in SVT sustaining rates of 160-170. Per discussion with cardiology attending, Adenosine 6mg x1 dose ordered STAT.    RRT called for assistance with administration of adenosine. Adenosine administered with improvement in rate to baseline rate, 105bpm, multifocal atrial tachycardia.     Per cardiology attending, PO diltiazem dose increased to 60mg.    Medicine attending made aware.

## 2023-07-07 NOTE — RAPID RESPONSE TEAM SUMMARY - NSSITUATIONBACKGROUNDRRT_GEN_ALL_CORE
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

## 2023-07-07 NOTE — PROGRESS NOTE ADULT - PROBLEM SELECTOR PROBLEM 8
CAD (coronary artery disease)
Pulmonary embolism
CAD (coronary artery disease)
Pulmonary embolism
CAD (coronary artery disease)
CAD (coronary artery disease)
Pulmonary embolism
CAD (coronary artery disease)
Pulmonary embolism
CAD (coronary artery disease)

## 2023-07-07 NOTE — PROGRESS NOTE ADULT - SUBJECTIVE AND OBJECTIVE BOX
Rony Mcgarry MD  Interventional Cardiology / Endovascular Specialist  [place] Office : [phone] [place] [address] [place] [address]  Tel:   [place] Office : [phone] [facility] [place] [address]  Tel: [phone]    Subjective/Overnight events: lying in bed in NAD, episodes of SVT's   	  MEDICATIONS:  diltiazem    Tablet 60 milliGRAM(s) Oral every 6 hours  diltiazem Infusion 10 mG/Hr IV Continuous <Continuous>    piperacillin/tazobactam IVPB.. 3.375 Gram(s) IV Intermittent every 12 hours    albuterol    90 MICROgram(s) HFA Inhaler 2 Puff(s) Inhalation every 6 hours PRN  loratadine 10 milliGRAM(s) Oral daily    acetaminophen     Tablet .. 650 milliGRAM(s) Oral every 6 hours PRN  HYDROmorphone  Injectable 0.2 milliGRAM(s) IV Push every 4 hours PRN  melatonin 3 milliGRAM(s) Oral at bedtime PRN  oxyCODONE    IR 5 milliGRAM(s) Oral every 4 hours PRN    lactulose Syrup 10 Gram(s) Oral daily PRN  misoprostol 200 MICROGram(s) Oral <User Schedule>  pantoprazole    Tablet 40 milliGRAM(s) Oral before breakfast  polyethylene glycol 3350 17 Gram(s) Oral daily PRN  senna 2 Tablet(s) Oral at bedtime PRN    atorvastatin 80 milliGRAM(s) Oral at bedtime  levothyroxine Injectable 56 MICROGram(s) IV Push at bedtime    albumin human 25% IVPB 100 milliLiter(s) IV Intermittent every 6 hours  cholecalciferol 2000 Unit(s) Oral daily  cyanocobalamin 1000 MICROGram(s) Oral daily  multivitamin 1 Tablet(s) Oral daily  mupirocin 2% Ointment 1 Application(s) Topical <User Schedule>  potassium chloride  10 mEq/100 mL IVPB 10 milliEquivalent(s) IV Intermittent every 1 hour      PAST MEDICAL/SURGICAL HISTORY  PAST MEDICAL & SURGICAL HISTORY:  Endometrial ca  S4 serous endometrial carcinoma, [facility], chemotherapy      HTN (hypertension)      CAD (coronary artery disease)      S/P AVR      Hypothyroidism      HLD (hyperlipidemia)      S/P AVR (aortic valve replacement)      History of endometrial biopsy          SOCIAL HISTORY: Substance Use (street drugs): ( x ) never used  (  ) other:    FAMILY HISTORY:  Family history of parotid cancer (Child)        REVIEW OF SYSTEMS:  CONSTITUTIONAL: No fever, weight loss, or fatigue  EYES: No eye pain, visual disturbances, or discharge  ENMT:  No difficulty hearing, tinnitus, vertigo; No sinus or throat pain  BREASTS: No pain, masses, or nipple discharge  GASTROINTESTINAL: No abdominal or epigastric pain. No nausea, vomiting, or hematemesis; No diarrhea or constipation. No melena or hematochezia.  GENITOURINARY: No dysuria, frequency, hematuria, or incontinence  NEUROLOGICAL: No headaches, memory loss, loss of strength, numbness, or tremors  ENDOCRINE: No heat or cold intolerance; No hair loss  MUSCULOSKELETAL: No joint pain or swelling; No muscle, back, or extremity pain  PSYCHIATRIC: No depression, anxiety, mood swings, or difficulty sleeping  HEME/LYMPH: No easy bruising, or bleeding gums  All others negative    PHYSICAL EXAM:  T(C): 37 (07-07-23 @ 11:42), Max: 37 (07-07-23 @ 11:42)  HR: 101 (07-07-23 @ 18:10) (101 - 174)  BP: 116/56 (07-07-23 @ 18:10) (102/52 - 139/83)  RR: 18 (07-07-23 @ 16:00) (16 - 18)  SpO2: 100% (07-07-23 @ 16:00) (99% - 100%)  Wt(kg): --  I&O's Summary    06 Jul 2023 07:01  -  07 Jul 2023 07:00  --------------------------------------------------------  IN: 0 mL / OUT: 5 mL / NET: -5 mL    07 Jul 2023 07:01  -  08 Jul 2023 01:03  --------------------------------------------------------  IN: 0 mL / OUT: 200 mL / NET: -200 mL        GENERAL: NAD  EYES: conjunctiva and sclera clear  ENMT: No tonsillar erythema, exudates, or enlargement  Cardiovascular: Normal S1 S2, No JVD, No murmurs, No edema  Respiratory: Lungs diminished to auscultation	  Gastrointestinal:  Soft, Non-tender, + BS	  Extremities: No edema                             7.8    14.49 )-----------( 61       ( 07 Jul 2023 20:08 )             22.0     07-07    137  |  96<L>  |  32<H>  ----------------------------<  113<H>  3.9   |  25  |  1.92<H>    Ca    8.6      07 Jul 2023 20:08  Phos  3.1     07-07  Mg     1.80     07-07    TPro  7.0  /  Alb  3.8  /  TBili  1.3<H>  /  DBili  x   /  AST  98<H>  /  ALT  30  /  AlkPhos  77  07-07    proBNP:   Lipid Profile:   HgA1c:   TSH: Thyroid Stimulating Hormone, Serum: 14.92 uIU/mL (07-07 @ 04:54)      Consultant(s) Notes Reviewed:  [x ] YES  [ ] NO    Care Discussed with Consultants/Other Providers [ x] YES  [ ] NO    Imaging Personally Reviewed independently:  [x] YES  [ ] NO    All labs, radiologic studies, vitals, orders and medications list reviewed. Patient is seen and examined at bedside. Case discussed with medical team.

## 2023-07-07 NOTE — CHART NOTE - NSCHARTNOTEFT_GEN_A_CORE
Provider notified by tele tech that patient with heart rate sustaining in 160-170 range. On examination of tele strip, rhythm appears to be SVT. Patient's baseline heart rate is 110-120s, multifocal atrial tachycardia. Patient currently on diltiazem 30mg q6h, last dose at 4pm.     On exam, patient reports feeling short of breath and weak. /70, afebrile.    Attempted vagal maneuvers and carotid massage, without improvement in rate. EKG obtained shows SVT with rate of 160. Provider discussed with cardiology attending who recommends IV diltiazem 10mg x1 dose.     Rate improved to 90-100s s/p diltiazem. Vitals stable. Will continue to monitor.

## 2023-07-07 NOTE — CHART NOTE - NSCHARTNOTEFT_GEN_A_CORE
notified by rn patient heart rate 160s. This is the second episode within the hour on first episode patient responded to 6mg adenosine IVP. on second episode patient responded well to 12mg adenosine IVP. d/w cardiology recommended 30mg PO Cardizem x1 STAT. c/w 60mg Cardizem PO.     -recs appreciated 30 mg PO cardizem x1 STAT ordered   -CTM     Derek Beth PA-C  Department of Internal Medicine  In-House beeper number [id number]

## 2023-07-07 NOTE — PROGRESS NOTE ADULT - PROBLEM SELECTOR PLAN 4
Serum Cr 1.38 on admission. Per pt and her daughter, serum Cr was 1.9 on Friday. [place] now resolving. Likely prerenal from poor PO intake vs. third spacing given hypoalbuminemia.   --Resolving  -renal f/u noted
Serum Cr 1.38 on admission. Per pt and her daughter, serum Cr was 1.9 on Friday. [place] now resolving. Likely prerenal from poor PO intake vs. third spacing given hypoalbuminemia.   --Resolving  -renal f/u noted
WBC mildly elevated to 11.13. Pt afebrile with no localizing S/S of infection. Possibly in setting of malignancy, though can also be from hemoconcentration or reactive 2/2 moderate-to-large R pleural effusion.  - Procalcitonin checked overnight and only mildly elevated to 0.22. Pt afebrile. Will monitor off antibiotics for now.  - < from: CT Angio Chest PE Protocol w/ IV Cont (06.21.23 @ 06:38) >    Suboptimal contrast bolus. Within these constraints, no main, right main,   left main, or lobar pulmonary embolism. Limited evaluation of segmental   and subsegmental pulmonary arteries secondary to suboptimal contrast   bolus, motion and mixing artifact.    Large right and trace left pleural effusions, increased on the rightand   decrease on the left when compared to prior exam.    < end of copied text >    thoracic sx eval
Serum Cr 1.38 on admission. Per pt and her daughter, serum Cr was 1.9 on Friday. [place] now resolving. Likely prerenal from poor PO intake vs. third spacing given hypoalbuminemia.   --Resolving  -renal f/u noted
WBC mildly elevated to 11.13. Pt afebrile with no localizing S/S of infection. Possibly in setting of malignancy, though can also be from hemoconcentration or reactive 2/2 moderate-to-large R pleural effusion.  - Procalcitonin checked overnight and only mildly elevated to 0.22. Pt afebrile. Will monitor off antibiotics for now.  - < from: CT Angio Chest PE Protocol w/ IV Cont (06.21.23 @ 06:38) >    Suboptimal contrast bolus. Within these constraints, no main, right main,   left main, or lobar pulmonary embolism. Limited evaluation of segmental   and subsegmental pulmonary arteries secondary to suboptimal contrast   bolus, motion and mixing artifact.    Large right and trace left pleural effusions, increased on the rightand   decrease on the left when compared to prior exam.    < end of copied text >    thoracic sx eval
Likely multifactorial acute medical illness + malignancy  We'll await for medical stabilization of her acute medical conditions and if lack of appetite persists despite this then we'll discuss options to address lack of appetite.
WBC mildly elevated to 11.13. Pt afebrile with no localizing S/S of infection. Possibly in setting of malignancy, though can also be from hemoconcentration or reactive 2/2 moderate-to-large R pleural effusion.  - Procalcitonin checked overnight and only mildly elevated to 0.22. Pt afebrile. Will monitor off antibiotics for now.  - < from: CT Angio Chest PE Protocol w/ IV Cont (06.21.23 @ 06:38) >    Suboptimal contrast bolus. Within these constraints, no main, right main,   left main, or lobar pulmonary embolism. Limited evaluation of segmental   and subsegmental pulmonary arteries secondary to suboptimal contrast   bolus, motion and mixing artifact.    Large right and trace left pleural effusions, increased on the rightand   decrease on the left when compared to prior exam.    < end of copied text >    thoracic sx eval
WBC mildly elevated to 11.13. Pt afebrile with no localizing S/S of infection. Possibly in setting of malignancy, though can also be from hemoconcentration or reactive 2/2 moderate-to-large R pleural effusion.  - Procalcitonin checked overnight and only mildly elevated to 0.22. Pt afebrile. Will monitor off antibiotics for now.  - < from: CT Angio Chest PE Protocol w/ IV Cont (06.21.23 @ 06:38) >    Suboptimal contrast bolus. Within these constraints, no main, right main,   left main, or lobar pulmonary embolism. Limited evaluation of segmental   and subsegmental pulmonary arteries secondary to suboptimal contrast   bolus, motion and mixing artifact.    Large right and trace left pleural effusions, increased on the rightand   decrease on the left when compared to prior exam.    < end of copied text >    thoracic sx eval
Likely multifactorial acute medical illness + malignancy
WBC mildly elevated to 11.13. Pt afebrile with no localizing S/S of infection. Possibly in setting of malignancy, though can also be from hemoconcentration or reactive 2/2 moderate-to-large R pleural effusion.  - Procalcitonin checked overnight and only mildly elevated to 0.22. Pt afebrile. Will monitor off antibiotics for now.  - < from: CT Angio Chest PE Protocol w/ IV Cont (06.21.23 @ 06:38) >    Suboptimal contrast bolus. Within these constraints, no main, right main,   left main, or lobar pulmonary embolism. Limited evaluation of segmental   and subsegmental pulmonary arteries secondary to suboptimal contrast   bolus, motion and mixing artifact.    Large right and trace left pleural effusions, increased on the rightand   decrease on the left when compared to prior exam.    < end of copied text >    thoracic sx eval  -c/w zosyn
WBC mildly elevated to 11.13. Pt afebrile with no localizing S/S of infection. Possibly in setting of malignancy, though can also be from hemoconcentration or reactive 2/2 moderate-to-large R pleural effusion.  - Procalcitonin checked overnight and only mildly elevated to 0.22. Pt afebrile. Will monitor off antibiotics for now.  - < from: CT Angio Chest PE Protocol w/ IV Cont (06.21.23 @ 06:38) >    Suboptimal contrast bolus. Within these constraints, no main, right main,   left main, or lobar pulmonary embolism. Limited evaluation of segmental   and subsegmental pulmonary arteries secondary to suboptimal contrast   bolus, motion and mixing artifact.    Large right and trace left pleural effusions, increased on the rightand   decrease on the left when compared to prior exam.    < end of copied text >    thoracic sx eval
Serum Cr 1.38 on admission. Per pt and her daughter, serum Cr was 1.9 on Friday. [place] now resolving. Likely prerenal from poor PO intake vs. third spacing given hypoalbuminemia.   --Resolving  -renal f/u noted
WBC mildly elevated to 11.13. Pt afebrile with no localizing S/S of infection. Possibly in setting of malignancy, though can also be from hemoconcentration or reactive 2/2 moderate-to-large R pleural effusion.  - Procalcitonin checked overnight and only mildly elevated to 0.22. Pt afebrile. Will monitor off antibiotics for now.  - < from: CT Angio Chest PE Protocol w/ IV Cont (06.21.23 @ 06:38) >    Suboptimal contrast bolus. Within these constraints, no main, right main,   left main, or lobar pulmonary embolism. Limited evaluation of segmental   and subsegmental pulmonary arteries secondary to suboptimal contrast   bolus, motion and mixing artifact.    Large right and trace left pleural effusions, increased on the rightand   decrease on the left when compared to prior exam.    < end of copied text >    thoracic sx eval

## 2023-07-07 NOTE — PROGRESS NOTE ADULT - SUBJECTIVE AND OBJECTIVE BOX
Patient is a 88y old  Female who presents with a chief complaint of Generalized weakness (07 Jul 2023 )    Patient seen and examined  pt had RRT for svt    MEDICATIONS  (STANDING):  albumin human 25% IVPB 100 milliLiter(s) IV Intermittent every 6 hours  atorvastatin 80 milliGRAM(s) Oral at bedtime  cholecalciferol 2000 Unit(s) Oral daily  cyanocobalamin 1000 MICROGram(s) Oral daily  diltiazem    Tablet 60 milliGRAM(s) Oral every 6 hours  diltiazem Infusion 12.5 mG/Hr (12.5 mL/Hr) IV Continuous <Continuous>  diltiazem Injectable 5 milliGRAM(s) IV Push once  levothyroxine Injectable 56 MICROGram(s) IV Push at bedtime  loratadine 10 milliGRAM(s) Oral daily  misoprostol 200 MICROGram(s) Oral <User Schedule>  multivitamin 1 Tablet(s) Oral daily  mupirocin 2% Ointment 1 Application(s) Topical <User Schedule>  pantoprazole    Tablet 40 milliGRAM(s) Oral before breakfast  piperacillin/tazobactam IVPB.. 3.375 Gram(s) IV Intermittent every 12 hours    MEDICATIONS  (PRN):  acetaminophen     Tablet .. 650 milliGRAM(s) Oral every 6 hours PRN Temp greater or equal to 38C (100.4F), Mild Pain (1 - 3)  albuterol    90 MICROgram(s) HFA Inhaler 2 Puff(s) Inhalation every 6 hours PRN Shortness of Breath and/or Wheezing  HYDROmorphone  Injectable 0.2 milliGRAM(s) IV Push every 4 hours PRN Severe Pain (7 - 10)  lactulose Syrup 10 Gram(s) Oral daily PRN constipation  melatonin 3 milliGRAM(s) Oral at bedtime PRN Sleep  oxyCODONE    IR 5 milliGRAM(s) Oral every 4 hours PRN Moderate Pain (4 - 6)  polyethylene glycol 3350 17 Gram(s) Oral daily PRN for constipation  senna 2 Tablet(s) Oral at bedtime PRN for constipation    Vital Signs Last 24 Hrs  T(C): 37.1 (07-08-23 @ 01:20), Max: 37.1 (07-08-23 @ 01:20)  T(F): 98.7 (07-08-23 @ 01:20), Max: 98.7 (07-08-23 @ 01:20)  HR: 162 (07-08-23 @ 01:01) (101 - 174)  BP: 99/59 (07-08-23 @ 01:05) (93/55 - 139/83)  BP(mean): --  RR: 16 (07-08-23 @ 01:05) (16 - 18)  SpO2: 100% (07-08-23 @ 01:05) (99% - 100%)        PE  NAD  Awake, alert, lethargic   Anicteric, MMM  cvs- s1, s2+  rs - dec breath sounds at bases  abd - soft, bs+  ext - edema+  Neuro - alert awake oriented to person, place  no signs of external injury     LABS:  07-07    137  |  96<L>  |  32<H>  ----------------------------<  113<H>  3.9   |  25  |  1.92<H>    Ca    8.6      07 Jul 2023 20:08  Phos  3.1     07-07  Mg     1.80     07-07    TPro  7.0  /  Alb  3.8  /  TBili  1.3<H>  /  DBili      /  AST  98<H>  /  ALT  30  /  AlkPhos  77  07-07    Creatinine Trend: 1.92 <--, 1.88 <--, 1.76 <--, 1.84 <--, 1.89 <--, 1.48 <--, 1.02 <--, 1.00 <--, 0.97 <--, 1.05 <--, 1.08 <--, 1.08 <--                        7.8    14.49 )-----------( 61       ( 07 Jul 2023 20:08 )             22.0     Urine Studies:  Urinalysis Basic - ( 07 Jul 2023 20:08 )    Color:  / Appearance:  / SG:  / pH:   Gluc: 113 mg/dL / Ketone:   / Bili:  / Urobili:    Blood:  / Protein:  / Nitrite:    Leuk Esterase:  / RBC:  / WBC    Sq Epi:  / Non Sq Epi:  / Bacteria:       Creatinine, Random Urine: 192 mg/dL (07-07 @ 13:00)  Sodium, Random Urine: <20 mmol/L (07-07 @ 13:00)  Sodium, Random Urine: 36 mmol/L (07-03 @ 12:26)          LIVER FUNCTIONS - ( 07 Jul 2023 04:54 )  Alb: 3.8 g/dL / Pro: 7.0 g/dL / ALK PHOS: 77 U/L / ALT: 30 U/L / AST: 98 U/L / GGT: x           PT/INR - ( 06 Jul 2023 13:15 )   PT: 14.5 sec;   INR: 1.25 ratio         PTT - ( 06 Jul 2023 13:15 )  PTT:28.1 sec   yes

## 2023-07-07 NOTE — PROGRESS NOTE ADULT - PROBLEM SELECTOR PROBLEM 1
Change in mental status
Generalized weakness
Endometrial cancer
Generalized weakness
Change in mental status
Endometrial cancer
Generalized weakness
Change in mental status
Generalized weakness
Generalized weakness
Change in mental status
Generalized weakness

## 2023-07-07 NOTE — PROGRESS NOTE ADULT - PROBLEM SELECTOR PROBLEM 10
Hypertension
Hyperlipidemia
Hypertension
Hyperlipidemia
Hyperlipidemia
Hypertension
Hyperlipidemia
Hypertension
Hyperlipidemia

## 2023-07-07 NOTE — CHART NOTE - NSCHARTNOTEFT_GEN_A_CORE
notified by RN pt heart rate sustaining 160s for the third time this evening. pt reporting chest pressure and SOB. Assessed at bedside w/ RRT found to be in SVT. pushed 12 mg of adenosine which broke the SVT for a short time then patient returned to SVT. gave cardizem 15mg IVP x1 which broke rhtym started on dilt GTT @ 10mg /Hr per cards recs. found to have LGF rectally 99.4, mg and potassium slightly below goal. Pt given 1gm mag IVPB, 10 meq potassium IVPB x3, APAP 1000mg IVPB.    -titrate dilt gtt PRN   -CTM     Derek Beth PA-C  Department of Internal Medicine  In-House beeper number [id number]

## 2023-07-07 NOTE — RAPID RESPONSE TEAM SUMMARY - NSSITUATIONBACKGROUNDRRT_GEN_ALL_CORE
89 Y/O F with history of stage 4 serous endometrial carcinoma (dx 5/2022) s/p chemo (last in 12/2022) and debulking surgery with POOL/BSO (2/10/23) c/b b/l pleural effusions (R > L, on 2L NC PRN), RUL segmental PE (8/2022, on Eliquis), GI bleed (4/2023), CAD s/p stent (3/2021), aortic stenosis s/p TAVR, HTN, HLD, and hypothyroidism presents with 3 weeks of worsening generalized weakness and admitted for AMS workup.     RRT called for SVT to 160 to trial adenosine per cardiology recommendations

## 2023-07-07 NOTE — PROGRESS NOTE ADULT - PROBLEM SELECTOR PROBLEM 5
Anxiety
Anemia
Leukocytosis
Leukocytosis
Anemia
Anxiety
Leukocytosis
Anemia
Leukocytosis
Anemia
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
## 2023-07-07 NOTE — PROGRESS NOTE ADULT - SUBJECTIVE AND OBJECTIVE BOX
Patient is a 88y old  Female who presents with a chief complaint of Generalized weakness (06 Jul 2023 18:02)    Patient seen this afternoon, with her son at bedside and daughter on phone. She has no new complaints. Pt more awake and alert today, though is asking to get some sleep.     MEDICATIONS  (STANDING):  albumin human 25% IVPB 100 milliLiter(s) IV Intermittent every 6 hours  atorvastatin 80 milliGRAM(s) Oral at bedtime  cholecalciferol 2000 Unit(s) Oral daily  cyanocobalamin 1000 MICROGram(s) Oral daily  diltiazem    Tablet 30 milliGRAM(s) Oral every 6 hours  levothyroxine Injectable 56 MICROGram(s) IV Push at bedtime  loratadine 10 milliGRAM(s) Oral daily  misoprostol 200 MICROGram(s) Oral <User Schedule>  multivitamin 1 Tablet(s) Oral daily  mupirocin 2% Ointment 1 Application(s) Topical <User Schedule>  pantoprazole    Tablet 40 milliGRAM(s) Oral before breakfast  piperacillin/tazobactam IVPB.. 3.375 Gram(s) IV Intermittent every 12 hours    MEDICATIONS  (PRN):  acetaminophen     Tablet .. 650 milliGRAM(s) Oral every 6 hours PRN Temp greater or equal to 38C (100.4F), Mild Pain (1 - 3)  albuterol    90 MICROgram(s) HFA Inhaler 2 Puff(s) Inhalation every 6 hours PRN Shortness of Breath and/or Wheezing  HYDROmorphone  Injectable 0.2 milliGRAM(s) IV Push every 4 hours PRN Severe Pain (7 - 10)  lactulose Syrup 10 Gram(s) Oral daily PRN constipation  melatonin 3 milliGRAM(s) Oral at bedtime PRN Sleep  oxyCODONE    IR 5 milliGRAM(s) Oral every 4 hours PRN Moderate Pain (4 - 6)  polyethylene glycol 3350 17 Gram(s) Oral daily PRN for constipation  senna 2 Tablet(s) Oral at bedtime PRN for constipation        Vital Signs Last 24 Hrs  T(C): 37 (07 Jul 2023 11:42), Max: 37.3 (06 Jul 2023 23:33)  T(F): 98.6 (07 Jul 2023 11:42), Max: 99.1 (06 Jul 2023 23:33)  HR: 116 (07 Jul 2023 11:42) (115 - 124)  BP: 139/83 (07 Jul 2023 11:42) (102/52 - 139/83)  BP(mean): --  RR: 18 (07 Jul 2023 11:42) (16 - 18)  SpO2: 100% (07 Jul 2023 11:42) (98% - 100%)    Parameters below as of 07 Jul 2023 11:42  Patient On (Oxygen Delivery Method): nasal cannula  O2 Flow (L/min): 4      PE  NAD  Awake, alert  Anicteric, MMM  +RUE edema  No rash grossly                            8.1    15.06 )-----------( 73       ( 07 Jul 2023 04:54 )             24.6       07-07    137  |  95<L>  |  31<H>  ----------------------------<  86  4.1   |  23  |  1.88<H>    Ca    8.5      07 Jul 2023 04:54  Phos  3.2     07-07  Mg     1.80     07-07    TPro  7.0  /  Alb  3.8  /  TBili  1.3<H>  /  DBili  x   /  AST  98<H>  /  ALT  30  /  AlkPhos  77  07-07

## 2023-07-07 NOTE — PROGRESS NOTE ADULT - PROBLEM SELECTOR PROBLEM 4
Leukocytosis
[place] (acute kidney injury)
Leukocytosis
Leukocytosis
[place] (acute kidney injury)
Leukocytosis
Lack of appetite
Leukocytosis
Leukocytosis
[place] (acute kidney injury)
Leukocytosis
Leukocytosis
[place] (acute kidney injury)
Leukocytosis
Lack of appetite
Leukocytosis

## 2023-07-07 NOTE — CHART NOTE - NSCHARTNOTEFT_GEN_A_CORE
Pt seen and examined at bedside. No complaints. Patient states she wants to be discharged.     Vital Signs Last 24 Hrs  T(C): 36.4 (07 Jul 2023 04:40), Max: 37.3 (06 Jul 2023 23:33)  T(F): 97.6 (07 Jul 2023 04:40), Max: 99.1 (06 Jul 2023 23:33)  HR: 124 (07 Jul 2023 10:10) (114 - 165)  BP: 118/64 (07 Jul 2023 10:10) (101/66 - 123/68)  BP(mean): --  RR: 18 (07 Jul 2023 10:10) (16 - 18)  SpO2: 99% (07 Jul 2023 10:10) (98% - 100%)    Parameters below as of 07 Jul 2023 10:10  Patient On (Oxygen Delivery Method): nasal cannula  O2 Flow (L/min): 4        General: A&Ox3, NAD  HEENT: Normocephalic. Vision and hearing grossly intact, EOMI. Airway grossly patent, no stridor.  CV: Tachycardic, BP stable   Resp: Comfortable on NC, no resp distress, no accessory muscle use  GI: Soft, NT, ND  Psych: Appropriate affect  Tubes: R PTC to WS                          8.4    15.51 )-----------( 67       ( 06 Jul 2023 06:04 )             25.5   BMI: BMI (kg/m2): 28.6 (06-21-23 @ 06:16)    133<L>  |  94<L>  |  24<H>  ----------------------------<  125<H>  4.0   |  26  |  1.48<H>    Ca    8.4      05 Jul 2023 06:01  Phos  3.7     07-05  Mg     1.80     07-05    TPro  6.7  /  Alb  3.1<L>  /  TBili  1.0  /  DBili  x   /  AST  112<H>  /  ALT  34<H>  /  AlkPhos  97  07-05      A/P: 89yo F with Rt pleural effusion, s/p R PTC insertion. RRT for tachycardia 7/6.     - PTC output minimal for multiple days  - No plans for any thoracic surgical intervention  - PTC to be removed once pt ready for discharge. Will keep in place per family request to not risk reaccumulation of pleural effusion while admitted   - Care per primary team

## 2023-07-07 NOTE — PROGRESS NOTE ADULT - ASSESSMENT
88y Female with history of endometrial carcinoma presents with weakness. Nephrology consulted for elevated Scr.    1) [place]: initially resolved now with recurrent [place] in setting of AMS and decreased PO intake. Scr relatively stable. Continue with IV albumin. Repeat UA. FeNa low. CT with mild to moderate R hydro likely secondary to CA for which patient would need intervention if within GOC. TMA work up negative. Avoid nephrotoxins. Monitor electrolytes.    2) HTN: BP low normal with tachycardia. As per cardiology.    3) LE edema: Due to hypoalbuminemia improved with diuresis. Start ensure with meals. TTE with normal LVSF. Monitor UO.    4) Hyponatremia: Resolved with IV albumin. Monitor serum Na.      [facility]  Brennon Cates M.D.  Addison Nieto D.O.  Guerline Dias M.D.  Merissa Parsons, MSN, ANP-C    Telephone: [phone]  Facsimile: [phone]    [id number] [id number]

## 2023-07-07 NOTE — PROGRESS NOTE ADULT - PROBLEM SELECTOR PROBLEM 6
Endometrial cancer
Endometrial cancer
Anemia
Endometrial cancer
Anemia
Endometrial cancer
Generalized weakness
Anemia
Generalized weakness
Anemia
Endometrial cancer
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
## 2023-07-07 NOTE — SWALLOW BEDSIDE ASSESSMENT ADULT - SWALLOW EVAL: RECOMMENDED FEEDING/EATING TECHNIQUES
Swallowing Guidelines: FEED/EAT WHEN ALERT STATE ONLY, Seated Upright 90 degrees, Slow Pacing, Small Bites/Sips, Allow for Swallow Prior to Next Presentation
slow rate of intake, feed when awake/ alert/crush medication (when feasible)/maintain upright posture during/after eating for 30 mins/oral hygiene/small sips/bites

## 2023-07-07 NOTE — PROGRESS NOTE ADULT - PROBLEM SELECTOR PROBLEM 11
Hypothyroidism
Hyperlipidemia
Hypothyroidism
Hyperlipidemia
Hypothyroidism
Hyperlipidemia
Hyperlipidemia
Hypothyroidism
Hypothyroidism

## 2023-07-07 NOTE — SWALLOW BEDSIDE ASSESSMENT ADULT - SWALLOW EVAL: DIAGNOSIS
1. Mild oral dysphagia for puree and moderately thick liquids characterized by adequate acceptance and containment, slow bolus manipulation and slow anterior to posterior transport, with adequate oral clearance. 2. Functional oral stage for mildly thick liquids and thin liquids characterized by adequate acceptance and containment, adequate anterior to posterior transport and adequate oral clearance. 3. Functional pharyngeal stage suspected characterized by adequate initiation of the pharyngeal swallow and hyolaryngeal excursion upon digital palpation with no overt s/s penetration/ aspiration noted.
1- Mild Oral Stage for puree and thin liquids marked by adequate oral containment, slow bolus manipulation, slow anterior to posterior transfer, and trace oral residue which clears with liquid wash. 2- Functional pharyngeal stage for puree and thin liquids marked by initiation of the pharyngeal swallow with hyolaryngeal excursion upon palpation without evidence of impaired airway protection.

## 2023-07-07 NOTE — PROGRESS NOTE ADULT - ASSESSMENT
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     EKG: MAT   Tele: MAT    1) Multifocal Atrial Tachycardia   -with episodes of Atrial tac   -continue to monitor on tele   -7/6 RRT for tachycardia to 160s while in US, EKG shows multifocal atrial tachycardia. switch dilt drip 10 mg /hr drip     2) Pleural effusion  -s/p Pig tail   -f/u pulm and thoracic recs    3) hx of PE  - RUL segmental PE (8/2022  -AC on hold 2/2 anemia    4) CAD s/p PCI  -denies CP  -TTE with normal LV function    5) TAVR  -TTE transcatheter aortic valve replacement Peak transaortic valve gradient equals 42 mm Hg, mean transaortic valve gradient equals 23 mm Hg, which is probably normal in the presence of a prosthetic valve. Moderate valvular aortic regurgitation.    6) Anemia  -transfuse to keep hemoglobin >8  -CT scan non con showing bleeding near the spleen and liver. surgery on board, CTA abd/pelvis with no acute bleed
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
## 2023-07-07 NOTE — RAPID RESPONSE TEAM SUMMARY - NSADDTLFINDINGSRRT_GEN_ALL_CORE
RRT called for SVT. Of note, patient with hx of SVT today which broke with adenosine x2. Patient also received cardizem IVP 10 and PO 30mg earlier. On arrival, patient in SVT in 160s. Patient complaining of chest discomfort and SOB. SBP 120s, patient afebrile, . Mental status at baseline, saturating high 90s on baseline nasal canula 4L.   Administered adenosine 12mg, EKG during administration in chart. SVT broke for approximately 5 minutes to NSR, then returned back to SVT in 160s-170s. Administered cardizam 15mg IVP and started cardizam gtt at 10 mg/hr per cardiology recommendations. Patient remained in NSR.  Started magnesium and potassium repletion for M>2, Phos >3 and K >4.

## 2023-07-07 NOTE — SWALLOW BEDSIDE ASSESSMENT ADULT - ASR SWALLOW REFERRAL
RD follow up as patient may benefit from nutritional supplements (i.e., ensure pudding, ensure shakes)/Registered Dietitian

## 2023-07-07 NOTE — PROGRESS NOTE ADULT - SUBJECTIVE AND OBJECTIVE BOX
[facility] NEPHROLOGY- PROGRESS NOTE    88y Female with history of endometrial carcinoma presents with weakness. Nephrology consulted for elevated Scr.      REVIEW OF SYSTEMS: limited due to mental status however patient denies any chest pain, dyspnea, vomiting or diarrhea. + Decreased PO intake    No Known Allergies      Hospital Medications: Medications reviewed      VITALS:  T(F): 98.6 (07-07-23 @ 11:42), Max: 99.1 (07-06-23 @ 23:33)  HR: 128 (07-07-23 @ 16:00)  BP: 118/62 (07-07-23 @ 16:00)  RR: 18 (07-07-23 @ 16:00)  SpO2: 100% (07-07-23 @ 16:00)  Wt(kg): --    07-06 @ 07:01  -  07-07 @ 07:00  --------------------------------------------------------  IN: 0 mL / OUT: 5 mL / NET: -5 mL    07-07 @ 07:01  -  07-07 @ 17:42  --------------------------------------------------------  IN: 0 mL / OUT: 200 mL / NET: -200 mL        PHYSICAL EXAM:    Gen: NAD, calm with dry MM  Cards: + tachycardia, +S1/S2, no M/G/R  Resp: Decreased BS @ R base, + R CT  GI: soft, NT/ND, NABS  : + brown  Vascular: no LE edema B/L        LABS:  07-07    137  |  95<L>  |  31<H>  ----------------------------<  86  4.1   |  23  |  1.88<H>    Ca    8.5      07 Jul 2023 04:54  Phos  3.2     07-07  Mg     1.80     07-07    TPro  7.0  /  Alb  3.8  /  TBili  1.3<H>  /  DBili      /  AST  98<H>  /  ALT  30  /  AlkPhos  77  07-07    Creatinine Trend: 1.88 <--, 1.76 <--, 1.84 <--, 1.89 <--, 1.48 <--, 1.02 <--, 1.00 <--, 0.97 <--, 1.05 <--, 1.08 <--, 1.08 <--                        8.1    15.06 )-----------( 73       ( 07 Jul 2023 04:54 )             24.6     Urine Studies:  Urinalysis Basic - ( 07 Jul 2023 04:54 )    Color:  / Appearance:  / SG:  / pH:   Gluc: 86 mg/dL / Ketone:   / Bili:  / Urobili:    Blood:  / Protein:  / Nitrite:    Leuk Esterase:  / RBC:  / WBC    Sq Epi:  / Non Sq Epi:  / Bacteria:       Creatinine, Random Urine: 192 mg/dL (07-07 @ 13:00)  Sodium, Random Urine: <20 mmol/L (07-07 @ 13:00)  Sodium, Random Urine: 36 mmol/L (07-03 @ 12:26)        < from: CT Abdomen and Pelvis No Cont (07.07.23 @ 16:33) >  IMPRESSION:  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  No significant change in right hydronephrosis.  Marked interval increase in a low density lesion in the right hepatic   lobe since 6/26/2023 which may represent a collection or hematoma, in the   right clinical setting.    --- End of Report ---    < end of copied text >

## 2023-07-07 NOTE — SWALLOW BEDSIDE ASSESSMENT ADULT - ASR SWALLOW RECOMMEND DIAG
Objective testing not warranted at this time given no overt s/s penetration/ aspiration and no pneumonia indicated on recent chest imaging

## 2023-07-07 NOTE — RAPID RESPONSE TEAM SUMMARY - NSADDTLFINDINGSRRT_GEN_ALL_CORE
Patient assessed at bedside and per bedside RN, patient mental status/lethargy is at baseline. Vitals notable for HR in 160s but stable BPs and on baseline oxygen requirement saturating high  with normal respiratory rate and poct glucose of 120. Patient noted to be in SVT to 160 on zoll and EKG obtained with poor voltage but SVT with aberrancy which could possibly be AVNRT, orthrodromic AVRT, unifocal atach or multifocal atach. Patient given adenosine 6 mg with no complications with heart rate going back to 110s which is patient baseline. EKG during adenosine administration placed into chart for cardiology to review.  - Primary team to follow up cardiology recs Patient assessed at bedside and per bedside RN, patient mental status/lethargy is at baseline. Vitals notable for HR in 160s but stable BPs and on baseline oxygen requirement saturating high  with normal respiratory rate and poct glucose of 120. Patient noted to be in SVT to 160 on zoll and EKG obtained with poor voltage but SVT with aberrancy which could possibly be AVNRT, orthrodromic AVRT, unifocal atach or multifocal atach. Patient given adenosine 6 mg with no complications with heart rate going back to 110s which is patient baseline. Patient then went back to 160s and adenosine 12 mg given with rates going back to 110s. EKG during adenosine administration placed into chart for cardiology to review.  - Primary team to follow up cardiology recs

## 2023-07-07 NOTE — PROGRESS NOTE ADULT - PROBLEM SELECTOR PROBLEM 3
INDIANA (acute kidney injury)
Shortness of breath
Shortness of breath
INDIANA (acute kidney injury)
Shortness of breath
INDIANA (acute kidney injury)
Shortness of breath
INDIANA (acute kidney injury)

## 2023-07-07 NOTE — PROGRESS NOTE ADULT - ASSESSMENT
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    1. Uterine carcinosarcoma   -- Under care of Dr. Alvarado at [facility]  -- s/p neoadjuvant chemotherapy completed 12/2022, s/p POOL-BSO 02/10/2023   -- Recent imaging suggests disease recurrence  -- Prognosis is poor. She is not a candidate for further treatment.   -- Palliative care team following, ongoing GOC discussions. Pt remains full code. Hospice referral placed.     2. Dyspnea on exertion   -- CTA chest w/o PE; + large right pleural effusion   -- LE duplex without DVT   -- Thoracic following, pigtail catheter placed for temporary draining. Will keep PTC in place for now per family request, though minimal output.   -- Cytology from R pleural effusion shows atypical cells suspicious for malignancy.   -- Continue supplemental O2 as needed, wean as tolerated     3. Anemia, thrombocytopenia   -- Likely secondary to malignancy, AOCD  -- No evidence of iron, B12, or folate deficiencies.  -- Monitor CBC and transfuse to maintain hg >7, platelet count >10K, >15K if febrile, or >50K if bleeding     4. AMS, lethargy   -- CT Head negative  -- Neurology consulted, EEG w/o seizure activity    5. IND[place]   -- Renal US shows moderate R hydronephrosis, moderate to large ascites   -- Nephrology following, appreciate recs. Likely IND[place]NA due to poor PO intake   -- IR consulted for NT placement, awaiting repeat CT a/p     Will continue to follow.    Twyla Pitts PA-C  Hematology/Oncology  [facility]  [phone] (office)  [phone] (alt office)  Evenings and weekends please call MD on call or office

## 2023-07-07 NOTE — PROGRESS NOTE ADULT - PROBLEM SELECTOR PLAN 7
Pt with history of stage 4 serous endometrial carcinoma (dx 5/2022) s/p chemo (last in 12/2022) and debulking surgery with POOL/BSO (2/10/23), now recently found to have recurrence of disease with new liver mets.  - Pt follows with Dr. Alvarado of [facility].   - GOC discussion with pt and family given poor prognosis - pt is fullcode

## 2023-07-08 NOTE — RAPID RESPONSE TEAM SUMMARY - NSSITUATIONBACKGROUNDRRT_GEN_ALL_CORE
Briefly, 87 Y/O F with history of stage 4 serous endometrial carcinoma (dx 5/2022) s/p chemo (last in 12/2022) and debulking surgery with POOL/BSO (2/10/23) c/b b/l pleural effusions (R > L, on 2L NC PRN), RUL segmental PE (8/2022, on Eliquis), GI bleed (4/2023), CAD s/p stent (3/2021), aortic stenosis s/p TAVR, HTN, HLD, and hypothyroidism presents with 3 weeks of worsening generalized weakness and admitted for AMS workup.

## 2023-07-08 NOTE — CHART NOTE - NSCHARTNOTEFT_GEN_A_CORE
Called bedside for patient AMS. unresponsive to voice or pain stimuli. Last known normal mental status was 15 minutes ago by the RN.  RRT was called for AMS.    Patient's daughter Shani was notified. States she is on her way to the Hospital.  Discussed with Dr. Olivier and Zeferino.  MICU bedside.

## 2023-07-08 NOTE — RAPID RESPONSE TEAM SUMMARY - NSADDTLFINDINGSRRT_GEN_ALL_CORE
Pt has had 3 previous rapids over the past 24 hours for SVT. Rapid called again for HR: 160's with EKG showing SVT. BP stable at 120/60, afebrile, mental status at baseline responding to questions, no pertinent PE findings. Labs reviewed - unactionable. Gave adenosine 6 mg x1 after which pt converted to sinus tach to HR: 100's. CICU fellow at bedside recommended trial of amio 150 followed by amio gtt. Dilt gtt maintained at 15

## 2023-07-08 NOTE — CONSULT NOTE ADULT - CONSULT REASON
R foot medial hallux boarder
SVT
Elevated Scr
Fever
Pleural effusion
Tachycardia
right hydronephrosis with INDIANA
uterine carcinosarcoma
decrease h/h   RP hematoma
Lethargy
Complex decision making in the setting of advanced illness

## 2023-07-08 NOTE — CONSULT NOTE ADULT - CONSULT REQUESTED BY NAME
Medicine
Pulmonary attending
medicine
medicine
[facility]
Dr. Olivier
Dr. Olivier
Med
Primary Team

## 2023-07-08 NOTE — CHART NOTE - NSCHARTNOTEFT_GEN_A_CORE
Notified by RN patient heart rate is 170s. Tele reviewed showed SVT. RRT was called.  See RRT note for details.    Daughter Shani was updates on the phone.  Dr. Olivier and  notified.

## 2023-07-08 NOTE — CONSULT NOTE ADULT - SUBJECTIVE AND OBJECTIVE BOX
Patient is a 88y old  Female who presents with a chief complaint of Generalized weakness (07 Jul 2023 17:42)      HISTORY OF PRESENT ILLNESS:   Mrs. 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        presents with 3 weeks of worsening generalized weakness. HPI obtained from both pt and her daughter at bedside, also named Shani. Prior to 3 weeks ago, pt was able to ambulate using her rolling walker without any additional assistance and even go down the 5 steps in her house on her own to head out for doctors' appointments. Over the last 3 weeks, however, pt's functional status has been progressively declining, ever since pt got a therapeutic thoracentesis with removal of 1L of pleural fluid in early June at her oncologist's office. Pt is now barely able to get out of bed on most days. Pt states that she got out of bed only once on Monday, and it was to use the bathroom for a BM. Since ~2 weeks ago, pt has also been becoming short of breath with minimal exertion, such as when walking from her bed to the bathroom, and has had to rely on supplemental O2 with 2L NC, which she didn't require for the past several months. Pt denies any recent chest pain, palpitations, lightheadedness, dizziness, headaches, vision changes, abdominal pain, nausea, vomiting, diarrhea, constipation, melena, BRBPR, or dysuria. Pt has chronic b/l LE edema and had b/l venous dopplers this past Friday that were negative for a DVT, though pt and her daughter note that pt's RLE seems more swollen than usual, as it is the LLE that is typically larger in size than the RLE. Pt was recently admitted at [facility], from 4/23-5/4/23, for a GI bleed requiring multiple pRBC transfusions. No clear source of bleed was identified despite pt getting both an enteroscopy and a colonoscopy, although the GI bleed was presumed to be from the distal small bowel. Pt's Eliquis was initially discontinued during the admission but eventually resumed at a lower dose of 2.5 mg BID prior to discharge. Soon after pt's discharge from the hospital, pt had a follow-up visit with her oncologist in which she had a PET/CT that showed recurrence of disease with new liver mets. Pt was being arranged for chemo as outpatient, but workup this past Friday revealed INDIANA with serum Cr of 1.9 (baseline 0.85-1) and recurrence of the R pleural effusion that was previously drained.       CCU consulted given patient has had multiple instances of SVT up to 160s-170s leading RRTs requiring adenosine 6mg IVP which successfully would convert her back to sinus rhythm. Patient has not been hypotensive and working with Cardiology overnight was started on IV diltiazem gtt and slowly titrating up with no prevention of further SVT episodes. Patient is awake but minimally verbal currently.     Allergies    No Known Allergies    Intolerances    	    MEDICATIONS:  aDENosine Injectable (ADENOCARD) 6 milliGRAM(s) IV Push once  aMIOdarone IVPB 150 milliGRAM(s) IV Intermittent once  diltiazem    Tablet 60 milliGRAM(s) Oral every 6 hours  diltiazem Infusion 15 mG/Hr IV Continuous <Continuous>    piperacillin/tazobactam IVPB.. 3.375 Gram(s) IV Intermittent every 12 hours    albuterol    90 MICROgram(s) HFA Inhaler 2 Puff(s) Inhalation every 6 hours PRN  loratadine 10 milliGRAM(s) Oral daily    acetaminophen     Tablet .. 650 milliGRAM(s) Oral every 6 hours PRN  HYDROmorphone  Injectable 0.2 milliGRAM(s) IV Push every 4 hours PRN  melatonin 3 milliGRAM(s) Oral at bedtime PRN  oxyCODONE    IR 5 milliGRAM(s) Oral every 4 hours PRN    lactulose Syrup 10 Gram(s) Oral daily PRN  misoprostol 200 MICROGram(s) Oral <User Schedule>  pantoprazole    Tablet 40 milliGRAM(s) Oral before breakfast  polyethylene glycol 3350 17 Gram(s) Oral daily PRN  senna 2 Tablet(s) Oral at bedtime PRN    atorvastatin 80 milliGRAM(s) Oral at bedtime  levothyroxine Injectable 56 MICROGram(s) IV Push at bedtime    albumin human 25% IVPB 100 milliLiter(s) IV Intermittent every 6 hours  cholecalciferol 2000 Unit(s) Oral daily  cyanocobalamin 1000 MICROGram(s) Oral daily  multivitamin 1 Tablet(s) Oral daily  mupirocin 2% Ointment 1 Application(s) Topical <User Schedule>      PAST MEDICAL & SURGICAL HISTORY:  Endometrial ca  S[place] ser[place] [facility] [place]      HTN (hypertension)      CAD (coronary artery disease)      S/P AVR      Hypothyroidism      HLD (hyperlipidemia)      S/P AVR (aortic valve replacement)      History of endometrial biopsy          FAMILY HISTORY:  Family history of parotid cancer (Child)              REVIEW OF SYSTEMS:  unable to obtained due to AMS    PHYSICAL EXAM:  T(C): 37 (07-08-23 @ 08:00), Max: 37.1 (07-08-23 @ 01:20)  HR: 96 (07-08-23 @ 08:00) (92 - 174)  BP: 111/62 (07-08-23 @ 08:00) (93/55 - 139/83)  RR: 16 (07-08-23 @ 08:00) (16 - 18)  SpO2: 100% (07-08-23 @ 08:00) (100% - 100%)  Wt(kg): --  I&O's Summary    07 Jul 2023 07:01  -  08 Jul 2023 07:00  --------------------------------------------------------  IN: 100 mL / OUT: 300 mL / NET: -200 mL        Appearance: Awake appears uncomfortable and tachypneic 	  HEENT:   Normal oral mucosa, PERRL, EOMI	  Lymphatic: No lymphadenopathy  Cardiovascular: Normal S1 S2, No JVD, No murmurs, No edema  Respiratory: Decreased breath sounds at the bases 	  Psychiatry: Awake minimally verbal   Gastrointestinal:  Soft, Non-tender, + BS	  Skin: No rashes, No ecchymoses, No cyanosis	  Neurologic: Non-focal  Extremities: Normal range of motion, No clubbing, cyanosis or edema  Vascular: Peripheral pulses palpable 2+ bilaterally        LABS:	 	    CBC Full  -  ( 08 Jul 2023 05:50 )  WBC Count : 15.97 K/uL  Hemoglobin : 7.9 g/dL  Hematocrit : 24.3 %  Platelet Count - Automated : 83 K/uL  Mean Cell Volume : 89.0 fL  Mean Cell Hemoglobin : 28.9 pg  Mean Cell Hemoglobin Concentration : 32.5 gm/dL  Auto Neutrophil # : x  Auto Lymphocyte # : x  Auto Monocyte # : x  Auto Eosinophil # : x  Auto Basophil # : x  Auto Neutrophil % : x  Auto Lymphocyte % : x  Auto Monocyte % : x  Auto Eosinophil % : x  Auto Basophil % : x    07-08    136  |  95<L>  |  36<H>  ----------------------------<  132<H>  4.6   |  24  |  2.07<H>  07-07    137  |  96<L>  |  32<H>  ----------------------------<  113<H>  3.9   |  25  |  1.92<H>    Ca    8.9      08 Jul 2023 05:50  Ca    8.6      07 Jul 2023 20:08  Phos  3.1     07-08  Phos  3.1     07-07  Mg     2.10     07-08  Mg     1.80     07-07    TPro  7.4  /  Alb  4.5  /  TBili  1.6<H>  /  DBili  x   /  AST  112<H>  /  ALT  33  /  AlkPhos  80  07-08  TPro  7.0  /  Alb  3.8  /  TBili  1.3<H>  /  DBili  x   /  AST  98<H>  /  ALT  30  /  AlkPhos  77  07-07      proBNP: 8894  Lipid Profile:   HgA1c:   TSH: 14.82      CARDIAC MARKERS:      55 -> 52 -> 40 -> 48 -> 45      TELEMETRY: Multiple episodes of narrow-complex long RP tachycardia up to 160s to 170s       OTHER: 	    PREVIOUS DIAGNOSTIC TESTING:    [X ] Echocardiogram:    Patient name: SHANI BRIGHT  YOB: 1935   Age: 88 (F)   MR#: [id number]  Study Date: 6/21/2023  [facility]  Study quality: Technically Difficult  Referring Physician: Tariq Olivier MD  BloodPressure: 127/60 mmHg  Height: 144 cm  Weight: 60 kg  BSA: 1.5 m2  ------------------------------------------------------------------------  PROCEDURE: Transthoracic echocardiogram with 2-D, M-Mode  and complete spectral and color flow Doppler.  INDICATION: Heart failure, unspecified (I50.9)  ------------------------------------------------------------------------  DIMENSIONS:  Dimensions:     Normal Values:  LA:     3.5 cm    2.0 - 4.0 cm  Ao:     2.9 cm    2.0 - 3.8 cm  SEPTUM: 1.3 cm    0.6 - 1.2 cm  PWT:    1.4 cm    0.6 - 1.1 cm  LVIDd:  3.7 cm    3.0 - 5.6 cm  LVIDs:    ---     1.8 - 4.0 cm  Derived Variables:  LVMI: 117 g/m2  RWT: 0.75  Ejection Fraction (Modified Greer Rule): 60 %  ------------------------------------------------------------------------  OBSERVATIONS:  Mitral Valve: Mitral annular calcification, otherwise  normal mitral valve. Mild-moderate mitral regurgitation.  Aortic Root: Normal aortic root.  Aortic Valve: Transcatheter aortic valve replacement Peak  transaortic valve gradient equals 42 mm Hg, mean  transaortic valve gradient equals 23 mm Hg, which is  probably normal in the presence of a prosthetic valve.  Moderate valvular aortic regurgitation.  Left Atrium: Normal left atrium.  LA volume index = 25  cc/m2.  Left Ventricle: Normal left ventricular systolic function.  No segmental wall motion abnormalities. Septal motion  consistent with right ventricular overload. Severe  concentric left ventricular hypertrophy. Mild diastolic  dysfunction (Stage I).  Right Heart: Normal right atrium. Right ventricular  enlargement with decreased right ventricular systolic  function. Normal tricuspid valve. Mild tricuspid  regurgitation. Normal pulmonic valve.  Pericardium/PleuraNormal pericardium with no pericardial  effusion. Bilateral pleural effusions.  ------------------------------------------------------------------------  CONCLUSIONS:  1. Mitral annular calcification, otherwise normal mitral  valve. Mild-moderate mitral regurgitation.  2. Transcatheter aortic valve replacement Peak transaortic  valve gradient equals 42 mm Hg, mean transaortic valve  gradient equals 23 mm Hg, which is probably normal in the  presence of a prosthetic valve. Moderate valvular aortic  regurgitation.  3. Normal left ventricular systolic function. No segmental  wall motion abnormalities. Septal motion consistent with  right ventricular overload.  4. Mild diastolic dysfunction (Stage I).  5. Right ventricular enlargement with decreased right  ventricular systolic function.  6. Bilateral pleural effusions.  *** Compared with echocardiogram of 5/1/2023 which on  review of images likely showed mild aortic regurgitation,  today's echo shows increase of TAVR gradients likely due to  increase in aortic regurgitation.  ------------------------------------------------------------------------  Confirmed on  6/21/2023 - 17:44:37 by GONZALO Chaves  ------------------------------------------------------------------------

## 2023-07-08 NOTE — RAPID RESPONSE TEAM SUMMARY - NSREASONFORCALLINGRRT_GEN_ALL_CORE
Acute mental status changes
Abnormal heart rate
Abnormal heart rate
Acute mental status changes
Other (specify)...
Abnormal heart rate
Acute mental status changes
Acute mental status changes

## 2023-07-08 NOTE — PROGRESS NOTE ADULT - ASSESSMENT
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     EKG: MAT   Tele: MAT    1) Multifocal Atrial Tachycardia   -with episodes of Atrial tac   -continue to monitor on tele   - Multiple episodes of SVT s/p adenosine and dilt drip , now on amio    - intuabted 2/2 AMS     2) Pleural effusion  -s/p Pig tail   -f/u pulm and thoracic recs    3) hx of PE  - RUL segmental PE (8/2022  -AC on hold 2/2 anemia    4) CAD s/p PCI  -denies CP  -TTE with normal LV function    5) TAVR  -TTE transcatheter aortic valve replacement Peak transaortic valve gradient equals 42 mm Hg, mean transaortic valve gradient equals 23 mm Hg, which is probably normal in the presence of a prosthetic valve. Moderate valvular aortic regurgitation.    6) Anemia  -transfuse to keep hemoglobin >8  -CT scan non con showing bleeding near the spleen and liver. surgery on board, CTA abd/pelvis with no acute bleed

## 2023-07-08 NOTE — PROGRESS NOTE ADULT - ASSESSMENT
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    1. Uterine carcinosarcoma   -- Under care of Dr. Alvarado at [facility]  -- s/p neoadjuvant chemotherapy completed 12/2022, s/p POOL-BSO 02/10/2023   -- Recent imaging suggests disease recurrence  -- Prognosis is poor. She is not a candidate for further treatment.   -- Palliative care team following, ongoing [facility] discussions. Hospice referral placed.     2. Dyspnea on exertion   -- CTA chest w/o PE; + large right pleural effusion   -- LE duplex without DVT   -- Thoracic following, pigtail catheter placed for temporary draining.   -- Cytology from R pleural effusion shows atypical cells suspicious for malignancy.   -- Continue supplemental O2 as needed, wean as tolerated     3. Anemia, thrombocytopenia   -- Likely secondary to malignancy, AOCD  -- No evidence of iron, B12, or folate deficiencies.  -- Monitor CBC and transfuse to maintain hg >7, platelet count >10K, >15K if febrile, or >50K if bleeding     4. AMS, lethargy   -- CT Head negative  -- Neurology consulted, EEG w/o seizure activity  -- RRT this morning for AMS - intubated, transferred to MICU    5. [place]   -- Renal US shows moderate R hydronephrosis, moderate to large ascites   -- Nephrology following, appreciate recs. Likely [place] due to poor PO intake   -- IR consulted for NT placement. Rpt CT w/ No significant change in right hydronephrosis.    Will continue to follow.    Juancarlos Esqueda PA-C  Hematology/Oncology  [facility]  [phone] (office)

## 2023-07-08 NOTE — PROGRESS NOTE ADULT - ASSESSMENT
88y Female with history of endometrial carcinoma presents with weakness. Nephrology consulted for elevated Scr.    1) INDIANA: initially resolved now with recurrent INDIANA suspect due to decreased EABV/ATN. Scr relatively stable. Repeat UA. FeNa low. CT with mild to moderate R hydro likely secondary to CA for which patient would need intervention if within GOC however I do not believe current [place] due to unilateral hydronephrosis. TMA work up negative. Avoid nephrotoxins. Monitor electrolytes.    2) Hypotension: BP acceptable. Pressors as per ICU.    3) LE edema: Due to hypoalbuminemia improved with diuresis. Holding diuretics given [place]. TTE with normal LVSF. Monitor UO.    4) Hyponatremia: Improved with IV albumin. Monitor serum Na.    Poor prognosis.    [facility] NEPHROLOGY  Brennon Cates M.D.  Addison Nieto D.O.  Guerline Dias M.D.  Merissa Parsons, GRACIA, ANP-C    Telephone: [phone]  Facsimile: [phone]    [address] [place] [address]

## 2023-07-08 NOTE — RAPID RESPONSE TEAM SUMMARY - NSSITUATIONBACKGROUNDRRT_GEN_ALL_CORE
Briefly, 89 Y/O F with history of stage 4 serous endometrial carcinoma (dx 5/2022) s/p chemo (last in 12/2022) and debulking surgery with POOL/BSO (2/10/23) c/b b/l pleural effusions (R > L, on 2L NC PRN), RUL segmental PE (8/2022, on Eliquis), GI bleed (4/2023), CAD s/p stent (3/2021), aortic stenosis s/p TAVR, HTN, HLD, and hypothyroidism presents with 3 weeks of worsening generalized weakness and admitted for AMS workup. Course c/b RP bleed, [place], recurrent SVT

## 2023-07-08 NOTE — RAPID RESPONSE TEAM SUMMARY - NSDATETIMERRT_GEN_ALL_CORE
02-Jul-2023 13:57
08-Jul-2023 09:24
08-Jul-2023 12:45
07-Jul-2023 19:10
08-Jul-2023 10:37
06-Jul-2023 14:09

## 2023-07-08 NOTE — RAPID RESPONSE TEAM SUMMARY - NSADDTLFINDINGSRRT_GEN_ALL_CORE
Previously had 2 RRT's for SVT as documented and was responding to questions earlier. Rapid called for AMS. Upon exam, pt unresponsive to painful stimuli. /60, HR: 100's- sinus tach, afebrile, unable to get O2 sat with good waveform despite multiple attempts. Attempted to get repeat labs with blood gas however unable to given anasarca. Concern for airway protection with AMS. MICU team at bedside. Readdressed GOC with family and pt remained full code. Anesthesia was called to bedside. Pt successfully extubated. Levo and prop started. Transported to CT head and MICU after.     Etiologies for AMS remain broad in this chronically ill patient. Although afebrile with no overt signs of infection, sepsis remains on the differential. Pt also with prior episodes of AMS, TIA also remains on the differential. CT head ordered to r/o structural etiology. EEG on 7/5 showed Mild diffuse slowing, No epileptiform abnormalities. Poor prognosis in this patient and continue with GOC discussions.

## 2023-07-08 NOTE — CHART NOTE - NSCHARTNOTEFT_GEN_A_CORE
: Audrey Cotto    INDICATION: Shock and Respiratory Failure    PROCEDURE:  [X ] LIMITED ECHO  [X ] LIMITED CHEST  [ ] LIMITED RETROPERITONEAL  [ ] LIMITED ABDOMINAL  [ ] LIMITED DVT  [ ] NEEDLE GUIDANCE VASCULAR  [ ] NEEDLE GUIDANCE THORACENTESIS  [ ] NEEDLE GUIDANCE PARACENTESIS  [ ] NEEDLE GUIDANCE PERICARDIOCENTESIS  [ ] OTHER    FINDINGS:  Large right sided pleural effusion tracking anteriorly. A line predominant left with small pleural effusion.  Mild-moderate decrease in LVSF. RV enlarged, smaller than LV. Septal flattening noted on systole. Mild MR. NO pericardial effusion. IVC indeterminate.   Large volume ascites.     INTERPRETATION:  Large right sided pleural effusion. Mild-mod decrease in LV function. Septal flattening consistent with RV overload.  Large volume ascites.    Images stored on [facility]. : Audrey Cotto    INDICATION: Shock and Respiratory Failure    PROCEDURE:  [X ] LIMITED ECHO  [X ] LIMITED CHEST  [ ] LIMITED RETROPERITONEAL  [ ] LIMITED ABDOMINAL  [ ] LIMITED DVT  [ ] NEEDLE GUIDANCE VASCULAR  [ ] NEEDLE GUIDANCE THORACENTESIS  [ ] NEEDLE GUIDANCE PARACENTESIS  [ ] NEEDLE GUIDANCE PERICARDIOCENTESIS  [ ] OTHER    FINDINGS:  Large right sided pleural effusion tracking anteriorly. A line predominant left with small pleural effusion.  Mild-moderate decrease in LVSF. RV enlarged, smaller than LV. Septal flattening noted on systole. Mild MR. NO pericardial effusion. IVC indeterminate.   Large volume ascites.     INTERPRETATION:  Large right sided pleural effusion. Mild-mod decrease in LV function. Septal flattening consistent with RV overload.  Large volume ascites.    Images stored on [facility].    Attending Attestation:  I was present during the key portions of the procedure and immediately available during the entire procedure.  John Cotto MD  Attending  Pulmonary & Critical Care Medicine

## 2023-07-08 NOTE — CONSULT NOTE ADULT - ASSESSMENT
Mrs. 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  CCU consulted for multiple episodes of SVT.    1. SVT  2. Stage 4 serous endometrial carcinoma  3. Encephalopathy  4. PE not on Eliquis  5. CAD s/p stent  6. AS s/p TAVR      Discussed with MICU team patient will be accepted to MICU given SVT is secondary to her chronic medical problems particularly her stage 4 serous endometrial carcinoma. Possible concern aside from infection she may have developed a new pulmonary embolism given she is not on AC. Patient is being intubated for AMS/airway protection. Discussed case with patient's private Cardiologist (Dr. Mcgarry) was given 150mg of Amio and is on IV diltiazem gtt. Would consider starting IV amio gtt and slowly coming off IV diltiazem gtt to minimize bradycardic episodes. Ongoing GOC and patient has poor prognosis.            Luke uMniz MD  Cardiology Fellow PGY-6  Phone: [phone]    For all New Consults  [email or web address]   Login: [email or web address] Mrs. 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  CCU consulted for multiple episodes of SVT.    1. SVT  2. Stage 4 serous endometrial carcinoma  3. Encephalopathy  4. PE not on Eliquis  5. CAD s/p stent  6. AS s/p TAVR      Discussed with MICU team patient will be accepted to MICU given SVT is secondary to her chronic medical problems particularly her stage 4 serous endometrial carcinoma. Possible concern aside from infection she may have developed a new pulmonary embolism given she is not on AC. Patient is being intubated for AMS/airway protection. Discussed case with patient's private Cardiologist (Dr. Mcgarry) was given 150mg of Amio and is on IV diltiazem gtt. Would consider starting IV amio gtt and slowly coming off IV diltiazem gtt to minimize bradycardic episodes. Ongoing GOC with patient having an extremely poor prognosis.             Luke Muniz MD  Cardiology Fellow PGY-6  Phone: [phone]    For all New Consults  [email or web address]   Login: duc

## 2023-07-08 NOTE — CONSULT NOTE ADULT - PROVIDER SPECIALTY LIST ADULT
Neurology
Surgery
Infectious Disease
Intervent Radiology
Nephrology
CCU
Heme/Onc
Podiatry
CT Surgery
Cardiology
Palliative Care

## 2023-07-08 NOTE — CHART NOTE - NSCHARTNOTEFT_GEN_A_CORE
MICU Accept Note    CHIEF COMPLAINT: Altered Mental Status/SVT     HPI: Mrs. 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        presents with 3 weeks of worsening generalized weakness. HPI obtained from both pt and her daughter at bedside, also named Shani. Prior to 3 weeks ago, pt was able to ambulate using her rolling walker without any additional assistance and even go down the 5 steps in her house on her own to head out for doctors' appointments. Over the last 3 weeks, however, pt's functional status has been progressively declining, ever since pt got a therapeutic thoracentesis with removal of 1L of pleural fluid in early June at her oncologist's office. Pt is now barely able to get out of bed on most days. Pt states that she got out of bed only once on Monday, and it was to use the bathroom for a BM. Since ~2 weeks ago, pt has also been becoming short of breath with minimal exertion, such as when walking from her bed to the bathroom, and has had to rely on supplemental O2 with 2L NC, which she didn't require for the past several months. Pt denies any recent chest pain, palpitations, lightheadedness, dizziness, headaches, vision changes, abdominal pain, nausea, vomiting, diarrhea, constipation, melena, BRBPR, or dysuria. Pt has chronic b/l LE edema and had b/l venous dopplers this past Friday that were negative for a DVT, though pt and her daughter note that pt's RLE seems more swollen than usual, as it is the LLE that is typically larger in size than the RLE. Pt was recently admitted at [facility], from 4/23-5/4/23, for a GI bleed requiring multiple pRBC transfusions. No clear source of bleed was identified despite pt getting both an enteroscopy and a colonoscopy, although the GI bleed was presumed to be from the distal small bowel. Pt's Eliquis was initially discontinued during the admission but eventually resumed at a lower dose of 2.5 mg BID prior to discharge. Soon after pt's discharge from the hospital, pt had a follow-up visit with her oncologist in which she had a PET/CT that showed recurrence of disease with new liver mets. Pt was being arranged for chemo as outpatient, but workup this past Friday revealed [place] with serum Cr of 1.9 (baseline 0.85-1) and recurrence of the R pleural effusion that was previously drained.       In the ED,   T 98.3-99, HR 82-87, -126/61-64, RR 18-20, SpO2 % RA and 2L NC.  CXR showing moderate to large R pleural effusion. (20 Jun 2023 23:40)      INTERVAL HISTORY:      PAST MEDICAL & SURGICAL HISTORY:  Endometrial ca  S4 serous endometrial carcinoma, [facility], chemotherapy      HTN (hypertension)      CAD (coronary artery disease)      S/P AVR      Hypothyroidism      HLD (hyperlipidemia)      S/P AVR (aortic valve replacement)      History of endometrial biopsy          FAMILY HISTORY:  Family history of parotid cancer (Child)        Social History:  Denies any alcohol, tobacco, or illicit drug use.   Ambulates with a rolling walker. (20 Jun 2023 23:40)      HOME MEDICATIONS:  Home Medications:  alendronate 70 mg oral tablet: 1 tab(s) orally once a week (21 Jun 2023 06:03)  apixaban 2.5 mg oral tablet: 1 tab(s) orally 2 times a day (21 Jun 2023 06:06)  aspirin 81 mg oral delayed release tablet: 1 tab(s) orally once a day (21 Jun 2023 06:03)  cetirizine 10 mg oral tablet: 1 orally once a day (21 Jun 2023 06:03)  ezetimibe 10 mg oral tablet: 1 tab(s) orally once a day (21 Jun 2023 06:03)  famotidine 40 mg oral tablet: 1 tab(s) orally once a day (at bedtime) (21 Jun 2023 06:03)  furosemide 20 mg oral tablet: 1 tab(s) orally every 12 hours (21 Jun 2023 06:06)  levothyroxine 75 mcg (0.075 mg) oral tablet: 1 tab(s) orally once a day (21 Jun 2023 06:03)  melatonin 3 mg oral tablet: 1 tab(s) orally once a day (at bedtime) (21 Jun 2023 06:07)  miSOPROStol 200 mcg oral tablet: 1 tab(s) orally once a day (21 Jun 2023 06:07)  One A Day Women&#x27;s Complete oral tablet: 1 tab(s) orally once a day (21 Jun 2023 06:03)  pantoprazole 40 mg oral delayed release tablet: 1 tab(s) orally once a day (21 Jun 2023 06:07)  polyethylene glycol 3350 oral powder for reconstitution: 17 gram(s) orally once a day as needed for  constipation (21 Jun 2023 06:07)  rosuvastatin 20 mg oral tablet: 1 tab(s) orally once a day (21 Jun 2023 06:03)  senna leaf extract oral tablet: 2 tab(s) orally once a day (at bedtime) as needed for  constipation (21 Jun 2023 06:07)  Ventolin HFA 90 mcg/inh inhalation aerosol: 2 puff(s) inhaled every 6 hours, As Needed (21 Jun 2023 06:03)  Vitamin B12: 5000 microgram(s) sublingual once a day (21 Jun 2023 06:03)  Vitamin D3 50 mcg (2000 intl units) oral tablet: 1 tab(s) orally once a day (21 Jun 2023 06:03)      Allergies    No Known Allergies    Intolerances        REVIEW OF SYSTEMS:  Constitutional: No fevers, chills, weight loss, weight gain  HEENT: No vision problems, eye pain, nasal congestion, rhinorrhea, sore throat, dysphagia  CV: No chest pain, orthopnea, palpitations  Resp: No cough, dyspnea, wheezing, hemoptysis  GI: No nausea, vomiting, diarrhea, constipation, abdominal pain  : [ ] dysuria [ ] nocturia [ ] hematuria [ ] increased urinary frequency  Musculoskeletal: [ ] back pain [ ] myalgias [ ] arthralgias [ ] fracture  Skin: [ ] rash [ ] itch  Neurological: [ ] headache [ ] dizziness [ ] syncope [ ] weakness [ ] numbness  Psychiatric: [ ] anxiety [ ] depression  Endocrine: [ ] diabetes [ ] thyroid problem  Hematologic/Lymphatic: [ ] anemia [ ] bleeding problem  Allergic/Immunologic: [ ] itchy eyes [ ] nasal discharge [ ] hives [ ] angioedema  [ ] All other systems negative  [ ] Unable to assess ROS because ________    OBJECTIVE:  ICU Vital Signs Last 24 Hrs  T(C): 37 (08 Jul 2023 08:00), Max: 37.1 (08 Jul 2023 01:20)  T(F): 98.6 (08 Jul 2023 08:00), Max: 98.7 (08 Jul 2023 01:20)  HR: 96 (08 Jul 2023 08:00) (92 - 174)  BP: 111/62 (08 Jul 2023 08:00) (93/55 - 139/83)  BP(mean): --  ABP: --  ABP(mean): --  RR: 16 (08 Jul 2023 08:00) (16 - 18)  SpO2: 100% (08 Jul 2023 08:00) (100% - 100%)    O2 Parameters below as of 08 Jul 2023 08:00    O2 Flow (L/min): 4            07-07 @ 07:01  -  07-08 @ 07:00  --------------------------------------------------------  IN: 100 mL / OUT: 300 mL / NET: -200 mL      CAPILLARY BLOOD GLUCOSE      POCT Blood Glucose.: 180 mg/dL (08 Jul 2023 10:48)      PHYSICAL EXAM:  General:   HEENT:   Neck:   Chest/Lungs:  Heart:  Abdomen:   Extremities:   Skin:   Neuro:   Psych:     LINES:     HOSPITAL MEDICATIONS:  MEDICATIONS  (STANDING):  aDENosine Injectable (ADENOCARD) 6 milliGRAM(s) IV Push once  albumin human 25% IVPB 100 milliLiter(s) IV Intermittent every 6 hours  aMIOdarone IVPB 150 milliGRAM(s) IV Intermittent once  atorvastatin 80 milliGRAM(s) Oral at bedtime  cholecalciferol 2000 Unit(s) Oral daily  cyanocobalamin 1000 MICROGram(s) Oral daily  diltiazem    Tablet 60 milliGRAM(s) Oral every 6 hours  diltiazem Infusion 15 mG/Hr (15 mL/Hr) IV Continuous <Continuous>  levothyroxine Injectable 56 MICROGram(s) IV Push at bedtime  loratadine 10 milliGRAM(s) Oral daily  misoprostol 200 MICROGram(s) Oral <User Schedule>  multivitamin 1 Tablet(s) Oral daily  mupirocin 2% Ointment 1 Application(s) Topical <User Schedule>  pantoprazole    Tablet 40 milliGRAM(s) Oral before breakfast  piperacillin/tazobactam IVPB.. 3.375 Gram(s) IV Intermittent every 12 hours    MEDICATIONS  (PRN):  acetaminophen     Tablet .. 650 milliGRAM(s) Oral every 6 hours PRN Temp greater or equal to 38C (100.4F), Mild Pain (1 - 3)  albuterol    90 MICROgram(s) HFA Inhaler 2 Puff(s) Inhalation every 6 hours PRN Shortness of Breath and/or Wheezing  HYDROmorphone  Injectable 0.2 milliGRAM(s) IV Push every 4 hours PRN Severe Pain (7 - 10)  lactulose Syrup 10 Gram(s) Oral daily PRN constipation  melatonin 3 milliGRAM(s) Oral at bedtime PRN Sleep  oxyCODONE    IR 5 milliGRAM(s) Oral every 4 hours PRN Moderate Pain (4 - 6)  polyethylene glycol 3350 17 Gram(s) Oral daily PRN for constipation  senna 2 Tablet(s) Oral at bedtime PRN for constipation      LABS:                        7.9    15.97 )-----------( 83       ( 08 Jul 2023 05:50 )             24.3     07-08    136  |  95<L>  |  36<H>  ----------------------------<  132<H>  4.6   |  24  |  2.07<H>    Ca    8.9      08 Jul 2023 05:50  Phos  3.1     07-08  Mg     2.10     07-08    TPro  7.4  /  Alb  4.5  /  TBili  1.6<H>  /  DBili  x   /  AST  112<H>  /  ALT  33  /  AlkPhos  80  07-08    PT/INR - ( 06 Jul 2023 13:15 )   PT: 14.5 sec;   INR: 1.25 ratio         PTT - ( 06 Jul 2023 13:15 )  PTT:28.1 sec    pH, Venous: 7.38 (07-06-23 @ 13:15)  pCO2, Venous: 53 mmHg (07-06-23 @ 13:15)  pO2, Venous: 40 mmHg (07-06-23 @ 13:15)  HCO3, Venous: 31 mmol/L (07-06-23 @ 13:15)      Urinalysis Basic - ( 08 Jul 2023 05:50 )    Color: x / Appearance: x / SG: x / pH: x  Gluc: 132 mg/dL / Ketone: x  / Bili: x / Urobili: x   Blood: x / Protein: x / Nitrite: x   Leuk Esterase: x / RBC: x / WBC x   Sq Epi: x / Non Sq Epi: x / Bacteria: x      CAPILLARY BLOOD GLUCOSE    POCT Blood Glucose.: 180 mg/dL (08 Jul 2023 10:48)  POCT Blood Glucose.: 170 mg/dL (08 Jul 2023 09:11)  POCT Blood Glucose.: 120 mg/dL (07 Jul 2023 21:15)  POCT Blood Glucose.: 126 mg/dL (07 Jul 2023 18:55)  POCT Blood Glucose.: 99 mg/dL (07 Jul 2023 11:32)      RADIOLOGY & ADDITIONAL TESTS:    ASSESSMENT/PLAN:      # NEURO    # CARDIAC    # PULM    # RENAL    # GI     # HEME/ONC    # ID    # ENDO    DVT MICU Accept Note    CHIEF COMPLAINT: Altered Mental Status/SVT     HPI: Mrs. Shani Bright is a 88 year old female with a PMH of stage 4 serous endometrial carcinoma (dx 5/2022) s/p chemo (last in 12/2022) and debulking surgery with POOL/BSO (2/10/23), RUL segmental PE (8/2022, on Eliquis), GI bleed (4/2023), CAD s/p stent (3/2021), aortic stenosis s/p TAVR, HTN, HLD, and hypothyroidism who initially presented to the hospital on 6/20/23 for a 3 wk history of generalized weakness.  At the baseline the patient had significant dyspnea on exertion.       presents with 3 weeks of worsening generalized weakness. HPI obtained from both pt and her daughter at bedside, also named Shani. Prior to 3 weeks ago, pt was able to ambulate using her rolling walker without any additional assistance and even go down the 5 steps in her house on her own to head out for doctors' appointments. Over the last 3 weeks, however, pt's functional status has been progressively declining, ever since pt got a therapeutic thoracentesis with removal of 1L of pleural fluid in early June at her oncologist's office. Pt is now barely able to get out of bed on most days. Pt states that she got out of bed only once on Monday, and it was to use the bathroom for a BM. Since ~2 weeks ago, pt has also been becoming short of breath with minimal exertion, such as when walking from her bed to the bathroom, and has had to rely on supplemental O2 with 2L NC, which she didn't require for the past several months. Pt denies any recent chest pain, palpitations, lightheadedness, dizziness, headaches, vision changes, abdominal pain, nausea, vomiting, diarrhea, constipation, melena, BRBPR, or dysuria. Pt has chronic b/l LE edema and had b/l venous dopplers this past Friday that were negative for a DVT, though pt and her daughter note that pt's RLE seems more swollen than usual, as it is the LLE that is typically larger in size than the RLE. Pt was recently admitted at [facility], from 4/23-5/4/23, for a GI bleed requiring multiple pRBC transfusions. No clear source of bleed was identified despite pt getting both an enteroscopy and a colonoscopy, although the GI bleed was presumed to be from the distal small bowel. Pt's Eliquis was initially discontinued during the admission but eventually resumed at a lower dose of 2.5 mg BID prior to discharge. Soon after pt's discharge from the hospital, pt had a follow-up visit with her oncologist in which she had a PET/CT that showed recurrence of disease with new liver mets. Pt was being arranged for chemo as outpatient, but workup this past Friday revealed [place] with serum Cr of 1.9 (baseline 0.85-1) and recurrence of the R pleural effusion that was previously drained.       In the ED,   T 98.3-99, HR 82-87, -126/61-64, RR 18-20, SpO2 % RA and 2L NC.  CXR showing moderate to large R pleural effusion. (20 Jun 2023 23:40)      INTERVAL HISTORY: During the patients hospital interval they were started on AC but that was then stopped because the patient had a RP bleed. Patient did have a RRT called on both 7/7 and 7/8 and the patient was resuscitated with a Diltiazem drip.      On the morning of 7/8 the patient was significantly altered compared to their baseline and was found to be unresponsive and had to be intubated. The patient is presenting to the ICU for further workup of their AMS, SVT, and ventilation management.       PAST MEDICAL & SURGICAL HISTORY:  Endometrial ca  [place] [facility] [place]      HTN (hypertension)      CAD (coronary artery disease)      S/P AVR      Hypothyroidism      HLD (hyperlipidemia)      S/P AVR (aortic valve replacement)      History of endometrial biopsy          FAMILY HISTORY:  Family history of parotid cancer (Child)        Social History:  Denies any alcohol, tobacco, or illicit drug use.   Ambulates with a rolling walker. (20 Jun 2023 23:40)      HOME MEDICATIONS:  Home Medications:  alendronate 70 mg oral tablet: 1 tab(s) orally once a week (21 Jun 2023 06:03)  apixaban 2.5 mg oral tablet: 1 tab(s) orally 2 times a day (21 Jun 2023 06:06)  aspirin 81 mg oral delayed release tablet: 1 tab(s) orally once a day (21 Jun 2023 06:03)  cetirizine 10 mg oral tablet: 1 orally once a day (21 Jun 2023 06:03)  ezetimibe 10 mg oral tablet: 1 tab(s) orally once a day (21 Jun 2023 06:03)  famotidine 40 mg oral tablet: 1 tab(s) orally once a day (at bedtime) (21 Jun 2023 06:03)  furosemide 20 mg oral tablet: 1 tab(s) orally every 12 hours (21 Jun 2023 06:06)  levothyroxine 75 mcg (0.075 mg) oral tablet: 1 tab(s) orally once a day (21 Jun 2023 06:03)  melatonin 3 mg oral tablet: 1 tab(s) orally once a day (at bedtime) (21 Jun 2023 06:07)  miSOPROStol 200 mcg oral tablet: 1 tab(s) orally once a day (21 Jun 2023 06:07)  One A Day Women&#x27;s Complete oral tablet: 1 tab(s) orally once a day (21 Jun 2023 06:03)  pantoprazole 40 mg oral delayed release tablet: 1 tab(s) orally once a day (21 Jun 2023 06:07)  polyethylene glycol 3350 oral powder for reconstitution: 17 gram(s) orally once a day as needed for  constipation (21 Jun 2023 06:07)  rosuvastatin 20 mg oral tablet: 1 tab(s) orally once a day (21 Jun 2023 06:03)  senna leaf extract oral tablet: 2 tab(s) orally once a day (at bedtime) as needed for  constipation (21 Jun 2023 06:07)  Ventolin HFA 90 mcg/inh inhalation aerosol: 2 puff(s) inhaled every 6 hours, As Needed (21 Jun 2023 06:03)  Vitamin B12: 5000 microgram(s) sublingual once a day (21 Jun 2023 06:03)  Vitamin D3 50 mcg (2000 intl units) oral tablet: 1 tab(s) orally once a day (21 Jun 2023 06:03)      Allergies    No Known Allergies    Intolerances        REVIEW OF SYSTEMS:  Constitutional: No fevers, chills, weight loss, weight gain  HEENT: No vision problems, eye pain, nasal congestion, rhinorrhea, sore throat, dysphagia  CV: No chest pain, orthopnea, palpitations  Resp: No cough, dyspnea, wheezing, hemoptysis  GI: No nausea, vomiting, diarrhea, constipation, abdominal pain  : [ ] dysuria [ ] nocturia [ ] hematuria [ ] increased urinary frequency  Musculoskeletal: [ ] back pain [ ] myalgias [ ] arthralgias [ ] fracture  Skin: [ ] rash [ ] itch  Neurological: [ ] headache [ ] dizziness [ ] syncope [ ] weakness [ ] numbness  Psychiatric: [ ] anxiety [ ] depression  Endocrine: [ ] diabetes [ ] thyroid problem  Hematologic/Lymphatic: [ ] anemia [ ] bleeding problem  Allergic/Immunologic: [ ] itchy eyes [ ] nasal discharge [ ] hives [ ] angioedema  [ ] All other systems negative  [ ] Unable to assess ROS because ________    OBJECTIVE:  ICU Vital Signs Last 24 Hrs  T(C): 37 (08 Jul 2023 08:00), Max: 37.1 (08 Jul 2023 01:20)  T(F): 98.6 (08 Jul 2023 08:00), Max: 98.7 (08 Jul 2023 01:20)  HR: 96 (08 Jul 2023 08:00) (92 - 174)  BP: 111/62 (08 Jul 2023 08:00) (93/55 - 139/83)  BP(mean): --  ABP: --  ABP(mean): --  RR: 16 (08 Jul 2023 08:00) (16 - 18)  SpO2: 100% (08 Jul 2023 08:00) (100% - 100%)    O2 Parameters below as of 08 Jul 2023 08:00    O2 Flow (L/min): 4            07-07 @ 07:01  -  07-08 @ 07:00  --------------------------------------------------------  IN: 100 mL / OUT: 300 mL / NET: -200 mL      CAPILLARY BLOOD GLUCOSE      POCT Blood Glucose.: 180 mg/dL (08 Jul 2023 10:48)      PHYSICAL EXAM:  General:   HEENT:   Neck:   Chest/Lungs:  Heart:  Abdomen:   Extremities:   Skin:   Neuro:   Psych:     LINES:     HOSPITAL MEDICATIONS:  MEDICATIONS  (STANDING):  aDENosine Injectable (ADENOCARD) 6 milliGRAM(s) IV Push once  albumin human 25% IVPB 100 milliLiter(s) IV Intermittent every 6 hours  aMIOdarone IVPB 150 milliGRAM(s) IV Intermittent once  atorvastatin 80 milliGRAM(s) Oral at bedtime  cholecalciferol 2000 Unit(s) Oral daily  cyanocobalamin 1000 MICROGram(s) Oral daily  diltiazem    Tablet 60 milliGRAM(s) Oral every 6 hours  diltiazem Infusion 15 mG/Hr (15 mL/Hr) IV Continuous <Continuous>  levothyroxine Injectable 56 MICROGram(s) IV Push at bedtime  loratadine 10 milliGRAM(s) Oral daily  misoprostol 200 MICROGram(s) Oral <User Schedule>  multivitamin 1 Tablet(s) Oral daily  mupirocin 2% Ointment 1 Application(s) Topical <User Schedule>  pantoprazole    Tablet 40 milliGRAM(s) Oral before breakfast  piperacillin/tazobactam IVPB.. 3.375 Gram(s) IV Intermittent every 12 hours    MEDICATIONS  (PRN):  acetaminophen     Tablet .. 650 milliGRAM(s) Oral every 6 hours PRN Temp greater or equal to 38C (100.4F), Mild Pain (1 - 3)  albuterol    90 MICROgram(s) HFA Inhaler 2 Puff(s) Inhalation every 6 hours PRN Shortness of Breath and/or Wheezing  HYDROmorphone  Injectable 0.2 milliGRAM(s) IV Push every 4 hours PRN Severe Pain (7 - 10)  lactulose Syrup 10 Gram(s) Oral daily PRN constipation  melatonin 3 milliGRAM(s) Oral at bedtime PRN Sleep  oxyCODONE    IR 5 milliGRAM(s) Oral every 4 hours PRN Moderate Pain (4 - 6)  polyethylene glycol 3350 17 Gram(s) Oral daily PRN for constipation  senna 2 Tablet(s) Oral at bedtime PRN for constipation      LABS:                        7.9    15.97 )-----------( 83       ( 08 Jul 2023 05:50 )             24.3     07-08    136  |  95<L>  |  36<H>  ----------------------------<  132<H>  4.6   |  24  |  2.07<H>    Ca    8.9      08 Jul 2023 05:50  Phos  3.1     07-08  Mg     2.10     07-08    TPro  7.4  /  Alb  4.5  /  TBili  1.6<H>  /  DBili  x   /  AST  112<H>  /  ALT  33  /  AlkPhos  80  07-08    PT/INR - ( 06 Jul 2023 13:15 )   PT: 14.5 sec;   INR: 1.25 ratio         PTT - ( 06 Jul 2023 13:15 )  PTT:28.1 sec    pH, Venous: 7.38 (07-06-23 @ 13:15)  pCO2, Venous: 53 mmHg (07-06-23 @ 13:15)  pO2, Venous: 40 mmHg (07-06-23 @ 13:15)  HCO3, Venous: 31 mmol/L (07-06-23 @ 13:15)      Urinalysis Basic - ( 08 Jul 2023 05:50 )    Color: x / Appearance: x / SG: x / pH: x  Gluc: 132 mg/dL / Ketone: x  / Bili: x / Urobili: x   Blood: x / Protein: x / Nitrite: x   Leuk Esterase: x / RBC: x / WBC x   Sq Epi: x / Non Sq Epi: x / Bacteria: x      CAPILLARY BLOOD GLUCOSE    POCT Blood Glucose.: 180 mg/dL (08 Jul 2023 10:48)  POCT Blood Glucose.: 170 mg/dL (08 Jul 2023 09:11)  POCT Blood Glucose.: 120 mg/dL (07 Jul 2023 21:15)  POCT Blood Glucose.: 126 mg/dL (07 Jul 2023 18:55)  POCT Blood Glucose.: 99 mg/dL (07 Jul 2023 11:32)      RADIOLOGY & ADDITIONAL TESTS:    ASSESSMENT/PLAN:      NEURO  #Altered Mental Status  -Patient is significantly altered compared to their baseline   -CT Head ordered to evaluate for stroke, Neuro checks Q4H  -ABG, BMP, and CBC ordered   -Consider EEG       # CARDIAC    # PULM    # RENAL    # GI     # HEME/ONC    # ID    # ENDO    Ethics  The patient is full code and a GOC discussion has been had with the family, MOLTS form is up to date and has been filled.     DVT MICU Accept Note    CHIEF COMPLAINT: Altered Mental Status/SVT     HPI: Mrs. Shani Bright is a 88 year old female with a PMH of stage 4 serous endometrial carcinoma (dx 5/2022) s/p chemo (last in 12/2022) and debulking surgery with POOL/BSO (2/10/23), RUL segmental PE (8/2022, on Eliquis), GI bleed (4/2023), CAD s/p stent (3/2021), aortic stenosis s/p TAVR, HTN, HLD, and hypothyroidism who initially presented to the hospital on 6/20/23 for a 3 wk history of generalized weakness.      INTERVAL HISTORY: During the patients hospital interval they were started on AC but that was then stopped because the patient had a RP bleed. The patient has also had an [place], leukocytosis, R PL effusion, and potential PE during this hospitalization stay. The patient has had several rapid responses called: 7/3 and 7/6 for AMS and 7/7 for SVT( ) which was resolved with adenosine. The patient also had a rapid respond call on 7/8 where the e patient was significantly altered compared to their baseline and was found to be unresponsive with tachycardia (), the patient was again given adenosine and then subsequently intubated . The patient is presenting to the ICU for further workup of their AMS, SVT, and ventilation management.     PAST MEDICAL & SURGICAL HISTORY:  Endometrial cancer   [place] [facility] [place]  HTN (hypertension)    CAD (coronary artery disease)    S/P AVR    Hypothyroidism    HLD (hyperlipidemia)    S/P AVR (aortic valve replacement)    History of endometrial biopsy    FAMILY HISTORY:  Family history of parotid cancer (Child)    Social History:  Denies any alcohol, tobacco, or illicit drug use.   Ambulates with a rolling walker. (20 Jun 2023 23:40)    HOME MEDICATIONS:  Home Medications:  alendronate 70 mg oral tablet: 1 tab(s) orally once a week (21 Jun 2023 06:03)  apixaban 2.5 mg oral tablet: 1 tab(s) orally 2 times a day (21 Jun 2023 06:06)  aspirin 81 mg oral delayed release tablet: 1 tab(s) orally once a day (21 Jun 2023 06:03)  cetirizine 10 mg oral tablet: 1 orally once a day (21 Jun 2023 06:03)  ezetimibe 10 mg oral tablet: 1 tab(s) orally once a day (21 Jun 2023 06:03)  famotidine 40 mg oral tablet: 1 tab(s) orally once a day (at bedtime) (21 Jun 2023 06:03)  furosemide 20 mg oral tablet: 1 tab(s) orally every 12 hours (21 Jun 2023 06:06)  levothyroxine 75 mcg (0.075 mg) oral tablet: 1 tab(s) orally once a day (21 Jun 2023 06:03)  melatonin 3 mg oral tablet: 1 tab(s) orally once a day (at bedtime) (21 Jun 2023 06:07)  miSOPROStol 200 mcg oral tablet: 1 tab(s) orally once a day (21 Jun 2023 06:07)  One A Day Women&#x27;s Complete oral tablet: 1 tab(s) orally once a day (21 Jun 2023 06:03)  pantoprazole 40 mg oral delayed release tablet: 1 tab(s) orally once a day (21 Jun 2023 06:07)  polyethylene glycol 3350 oral powder for reconstitution: 17 gram(s) orally once a day as needed for  constipation (21 Jun 2023 06:07)  rosuvastatin 20 mg oral tablet: 1 tab(s) orally once a day (21 Jun 2023 06:03)  senna leaf extract oral tablet: 2 tab(s) orally once a day (at bedtime) as needed for  constipation (21 Jun 2023 06:07)  Ventolin HFA 90 mcg/inh inhalation aerosol: 2 puff(s) inhaled every 6 hours, As Needed (21 Jun 2023 06:03)  Vitamin B12: 5000 microgram(s) sublingual once a day (21 Jun 2023 06:03)  Vitamin D3 50 mcg (2000 intl units) oral tablet: 1 tab(s) orally once a day (21 Jun 2023 06:03)    Allergies    No Known Allergies    Intolerances    REVIEW OF SYSTEMS:  Constitutional: No fevers, chills, weight loss, weight gain  HEENT: No vision problems, eye pain, nasal congestion, rhinorrhea, sore throat, dysphagia  CV: No chest pain, orthopnea, palpitations  Resp: No cough, dyspnea, wheezing, hemoptysis  GI: No nausea, vomiting, diarrhea, constipation, abdominal pain  : [ ] dysuria [ ] nocturia [ ] hematuria [ ] increased urinary frequency  Musculoskeletal: [ ] back pain [ ] myalgias [ ] arthralgias [ ] fracture  Skin: [ ] rash [ ] itch  Neurological: [ ] headache [ ] dizziness [ ] syncope [ ] weakness [ ] numbness  Psychiatric: [ ] anxiety [ ] depression  Endocrine: [ ] diabetes [ ] thyroid problem  Hematologic/Lymphatic: [ ] anemia [ ] bleeding problem  Allergic/Immunologic: [ ] itchy eyes [ ] nasal discharge [ ] hives [ ] angioedema  [ ] All other systems negative  [ ] Unable to assess ROS because ________    OBJECTIVE:  ICU Vital Signs Last 24 Hrs  T(C): 37 (08 Jul 2023 08:00), Max: 37.1 (08 Jul 2023 01:20)  T(F): 98.6 (08 Jul 2023 08:00), Max: 98.7 (08 Jul 2023 01:20)  HR: 96 (08 Jul 2023 08:00) (92 - 174)  BP: 111/62 (08 Jul 2023 08:00) (93/55 - 139/83)  BP(mean): --  ABP: --  ABP(mean): --  RR: 16 (08 Jul 2023 08:00) (16 - 18)  SpO2: 100% (08 Jul 2023 08:00) (100% - 100%)    O2 Parameters below as of 08 Jul 2023 08:00    O2 Flow (L/min): 4    07-07 @ 07:01  -  07-08 @ 07:00  --------------------------------------------------------  IN: 100 mL / OUT: 300 mL / NET: -200 mL      CAPILLARY BLOOD GLUCOSE      POCT Blood Glucose.: 180 mg/dL (08 Jul 2023 10:48)    PHYSICAL EXAM:  General:   HEENT:   Neck:   Chest/Lungs:  Heart:  Abdomen:   Extremities:   Skin:   Neuro:   Psych:     LINES:     HOSPITAL MEDICATIONS:  MEDICATIONS  (STANDING):  aDENosine Injectable (ADENOCARD) 6 milliGRAM(s) IV Push once  albumin human 25% IVPB 100 milliLiter(s) IV Intermittent every 6 hours  aMIOdarone IVPB 150 milliGRAM(s) IV Intermittent once  atorvastatin 80 milliGRAM(s) Oral at bedtime  cholecalciferol 2000 Unit(s) Oral daily  cyanocobalamin 1000 MICROGram(s) Oral daily  diltiazem    Tablet 60 milliGRAM(s) Oral every 6 hours  diltiazem Infusion 15 mG/Hr (15 mL/Hr) IV Continuous <Continuous>  levothyroxine Injectable 56 MICROGram(s) IV Push at bedtime  loratadine 10 milliGRAM(s) Oral daily  misoprostol 200 MICROGram(s) Oral <User Schedule>  multivitamin 1 Tablet(s) Oral daily  mupirocin 2% Ointment 1 Application(s) Topical <User Schedule>  pantoprazole    Tablet 40 milliGRAM(s) Oral before breakfast  piperacillin/tazobactam IVPB.. 3.375 Gram(s) IV Intermittent every 12 hours    MEDICATIONS  (PRN):  acetaminophen     Tablet .. 650 milliGRAM(s) Oral every 6 hours PRN Temp greater or equal to 38C (100.4F), Mild Pain (1 - 3)  albuterol    90 MICROgram(s) HFA Inhaler 2 Puff(s) Inhalation every 6 hours PRN Shortness of Breath and/or Wheezing  HYDROmorphone  Injectable 0.2 milliGRAM(s) IV Push every 4 hours PRN Severe Pain (7 - 10)  lactulose Syrup 10 Gram(s) Oral daily PRN constipation  melatonin 3 milliGRAM(s) Oral at bedtime PRN Sleep  oxyCODONE    IR 5 milliGRAM(s) Oral every 4 hours PRN Moderate Pain (4 - 6)  polyethylene glycol 3350 17 Gram(s) Oral daily PRN for constipation  senna 2 Tablet(s) Oral at bedtime PRN for constipation      LABS:                        7.9    15.97 )-----------( 83       ( 08 Jul 2023 05:50 )             24.3     07-08    136  |  95<L>  |  36<H>  ----------------------------<  132<H>  4.6   |  24  |  2.07<H>    Ca    8.9      08 Jul 2023 05:50  Phos  3.1     07-08  Mg     2.10     07-08    TPro  7.4  /  Alb  4.5  /  TBili  1.6<H>  /  DBili  x   /  AST  112<H>  /  ALT  33  /  AlkPhos  80  07-08    PT/INR - ( 06 Jul 2023 13:15 )   PT: 14.5 sec;   INR: 1.25 ratio         PTT - ( 06 Jul 2023 13:15 )  PTT:28.1 sec    pH, Venous: 7.38 (07-06-23 @ 13:15)  pCO2, Venous: 53 mmHg (07-06-23 @ 13:15)  pO2, Venous: 40 mmHg (07-06-23 @ 13:15)  HCO3, Venous: 31 mmol/L (07-06-23 @ 13:15)    Urinalysis Basic - ( 08 Jul 2023 05:50 )    Color: x / Appearance: x / SG: x / pH: x  Gluc: 132 mg/dL / Ketone: x  / Bili: x / Urobili: x   Blood: x / Protein: x / Nitrite: x   Leuk Esterase: x / RBC: x / WBC x   Sq Epi: x / Non Sq Epi: x / Bacteria: x    CAPILLARY BLOOD GLUCOSE    POCT Blood Glucose.: 180 mg/dL (08 Jul 2023 10:48)  POCT Blood Glucose.: 170 mg/dL (08 Jul 2023 09:11)  POCT Blood Glucose.: 120 mg/dL (07 Jul 2023 21:15)  POCT Blood Glucose.: 126 mg/dL (07 Jul 2023 18:55)  POCT Blood Glucose.: 99 mg/dL (07 Jul 2023 11:32)      RADIOLOGY & ADDITIONAL TESTS:    ASSESSMENT/PLAN:  Mrs. 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     NEURO  #Altered Mental Status  -Patient is significantly altered compared to their baseline   -CT Head ordered to evaluate for stroke, Neuro checks Q4H  -ABG, BMP, and CBC ordered   -Consider EEG   -Follow up Free T4, TSH, Total T3, Ammonia, Cortisol         CARDIAC    PULM  # Pulmonary embolism.   -Pt diagnosed with RUL segmental PE in Aug 2022, now with possible new PE while on Eliquis 2.5 mg BID.  -Hold Eliquis for now given possibility of thoracentesis, chest tube placement, and other procedures during admission. Will start heparin gtt for AC instead (weight based on previous admission in May, will need to obtain actual weight).      ##Dyspnea  -Patient is currently intubated, R pleural effusion found on. Potential thoracentesis         RENAL  #[place]  -Likely due poor PO intake and 3rd spacing(hypoalbuminemia)  -Resolving trend via CMP  -Nephrology should keep following     # GI     HEME/ONC  #Stage 4 Endometrial cancer.   - Pt with history of stage 4 serous endometrial carcinoma (dx 5/2022) s/p chemo (last in 12/2022) and debulking surgery with POOL/BSO (2/10/23), now recently found to have recurrence of disease with new liver mets, the [place] [facility] [place]   - GOC discussion with pt and family given poor prognosis - pt is fullcode.    ID  #Leukocytosis  -WBC of   -CBC, UCx, BCx ordered     ENDO  #Hypothyroidism  -Levothyroxine        Ethics  The patient is full code and a GOC discussion has been had with the family, MOLTS form is up to date and has been filled.     DVT       Problem/Plan - 9:  ·  Problem: CAD (coronary artery disease).   ·  Plan: S/p stent in 3/2021. On ASA and statin.   - C/w ASA 81 mg daily  - C/w atorvastatin 80 mg qHS (interchange for rosuvastatin 20 mg)    SVT - cards f/u. MICU Accept Note    CHIEF COMPLAINT: Altered Mental Status/SVT     HPI: Mrs. Shani Bright is a 88 year old female with a PMH of stage 4 serous endometrial carcinoma (dx 5/2022) s/p chemo (12/2022) and debulking surgery with POOL/BSO (2/10/23), RUL segmental PE (8/2022, on Eliquis), GI bleed (4/2023), CAD s/p stent (3/2021), aortic stenosis s/p TAVR, HTN, HLD, and hypothyroidism who initially presented to the hospital on 6/20/23 for a 3 wk history of generalized weakness.      INTERVAL HISTORY: During the patients hospital interval they were started on AC but that was then stopped because the patient had a RP bleed. The patient has also had an [place], leukocytosis, R PL effusion, and potential PE during this hospitalization stay. The patient has had several rapid responses called: 7/3 and 7/6 for AMS and 7/7 for SVT( ) which was resolved with adenosine. The patient also had a rapid respond call on 7/8 where the patient was significantly altered compared to their baseline and was found to be unresponsive with tachycardia (), the patient was again given adenosine and then subsequently intubated . The patient is presenting to the ICU for further workup of their AMS, SVT, and ventilation management.     PAST MEDICAL & SURGICAL HISTORY:  Endometrial cancer   S4 serous endometrial carcinoma, [facility], chemotherapy  HTN (hypertension)    CAD (coronary artery disease)    S/P AVR    Hypothyroidism    HLD (hyperlipidemia)    S/P AVR (aortic valve replacement)    History of endometrial biopsy    FAMILY HISTORY:  Family history of parotid cancer (Child)    Social History:  Denies any alcohol, tobacco, or illicit drug use.   Ambulates with a rolling walker. (20 Jun 2023 23:40)    HOME MEDICATIONS:  Home Medications:  alendronate 70 mg oral tablet: 1 tab(s) orally once a week (21 Jun 2023 06:03)  apixaban 2.5 mg oral tablet: 1 tab(s) orally 2 times a day (21 Jun 2023 06:06)  aspirin 81 mg oral delayed release tablet: 1 tab(s) orally once a day (21 Jun 2023 06:03)  cetirizine 10 mg oral tablet: 1 orally once a day (21 Jun 2023 06:03)  ezetimibe 10 mg oral tablet: 1 tab(s) orally once a day (21 Jun 2023 06:03)  famotidine 40 mg oral tablet: 1 tab(s) orally once a day (at bedtime) (21 Jun 2023 06:03)  furosemide 20 mg oral tablet: 1 tab(s) orally every 12 hours (21 Jun 2023 06:06)  levothyroxine 75 mcg (0.075 mg) oral tablet: 1 tab(s) orally once a day (21 Jun 2023 06:03)  melatonin 3 mg oral tablet: 1 tab(s) orally once a day (at bedtime) (21 Jun 2023 06:07)  miSOPROStol 200 mcg oral tablet: 1 tab(s) orally once a day (21 Jun 2023 06:07)  One A Day Women&#x27;s Complete oral tablet: 1 tab(s) orally once a day (21 Jun 2023 06:03)  pantoprazole 40 mg oral delayed release tablet: 1 tab(s) orally once a day (21 Jun 2023 06:07)  polyethylene glycol 3350 oral powder for reconstitution: 17 gram(s) orally once a day as needed for  constipation (21 Jun 2023 06:07)  rosuvastatin 20 mg oral tablet: 1 tab(s) orally once a day (21 Jun 2023 06:03)  senna leaf extract oral tablet: 2 tab(s) orally once a day (at bedtime) as needed for  constipation (21 Jun 2023 06:07)  Ventolin HFA 90 mcg/inh inhalation aerosol: 2 puff(s) inhaled every 6 hours, As Needed (21 Jun 2023 06:03)  Vitamin B12: 5000 microgram(s) sublingual once a day (21 Jun 2023 06:03)  Vitamin D3 50 mcg (2000 intl units) oral tablet: 1 tab(s) orally once a day (21 Jun 2023 06:03)    Allergies    No Known Allergies    Intolerances    REVIEW OF SYSTEMS:  Constitutional: No fevers, chills, weight loss, weight gain  HEENT: No vision problems, eye pain, nasal congestion, rhinorrhea, sore throat, dysphagia  CV: No chest pain, orthopnea, palpitations  Resp: No cough, dyspnea, wheezing, hemoptysis  GI: No nausea, vomiting, diarrhea, constipation, abdominal pain  : [ ] dysuria [ ] nocturia [ ] hematuria [ ] increased urinary frequency  Musculoskeletal: [ ] back pain [ ] myalgias [ ] arthralgias [ ] fracture  Skin: [ ] rash [ ] itch  Neurological: [ ] headache [ ] dizziness [ ] syncope [ ] weakness [ ] numbness  Psychiatric: [ ] anxiety [ ] depression  Endocrine: [ ] diabetes [ ] thyroid problem  Hematologic/Lymphatic: [ ] anemia [ ] bleeding problem  Allergic/Immunologic: [ ] itchy eyes [ ] nasal discharge [ ] hives [ ] angioedema  [ ] All other systems negative  [ ] Unable to assess ROS because ________    OBJECTIVE:  ICU Vital Signs Last 24 Hrs  T(C): 37 (08 Jul 2023 08:00), Max: 37.1 (08 Jul 2023 01:20)  T(F): 98.6 (08 Jul 2023 08:00), Max: 98.7 (08 Jul 2023 01:20)  HR: 96 (08 Jul 2023 08:00) (92 - 174)  BP: 111/62 (08 Jul 2023 08:00) (93/55 - 139/83)  BP(mean): --  ABP: --  ABP(mean): --  RR: 16 (08 Jul 2023 08:00) (16 - 18)  SpO2: 100% (08 Jul 2023 08:00) (100% - 100%)    O2 Parameters below as of 08 Jul 2023 08:00    O2 Flow (L/min): 4    07-07 @ 07:01  -  07-08 @ 07:00  --------------------------------------------------------  IN: 100 mL / OUT: 300 mL / NET: -200 mL    CAPILLARY BLOOD GLUCOSE    POCT Blood Glucose.: 180 mg/dL (08 Jul 2023 10:48)    PHYSICAL EXAM:  General:   HEENT:   Neck:   Chest/Lungs:  Heart:  Abdomen:   Extremities:   Skin:   Neuro:   Psych:     LINES:     HOSPITAL MEDICATIONS:  MEDICATIONS  (STANDING):  aDENosine Injectable (ADENOCARD) 6 milliGRAM(s) IV Push once  albumin human 25% IVPB 100 milliLiter(s) IV Intermittent every 6 hours  aMIOdarone IVPB 150 milliGRAM(s) IV Intermittent once  atorvastatin 80 milliGRAM(s) Oral at bedtime  cholecalciferol 2000 Unit(s) Oral daily  cyanocobalamin 1000 MICROGram(s) Oral daily  diltiazem    Tablet 60 milliGRAM(s) Oral every 6 hours  diltiazem Infusion 15 mG/Hr (15 mL/Hr) IV Continuous <Continuous>  levothyroxine Injectable 56 MICROGram(s) IV Push at bedtime  loratadine 10 milliGRAM(s) Oral daily  misoprostol 200 MICROGram(s) Oral <User Schedule>  multivitamin 1 Tablet(s) Oral daily  mupirocin 2% Ointment 1 Application(s) Topical <User Schedule>  pantoprazole    Tablet 40 milliGRAM(s) Oral before breakfast  piperacillin/tazobactam IVPB.. 3.375 Gram(s) IV Intermittent every 12 hours    MEDICATIONS  (PRN):  acetaminophen     Tablet .. 650 milliGRAM(s) Oral every 6 hours PRN Temp greater or equal to 38C (100.4F), Mild Pain (1 - 3)  albuterol    90 MICROgram(s) HFA Inhaler 2 Puff(s) Inhalation every 6 hours PRN Shortness of Breath and/or Wheezing  HYDROmorphone  Injectable 0.2 milliGRAM(s) IV Push every 4 hours PRN Severe Pain (7 - 10)  lactulose Syrup 10 Gram(s) Oral daily PRN constipation  melatonin 3 milliGRAM(s) Oral at bedtime PRN Sleep  oxyCODONE    IR 5 milliGRAM(s) Oral every 4 hours PRN Moderate Pain (4 - 6)  polyethylene glycol 3350 17 Gram(s) Oral daily PRN for constipation  senna 2 Tablet(s) Oral at bedtime PRN for constipation    LABS:                        7.9    15.97 )-----------( 83       ( 08 Jul 2023 05:50 )             24.3     07-08    136  |  95<L>  |  36<H>  ----------------------------<  132<H>  4.6   |  24  |  2.07<H>    Ca    8.9      08 Jul 2023 05:50  Phos  3.1     07-08  Mg     2.10     07-08    TPro  7.4  /  Alb  4.5  /  TBili  1.6<H>  /  DBili  x   /  AST  112<H>  /  ALT  33  /  AlkPhos  80  07-08    PT/INR - ( 06 Jul 2023 13:15 )   PT: 14.5 sec;   INR: 1.25 ratio      PTT - ( 06 Jul 2023 13:15 )  PTT:28.1 sec    pH, Venous: 7.38 (07-06-23 @ 13:15)  pCO2, Venous: 53 mmHg (07-06-23 @ 13:15)  pO2, Venous: 40 mmHg (07-06-23 @ 13:15)  HCO3, Venous: 31 mmol/L (07-06-23 @ 13:15)    Urinalysis Basic - ( 08 Jul 2023 05:50 )    Color: x / Appearance: x / SG: x / pH: x  Gluc: 132 mg/dL / Ketone: x  / Bili: x / Urobili: x   Blood: x / Protein: x / Nitrite: x   Leuk Esterase: x / RBC: x / WBC x   Sq Epi: x / Non Sq Epi: x / Bacteria: x    CAPILLARY BLOOD GLUCOSE    POCT Blood Glucose.: 180 mg/dL (08 Jul 2023 10:48)  POCT Blood Glucose.: 170 mg/dL (08 Jul 2023 09:11)  POCT Blood Glucose.: 120 mg/dL (07 Jul 2023 21:15)  POCT Blood Glucose.: 126 mg/dL (07 Jul 2023 18:55)  POCT Blood Glucose.: 99 mg/dL (07 Jul 2023 11:32)      RADIOLOGY & ADDITIONAL TESTS:    ASSESSMENT/PLAN:  Mrs. 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     NEURO  #Altered Mental Status  -Patient is significantly altered compared to their baseline   -CT Head ordered to evaluate for stroke, Neuro checks Q4H  -ABG, BMP, and CBC ordered   -Consider EEG   -Follow up Free T4, TSH, Total T3, Ammonia, Cortisol   -IV Zosyn     #Intubated  -Sedated on versed     CARDIAC  #SVT  -RRT 7/6 and 7/8 for multifocal atrial tacycarida,  patient was given adenosine on both events.  -Patient now on Diltiazem drip and Amiodarone   -Monitor Tele  -TTE with normal findings   -Cardiology following would appreciate recs     #TAVR  -Mild aortic regurgitation as per MATTHIEU     PULM  # Pulmonary embolism.   -Pt diagnosed with RUL segmental PE in Aug 2022, now with possible new PE while on Eliquis 2.5 mg BID.  -Hold Eliquis due to bleeding risk and low HgB    #Dyspnea  -Patient is currently intubated, R pleural effusion found on. Currently thoracic surgery following->patient currently has waterseal.   -PE possible but CTA was negative and Lower Ultrasound negative for DVT    #Intubation  -Refer neuro section     RENAL  #[place]  -Likely due poor PO intake and 3rd spacing(hypoalbuminemia), IV albumin can be given if necessary   -Resolving trend via CMP  -Nephrology should keep following     # GI   -NG tube liquid diet     HEME/ONC  #Stage 4 Endometrial cancer.   - Pt with history of stage 4 serous endometrial carcinoma (dx 5/2022) s/p chemo (last in 12/2022) and debulking surgery with POOL/BSO (2/10/23), now recently found to have recurrence of disease with new liver mets, the pt follows with Dr. Alvarado of [facility] [place].   - GOC discussion with pt and family given poor prognosis - pt is fullcode.    #Anemia  -Likely ACD transfuse if HgB<7    #DVT  -Hold AC with bleeding risk and low HgB  ID  #Leukocytosis  -WBC of 16K  -CBC, UCx, BCx ordered '  -Zosyn     ENDO  #Hypothyroidism  -Levothyroxine    Ethics  The patient is full code and a GOC discussion has been had with the family, MOLTS form is up to date and has been filled.     DVT       Problem/Plan - 9:  ·  Problem: CAD (coronary artery disease).   ·  Plan: S/p stent in 3/2021. On ASA and statin.   - C/w ASA 81 mg daily  - C/w atorvastatin 80 mg qHS (interchange for rosuvastatin 20 mg)    SVT - cards f/u. MICU Accept Note    CHIEF COMPLAINT: Altered Mental Status/SVT     HPI: Mrs. Shani Bright is a 88 year old female with a PMH of stage 4 serous endometrial carcinoma (dx 5/2022) s/p chemo (12/2022) and debulking surgery with POOL/BSO (2/10/23), RUL segmental PE (8/2022, on Eliquis), GI bleed (4/2023), CAD s/p stent (3/2021), aortic stenosis s/p TAVR, HTN, HLD, and hypothyroidism who initially presented to the hospital on 6/20/23 for a 3 wk history of generalized weakness.      INTERVAL HISTORY: During the patients hospital interval they were started on AC but that was then stopped because the patient had a RP bleed. The patient has also had an [place], leukocytosis, R PL effusion, and potential PE during this hospitalization stay. The patient has had several rapid responses called: 7/3 and 7/6 for AMS and 7/7 for SVT( ) which was resolved with adenosine. The patient also had a rapid respond call on 7/8 where the patient was significantly altered compared to their baseline and was found to be unresponsive with tachycardia (), the patient was again given adenosine and then subsequently intubated . The patient is presenting to the ICU for further workup of their AMS, SVT, and ventilation management.     PAST MEDICAL & SURGICAL HISTORY:  Endometrial cancer   S4 serous endometrial carcinoma, [facility], chemotherapy  HTN (hypertension)    CAD (coronary artery disease)    S/P AVR    Hypothyroidism    HLD (hyperlipidemia)    S/P AVR (aortic valve replacement)    History of endometrial biopsy    FAMILY HISTORY:  Family history of parotid cancer (Child)    Social History:  Denies any alcohol, tobacco, or illicit drug use.   Ambulates with a rolling walker. (20 Jun 2023 23:40)    HOME MEDICATIONS:  Home Medications:  alendronate 70 mg oral tablet: 1 tab(s) orally once a week (21 Jun 2023 06:03)  apixaban 2.5 mg oral tablet: 1 tab(s) orally 2 times a day (21 Jun 2023 06:06)  aspirin 81 mg oral delayed release tablet: 1 tab(s) orally once a day (21 Jun 2023 06:03)  cetirizine 10 mg oral tablet: 1 orally once a day (21 Jun 2023 06:03)  ezetimibe 10 mg oral tablet: 1 tab(s) orally once a day (21 Jun 2023 06:03)  famotidine 40 mg oral tablet: 1 tab(s) orally once a day (at bedtime) (21 Jun 2023 06:03)  furosemide 20 mg oral tablet: 1 tab(s) orally every 12 hours (21 Jun 2023 06:06)  levothyroxine 75 mcg (0.075 mg) oral tablet: 1 tab(s) orally once a day (21 Jun 2023 06:03)  melatonin 3 mg oral tablet: 1 tab(s) orally once a day (at bedtime) (21 Jun 2023 06:07)  miSOPROStol 200 mcg oral tablet: 1 tab(s) orally once a day (21 Jun 2023 06:07)  One A Day Women&#x27;s Complete oral tablet: 1 tab(s) orally once a day (21 Jun 2023 06:03)  pantoprazole 40 mg oral delayed release tablet: 1 tab(s) orally once a day (21 Jun 2023 06:07)  polyethylene glycol 3350 oral powder for reconstitution: 17 gram(s) orally once a day as needed for  constipation (21 Jun 2023 06:07)  rosuvastatin 20 mg oral tablet: 1 tab(s) orally once a day (21 Jun 2023 06:03)  senna leaf extract oral tablet: 2 tab(s) orally once a day (at bedtime) as needed for  constipation (21 Jun 2023 06:07)  Ventolin HFA 90 mcg/inh inhalation aerosol: 2 puff(s) inhaled every 6 hours, As Needed (21 Jun 2023 06:03)  Vitamin B12: 5000 microgram(s) sublingual once a day (21 Jun 2023 06:03)  Vitamin D3 50 mcg (2000 intl units) oral tablet: 1 tab(s) orally once a day (21 Jun 2023 06:03)    Allergies    No Known Allergies    Intolerances    REVIEW OF SYSTEMS:  Constitutional: No fevers, chills, weight loss, weight gain  HEENT: No vision problems, eye pain, nasal congestion, rhinorrhea, sore throat, dysphagia  CV: No chest pain, orthopnea, palpitations  Resp: No cough, dyspnea, wheezing, hemoptysis  GI: No nausea, vomiting, diarrhea, constipation, abdominal pain  : [ ] dysuria [ ] nocturia [ ] hematuria [ ] increased urinary frequency  Musculoskeletal: [ ] back pain [ ] myalgias [ ] arthralgias [ ] fracture  Skin: [ ] rash [ ] itch  Neurological: [ ] headache [ ] dizziness [ ] syncope [ ] weakness [ ] numbness  Psychiatric: [ ] anxiety [ ] depression  Endocrine: [ ] diabetes [ ] thyroid problem  Hematologic/Lymphatic: [ ] anemia [ ] bleeding problem  Allergic/Immunologic: [ ] itchy eyes [ ] nasal discharge [ ] hives [ ] angioedema  [ ] All other systems negative  [ ] Unable to assess ROS because ________    OBJECTIVE:  ICU Vital Signs Last 24 Hrs  T(C): 37 (08 Jul 2023 08:00), Max: 37.1 (08 Jul 2023 01:20)  T(F): 98.6 (08 Jul 2023 08:00), Max: 98.7 (08 Jul 2023 01:20)  HR: 96 (08 Jul 2023 08:00) (92 - 174)  BP: 111/62 (08 Jul 2023 08:00) (93/55 - 139/83)  BP(mean): --  ABP: --  ABP(mean): --  RR: 16 (08 Jul 2023 08:00) (16 - 18)  SpO2: 100% (08 Jul 2023 08:00) (100% - 100%)    O2 Parameters below as of 08 Jul 2023 08:00    O2 Flow (L/min): 4    07-07 @ 07:01  -  07-08 @ 07:00  --------------------------------------------------------  IN: 100 mL / OUT: 300 mL / NET: -200 mL    CAPILLARY BLOOD GLUCOSE    POCT Blood Glucose.: 180 mg/dL (08 Jul 2023 10:48)    PHYSICAL EXAM:  General:   HEENT:   Neck:   Chest/Lungs:  Heart:  Abdomen:   Extremities:   Skin:   Neuro:   Psych:     LINES:     HOSPITAL MEDICATIONS:  MEDICATIONS  (STANDING):  aDENosine Injectable (ADENOCARD) 6 milliGRAM(s) IV Push once  albumin human 25% IVPB 100 milliLiter(s) IV Intermittent every 6 hours  aMIOdarone IVPB 150 milliGRAM(s) IV Intermittent once  atorvastatin 80 milliGRAM(s) Oral at bedtime  cholecalciferol 2000 Unit(s) Oral daily  cyanocobalamin 1000 MICROGram(s) Oral daily  diltiazem    Tablet 60 milliGRAM(s) Oral every 6 hours  diltiazem Infusion 15 mG/Hr (15 mL/Hr) IV Continuous <Continuous>  levothyroxine Injectable 56 MICROGram(s) IV Push at bedtime  loratadine 10 milliGRAM(s) Oral daily  misoprostol 200 MICROGram(s) Oral <User Schedule>  multivitamin 1 Tablet(s) Oral daily  mupirocin 2% Ointment 1 Application(s) Topical <User Schedule>  pantoprazole    Tablet 40 milliGRAM(s) Oral before breakfast  piperacillin/tazobactam IVPB.. 3.375 Gram(s) IV Intermittent every 12 hours    MEDICATIONS  (PRN):  acetaminophen     Tablet .. 650 milliGRAM(s) Oral every 6 hours PRN Temp greater or equal to 38C (100.4F), Mild Pain (1 - 3)  albuterol    90 MICROgram(s) HFA Inhaler 2 Puff(s) Inhalation every 6 hours PRN Shortness of Breath and/or Wheezing  HYDROmorphone  Injectable 0.2 milliGRAM(s) IV Push every 4 hours PRN Severe Pain (7 - 10)  lactulose Syrup 10 Gram(s) Oral daily PRN constipation  melatonin 3 milliGRAM(s) Oral at bedtime PRN Sleep  oxyCODONE    IR 5 milliGRAM(s) Oral every 4 hours PRN Moderate Pain (4 - 6)  polyethylene glycol 3350 17 Gram(s) Oral daily PRN for constipation  senna 2 Tablet(s) Oral at bedtime PRN for constipation    LABS:                        7.9    15.97 )-----------( 83       ( 08 Jul 2023 05:50 )             24.3     07-08    136  |  95<L>  |  36<H>  ----------------------------<  132<H>  4.6   |  24  |  2.07<H>    Ca    8.9      08 Jul 2023 05:50  Phos  3.1     07-08  Mg     2.10     07-08    TPro  7.4  /  Alb  4.5  /  TBili  1.6<H>  /  DBili  x   /  AST  112<H>  /  ALT  33  /  AlkPhos  80  07-08    PT/INR - ( 06 Jul 2023 13:15 )   PT: 14.5 sec;   INR: 1.25 ratio      PTT - ( 06 Jul 2023 13:15 )  PTT:28.1 sec    pH, Venous: 7.38 (07-06-23 @ 13:15)  pCO2, Venous: 53 mmHg (07-06-23 @ 13:15)  pO2, Venous: 40 mmHg (07-06-23 @ 13:15)  HCO3, Venous: 31 mmol/L (07-06-23 @ 13:15)    Urinalysis Basic - ( 08 Jul 2023 05:50 )    Color: x / Appearance: x / SG: x / pH: x  Gluc: 132 mg/dL / Ketone: x  / Bili: x / Urobili: x   Blood: x / Protein: x / Nitrite: x   Leuk Esterase: x / RBC: x / WBC x   Sq Epi: x / Non Sq Epi: x / Bacteria: x    CAPILLARY BLOOD GLUCOSE    POCT Blood Glucose.: 180 mg/dL (08 Jul 2023 10:48)  POCT Blood Glucose.: 170 mg/dL (08 Jul 2023 09:11)  POCT Blood Glucose.: 120 mg/dL (07 Jul 2023 21:15)  POCT Blood Glucose.: 126 mg/dL (07 Jul 2023 18:55)  POCT Blood Glucose.: 99 mg/dL (07 Jul 2023 11:32)      RADIOLOGY & ADDITIONAL TESTS:    ASSESSMENT/PLAN:  Mrs. 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     NEURO  #Altered Mental Status  -Patient is significantly altered compared to their baseline   -CT Head ordered to evaluate for stroke, Neuro checks Q4H  -ABG, BMP, and CBC ordered   -Consider EEG   -Follow up Free T4, TSH, Total T3, Ammonia, Cortisol   -IV Zosyn     #Intubated  -Sedated on versed     CARDIAC  #SVT  -RRT 7/6 and 7/8 for multifocal atrial tacycarida,  patient was given adenosine on both events.  -Patient now on Diltiazem drip and Amiodarone   -Monitor Tele  -TTE with normal findings   -Cardiology following would appreciate recs     #TAVR  -Mild aortic regurgitation as per MATTHIEU     #Shock of unknown etiology  -Infectious work up being done with BCx, UCx, CBC  -Patient is intubated for respiratory support   -Pressors       PULM  # Pulmonary embolism.   -Pt diagnosed with RUL segmental PE in Aug 2022, now with possible new PE while on Eliquis 2.5 mg BID.  -Hold Eliquis due to bleeding risk and low HgB    #Dyspnea  -Patient is currently intubated, R pleural effusion found on. Currently thoracic surgery following->patient currently has waterseal.   -PE possible but CTA was negative and Lower Ultrasound negative for DVT    #Intubation  -Refer neuro section     RENAL  #[place]  -Likely due poor PO intake and 3rd spacing(hypoalbuminemia), IV albumin can be given if necessary   -Resolving trend via CMP  -Nephrology should keep following     # GI   -NG tube liquid diet     HEME/ONC  #Stage 4 Endometrial cancer.   - Pt with history of stage 4 serous endometrial carcinoma (dx 5/2022) s/p chemo (last in 12/2022) and debulking surgery with POOL/BSO (2/10/23), now recently found to have recurrence of disease with new liver mets, the pt follows with Dr. Alvarado of [facility] [place].   - GOC discussion with pt and family given poor prognosis - pt is fullcode.    #Anemia  -Likely ACD transfuse if HgB<7    #DVT  -Hold AC with bleeding risk and low HgB  ID  #Leukocytosis  -WBC of 16K  -CBC, UCx, BCx ordered '  -Zosyn     ENDO  #Hypothyroidism  -Levothyroxine    Ethics  The patient is full code and a GOC discussion has been had with the family, MOLTS form is up to date and has been filled.     DVT       Problem/Plan - 9:  ·  Problem: CAD (coronary artery disease).   ·  Plan: S/p stent in 3/2021. On ASA and statin.   - C/w ASA 81 mg daily  - C/w atorvastatin 80 mg qHS (interchange for rosuvastatin 20 mg)    SVT - cards f/u. MICU Accept Note    CHIEF COMPLAINT: Altered Mental Status/SVT     HPI: Mrs. Shani Bright is a 88 year old female with a PMH of stage 4 serous endometrial carcinoma (dx 5/2022) s/p chemo (12/2022) and debulking surgery with POOL/BSO (2/10/23), RUL segmental PE (8/2022, on Eliquis), GI bleed (4/2023), CAD s/p stent (3/2021), aortic stenosis s/p TAVR, HTN, HLD, and hypothyroidism who initially presented to the hospital on 6/20/23 for a 3 wk history of generalized weakness.      INTERVAL HISTORY: During the patients hospital interval they were started on AC but that was then stopped because the patient had a RP bleed. The patient has also had an [place], leukocytosis, R PL effusion, and potential PE during this hospitalization stay. The patient has had several rapid responses called: 7/3 and 7/6 for AMS and 7/7 for SVT( ) which was resolved with adenosine. The patient also had a rapid respond call on 7/8 where the patient was significantly altered compared to their baseline and was found to be unresponsive with tachycardia (), the patient was again given adenosine and then subsequently intubated . The patient is presenting to the ICU for further workup of their AMS, SVT, and ventilation management.     PAST MEDICAL & SURGICAL HISTORY:  Endometrial cancer   S4 serous endometrial carcinoma, [facility], chemotherapy  HTN (hypertension)    CAD (coronary artery disease)    S/P AVR    Hypothyroidism    HLD (hyperlipidemia)    S/P AVR (aortic valve replacement)    History of endometrial biopsy    FAMILY HISTORY:  Family history of parotid cancer (Child)    Social History:  Denies any alcohol, tobacco, or illicit drug use.   Ambulates with a rolling walker. (20 Jun 2023 23:40)    HOME MEDICATIONS:  Home Medications:  alendronate 70 mg oral tablet: 1 tab(s) orally once a week (21 Jun 2023 06:03)  apixaban 2.5 mg oral tablet: 1 tab(s) orally 2 times a day (21 Jun 2023 06:06)  aspirin 81 mg oral delayed release tablet: 1 tab(s) orally once a day (21 Jun 2023 06:03)  cetirizine 10 mg oral tablet: 1 orally once a day (21 Jun 2023 06:03)  ezetimibe 10 mg oral tablet: 1 tab(s) orally once a day (21 Jun 2023 06:03)  famotidine 40 mg oral tablet: 1 tab(s) orally once a day (at bedtime) (21 Jun 2023 06:03)  furosemide 20 mg oral tablet: 1 tab(s) orally every 12 hours (21 Jun 2023 06:06)  levothyroxine 75 mcg (0.075 mg) oral tablet: 1 tab(s) orally once a day (21 Jun 2023 06:03)  melatonin 3 mg oral tablet: 1 tab(s) orally once a day (at bedtime) (21 Jun 2023 06:07)  miSOPROStol 200 mcg oral tablet: 1 tab(s) orally once a day (21 Jun 2023 06:07)  One A Day Women&#x27;s Complete oral tablet: 1 tab(s) orally once a day (21 Jun 2023 06:03)  pantoprazole 40 mg oral delayed release tablet: 1 tab(s) orally once a day (21 Jun 2023 06:07)  polyethylene glycol 3350 oral powder for reconstitution: 17 gram(s) orally once a day as needed for  constipation (21 Jun 2023 06:07)  rosuvastatin 20 mg oral tablet: 1 tab(s) orally once a day (21 Jun 2023 06:03)  senna leaf extract oral tablet: 2 tab(s) orally once a day (at bedtime) as needed for  constipation (21 Jun 2023 06:07)  Ventolin HFA 90 mcg/inh inhalation aerosol: 2 puff(s) inhaled every 6 hours, As Needed (21 Jun 2023 06:03)  Vitamin B12: 5000 microgram(s) sublingual once a day (21 Jun 2023 06:03)  Vitamin D3 50 mcg (2000 intl units) oral tablet: 1 tab(s) orally once a day (21 Jun 2023 06:03)    Allergies    No Known Allergies    Intolerances    REVIEW OF SYSTEMS:  Constitutional: No fevers, chills, weight loss, weight gain  HEENT: No vision problems, eye pain, nasal congestion, rhinorrhea, sore throat, dysphagia  CV: No chest pain, orthopnea, palpitations  Resp: No cough, dyspnea, wheezing, hemoptysis  GI: No nausea, vomiting, diarrhea, constipation, abdominal pain  : [ ] dysuria [ ] nocturia [ ] hematuria [ ] increased urinary frequency  Musculoskeletal: [ ] back pain [ ] myalgias [ ] arthralgias [ ] fracture  Skin: [ ] rash [ ] itch  Neurological: [ ] headache [ ] dizziness [ ] syncope [ ] weakness [ ] numbness  Psychiatric: [ ] anxiety [ ] depression  Endocrine: [ ] diabetes [ ] thyroid problem  Hematologic/Lymphatic: [ ] anemia [ ] bleeding problem  Allergic/Immunologic: [ ] itchy eyes [ ] nasal discharge [ ] hives [ ] angioedema  [ ] All other systems negative  [ ] Unable to assess ROS because ________    OBJECTIVE:  ICU Vital Signs Last 24 Hrs  T(C): 37 (08 Jul 2023 08:00), Max: 37.1 (08 Jul 2023 01:20)  T(F): 98.6 (08 Jul 2023 08:00), Max: 98.7 (08 Jul 2023 01:20)  HR: 96 (08 Jul 2023 08:00) (92 - 174)  BP: 111/62 (08 Jul 2023 08:00) (93/55 - 139/83)  BP(mean): --  ABP: --  ABP(mean): --  RR: 16 (08 Jul 2023 08:00) (16 - 18)  SpO2: 100% (08 Jul 2023 08:00) (100% - 100%)    O2 Parameters below as of 08 Jul 2023 08:00    O2 Flow (L/min): 4    07-07 @ 07:01  -  07-08 @ 07:00  --------------------------------------------------------  IN: 100 mL / OUT: 300 mL / NET: -200 mL    CAPILLARY BLOOD GLUCOSE    POCT Blood Glucose.: 180 mg/dL (08 Jul 2023 10:48)    PHYSICAL EXAM:  General:   HEENT:   Neck:   Chest/Lungs:  Heart:  Abdomen:   Extremities:   Skin:   Neuro:   Psych:     LINES:     HOSPITAL MEDICATIONS:  MEDICATIONS  (STANDING):  aDENosine Injectable (ADENOCARD) 6 milliGRAM(s) IV Push once  albumin human 25% IVPB 100 milliLiter(s) IV Intermittent every 6 hours  aMIOdarone IVPB 150 milliGRAM(s) IV Intermittent once  atorvastatin 80 milliGRAM(s) Oral at bedtime  cholecalciferol 2000 Unit(s) Oral daily  cyanocobalamin 1000 MICROGram(s) Oral daily  diltiazem    Tablet 60 milliGRAM(s) Oral every 6 hours  diltiazem Infusion 15 mG/Hr (15 mL/Hr) IV Continuous <Continuous>  levothyroxine Injectable 56 MICROGram(s) IV Push at bedtime  loratadine 10 milliGRAM(s) Oral daily  misoprostol 200 MICROGram(s) Oral <User Schedule>  multivitamin 1 Tablet(s) Oral daily  mupirocin 2% Ointment 1 Application(s) Topical <User Schedule>  pantoprazole    Tablet 40 milliGRAM(s) Oral before breakfast  piperacillin/tazobactam IVPB.. 3.375 Gram(s) IV Intermittent every 12 hours    MEDICATIONS  (PRN):  acetaminophen     Tablet .. 650 milliGRAM(s) Oral every 6 hours PRN Temp greater or equal to 38C (100.4F), Mild Pain (1 - 3)  albuterol    90 MICROgram(s) HFA Inhaler 2 Puff(s) Inhalation every 6 hours PRN Shortness of Breath and/or Wheezing  HYDROmorphone  Injectable 0.2 milliGRAM(s) IV Push every 4 hours PRN Severe Pain (7 - 10)  lactulose Syrup 10 Gram(s) Oral daily PRN constipation  melatonin 3 milliGRAM(s) Oral at bedtime PRN Sleep  oxyCODONE    IR 5 milliGRAM(s) Oral every 4 hours PRN Moderate Pain (4 - 6)  polyethylene glycol 3350 17 Gram(s) Oral daily PRN for constipation  senna 2 Tablet(s) Oral at bedtime PRN for constipation    LABS:                        7.9    15.97 )-----------( 83       ( 08 Jul 2023 05:50 )             24.3     07-08    136  |  95<L>  |  36<H>  ----------------------------<  132<H>  4.6   |  24  |  2.07<H>    Ca    8.9      08 Jul 2023 05:50  Phos  3.1     07-08  Mg     2.10     07-08    TPro  7.4  /  Alb  4.5  /  TBili  1.6<H>  /  DBili  x   /  AST  112<H>  /  ALT  33  /  AlkPhos  80  07-08    PT/INR - ( 06 Jul 2023 13:15 )   PT: 14.5 sec;   INR: 1.25 ratio      PTT - ( 06 Jul 2023 13:15 )  PTT:28.1 sec    pH, Venous: 7.38 (07-06-23 @ 13:15)  pCO2, Venous: 53 mmHg (07-06-23 @ 13:15)  pO2, Venous: 40 mmHg (07-06-23 @ 13:15)  HCO3, Venous: 31 mmol/L (07-06-23 @ 13:15)    Urinalysis Basic - ( 08 Jul 2023 05:50 )    Color: x / Appearance: x / SG: x / pH: x  Gluc: 132 mg/dL / Ketone: x  / Bili: x / Urobili: x   Blood: x / Protein: x / Nitrite: x   Leuk Esterase: x / RBC: x / WBC x   Sq Epi: x / Non Sq Epi: x / Bacteria: x    CAPILLARY BLOOD GLUCOSE    POCT Blood Glucose.: 180 mg/dL (08 Jul 2023 10:48)  POCT Blood Glucose.: 170 mg/dL (08 Jul 2023 09:11)  POCT Blood Glucose.: 120 mg/dL (07 Jul 2023 21:15)  POCT Blood Glucose.: 126 mg/dL (07 Jul 2023 18:55)  POCT Blood Glucose.: 99 mg/dL (07 Jul 2023 11:32)      RADIOLOGY & ADDITIONAL TESTS:    ASSESSMENT/PLAN:  Mrs. 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     NEURO  #Altered Mental Status  -Patient is significantly altered compared to their baseline   -CT Head ordered to evaluate for stroke, Neuro checks Q4H->Contact neurology would appreciate recommendations  -ABG, BMP, and CBC ordered   -Consider EEG   -Follow up Free T4, TSH, Total T3, Ammonia, Cortisol   -IV Zosyn     #Intubated  -Sedated on versed     CARDIAC  #SVT  -RRT 7/6 and 7/8 for multifocal atrial tacycarida,  patient was given adenosine on both events.  -Patient now on Diltiazem drip and Amiodarone   -Monitor Tele  -TTE with normal findings, Bedside POCUS showed reduced EF    -Cardiology following would appreciate recs     #TAVR  -Mild aortic regurgitation as per MATTHIEU     #Shock of unknown etiology  -Septic vs Vasoplegic shock  -Infectious work up being done with BCx, UCx, CBC  -Patient is intubated for respiratory support   -Pressors     PULM  # Pulmonary embolism.   -Pt diagnosed with RUL segmental PE in Aug 2022, now with possible new PE while on Eliquis 2.5 mg BID.  -Hold Eliquis due to bleeding risk and low HgB    #Dyspnea  -Patient is currently intubated, R pleural effusion found on. Currently thoracic surgery following->patient currently has waterseal.   -PE possible but CTA was negative and Lower Ultrasound negative for DVT    #Intubation  -Refer neuro section     RENAL  #[place]  -Likely due poor PO intake and 3rd spacing(hypoalbuminemia), IV albumin can be given if necessary   -Resolving trend via CMP  -Nephrology should keep following     # GI   -NG tube liquid diet     HEME/ONC  #Stage 4 Endometrial cancer.   - Pt with history of stage 4 serous endometrial carcinoma (dx 5/2022) s/p chemo (last in 12/2022) and debulking surgery with POOL/BSO (2/10/23), now recently found to have recurrence of disease with new liver mets, the pt follows with Dr. Alvarado of [facility] [place].   - GOC discussion with pt and family given poor prognosis - pt is fullcode.    #Anemia  -Likely ACD transfuse if HgB<7    #DVT  -Hold AC with bleeding risk and low HgB  ID  #Leukocytosis  -WBC of 16K  -CBC, UCx, BCx ordered '  -Vancomycin and Cefipime    ENDO  #Hypothyroidism  -Levothyroxine    Ethics  The patient is full code and a GOC discussion has been had with the family, MOLTS form is up to date and has been filled. MICU Accept Note    CHIEF COMPLAINT: Altered Mental Status/SVT     HPI: Mrs. Shani Bright is a 88 year old female with a PMH of stage 4 serous endometrial carcinoma (dx 5/2022) s/p chemo (12/2022) and debulking surgery with POOL/BSO (2/10/23), RUL segmental PE (8/2022, on Eliquis), GI bleed (4/2023), CAD s/p stent (3/2021), aortic stenosis s/p TAVR, HTN, HLD, and hypothyroidism who initially presented to the hospital on 6/20/23 for a 3 wk history of generalized weakness.      INTERVAL HISTORY: During the patients hospital interval they were started on AC but that was then stopped because the patient had a RP bleed. The patient has also had an [place], leukocytosis, R PL effusion, and potential PE during this hospitalization stay. The patient has had several rapid responses called: 7/3 and 7/6 for AMS and 7/7 for SVT( ) which was resolved with adenosine. The patient also had a rapid respond call on 7/8 where the patient was significantly altered compared to their baseline and was found to be unresponsive with tachycardia (), the patient was again given adenosine and then subsequently intubated . The patient is presenting to the ICU for further workup of their AMS, SVT, and ventilation management.     PAST MEDICAL & SURGICAL HISTORY:  Endometrial cancer   S4 serous endometrial carcinoma, [facility], chemotherapy  HTN (hypertension)    CAD (coronary artery disease)    S/P AVR    Hypothyroidism    HLD (hyperlipidemia)    S/P AVR (aortic valve replacement)    History of endometrial biopsy    FAMILY HISTORY:  Family history of parotid cancer (Child)    Social History:  Denies any alcohol, tobacco, or illicit drug use.   Ambulates with a rolling walker. (20 Jun 2023 23:40)    HOME MEDICATIONS:  Home Medications:  alendronate 70 mg oral tablet: 1 tab(s) orally once a week (21 Jun 2023 06:03)  apixaban 2.5 mg oral tablet: 1 tab(s) orally 2 times a day (21 Jun 2023 06:06)  aspirin 81 mg oral delayed release tablet: 1 tab(s) orally once a day (21 Jun 2023 06:03)  cetirizine 10 mg oral tablet: 1 orally once a day (21 Jun 2023 06:03)  ezetimibe 10 mg oral tablet: 1 tab(s) orally once a day (21 Jun 2023 06:03)  famotidine 40 mg oral tablet: 1 tab(s) orally once a day (at bedtime) (21 Jun 2023 06:03)  furosemide 20 mg oral tablet: 1 tab(s) orally every 12 hours (21 Jun 2023 06:06)  levothyroxine 75 mcg (0.075 mg) oral tablet: 1 tab(s) orally once a day (21 Jun 2023 06:03)  melatonin 3 mg oral tablet: 1 tab(s) orally once a day (at bedtime) (21 Jun 2023 06:07)  miSOPROStol 200 mcg oral tablet: 1 tab(s) orally once a day (21 Jun 2023 06:07)  One A Day Women&#x27;s Complete oral tablet: 1 tab(s) orally once a day (21 Jun 2023 06:03)  pantoprazole 40 mg oral delayed release tablet: 1 tab(s) orally once a day (21 Jun 2023 06:07)  polyethylene glycol 3350 oral powder for reconstitution: 17 gram(s) orally once a day as needed for  constipation (21 Jun 2023 06:07)  rosuvastatin 20 mg oral tablet: 1 tab(s) orally once a day (21 Jun 2023 06:03)  senna leaf extract oral tablet: 2 tab(s) orally once a day (at bedtime) as needed for  constipation (21 Jun 2023 06:07)  Ventolin HFA 90 mcg/inh inhalation aerosol: 2 puff(s) inhaled every 6 hours, As Needed (21 Jun 2023 06:03)  Vitamin B12: 5000 microgram(s) sublingual once a day (21 Jun 2023 06:03)  Vitamin D3 50 mcg (2000 intl units) oral tablet: 1 tab(s) orally once a day (21 Jun 2023 06:03)    Allergies    No Known Allergies    Intolerances    REVIEW OF SYSTEMS:  [X] Unable to assess ROS due to AMS    OBJECTIVE:  ICU Vital Signs Last 24 Hrs  T(C): 37 (08 Jul 2023 08:00), Max: 37.1 (08 Jul 2023 01:20)  T(F): 98.6 (08 Jul 2023 08:00), Max: 98.7 (08 Jul 2023 01:20)  HR: 96 (08 Jul 2023 08:00) (92 - 174)  BP: 111/62 (08 Jul 2023 08:00) (93/55 - 139/83)  BP(mean): --  ABP: --  ABP(mean): --  RR: 16 (08 Jul 2023 08:00) (16 - 18)  SpO2: 100% (08 Jul 2023 08:00) (100% - 100%)    O2 Parameters below as of 08 Jul 2023 08:00    O2 Flow (L/min): 4    07-07 @ 07:01  -  07-08 @ 07:00  --------------------------------------------------------  IN: 100 mL / OUT: 300 mL / NET: -200 mL    CAPILLARY BLOOD GLUCOSE    POCT Blood Glucose.: 180 mg/dL (08 Jul 2023 10:48)    Physical Exam:  General: intubated, sedated  Eyes: PERRLA, EOMI; conjunctiva and sclera clear  HEENT: NCAT  Neck: Supple, no cervical LAD, full ROM  Respiratory: No chest wall deformity, CTABL anteriorly, no wheezes, rales, or rhonchi  Cardiovascular: RRR, +S1/S2, no murmurs, rubs, or gallops. 2+ upper and lower pulses b/l  Abdominal: Normal BS, soft, non-tender, non-distended, no rebound or guarding, no HSM, no masses  Extremities: no pitting edema  Neurological: sedated, unresponsive  Skin: WWP. No rashes or lesions      LINES:     HOSPITAL MEDICATIONS:  MEDICATIONS  (STANDING):  aDENosine Injectable (ADENOCARD) 6 milliGRAM(s) IV Push once  albumin human 25% IVPB 100 milliLiter(s) IV Intermittent every 6 hours  aMIOdarone IVPB 150 milliGRAM(s) IV Intermittent once  atorvastatin 80 milliGRAM(s) Oral at bedtime  cholecalciferol 2000 Unit(s) Oral daily  cyanocobalamin 1000 MICROGram(s) Oral daily  diltiazem    Tablet 60 milliGRAM(s) Oral every 6 hours  diltiazem Infusion 15 mG/Hr (15 mL/Hr) IV Continuous <Continuous>  levothyroxine Injectable 56 MICROGram(s) IV Push at bedtime  loratadine 10 milliGRAM(s) Oral daily  misoprostol 200 MICROGram(s) Oral <User Schedule>  multivitamin 1 Tablet(s) Oral daily  mupirocin 2% Ointment 1 Application(s) Topical <User Schedule>  pantoprazole    Tablet 40 milliGRAM(s) Oral before breakfast  piperacillin/tazobactam IVPB.. 3.375 Gram(s) IV Intermittent every 12 hours    MEDICATIONS  (PRN):  acetaminophen     Tablet .. 650 milliGRAM(s) Oral every 6 hours PRN Temp greater or equal to 38C (100.4F), Mild Pain (1 - 3)  albuterol    90 MICROgram(s) HFA Inhaler 2 Puff(s) Inhalation every 6 hours PRN Shortness of Breath and/or Wheezing  HYDROmorphone  Injectable 0.2 milliGRAM(s) IV Push every 4 hours PRN Severe Pain (7 - 10)  lactulose Syrup 10 Gram(s) Oral daily PRN constipation  melatonin 3 milliGRAM(s) Oral at bedtime PRN Sleep  oxyCODONE    IR 5 milliGRAM(s) Oral every 4 hours PRN Moderate Pain (4 - 6)  polyethylene glycol 3350 17 Gram(s) Oral daily PRN for constipation  senna 2 Tablet(s) Oral at bedtime PRN for constipation    LABS:                        7.9    15.97 )-----------( 83       ( 08 Jul 2023 05:50 )             24.3     07-08    136  |  95<L>  |  36<H>  ----------------------------<  132<H>  4.6   |  24  |  2.07<H>    Ca    8.9      08 Jul 2023 05:50  Phos  3.1     07-08  Mg     2.10     07-08    TPro  7.4  /  Alb  4.5  /  TBili  1.6<H>  /  DBili  x   /  AST  112<H>  /  ALT  33  /  AlkPhos  80  07-08    PT/INR - ( 06 Jul 2023 13:15 )   PT: 14.5 sec;   INR: 1.25 ratio      PTT - ( 06 Jul 2023 13:15 )  PTT:28.1 sec    pH, Venous: 7.38 (07-06-23 @ 13:15)  pCO2, Venous: 53 mmHg (07-06-23 @ 13:15)  pO2, Venous: 40 mmHg (07-06-23 @ 13:15)  HCO3, Venous: 31 mmol/L (07-06-23 @ 13:15)    Urinalysis Basic - ( 08 Jul 2023 05:50 )    Color: x / Appearance: x / SG: x / pH: x  Gluc: 132 mg/dL / Ketone: x  / Bili: x / Urobili: x   Blood: x / Protein: x / Nitrite: x   Leuk Esterase: x / RBC: x / WBC x   Sq Epi: x / Non Sq Epi: x / Bacteria: x    CAPILLARY BLOOD GLUCOSE    POCT Blood Glucose.: 180 mg/dL (08 Jul 2023 10:48)  POCT Blood Glucose.: 170 mg/dL (08 Jul 2023 09:11)  POCT Blood Glucose.: 120 mg/dL (07 Jul 2023 21:15)  POCT Blood Glucose.: 126 mg/dL (07 Jul 2023 18:55)  POCT Blood Glucose.: 99 mg/dL (07 Jul 2023 11:32)      RADIOLOGY & ADDITIONAL TESTS:    ASSESSMENT/PLAN:  Mrs. 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     NEURO  #Altered Mental Status  -Patient is significantly altered compared to their baseline   -CT Head ordered to evaluate for stroke, Neuro checks Q4H->Contact neurology would appreciate recommendations  -ABG, BMP, and CBC ordered   -Consider EEG   -Follow up Free T4, TSH, Total T3, Ammonia, Cortisol   - start vanco and zosyn    #Intubated  -Sedated on versed and propofol  - monitor mental status during weaning     CARDIAC  #SVT  -RRT 7/6 and 7/8 for multifocal atrial tacycarida,  patient was given adenosine on both events.  - s/p diltiazem gtt, will trial diltiazem 60 Q6 via OGT  -Monitor Tele  -TTE with normal findings, Bedside POCUS showed reduced EF    -Cardiology following would appreciate recs     #TAVR  -Mild aortic regurgitation as per MATTHIEU     #Shock of unknown etiology  -Septic vs Vasoplegic shock  -Infectious work up being done with BCx, UCx, CBC  -Patient is intubated for respiratory support   - on levophed, wean as tolerated  - start vanco and cefepime    PULM  # Pulmonary embolism.   -Pt diagnosed with RUL segmental PE in Aug 2022, now with possible new PE while on Eliquis 2.5 mg BID.  -Hold Eliquis due to bleeding risk and low HgB  - Lower Ultrasound negative for DVT    #Malignant pleural effusion  - R chest tube to waterseal for significant R pleural effusion  - f/u repeat CT chest    #Intubation  - see above  - monitor VBGs/ABGs    RENAL  #[place]  -DDx: poor PO intake and 3rd spacing (hypoalbuminemia vs sepsis  -Resolving, trend via CMP    # GI   - OGT  - will start liquid diet     HEME/ONC  #Stage 4 Endometrial cancer.   - Pt with history of stage 4 serous endometrial carcinoma (dx 5/2022) s/p chemo (last in 12/2022) and debulking surgery with POOL/BSO (2/10/23), now recently found to have recurrence of disease with new liver mets, the pt follows with Dr. Alvarado of [facility] [place].   - GOC discussion with pt and family given poor prognosis - pt is fullcode.    #Anemia  -Likely ACD transfuse if HgB<7    #DVT  -Hold AC with bleeding risk and low HgB  ID  #Leukocytosis  -WBC of 16K  -CBC, UCx, BCx ordered '  -Vancomycin and Cefipime    ENDO  #Hypothyroidism  -Levothyroxine    Ethics  The patient is full code and a GOC discussion has been had with the family, MOLST form is up to date and has been filled. MICU Accept Note    CHIEF COMPLAINT: Altered Mental Status/SVT     HPI: Mrs. Shani Bright is a 88 year old female with a PMH of stage 4 serous endometrial carcinoma (dx 5/2022) s/p chemo (12/2022) and debulking surgery with POOL/BSO (2/10/23), RUL segmental PE (8/2022, on Eliquis), GI bleed (4/2023), CAD s/p stent (3/2021), aortic stenosis s/p TAVR, HTN, HLD, and hypothyroidism who initially presented to the hospital on 6/20/23 for a 3 wk history of generalized weakness.      INTERVAL HISTORY: During the patients hospital interval they were started on AC but that was then stopped because the patient had a RP bleed. The patient has also had an [place], leukocytosis, R PL effusion, and potential PE during this hospitalization stay. The patient has had several rapid responses called: 7/3 and 7/6 for AMS and 7/7 for SVT( ) which was resolved with adenosine. The patient also had a rapid respond call on 7/8 where the patient was significantly altered compared to their baseline and was found to be unresponsive with tachycardia (), the patient was again given adenosine and then subsequently intubated . The patient is presenting to the ICU for further workup of their AMS, SVT, and ventilation management.     PAST MEDICAL & SURGICAL HISTORY:  Endometrial cancer   S4 serous endometrial carcinoma, [facility], chemotherapy  HTN (hypertension)    CAD (coronary artery disease)    S/P AVR    Hypothyroidism    HLD (hyperlipidemia)    S/P AVR (aortic valve replacement)    History of endometrial biopsy    FAMILY HISTORY:  Family history of parotid cancer (Child)    Social History:  Denies any alcohol, tobacco, or illicit drug use.   Ambulates with a rolling walker. (20 Jun 2023 23:40)    HOME MEDICATIONS:  Home Medications:  alendronate 70 mg oral tablet: 1 tab(s) orally once a week (21 Jun 2023 06:03)  apixaban 2.5 mg oral tablet: 1 tab(s) orally 2 times a day (21 Jun 2023 06:06)  aspirin 81 mg oral delayed release tablet: 1 tab(s) orally once a day (21 Jun 2023 06:03)  cetirizine 10 mg oral tablet: 1 orally once a day (21 Jun 2023 06:03)  ezetimibe 10 mg oral tablet: 1 tab(s) orally once a day (21 Jun 2023 06:03)  famotidine 40 mg oral tablet: 1 tab(s) orally once a day (at bedtime) (21 Jun 2023 06:03)  furosemide 20 mg oral tablet: 1 tab(s) orally every 12 hours (21 Jun 2023 06:06)  levothyroxine 75 mcg (0.075 mg) oral tablet: 1 tab(s) orally once a day (21 Jun 2023 06:03)  melatonin 3 mg oral tablet: 1 tab(s) orally once a day (at bedtime) (21 Jun 2023 06:07)  miSOPROStol 200 mcg oral tablet: 1 tab(s) orally once a day (21 Jun 2023 06:07)  One A Day Women&#x27;s Complete oral tablet: 1 tab(s) orally once a day (21 Jun 2023 06:03)  pantoprazole 40 mg oral delayed release tablet: 1 tab(s) orally once a day (21 Jun 2023 06:07)  polyethylene glycol 3350 oral powder for reconstitution: 17 gram(s) orally once a day as needed for  constipation (21 Jun 2023 06:07)  rosuvastatin 20 mg oral tablet: 1 tab(s) orally once a day (21 Jun 2023 06:03)  senna leaf extract oral tablet: 2 tab(s) orally once a day (at bedtime) as needed for  constipation (21 Jun 2023 06:07)  Ventolin HFA 90 mcg/inh inhalation aerosol: 2 puff(s) inhaled every 6 hours, As Needed (21 Jun 2023 06:03)  Vitamin B12: 5000 microgram(s) sublingual once a day (21 Jun 2023 06:03)  Vitamin D3 50 mcg (2000 intl units) oral tablet: 1 tab(s) orally once a day (21 Jun 2023 06:03)    Allergies    No Known Allergies    Intolerances    REVIEW OF SYSTEMS:  [X] Unable to assess ROS due to AMS    OBJECTIVE:  ICU Vital Signs Last 24 Hrs  T(C): 37 (08 Jul 2023 08:00), Max: 37.1 (08 Jul 2023 01:20)  T(F): 98.6 (08 Jul 2023 08:00), Max: 98.7 (08 Jul 2023 01:20)  HR: 96 (08 Jul 2023 08:00) (92 - 174)  BP: 111/62 (08 Jul 2023 08:00) (93/55 - 139/83)  BP(mean): --  ABP: --  ABP(mean): --  RR: 16 (08 Jul 2023 08:00) (16 - 18)  SpO2: 100% (08 Jul 2023 08:00) (100% - 100%)    O2 Parameters below as of 08 Jul 2023 08:00    O2 Flow (L/min): 4    07-07 @ 07:01  -  07-08 @ 07:00  --------------------------------------------------------  IN: 100 mL / OUT: 300 mL / NET: -200 mL    CAPILLARY BLOOD GLUCOSE    POCT Blood Glucose.: 180 mg/dL (08 Jul 2023 10:48)    Physical Exam:  General: intubated, sedated  Eyes: PERRLA, EOMI; conjunctiva and sclera clear  HEENT: NCAT  Neck: Supple, no cervical LAD, full ROM  Respiratory: No chest wall deformity, CTABL anteriorly, no wheezes, rales, or rhonchi  Cardiovascular: RRR, +S1/S2, no murmurs, rubs, or gallops. 2+ upper and lower pulses b/l  Abdominal: Normal BS, soft, non-tender, non-distended, no rebound or guarding, no HSM, no masses  Extremities: no pitting edema  Neurological: sedated, unresponsive  Skin: WWP. No rashes or lesions      LINES:     HOSPITAL MEDICATIONS:  MEDICATIONS  (STANDING):  aDENosine Injectable (ADENOCARD) 6 milliGRAM(s) IV Push once  albumin human 25% IVPB 100 milliLiter(s) IV Intermittent every 6 hours  aMIOdarone IVPB 150 milliGRAM(s) IV Intermittent once  atorvastatin 80 milliGRAM(s) Oral at bedtime  cholecalciferol 2000 Unit(s) Oral daily  cyanocobalamin 1000 MICROGram(s) Oral daily  diltiazem    Tablet 60 milliGRAM(s) Oral every 6 hours  diltiazem Infusion 15 mG/Hr (15 mL/Hr) IV Continuous <Continuous>  levothyroxine Injectable 56 MICROGram(s) IV Push at bedtime  loratadine 10 milliGRAM(s) Oral daily  misoprostol 200 MICROGram(s) Oral <User Schedule>  multivitamin 1 Tablet(s) Oral daily  mupirocin 2% Ointment 1 Application(s) Topical <User Schedule>  pantoprazole    Tablet 40 milliGRAM(s) Oral before breakfast  piperacillin/tazobactam IVPB.. 3.375 Gram(s) IV Intermittent every 12 hours    MEDICATIONS  (PRN):  acetaminophen     Tablet .. 650 milliGRAM(s) Oral every 6 hours PRN Temp greater or equal to 38C (100.4F), Mild Pain (1 - 3)  albuterol    90 MICROgram(s) HFA Inhaler 2 Puff(s) Inhalation every 6 hours PRN Shortness of Breath and/or Wheezing  HYDROmorphone  Injectable 0.2 milliGRAM(s) IV Push every 4 hours PRN Severe Pain (7 - 10)  lactulose Syrup 10 Gram(s) Oral daily PRN constipation  melatonin 3 milliGRAM(s) Oral at bedtime PRN Sleep  oxyCODONE    IR 5 milliGRAM(s) Oral every 4 hours PRN Moderate Pain (4 - 6)  polyethylene glycol 3350 17 Gram(s) Oral daily PRN for constipation  senna 2 Tablet(s) Oral at bedtime PRN for constipation    LABS:                        7.9    15.97 )-----------( 83       ( 08 Jul 2023 05:50 )             24.3     07-08    136  |  95<L>  |  36<H>  ----------------------------<  132<H>  4.6   |  24  |  2.07<H>    Ca    8.9      08 Jul 2023 05:50  Phos  3.1     07-08  Mg     2.10     07-08    TPro  7.4  /  Alb  4.5  /  TBili  1.6<H>  /  DBili  x   /  AST  112<H>  /  ALT  33  /  AlkPhos  80  07-08    PT/INR - ( 06 Jul 2023 13:15 )   PT: 14.5 sec;   INR: 1.25 ratio      PTT - ( 06 Jul 2023 13:15 )  PTT:28.1 sec    pH, Venous: 7.38 (07-06-23 @ 13:15)  pCO2, Venous: 53 mmHg (07-06-23 @ 13:15)  pO2, Venous: 40 mmHg (07-06-23 @ 13:15)  HCO3, Venous: 31 mmol/L (07-06-23 @ 13:15)    Urinalysis Basic - ( 08 Jul 2023 05:50 )    Color: x / Appearance: x / SG: x / pH: x  Gluc: 132 mg/dL / Ketone: x  / Bili: x / Urobili: x   Blood: x / Protein: x / Nitrite: x   Leuk Esterase: x / RBC: x / WBC x   Sq Epi: x / Non Sq Epi: x / Bacteria: x    CAPILLARY BLOOD GLUCOSE    POCT Blood Glucose.: 180 mg/dL (08 Jul 2023 10:48)  POCT Blood Glucose.: 170 mg/dL (08 Jul 2023 09:11)  POCT Blood Glucose.: 120 mg/dL (07 Jul 2023 21:15)  POCT Blood Glucose.: 126 mg/dL (07 Jul 2023 18:55)  POCT Blood Glucose.: 99 mg/dL (07 Jul 2023 11:32)      RADIOLOGY & ADDITIONAL TESTS:    ASSESSMENT/PLAN:  Mrs. 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     NEURO  #Altered Mental Status  -Patient is significantly altered compared to their baseline   -CT Head ordered to evaluate for stroke, Neuro checks Q4H->Contact neurology would appreciate recommendations  -ABG, BMP, and CBC ordered   -Consider EEG   -Follow up Free T4, TSH, Total T3, Ammonia, Cortisol   - start vanco and zosyn    #Intubated  -Sedated on versed and propofol  - monitor mental status during weaning     CARDIAC  #SVT  -RRT 7/6 and 7/8 for multifocal atrial tacycarida,  patient was given adenosine on both events.  - s/p diltiazem gtt, will trial diltiazem 60 Q6 via OGT  -Monitor Tele  -TTE with normal findings, Bedside POCUS showed reduced EF    -Cardiology following would appreciate recs     #TAVR  -Mild aortic regurgitation as per MATTHIEU     #Shock of unknown etiology  -Septic vs Vasoplegic shock  -Infectious work up being done with BCx, UCx, CBC  -Patient is intubated for respiratory support   - on levophed, wean as tolerated  - start vanco and cefepime    PULM  # Pulmonary embolism.   -Pt diagnosed with RUL segmental PE in Aug 2022, now with possible new PE while on Eliquis 2.5 mg BID.  -Hold Eliquis due to bleeding risk and low HgB  - Lower Ultrasound negative for DVT    #Malignant pleural effusion  - R chest tube to waterseal for significant R pleural effusion  - f/u repeat CT chest    #Intubation  - see above  - monitor VBGs/ABGs    RENAL  #[place]  -DDx: poor PO intake and 3rd spacing (hypoalbuminemia vs sepsis  -Resolving, trend via CMP    # GI   - OGT  - will start liquid diet     HEME/ONC  #Stage 4 Endometrial cancer.   - Pt with history of stage 4 serous endometrial carcinoma (dx 5/2022) s/p chemo (last in 12/2022) and debulking surgery with POOL/BSO (2/10/23), now recently found to have recurrence of disease with new liver mets, the pt follows with Dr. Alvarado of [facility].   - GOC discussion with pt and family given poor prognosis - pt is fullcode.    #Anemia  -Likely ACD transfuse if HgB<7    #DVT  -Hold AC with bleeding risk and low HgB  ID  #Leukocytosis  -WBC of 16K  -CBC, UCx, BCx ordered '  -Vancomycin and Cefipime    ENDO  #Hypothyroidism  -Levothyroxine    Ethics  The patient is full code and a GOC discussion has been had with the family, MOLST form is up to date and has been filled.      ############ATTENDING ATTESTATION#####################        Patient is a 87 yo F w/ Stage 4 serous Endometrial carcinoma (dx 5/2022) s/p chemo (12/2022) s/p POOL/BSO (2/10/23), PE (8/2022, on Eliquis), GI bleed (4/2023), CAD s/p stent (3/2021), AS s/p TAVR, HTN, HLD, and hypothyroidism who was initially admitted on 6/20 after p/w weakness. Hospital course c/b RP bleed now off AC, malignant pleural effusion, several RRTs for AMS on 7/3 and 7/6 as well as for SVT on 7/7. Patient transferred to MICU after RRT for SVT and AMS again on 7/8.    #AMS - Unclear etiology. Recurrent this hospital stay. CTH on arrival to MICU with nonspecific symmetric dilatation of b/l superior ophthalmic veins. Possible sign of increased ICP  #Encephalopathy  - Will order CTV and CTA head as per discussion with neuro    #Acute respiratory failure - Intubated for airway protection, suspect possible hypercapnia as well in setting of unresponsiveness  - c/w mechanical ventilatory support  - Monitor blood gases    #Shock - Likely vasoplegic.  - Will c/w empiric abx with Vanc/Cefepime for now  - Will check EKG and cardiac enzymes  - c/w vasopressors, wean as tolerated    #SVT  - Will initiate Cardizem 60mg PO q6h  - Monitor on tele    #Malignant pleural effusion - Minimal output from chest tube  - c/w water seal  - Will flush chest tube  - Likely trapped lung if no improvement with drainage    #[place]  - Monitor creatinine and UOP  - Will change Vanc/Zosyn to Vanc/Cefepime    #Anemia  #DVT ppx  - Unable to tolerate full AC due to RP bleed earlier  - Will challenge with HSQ    #POCUS - See full POCUS note    #GOC - FUll code    John Cotto MD  Pulmonary & Critical Care    Critically ill patient requiring frequent bedside visits with therapy changes.    Total critical care time spent 60 minutes MICU Accept Note    CHIEF COMPLAINT: Altered Mental Status/SVT     HPI: Mrs. Shani Bright is a 88 year old female with a PMH of stage 4 serous endometrial carcinoma (dx 5/2022) s/p chemo (12/2022) and debulking surgery with POOL/BSO (2/10/23), RUL segmental PE (8/2022, on Eliquis), GI bleed (4/2023), CAD s/p stent (3/2021), aortic stenosis s/p TAVR, HTN, HLD, and hypothyroidism who initially presented to the hospital on 6/20/23 for a 3 wk history of generalized weakness.      INTERVAL HISTORY: During the patients hospital interval they were started on AC but that was then stopped because the patient had a RP bleed. The patient has also had an [place], leukocytosis, R PL effusion, and potential PE during this hospitalization stay. The patient has had several rapid responses called: 7/3 and 7/6 for AMS and 7/7 for SVT( ) which was resolved with adenosine. The patient also had a rapid respond call on 7/8 where the patient was significantly altered compared to their baseline and was found to be unresponsive with tachycardia (), the patient was again given adenosine and then subsequently intubated . The patient is presenting to the ICU for further workup of their AMS, SVT, and ventilation management.     PAST MEDICAL & SURGICAL HISTORY:  Endometrial cancer   S4 serous endometrial carcinoma, [facility], chemotherapy  HTN (hypertension)    CAD (coronary artery disease)    S/P AVR    Hypothyroidism    HLD (hyperlipidemia)    S/P AVR (aortic valve replacement)    History of endometrial biopsy    FAMILY HISTORY:  Family history of parotid cancer (Child)    Social History:  Denies any alcohol, tobacco, or illicit drug use.   Ambulates with a rolling walker. (20 Jun 2023 23:40)    HOME MEDICATIONS:  Home Medications:  alendronate 70 mg oral tablet: 1 tab(s) orally once a week (21 Jun 2023 06:03)  apixaban 2.5 mg oral tablet: 1 tab(s) orally 2 times a day (21 Jun 2023 06:06)  aspirin 81 mg oral delayed release tablet: 1 tab(s) orally once a day (21 Jun 2023 06:03)  cetirizine 10 mg oral tablet: 1 orally once a day (21 Jun 2023 06:03)  ezetimibe 10 mg oral tablet: 1 tab(s) orally once a day (21 Jun 2023 06:03)  famotidine 40 mg oral tablet: 1 tab(s) orally once a day (at bedtime) (21 Jun 2023 06:03)  furosemide 20 mg oral tablet: 1 tab(s) orally every 12 hours (21 Jun 2023 06:06)  levothyroxine 75 mcg (0.075 mg) oral tablet: 1 tab(s) orally once a day (21 Jun 2023 06:03)  melatonin 3 mg oral tablet: 1 tab(s) orally once a day (at bedtime) (21 Jun 2023 06:07)  miSOPROStol 200 mcg oral tablet: 1 tab(s) orally once a day (21 Jun 2023 06:07)  One A Day Women&#x27;s Complete oral tablet: 1 tab(s) orally once a day (21 Jun 2023 06:03)  pantoprazole 40 mg oral delayed release tablet: 1 tab(s) orally once a day (21 Jun 2023 06:07)  polyethylene glycol 3350 oral powder for reconstitution: 17 gram(s) orally once a day as needed for  constipation (21 Jun 2023 06:07)  rosuvastatin 20 mg oral tablet: 1 tab(s) orally once a day (21 Jun 2023 06:03)  senna leaf extract oral tablet: 2 tab(s) orally once a day (at bedtime) as needed for  constipation (21 Jun 2023 06:07)  Ventolin HFA 90 mcg/inh inhalation aerosol: 2 puff(s) inhaled every 6 hours, As Needed (21 Jun 2023 06:03)  Vitamin B12: 5000 microgram(s) sublingual once a day (21 Jun 2023 06:03)  Vitamin D3 50 mcg (2000 intl units) oral tablet: 1 tab(s) orally once a day (21 Jun 2023 06:03)    Allergies    No Known Allergies    Intolerances    REVIEW OF SYSTEMS:  [X] Unable to assess ROS due to AMS    OBJECTIVE:  ICU Vital Signs Last 24 Hrs  T(C): 37 (08 Jul 2023 08:00), Max: 37.1 (08 Jul 2023 01:20)  T(F): 98.6 (08 Jul 2023 08:00), Max: 98.7 (08 Jul 2023 01:20)  HR: 96 (08 Jul 2023 08:00) (92 - 174)  BP: 111/62 (08 Jul 2023 08:00) (93/55 - 139/83)  BP(mean): --  ABP: --  ABP(mean): --  RR: 16 (08 Jul 2023 08:00) (16 - 18)  SpO2: 100% (08 Jul 2023 08:00) (100% - 100%)    O2 Parameters below as of 08 Jul 2023 08:00    O2 Flow (L/min): 4    07-07 @ 07:01  -  07-08 @ 07:00  --------------------------------------------------------  IN: 100 mL / OUT: 300 mL / NET: -200 mL    CAPILLARY BLOOD GLUCOSE    POCT Blood Glucose.: 180 mg/dL (08 Jul 2023 10:48)    Physical Exam:  General: intubated, sedated  Eyes: PERRLA, EOMI; conjunctiva and sclera clear  HEENT: NCAT  Neck: Supple, no cervical LAD, full ROM  Respiratory: No chest wall deformity, CTABL anteriorly, no wheezes, rales, or rhonchi  Cardiovascular: RRR, +S1/S2, no murmurs, rubs, or gallops. 2+ upper and lower pulses b/l  Abdominal: Normal BS, soft, non-tender, non-distended, no rebound or guarding, no HSM, no masses  Extremities: no pitting edema  Neurological: sedated, unresponsive  Skin: WWP. No rashes or lesions      LINES:     HOSPITAL MEDICATIONS:  MEDICATIONS  (STANDING):  aDENosine Injectable (ADENOCARD) 6 milliGRAM(s) IV Push once  albumin human 25% IVPB 100 milliLiter(s) IV Intermittent every 6 hours  aMIOdarone IVPB 150 milliGRAM(s) IV Intermittent once  atorvastatin 80 milliGRAM(s) Oral at bedtime  cholecalciferol 2000 Unit(s) Oral daily  cyanocobalamin 1000 MICROGram(s) Oral daily  diltiazem    Tablet 60 milliGRAM(s) Oral every 6 hours  diltiazem Infusion 15 mG/Hr (15 mL/Hr) IV Continuous <Continuous>  levothyroxine Injectable 56 MICROGram(s) IV Push at bedtime  loratadine 10 milliGRAM(s) Oral daily  misoprostol 200 MICROGram(s) Oral <User Schedule>  multivitamin 1 Tablet(s) Oral daily  mupirocin 2% Ointment 1 Application(s) Topical <User Schedule>  pantoprazole    Tablet 40 milliGRAM(s) Oral before breakfast  piperacillin/tazobactam IVPB.. 3.375 Gram(s) IV Intermittent every 12 hours    MEDICATIONS  (PRN):  acetaminophen     Tablet .. 650 milliGRAM(s) Oral every 6 hours PRN Temp greater or equal to 38C (100.4F), Mild Pain (1 - 3)  albuterol    90 MICROgram(s) HFA Inhaler 2 Puff(s) Inhalation every 6 hours PRN Shortness of Breath and/or Wheezing  HYDROmorphone  Injectable 0.2 milliGRAM(s) IV Push every 4 hours PRN Severe Pain (7 - 10)  lactulose Syrup 10 Gram(s) Oral daily PRN constipation  melatonin 3 milliGRAM(s) Oral at bedtime PRN Sleep  oxyCODONE    IR 5 milliGRAM(s) Oral every 4 hours PRN Moderate Pain (4 - 6)  polyethylene glycol 3350 17 Gram(s) Oral daily PRN for constipation  senna 2 Tablet(s) Oral at bedtime PRN for constipation    LABS:                        7.9    15.97 )-----------( 83       ( 08 Jul 2023 05:50 )             24.3     07-08    136  |  95<L>  |  36<H>  ----------------------------<  132<H>  4.6   |  24  |  2.07<H>    Ca    8.9      08 Jul 2023 05:50  Phos  3.1     07-08  Mg     2.10     07-08    TPro  7.4  /  Alb  4.5  /  TBili  1.6<H>  /  DBili  x   /  AST  112<H>  /  ALT  33  /  AlkPhos  80  07-08    PT/INR - ( 06 Jul 2023 13:15 )   PT: 14.5 sec;   INR: 1.25 ratio      PTT - ( 06 Jul 2023 13:15 )  PTT:28.1 sec    pH, Venous: 7.38 (07-06-23 @ 13:15)  pCO2, Venous: 53 mmHg (07-06-23 @ 13:15)  pO2, Venous: 40 mmHg (07-06-23 @ 13:15)  HCO3, Venous: 31 mmol/L (07-06-23 @ 13:15)    Urinalysis Basic - ( 08 Jul 2023 05:50 )    Color: x / Appearance: x / SG: x / pH: x  Gluc: 132 mg/dL / Ketone: x  / Bili: x / Urobili: x   Blood: x / Protein: x / Nitrite: x   Leuk Esterase: x / RBC: x / WBC x   Sq Epi: x / Non Sq Epi: x / Bacteria: x    CAPILLARY BLOOD GLUCOSE    POCT Blood Glucose.: 180 mg/dL (08 Jul 2023 10:48)  POCT Blood Glucose.: 170 mg/dL (08 Jul 2023 09:11)  POCT Blood Glucose.: 120 mg/dL (07 Jul 2023 21:15)  POCT Blood Glucose.: 126 mg/dL (07 Jul 2023 18:55)  POCT Blood Glucose.: 99 mg/dL (07 Jul 2023 11:32)      RADIOLOGY & ADDITIONAL TESTS:    ASSESSMENT/PLAN:  Mrs. 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     NEURO  #Altered Mental Status  -Patient is significantly altered compared to their baseline   -CT Head ordered to evaluate for stroke, Neuro checks Q4H->Contact neurology would appreciate recommendations  -ABG, BMP, and CBC ordered   -Consider EEG   -Follow up Free T4, TSH, Total T3, Ammonia, Cortisol   - start vanco and zosyn    #Intubated  -Sedated on versed and propofol  - monitor mental status during weaning     CARDIAC  #SVT  -RRT 7/6 and 7/8 for multifocal atrial tacycarida,  patient was given adenosine on both events.  - s/p diltiazem gtt, will trial diltiazem 60 Q6 via OGT  -Monitor Tele  -TTE with normal findings, Bedside POCUS showed reduced EF    -Cardiology following would appreciate recs     #TAVR  -Mild aortic regurgitation as per AMTTHIEU     #Shock of unknown etiology  -Septic vs Vasoplegic shock  -Infectious work up being done with BCx, UCx, CBC  -Patient is intubated for respiratory support   - on levophed, wean as tolerated  - start vanco and cefepime    PULM  # Pulmonary embolism.   -Pt diagnosed with RUL segmental PE in Aug 2022, now with possible new PE while on Eliquis 2.5 mg BID.  -Hold Eliquis due to bleeding risk and low HgB  - Lower Ultrasound negative for DVT    #Malignant pleural effusion  - R chest tube to waterseal for significant R pleural effusion  - f/u repeat CT chest    #Intubation  - see above  - monitor VBGs/ABGs    RENAL  #[place]  -DDx: poor PO intake and 3rd spacing (hypoalbuminemia vs sepsis  -Resolving, trend via CMP    # GI   - OGT  - will start liquid diet     HEME/ONC  #Stage 4 Endometrial cancer.   - Pt with history of stage 4 serous endometrial carcinoma (dx 5/2022) s/p chemo (last in 12/2022) and debulking surgery with POOL/BSO (2/10/23), now recently found to have recurrence of disease with new liver mets, the pt [place] [facility]   - GOC discussion with pt and family given poor prognosis - pt is fullcode.    #Anemia  -Likely ACD transfuse if HgB<7    #DVT  -Hold AC with bleeding risk and low HgB  ID  #Leukocytosis  -WBC of 16K  -CBC, UCx, BCx ordered '  -Vancomycin and Cefipime    ENDO  #Hypothyroidism  -Levothyroxine    Ethics  The patient is full code and a GOC discussion has been had with the family, MOLST form is up to date and has been filled.      ############ATTENDING ATTESTATION#####################    I have personally seen and examined the patient.  I fully participated in the care of this patient.  I have made amendments to the documentation where necessary, and agree with the history, physical exam, and plan as documented by the Resident.      Patient is a 87 yo F w/ Stage 4 serous Endometrial carcinoma (dx 5/2022) s/p chemo (12/2022) s/p POLO/BSO (2/10/23), PE (8/2022, on Eliquis), GI bleed (4/2023), CAD s/p stent (3/2021), AS s/p TAVR, HTN, HLD, and hypothyroidism who was initially admitted on 6/20 after p/w weakness. Hospital course c/b RP bleed now off AC, malignant pleural effusion, several RRTs for AMS on 7/3 and 7/6 as well as for SVT on 7/7. Patient transferred to MICU after RRT for SVT and AMS again on 7/8.    #AMS - Unclear etiology. Recurrent this hospital stay. CTH on arrival to MICU with nonspecific symmetric dilatation of b/l superior ophthalmic veins. Possible sign of increased ICP  #Encephalopathy  - Will order CTV and CTA head as per discussion with neuro    #Acute respiratory failure - Intubated for airway protection, suspect possible hypercapnia as well in setting of unresponsiveness  - c/w mechanical ventilatory support  - Monitor blood gases    #Shock - Likely vasoplegic.  - Will c/w empiric abx with Vanc/Cefepime for now  - Will check EKG and cardiac enzymes  - c/w vasopressors, wean as tolerated    #SVT  - Will initiate Cardizem 60mg PO q6h  - Monitor on tele    #Malignant pleural effusion - Minimal output from chest tube  - c/w water seal  - Will flush chest tube  - Likely trapped lung if no improvement with drainage    #[place]  - Monitor creatinine and UOP  - Will change Vanc/Zosyn to Vanc/Cefepime    #Anemia  #DVT ppx  - Unable to tolerate full AC due to RP bleed earlier  - Will challenge with HSQ    #POCUS - See full POCUS note    #GOC - FUll code    John Cotto MD  Pulmonary & Critical Care    Critically ill patient requiring frequent bedside visits with therapy changes.    Attending: I have personally and independently provided 60 minutes of critical care services.  This excludes any time spent on separate procedures or teaching.

## 2023-07-08 NOTE — RAPID RESPONSE TEAM SUMMARY - NSADDTLFINDINGSRRT_GEN_ALL_CORE
Weekly Telephone Call Attempt    Call Attempt Date: 10/11/2022  Call Attempt: LM for patient to return call. Will attempt another outreach at a later date and time.    Pt has had 2 previous rapids over the past 24 hours for SVT. Rapid called this AM for HR: 160's with EKG showing SVT. BP stable at 130/60, mentating at baseline, no pertinent PE findings. Labs reviewed - unactionable Gave adenosine 6 mg x1 after which pt converted to sinus tach to HR: 100's.

## 2023-07-08 NOTE — PROGRESS NOTE ADULT - SUBJECTIVE AND OBJECTIVE BOX
Pt seen and examined this morning  pt on nasal cannula    awake and alert  R PTC on waterseal with minimal drainage, no air leak     imaging reviewed    pt since intubated and transferred to MICU    A/P: 89yo F with Rt pleural effusion, s/p PTC insertion    -PTC output minimal for multiple days  -No plans for any thoracic surgical intervention  -pt now intubated in MICU  - tube remains on waterseal  -Will follow as needed    Megan MATUTE [id number]

## 2023-07-08 NOTE — CONSULT NOTE ADULT - CONSULT REQUESTED DATE/TIME
08-Jul-2023 11:32
06-Jul-2023 18:02
23-Jun-2023 10:50
24-Jun-2023 17:19
26-Jun-2023 00:18
21-Jun-2023 13:09
27-Jun-2023
04-Jul-2023 13:17
25-Jun-2023 12:33
21-Jun-2023 17:19
26-Jun-2023 16:08

## 2023-07-08 NOTE — PROGRESS NOTE ADULT - SUBJECTIVE AND OBJECTIVE BOX
Rony Mcgarry MD  Interventional Cardiology / Endovascular Specialist  [place] Office : [address] [place] [address] [place] [address]  Tel:   [place] Office : [address] [facility] [place] [address]  Tel: [phone]    pt lying in bed intubated and sedated , intubated in the am for AMS  	  MEDICATIONS:  aDENosine Injectable (ADENOCARD) 6 milliGRAM(s) IV Push once  diltiazem    Tablet 60 milliGRAM(s) Oral every 6 hours  heparin   Injectable 5000 Unit(s) SubCutaneous every 12 hours  norepinephrine Infusion 0.05 MICROgram(s)/kG/Min IV Continuous <Continuous>    cefepime   IVPB 1000 milliGRAM(s) IV Intermittent daily    albuterol    90 MICROgram(s) HFA Inhaler 2 Puff(s) Inhalation every 6 hours PRN  loratadine 10 milliGRAM(s) Oral daily    acetaminophen     Tablet .. 650 milliGRAM(s) Oral every 6 hours PRN  HYDROmorphone  Injectable 0.2 milliGRAM(s) IV Push every 4 hours PRN  melatonin 3 milliGRAM(s) Oral at bedtime PRN  midazolam Infusion 0.02 mG/kG/Hr IV Continuous <Continuous>  oxyCODONE    IR 5 milliGRAM(s) Oral every 4 hours PRN  propofol Infusion 50 MICROgram(s)/kG/Min IV Continuous <Continuous>    lactulose Syrup 10 Gram(s) Oral daily PRN  misoprostol 200 MICROGram(s) Oral <User Schedule>  pantoprazole    Tablet 40 milliGRAM(s) Oral before breakfast  polyethylene glycol 3350 17 Gram(s) Oral daily PRN  senna 2 Tablet(s) Oral at bedtime PRN    atorvastatin 80 milliGRAM(s) Oral at bedtime  levothyroxine Injectable 56 MICROGram(s) IV Push at bedtime    chlorhexidine 0.12% Liquid 15 milliLiter(s) Oral Mucosa every 12 hours  chlorhexidine 4% Liquid 1 Application(s) Topical <User Schedule>  cholecalciferol 2000 Unit(s) Oral daily  cyanocobalamin 1000 MICROGram(s) Oral daily  multivitamin 1 Tablet(s) Oral daily  mupirocin 2% Ointment 1 Application(s) Topical <User Schedule>  potassium phosphate / sodium phosphate Tablet (K-PHOS No. 2) 2 Tablet(s) Oral once      PAST MEDICAL/SURGICAL HISTORY  PAST MEDICAL & SURGICAL HISTORY:  Endometrial ca  S4 serous endometrial carcinoma, [facility], chemotherapy      HTN (hypertension)      CAD (coronary artery disease)      S/P AVR      Hypothyroidism      HLD (hyperlipidemia)      S/P AVR (aortic valve replacement)      History of endometrial biopsy          SOCIAL HISTORY: Substance Use (street drugs): ( x ) never used  (  ) other:    FAMILY HISTORY:  Family history of parotid cancer (Child)      PHYSICAL EXAM:  T(C): 36.3 (07-09-23 @ 00:00), Max: 37 (07-08-23 @ 06:02)  HR: 109 (07-09-23 @ 02:00) (96 - 109)  BP: 117/63 (07-09-23 @ 02:00) (109/61 - 133/80)  RR: 16 (07-09-23 @ 02:00) (2 - 22)  SpO2: 100% (07-09-23 @ 02:00) (86% - 100%)  Wt(kg): --  I&O's Summary    07 Jul 2023 07:01  -  08 Jul 2023 07:00  --------------------------------------------------------  IN: 100 mL / OUT: 300 mL / NET: -200 mL    08 Jul 2023 07:01  -  09 Jul 2023 02:34  --------------------------------------------------------  IN: 1699 mL / OUT: 660 mL / NET: 1039 mL        GENERAL: intubated and sedated   EYES: conjunctiva and sclera clear  ENMT: No tonsillar erythema, exudates, or enlargement  Cardiovascular: Normal S1 S2, No JVD, No murmurs, No edema  Respiratory: Lungs diminished to auscultation	  Gastrointestinal:  Soft, Non-tender, + BS	  Extremities: No edema                             8.1    16.65 )-----------( 56       ( 09 Jul 2023 00:45 )             23.6     07-09    134<L>  |  97<L>  |  36<H>  ----------------------------<  150<H>  4.0   |  19<L>  |  1.89<H>    Ca    8.4      09 Jul 2023 00:45  Phos  2.1     07-09  Mg     1.90     07-09    TPro  6.6  /  Alb  4.0  /  TBili  1.4<H>  /  DBili  x   /  AST  95<H>  /  ALT  27  /  AlkPhos  77  07-09    proBNP:   Lipid Profile:   HgA1c:   TSH:     Consultant(s) Notes Reviewed:  [x ] YES  [ ] NO    Care Discussed with Consultants/Other Providers [ x] YES  [ ] NO    Imaging Personally Reviewed independently:  [x] YES  [ ] NO    All labs, radiologic studies, vitals, orders and medications list reviewed. Patient is seen and examined at bedside. Case discussed with medical team.

## 2023-07-08 NOTE — CHART NOTE - NSCHARTNOTEFT_GEN_A_CORE
Notified by primary team that pt became significantly altered today, had to be intubated.   Patient received CT head, report as follows:  Nonspecific symmetric dilatation of the bilateral superior ophthalmic veins with slight hyperattenuation on the right. Findings can be seen with raised intracranial pressure amongst other possible etiologies. The possibility of the cavernous carotid fistula cannot be completely excluded. The presence of a right-sided superior ophthalmic vein thrombosis cannot also be excluded. Imaging findings are new when compared to the prior exams.     Recommendations:  -obtain CTV + CTA head and neck with contrast Neurology team notified by primary team that pt became significantly altered today, had to be intubated.   Patient received CT head, report as follows:  Nonspecific symmetric dilatation of the bilateral superior ophthalmic veins with slight hyperattenuation on the right. Findings can be seen with raised intracranial pressure amongst other possible etiologies. The possibility of the cavernous carotid fistula cannot be completely excluded. The presence of a right-sided superior ophthalmic vein thrombosis cannot also be excluded. Imaging findings are new when compared to the prior exams.     Recommendations:  -obtain CTV + CTA head and neck with contrast

## 2023-07-08 NOTE — PROGRESS NOTE ADULT - SUBJECTIVE AND OBJECTIVE BOX
Patient is a 88y old  Female who presents with a chief complaint of Generalized weakness (08 Jul 2023 11:32)    Patient seen and examined at bedside, RRT called 3 times for SVT and this morning for AMS. Transferring to MICU.    MEDICATIONS  (STANDING):  aDENosine Injectable (ADENOCARD) 6 milliGRAM(s) IV Push once  albumin human 25% IVPB 100 milliLiter(s) IV Intermittent every 6 hours  aMIOdarone IVPB 150 milliGRAM(s) IV Intermittent once  atorvastatin 80 milliGRAM(s) Oral at bedtime  chlorhexidine 4% Liquid 1 Application(s) Topical <User Schedule>  cholecalciferol 2000 Unit(s) Oral daily  cyanocobalamin 1000 MICROGram(s) Oral daily  diltiazem    Tablet 60 milliGRAM(s) Oral every 6 hours  diltiazem Infusion 15 mG/Hr (15 mL/Hr) IV Continuous <Continuous>  levothyroxine Injectable 56 MICROGram(s) IV Push at bedtime  loratadine 10 milliGRAM(s) Oral daily  misoprostol 200 MICROGram(s) Oral <User Schedule>  multivitamin 1 Tablet(s) Oral daily  mupirocin 2% Ointment 1 Application(s) Topical <User Schedule>  pantoprazole    Tablet 40 milliGRAM(s) Oral before breakfast  piperacillin/tazobactam IVPB.. 3.375 Gram(s) IV Intermittent every 12 hours    MEDICATIONS  (PRN):  acetaminophen     Tablet .. 650 milliGRAM(s) Oral every 6 hours PRN Temp greater or equal to 38C (100.4F), Mild Pain (1 - 3)  albuterol    90 MICROgram(s) HFA Inhaler 2 Puff(s) Inhalation every 6 hours PRN Shortness of Breath and/or Wheezing  HYDROmorphone  Injectable 0.2 milliGRAM(s) IV Push every 4 hours PRN Severe Pain (7 - 10)  lactulose Syrup 10 Gram(s) Oral daily PRN constipation  melatonin 3 milliGRAM(s) Oral at bedtime PRN Sleep  oxyCODONE    IR 5 milliGRAM(s) Oral every 4 hours PRN Moderate Pain (4 - 6)  polyethylene glycol 3350 17 Gram(s) Oral daily PRN for constipation  senna 2 Tablet(s) Oral at bedtime PRN for constipation    Vital Signs Last 24 Hrs  T(C): 37 (08 Jul 2023 08:00), Max: 37.1 (08 Jul 2023 01:20)  T(F): 98.6 (08 Jul 2023 08:00), Max: 98.7 (08 Jul 2023 01:20)  HR: 96 (08 Jul 2023 08:00) (92 - 174)  BP: 111/62 (08 Jul 2023 08:00) (93/55 - 118/74)  BP(mean): --  RR: 16 (08 Jul 2023 08:00) (16 - 18)  SpO2: 100% (08 Jul 2023 08:00) (100% - 100%)    Parameters below as of 08 Jul 2023 08:00    O2 Flow (L/min): 4      PE  NAD  Awake  Anicteric, MMM  No c/c/e  No rash grossly  FROM                          7.9    15.97 )-----------( 83       ( 08 Jul 2023 05:50 )             24.3       07-08    136  |  95<L>  |  36<H>  ----------------------------<  132<H>  4.6   |  24  |  2.07<H>    Ca    8.9      08 Jul 2023 05:50  Phos  3.1     07-08  Mg     2.10     07-08    TPro  7.4  /  Alb  4.5  /  TBili  1.6<H>  /  DBili  x   /  AST  112<H>  /  ALT  33  /  AlkPhos  80  07-08

## 2023-07-09 NOTE — PROGRESS NOTE ADULT - SUBJECTIVE AND OBJECTIVE BOX
Rony Mcgarry MD  Interventional Cardiology / Endovascular Specialist  [place] Office : [address] [place] [address] [place] [address]  Tel:   [place] Office : [address] [facility] [place] [address]  Tel: [phone]    pt lying in bed intubated and sedated ,  	  MEDICATIONS:  heparin   Injectable 5000 Unit(s) SubCutaneous every 12 hours  norepinephrine Infusion 0.05 MICROgram(s)/kG/Min IV Continuous <Continuous>    cefepime   IVPB 1000 milliGRAM(s) IV Intermittent daily    albuterol    90 MICROgram(s) HFA Inhaler 2 Puff(s) Inhalation every 6 hours PRN  loratadine 10 milliGRAM(s) Oral daily    acetaminophen     Tablet .. 650 milliGRAM(s) Oral every 6 hours PRN  HYDROmorphone  Injectable 0.2 milliGRAM(s) IV Push every 4 hours PRN  melatonin 3 milliGRAM(s) Oral at bedtime PRN  midazolam Infusion 0.02 mG/kG/Hr IV Continuous <Continuous>  oxyCODONE    IR 5 milliGRAM(s) Oral every 4 hours PRN  propofol Infusion 50 MICROgram(s)/kG/Min IV Continuous <Continuous>    lactulose Syrup 10 Gram(s) Oral daily PRN  misoprostol 200 MICROGram(s) Oral <User Schedule>  pantoprazole    Tablet 40 milliGRAM(s) Oral before breakfast  polyethylene glycol 3350 17 Gram(s) Oral daily PRN  senna 2 Tablet(s) Oral at bedtime PRN    atorvastatin 80 milliGRAM(s) Oral at bedtime  levothyroxine Injectable 56 MICROGram(s) IV Push at bedtime    chlorhexidine 0.12% Liquid 15 milliLiter(s) Oral Mucosa every 12 hours  chlorhexidine 2% Cloths 1 Application(s) Topical daily  cholecalciferol 2000 Unit(s) Oral daily  cyanocobalamin 1000 MICROGram(s) Oral daily  multivitamin 1 Tablet(s) Oral daily  mupirocin 2% Ointment 1 Application(s) Topical <User Schedule>      PAST MEDICAL/SURGICAL HISTORY  PAST MEDICAL & SURGICAL HISTORY:  Endometrial ca  S4 serous endometrial carcinoma, [facility], chemotherapy      HTN (hypertension)      CAD (coronary artery disease)      S/P AVR      Hypothyroidism      HLD (hyperlipidemia)      S/P AVR (aortic valve replacement)      History of endometrial biopsy          SOCIAL HISTORY: Substance Use (street drugs): ( x ) never used  (  ) other:    FAMILY HISTORY:  Family history of parotid cancer (Child)        PHYSICAL EXAM:  T(C): 36.8 (07-09-23 @ 20:00), Max: 37.3 (07-09-23 @ 16:00)  HR: 116 (07-09-23 @ 23:00) (105 - 119)  BP: 125/74 (07-09-23 @ 23:00) (47/29 - 133/79)  RR: 14 (07-09-23 @ 23:00) (14 - 19)  SpO2: 100% (07-09-23 @ 23:00) (79% - 100%)  Wt(kg): --  I&O's Summary    08 Jul 2023 07:01  -  09 Jul 2023 07:00  --------------------------------------------------------  IN: 2113.8 mL / OUT: 800 mL / NET: 1313.8 mL    09 Jul 2023 07:01  -  10 Jul 2023 00:01  --------------------------------------------------------  IN: 1465 mL / OUT: 1080 mL / NET: 385 mL        GENERAL: intubated and sedated   EYES: conjunctiva and sclera clear  ENMT: No tonsillar erythema, exudates, or enlargement  Cardiovascular: Normal S1 S2, No JVD, No murmurs, No edema  Respiratory: Lungs diminished to auscultation	  Gastrointestinal:  Soft, Non-tender, + BS	  Extremities: No edema                             8.1    16.65 )-----------( 56       ( 09 Jul 2023 00:45 )             23.6     07-09    134<L>  |  97<L>  |  36<H>  ----------------------------<  150<H>  4.0   |  19<L>  |  1.89<H>    Ca    8.4      09 Jul 2023 00:45  Phos  2.1     07-09  Mg     1.90     07-09    TPro  6.6  /  Alb  4.0  /  TBili  1.4<H>  /  DBili  x   /  AST  95<H>  /  ALT  27  /  AlkPhos  77  07-09    proBNP:   Lipid Profile:   HgA1c:   TSH:     Consultant(s) Notes Reviewed:  [x ] YES  [ ] NO    Care Discussed with Consultants/Other Providers [ x] YES  [ ] NO    Imaging Personally Reviewed independently:  [x] YES  [ ] NO    All labs, radiologic studies, vitals, orders and medications list reviewed. Patient is seen and examined at bedside. Case discussed with medical team.

## 2023-07-09 NOTE — PROGRESS NOTE ADULT - SUBJECTIVE AND OBJECTIVE BOX
INTERVAL HPI/OVERNIGHT EVENTS:    SUBJECTIVE: Patient seen and examined at bedside.     CONSTITUTIONAL: No weakness, fevers or chills  EYES/ENT: No visual changes;  No vertigo or throat pain   NECK: No pain or stiffness  RESPIRATORY: No cough, wheezing, hemoptysis; No shortness of breath  CARDIOVASCULAR: No chest pain or palpitations  GASTROINTESTINAL: No abdominal or epigastric pain. No nausea, vomiting, or hematemesis; No diarrhea or constipation. No melena or hematochezia.  GENITOURINARY: No dysuria, frequency or hematuria  NEUROLOGICAL: No numbness or weakness  SKIN: No itching, rashes    OBJECTIVE:    VITAL SIGNS:  ICU Vital Signs Last 24 Hrs  T(C): 36.2 (09 Jul 2023 04:00), Max: 36.8 (08 Jul 2023 16:00)  T(F): 97.2 (09 Jul 2023 04:00), Max: 98.2 (08 Jul 2023 16:00)  HR: 112 (09 Jul 2023 10:39) (99 - 119)  BP: 129/64 (09 Jul 2023 10:04) (47/29 - 133/79)  BP(mean): 81 (09 Jul 2023 10:04) (27 - 92)  ABP: --  ABP(mean): --  RR: 14 (09 Jul 2023 10:04) (2 - 22)  SpO2: 100% (09 Jul 2023 10:39) (79% - 100%)    O2 Parameters below as of 09 Jul 2023 09:00  Patient On (Oxygen Delivery Method): ventilator    O2 Concentration (%): 40      Mode: AC/ CMV (Assist Control/ Continuous Mandatory Ventilation), RR (machine): 14, TV (machine): 320, FiO2: 40, PEEP: 5, ITime: 1, MAP: 9, PIP: 26    07-08 @ 07:01  -  07-09 @ 07:00  --------------------------------------------------------  IN: 2113.8 mL / OUT: 800 mL / NET: 1313.8 mL    07-09 @ 07:01  -  07-09 @ 12:00  --------------------------------------------------------  IN: 325.2 mL / OUT: 55 mL / NET: 270.2 mL      CAPILLARY BLOOD GLUCOSE      POCT Blood Glucose.: 143 mg/dL (09 Jul 2023 08:02)      PHYSICAL EXAM:    General: NAD  HEENT: NC/AT; PERRL, clear conjunctiva  Neck: supple  Respiratory: CTA b/l  Cardiovascular: +S1/S2; RRR  Abdomen: soft, NT/ND; +BS x4  Extremities: WWP, 2+ peripheral pulses b/l; no LE edema  Skin: normal color and turgor; no rash  Neurological:    MEDICATIONS:  MEDICATIONS  (STANDING):  atorvastatin 80 milliGRAM(s) Oral at bedtime  buMETAnide Injectable 2 milliGRAM(s) IV Push once  cefepime   IVPB 1000 milliGRAM(s) IV Intermittent daily  chlorhexidine 0.12% Liquid 15 milliLiter(s) Oral Mucosa every 12 hours  chlorhexidine 2% Cloths 1 Application(s) Topical daily  cholecalciferol 2000 Unit(s) Oral daily  cyanocobalamin 1000 MICROGram(s) Oral daily  diltiazem    Tablet 60 milliGRAM(s) Oral every 6 hours  heparin   Injectable 5000 Unit(s) SubCutaneous every 12 hours  levothyroxine Injectable 56 MICROGram(s) IV Push at bedtime  loratadine 10 milliGRAM(s) Oral daily  midazolam Infusion 0.02 mG/kG/Hr (1.2 mL/Hr) IV Continuous <Continuous>  misoprostol 200 MICROGram(s) Oral <User Schedule>  multivitamin 1 Tablet(s) Oral daily  mupirocin 2% Ointment 1 Application(s) Topical <User Schedule>  norepinephrine Infusion 0.05 MICROgram(s)/kG/Min (5.63 mL/Hr) IV Continuous <Continuous>  pantoprazole    Tablet 40 milliGRAM(s) Oral before breakfast  propofol Infusion 50 MICROgram(s)/kG/Min (18 mL/Hr) IV Continuous <Continuous>    MEDICATIONS  (PRN):  acetaminophen     Tablet .. 650 milliGRAM(s) Oral every 6 hours PRN Temp greater or equal to 38C (100.4F), Mild Pain (1 - 3)  albuterol    90 MICROgram(s) HFA Inhaler 2 Puff(s) Inhalation every 6 hours PRN Shortness of Breath and/or Wheezing  HYDROmorphone  Injectable 0.2 milliGRAM(s) IV Push every 4 hours PRN Severe Pain (7 - 10)  lactulose Syrup 10 Gram(s) Oral daily PRN constipation  melatonin 3 milliGRAM(s) Oral at bedtime PRN Sleep  oxyCODONE    IR 5 milliGRAM(s) Oral every 4 hours PRN Moderate Pain (4 - 6)  polyethylene glycol 3350 17 Gram(s) Oral daily PRN for constipation  senna 2 Tablet(s) Oral at bedtime PRN for constipation      ALLERGIES:  Allergies    No Known Allergies    Intolerances        LABS:                        8.1    16.65 )-----------( 56       ( 09 Jul 2023 00:45 )             23.6     07-09    134<L>  |  97<L>  |  36<H>  ----------------------------<  150<H>  4.0   |  19<L>  |  1.89<H>    Ca    8.4      09 Jul 2023 00:45  Phos  2.1     07-09  Mg     1.90     07-09    TPro  6.6  /  Alb  4.0  /  TBili  1.4<H>  /  DBili  x   /  AST  95<H>  /  ALT  27  /  AlkPhos  77  07-09    PT/INR - ( 08 Jul 2023 13:51 )   PT: 17.3 sec;   INR: 1.49 ratio         PTT - ( 08 Jul 2023 13:51 )  PTT:48.0 sec  Urinalysis Basic - ( 09 Jul 2023 00:45 )    Color: x / Appearance: x / SG: x / pH: x  Gluc: 150 mg/dL / Ketone: x  / Bili: x / Urobili: x   Blood: x / Protein: x / Nitrite: x   Leuk Esterase: x / RBC: x / WBC x   Sq Epi: x / Non Sq Epi: x / Bacteria: x        RADIOLOGY & ADDITIONAL TESTS: Reviewed. INTERVAL HPI/OVERNIGHT EVENTS:    SUBJECTIVE: Overnight Vent settings 22->16, platelets were low, Heparin Ab negative, started on feeds. Patient seen and examined at bedside. POCUS this AM with new severely reduced global LV systolic dysfunction.     ROS: Patient intubated and sedated    OBJECTIVE:    VITAL SIGNS:  ICU Vital Signs Last 24 Hrs  T(C): 36.2 (09 Jul 2023 04:00), Max: 36.8 (08 Jul 2023 16:00)  T(F): 97.2 (09 Jul 2023 04:00), Max: 98.2 (08 Jul 2023 16:00)  HR: 112 (09 Jul 2023 10:39) (99 - 119)  BP: 129/64 (09 Jul 2023 10:04) (47/29 - 133/79)  BP(mean): 81 (09 Jul 2023 10:04) (27 - 92)  ABP: --  ABP(mean): --  RR: 14 (09 Jul 2023 10:04) (2 - 22)  SpO2: 100% (09 Jul 2023 10:39) (79% - 100%)    O2 Parameters below as of 09 Jul 2023 09:00  Patient On (Oxygen Delivery Method): ventilator    O2 Concentration (%): 40      Mode: AC/ CMV (Assist Control/ Continuous Mandatory Ventilation), RR (machine): 14, TV (machine): 320, FiO2: 40, PEEP: 5, ITime: 1, MAP: 9, PIP: 26    07-08 @ 07:01  -  07-09 @ 07:00  --------------------------------------------------------  IN: 2113.8 mL / OUT: 800 mL / NET: 1313.8 mL    07-09 @ 07:01  -  07-09 @ 12:00  --------------------------------------------------------  IN: 325.2 mL / OUT: 55 mL / NET: 270.2 mL      CAPILLARY BLOOD GLUCOSE      POCT Blood Glucose.: 143 mg/dL (09 Jul 2023 08:02)      PHYSICAL EXAM:    General: NAD  HEENT: NC/AT; PERRL, clear conjunctiva  Neck: supple  Respiratory: CTA b/l  Cardiovascular: +S1/S2; RRR  Abdomen: soft, NT/ND; +BS x4  Extremities: WWP, 2+ peripheral pulses b/l; no LE edema  Skin: normal color and turgor; no rash  Neurological:    MEDICATIONS:  MEDICATIONS  (STANDING):  atorvastatin 80 milliGRAM(s) Oral at bedtime  buMETAnide Injectable 2 milliGRAM(s) IV Push once  cefepime   IVPB 1000 milliGRAM(s) IV Intermittent daily  chlorhexidine 0.12% Liquid 15 milliLiter(s) Oral Mucosa every 12 hours  chlorhexidine 2% Cloths 1 Application(s) Topical daily  cholecalciferol 2000 Unit(s) Oral daily  cyanocobalamin 1000 MICROGram(s) Oral daily  diltiazem    Tablet 60 milliGRAM(s) Oral every 6 hours  heparin   Injectable 5000 Unit(s) SubCutaneous every 12 hours  levothyroxine Injectable 56 MICROGram(s) IV Push at bedtime  loratadine 10 milliGRAM(s) Oral daily  midazolam Infusion 0.02 mG/kG/Hr (1.2 mL/Hr) IV Continuous <Continuous>  misoprostol 200 MICROGram(s) Oral <User Schedule>  multivitamin 1 Tablet(s) Oral daily  mupirocin 2% Ointment 1 Application(s) Topical <User Schedule>  norepinephrine Infusion 0.05 MICROgram(s)/kG/Min (5.63 mL/Hr) IV Continuous <Continuous>  pantoprazole    Tablet 40 milliGRAM(s) Oral before breakfast  propofol Infusion 50 MICROgram(s)/kG/Min (18 mL/Hr) IV Continuous <Continuous>    MEDICATIONS  (PRN):  acetaminophen     Tablet .. 650 milliGRAM(s) Oral every 6 hours PRN Temp greater or equal to 38C (100.4F), Mild Pain (1 - 3)  albuterol    90 MICROgram(s) HFA Inhaler 2 Puff(s) Inhalation every 6 hours PRN Shortness of Breath and/or Wheezing  HYDROmorphone  Injectable 0.2 milliGRAM(s) IV Push every 4 hours PRN Severe Pain (7 - 10)  lactulose Syrup 10 Gram(s) Oral daily PRN constipation  melatonin 3 milliGRAM(s) Oral at bedtime PRN Sleep  oxyCODONE    IR 5 milliGRAM(s) Oral every 4 hours PRN Moderate Pain (4 - 6)  polyethylene glycol 3350 17 Gram(s) Oral daily PRN for constipation  senna 2 Tablet(s) Oral at bedtime PRN for constipation      ALLERGIES:  Allergies    No Known Allergies    Intolerances        LABS:                        8.1    16.65 )-----------( 56       ( 09 Jul 2023 00:45 )             23.6     07-09    134<L>  |  97<L>  |  36<H>  ----------------------------<  150<H>  4.0   |  19<L>  |  1.89<H>    Ca    8.4      09 Jul 2023 00:45  Phos  2.1     07-09  Mg     1.90     07-09    TPro  6.6  /  Alb  4.0  /  TBili  1.4<H>  /  DBili  x   /  AST  95<H>  /  ALT  27  /  AlkPhos  77  07-09    PT/INR - ( 08 Jul 2023 13:51 )   PT: 17.3 sec;   INR: 1.49 ratio         PTT - ( 08 Jul 2023 13:51 )  PTT:48.0 sec  Urinalysis Basic - ( 09 Jul 2023 00:45 )    Color: x / Appearance: x / SG: x / pH: x  Gluc: 150 mg/dL / Ketone: x  / Bili: x / Urobili: x   Blood: x / Protein: x / Nitrite: x   Leuk Esterase: x / RBC: x / WBC x   Sq Epi: x / Non Sq Epi: x / Bacteria: x        RADIOLOGY & ADDITIONAL TESTS: Reviewed. INTERVAL HPI/OVERNIGHT EVENTS:    SUBJECTIVE: Overnight Vent settings 22->16, platelets were low, Heparin Ab negative, started on feeds. Patient seen and examined at bedside. POCUS this AM with new severely reduced global LV systolic dysfunction.     ROS: Patient intubated and sedated    OBJECTIVE:    VITAL SIGNS:  ICU Vital Signs Last 24 Hrs  T(C): 36.2 (09 Jul 2023 04:00), Max: 36.8 (08 Jul 2023 16:00)  T(F): 97.2 (09 Jul 2023 04:00), Max: 98.2 (08 Jul 2023 16:00)  HR: 112 (09 Jul 2023 10:39) (99 - 119)  BP: 129/64 (09 Jul 2023 10:04) (47/29 - 133/79)  BP(mean): 81 (09 Jul 2023 10:04) (27 - 92)  ABP: --  ABP(mean): --  RR: 14 (09 Jul 2023 10:04) (2 - 22)  SpO2: 100% (09 Jul 2023 10:39) (79% - 100%)    O2 Parameters below as of 09 Jul 2023 09:00  Patient On (Oxygen Delivery Method): ventilator    O2 Concentration (%): 40      Mode: AC/ CMV (Assist Control/ Continuous Mandatory Ventilation), RR (machine): 14, TV (machine): 320, FiO2: 40, PEEP: 5, ITime: 1, MAP: 9, PIP: 26    07-08 @ 07:01  -  07-09 @ 07:00  --------------------------------------------------------  IN: 2113.8 mL / OUT: 800 mL / NET: 1313.8 mL    07-09 @ 07:01  -  07-09 @ 12:00  --------------------------------------------------------  IN: 325.2 mL / OUT: 55 mL / NET: 270.2 mL      CAPILLARY BLOOD GLUCOSE      POCT Blood Glucose.: 143 mg/dL (09 Jul 2023 08:02)      PHYSICAL EXAM:    General: NAD  HEENT: NC/AT; PERRL, clear conjunctiva  Neck: supple  Respiratory: CTA b/l  Cardiovascular: +S1/S2; RRR  Abdomen: soft, NT/ND; +BS x4  Extremities: WWP, peripheral pulses difficult to palpate due to edema  Skin: normal color and turgor; no rash  Neurological:    MEDICATIONS:  MEDICATIONS  (STANDING):  atorvastatin 80 milliGRAM(s) Oral at bedtime  buMETAnide Injectable 2 milliGRAM(s) IV Push once  cefepime   IVPB 1000 milliGRAM(s) IV Intermittent daily  chlorhexidine 0.12% Liquid 15 milliLiter(s) Oral Mucosa every 12 hours  chlorhexidine 2% Cloths 1 Application(s) Topical daily  cholecalciferol 2000 Unit(s) Oral daily  cyanocobalamin 1000 MICROGram(s) Oral daily  diltiazem    Tablet 60 milliGRAM(s) Oral every 6 hours  heparin   Injectable 5000 Unit(s) SubCutaneous every 12 hours  levothyroxine Injectable 56 MICROGram(s) IV Push at bedtime  loratadine 10 milliGRAM(s) Oral daily  midazolam Infusion 0.02 mG/kG/Hr (1.2 mL/Hr) IV Continuous <Continuous>  misoprostol 200 MICROGram(s) Oral <User Schedule>  multivitamin 1 Tablet(s) Oral daily  mupirocin 2% Ointment 1 Application(s) Topical <User Schedule>  norepinephrine Infusion 0.05 MICROgram(s)/kG/Min (5.63 mL/Hr) IV Continuous <Continuous>  pantoprazole    Tablet 40 milliGRAM(s) Oral before breakfast  propofol Infusion 50 MICROgram(s)/kG/Min (18 mL/Hr) IV Continuous <Continuous>    MEDICATIONS  (PRN):  acetaminophen     Tablet .. 650 milliGRAM(s) Oral every 6 hours PRN Temp greater or equal to 38C (100.4F), Mild Pain (1 - 3)  albuterol    90 MICROgram(s) HFA Inhaler 2 Puff(s) Inhalation every 6 hours PRN Shortness of Breath and/or Wheezing  HYDROmorphone  Injectable 0.2 milliGRAM(s) IV Push every 4 hours PRN Severe Pain (7 - 10)  lactulose Syrup 10 Gram(s) Oral daily PRN constipation  melatonin 3 milliGRAM(s) Oral at bedtime PRN Sleep  oxyCODONE    IR 5 milliGRAM(s) Oral every 4 hours PRN Moderate Pain (4 - 6)  polyethylene glycol 3350 17 Gram(s) Oral daily PRN for constipation  senna 2 Tablet(s) Oral at bedtime PRN for constipation      ALLERGIES:  Allergies    No Known Allergies    Intolerances        LABS:                        8.1    16.65 )-----------( 56       ( 09 Jul 2023 00:45 )             23.6     07-09    134<L>  |  97<L>  |  36<H>  ----------------------------<  150<H>  4.0   |  19<L>  |  1.89<H>    Ca    8.4      09 Jul 2023 00:45  Phos  2.1     07-09  Mg     1.90     07-09    TPro  6.6  /  Alb  4.0  /  TBili  1.4<H>  /  DBili  x   /  AST  95<H>  /  ALT  27  /  AlkPhos  77  07-09    PT/INR - ( 08 Jul 2023 13:51 )   PT: 17.3 sec;   INR: 1.49 ratio         PTT - ( 08 Jul 2023 13:51 )  PTT:48.0 sec  Urinalysis Basic - ( 09 Jul 2023 00:45 )    Color: x / Appearance: x / SG: x / pH: x  Gluc: 150 mg/dL / Ketone: x  / Bili: x / Urobili: x   Blood: x / Protein: x / Nitrite: x   Leuk Esterase: x / RBC: x / WBC x   Sq Epi: x / Non Sq Epi: x / Bacteria: x        RADIOLOGY & ADDITIONAL TESTS: Reviewed.

## 2023-07-09 NOTE — PROGRESS NOTE ADULT - ASSESSMENT
88y Female with history of endometrial carcinoma presents with weakness. Nephrology consulted for elevated Scr.    1) [place]: initially resolved now with recurrent [place] suspect due to decreased EABV/ATN. Scr improving. Repeat UA. FeNa low. CT with mild to moderate R hydro likely secondary to CA for which patient would need intervention if within GOC however I do not believe current [place] due to unilateral hydronephrosis. TMA work up negative. Avoid nephrotoxins. Monitor electrolytes.    2) Hypotension: BP acceptable. Pressors as per ICU.    3) LE edema: Due to hypoalbuminemia S/P IV albumin and S/P bumex 2 mg IV X 1 dose this morning. TTE with normal LVSF. Monitor UO.    4) Hyponatremia: Improved with IV albumin. Bumex given this morning given volume overload. Monitor serum Na.        [facility] NEPHROLOGY  Brennon Cates M.D.  Addison Nieto D.O.  Guerline Dias M.D.  Merissa Parsons, MSN, ANP-C    Telephone: [phone]  Facsimile: [phone]    [address] [place] [address]

## 2023-07-09 NOTE — PROGRESS NOTE ADULT - SUBJECTIVE AND OBJECTIVE BOX
[facility] NEPHROLOGY- PROGRESS NOTE    88y Female with history of endometrial carcinoma presents with weakness. Nephrology consulted for elevated Scr.      REVIEW OF SYSTEMS: Unable to obtain as patient intubated.    No Known Allergies      Hospital Medications: Medications reviewed      VITALS:  T(F): 98.2 (07-09-23 @ 12:00), Max: 98.2 (07-08-23 @ 16:00)  HR: 110 (07-09-23 @ 12:30)  BP: 124/71 (07-09-23 @ 12:30)  RR: 14 (07-09-23 @ 12:30)  SpO2: 100% (07-09-23 @ 12:30)  Wt(kg): --    07-08 @ 07:01  -  07-09 @ 07:00  --------------------------------------------------------  IN: 2113.8 mL / OUT: 800 mL / NET: 1313.8 mL    07-09 @ 07:01  -  07-09 @ 12:46  --------------------------------------------------------  IN: 325.2 mL / OUT: 55 mL / NET: 270.2 mL        PHYSICAL EXAM:    Gen: Intubated and sedated  Cards: + tachycardia, +S1/S2, no M/G/R  Resp: Decreased BS @ R base, + R CT  GI: soft, NT/ND, NABS  : + brown  Vascular: + LE edema B/L        LABS:  07-09    134<L>  |  97<L>  |  36<H>  ----------------------------<  150<H>  4.0   |  19<L>  |  1.89<H>    Ca    8.4      09 Jul 2023 00:45  Phos  2.1     07-09  Mg     1.90     07-09    TPro  6.6  /  Alb  4.0  /  TBili  1.4<H>  /  DBili      /  AST  95<H>  /  ALT  27  /  AlkPhos  77  07-09    Creatinine Trend: 1.89 <--, 1.97 <--, 2.07 <--, 1.92 <--, 1.88 <--, 1.76 <--, 1.84 <--, 1.89 <--, 1.48 <--, 1.02 <--, 1.00 <--, 0.97 <--, 1.05 <--                        8.1    16.65 )-----------( 56       ( 09 Jul 2023 00:45 )             23.6     Urine Studies:  Urinalysis Basic - ( 09 Jul 2023 00:45 )    Color:  / Appearance:  / SG:  / pH:   Gluc: 150 mg/dL / Ketone:   / Bili:  / Urobili:    Blood:  / Protein:  / Nitrite:    Leuk Esterase:  / RBC:  / WBC    Sq Epi:  / Non Sq Epi:  / Bacteria:       Creatinine, Random Urine: 192 mg/dL (07-07 @ 13:00)  Sodium, Random Urine: <20 mmol/L (07-07 @ 13:00)  Sodium, Random Urine: 36 mmol/L (07-03 @ 12:26)

## 2023-07-09 NOTE — PROGRESS NOTE ADULT - ASSESSMENT
ASSESSMENT/PLAN:  Mrs. 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     NEURO  #Altered Mental Status  -Patient is significantly altered compared to their baseline   -CT Head ordered to evaluate for stroke, Neuro checks Q4H->Contact neurology would appreciate recommendations  -ABG, BMP, and CBC ordered   -Consider EEG   -Follow up Free T4, TSH, Total T3, Ammonia, Cortisol   - start vanco and zosyn    #Intubated  -Sedated on versed and propofol  - monitor mental status during weaning     CARDIAC  #SVT  -RRT 7/6 and 7/8 for multifocal atrial tacycarida,  patient was given adenosine on both events.  - s/p diltiazem gtt, will trial diltiazem 60 Q6 via OGT  -Monitor Tele  -TTE with normal findings, Bedside POCUS showed reduced EF    -Cardiology following would appreciate recs     #TAVR  -Mild aortic regurgitation as per MATTHIEU     #Shock of unknown etiology  -Septic vs Vasoplegic shock  -Infectious work up being done with BCx, UCx, CBC  -Patient is intubated for respiratory support   - on levophed, wean as tolerated  - start vanco and cefepime    PULM  # Pulmonary embolism.   -Pt diagnosed with RUL segmental PE in Aug 2022, now with possible new PE while on Eliquis 2.5 mg BID.  -Hold Eliquis due to bleeding risk and low HgB  - Lower Ultrasound negative for DVT    #Malignant pleural effusion  - R chest tube to waterseal for significant R pleural effusion  - f/u repeat CT chest    #Intubation  - see above  - monitor VBGs/ABGs    RENAL  #[place]  -DDx: poor PO intake and 3rd spacing (hypoalbuminemia vs sepsis  -Resolving, trend via CMP    # GI   - OGT  - will start liquid diet     HEME/ONC  #Stage 4 Endometrial cancer.   - Pt with history of stage 4 serous endometrial carcinoma (dx 5/2022) s/p chemo (last in 12/2022) and debulking surgery with POOL/BSO (2/10/23), now recently found to have recurrence of disease with new liver mets, the [place]var[place] [facility] [place]   - GOC discussion with pt and family given poor prognosis - pt is fullcode.    #Anemia  -Likely ACD transfuse if HgB<7    #DVT  -Hold AC with bleeding risk and low HgB  ID  #Leukocytosis  -WBC of 16K  -CBC, UCx, BCx ordered '  -Vancomycin and Cefipime    ENDO  #Hypothyroidism  -Levothyroxine    Ethics  The patient is full code and a GOC discussion has been had with the family, MOLST form is up to date and has been filled.   ASSESSMENT/PLAN:  Mrs. 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     NEURO  #Altered Mental Status  -Patient is significantly altered compared to their baseline   -CT Head negative for stroke  - Nonspecific symmetric dilatation of the bilateral superior ophthalmic   veins with slight hyperattenuation on the right, concern for increased intracranial pressure vs cavernous carotid fistula vs right-sided superior ophthalmic vein   thrombosis  - f/u CTA and CTV head and neck  - Neuro checks Q4H->Contact neurology would appreciate recommendations  -ABG, BMP, and CBC ordered   -Consider EEG   -Follow up Free T4, TSH, Total T3, Ammonia, Cortisol     #Intubated  -Sedated on versed and propofol  - monitor mental status during weaning     CARDIAC  #SVT  -RRT 7/6 and 7/8 for multifocal atrial tacycarida,  patient was given adenosine on both events.  - s/p diltiazem gtt, will trial diltiazem 60 Q6 via OGT  - d/c dilt 60 Q6 iso reduced cardiac function  - consider amio instead  -Monitor Tele  -TTE with normal findings, Bedside POCUS showed reduced EF    -Cardiology following would appreciate recs     #TAVR  -Mild aortic regurgitation as per MATTHIEU     #Shock of unknown etiology  -Septic vs Vasoplegic shock vs Cardiogenic  - POCUS showing severely reduced global LV systolic dysfunction with evidence of cardiogenic shock  -Infectious work up being done with BCx, UCx, CBC  -Patient is intubated for respiratory support   - on levophed, wean as tolerated  - start vanco and cefepime  - f/u repeat TTE  - s/p bumex 2mg  - monitor UOP    PULM  # Pulmonary embolism.   -Pt diagnosed with RUL segmental PE in Aug 2022, now with possible new PE while on Eliquis 2.5 mg BID.  -Hold Eliquis due to bleeding risk and low HgB  - Lower Ultrasound negative for DVT    #Malignant pleural effusion  - R chest tube to waterseal for significant R pleural effusion  - CT chest with large R pleural effusion, slight decrease from 6/21/23 w/ passive atelectasis of R upper and middle lobes, slightly increased small L pleural effusion. Extensive hepatic and peritoneal mets with large ascites  - per Heme, cytology from R pleural effusion shows atypical cells suspicious for malignancy.   - c/w water seal  - will flush chest tube    #Intubation  - c/w mechanical ventilation  - monitor VBGs/ABGs    RENAL  #[place]  -DDx: poor PO intake and 3rd spacing (hypoalbuminemia vs sepsis  -Resolving, trend via CMP  - CT with mild to moderate R hydro likely secondary to CA  - monitor UOP    # GI   - vomited this AM  - s/p Zofran  - NPO for now given vomiting    HEME/ONC  #Stage 4 Endometrial cancer.   - Pt with history of stage 4 serous endometrial carcinoma (dx 5/2022) s/p chemo (last in 12/2022) and debulking surgery with POOL/BSO (2/10/23), now recently found to have recurrence of disease with new liver mets, [place] [facility] [place]   - GOC discussion with pt and family given poor prognosis - pt is fullcode.    #Anemia  -Likely ACD transfuse if HgB<7  - per heme transfuse to maintain hg >7, platelet count >10K, >15K if febrile, or >50K if bleeding     #DVT  -c/w heparin subQ for ppx    ID  #Leukocytosis  -WBC of 16K  -CBC, UCx, BCx ordered '  -c/w Vancomycin and Cefipime  - f/u vanc levels  - repeat TTE for vegetations    ENDO  #Hypothyroidism  -Levothyroxine    Ethics  The patient is full code and a GOC discussion has been had with the family, MOLST form is up to date and has been filled.   ASSESSMENT/PLAN:  Mrs. 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     NEURO  #Altered Mental Status  -Patient is significantly altered compared to their baseline   -CT Head negative for stroke  - Nonspecific symmetric dilatation of the bilateral superior ophthalmic   veins with slight hyperattenuation on the right, concern for increased intracranial pressure vs cavernous carotid fistula vs right-sided superior ophthalmic vein   thrombosis  - f/u CTA and CTV head and neck  - Neuro checks Q4H->Contact neurology would appreciate recommendations  -ABG, BMP, and CBC ordered   -Consider EEG   -Follow up Free T4, TSH, Total T3, Ammonia, Cortisol     #Intubated  -Sedated on versed and propofol  - monitor mental status during weaning     CARDIAC  #SVT  -RRT 7/6 and 7/8 for multifocal atrial tacycarida,  patient was given adenosine on both events.  - s/p diltiazem gtt, will trial diltiazem 60 Q6 via OGT  - d/c dilt 60 Q6 iso reduced cardiac function  - consider amio instead  -Monitor Tele  -TTE with normal findings, Bedside POCUS showed reduced EF    -Cardiology following would appreciate recs     #TAVR  -Mild aortic regurgitation as per MATTHIEU     #Shock of unknown etiology  -Septic vs Vasoplegic shock vs Cardiogenic  - POCUS showing severely reduced global LV systolic dysfunction with evidence of cardiogenic shock  -Infectious work up being done with BCx, UCx, CBC  -Patient is intubated for respiratory support   - on levophed, wean as tolerated  - start vanco and cefepime  - f/u repeat TTE  - s/p bumex 2mg  - monitor UOP    PULM  # Pulmonary embolism.   -Pt diagnosed with RUL segmental PE in Aug 2022, now with possible new PE while on Eliquis 2.5 mg BID.  -Hold Eliquis due to bleeding risk and low HgB  - Lower Ultrasound negative for DVT    #Malignant pleural effusion  - R chest tube to waterseal for significant R pleural effusion  - CT chest with large R pleural effusion, slight decrease from 6/21/23 w/ passive atelectasis of R upper and middle lobes, slightly increased small L pleural effusion. Extensive hepatic and peritoneal mets with large ascites  - per Heme, cytology from R pleural effusion shows atypical cells suspicious for malignancy.   - c/w water seal  - will flush chest tube    #Intubation  - c/w mechanical ventilation  - monitor VBGs/ABGs    RENAL  #[place]  -DDx: poor PO intake and 3rd spacing (hypoalbuminemia vs sepsis  -Resolving, trend via CMP  - CT with mild to moderate R hydro likely secondary to CA  - monitor UOP    # GI   - vomited this AM  - s/p Zofran  - NPO for now given vomiting    HEME/ONC  #Stage 4 Endometrial cancer.   - Pt with history of stage 4 serous endometrial carcinoma (dx 5/2022) s/p chemo (last in 12/2022) and debulking surgery with POOL/BSO (2/10/23), now recently found to have recurrence of disease with new liver mets, the pt follows with [place]. Alvarado of [facility] [place].   - GOC discussion with pt and family given poor prognosis - pt is fullcode.    #Anemia  -Likely ACD transfuse if HgB<7  - per heme transfuse to maintain hg >7, platelet count >10K, >15K if febrile, or >50K if bleeding     #DVT  -c/w heparin subQ for ppx    ID  #Leukocytosis  -WBC of 16K  -CBC, UCx, BCx ordered  - BCx w/ Gram pos cocci in clusters, coagulase negative  - f/u 2nd Bcx  -c/w Vancomycin and Cefipime  - f/u vanc levels  - repeat TTE for vegetations    ENDO  #Hypothyroidism  -Levothyroxine    Ethics  The patient is full code and a GOC discussion has been had with the family, MOLST form is up to date and has been filled.   ASSESSMENT/PLAN:  Mrs. 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     NEURO  #Altered Mental Status  -Patient is significantly altered compared to their baseline   -CT Head negative for stroke  - Nonspecific symmetric dilatation of the bilateral superior ophthalmic   veins with slight hyperattenuation on the right, concern for increased intracranial pressure vs cavernous carotid fistula vs right-sided superior ophthalmic vein   thrombosis  - f/u CTA and CTV head and neck  - Neuro checks Q4H->Contact neurology would appreciate recommendations  -ABG, BMP, and CBC ordered   -Consider EEG   -Follow up Free T4, TSH, Total T3, Ammonia, Cortisol     #Intubated  -Sedated on versed and propofol  - monitor mental status during weaning     CARDIAC  #SVT  -RRT 7/6 and 7/8 for multifocal atrial tacycarida,  patient was given adenosine on both events.  - s/p diltiazem gtt, will trial diltiazem 60 Q6 via OGT  - d/c dilt 60 Q6 iso reduced cardiac function  - consider amio instead for SVT managment  -Monitor Tele  -TTE with normal findings, Bedside POCUS showed reduced EF    - f/u repeat TTE  -Cardiology following would appreciate recs   - trops downtrending 98 -> 92    #TAVR  -Mild aortic regurgitation as per MATTHIEU     #Shock of unknown etiology  -Septic vs Vasoplegic shock vs Cardiogenic  - POCUS showing severely reduced global LV systolic dysfunction with evidence of cardiogenic shock  -Infectious work up being done with BCx, UCx, CBC  -Patient is intubated for respiratory support   - on levophed, wean as tolerated  - start vanco and cefepime  - f/u repeat TTE  - s/p bumex 2mg  - monitor UOP    PULM  # Pulmonary embolism.   -Pt diagnosed with RUL segmental PE in Aug 2022, now with possible new PE while on Eliquis 2.5 mg BID.  -Hold Eliquis due to bleeding risk and low HgB  - Lower Ultrasound negative for DVT    #Malignant pleural effusion  - R chest tube to waterseal for significant R pleural effusion  - CT chest with large R pleural effusion, slight decrease from 6/21/23 w/ passive atelectasis of R upper and middle lobes, slightly increased small L pleural effusion. Extensive hepatic and peritoneal mets with large ascites  - per Heme, cytology from R pleural effusion shows atypical cells suspicious for malignancy.   - c/w water seal  - will flush chest tube    #Intubation  - c/w mechanical ventilation  - monitor VBGs/ABGs    RENAL  #[place]  -DDx: poor PO intake and 3rd spacing (hypoalbuminemia vs sepsis  -Resolving, trend via CMP  - CT with mild to moderate R hydro likely secondary to CA  - monitor UOP    # GI   - vomited this AM  - s/p Zofran  - NPO for now given vomiting    HEME/ONC  #Stage 4 Endometrial cancer.   - Pt with history of stage 4 serous endometrial carcinoma (dx 5/2022) s/p chemo (last in 12/2022) and debulking surgery with POOL/BSO (2/10/23), now recently found to have recurrence of disease with new liver mets, the pt follows with Dr. Alvarado of [facility] [place].   - GOC discussion with pt and family given poor prognosis - pt is fullcode.    #Anemia  -Likely ACD transfuse if HgB<7  - per heme transfuse to maintain hg >7, platelet count >10K, >15K if febrile, or >50K if bleeding   - platelet count 56K  - continue to monitor  - Hep PF4 Ab negative    #DVT  -c/w heparin subQ for ppx    ID  #Leukocytosis  -WBC of 16K  -CBC, UCx, BCx ordered  - BCx w/ Gram pos cocci in clusters, coagulase negative  - f/u 2nd Bcx  -c/w Vancomycin and Cefipime  - f/u vanc levels  - repeat TTE for vegetations    ENDO  #Hypothyroidism  -Levothyroxine    Ethics  The patient is full code and a GOC discussion has been had with the family, MOLST form is up to date and has been filled.

## 2023-07-09 NOTE — PROGRESS NOTE ADULT - SUBJECTIVE AND OBJECTIVE BOX
Patient is a 88y old  Female who presents with a chief complaint of Generalized weakness (09 Jul 2023 11:59)    Pt seen and examined at bedside. Intubated, in MICU.    MEDICATIONS  (STANDING):  atorvastatin 80 milliGRAM(s) Oral at bedtime  buMETAnide Injectable 2 milliGRAM(s) IV Push once  cefepime   IVPB 1000 milliGRAM(s) IV Intermittent daily  chlorhexidine 0.12% Liquid 15 milliLiter(s) Oral Mucosa every 12 hours  chlorhexidine 2% Cloths 1 Application(s) Topical daily  cholecalciferol 2000 Unit(s) Oral daily  cyanocobalamin 1000 MICROGram(s) Oral daily  diltiazem    Tablet 60 milliGRAM(s) Oral every 6 hours  heparin   Injectable 5000 Unit(s) SubCutaneous every 12 hours  levothyroxine Injectable 56 MICROGram(s) IV Push at bedtime  loratadine 10 milliGRAM(s) Oral daily  midazolam Infusion 0.02 mG/kG/Hr (1.2 mL/Hr) IV Continuous <Continuous>  misoprostol 200 MICROGram(s) Oral <User Schedule>  multivitamin 1 Tablet(s) Oral daily  mupirocin 2% Ointment 1 Application(s) Topical <User Schedule>  norepinephrine Infusion 0.05 MICROgram(s)/kG/Min (5.63 mL/Hr) IV Continuous <Continuous>  pantoprazole    Tablet 40 milliGRAM(s) Oral before breakfast  propofol Infusion 50 MICROgram(s)/kG/Min (18 mL/Hr) IV Continuous <Continuous>    MEDICATIONS  (PRN):  acetaminophen     Tablet .. 650 milliGRAM(s) Oral every 6 hours PRN Temp greater or equal to 38C (100.4F), Mild Pain (1 - 3)  albuterol    90 MICROgram(s) HFA Inhaler 2 Puff(s) Inhalation every 6 hours PRN Shortness of Breath and/or Wheezing  HYDROmorphone  Injectable 0.2 milliGRAM(s) IV Push every 4 hours PRN Severe Pain (7 - 10)  lactulose Syrup 10 Gram(s) Oral daily PRN constipation  melatonin 3 milliGRAM(s) Oral at bedtime PRN Sleep  oxyCODONE    IR 5 milliGRAM(s) Oral every 4 hours PRN Moderate Pain (4 - 6)  polyethylene glycol 3350 17 Gram(s) Oral daily PRN for constipation  senna 2 Tablet(s) Oral at bedtime PRN for constipation    Vital Signs Last 24 Hrs  T(C): 36.2 (09 Jul 2023 04:00), Max: 36.8 (08 Jul 2023 16:00)  T(F): 97.2 (09 Jul 2023 04:00), Max: 98.2 (08 Jul 2023 16:00)  HR: 112 (09 Jul 2023 10:39) (99 - 119)  BP: 129/64 (09 Jul 2023 10:04) (47/29 - 133/79)  BP(mean): 81 (09 Jul 2023 10:04) (27 - 92)  RR: 14 (09 Jul 2023 10:04) (2 - 22)  SpO2: 100% (09 Jul 2023 10:39) (79% - 100%)    Parameters below as of 09 Jul 2023 09:00  Patient On (Oxygen Delivery Method): ventilator    O2 Concentration (%): 40    PE  NAD  Intubated  MMM  No c/c/e  No rash grossly  FROM                          8.1    16.65 )-----------( 56       ( 09 Jul 2023 00:45 )             23.6       07-09    134<L>  |  97<L>  |  36<H>  ----------------------------<  150<H>  4.0   |  19<L>  |  1.89<H>    Ca    8.4      09 Jul 2023 00:45  Phos  2.1     07-09  Mg     1.90     07-09    TPro  6.6  /  Alb  4.0  /  TBili  1.4<H>  /  DBili  x   /  AST  95<H>  /  ALT  27  /  AlkPhos  77  07-09

## 2023-07-09 NOTE — PROGRESS NOTE ADULT - ASSESSMENT
EKG: MAT   Tele: MAT    1) Multifocal Atrial Tachycardia   -with episodes of Atrial tac   -continue to monitor on tele   - Multiple episodes of SVT s/p adenosine and dilt drip , now on amio    - intuabted 2/2 AMS     2) Pleural effusion  -s/p Pig tail   -f/u pulm and thoracic recs    3) hx of PE  - RUL segmental PE (8/2022  -AC on hold 2/2 anemia    4) CAD s/p PCI  -denies CP  -TTE with normal LV function    5) TAVR  -TTE transcatheter aortic valve replacement Peak transaortic valve gradient equals 42 mm Hg, mean transaortic valve gradient equals 23 mm Hg, which is probably normal in the presence of a prosthetic valve. Moderate valvular aortic regurgitation.    6) Anemia  -transfuse to keep hemoglobin >8  -CT scan non con showing bleeding near the spleen and liver. surgery on board, CTA abd/pelvis with no acute bleed

## 2023-07-09 NOTE — CHART NOTE - NSCHARTNOTEFT_GEN_A_CORE
: Yadiel    INDICATION: Respiratory Failure    PROCEDURE:  [X] LIMITED ECHO  [X] LIMITED CHEST  [ ] LIMITED RETROPERITONEAL  [ ] LIMITED ABDOMINAL  [ ] LIMITED DVT  [ ] NEEDLE GUIDANCE VASCULAR  [ ] NEEDLE GUIDANCE THORACENTESIS  [ ] NEEDLE GUIDANCE PARACENTESIS  [ ] NEEDLE GUIDANCE PERICARDIOCENTESIS  [ ] OTHER    FINDINGS: A line predominant pattern anteriorly. Bilateral simple pleural effusions, R > L. Severe global reduction in LV systolic function. LVOT VTI 11 cm. RV smaller than LV in size. IVC collapsed.       INTERPRETATION: Bilateral simple pleural effusions, R > L. New severe LV systolic dysfunction.     Images stored on "[employer].    John Cotto MD  Pulmonary & Critical Care

## 2023-07-09 NOTE — PROGRESS NOTE ADULT - ASSESSMENT
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    1. Uterine carcinosarcoma   -- Under care of Dr. Alvarado at [facility] [place]  -- s/p neoadjuvant chemotherapy completed 12/2022, s/p POOL-BSO 02/10/2023   -- Recent imaging suggests disease recurrence  -- Prognosis is poor. She is not a candidate for further treatment.   -- Palliative care team following, ongoing C discussions. Hospice referral placed.     2. Dyspnea on exertion   -- CTA chest w/o PE; + large right pleural effusion   -- LE duplex without DVT   -- Thoracic following, pigtail catheter on waterseal  -- Cytology from R pleural effusion shows atypical cells suspicious for malignancy.   -- Continue supplemental O2 as needed, wean as tolerated     3. Anemia, thrombocytopenia   -- Likely secondary to malignancy, AOCD  -- No evidence of iron, B12, or folate deficiencies.  -- Monitor CBC and transfuse to maintain hg >7, platelet count >10K, >15K if febrile, or >50K if bleeding     4. AMS, lethargy   -- CT Head negative  -- Neurology consulted, EEG w/o seizure activity  -- Intubated, sedated in MICU    5. [place]   -- Renal US shows moderate R hydronephrosis, moderate to large ascites   -- Nephrology following, appreciate recs. Likely [place] due to poor PO intake   -- IR consulted for NT placement. Rpt CT w/ No significant change in right hydronephrosis.    Will continue to follow.    Juancarlos Esqueda PA-C  Hematology/Oncology  [facility]  [phone] (office)

## 2023-07-10 NOTE — PROGRESS NOTE ADULT - SUBJECTIVE AND OBJECTIVE BOX
pt seen and examined with Dr Boyd    Vital Signs Last 24 Hrs  T(C): 37.6 (10 Jul 2023 12:02), Max: 37.6 (10 Jul 2023 08:00)  T(F): 99.6 (10 Jul 2023 12:02), Max: 99.6 (10 Jul 2023 08:00)  HR: 119 (10 Jul 2023 15:02) (115 - 135)  BP: 98/61 (10 Jul 2023 15:02) (83/32 - 144/92)  BP(mean): 71 (10 Jul 2023 15:02) (43 - 105)  RR: 14 (10 Jul 2023 15:02) (14 - 18)  SpO2: 100% (10 Jul 2023 15:02) (96% - 100%)    Parameters below as of 10 Jul 2023 15:02  Patient On (Oxygen Delivery Method): ventilator, 14/320/5    O2 Concentration (%): 30    PE  intubated and sedated in MICU  R PTC on ws drained 160cc/24h no air leak    A/P: 87yo F with Rt pleural effusion, s/p PTC insertion    -PTC output minimal for multiple days  -No plans for any thoracic surgical intervention  -pt now intubated in MICU  - tube remains on waterseal  -Will follow as needed    Megan MATUTE [id number]

## 2023-07-10 NOTE — PROGRESS NOTE ADULT - SUBJECTIVE AND OBJECTIVE BOX
Rony Mcgarry MD  Interventional Cardiology / Advance Heart Failure and Cardiac Transplant Specialist  [place] Office : [phone] [place] [id number]  Tel:   [place] Office : [address] [facility] [address]  Tel: [phone]      Subjective/Overnight events: Pt is lying in bed in ICU intubated and sedated  	  MEDICATIONS:  heparin   Injectable 5000 Unit(s) SubCutaneous every 12 hours  norepinephrine Infusion 0.05 MICROgram(s)/kG/Min IV Continuous <Continuous>    cefepime   IVPB 1000 milliGRAM(s) IV Intermittent daily    albuterol    90 MICROgram(s) HFA Inhaler 2 Puff(s) Inhalation every 6 hours PRN  loratadine 10 milliGRAM(s) Oral daily    acetaminophen     Tablet .. 650 milliGRAM(s) Oral every 6 hours PRN  HYDROmorphone  Injectable 0.2 milliGRAM(s) IV Push every 4 hours PRN  midazolam Infusion 0.02 mG/kG/Hr IV Continuous <Continuous>  oxyCODONE    IR 5 milliGRAM(s) Oral every 4 hours PRN  propofol Infusion 50 MICROgram(s)/kG/Min IV Continuous <Continuous>    lactulose Syrup 10 Gram(s) Oral daily PRN  misoprostol 200 MICROGram(s) Oral <User Schedule>  pantoprazole  Injectable 40 milliGRAM(s) IV Push daily  polyethylene glycol 3350 17 Gram(s) Oral two times a day  senna 2 Tablet(s) Oral at bedtime PRN    atorvastatin 80 milliGRAM(s) Oral at bedtime  levothyroxine Injectable 56 MICROGram(s) IV Push at bedtime  vasopressin Infusion 0.04 Unit(s)/Min IV Continuous <Continuous>    chlorhexidine 0.12% Liquid 15 milliLiter(s) Oral Mucosa every 12 hours  chlorhexidine 2% Cloths 1 Application(s) Topical daily  cholecalciferol 2000 Unit(s) Oral daily  cyanocobalamin 1000 MICROGram(s) Oral daily  multivitamin 1 Tablet(s) Oral daily  mupirocin 2% Ointment 1 Application(s) Topical <User Schedule>  petrolatum Ophthalmic Ointment 1 Application(s) Both EYES two times a day  potassium chloride  10 mEq/100 mL IVPB 10 milliEquivalent(s) IV Intermittent every 1 hour      PAST MEDICAL/SURGICAL HISTORY  PAST MEDICAL & SURGICAL HISTORY:  Endometrial ca  S4 serous endometrial carcinoma, [facility], chemotherapy      HTN (hypertension)      CAD (coronary artery disease)      S/P AVR      Hypothyroidism      HLD (hyperlipidemia)      S/P AVR (aortic valve replacement)      History of endometrial biopsy          SOCIAL HISTORY: Substance Use (street drugs): ( x ) never used  (  ) other:    FAMILY HISTORY:  Family history of parotid cancer (Child)        PHYSICAL EXAM:  T(C): 37.6 (07-10-23 @ 12:02), Max: 37.6 (07-10-23 @ 08:00)  HR: 120 (07-10-23 @ 13:00) (112 - 135)  BP: 100/59 (07-10-23 @ 13:00) (99/78 - 144/92)  RR: 14 (07-10-23 @ 13:00) (14 - 18)  SpO2: 100% (07-10-23 @ 13:00) (96% - 100%)  Wt(kg): --  I&O's Summary    09 Jul 2023 07:01  -  10 Jul 2023 07:00  --------------------------------------------------------  IN: 1869.4 mL / OUT: 1545 mL / NET: 324.4 mL    10 Jul 2023 07:01  -  10 Jul 2023 14:52  --------------------------------------------------------  IN: 524.8 mL / OUT: 265 mL / NET: 259.8 mL          GENERAL: intubated and sedated  EYES: , conjunctiva and sclera clear  Cardiovascular: Normal S1 S2, No JVD, No murmurs, No edema  Respiratory: Lungs clear to auscultation	  Gastrointestinal:  Soft, Extremities: No edema                                     9.2    17.32 )-----------( 69       ( 10 Jul 2023 00:20 )             27.3     07-10    136  |  98  |  34<H>  ----------------------------<  135<H>  3.5   |  23  |  1.68<H>    Ca    8.1<L>      10 Jul 2023 00:20  Phos  2.7     07-10  Mg     2.00     07-10    TPro  6.2  /  Alb  3.4  /  TBili  1.2  /  DBili  x   /  AST  133<H>  /  ALT  38<H>  /  AlkPhos  94  07-10    proBNP:   Lipid Profile:   HgA1c:   TSH:     Consultant(s) Notes Reviewed:  [x ] YES  [ ] NO    Care Discussed with Consultants/Other Providers [ x] YES  [ ] NO    Imaging Personally Reviewed independently:  [x] YES  [ ] NO    All labs, radiologic studies, vitals, orders and medications list reviewed. Patient is seen and examined at bedside. Case discussed with medical team.

## 2023-07-10 NOTE — PROCEDURE NOTE - NSPROCDETAILS_GEN_ALL_CORE
location identified, draped/prepped, sterile technique used, needle inserted/introduced/positive blood return obtained via catheter/connected to a pressurized flush line/sutured in place/hemostasis with direct pressure, dressing applied/Seldinger technique/all materials/supplies accounted for at end of procedure
Seldinger technique/sterile dressing applied

## 2023-07-10 NOTE — CHART NOTE - NSCHARTNOTEFT_GEN_A_CORE
88 year-old F with stage 4 endometerial carcinoma s/p chemo who is admitted for altered mental status. Patient found to have [place]. IR consulted for left nephrostomy tube. Per review of notes, nephrology did not feel that [place] is due to unilateral hydronephrosis at this time. Also, there is some concern regarding a goals of care discussion for this patient. At this time, likely no need to place nephrostomy tube.

## 2023-07-10 NOTE — PROGRESS NOTE ADULT - ASSESSMENT
88y ([id number]) woman with recurrent stage 4 serous endometrial carcinoma (dx 5/2022) (neoadjuvant chemotherapy completed 12/2022, s/p POOL-BSO 02/10/2023), RUL segmental PE (8/2022, on Eliquis), GI bleed (4/2023), CAD s/p stent (3/2021), aortic stenosis s/p TAVR, HTN, HLD, and hypothyroidism admitted for generalized weakness, found to have moderate-to-large R pleural effusion. Neurology consulted for AMS and lethargy. Daughter at bedside providing collateral history. Patient at baseline is oriented x 3 and walks with walker. Since being admitted to the hospital she has become lethargic with decreased verbal output. After getting hysterectomy she felt her mother slowly declining. She is not eating as much and not participating in conversation as she used to. RRT was called for AMS, patient was given xanax prior and now has been discontinued. Exam nonfocal. VPA    WBC 16, plat 69, elevated coags, Trop T 98 H, elevatd BUN/CR,TSH 15 H, low T3/T4, procalcitonin .39 H, lactate 2.3 H    CTH 7/8/23   No acute intracranial hemorrhage, mass effect or midline shift.    Nonspecific symmetric dilatation of the bilateral superior ophthalmic veins with slight hyperattenuation on the right. Findings can be seen with raised intracranial pressure amongst other possible etiologies. The possibility of the cavernous carotid fistula cannot be completely excluded. The presence of a right-sided superior ophthalmic vein thrombosis cannot also be excluded. Imaging findings are new when compared to the prior exams. Consider further evaluation with a CT angiogram study of the head and neck.    CT head 7/3:   Age-appropriate involutional change and microvascular ischemic disease. No large vessel occlusion. No intracranial hemorrhage no space-occupying lesion. Bilateral basal ganglia and dentate nuclei calcifications. Incidental mildly enlarged partially empty sella.      Impression: Continued lethargy and episode of confusion likely toxic, metabolic, infectious etiology with component of hospital delirium vs medication induced from sedatives. Eliquis on hold for now due to bleed risk and low hgb.     Recommendation:   [x] B12 over 2000, Folate over 20, TSH 25, free t3 42  [x] 2 hour EEG - no discharged noted  [x] CTA head neck, CTV not done - family refused contrast.  [] Repeat CT head findings discussed with stroke fellow and MR brain/MRA head w/o contrast/ MRA neck with contrast recommended when able  [] MRI B w/wo to r/o leptomeningeal enhancement when able and if in line with GOC  []Please restart eliquis, when not contraindicted  [] Labs: NH3, B1, B6, vitamin D    To be seen and discussed with attending.    88y ([id number]) woman with recurrent stage 4 serous endometrial carcinoma (dx 5/2022) (neoadjuvant chemotherapy completed 12/2022, s/p POOL-BSO 02/10/2023), RUL segmental PE (8/2022, on Eliquis), GI bleed (4/2023), CAD s/p stent (3/2021), aortic stenosis s/p TAVR, HTN, HLD, and hypothyroidism admitted for generalized weakness, found to have moderate-to-large R pleural effusion. Neurology consulted for AMS and lethargy. Daughter at bedside providing collateral history. Patient at baseline is oriented x 3 and walks with walker. Since being admitted to the hospital she has become lethargic with decreased verbal output. After getting hysterectomy she felt her mother slowly declining. She is not eating as much and not participating in conversation as she used to. RRT was called for AMS, patient was given xanax prior and now has been discontinued. Exam nonfocal. VPA    WBC 16, plat 69, elevated coags, Trop T 98 H, elevatd BUN/CR,TSH 15 H, low T3/T4, procalcitonin .39 H, lactate 2.3 H    CTH 7/8/23   No acute intracranial hemorrhage, mass effect or midline shift.    Nonspecific symmetric dilatation of the bilateral superior ophthalmic veins with slight hyperattenuation on the right. Findings can be seen with raised intracranial pressure amongst other possible etiologies. The possibility of the cavernous carotid fistula cannot be completely excluded. The presence of a right-sided superior ophthalmic vein thrombosis cannot also be excluded. Imaging findings are new when compared to the prior exams. Consider further evaluation with a CT angiogram study of the head and neck.    CT head 7/3:   Age-appropriate involutional change and microvascular ischemic disease. No large vessel occlusion. No intracranial hemorrhage no space-occupying lesion. Bilateral basal ganglia and dentate nuclei calcifications. Incidental mildly enlarged partially empty sella.      Impression: Continued lethargy and episode of confusion likely toxic, metabolic, infectious etiology with component of hospital delirium vs medication induced from sedatives. Eliquis on hold for now due to bleed risk and low hgb.     Recommendation:   [x] B12 over 2000, Folate over 20, TSH 25, free t3 42  [x] 2 hour EEG - no discharged noted  [] Video EEG if in line with GOC  [x] CTA head neck, CTV not done - family refused contrast.  [] Repeat CT head findings discussed with stroke fellow and MR brain/MRA head w/o contrast/ MRA neck with contrast recommended when able  [] MRI B w/wo to r/o leptomeningeal enhancement when able and if in line with GOC  []Please restart eliquis, when not contraindicted  [] Labs: NH3, B1, B6, vitamin D    To be seen and discussed with attending.    88y ([id number]) woman with recurrent stage 4 serous endometrial carcinoma (dx 5/2022) (neoadjuvant chemotherapy completed 12/2022, s/p POOL-BSO 02/10/2023), RUL segmental PE (8/2022, on Eliquis), GI bleed (4/2023), CAD s/p stent (3/2021), aortic stenosis s/p TAVR, HTN, HLD, and hypothyroidism admitted for generalized weakness, found to have moderate-to-large R pleural effusion. Neurology consulted for AMS and lethargy. Daughter at bedside providing collateral history. Patient at baseline is oriented x 3 and walks with walker. Since being admitted to the hospital she has become lethargic with decreased verbal output. After getting hysterectomy she felt her mother slowly declining. She is not eating as much and not participating in conversation as she used to. RRT was called for AMS, patient was given xanax prior and now has been discontinued. Exam nonfocal. VPA    WBC 16, plat 69, elevated coags, Trop T 98 H, elevatd BUN/CR,TSH 15 H, low T3/T4, procalcitonin .39 H, lactate 2.3 H    CTH 7/8/23   No acute intracranial hemorrhage, mass effect or midline shift.    Nonspecific symmetric dilatation of the bilateral superior ophthalmic veins with slight hyperattenuation on the right. Findings can be seen with raised intracranial pressure amongst other possible etiologies. The possibility of the cavernous carotid fistula cannot be completely excluded. The presence of a right-sided superior ophthalmic vein thrombosis cannot also be excluded. Imaging findings are new when compared to the prior exams. Consider further evaluation with a CT angiogram study of the head and neck.    CT head 7/3:   Age-appropriate involutional change and microvascular ischemic disease. No large vessel occlusion. No intracranial hemorrhage no space-occupying lesion. Bilateral basal ganglia and dentate nuclei calcifications. Incidental mildly enlarged partially empty sella.      Impression: Continued lethargy and episode of confusion likely toxic, metabolic, infectious etiology with component of hospital delirium vs medication induced from sedatives. Eliquis on hold for now due to bleed risk and low hgb.     Recommendation:   [x] B12 over 2000, Folate over 20, TSH 25, free t3 42  [x] 2 hour EEG - No epileptiform abnormalities  [] Video EEG if in line with GOC  [x] CTA head neck, CTV not done - family refused contrast.  [] Repeat CT head findings discussed with stroke fellow and MR brain/MRA head w/o contrast/ MRA neck with contrast recommended when able  [] MRI B w/wo when able and if in line with GOC  []Please restart eliquis, when not contraindicted  [] Labs: NH3, B1, B6, vitamin D    To be seen and discussed with attending.

## 2023-07-10 NOTE — PROGRESS NOTE ADULT - SUBJECTIVE AND OBJECTIVE BOX
Pt seen at bedside with neurology team.    Pt has no acute overnight events.    Unable to obtain ROS given mental status/intubation.     Allergies:  No Known Allergies      PMHx/PSHx/Family Hx: As above, otherwise see below   Endometrial ca    HTN (hypertension)    CAD (coronary artery disease)    S/P AVR    Hypothyroidism    HLD (hyperlipidemia)        Social Hx:  No current use of tobacco, alcohol, or illicit drugs  Lives with ***    Medications:  MEDICATIONS  (STANDING):  atorvastatin 80 milliGRAM(s) Oral at bedtime  cefepime   IVPB 1000 milliGRAM(s) IV Intermittent daily  chlorhexidine 0.12% Liquid 15 milliLiter(s) Oral Mucosa every 12 hours  chlorhexidine 2% Cloths 1 Application(s) Topical daily  cholecalciferol 2000 Unit(s) Oral daily  cyanocobalamin 1000 MICROGram(s) Oral daily  heparin   Injectable 5000 Unit(s) SubCutaneous every 12 hours  levothyroxine Injectable 56 MICROGram(s) IV Push at bedtime  loratadine 10 milliGRAM(s) Oral daily  midazolam Infusion 0.02 mG/kG/Hr (1.2 mL/Hr) IV Continuous <Continuous>  misoprostol 200 MICROGram(s) Oral <User Schedule>  multivitamin 1 Tablet(s) Oral daily  mupirocin 2% Ointment 1 Application(s) Topical <User Schedule>  norepinephrine Infusion 0.05 MICROgram(s)/kG/Min (2.81 mL/Hr) IV Continuous <Continuous>  pantoprazole  Injectable 40 milliGRAM(s) IV Push daily  petrolatum Ophthalmic Ointment 1 Application(s) Both EYES two times a day  polyethylene glycol 3350 17 Gram(s) Oral two times a day  potassium chloride  10 mEq/100 mL IVPB 10 milliEquivalent(s) IV Intermittent every 1 hour  propofol Infusion 50 MICROgram(s)/kG/Min (18 mL/Hr) IV Continuous <Continuous>  vasopressin Infusion 0.04 Unit(s)/Min (6 mL/Hr) IV Continuous <Continuous>    MEDICATIONS  (PRN):  acetaminophen     Tablet .. 650 milliGRAM(s) Oral every 6 hours PRN Temp greater or equal to 38C (100.4F), Mild Pain (1 - 3)  albuterol    90 MICROgram(s) HFA Inhaler 2 Puff(s) Inhalation every 6 hours PRN Shortness of Breath and/or Wheezing  HYDROmorphone  Injectable 0.2 milliGRAM(s) IV Push every 4 hours PRN Severe Pain (7 - 10)  lactulose Syrup 10 Gram(s) Oral daily PRN constipation  oxyCODONE    IR 5 milliGRAM(s) Oral every 4 hours PRN Moderate Pain (4 - 6)  senna 2 Tablet(s) Oral at bedtime PRN for constipation      Vitals:  T(C): 37.6 (07-10-23 @ 08:00), Max: 37.6 (07-10-23 @ 08:00)  HR: 133 (07-10-23 @ 11:00) (107 - 135)  BP: 108/85 (07-10-23 @ 11:00) (99/78 - 144/92)  RR: 14 (07-10-23 @ 11:00) (14 - 18)  SpO2: 100% (07-10-23 @ 11:00) (99% - 100%)    Physical Examination: INCOMPLETE  General - NAD  Cardiovascular - Peripheral pulses palpable, no edema  Eyes - Fundoscopy with flat, sharp optic discs and no hemorrhage or exudates; Fundoscopy not well visualized; Fundoscopy not performed due to safety precautions in the setting of the COVID-19 pandemic    Neurologic Exam:  Mental status - Awake, Alert, Oriented to person, place, and time. Speech fluent, repetition and naming intact. Follows simple and complex commands. Attention/concentration, recent and remote memory (including registration and recall), and fund of knowledge intact    Cranial nerves - PERRLA, VFF, EOMI, face sensation (V1-V3) intact b/l, facial strength intact without asymmetry b/l, hearing intact b/l, palate with symmetric elevation, trapezius OR sternocleidomastiod 5/5 strength b/l, tongue midline on protrusion with full lateral movement    Motor - Normal bulk and tone throughout. No pronator drift.  Strength testing            Deltoid      Biceps      Triceps     Wrist Extension    Wrist Flexion     Interossei         R            5                 5               5                     5                              5                        5                 5  L             5                 5               5                     5                              5                        5                 5              Hip Flexion    Hip Extension    Knee Flexion    Knee Extension    Dorsiflexion    Plantar Flexion  R              5                           5                       5                           5                            5                          5  L              5                           5                        5                           5                            5                          5    Sensation - Light touch/temperature OR pain/vibration intact throughout    DTR's -             Biceps      Triceps     Brachioradialis      Patellar    Ankle    Toes/plantar response  R             2+             2+                  2+                       2+            2+                 Down  L              2+             2+                 2+                        2+           2+                 Down    Coordination - Finger to Nose intact b/l. No tremors appreciated    Gait and station - Normal casual gait. Romberg (-)    Labs:                        9.2    17.32 )-----------( 69       ( 10 Jul 2023 00:20 )             27.3     07-10    136  |  98  |  34<H>  ----------------------------<  135<H>  3.5   |  23  |  1.68<H>    Ca    8.1<L>      10 Jul 2023 00:20  Phos  2.7     07-10  Mg     2.00     07-10    TPro  6.2  /  Alb  3.4  /  TBili  1.2  /  DBili  x   /  AST  133<H>  /  ALT  38<H>  /  AlkPhos  94  07-10    CAPILLARY BLOOD GLUCOSE      POCT Blood Glucose.: 149 mg/dL (10 Jul 2023 11:23)    LIVER FUNCTIONS - ( 10 Jul 2023 00:20 )  Alb: 3.4 g/dL / Pro: 6.2 g/dL / ALK PHOS: 94 U/L / ALT: 38 U/L / AST: 133 U/L / GGT: x             Culture - Blood (collected 09 Jul 2023 06:56)  Source: .Blood Blood  Preliminary Report (10 Jul 2023 09:01):    No growth at 24 hours    Culture - Sputum (collected 08 Jul 2023 20:55)  Source: ET Tube ET Tube  Gram Stain (09 Jul 2023 07:57):    No polymorphonuclear leukocytes per low power field    No Squamous epithelial cells per low power field    No organisms seen  Preliminary Report (10 Jul 2023 07:15):    Normal Respiratory Kenzie present    Culture - Blood (collected 08 Jul 2023 13:37)  Source: .Blood Blood-Peripheral  Gram Stain (09 Jul 2023 12:05):    Growth in aerobic bottle: Gram Positive Cocci in Clusters  Preliminary Report (09 Jul 2023 12:05):    Growth in aerobic bottle: Gram Positive Cocci in Clusters    Direct identification is available within approximately 3-5    hours either by Blood Panel Multiplexed PCR or Direct    MALDI-TOF. Details: [email or web address]  Organism: Blood Culture PCR (09 Jul 2023 13:43)  Organism: Blood Culture PCR (09 Jul 2023 13:43)      PT/INR - ( 08 Jul 2023 13:51 )   PT: 17.3 sec;   INR: 1.49 ratio         PTT - ( 08 Jul 2023 13:51 )  PTT:48.0 sec  CSF:                  Radiology:  CT Head No Cont:  (08 Jul 2023 11:50)  CT Head No Cont:  (03 Jul 2023 17:33)     Pt seen at bedside with neurology team. Pt on propofol midazolam and norepi.    Pt has no acute overnight events.    Unable to obtain ROS given mental status/intubation.     Allergies:  No Known Allergies      PMHx/PSHx/Family Hx: As above, otherwise see below   Endometrial ca    HTN (hypertension)    CAD (coronary artery disease)    S/P AVR    Hypothyroidism    HLD (hyperlipidemia)        Social Hx:  No current use of tobacco, alcohol, or illicit drugs    Medications:  MEDICATIONS  (STANDING):  atorvastatin 80 milliGRAM(s) Oral at bedtime  cefepime   IVPB 1000 milliGRAM(s) IV Intermittent daily  chlorhexidine 0.12% Liquid 15 milliLiter(s) Oral Mucosa every 12 hours  chlorhexidine 2% Cloths 1 Application(s) Topical daily  cholecalciferol 2000 Unit(s) Oral daily  cyanocobalamin 1000 MICROGram(s) Oral daily  heparin   Injectable 5000 Unit(s) SubCutaneous every 12 hours  levothyroxine Injectable 56 MICROGram(s) IV Push at bedtime  loratadine 10 milliGRAM(s) Oral daily  midazolam Infusion 0.02 mG/kG/Hr (1.2 mL/Hr) IV Continuous <Continuous>  misoprostol 200 MICROGram(s) Oral <User Schedule>  multivitamin 1 Tablet(s) Oral daily  mupirocin 2% Ointment 1 Application(s) Topical <User Schedule>  norepinephrine Infusion 0.05 MICROgram(s)/kG/Min (2.81 mL/Hr) IV Continuous <Continuous>  pantoprazole  Injectable 40 milliGRAM(s) IV Push daily  petrolatum Ophthalmic Ointment 1 Application(s) Both EYES two times a day  polyethylene glycol 3350 17 Gram(s) Oral two times a day  potassium chloride  10 mEq/100 mL IVPB 10 milliEquivalent(s) IV Intermittent every 1 hour  propofol Infusion 50 MICROgram(s)/kG/Min (18 mL/Hr) IV Continuous <Continuous>  vasopressin Infusion 0.04 Unit(s)/Min (6 mL/Hr) IV Continuous <Continuous>    MEDICATIONS  (PRN):  acetaminophen     Tablet .. 650 milliGRAM(s) Oral every 6 hours PRN Temp greater or equal to 38C (100.4F), Mild Pain (1 - 3)  albuterol    90 MICROgram(s) HFA Inhaler 2 Puff(s) Inhalation every 6 hours PRN Shortness of Breath and/or Wheezing  HYDROmorphone  Injectable 0.2 milliGRAM(s) IV Push every 4 hours PRN Severe Pain (7 - 10)  lactulose Syrup 10 Gram(s) Oral daily PRN constipation  oxyCODONE    IR 5 milliGRAM(s) Oral every 4 hours PRN Moderate Pain (4 - 6)  senna 2 Tablet(s) Oral at bedtime PRN for constipation      Vitals:  T(C): 37.6 (07-10-23 @ 08:00), Max: 37.6 (07-10-23 @ 08:00)  HR: 133 (07-10-23 @ 11:00) (107 - 135)  BP: 108/85 (07-10-23 @ 11:00) (99/78 - 144/92)  RR: 14 (07-10-23 @ 11:00) (14 - 18)  SpO2: 100% (07-10-23 @ 11:00) (99% - 100%)    Physical Examination:    Physical Examination:   General - Laying in bed, intubated    Neurologic Exam:  Mental status - Eyes closed, does not open them to repeated stimulation. Non verbal. Does not follow commands.     Cranial nerves - Pupils 1mm minimally reactive, midline pupils BTT decreased on both sides , face sensation unable to test, face without asymmetry b/l, sternocleidomastiod strength - unable to testl,     Motor - Normal bulk and tone throughout. hypotonic  No spontaneous movements noted.    Sensation - Does not withdraw any extremities or grimace on noxious stimuli of extremities.     DTR's -             Biceps      Triceps     Brachioradialis          Patellar    Ankle    Toes/plantar response  R             1+             1+                  1                       1+            1                Mute  L              1+             1+                 1                        1+           1                 Mute    Coordination - unable to test given status    Gait and station - unable to test given mental status    Labs:                        9.2    17.32 )-----------( 69       ( 10 Jul 2023 00:20 )             27.3     07-10    136  |  98  |  34<H>  ----------------------------<  135<H>  3.5   |  23  |  1.68<H>    Ca    8.1<L>      10 Jul 2023 00:20  Phos  2.7     07-10  Mg     2.00     07-10    TPro  6.2  /  Alb  3.4  /  TBili  1.2  /  DBili  x   /  AST  133<H>  /  ALT  38<H>  /  AlkPhos  94  07-10    CAPILLARY BLOOD GLUCOSE      POCT Blood Glucose.: 149 mg/dL (10 Jul 2023 11:23)    LIVER FUNCTIONS - ( 10 Jul 2023 00:20 )  Alb: 3.4 g/dL / Pro: 6.2 g/dL / ALK PHOS: 94 U/L / ALT: 38 U/L / AST: 133 U/L / GGT: x             Culture - Blood (collected 09 Jul 2023 06:56)  Source: .Blood Blood  Preliminary Report (10 Jul 2023 09:01):    No growth at 24 hours    Culture - Sputum (collected 08 Jul 2023 20:55)  Source: ET Tube ET Tube  Gram Stain (09 Jul 2023 07:57):    No polymorphonuclear leukocytes per low power field    No Squamous epithelial cells per low power field    No organisms seen  Preliminary Report (10 Jul 2023 07:15):    Normal Respiratory Kenzie present    Culture - Blood (collected 08 Jul 2023 13:37)  Source: .Blood Blood-Peripheral  Gram Stain (09 Jul 2023 12:05):    Growth in aerobic bottle: Gram Positive Cocci in Clusters  Preliminary Report (09 Jul 2023 12:05):    Growth in aerobic bottle: Gram Positive Cocci in Clusters    Direct identification is available within approximately 3-5    hours either by Blood Panel Multiplexed PCR or Direct    MALDI-TOF. Details: [email or web address]  Organism: Blood Culture PCR (09 Jul 2023 13:43)  Organism: Blood Culture PCR (09 Jul 2023 13:43)      PT/INR - ( 08 Jul 2023 13:51 )   PT: 17.3 sec;   INR: 1.49 ratio         PTT - ( 08 Jul 2023 13:51 )  PTT:48.0 sec  CSF:                  Radiology:  CT Head No Cont:  (08 Jul 2023 11:50)  CT Head No Cont:  (03 Jul 2023 17:33)    WBC 16, plat 69, elevated coags, Trop T 98 H, elevatd BUN/CR,TSH 15 H, low T3/T4, procalcitonin .39 H, lactate 2.3 H    CTH 7/8/23   No acute intracranial hemorrhage, mass effect or midline shift.    Nonspecific symmetric dilatation of the bilateral superior ophthalmic veins with slight hyperattenuation on the right. Findings can be seen with raised intracranial pressure amongst other possible etiologies. The possibility of the cavernous carotid fistula cannot be completely excluded. The presence of a right-sided superior ophthalmic vein thrombosis cannot also be excluded. Imaging findings are new when compared to the prior exams. Consider further evaluation with a CT angiogram study of the head and neck.    CT head 7/3:   Age-appropriate involutional change and microvascular ischemic disease. No large vessel occlusion. No intracranial hemorrhage no space-occupying lesion. Bilateral basal ganglia and dentate nuclei calcifications. Incidental mildly enlarged partially empty sella.    MRI B w/w/o 8/2022:   Tiny subcentimeter foci of T2 prolongation with associated restricted diffusion is seen involving the right posterior temporal/occipital subcortical, high left frontal cortex as well as the right centrum semiovale region. No associated areas of abnormal enhancement is seen.     These findings could be compatible with acute lacunar infarcts and less likely underlying metastasis (given the lack of associated enhancement) though continued close interval follow-up is recommended. Small focus of   abnormal susceptibility seen involving the leftposterior temporal subcortical region which could be compatible area of old hemorrhage   mineralization or small cavernous angioma.    2 HR eeg:    EEG Classification / Summary:  Abnormal EEG in the awake, drowsy, and asleep states.  -Mild diffuse slowing  -No epileptiform abnormalities    Clinical Impression:  -Mild diffuse cerebral dysfunction is nonspecific in etiology.       Pt seen at bedside with neurology team. Pt on propofol midazolam and norepi.    Pt has no acute overnight events.    Unable to obtain ROS given mental status/intubation.     Allergies:  No Known Allergies      PMHx/PSHx/Family Hx: As above, otherwise see below   Endometrial ca    HTN (hypertension)    CAD (coronary artery disease)    S/P AVR    Hypothyroidism    HLD (hyperlipidemia)        Social Hx:  No current use of tobacco, alcohol, or illicit drugs    Medications:  MEDICATIONS  (STANDING):  atorvastatin 80 milliGRAM(s) Oral at bedtime  cefepime   IVPB 1000 milliGRAM(s) IV Intermittent daily  chlorhexidine 0.12% Liquid 15 milliLiter(s) Oral Mucosa every 12 hours  chlorhexidine 2% Cloths 1 Application(s) Topical daily  cholecalciferol 2000 Unit(s) Oral daily  cyanocobalamin 1000 MICROGram(s) Oral daily  heparin   Injectable 5000 Unit(s) SubCutaneous every 12 hours  levothyroxine Injectable 56 MICROGram(s) IV Push at bedtime  loratadine 10 milliGRAM(s) Oral daily  midazolam Infusion 0.02 mG/kG/Hr (1.2 mL/Hr) IV Continuous <Continuous>  misoprostol 200 MICROGram(s) Oral <User Schedule>  multivitamin 1 Tablet(s) Oral daily  mupirocin 2% Ointment 1 Application(s) Topical <User Schedule>  norepinephrine Infusion 0.05 MICROgram(s)/kG/Min (2.81 mL/Hr) IV Continuous <Continuous>  pantoprazole  Injectable 40 milliGRAM(s) IV Push daily  petrolatum Ophthalmic Ointment 1 Application(s) Both EYES two times a day  polyethylene glycol 3350 17 Gram(s) Oral two times a day  potassium chloride  10 mEq/100 mL IVPB 10 milliEquivalent(s) IV Intermittent every 1 hour  propofol Infusion 50 MICROgram(s)/kG/Min (18 mL/Hr) IV Continuous <Continuous>  vasopressin Infusion 0.04 Unit(s)/Min (6 mL/Hr) IV Continuous <Continuous>    MEDICATIONS  (PRN):  acetaminophen     Tablet .. 650 milliGRAM(s) Oral every 6 hours PRN Temp greater or equal to 38C (100.4F), Mild Pain (1 - 3)  albuterol    90 MICROgram(s) HFA Inhaler 2 Puff(s) Inhalation every 6 hours PRN Shortness of Breath and/or Wheezing  HYDROmorphone  Injectable 0.2 milliGRAM(s) IV Push every 4 hours PRN Severe Pain (7 - 10)  lactulose Syrup 10 Gram(s) Oral daily PRN constipation  oxyCODONE    IR 5 milliGRAM(s) Oral every 4 hours PRN Moderate Pain (4 - 6)  senna 2 Tablet(s) Oral at bedtime PRN for constipation      Vitals:  T(C): 37.6 (07-10-23 @ 08:00), Max: 37.6 (07-10-23 @ 08:00)  HR: 133 (07-10-23 @ 11:00) (107 - 135)  BP: 108/85 (07-10-23 @ 11:00) (99/78 - 144/92)  RR: 14 (07-10-23 @ 11:00) (14 - 18)  SpO2: 100% (07-10-23 @ 11:00) (99% - 100%)    Physical Examination:    Physical Examination:   General - Laying in bed, intubated    Neurologic Exam:  Mental status - Eyes closed, does not open them to repeated stimulation. Non verbal. Does not follow commands.     Cranial nerves - Pupils 1mm minimally reactive, midline pupils BTT decreased on both sides , face sensation unable to test, face without asymmetry b/l, sternocleidomastiod strength - unable to testl,     Motor - Normal bulk throughout. hypotonic  No spontaneous movements noted.    Sensation - Does not withdraw any extremities or grimace on noxious stimuli of extremities.     DTR's -             Biceps      Triceps     Brachioradialis          Patellar    Ankle    Toes/plantar response  R             1+             1+                  1                       1+            1                Mute  L              1+             1+                 1                        1+           1                 Mute    Coordination - unable to test given status    Gait and station - unable to test given mental status    Labs:                        9.2    17.32 )-----------( 69       ( 10 Jul 2023 00:20 )             27.3     07-10    136  |  98  |  34<H>  ----------------------------<  135<H>  3.5   |  23  |  1.68<H>    Ca    8.1<L>      10 Jul 2023 00:20  Phos  2.7     07-10  Mg     2.00     07-10    TPro  6.2  /  Alb  3.4  /  TBili  1.2  /  DBili  x   /  AST  133<H>  /  ALT  38<H>  /  AlkPhos  94  07-10    CAPILLARY BLOOD GLUCOSE      POCT Blood Glucose.: 149 mg/dL (10 Jul 2023 11:23)    LIVER FUNCTIONS - ( 10 Jul 2023 00:20 )  Alb: 3.4 g/dL / Pro: 6.2 g/dL / ALK PHOS: 94 U/L / ALT: 38 U/L / AST: 133 U/L / GGT: x             Culture - Blood (collected 09 Jul 2023 06:56)  Source: .Blood Blood  Preliminary Report (10 Jul 2023 09:01):    No growth at 24 hours    Culture - Sputum (collected 08 Jul 2023 20:55)  Source: ET Tube ET Tube  Gram Stain (09 Jul 2023 07:57):    No polymorphonuclear leukocytes per low power field    No Squamous epithelial cells per low power field    No organisms seen  Preliminary Report (10 Jul 2023 07:15):    Normal Respiratory Kenzie present    Culture - Blood (collected 08 Jul 2023 13:37)  Source: .Blood Blood-Peripheral  Gram Stain (09 Jul 2023 12:05):    Growth in aerobic bottle: Gram Positive Cocci in Clusters  Preliminary Report (09 Jul 2023 12:05):    Growth in aerobic bottle: Gram Positive Cocci in Clusters    Direct identification is available within approximately 3-5    hours either by Blood Panel Multiplexed PCR or Direct    MALDI-TOF. Details: [email or web address]  Organism: Blood Culture PCR (09 Jul 2023 13:43)  Organism: Blood Culture PCR (09 Jul 2023 13:43)      PT/INR - ( 08 Jul 2023 13:51 )   PT: 17.3 sec;   INR: 1.49 ratio         PTT - ( 08 Jul 2023 13:51 )  PTT:48.0 sec  CSF:                  Radiology:  CT Head No Cont:  (08 Jul 2023 11:50)  CT Head No Cont:  (03 Jul 2023 17:33)    WBC 16, plat 69, elevated coags, Trop T 98 H, elevatd BUN/CR,TSH 15 H, low T3/T4, procalcitonin .39 H, lactate 2.3 H    CTH 7/8/23   No acute intracranial hemorrhage, mass effect or midline shift.    Nonspecific symmetric dilatation of the bilateral superior ophthalmic veins with slight hyperattenuation on the right. Findings can be seen with raised intracranial pressure amongst other possible etiologies. The possibility of the cavernous carotid fistula cannot be completely excluded. The presence of a right-sided superior ophthalmic vein thrombosis cannot also be excluded. Imaging findings are new when compared to the prior exams. Consider further evaluation with a CT angiogram study of the head and neck.    CT head 7/3:   Age-appropriate involutional change and microvascular ischemic disease. No large vessel occlusion. No intracranial hemorrhage no space-occupying lesion. Bilateral basal ganglia and dentate nuclei calcifications. Incidental mildly enlarged partially empty sella.    MRI B w/w/o 8/2022:   Tiny subcentimeter foci of T2 prolongation with associated restricted diffusion is seen involving the right posterior temporal/occipital subcortical, high left frontal cortex as well as the right centrum semiovale region. No associated areas of abnormal enhancement is seen.     These findings could be compatible with acute lacunar infarcts and less likely underlying metastasis (given the lack of associated enhancement) though continued close interval follow-up is recommended. Small focus of   abnormal susceptibility seen involving the leftposterior temporal subcortical region which could be compatible area of old hemorrhage   mineralization or small cavernous angioma.    2 HR eeg:    EEG Classification / Summary:  Abnormal EEG in the awake, drowsy, and asleep states.  -Mild diffuse slowing  -No epileptiform abnormalities    Clinical Impression:  -Mild diffuse cerebral dysfunction is nonspecific in etiology.

## 2023-07-10 NOTE — PROGRESS NOTE ADULT - ASSESSMENT
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    1. Uterine carcinosarcoma   -- [place] [facility] [place] [place] [facility]  -- s/p neoadjuvant chemotherapy completed 12/2022, s/p POOL-BSO 02/10/2023   -- Recent imaging suggests disease recurrence  -- Prognosis is poor. She is not a candidate for further treatment.   -- Palliative care team following, ongoing [facility] discussions.    2. Respiratory failure   -- CTA chest w/o PE; + large right pleural effusion, s/p pigtail catheter currently w minimal drainage   -- LE duplex without DVT   -- Cytology from R pleural effusion positive for malignant cells- metastatic adenocarcinoma favoring mullerian origin.  -- Repeat CT chest 06/08 with large right pleural effusion despite PTC   -- Currently intubated for airway protection    3. Anemia, thrombocytopenia   -- Likely secondary to malignancy, AOCD, sepsis   -- No evidence of iron, B12, or folate deficiencies.  -- Monitor CBC and transfuse to maintain hg >7, platelet count >10K, >15K if febrile, or >50K if bleeding     4. AMS  -- Neurology following, appreciate recommendations. repeat CTH abnormal, recommended CTA, CTV head/neck however family declines. Recommending MRI, MRA head/neck when able.   -- Intubated, sedated in MICU    5. [place]   -- Renal US shows moderate R hydronephrosis, moderate to large ascites   -- Nephrology following, appreciate recs. Creatinine trending down.  -- IR consulted for NT placement, no need for NT at this time.     Will continue to follow.    Twyla Pitts PA-C  Hematology/Oncology  [facility]  [phone] (office)  [phone] (alt office)  Evenings and weekends please call MD on call or office

## 2023-07-10 NOTE — PROGRESS NOTE ADULT - ASSESSMENT
88y Female with history of endometrial carcinoma presents with weakness. Nephrology consulted for elevated Scr.    1) INDIA[place]: initially resolved now with recurrent [place] suspect due to decreased EABV/ATN. Scr improving. Repeat UA. FeNa low. CT with mild to moderate R hydro likely secondary to CA for which patient would need intervention if within GOC however I do not believe current [place] due to unilateral hydronephrosis. TMA work up negative. Avoid nephrotoxins. Monitor electrolytes.    2) Hypotension: BP acceptable. Pressors as per ICU.    3) LE edema: Due to hypoalbuminemia improved s/p IV albumin. S/P bumex 2 mg IV X 1 dose this morning. TTE with severe global LVSD. Monitor UO.    4) Hyponatremia: Due to volume overload now resolved. Monitor serum Na.        [facility] NEPHROLOGY  Brennon Cates M.D.  Addison Nieto D.O.  Guerline Dias M.D.  Merissa Parsons, MSN, ANP-C    Telephone: [phone]  Facsimile: [phone]    [id number] [place] [address]

## 2023-07-10 NOTE — PROGRESS NOTE ADULT - SUBJECTIVE AND OBJECTIVE BOX
Patient is a 88y old  Female who presents with a chief complaint of Generalized weakness (10 Jul 2023 11:43)    Patient seen this afternoon. She remains intubated and sedated in the ICU.    MEDICATIONS  (STANDING):  atorvastatin 80 milliGRAM(s) Oral at bedtime  cefepime   IVPB 1000 milliGRAM(s) IV Intermittent daily  chlorhexidine 0.12% Liquid 15 milliLiter(s) Oral Mucosa every 12 hours  chlorhexidine 2% Cloths 1 Application(s) Topical daily  cholecalciferol 2000 Unit(s) Oral daily  cyanocobalamin 1000 MICROGram(s) Oral daily  heparin   Injectable 5000 Unit(s) SubCutaneous every 12 hours  levothyroxine Injectable 56 MICROGram(s) IV Push at bedtime  loratadine 10 milliGRAM(s) Oral daily  midazolam Infusion 0.02 mG/kG/Hr (1.2 mL/Hr) IV Continuous <Continuous>  misoprostol 200 MICROGram(s) Oral <User Schedule>  multivitamin 1 Tablet(s) Oral daily  mupirocin 2% Ointment 1 Application(s) Topical <User Schedule>  norepinephrine Infusion 0.05 MICROgram(s)/kG/Min (2.81 mL/Hr) IV Continuous <Continuous>  pantoprazole  Injectable 40 milliGRAM(s) IV Push daily  petrolatum Ophthalmic Ointment 1 Application(s) Both EYES two times a day  polyethylene glycol 3350 17 Gram(s) Oral two times a day  potassium chloride  10 mEq/100 mL IVPB 10 milliEquivalent(s) IV Intermittent every 1 hour  propofol Infusion 50 MICROgram(s)/kG/Min (18 mL/Hr) IV Continuous <Continuous>  vasopressin Infusion 0.04 Unit(s)/Min (6 mL/Hr) IV Continuous <Continuous>    MEDICATIONS  (PRN):  acetaminophen     Tablet .. 650 milliGRAM(s) Oral every 6 hours PRN Temp greater or equal to 38C (100.4F), Mild Pain (1 - 3)  albuterol    90 MICROgram(s) HFA Inhaler 2 Puff(s) Inhalation every 6 hours PRN Shortness of Breath and/or Wheezing  HYDROmorphone  Injectable 0.2 milliGRAM(s) IV Push every 4 hours PRN Severe Pain (7 - 10)  lactulose Syrup 10 Gram(s) Oral daily PRN constipation  oxyCODONE    IR 5 milliGRAM(s) Oral every 4 hours PRN Moderate Pain (4 - 6)  senna 2 Tablet(s) Oral at bedtime PRN for constipation        Vital Signs Last 24 Hrs  T(C): 37.6 (10 Jul 2023 12:02), Max: 37.6 (10 Jul 2023 08:00)  T(F): 99.6 (10 Jul 2023 12:02), Max: 99.6 (10 Jul 2023 08:00)  HR: 120 (10 Jul 2023 13:00) (112 - 135)  BP: 100/59 (10 Jul 2023 13:00) (99/78 - 144/92)  BP(mean): 68 (10 Jul 2023 13:00) (59 - 105)  RR: 14 (10 Jul 2023 13:00) (14 - 18)  SpO2: 100% (10 Jul 2023 13:00) (96% - 100%)    Parameters below as of 10 Jul 2023 13:00  Patient On (Oxygen Delivery Method): ventilator, 14/320/5    O2 Concentration (%): 30    PE  NAD  intubated, sedated   +peripheral edema   No rash grossly                            9.2    17.32 )-----------( 69       ( 10 Jul 2023 00:20 )             27.3       07-10    136  |  98  |  34<H>  ----------------------------<  135<H>  3.5   |  23  |  1.68<H>    Ca    8.1<L>      10 Jul 2023 00:20  Phos  2.7     07-10  Mg     2.00     07-10    TPro  6.2  /  Alb  3.4  /  TBili  1.2  /  DBili  x   /  AST  133<H>  /  ALT  38<H>  /  AlkPhos  94  07-10      Patient name: FRIDA WELCH  YOB: 1935   Age: 88 (F)   MR#: [id number]  Study Date: 7/9/2023  [facility]CS  Study quality: Technically Difficult  Referring Physician: Alexy Brito MD  Blood Pressure: 129/64 mmHg  Height: 170 cm  Weight: 66 kg  BSA: 1.8 m2  ------------------------------------------------------------------------  PROCEDURE: Transthoracic echocardiogram with 2-D, M-Mode  and complete spectral and color flow Doppler.  INDICATION: Shock, unspecified (R57.9)  ------------------------------------------------------------------------  DIMENSIONS:  Dimensions:     Normal Values:  LA:     3.6 cm    2.0 - 4.0 cm  Ao:     2.8 cm    2.0 - 3.8 cm  SEPTUM: 1.3 cm    0.6 - 1.2 cm  PWT:    1.3 cm    0.6 - 1.1 cm  LVIDd:  3.8 cm    3.0 - 5.6 cm  LVIDs:  3.3 cm    1.8 - 4.0 cm  Derived Variables:  LVMI: 98 g/m2  RWT: 0.68  Fractional short: 13 %  Ejection Fraction (Modified Greer Rule): 29 %  ------------------------------------------------------------------------  OBSERVATIONS:  Mitral Valve: Mitral annular calcification and calcified  mitral leaflets with normal diastolic opening. Moderate  mitral regurgitation.  Aortic Root: Normal aortic root.  Aortic Valve: Transcatheter aortic valve replacement. Peak  transaortic valve gradient equals 39 mm Hg, mean  transaortic valve gradient equals 27 mm Hg, which is  probably normal in the presence of a prosthetic valve.  Mild-moderate aortic regurgitation.  Left Atrium: Normal left atrium.  Left Ventricle: Severe global left ventricular systolic  dysfunction. Mild concentric left ventricular hypertrophy.  Right Heart: Mild right atrial enlargement. Right  ventricular enlargement with decreased right ventricular  systolic function. Normal tricuspid valve. Mild tricuspid  regurgitation. Normal pulmonic valve.  Pericardium/PleuraNormal pericardium with no pericardial  effusion. Left pleural effusion.  ------------------------------------------------------------------------  CONCLUSIONS:  1. Mitral annular calcification and calcified mitral  leaflets with normal diastolic opening. Moderate mitral  regurgitation.  2. Transcatheter aortic valve replacement. Peak transaortic  valve gradient equals 39 mm Hg, mean transaortic valve  gradient equals 27 mm Hg, which is probably normal in the  presence of a prosthetic valve. Mild-moderate aortic  regurgitation.  3. Mild concentric left ventricular hypertrophy.  4. Severe global left ventricular systolic dysfunction.  5. Right ventricular enlargement with decreased right  ventricular systolic function.  6. Left pleural effusion.  *** Compared with echocardiogram of 6/21/2023, left  ventricular systolic function has deteriorated  significantly.  ------------------------------------------------------------------------

## 2023-07-10 NOTE — EEG REPORT - NS EEG TEXT BOX
EEG preliminary read (not final) on the initial recording hour(s) = x 1     No seizures recorded.    Final report to follow tomorrow morning after completion of study.    [facility] EEG Reading Room Ph#: [phone]  Epilepsy Answering Service after 5PM and before 8:30AM: Ph#: [phone]

## 2023-07-10 NOTE — PROGRESS NOTE ADULT - SUBJECTIVE AND OBJECTIVE BOX
[facility] NEPHROLOGY- PROGRESS NOTE    88y Female with history of endometrial carcinoma presents with weakness. Nephrology consulted for elevated Scr.      REVIEW OF SYSTEMS: Unable to obtain as patient intubated.    No Known Allergies      Hospital Medications: Medications reviewed      VITALS:  T(F): 99.6 (07-10-23 @ 12:02), Max: 99.6 (07-10-23 @ 08:00)  HR: 120 (07-10-23 @ 13:00)  BP: 100/59 (07-10-23 @ 13:00)  RR: 14 (07-10-23 @ 13:00)  SpO2: 100% (07-10-23 @ 13:00)  Wt(kg): --    07-09 @ 07:01  -  07-10 @ 07:00  --------------------------------------------------------  IN: 1869.4 mL / OUT: 1545 mL / NET: 324.4 mL    07-10 @ 07:01  -  07-10 @ 13:43  --------------------------------------------------------  IN: 524.8 mL / OUT: 265 mL / NET: 259.8 mL        PHYSICAL EXAM:    Gen: Intubated and sedated  Cards: + tachycardia, +S1/S2, no M/G/R  Resp: Decreased BS @ R base, + R CT  GI: soft, NT/ND, NABS  : + brown  Vascular: + LE edema B/L, + RUE edema        LABS:  07-10    136  |  98  |  34<H>  ----------------------------<  135<H>  3.5   |  23  |  1.68<H>    Ca    8.1<L>      10 Jul 2023 00:20  Phos  2.7     07-10  Mg     2.00     07-10    TPro  6.2  /  Alb  3.4  /  TBili  1.2  /  DBili      /  AST  133<H>  /  ALT  38<H>  /  AlkPhos  94  07-10    Creatinine Trend: 1.68 <--, 1.89 <--, 1.97 <--, 2.07 <--, 1.92 <--, 1.88 <--, 1.76 <--, 1.84 <--, 1.89 <--, 1.48 <--, 1.02 <--, 1.00 <--                        9.2    17.32 )-----------( 69       ( 10 Jul 2023 00:20 )             27.3     Urine Studies:  Urinalysis Basic - ( 10 Jul 2023 00:20 )    Color:  / Appearance:  / SG:  / pH:   Gluc: 135 mg/dL / Ketone:   / Bili:  / Urobili:    Blood:  / Protein:  / Nitrite:    Leuk Esterase:  / RBC:  / WBC    Sq Epi:  / Non Sq Epi:  / Bacteria:       Creatinine, Random Urine: 192 mg/dL (07-07 @ 13:00)  Sodium, Random Urine: <20 mmol/L (07-07 @ 13:00)

## 2023-07-10 NOTE — PROGRESS NOTE ADULT - SUBJECTIVE AND OBJECTIVE BOX
INTERVAL HPI/OVERNIGHT EVENTS:    SUBJECTIVE: Patient seen and examined at bedside.     CONSTITUTIONAL: No weakness, fevers or chills  EYES/ENT: No visual changes;  No vertigo or throat pain   NECK: No pain or stiffness  RESPIRATORY: No cough, wheezing, hemoptysis; No shortness of breath  CARDIOVASCULAR: No chest pain or palpitations  GASTROINTESTINAL: No abdominal or epigastric pain. No nausea, vomiting, or hematemesis; No diarrhea or constipation. No melena or hematochezia.  GENITOURINARY: No dysuria, frequency or hematuria  NEUROLOGICAL: No numbness or weakness  SKIN: No itching, rashes    OBJECTIVE:    VITAL SIGNS:  ICU Vital Signs Last 24 Hrs  T(C): 37.1 (10 Jul 2023 04:00), Max: 37.3 (09 Jul 2023 16:00)  T(F): 98.8 (10 Jul 2023 04:00), Max: 99.2 (09 Jul 2023 16:00)  HR: 124 (10 Jul 2023 05:40) (107 - 124)  BP: 137/70 (10 Jul 2023 05:40) (47/29 - 144/92)  BP(mean): 81 (10 Jul 2023 05:40) (27 - 105)  ABP: --  ABP(mean): --  RR: 14 (10 Jul 2023 05:00) (14 - 19)  SpO2: 100% (10 Jul 2023 05:00) (79% - 100%)    O2 Parameters below as of 10 Jul 2023 04:00  Patient On (Oxygen Delivery Method): ventilator    O2 Concentration (%): 40      Mode: AC/ CMV (Assist Control/ Continuous Mandatory Ventilation), RR (machine): 14, TV (machine): 320, FiO2: 40, PEEP: 5, ITime: 1, MAP: 9, PIP: 25    07-08 @ 07:01  -  07-09 @ 07:00  --------------------------------------------------------  IN: 2113.8 mL / OUT: 800 mL / NET: 1313.8 mL    07-09 @ 07:01  -  07-10 @ 06:17  --------------------------------------------------------  IN: 1745.2 mL / OUT: 1395 mL / NET: 350.2 mL      CAPILLARY BLOOD GLUCOSE      POCT Blood Glucose.: 143 mg/dL (09 Jul 2023 08:02)      PHYSICAL EXAM:    General: NAD  HEENT: NC/AT; PERRL, clear conjunctiva  Neck: supple  Respiratory: CTA b/l  Cardiovascular: +S1/S2; RRR  Abdomen: soft, NT/ND; +BS x4  Extremities: WWP, 2+ peripheral pulses b/l; no LE edema  Skin: normal color and turgor; no rash  Neurological:    MEDICATIONS:  MEDICATIONS  (STANDING):  atorvastatin 80 milliGRAM(s) Oral at bedtime  cefepime   IVPB 1000 milliGRAM(s) IV Intermittent daily  chlorhexidine 0.12% Liquid 15 milliLiter(s) Oral Mucosa every 12 hours  chlorhexidine 2% Cloths 1 Application(s) Topical daily  cholecalciferol 2000 Unit(s) Oral daily  cyanocobalamin 1000 MICROGram(s) Oral daily  heparin   Injectable 5000 Unit(s) SubCutaneous every 12 hours  levothyroxine Injectable 56 MICROGram(s) IV Push at bedtime  loratadine 10 milliGRAM(s) Oral daily  midazolam Infusion 0.02 mG/kG/Hr (1.2 mL/Hr) IV Continuous <Continuous>  misoprostol 200 MICROGram(s) Oral <User Schedule>  multivitamin 1 Tablet(s) Oral daily  mupirocin 2% Ointment 1 Application(s) Topical <User Schedule>  norepinephrine Infusion 0.05 MICROgram(s)/kG/Min (2.81 mL/Hr) IV Continuous <Continuous>  pantoprazole    Tablet 40 milliGRAM(s) Oral before breakfast  propofol Infusion 50 MICROgram(s)/kG/Min (18 mL/Hr) IV Continuous <Continuous>  vasopressin Infusion 0.04 Unit(s)/Min (6 mL/Hr) IV Continuous <Continuous>    MEDICATIONS  (PRN):  acetaminophen     Tablet .. 650 milliGRAM(s) Oral every 6 hours PRN Temp greater or equal to 38C (100.4F), Mild Pain (1 - 3)  albuterol    90 MICROgram(s) HFA Inhaler 2 Puff(s) Inhalation every 6 hours PRN Shortness of Breath and/or Wheezing  HYDROmorphone  Injectable 0.2 milliGRAM(s) IV Push every 4 hours PRN Severe Pain (7 - 10)  lactulose Syrup 10 Gram(s) Oral daily PRN constipation  melatonin 3 milliGRAM(s) Oral at bedtime PRN Sleep  oxyCODONE    IR 5 milliGRAM(s) Oral every 4 hours PRN Moderate Pain (4 - 6)  polyethylene glycol 3350 17 Gram(s) Oral daily PRN for constipation  senna 2 Tablet(s) Oral at bedtime PRN for constipation      ALLERGIES:  Allergies    No Known Allergies    Intolerances        LABS:                        9.2    17.32 )-----------( 69       ( 10 Jul 2023 00:20 )             27.3     07-10    136  |  98  |  34<H>  ----------------------------<  135<H>  3.5   |  23  |  1.68<H>    Ca    8.1<L>      10 Jul 2023 00:20  Phos  2.7     07-10  Mg     2.00     07-10    TPro  6.2  /  Alb  3.4  /  TBili  1.2  /  DBili  x   /  AST  133<H>  /  ALT  38<H>  /  AlkPhos  94  07-10    PT/INR - ( 08 Jul 2023 13:51 )   PT: 17.3 sec;   INR: 1.49 ratio         PTT - ( 08 Jul 2023 13:51 )  PTT:48.0 sec  Urinalysis Basic - ( 10 Jul 2023 00:20 )    Color: x / Appearance: x / SG: x / pH: x  Gluc: 135 mg/dL / Ketone: x  / Bili: x / Urobili: x   Blood: x / Protein: x / Nitrite: x   Leuk Esterase: x / RBC: x / WBC x   Sq Epi: x / Non Sq Epi: x / Bacteria: x        RADIOLOGY & ADDITIONAL TESTS: Reviewed. INTERVAL HPI/OVERNIGHT EVENTS: Patient had significant tachycardia to 220s. Patient also had a troponin elevation of 106.    SUBJECTIVE: Patient seen and examined at bedside.     CONSTITUTIONAL: No weakness, fevers or chills  EYES/ENT: No visual changes;  No vertigo or throat pain   NECK: No pain or stiffness  RESPIRATORY: No cough, wheezing, hemoptysis; No shortness of breath  CARDIOVASCULAR: No chest pain or palpitations  GASTROINTESTINAL: No abdominal or epigastric pain. No nausea, vomiting, or hematemesis; No diarrhea or constipation. No melena or hematochezia.  GENITOURINARY: No dysuria, frequency or hematuria  NEUROLOGICAL: No numbness or weakness  SKIN: No itching, rashes    OBJECTIVE:    VITAL SIGNS:  ICU Vital Signs Last 24 Hrs  T(C): 37.1 (10 Jul 2023 04:00), Max: 37.3 (09 Jul 2023 16:00)  T(F): 98.8 (10 Jul 2023 04:00), Max: 99.2 (09 Jul 2023 16:00)  HR: 124 (10 Jul 2023 05:40) (107 - 124)  BP: 137/70 (10 Jul 2023 05:40) (47/29 - 144/92)  BP(mean): 81 (10 Jul 2023 05:40) (27 - 105)  ABP: --  ABP(mean): --  RR: 14 (10 Jul 2023 05:00) (14 - 19)  SpO2: 100% (10 Jul 2023 05:00) (79% - 100%)    O2 Parameters below as of 10 Jul 2023 04:00  Patient On (Oxygen Delivery Method): ventilator    O2 Concentration (%): 40      Mode: AC/ CMV (Assist Control/ Continuous Mandatory Ventilation), RR (machine): 14, TV (machine): 320, FiO2: 40, PEEP: 5, ITime: 1, MAP: 9, PIP: 25    07-08 @ 07:01  -  07-09 @ 07:00  --------------------------------------------------------  IN: 2113.8 mL / OUT: 800 mL / NET: 1313.8 mL    07-09 @ 07:01  -  07-10 @ 06:17  --------------------------------------------------------  IN: 1745.2 mL / OUT: 1395 mL / NET: 350.2 mL      CAPILLARY BLOOD GLUCOSE      POCT Blood Glucose.: 143 mg/dL (09 Jul 2023 08:02)      PHYSICAL EXAM:    General: NAD  HEENT: NC/AT  Cardiovascular: RRR  Abdomen: soft, NT/ND  Extremities: WWP  Skin: normal color and turgor  Neurological: Not alerted to time, person, or place     MEDICATIONS:  MEDICATIONS  (STANDING):  atorvastatin 80 milliGRAM(s) Oral at bedtime  cefepime   IVPB 1000 milliGRAM(s) IV Intermittent daily  chlorhexidine 0.12% Liquid 15 milliLiter(s) Oral Mucosa every 12 hours  chlorhexidine 2% Cloths 1 Application(s) Topical daily  cholecalciferol 2000 Unit(s) Oral daily  cyanocobalamin 1000 MICROGram(s) Oral daily  heparin   Injectable 5000 Unit(s) SubCutaneous every 12 hours  levothyroxine Injectable 56 MICROGram(s) IV Push at bedtime  loratadine 10 milliGRAM(s) Oral daily  midazolam Infusion 0.02 mG/kG/Hr (1.2 mL/Hr) IV Continuous <Continuous>  misoprostol 200 MICROGram(s) Oral <User Schedule>  multivitamin 1 Tablet(s) Oral daily  mupirocin 2% Ointment 1 Application(s) Topical <User Schedule>  norepinephrine Infusion 0.05 MICROgram(s)/kG/Min (2.81 mL/Hr) IV Continuous <Continuous>  pantoprazole    Tablet 40 milliGRAM(s) Oral before breakfast  propofol Infusion 50 MICROgram(s)/kG/Min (18 mL/Hr) IV Continuous <Continuous>  vasopressin Infusion 0.04 Unit(s)/Min (6 mL/Hr) IV Continuous <Continuous>    MEDICATIONS  (PRN):  acetaminophen     Tablet .. 650 milliGRAM(s) Oral every 6 hours PRN Temp greater or equal to 38C (100.4F), Mild Pain (1 - 3)  albuterol    90 MICROgram(s) HFA Inhaler 2 Puff(s) Inhalation every 6 hours PRN Shortness of Breath and/or Wheezing  HYDROmorphone  Injectable 0.2 milliGRAM(s) IV Push every 4 hours PRN Severe Pain (7 - 10)  lactulose Syrup 10 Gram(s) Oral daily PRN constipation  melatonin 3 milliGRAM(s) Oral at bedtime PRN Sleep  oxyCODONE    IR 5 milliGRAM(s) Oral every 4 hours PRN Moderate Pain (4 - 6)  polyethylene glycol 3350 17 Gram(s) Oral daily PRN for constipation  senna 2 Tablet(s) Oral at bedtime PRN for constipation      ALLERGIES:  Allergies    No Known Allergies    Intolerances        LABS:                        9.2    17.32 )-----------( 69       ( 10 Jul 2023 00:20 )             27.3     07-10    136  |  98  |  34<H>  ----------------------------<  135<H>  3.5   |  23  |  1.68<H>    Ca    8.1<L>      10 Jul 2023 00:20  Phos  2.7     07-10  Mg     2.00     07-10    TPro  6.2  /  Alb  3.4  /  TBili  1.2  /  DBili  x   /  AST  133<H>  /  ALT  38<H>  /  AlkPhos  94  07-10    PT/INR - ( 08 Jul 2023 13:51 )   PT: 17.3 sec;   INR: 1.49 ratio         PTT - ( 08 Jul 2023 13:51 )  PTT:48.0 sec  Urinalysis Basic - ( 10 Jul 2023 00:20 )    Color: x / Appearance: x / SG: x / pH: x  Gluc: 135 mg/dL / Ketone: x  / Bili: x / Urobili: x   Blood: x / Protein: x / Nitrite: x   Leuk Esterase: x / RBC: x / WBC x   Sq Epi: x / Non Sq Epi: x / Bacteria: x        RADIOLOGY & ADDITIONAL TESTS: Reviewed.

## 2023-07-10 NOTE — PROGRESS NOTE ADULT - ASSESSMENT
Mrs. 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     EKG: MAT   Tele: ST 110s    1) Multifocal Atrial Tachycardia   -with episodes of Atrial tac   -continue to monitor on tele   - Multiple episodes of SVT s/p adenosine and dilt drip and amio gtt  - intubated 2/2 AMS   -dilt d/bahman 2/2 new severe LV dysfunction on TTE 7/9  -currently ST 110s, on levo HR is likely compensatory mechanism would continue to monitor    2) Pleural effusion  -s/p Pig tail   -f/u pulm and thoracic recs    3) hx of PE  - RUL segmental PE (8/2022  -AC on hold 2/2 anemia    4) CAD s/p PCI  -denies CP  -TTE with normal LV function now repeat TTE 7/9 severe LV systolic dysfunction currently intubated and sedated no ischemic eval     5) TAVR  -TTE transcatheter aortic valve replacement Peak transaortic valve gradient equals 42 mm Hg, mean transaortic valve gradient equals 23 mm Hg, which is probably normal in the presence of a prosthetic valve. Moderate valvular aortic regurgitation.    6) Anemia  -transfuse to keep hemoglobin >8  -CT scan non con showing bleeding near the spleen and liver. surgery on board, CTA abd/pelvis with no acute bleed

## 2023-07-10 NOTE — PROGRESS NOTE ADULT - ASSESSMENT
ASSESSMENT/PLAN:  Mrs. 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     NEURO  #Altered Mental Status  -Patient is significantly altered compared to their baseline   -CT Head negative for stroke  - Nonspecific symmetric dilatation of the bilateral superior ophthalmic   veins with slight hyperattenuation on the right, concern for increased intracranial pressure vs cavernous carotid fistula vs right-sided superior ophthalmic vein   thrombosis  - f/u CTA and CTV head and neck  - Neuro checks Q4H->Contact neurology would appreciate recommendations  -ABG, BMP, and CBC ordered   -Consider EEG   -Follow up Free T4, TSH, Total T3, Ammonia, Cortisol     #Intubated  -Sedated on versed and propofol  - monitor mental status during weaning     CARDIAC  #SVT  -RRT 7/6 and 7/8 for multifocal atrial tacycarida,  patient was given adenosine on both events.  - s/p diltiazem gtt, will trial diltiazem 60 Q6 via OGT  - d/c dilt 60 Q6 iso reduced cardiac function  - consider amio instead for SVT managment  -Monitor Tele  -TTE with normal findings, Bedside POCUS showed reduced EF    - f/u repeat TTE  -Cardiology following would appreciate recs   - trops downtrending 98 -> 92    #TAVR  -Mild aortic regurgitation as per MATTHIEU     #Shock of unknown etiology  -Septic vs Vasoplegic shock vs Cardiogenic  - POCUS showing severely reduced global LV systolic dysfunction with evidence of cardiogenic shock  -Infectious work up being done with BCx, UCx, CBC  -Patient is intubated for respiratory support   - on levophed, wean as tolerated  - start vanco and cefepime  - f/u repeat TTE  - s/p bumex 2mg  - monitor UOP    PULM  # Pulmonary embolism.   -Pt diagnosed with RUL segmental PE in Aug 2022, now with possible new PE while on Eliquis 2.5 mg BID.  -Hold Eliquis due to bleeding risk and low HgB  - Lower Ultrasound negative for DVT    #Malignant pleural effusion  - R chest tube to waterseal for significant R pleural effusion  - CT chest with large R pleural effusion, slight decrease from 6/21/23 w/ passive atelectasis of R upper and middle lobes, slightly increased small L pleural effusion. Extensive hepatic and peritoneal mets with large ascites  - per Heme, cytology from R pleural effusion shows atypical cells suspicious for malignancy.   - c/w water seal  - will flush chest tube    #Intubation  - c/w mechanical ventilation  - monitor VBGs/ABGs    RENAL  #[place]  -DDx: poor PO intake and 3rd spacing (hypoalbuminemia vs sepsis  -Resolving, trend via CMP  - CT with mild to moderate R hydro likely secondary to CA  - monitor UOP    # GI   - vomited this AM  - s/p Zofran  - NPO for now given vomiting    HEME/ONC  #Stage 4 Endometrial cancer.   - Pt with history of stage 4 serous endometrial carcinoma (dx 5/2022) s/p chemo (last in 12/2022) and debulking surgery with POOL/BSO (2/10/23), now recently found to have recurrence of disease with new liver mets, the pt follows with Dr. Alvarado of [facility] [place].   - GOC discussion with pt and family given poor prognosis - pt is fullcode.    #Anemia  -Likely ACD transfuse if HgB<7  - per heme transfuse to maintain hg >7, platelet count >10K, >15K if febrile, or >50K if bleeding   - platelet count 56K  - continue to monitor  - Hep PF4 Ab negative    #DVT  -c/w heparin subQ for ppx    ID  #Leukocytosis  -WBC of 16K  -CBC, UCx, BCx ordered  - BCx w/ Gram pos cocci in clusters, coagulase negative  - f/u 2nd Bcx  -c/w Vancomycin and Cefipime  - f/u vanc levels  - repeat TTE for vegetations    ENDO  #Hypothyroidism  -Levothyroxine    Ethics  The patient is full code and a GOC discussion has been had with the family, MOLST form is up to date and has been filled.   ASSESSMENT/PLAN:  Mrs. 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     NEURO  #Altered Mental Status  -Patient is significantly altered compared to their baseline   -CT Head negative for stroke- Nonspecific symmetric dilatation of the bilateral superior ophthalmic veins with slight hyperattenuation on the right, concern for increased intracranial pressure vs cavernous carotid fistula vs right-sided superior ophthalmic vein   thrombosis  -Consider EEG   -Follow up Free T4, Total T3, and Ammonia normal   -Family deferred CTA and CTV head and neck  - Neuro checks Q4H->Contact neurology would appreciate recommendations  -ABG, BMP, and CBC ordered     #Intubated  -Sedated on midazolam goal to lower   -monitor mental status during weaning     CARDIAC  #SVT  -RRT 7/6 and 7/8 for multifocal atrial tachycardia  patient was given adenosine on both events.  - s/p diltiazem gtt, will trial diltiazem 60 Q6 via OGT  - d/c dilt 60 Q6 iso reduced cardiac function  - consider amio instead for SVT managment  -Monitor Tele  -TTE showed moderate mitral and aortic regurgiation,  Bedside POCUS showed reduced EF    -Cardiology following would appreciate recs     #TAVR  -Mild aortic regurgitation as per MATTHIEU     #Shock of unknown etiology  -Septic vs Vasoplegic shock vs Cardiogenic  - POCUS showing severely reduced global LV systolic dysfunction with evidence of cardiogenic shock  - Infectious work up being done with BCx and CBC  - Patient is intubated for respiratory support   - on levophed and vasopressin, wean as tolerated  - start vanco and cefepime  - monitor UOP    PULM  # Pulmonary embolism.   -Pt diagnosed with RUL segmental PE in Aug 2022, now with possible new PE while on Eliquis 2.5 mg BID.  -Hold Eliquis due to bleeding risk, low HgB, and history of RP bleed   - Lower Ultrasound negative for DVT    #Malignant pleural effusion  - R chest tube to waterseal for significant R pleural effusion  - CT chest with large R pleural effusion, slight decrease from 6/21/23 w/ passive atelectasis of R upper and middle lobes, slightly increased small L pleural effusion. Extensive hepatic and peritoneal mets with large ascites  - per Heme, cytology from R pleural effusion shows atypical cells suspicious for malignancy.   - c/w water seal  - will flush chest tube    #Intubation  - c/w mechanical ventilation  - monitor VBGs/ABGs  - wean FiO2 and midazolam     RENAL  #[place]  -DDx: poor PO intake and 3rd spacing (hypoalbuminemia vs sepsis)  -Resolving, trend via CMP  - CT with mild to moderate R hydro likely secondary to CA  - monitor UOP  - replete potassium     # GI   - vomited this AM  - s/p Zofran  - NPO for now given vomiting  - EKG had QT prolongation no Reglan   - Order CXR to evaluate for ileus, if not then give scheduled PEG     HEME/ONC  #Stage 4 Endometrial cancer.   - Pt with history of stage 4 serous endometrial carcinoma (dx 5/2022) s/p chemo (last in 12/2022) and debulking surgery with POOL/BSO (2/10/23), now recently found to have recurrence of disease with new liver mets, the [place] [facility] [place]   - GOC discussion with pt and family given poor prognosis - pt is fullcode.    #Anemia  -Likely ACD transfuse if HgB<7  - per heme transfuse to maintain hg >7, platelet count >10K, >15K if febrile, or >50K if bleeding   - platelet count 69K  - continue to monitor  - Hep PF4 Ab negative    #DVT  -c/w heparin subQ for ppx    ID  #Leukocytosis  - WBC of 16K  - BCx w/ Gram pos cocci in clusters, coagulase negative-> repeat cultures negative  -c/w Vancomycin and Cefipime  - f/u vanc levels next scheduled 6pm     ENDO  #Hypothyroidism  -Levothyroxine    Ethics  The patient is full code and a GOC discussion has been had with the family, MOLST form is up to date and has been filled.   ASSESSMENT/PLAN:  Mrs. 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     NEURO  #Altered Mental Status  -Patient is significantly altered compared to their baseline   -CT Head negative for stroke- Nonspecific symmetric dilatation of the bilateral superior ophthalmic veins with slight hyperattenuation on the right, concern for increased intracranial pressure vs cavernous carotid fistula vs right-sided superior ophthalmic vein   thrombosis  -Consider EEG   -Follow up Free T4, Total T3, and Ammonia normal   -Family deferred CTA and CTV head and neck  - Neuro checks Q4H-> neurology following, recs appreciated   -ABG, BMP, and CBC ordered     #Intubated  -Sedated on midazolam goal to lower   -monitor mental status during weaning     CARDIAC  #SVT  -RRT 7/6 and 7/8 for multifocal atrial tachycardia  patient was given adenosine on both events.  - s/p diltiazem gtt, will trial diltiazem 60 Q6 via OGT  - d/c dilt 60 Q6 iso reduced cardiac function  - consider amio instead for SVT managment  -Monitor Tele  -TTE showed moderate mitral and aortic regurgiation,  Bedside POCUS showed reduced EF    -Cardiology following, recs appreciated     #TAVR  -Mild aortic regurgitation as per MATTHIEU     #Shock of unknown etiology  -Septic vs Vasoplegic shock vs Cardiogenic  - POCUS showing severely reduced global LV systolic dysfunction with evidence of cardiogenic shock  - Infectious work up being done with BCx and CBC  - Patient is intubated for respiratory support   - on levophed and vasopressin, wean as tolerated  - start vanco and cefepime  - monitor UOP    PULM  # Pulmonary embolism.   -Pt diagnosed with RUL segmental PE in Aug 2022, now with possible new PE while on Eliquis 2.5 mg BID.  -Hold Eliquis due to bleeding risk, low HgB, and history of RP bleed   - Lower Ultrasound negative for DVT    #Malignant pleural effusion  - R chest tube to waterseal for significant R pleural effusion  - CT chest with large R pleural effusion, slight decrease from 6/21/23 w/ passive atelectasis of R upper and middle lobes, slightly increased small L pleural effusion. Extensive hepatic and peritoneal mets with large ascites  - per Heme, cytology from R pleural effusion shows atypical cells suspicious for malignancy.   - c/w water seal  - will flush chest tube    #Intubation  - c/w mechanical ventilation  - monitor VBGs/ABGs  - wean FiO2 and midazolam     RENAL  #[place]  -DDx: poor PO intake and 3rd spacing (hypoalbuminemia vs sepsis)  -Resolving, trend via CMP  - CT with mild to moderate R hydro likely secondary to CA  - monitor UOP  - replete potassium     # GI   - vomited this AM  - s/p Zofran  - NPO for now given vomiting  - EKG had QT prolongation no Reglan   - Order CXR to evaluate for ileus, if not then give scheduled PEG     HEME/ONC  #Stage 4 Endometrial cancer.   - Pt with history of stage 4 serous endometrial carcinoma (dx 5/2022) s/p chemo (last in 12/2022) and debulking surgery with POOL/BSO (2/10/23), now recently found to have recurrence of disease with new liver mets, the pt follows with Dr. Alvarado of [facility] [place].   - GOC discussion with pt and family given poor prognosis - pt is fullcode.    #Anemia  -Likely ACD transfuse if HgB<7  - per heme transfuse to maintain hg >7, platelet count >10K, >15K if febrile, or >50K if bleeding   - platelet count 69K  - continue to monitor  - Hep PF4 Ab negative    #DVT  -c/w heparin subQ for ppx    ID  #Leukocytosis  - WBC of 16K  - BCx w/ Gram pos cocci in clusters, coagulase negative-> repeat cultures negative  -c/w Vancomycin and Cefipime  - f/u vanc levels next scheduled 6pm     ENDO  #Hypothyroidism  -Levothyroxine    Ethics  The patient is full code and a GOC discussion has been had with the family, MOLST form is up to date and has been filled.

## 2023-07-11 NOTE — PROGRESS NOTE ADULT - SUBJECTIVE AND OBJECTIVE BOX
[facility] NEPHROLOGY- PROGRESS NOTE    88y Female with history of endometrial carcinoma presents with weakness. Nephrology consulted for elevated Scr.      REVIEW OF SYSTEMS: Unable to obtain as patient intubated.    No Known Allergies      Hospital Medications: Medications reviewed      ICU Vital Signs Last 24 Hrs  T(C): 38 (11 Jul 2023 20:00), Max: 38 (11 Jul 2023 20:00)  T(F): 100.4 (11 Jul 2023 20:00), Max: 100.4 (11 Jul 2023 20:00)  HR: 129 (11 Jul 2023 22:25) (127 - 138)  ABP: 103/54 (11 Jul 2023 21:00) (74/43 - 114/59)  ABP(mean): 72 (11 Jul 2023 21:00) (55 - 80)  RR: 14 (11 Jul 2023 21:00) (0 - 17)  SpO2: 97% (11 Jul 2023 22:25) (96% - 100%)    O2 Parameters below as of 11 Jul 2023 21:00  Patient On (Oxygen Delivery Method): ventilator    O2 Concentration (%): 30          PHYSICAL EXAM:    Gen: Intubated, not sedated but not waking up.  EEG in progress  Cards: + tachycardia, +S1/S2, no M/G/R  Resp: Decreased BS @ R base, + R CT  GI: soft, NT/ND, NABS  : + brown  Vascular: + LE edema B/L, + RUE edema          LABS:  07-11    128<L>  |  93<L>  |  37<H>  ----------------------------<  144<H>  4.4   |  18<L>  |  1.71<H>    Ca    7.5<L>      11 Jul 2023 14:20  Phos  4.0     07-11  Mg     2.10     07-11    TPro  5.9<L>  /  Alb  3.0<L>  /  TBili  1.2  /  DBili      /  AST  192<H>  /  ALT  51<H>  /  AlkPhos  99  07-11    Creatinine Trend: 1.71 <--, 1.77 <--, 1.68 <--, 1.68 <--, 1.89 <--, 1.97 <--, 2.07 <--, 1.92 <--, 1.88 <--, 1.76 <--, 1.84 <--, 1.89 <--, 1.48 <--                        9.0    17.94 )-----------( 48       ( 11 Jul 2023 09:40 )             27.2     Urine Studies:  Urinalysis Basic - ( 11 Jul 2023 14:20 )    Color:  / Appearance:  / SG:  / pH:   Gluc: 144 mg/dL / Ketone:   / Bili:  / Urobili:    Blood:  / Protein:  / Nitrite:    Leuk Esterase:  / RBC:  / WBC    Sq Epi:  / Non Sq Epi:  / Bacteria:       Sodium, Random Urine: <20 mmol/L (07-11 @ 14:20)  Potassium, Random Urine: 54.1 mmol/L (07-11 @ 14:20)  Chloride, Random Urine: <20 mmol/L (07-11 @ 14:20)  Creatinine, Random Urine: 111 mg/dL (07-11 @ 14:20)  Protein/Creatinine Ratio Calculation: 2.0 Ratio (07-11 @ 14:20)  Creatinine, Random Urine: 192 mg/dL (07-07 @ 13:00)  Sodium, Random Urine: <20 mmol/L (07-07 @ 13:00)

## 2023-07-11 NOTE — CHART NOTE - NSCHARTNOTEFT_GEN_A_CORE
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  Remains intubated/sedated.  ~245 non-nutritive lipid calories provided by Propofol over past 24hrs.  Currently attempting to wean Propofol, per RN.     Prior to NPO, Pt. noted with ~50-75% PO intake of meals.    Vomiting x 3 (7/9)... NGT placed to suction and Pt. changed to NPO.  50mL NGT output in past 24hrs.  Pt. with BM (7/11).  +Abdominal distention.  No emesis.  Spoke w RN.  Intent is to re-start enteral feeding.    Suggest VitalHP with eventual goal of 50mL/hr x 24hrs      _________________Diet___________________  Diet, NPO (07-10-23 @ 09:33) [Active]      Weight:                    Height:   57"               63.2kg (7/9)  60kg (6/20)          _____Estimated Energy Needs (based on dosing/admit weight)_____  20-25 kcals/kg = 9733-3715 kcals/d  1.7-2.0g protein/kg =102-120g protein/d        Edema: 2+ generalized, 3+ b/l legs and left arm, 4+ right arm  Skin: Sacrum stage II and right buttock DTI pressure injuries         ________________________Pertinent Medications____________   MEDICATIONS  (STANDING):  atorvastatin 80 milliGRAM(s) Oral at bedtime  cefepime   IVPB 1000 milliGRAM(s) IV Intermittent daily  chlorhexidine 0.12% Liquid 15 milliLiter(s) Oral Mucosa every 12 hours  chlorhexidine 2% Cloths 1 Application(s) Topical daily  cholecalciferol 2000 Unit(s) Oral daily  cyanocobalamin 1000 MICROGram(s) Oral daily  levothyroxine Injectable 56 MICROGram(s) IV Push at bedtime  loratadine 10 milliGRAM(s) Oral daily  misoprostol 200 MICROGram(s) Oral <User Schedule>  multivitamin 1 Tablet(s) Oral daily  mupirocin 2% Ointment 1 Application(s) Topical <User Schedule>  norepinephrine Infusion 0.05 MICROgram(s)/kG/Min (2.81 mL/Hr) IV Continuous <Continuous>  pantoprazole  Injectable 40 milliGRAM(s) IV Push daily  petrolatum Ophthalmic Ointment 1 Application(s) Both EYES two times a day  polyethylene glycol 3350 17 Gram(s) Oral two times a day  propofol Infusion 50 MICROgram(s)/kG/Min (18 mL/Hr) IV Continuous <Continuous>  vasopressin Infusion 0.04 Unit(s)/Min (6 mL/Hr) IV Continuous <Continuous>    MEDICATIONS  (PRN):  acetaminophen     Tablet .. 650 milliGRAM(s) Oral every 6 hours PRN Temp greater or equal to 38C (100.4F), Mild Pain (1 - 3)  albuterol    90 MICROgram(s) HFA Inhaler 2 Puff(s) Inhalation every 6 hours PRN Shortness of Breath and/or Wheezing  HYDROmorphone  Injectable 0.2 milliGRAM(s) IV Push every 4 hours PRN Severe Pain (7 - 10)  lactulose Syrup 10 Gram(s) Oral daily PRN constipation  oxyCODONE    IR 5 milliGRAM(s) Oral every 4 hours PRN Moderate Pain (4 - 6)  senna 2 Tablet(s) Oral at bedtime PRN for constipation          _________________________Pertinent Labs____________________     07-11    129<L>  |  92<L>  |  37<H>  ----------------------------<  145<H>  4.2   |  19<L>  |  1.77<H>    Ca    7.7<L>      11 Jul 2023 09:40  Phos  4.0     07-11  Mg     2.10     07-11    TPro  5.9<L>  /  Alb  3.0<L>  /  TBili  1.2  /  DBili  x   /  AST  192<H>  /  ALT  51<H>  /  AlkPhos  99  07-11                                                                   9.0    17.94 )-----------( 48       ( 11 Jul 2023 09:40 )             27.2         CAPILLARY BLOOD GLUCOSE      POCT Blood Glucose.: 110 mg/dL (11 Jul 2023 11:22)    POCT Blood Glucose.: 110 mg/dL (07-11-23 @ 11:22)  POCT Blood Glucose.: 145 mg/dL (07-11-23 @ 05:29)  POCT Blood Glucose.: 76 mg/dL (07-11-23 @ 05:27)        ________NUTRITION Dx_________    Pt. remains severely malnourished       PLAN/RECOMMENDATIONS:    1) Initiate VitalHP @ 20mL/hr then increase by 10mL q 4hrs, as tolerated, to goal of 50mL/hr x 24hrs    provides:  1200mL total volume  1200 kcals  105g protein  1003mL free water      2) Obtain daily weights  3) Monitor tolerance to TF, GI status, electrolytes, glucose     RDN remains available and will f/u PRN.          Shama Preston, SUZIEN, CDN       pager [phone] or MS Teams

## 2023-07-11 NOTE — PROGRESS NOTE ADULT - SUBJECTIVE AND OBJECTIVE BOX
Subjective: pt intubated in MICU    Vital Signs:  Vital Signs Last 24 Hrs  T(C): 37.8 (07-11-23 @ 12:00), Max: 37.8 (07-11-23 @ 04:00)  T(F): 100 (07-11-23 @ 12:00), Max: 100 (07-11-23 @ 04:00)  HR: 130 (07-11-23 @ 15:00) (119 - 133)  BP: 89/58 (07-10-23 @ 20:00) (47/13 - 130/65)  RR: 14 (07-11-23 @ 15:00) (0 - 20)  SpO2: 100% (07-11-23 @ 14:00) (96% - 100%) on (O2)    PE  intubated and sedated in MICU  R PTC on ws drained 160cc/24h no air leak    A/P: 89yo F with Rt pleural effusion, s/p PTC insertion    -PTC output minimal for multiple days  -No plans for any thoracic surgical intervention  -pt now intubated in MICU  - tube remains on waterseal  -Will follow as needed    Megan MATUTE [id number]      Subjective: pt intubated in MICU    Vital Signs:  Vital Signs Last 24 Hrs  T(C): 37.8 (07-11-23 @ 12:00), Max: 37.8 (07-11-23 @ 04:00)  T(F): 100 (07-11-23 @ 12:00), Max: 100 (07-11-23 @ 04:00)  HR: 130 (07-11-23 @ 15:00) (119 - 133)  BP: 89/58 (07-10-23 @ 20:00) (47/13 - 130/65)  RR: 14 (07-11-23 @ 15:00) (0 - 20)  SpO2: 100% (07-11-23 @ 14:00) (96% - 100%) on (O2)    PE  intubated and sedated in MICU  R PTC on ws drained 195cc/24h no air leak    A/P: 87yo F with Rt pleural effusion, s/p PTC insertion    -PTC output minimal for multiple days  -No plans for any thoracic surgical intervention  -pt now intubated in MICU  - tube remains on waterseal  -Will follow as needed    Megan MATUTE [id number]

## 2023-07-11 NOTE — PROGRESS NOTE ADULT - ASSESSMENT
88y Female with history of endometrial carcinoma presents with weakness. Nephrology consulted for elevated Scr.    1) [place]: initially resolved now with recurrent [place] suspect due to decreased EABV/ATN. Scr stable, though urine output poor.   FeNa low. CT with mild to moderate R hydro likely secondary to CA for which patient would need intervention if within GOC however I do not believe current [place] due to unilateral hydronephrosis. TMA work up negative. Avoid nephrotoxins. Monitor electrolytes.    2) Hypotension: BP acceptable. Pressors as per ICU.    3) LE edema: Due to hypoalbuminemia improved s/p IV albumin. Monitoring off diureitcs.   TTE with severe global LVSD. Monitor UO.    4) Hyponatremia: Due to volume overload now resolved. Monitor serum Na.        [facility] NEPHROLOGY  Brennon Cates M.D.  Addison Nieto D.O.  Guerline Dias M.D.  Merissa Parsons, GRACIA, ANP-C    Telephone: [phone]  Facsimile: [phone]    [address] [place] [address]

## 2023-07-11 NOTE — CHART NOTE - NSCHARTNOTEFT_GEN_A_CORE
: Trace Vergara    INDICATION:  Shock    PROCEDURE:  [x ] LIMITED ECHO  [x] LIMITED CHEST  [ ] LIMITED RETROPERITONEAL  [ ] LIMITED ABDOMINAL  [x ] LIMITED DVT  [ ] NEEDLE GUIDANCE VASCULAR  [ ] NEEDLE GUIDANCE THORACENTESIS  [ ] NEEDLE GUIDANCE PARACENTESIS  [ ] NEEDLE GUIDANCE PERICARDIOCENTESIS  [ ] OTHER    FINDINGS:  Lungs: A line predominance. Bilateral moderate pleural effusions. The right has septations. Basilar consolidations.  Heart: Severely reduced LVSF. LVOT VTI 8.2 cm. E/e' 6.4. LV > RV. No pericardial effusion. IVC indeterminant. Unspecified echodensity noted in the IVC.  LE veins: possible noncompressive popliteal vein on the right. Other standard venous sites fully compressible.     INTERPRETATION:  Bilateral pleural effusions. Right is complex. Basilar consolidations.  Distributive and cardiogenic shock.   Normal lung aeration with low cardiac filling pressures suggest volume tolerance.  Possible distal DVT on the right.    Images uploaded on [employer] Path : Trace Vergara    INDICATION:  Shock    PROCEDURE:  [x ] LIMITED ECHO  [x] LIMITED CHEST  [ ] LIMITED RETROPERITONEAL  [ ] LIMITED ABDOMINAL  [x ] LIMITED DVT  [ ] NEEDLE GUIDANCE VASCULAR  [ ] NEEDLE GUIDANCE THORACENTESIS  [ ] NEEDLE GUIDANCE PARACENTESIS  [ ] NEEDLE GUIDANCE PERICARDIOCENTESIS  [ ] OTHER    FINDINGS:  Lungs: A line predominance. Bilateral moderate pleural effusions. The right has septations. Basilar consolidations.  Heart: Severely reduced LVSF. LVOT VTI 8.2 cm. E/e' 6.4. LV > RV. No pericardial effusion. IVC indeterminant. Unspecified echodensity noted in the IVC.  LE veins: possible noncompressive popliteal vein on the right. Other standard venous sites fully compressible.     INTERPRETATION:  Bilateral pleural effusions. Right is complex. Basilar consolidations.  Distributive and cardiogenic shock.   Normal lung aeration with low cardiac filling pressures suggest volume tolerance.  Possible distal DVT on the right.    Images uploaded on [employer]    Attending Attestation:  I was present during the key portions of the procedure and immediately available during the entire procedure.  John Cotto MD  Attending  Pulmonary & Critical Care Medicine

## 2023-07-11 NOTE — PROGRESS NOTE ADULT - SUBJECTIVE AND OBJECTIVE BOX
Rony Mcgarry MD  Interventional Cardiology / Advance Heart Failure and Cardiac Transplant Specialist  [place] Office : [phone] [place] [address]  Tel:   [place] Office : [phone] [facility] [address]  Tel: [phone]      Subjective/Overnight events: Pt is lying in bed in ICU intubated  	  MEDICATIONS:  norepinephrine Infusion 0.05 MICROgram(s)/kG/Min IV Continuous <Continuous>    cefepime   IVPB 1000 milliGRAM(s) IV Intermittent daily    albuterol    90 MICROgram(s) HFA Inhaler 2 Puff(s) Inhalation every 6 hours PRN  loratadine 10 milliGRAM(s) Oral daily    acetaminophen     Tablet .. 650 milliGRAM(s) Oral every 6 hours PRN  HYDROmorphone  Injectable 0.2 milliGRAM(s) IV Push every 4 hours PRN  oxyCODONE    IR 5 milliGRAM(s) Oral every 4 hours PRN  propofol Infusion 50 MICROgram(s)/kG/Min IV Continuous <Continuous>    lactulose Syrup 10 Gram(s) Oral daily PRN  misoprostol 200 MICROGram(s) Oral <User Schedule>  pantoprazole  Injectable 40 milliGRAM(s) IV Push daily  polyethylene glycol 3350 17 Gram(s) Oral two times a day  senna 2 Tablet(s) Oral at bedtime PRN    atorvastatin 80 milliGRAM(s) Oral at bedtime  levothyroxine Injectable 56 MICROGram(s) IV Push at bedtime  vasopressin Infusion 0.04 Unit(s)/Min IV Continuous <Continuous>    chlorhexidine 0.12% Liquid 15 milliLiter(s) Oral Mucosa every 12 hours  chlorhexidine 2% Cloths 1 Application(s) Topical daily  cholecalciferol 2000 Unit(s) Oral daily  cyanocobalamin 1000 MICROGram(s) Oral daily  multivitamin 1 Tablet(s) Oral daily  mupirocin 2% Ointment 1 Application(s) Topical <User Schedule>  petrolatum Ophthalmic Ointment 1 Application(s) Both EYES two times a day      PAST MEDICAL/SURGICAL HISTORY  PAST MEDICAL & SURGICAL HISTORY:  Endometrial ca  S4 serous endometrial carcinoma, [facility], chemotherapy      HTN (hypertension)      CAD (coronary artery disease)      S/P AVR      Hypothyroidism      HLD (hyperlipidemia)      S/P AVR (aortic valve replacement)      History of endometrial biopsy          SOCIAL HISTORY: Substance Use (street drugs): ( x ) never used  (  ) other:    FAMILY HISTORY:  Family history of parotid cancer (Child)          PHYSICAL EXAM:  T(C): 37.8 (07-11-23 @ 12:00), Max: 37.8 (07-11-23 @ 04:00)  HR: 130 (07-11-23 @ 15:00) (119 - 133)  BP: 89/58 (07-10-23 @ 20:00) (47/13 - 130/65)  RR: 14 (07-11-23 @ 15:00) (0 - 20)  SpO2: 100% (07-11-23 @ 14:00) (96% - 100%)  Wt(kg): --  I&O's Summary    10 Jul 2023 07:01  -  11 Jul 2023 07:00  --------------------------------------------------------  IN: 1825.5 mL / OUT: 610 mL / NET: 1215.5 mL    11 Jul 2023 07:01  -  11 Jul 2023 15:29  --------------------------------------------------------  IN: 393 mL / OUT: 95 mL / NET: 298 mL          GENERAL: intubated and sedated  EYES: , conjunctiva and sclera clear  Cardiovascular: Normal S1 S2, No JVD, No murmurs, No edema  Respiratory: Lungs clear to auscultation	  Gastrointestinal:  Soft, Extremities: No edema                                   9.0    17.94 )-----------( 48       ( 11 Jul 2023 09:40 )             27.2     07-11    129<L>  |  92<L>  |  37<H>  ----------------------------<  145<H>  4.2   |  19<L>  |  1.77<H>    Ca    7.7<L>      11 Jul 2023 09:40  Phos  4.0     07-11  Mg     2.10     07-11    TPro  5.9<L>  /  Alb  3.0<L>  /  TBili  1.2  /  DBili  x   /  AST  192<H>  /  ALT  51<H>  /  AlkPhos  99  07-11    proBNP:   Lipid Profile:   HgA1c:   TSH:     Consultant(s) Notes Reviewed:  [x ] YES  [ ] NO    Care Discussed with Consultants/Other Providers [ x] YES  [ ] NO    Imaging Personally Reviewed independently:  [x] YES  [ ] NO    All labs, radiologic studies, vitals, orders and medications list reviewed. Patient is seen and examined at bedside. Case discussed with medical team.

## 2023-07-11 NOTE — CHART NOTE - NSCHARTNOTESELECT_GEN_ALL_CORE
Event Note
POCUS
Palliative Care/Event Note
Palliative/Event Note
Surgery
ultrasound
2nd SVT episode/Event Note
Accept Note/Transfer Note
Event Note
Follow Up/Nutrition Services
Follow-up/Nutrition Services
IR/Event Note
Neurology
POCUS Attending/Event Note
SVT/Event Note
Thoracic surgery
Thoracic/Event Note

## 2023-07-11 NOTE — EEG REPORT - NS EEG TEXT BOX
[facility] WEL[facility]-[id number]     Study Date: 07/10/23 18:35 - 07/11/23 08:00  Duration: 13 hr 24 min  --------------------------------------------------------------------------------------------------  History:  CC/ HPI Patient is a 88y old  Female who presents with a chief complaint of Generalized weakness (11 Jul 2023 06:45)    MEDICATIONS  (STANDING):  atorvastatin 80 milliGRAM(s) Oral at bedtime  cefepime   IVPB 1000 milliGRAM(s) IV Intermittent daily  chlorhexidine 0.12% Liquid 15 milliLiter(s) Oral Mucosa every 12 hours  chlorhexidine 2% Cloths 1 Application(s) Topical daily  cholecalciferol 2000 Unit(s) Oral daily  cyanocobalamin 1000 MICROGram(s) Oral daily  heparin   Injectable 5000 Unit(s) SubCutaneous every 12 hours  levothyroxine Injectable 56 MICROGram(s) IV Push at bedtime  loratadine 10 milliGRAM(s) Oral daily  misoprostol 200 MICROGram(s) Oral <User Schedule>  multivitamin 1 Tablet(s) Oral daily  mupirocin 2% Ointment 1 Application(s) Topical <User Schedule>  norepinephrine Infusion 0.05 MICROgram(s)/kG/Min (2.81 mL/Hr) IV Continuous <Continuous>  pantoprazole  Injectable 40 milliGRAM(s) IV Push daily  petrolatum Ophthalmic Ointment 1 Application(s) Both EYES two times a day  polyethylene glycol 3350 17 Gram(s) Oral two times a day  propofol Infusion 50 MICROgram(s)/kG/Min (18 mL/Hr) IV Continuous <Continuous>  vasopressin Infusion 0.04 Unit(s)/Min (6 mL/Hr) IV Continuous <Continuous>    --------------------------------------------------------------------------------------------------  Study Interpretation:    [[[Abbreviation Key:  PDR=alpha rhythm/posterior dominant rhythm. A-P=anterior posterior.  Amplitude: ‘very low’:<20; ‘low’:20-49; ‘medium’:; ‘high’:>150uV.  Persistence for periodic/rhythmic patterns (% of epoch) ‘rare’:<1%; ‘occasional’:1-10%; ‘frequent’:10-50%; ‘abundant’:50-90%; ‘continuous’:>90%.  Persistence for sporadic discharges: ‘rare’:<1/hr; ‘occasional’:1/min-1/hr; ‘frequent’:>1/min; ‘abundant’:>1/10 sec.  RPP=rhythmic and periodic patterns; GRDA=generalized rhythmic delta activity; FIRDA=frontal intermittent GRDA; LRDA=lateralized rhythmic delta activity; TIRDA=temporal intermittent rhythmic delta activity;  LPD=PLED=lateralized periodic discharges; GPD=generalized periodic discharges; BIPDs =bilateral independent periodic discharges; Mf=multifocal; SIRPDs=stimulus induced rhythmic, periodic, or ictal appearing discharges; BIRDs=brief potentially ictal rhythmic discharges >4 Hz, lasting .5-10s; PFA (paroxysmal bursts >13 Hz or =8 Hz <10s).  Modifiers: +F=with fast component; +S=with spike component; +R=with rhythmic component.  S-B=burst suppression pattern.  Max=maximal. N1-drowsy; N2-stage II sleep; N3-slow wave sleep. SSS/BETS=small sharp spikes/benign epileptiform transients of sleep. HV=hyperventilation; PS=photic stimulation]]]    Daily EEG Visual Analysis    FINDINGS:      Background:  The background is symmetric and burst-suppressed. There is no state change. The predominant background consists of 1-5-second bursts of diffuse polymorphic theta, delta, and intermittent beta/alpha activity, often sharply contoured, at times asynchronous, between 3-10-second periods of diffuse suppression.    Background Slowing:  Generalized slowing: As above  Focal slowing: None    State Changes:   None    Interictal Findings:  None    Electrographic and Electroclinical seizures:  None    Other Clinical Events:  None    Activation Procedures:   Hyperventilation is not performed.    Photic stimulation is performed and does not elicit any abnormalities. There is symmetric photic driving at 2 and 4 Hz.    Artifacts:  Intermittent myogenic and movement artifacts are present.    EKG:  Single-lead EKG shows    EEG Classification / Summary:  Abnormal EEG in a comatose patient.  -Burst-suppressed background  -No epileptiform abnormalities    Clinical Impression:  -This finding indicates severe diffuse cerebral dysfunction of nonspecific etiology. In this case, sedating medications may be contributing.        -------------------------------------------------------------------------------------------------------  [facility] EEG Reading Room Ph#: [phone]  Epilepsy Answering Service after 5PM and before 8:30AM: Ph#: [phone]    Linnette Jolley MD  Attending Physician, [facility]   [facility] WELCH [facility]-[id number]     Study Date: 07/10/23 18:35 - 07/11/23 08:00  Duration: 13 hr 24 min  --------------------------------------------------------------------------------------------------  History:  CC/ HPI Patient is a 88y old  Female who presents with a chief complaint of Generalized weakness (11 Jul 2023 06:45)    MEDICATIONS  (STANDING):  atorvastatin 80 milliGRAM(s) Oral at bedtime  cefepime   IVPB 1000 milliGRAM(s) IV Intermittent daily  chlorhexidine 0.12% Liquid 15 milliLiter(s) Oral Mucosa every 12 hours  chlorhexidine 2% Cloths 1 Application(s) Topical daily  cholecalciferol 2000 Unit(s) Oral daily  cyanocobalamin 1000 MICROGram(s) Oral daily  heparin   Injectable 5000 Unit(s) SubCutaneous every 12 hours  levothyroxine Injectable 56 MICROGram(s) IV Push at bedtime  loratadine 10 milliGRAM(s) Oral daily  misoprostol 200 MICROGram(s) Oral <User Schedule>  multivitamin 1 Tablet(s) Oral daily  mupirocin 2% Ointment 1 Application(s) Topical <User Schedule>  norepinephrine Infusion 0.05 MICROgram(s)/kG/Min (2.81 mL/Hr) IV Continuous <Continuous>  pantoprazole  Injectable 40 milliGRAM(s) IV Push daily  petrolatum Ophthalmic Ointment 1 Application(s) Both EYES two times a day  polyethylene glycol 3350 17 Gram(s) Oral two times a day  propofol Infusion 50 MICROgram(s)/kG/Min (18 mL/Hr) IV Continuous <Continuous>  vasopressin Infusion 0.04 Unit(s)/Min (6 mL/Hr) IV Continuous <Continuous>    --------------------------------------------------------------------------------------------------  Study Interpretation:    [[[Abbreviation Key:  PDR=alpha rhythm/posterior dominant rhythm. A-P=anterior posterior.  Amplitude: ‘very low’:<20; ‘low’:20-49; ‘medium’:; ‘high’:>150uV.  Persistence for periodic/rhythmic patterns (% of epoch) ‘rare’:<1%; ‘occasional’:1-10%; ‘frequent’:10-50%; ‘abundant’:50-90%; ‘continuous’:>90%.  Persistence for sporadic discharges: ‘rare’:<1/hr; ‘occasional’:1/min-1/hr; ‘frequent’:>1/min; ‘abundant’:>1/10 sec.  RPP=rhythmic and periodic patterns; GRDA=generalized rhythmic delta activity; FIRDA=frontal intermittent GRDA; LRDA=lateralized rhythmic delta activity; TIRDA=temporal intermittent rhythmic delta activity;  LPD=PLED=lateralized periodic discharges; GPD=generalized periodic discharges; BIPDs =bilateral independent periodic discharges; Mf=multifocal; SIRPDs=stimulus induced rhythmic, periodic, or ictal appearing discharges; BIRDs=brief potentially ictal rhythmic discharges >4 Hz, lasting .5-10s; PFA (paroxysmal bursts >13 Hz or =8 Hz <10s).  Modifiers: +F=with fast component; +S=with spike component; +R=with rhythmic component.  S-B=burst suppression pattern.  Max=maximal. N1-drowsy; N2-stage II sleep; N3-slow wave sleep. SSS/BETS=small sharp spikes/benign epileptiform transients of sleep. HV=hyperventilation; PS=photic stimulation]]]    Daily EEG Visual Analysis    FINDINGS:      Background:  The background is symmetric and burst-suppressed. There is no state change. The predominant background consists of 1-5-second bursts of diffuse polymorphic theta, delta, and intermittent beta/alpha activity, often sharply contoured, at times asynchronous, between 3-10-second periods of diffuse suppression.    Background Slowing:  Generalized slowing: As above  Focal slowing: None    State Changes:   None    Interictal Findings:  None    Electrographic and Electroclinical seizures:  None    Other Clinical Events:  None    Activation Procedures:   Hyperventilation is not performed.    Photic stimulation is performed and does not elicit any abnormalities. There is symmetric photic driving at 2 and 4 Hz.    Artifacts:  Intermittent myogenic and movement artifacts are present.    EKG:  Single-lead EKG is disconnected for most of the study, otherwise limited by artifact.    EEG Classification / Summary:  Abnormal EEG in a comatose patient.  -Burst-suppressed background  -No epileptiform abnormalities    Clinical Impression:  -This finding indicates severe diffuse cerebral dysfunction of nonspecific etiology. In this case, sedating medications may be contributing.      -------------------------------------------------------------------------------------------------------  [facility] EEG Reading Room Ph#: [phone]  Epilepsy Answering Service after 5PM and before 8:30AM: Ph#: [phone]    Linnette Jolley MD  Attending Physician, [facility]

## 2023-07-11 NOTE — PROGRESS NOTE ADULT - SUBJECTIVE AND OBJECTIVE BOX
INTERVAL HPI/OVERNIGHT EVENTS:    SUBJECTIVE: Patient seen and examined at bedside.     CONSTITUTIONAL: No weakness, fevers or chills  EYES/ENT: No visual changes;  No vertigo or throat pain   NECK: No pain or stiffness  RESPIRATORY: No cough, wheezing, hemoptysis; No shortness of breath  CARDIOVASCULAR: No chest pain or palpitations  GASTROINTESTINAL: No abdominal or epigastric pain. No nausea, vomiting, or hematemesis; No diarrhea or constipation. No melena or hematochezia.  GENITOURINARY: No dysuria, frequency or hematuria  NEUROLOGICAL: No numbness or weakness  SKIN: No itching, rashes    OBJECTIVE:    VITAL SIGNS:  ICU Vital Signs Last 24 Hrs  T(C): 37.8 (11 Jul 2023 04:00), Max: 37.8 (11 Jul 2023 04:00)  T(F): 100 (11 Jul 2023 04:00), Max: 100 (11 Jul 2023 04:00)  HR: 128 (11 Jul 2023 06:14) (117 - 133)  BP: 89/58 (10 Jul 2023 20:00) (47/13 - 142/67)  BP(mean): 65 (10 Jul 2023 20:00) (18 - 102)  ABP: 98/54 (11 Jul 2023 06:14) (74/43 - 114/59)  ABP(mean): 70 (11 Jul 2023 06:14) (55 - 80)  RR: 14 (11 Jul 2023 06:14) (12 - 20)  SpO2: 98% (11 Jul 2023 06:14) (96% - 100%)    O2 Parameters below as of 11 Jul 2023 06:14  Patient On (Oxygen Delivery Method): ventilator    O2 Concentration (%): 30      Mode: AC/ CMV (Assist Control/ Continuous Mandatory Ventilation), RR (machine): 14, TV (machine): 320, FiO2: 30, PEEP: 5, ITime: 1.03, MAP: 9, PIP: 24    07-09 @ 07:01  -  07-10 @ 07:00  --------------------------------------------------------  IN: 1869.4 mL / OUT: 1545 mL / NET: 324.4 mL    07-10 @ 07:01  -  07-11 @ 06:45  --------------------------------------------------------  IN: 1825.5 mL / OUT: 610 mL / NET: 1215.5 mL      CAPILLARY BLOOD GLUCOSE      POCT Blood Glucose.: 145 mg/dL (11 Jul 2023 05:29)      PHYSICAL EXAM:    General: NAD  HEENT: NC/AT; PERRL, clear conjunctiva  Neck: supple  Respiratory: CTA b/l  Cardiovascular: +S1/S2; RRR  Abdomen: soft, NT/ND; +BS x4  Extremities: WWP, 2+ peripheral pulses b/l; no LE edema  Skin: normal color and turgor; no rash  Neurological:    MEDICATIONS:  MEDICATIONS  (STANDING):  atorvastatin 80 milliGRAM(s) Oral at bedtime  cefepime   IVPB 1000 milliGRAM(s) IV Intermittent daily  chlorhexidine 0.12% Liquid 15 milliLiter(s) Oral Mucosa every 12 hours  chlorhexidine 2% Cloths 1 Application(s) Topical daily  cholecalciferol 2000 Unit(s) Oral daily  cyanocobalamin 1000 MICROGram(s) Oral daily  heparin   Injectable 5000 Unit(s) SubCutaneous every 12 hours  levothyroxine Injectable 56 MICROGram(s) IV Push at bedtime  loratadine 10 milliGRAM(s) Oral daily  midazolam Infusion 0.02 mG/kG/Hr (1.2 mL/Hr) IV Continuous <Continuous>  misoprostol 200 MICROGram(s) Oral <User Schedule>  multivitamin 1 Tablet(s) Oral daily  mupirocin 2% Ointment 1 Application(s) Topical <User Schedule>  norepinephrine Infusion 0.05 MICROgram(s)/kG/Min (2.81 mL/Hr) IV Continuous <Continuous>  pantoprazole  Injectable 40 milliGRAM(s) IV Push daily  petrolatum Ophthalmic Ointment 1 Application(s) Both EYES two times a day  polyethylene glycol 3350 17 Gram(s) Oral two times a day  propofol Infusion 50 MICROgram(s)/kG/Min (18 mL/Hr) IV Continuous <Continuous>  vasopressin Infusion 0.04 Unit(s)/Min (6 mL/Hr) IV Continuous <Continuous>    MEDICATIONS  (PRN):  acetaminophen     Tablet .. 650 milliGRAM(s) Oral every 6 hours PRN Temp greater or equal to 38C (100.4F), Mild Pain (1 - 3)  albuterol    90 MICROgram(s) HFA Inhaler 2 Puff(s) Inhalation every 6 hours PRN Shortness of Breath and/or Wheezing  HYDROmorphone  Injectable 0.2 milliGRAM(s) IV Push every 4 hours PRN Severe Pain (7 - 10)  lactulose Syrup 10 Gram(s) Oral daily PRN constipation  oxyCODONE    IR 5 milliGRAM(s) Oral every 4 hours PRN Moderate Pain (4 - 6)  senna 2 Tablet(s) Oral at bedtime PRN for constipation      ALLERGIES:  Allergies    No Known Allergies    Intolerances        LABS:                        8.9    17.05 )-----------( 51       ( 11 Jul 2023 00:20 )             24.9     07-11    133<L>  |  97<L>  |  35<H>  ----------------------------<  166<H>  4.3   |  18<L>  |  1.68<H>    Ca    7.7<L>      11 Jul 2023 00:20  Phos  4.0     07-11  Mg     1.90     07-11    TPro  6.0  /  Alb  3.2<L>  /  TBili  1.3<H>  /  DBili  x   /  AST  172<H>  /  ALT  46<H>  /  AlkPhos  100  07-11      Urinalysis Basic - ( 11 Jul 2023 00:20 )    Color: x / Appearance: x / SG: x / pH: x  Gluc: 166 mg/dL / Ketone: x  / Bili: x / Urobili: x   Blood: x / Protein: x / Nitrite: x   Leuk Esterase: x / RBC: x / WBC x   Sq Epi: x / Non Sq Epi: x / Bacteria: x        RADIOLOGY & ADDITIONAL TESTS: Reviewed. INTERVAL HPI/OVERNIGHT EVENTS: No overnight events     SUBJECTIVE: Patient seen and examined at bedside. Subjective history not elicited from this patient.     CONSTITUTIONAL: No weakness, fevers or chills  EYES/ENT: No visual changes;  No vertigo or throat pain   NECK: No pain or stiffness  RESPIRATORY: No cough, wheezing, hemoptysis; No shortness of breath  CARDIOVASCULAR: No chest pain or palpitations  GASTROINTESTINAL: No abdominal or epigastric pain. No nausea, vomiting, or hematemesis; No diarrhea or constipation. No melena or hematochezia.  GENITOURINARY: No dysuria, frequency or hematuria  NEUROLOGICAL: No numbness or weakness  SKIN: No itching, rashes    OBJECTIVE:    VITAL SIGNS:  ICU Vital Signs Last 24 Hrs  T(C): 37.8 (11 Jul 2023 04:00), Max: 37.8 (11 Jul 2023 04:00)  T(F): 100 (11 Jul 2023 04:00), Max: 100 (11 Jul 2023 04:00)  HR: 128 (11 Jul 2023 06:14) (117 - 133)  BP: 89/58 (10 Jul 2023 20:00) (47/13 - 142/67)  BP(mean): 65 (10 Jul 2023 20:00) (18 - 102)  ABP: 98/54 (11 Jul 2023 06:14) (74/43 - 114/59)  ABP(mean): 70 (11 Jul 2023 06:14) (55 - 80)  RR: 14 (11 Jul 2023 06:14) (12 - 20)  SpO2: 98% (11 Jul 2023 06:14) (96% - 100%)    O2 Parameters below as of 11 Jul 2023 06:14  Patient On (Oxygen Delivery Method): ventilator    O2 Concentration (%): 30      Mode: AC/ CMV (Assist Control/ Continuous Mandatory Ventilation), RR (machine): 14, TV (machine): 320, FiO2: 30, PEEP: 5, ITime: 1.03, MAP: 9, PIP: 24    07-09 @ 07:01  -  07-10 @ 07:00  --------------------------------------------------------  IN: 1869.4 mL / OUT: 1545 mL / NET: 324.4 mL    07-10 @ 07:01 - 07-11 @ 06:45  --------------------------------------------------------  IN: 1825.5 mL / OUT: 610 mL / NET: 1215.5 mL      CAPILLARY BLOOD GLUCOSE      POCT Blood Glucose.: 145 mg/dL (11 Jul 2023 05:29)      PHYSICAL EXAM:    General: NAD  HEENT: NC/AT  Cardiovascular:RRR  Abdomen: soft, NT/ND  Extremities: WWP  Skin: normal color and turgor    MEDICATIONS:  MEDICATIONS  (STANDING):  atorvastatin 80 milliGRAM(s) Oral at bedtime  cefepime   IVPB 1000 milliGRAM(s) IV Intermittent daily  chlorhexidine 0.12% Liquid 15 milliLiter(s) Oral Mucosa every 12 hours  chlorhexidine 2% Cloths 1 Application(s) Topical daily  cholecalciferol 2000 Unit(s) Oral daily  cyanocobalamin 1000 MICROGram(s) Oral daily  heparin   Injectable 5000 Unit(s) SubCutaneous every 12 hours  levothyroxine Injectable 56 MICROGram(s) IV Push at bedtime  loratadine 10 milliGRAM(s) Oral daily  midazolam Infusion 0.02 mG/kG/Hr (1.2 mL/Hr) IV Continuous <Continuous>  misoprostol 200 MICROGram(s) Oral <User Schedule>  multivitamin 1 Tablet(s) Oral daily  mupirocin 2% Ointment 1 Application(s) Topical <User Schedule>  norepinephrine Infusion 0.05 MICROgram(s)/kG/Min (2.81 mL/Hr) IV Continuous <Continuous>  pantoprazole  Injectable 40 milliGRAM(s) IV Push daily  petrolatum Ophthalmic Ointment 1 Application(s) Both EYES two times a day  polyethylene glycol 3350 17 Gram(s) Oral two times a day  propofol Infusion 50 MICROgram(s)/kG/Min (18 mL/Hr) IV Continuous <Continuous>  vasopressin Infusion 0.04 Unit(s)/Min (6 mL/Hr) IV Continuous <Continuous>    MEDICATIONS  (PRN):  acetaminophen     Tablet .. 650 milliGRAM(s) Oral every 6 hours PRN Temp greater or equal to 38C (100.4F), Mild Pain (1 - 3)  albuterol    90 MICROgram(s) HFA Inhaler 2 Puff(s) Inhalation every 6 hours PRN Shortness of Breath and/or Wheezing  HYDROmorphone  Injectable 0.2 milliGRAM(s) IV Push every 4 hours PRN Severe Pain (7 - 10)  lactulose Syrup 10 Gram(s) Oral daily PRN constipation  oxyCODONE    IR 5 milliGRAM(s) Oral every 4 hours PRN Moderate Pain (4 - 6)  senna 2 Tablet(s) Oral at bedtime PRN for constipation      ALLERGIES:  Allergies    No Known Allergies    Intolerances        LABS:                        8.9    17.05 )-----------( 51       ( 11 Jul 2023 00:20 )             24.9     07-11    133<L>  |  97<L>  |  35<H>  ----------------------------<  166<H>  4.3   |  18<L>  |  1.68<H>    Ca    7.7<L>      11 Jul 2023 00:20  Phos  4.0     07-11  Mg     1.90     07-11    TPro  6.0  /  Alb  3.2<L>  /  TBili  1.3<H>  /  DBili  x   /  AST  172<H>  /  ALT  46<H>  /  AlkPhos  100  07-11      Urinalysis Basic - ( 11 Jul 2023 00:20 )    Color: x / Appearance: x / SG: x / pH: x  Gluc: 166 mg/dL / Ketone: x  / Bili: x / Urobili: x   Blood: x / Protein: x / Nitrite: x   Leuk Esterase: x / RBC: x / WBC x   Sq Epi: x / Non Sq Epi: x / Bacteria: x        RADIOLOGY & ADDITIONAL TESTS: Reviewed.

## 2023-07-11 NOTE — PROGRESS NOTE ADULT - ASSESSMENT
Mrs. 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     EKG: MAT   Tele:     1) Multifocal Atrial Tachycardia   -with episodes of Atrial tac   -continue to monitor on tele   - Multiple episodes of SVT s/p adenosine and dilt drip and amio gtt  - intubated 2/2 AMS   -dilt d/bahman 2/2 new severe LV dysfunction on TTE 7/9  -currently -130s, on levo HR is likely compensatory mechanism would continue to monitor    2) Pleural effusion  -s/p Pig tail   -f/u pulm and thoracic recs    3) hx of PE  - RUL segmental PE (8/2022  -AC on hold 2/2 anemia    4) CAD s/p PCI  -denies CP  -TTE with normal LV function now repeat TTE 7/9 severe LV systolic dysfunction currently intubated and sedated no ischemic eval     5) TAVR  -TTE transcatheter aortic valve replacement Peak transaortic valve gradient equals 42 mm Hg, mean transaortic valve gradient equals 23 mm Hg, which is probably normal in the presence of a prosthetic valve. Moderate valvular aortic regurgitation.    6) Anemia  -transfuse to keep hemoglobin >8  -CT scan non con showing bleeding near the spleen and liver. surgery on board, CTA abd/pelvis with no acute bleed

## 2023-07-11 NOTE — PROGRESS NOTE ADULT - ASSESSMENT
ASSESSMENT/PLAN:  Mrs. 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     NEURO  #Altered Mental Status  -Patient is significantly altered compared to their baseline   -CT Head negative for stroke- Nonspecific symmetric dilatation of the bilateral superior ophthalmic veins with slight hyperattenuation on the right, concern for increased intracranial pressure vs cavernous carotid fistula vs right-sided superior ophthalmic vein   thrombosis  -Consider EEG   -Follow up Free T4, Total T3, and Ammonia normal   -Family deferred CTA and CTV head and neck  - Neuro checks Q4H-> neurology following, recs appreciated   -ABG, BMP, and CBC ordered     #Intubated  -Sedated on midazolam goal to lower   -monitor mental status during weaning     CARDIAC  #SVT  -RRT 7/6 and 7/8 for multifocal atrial tachycardia  patient was given adenosine on both events.  - s/p diltiazem gtt, will trial diltiazem 60 Q6 via OGT  - d/c dilt 60 Q6 iso reduced cardiac function  - consider amio instead for SVT managment  -Monitor Tele  -TTE showed moderate mitral and aortic regurgiation,  Bedside POCUS showed reduced EF    -Cardiology following, recs appreciated     #TAVR  -Mild aortic regurgitation as per MATTHIEU     #Shock of unknown etiology  -Septic vs Vasoplegic shock vs Cardiogenic  - POCUS showing severely reduced global LV systolic dysfunction with evidence of cardiogenic shock  - Infectious work up being done with BCx and CBC  - Patient is intubated for respiratory support   - on levophed and vasopressin, wean as tolerated  - start vanco and cefepime  - monitor UOP    PULM  # Pulmonary embolism.   -Pt diagnosed with RUL segmental PE in Aug 2022, now with possible new PE while on Eliquis 2.5 mg BID.  -Hold Eliquis due to bleeding risk, low HgB, and history of RP bleed   - Lower Ultrasound negative for DVT    #Malignant pleural effusion  - R chest tube to waterseal for significant R pleural effusion  - CT chest with large R pleural effusion, slight decrease from 6/21/23 w/ passive atelectasis of R upper and middle lobes, slightly increased small L pleural effusion. Extensive hepatic and peritoneal mets with large ascites  - per Heme, cytology from R pleural effusion shows atypical cells suspicious for malignancy.   - c/w water seal  - will flush chest tube    #Intubation  - c/w mechanical ventilation  - monitor VBGs/ABGs  - wean FiO2 and midazolam     RENAL  #[place]  -DDx: poor PO intake and 3rd spacing (hypoalbuminemia vs sepsis)  -Resolving, trend via CMP  - CT with mild to moderate R hydro likely secondary to CA  - monitor UOP  - replete potassium     # GI   - vomited this AM  - s/p Zofran  - NPO for now given vomiting  - EKG had QT prolongation no Reglan   - Order CXR to evaluate for ileus, if not then give scheduled PEG     HEME/ONC  #Stage 4 Endometrial cancer.   - Pt with history of stage 4 serous endometrial carcinoma (dx 5/2022) s/p chemo (last in 12/2022) and debulking surgery with POOL/BSO (2/10/23), now recently found to have recurrence of disease with new liver mets, the pt follows with Dr. Alvarado of [facility] [place].   - GOC discussion with pt and family given poor prognosis - pt is fullcode.    #Anemia  -Likely ACD transfuse if HgB<7  - per heme transfuse to maintain hg >7, platelet count >10K, >15K if febrile, or >50K if bleeding   - platelet count 69K  - continue to monitor  - Hep PF4 Ab negative    #DVT  -c/w heparin subQ for ppx    ID  #Leukocytosis  - WBC of 16K  - BCx w/ Gram pos cocci in clusters, coagulase negative-> repeat cultures negative  -c/w Vancomycin and Cefipime  - f/u vanc levels next scheduled 6pm     ENDO  #Hypothyroidism  -Levothyroxine    Ethics  The patient is full code and a GOC discussion has been had with the family, MOLST form is up to date and has been filled.   ASSESSMENT/PLAN:  Mrs. 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     NEURO  #Altered Mental Status  -Patient is significantly altered compared to their baseline   -CT Head negative for stroke- Nonspecific symmetric dilatation of the bilateral superior ophthalmic veins with slight hyperattenuation on the right, concern for increased intracranial pressure vs cavernous carotid fistula vs right-sided superior ophthalmic vein   thrombosis  -Consider EEG   -Follow up Free T4, Total T3, and Ammonia normal   -Family deferred CTA and CTV head and neck  - Neuro checks Q4H-> neurology following, recs appreciated   -ABG, BMP, and CBC ordered     #Intubated  -Sedated on midazolam goal to lower   -monitor mental status during weaning     CARDIAC  #SVT  -RRT 7/6 and 7/8 for multifocal atrial tachycardia  patient was given adenosine on both events.  - s/p diltiazem gtt, will trial diltiazem 60 Q6 via OGT  - d/c dilt 60 Q6 iso reduced cardiac function  - consider amio instead for SVT managment  -Monitor Tele  -TTE showed moderate mitral and aortic regurgiation,  Bedside POCUS showed reduced EF    -Cardiology following, recs appreciated     #TAVR  -Mild aortic regurgitation as per MATTHIEU     #Shock of unknown etiology  -Septic vs Vasoplegic shock vs Cardiogenic  - POCUS showing severely reduced global LV systolic dysfunction with evidence of cardiogenic shock  - Infectious work up being done with BCx and CBC  - Patient is intubated for respiratory support   - on levophed and vasopressin, wean as tolerated  - start vanco and cefepime  - monitor UOP    PULM  # Pulmonary embolism.   -Pt diagnosed with RUL segmental PE in Aug 2022, now with possible new PE while on Eliquis 2.5 mg BID.  -Hold Eliquis due to bleeding risk, low HgB, and history of RP bleed   - Lower Ultrasound negative for DVT    #Malignant pleural effusion  - R chest tube to waterseal for significant R pleural effusion  - CT chest with large R pleural effusion, slight decrease from 6/21/23 w/ passive atelectasis of R upper and middle lobes, slightly increased small L pleural effusion. Extensive hepatic and peritoneal mets with large ascites  - per Heme, cytology from R pleural effusion shows atypical cells suspicious for malignancy.   - c/w water seal  - will flush chest tube    #Intubation  - c/w mechanical ventilation  - monitor VBGs/ABGs  - wean FiO2 and midazolam     RENAL  #[place]  -DDx: poor PO intake and 3rd spacing (hypoalbuminemia vs sepsis)  -Resolving, trend via CMP  - CT with mild to moderate R hydro likely secondary to CA  - monitor UOP    # GI   - vomited this AM  - s/p Zofran  - NPO for now given vomiting  - EKG had QT prolongation no Reglan   -CXR negative for ileus    - follow up C.diff  -Clamp trial for OG tube     HEME/ONC  #Stage 4 Endometrial cancer.   - Pt with history of stage 4 serous endometrial carcinoma (dx 5/2022) s/p chemo (last in 12/2022) and debulking surgery with OPOL/BSO (2/10/23), now recently found to have recurrence of disease with new liver mets, the pt follows with [place]. Alvarado of [facility] [place].   - GOC discussion with pt and family given poor prognosis - pt is fullcode.    #Anemia  -Likely ACD transfuse if HgB<7  - per heme transfuse to maintain hg >7, platelet count >10K, >15K if febrile, or >50K if bleeding   - platelet count 69K  - continue to monitor  - Hep PF4 Ab negative    #DVT  -c/w heparin subQ for ppx  -serotonin release assay   -DVT U/S done     ID  #Leukocytosis  - WBC of 16K  - BCx w/ Gram pos cocci in clusters, coagulase negative-> repeat cultures negative  -c/w Vancomycin and Cefipime  - f/u vanc levels     ENDO  #Hypothyroidism  -Levothyroxine    Ethics  The patient is full code and a GOC discussion has been had with the family, MOLST form is up to date and has been filled.

## 2023-07-11 NOTE — PROGRESS NOTE ADULT - ASSESSMENT
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    1. Uterine carcinosarcoma   -- Under care of Dr. Alvarado at [facility]  -- s/p neoadjuvant chemotherapy completed 12/2022, s/p POOL-BSO 02/10/2023   -- Recent imaging suggests disease recurrence  -- Prognosis is poor. She is not a candidate for further treatment.   -- Palliative care team following, ongoing [facility] discussions.    2. Respiratory failure   -- CTA chest w/o PE; + large right pleural effusion, s/p pigtail catheter currently w minimal drainage   -- LE duplex without DVT   -- Cytology from R pleural effusion positive for malignant cells- metastatic adenocarcinoma favoring mullerian origin.  -- Repeat CT chest 07/08 with large right pleural effusion despite PTC   -- Currently intubated for airway protection    3. Anemia, thrombocytopenia   -- Likely secondary to malignancy, AOCD, sepsis   -- No evidence of iron, B12, or folate deficiencies.  -- Monitor CBC and transfuse to maintain hg >7, platelet count >10K, >15K if febrile, or >50K if bleeding     4. AMS  -- Neurology following, appreciate recommendations. repeat CTH abnormal, recommended CTA, CTV head/neck however family declines. Recommending MRI, MRA head/neck when able.   -- Intubated, sedated in MICU  -- on empiric antibiotics, cultures NGTD    5. [place]   -- Renal US shows moderate R hydronephrosis, moderate to large ascites   -- Nephrology following, appreciate recs. Creatinine trending down.  -- IR consulted for NT placement, no need for NT at this time.     Will continue to follow.    Twyla Pitts PA-C  Hematology/Oncology  [facility]  [phone] (office)  [phone] (alt office)  Evenings and weekends please call MD on call or office

## 2023-07-11 NOTE — PROGRESS NOTE ADULT - SUBJECTIVE AND OBJECTIVE BOX
Patient is a 88y old  Female who presents with a chief complaint of Generalized weakness (11 Jul 2023 06:45)    Patient seen this afternoon, with her daughter at bedside. She remains intubated, sedated, on pressors. EEG running.    MEDICATIONS  (STANDING):  atorvastatin 80 milliGRAM(s) Oral at bedtime  cefepime   IVPB 1000 milliGRAM(s) IV Intermittent daily  chlorhexidine 0.12% Liquid 15 milliLiter(s) Oral Mucosa every 12 hours  chlorhexidine 2% Cloths 1 Application(s) Topical daily  cholecalciferol 2000 Unit(s) Oral daily  cyanocobalamin 1000 MICROGram(s) Oral daily  levothyroxine Injectable 56 MICROGram(s) IV Push at bedtime  loratadine 10 milliGRAM(s) Oral daily  misoprostol 200 MICROGram(s) Oral <User Schedule>  multivitamin 1 Tablet(s) Oral daily  mupirocin 2% Ointment 1 Application(s) Topical <User Schedule>  norepinephrine Infusion 0.05 MICROgram(s)/kG/Min (2.81 mL/Hr) IV Continuous <Continuous>  pantoprazole  Injectable 40 milliGRAM(s) IV Push daily  petrolatum Ophthalmic Ointment 1 Application(s) Both EYES two times a day  polyethylene glycol 3350 17 Gram(s) Oral two times a day  propofol Infusion 50 MICROgram(s)/kG/Min (18 mL/Hr) IV Continuous <Continuous>  vasopressin Infusion 0.04 Unit(s)/Min (6 mL/Hr) IV Continuous <Continuous>    MEDICATIONS  (PRN):  acetaminophen     Tablet .. 650 milliGRAM(s) Oral every 6 hours PRN Temp greater or equal to 38C (100.4F), Mild Pain (1 - 3)  albuterol    90 MICROgram(s) HFA Inhaler 2 Puff(s) Inhalation every 6 hours PRN Shortness of Breath and/or Wheezing  HYDROmorphone  Injectable 0.2 milliGRAM(s) IV Push every 4 hours PRN Severe Pain (7 - 10)  lactulose Syrup 10 Gram(s) Oral daily PRN constipation  oxyCODONE    IR 5 milliGRAM(s) Oral every 4 hours PRN Moderate Pain (4 - 6)  senna 2 Tablet(s) Oral at bedtime PRN for constipation        Vital Signs Last 24 Hrs  T(C): 37.8 (11 Jul 2023 12:00), Max: 37.8 (11 Jul 2023 04:00)  T(F): 100 (11 Jul 2023 12:00), Max: 100 (11 Jul 2023 04:00)  HR: 130 (11 Jul 2023 12:00) (117 - 133)  BP: 89/58 (10 Jul 2023 20:00) (47/13 - 130/65)  BP(mean): 65 (10 Jul 2023 20:00) (18 - 95)  RR: 14 (11 Jul 2023 12:00) (0 - 20)  SpO2: 100% (11 Jul 2023 12:00) (96% - 100%)    Parameters below as of 11 Jul 2023 08:00  Patient On (Oxygen Delivery Method): ventilator        PE  NAD  intubated, sedated  Anicteric, MMM  no LE edema   No rash grossly                          9.0    17.94 )-----------( 48       ( 11 Jul 2023 09:40 )             27.2       07-11    129<L>  |  92<L>  |  37<H>  ----------------------------<  145<H>  4.2   |  19<L>  |  1.77<H>    Ca    7.7<L>      11 Jul 2023 09:40  Phos  4.0     07-11  Mg     2.10     07-11    TPro  5.9<L>  /  Alb  3.0<L>  /  TBili  1.2  /  DBili  x   /  AST  192<H>  /  ALT  51<H>  /  AlkPhos  99  07-11

## 2023-07-12 NOTE — PROGRESS NOTE ADULT - NS ATTEND AMEND GEN_ALL_CORE FT
Discussed with patient with her sisters/niece present, patient desires comfort measures.    Recommend home hospice, attempted to discuss with case management who appeared to be too busy to attempt to address today.   Agree with palliative care assistance.  Would hold pleurex removal if planned for more palliative measures.
Discussed with patient's daughter over the phone.  Explained that she has a terminal disease which she states she is aware of and will pass from this.  She is not a candidate for further treatment, strongly recommend palliative measures, DNR/DNI.  She is inquiring about home hospice.   No objection to gentle hydration.  Work up for worsening renal failure/hydronephrosis, unfortunately her family is having difficulty with a cohesive decision regarding her care.
Discussion yesterday with patient, no further chemotherapy desired.  Daughter and son were not there, her sisters/niece were.   Per palliative care note, family is still discussing goals of care/code status.  Await further family discussion regarding further plan of care.
I reviewed the overnight course of events on the unit, re-confirming the patient history. I discussed the care with the patient and their family. The plan of care was discussed with the ACP team and modifications were made to the notation where appropriate. Differential diagnosis and plan of care discussed with patient after the evaluation. Advanced care planning was discussed with patient and family.  Advanced care planning forms were reviewed and discussed.  Risks, benefits and alternatives of cardiac procedures were discussed in detail and all questions were answered. 35 minutes spent on total encounter of which more than fifty percent of the encounter was spent counseling and/or coordinating care by the attending physician.
She appears more awake, alert today.  Wants to go home, her daughter is present on the phone.   she is still interested in work up of urinary obstruction, explained her creatinine is stable, unlikely to benefit from invasive interventions.   Again stressed discharge to home hospice per patient's wishes.
discussed with daughter last night and patient today. currently main issue is bleeding in abdomen. she has temporary pigtail for right pleural effusion and would defer placement of pleurx until medically stable.    would consider goals of care discussion with patient and family
overall prognosis is poor. family decided to keep full code. follow up with [facility]
spoke to family at bedside. wish to keep full code at this time.
spoke with patient darielacristelaalvertotung. they are leaning towards comfort care. please have pall care follow up for goals of care discussion
tentatively on OR schedule for tomorrow for right vats/pleurx. possibly under local and sedation. discussed with patient and daughter. will hold heparin 1am. NPO after midnight
Agree with above
As above.    89 y/o female with recurrent uterine carcinosarcoma, with plans to initiate chemotherapy outpatient. Plan on right VATS and pleural catheter placement tomorrow for pleural effusion with thoracic surgery.
Discussed with patient's granddaughter at her side.  Explained she has progressive disease, not a candidate for further chemotherapy.   The patient herself desires to go home.  The patient's granddaughter will discuss this with the rest of her family.
I reviewed the overnight course of events on the unit, re-confirming the patient history. I discussed the care with the patient and their family. The plan of care was discussed with the ACP team and modifications were made to the notation where appropriate. Differential diagnosis and plan of care discussed with patient after the evaluation. Advanced care planning was discussed with patient and family.  Advanced care planning forms were reviewed and discussed.  Risks, benefits and alternatives of cardiac procedures were discussed in detail and all questions were answered. 35 minutes spent on total encounter of which more than fifty percent of the encounter was spent counseling and/or coordinating care by the attending physician.
Agree with above
Agree with above.    Plan for VATS/pleural catheter placement with thoracic surgery tomorrow. Restart anticoagulation after procedures are completed.
Amended as above.    Briefly, 89 y/o female with uterine carcinosarcoma admitted with weakness.    No chemotherapy to be offered with poor performance status. Thoracic surgery following for malignant pleural effusion. Continue goals of care discussion.     Ongoing shock; on broad-spectrum antibiotics.    Nephrology following for acute kidney injury. Follow-up recommendations.
I reviewed the overnight course of events on the unit, re-confirming the patient history. I discussed the care with the patient and their family. The plan of care was discussed with the ACP team and modifications were made to the notation where appropriate. Differential diagnosis and plan of care discussed with patient after the evaluation. Advanced care planning was discussed with patient and family.  Advanced care planning forms were reviewed and discussed.  Risks, benefits and alternatives of cardiac procedures were discussed in detail and all questions were answered. 35 minutes spent on total encounter of which more than fifty percent of the encounter was spent counseling and/or coordinating care by the attending physician.
Patient seen with NP agree with above
Uterine cancer with respiratory failure  extremely poor prognosis  GOC discussions ongoing
mental status declining. neuro following. pending brain MRI. overall prognosis is poor. spoke with yu at bedside
patient with minimal output - family had refused removal of tube and then patient with respiratory failure requiring intubation. will readdress removal of tube as minimal output

## 2023-07-12 NOTE — EEG REPORT - NS EEG TEXT BOX
FRIDA WELCH [id number]     Study Date: 07-12-23 08:00-12:55  Duration: 4 hr 53 min  --------------------------------------------------------------------------------------------------  History:  CC/ HPI Patient is a 88y old  Female who presents with a chief complaint of Generalized weakness (12 Jul 2023 15:00)    MEDICATIONS  (STANDING):  cefepime   IVPB 1000 milliGRAM(s) IV Intermittent daily  chlorhexidine 0.12% Liquid 15 milliLiter(s) Oral Mucosa every 12 hours  chlorhexidine 2% Cloths 1 Application(s) Topical daily  dextrose 50% Injectable 50 milliLiter(s) IV Push once  dextrose 50% Injectable 50 milliLiter(s) IV Push once  levothyroxine Injectable 56 MICROGram(s) IV Push at bedtime  misoprostol 200 MICROGram(s) Oral <User Schedule>  mupirocin 2% Ointment 1 Application(s) Topical <User Schedule>  norepinephrine Infusion 0.05 MICROgram(s)/kG/Min (2.81 mL/Hr) IV Continuous <Continuous>  pantoprazole  Injectable 40 milliGRAM(s) IV Push daily  petrolatum Ophthalmic Ointment 1 Application(s) Both EYES two times a day  phenylephrine    Infusion 0.1 MICROgram(s)/kG/Min (1.13 mL/Hr) IV Continuous <Continuous>  polyethylene glycol 3350 17 Gram(s) Oral two times a day  vasopressin Infusion 0.04 Unit(s)/Min (6 mL/Hr) IV Continuous <Continuous>    --------------------------------------------------------------------------------------------------  Study Interpretation:    [[[Abbreviation Key:  PDR=alpha rhythm/posterior dominant rhythm. A-P=anterior posterior.  Amplitude: ‘very low’:<20; ‘low’:20-49; ‘medium’:; ‘high’:>150uV.  Persistence for periodic/rhythmic patterns (% of epoch) ‘rare’:<1%; ‘occasional’:1-10%; ‘frequent’:10-50%; ‘abundant’:50-90%; ‘continuous’:>90%.  Persistence for sporadic discharges: ‘rare’:<1/hr; ‘occasional’:1/min-1/hr; ‘frequent’:>1/min; ‘abundant’:>1/10 sec.  RPP=rhythmic and periodic patterns; GRDA=generalized rhythmic delta activity; FIRDA=frontal intermittent GRDA; LRDA=lateralized rhythmic delta activity; TIRDA=temporal intermittent rhythmic delta activity;  LPD=PLED=lateralized periodic discharges; GPD=generalized periodic discharges; BIPDs =bilateral independent periodic discharges; Mf=multifocal; SIRPDs=stimulus induced rhythmic, periodic, or ictal appearing discharges; BIRDs=brief potentially ictal rhythmic discharges >4 Hz, lasting .5-10s; PFA (paroxysmal bursts >13 Hz or =8 Hz <10s).  Modifiers: +F=with fast component; +S=with spike component; +R=with rhythmic component.  S-B=burst suppression pattern.  Max=maximal. N1-drowsy; N2-stage II sleep; N3-slow wave sleep. SSS/BETS=small sharp spikes/benign epileptiform transients of sleep. HV=hyperventilation; PS=photic stimulation]]]    Daily EEG Visual Analysis    FINDINGS:    Background:  The background is symmetric and burst-suppressed. There is no state change. The predominant background consists of 1-3-second bursts of diffuse polymorphic theta, delta, and intermittent beta/alpha activity, often sharply contoured, at times asynchronous, between 1-20-second periods of diffuse suppression. Voltages of bursts are lower compared to 7/11/23 morning.    Background Slowing:  Generalized slowing: As above  Focal slowing: None    State Changes:   None    Interictal Findings:  None    Electrographic and Electroclinical seizures:  None    Other Clinical Events:  None    Activation Procedures:   Hyperventilation is not performed.    Photic stimulation is not performed.    Artifacts:  Intermittent myogenic and movement artifacts are present.    EKG:  Single-lead EKG is not connected.    EEG Classification / Summary:  Abnormal EEG in a comatose patient.  -Burst-suppressed background, lower voltages compared to 24 hrs prior  -No epileptiform abnormalities in this recording.    Clinical Impression:  -Severe diffuse cerebral dysfunction of nonspecific etiology, more severe compared to 24 hrs prior.            -------------------------------------------------------------------------------------------------------  [facility] EEG Reading Room Ph#: [phone]  Epilepsy Answering Service after 5PM and before 8:30AM: Ph#: [phone]    Linnette Jolley MD  Attending Physician, [facility]

## 2023-07-12 NOTE — PROGRESS NOTE ADULT - NS ATTEND OPT1 GEN_ALL_CORE
I independently performed the documented:
I attest my time as attending is greater than 50% of the total combined time spent on qualifying patient care activities by the PA/NP and attending.
I attest my time as attending is greater than 50% of the total combined time spent on qualifying patient care activities by the PA/NP and attending.
I independently performed the documented:
I attest my time as attending is greater than 50% of the total combined time spent on qualifying patient care activities by the PA/NP and attending.
I independently performed the documented:
I attest my time as attending is greater than 50% of the total combined time spent on qualifying patient care activities by the PA/NP and attending.
I independently performed the documented:

## 2023-07-12 NOTE — PROGRESS NOTE ADULT - PROVIDER SPECIALTY LIST ADULT
CT Surgery
Cardiology
Heme/Onc
MICU
MICU
Nephrology
Pulmonology
Pulmonology
Thoracic Surgery
Cardiology
Heme/Onc
Infectious Disease
Internal Medicine
Nephrology
Pulmonology
Thoracic Surgery
Cardiology
Heme/Onc
Infectious Disease
MICU
Nephrology
Neurology
Pulmonology
Thoracic Surgery
Cardiology
Heme/Onc
Infectious Disease
MICU
Nephrology
Thoracic Surgery
Palliative Care
Internal Medicine
no
Internal Medicine
Palliative Care
Internal Medicine

## 2023-07-12 NOTE — EEG REPORT - NS EEG TEXT BOX
FRIDA WELCH [facility]-[id number]     Study Date: 07/11/23 08:00 - 07/12/23 08:00  Duration: 23 hr 58 min  --------------------------------------------------------------------------------------------------  History:  CC/ HPI Patient is a 88y old  Female who presents with a chief complaint of Generalized weakness (12 Jul 2023 06:33)    MEDICATIONS  (STANDING):  cefepime   IVPB 1000 milliGRAM(s) IV Intermittent daily  chlorhexidine 0.12% Liquid 15 milliLiter(s) Oral Mucosa every 12 hours  chlorhexidine 2% Cloths 1 Application(s) Topical daily  cholecalciferol 2000 Unit(s) Oral daily  cyanocobalamin 1000 MICROGram(s) Oral daily  levothyroxine Injectable 56 MICROGram(s) IV Push at bedtime  loratadine 10 milliGRAM(s) Oral daily  misoprostol 200 MICROGram(s) Oral <User Schedule>  multivitamin 1 Tablet(s) Oral daily  mupirocin 2% Ointment 1 Application(s) Topical <User Schedule>  norepinephrine Infusion 0.05 MICROgram(s)/kG/Min (2.81 mL/Hr) IV Continuous <Continuous>  pantoprazole  Injectable 40 milliGRAM(s) IV Push daily  petrolatum Ophthalmic Ointment 1 Application(s) Both EYES two times a day  phenylephrine    Infusion 0.1 MICROgram(s)/kG/Min (1.13 mL/Hr) IV Continuous <Continuous>  polyethylene glycol 3350 17 Gram(s) Oral two times a day  vasopressin Infusion 0.04 Unit(s)/Min (6 mL/Hr) IV Continuous <Continuous>    --------------------------------------------------------------------------------------------------  Study Interpretation:    [[[Abbreviation Key:  PDR=alpha rhythm/posterior dominant rhythm. A-P=anterior posterior.  Amplitude: ‘very low’:<20; ‘low’:20-49; ‘medium’:; ‘high’:>150uV.  Persistence for periodic/rhythmic patterns (% of epoch) ‘rare’:<1%; ‘occasional’:1-10%; ‘frequent’:10-50%; ‘abundant’:50-90%; ‘continuous’:>90%.  Persistence for sporadic discharges: ‘rare’:<1/hr; ‘occasional’:1/min-1/hr; ‘frequent’:>1/min; ‘abundant’:>1/10 sec.  RPP=rhythmic and periodic patterns; GRDA=generalized rhythmic delta activity; FIRDA=frontal intermittent GRDA; LRDA=lateralized rhythmic delta activity; TIRDA=temporal intermittent rhythmic delta activity;  LPD=PLED=lateralized periodic discharges; GPD=generalized periodic discharges; BIPDs =bilateral independent periodic discharges; Mf=multifocal; SIRPDs=stimulus induced rhythmic, periodic, or ictal appearing discharges; BIRDs=brief potentially ictal rhythmic discharges >4 Hz, lasting .5-10s; PFA (paroxysmal bursts >13 Hz or =8 Hz <10s).  Modifiers: +F=with fast component; +S=with spike component; +R=with rhythmic component.  S-B=burst suppression pattern.  Max=maximal. N1-drowsy; N2-stage II sleep; N3-slow wave sleep. SSS/BETS=small sharp spikes/benign epileptiform transients of sleep. HV=hyperventilation; PS=photic stimulation]]]    Daily EEG Visual Analysis    FINDINGS:      Background:  The background is symmetric and burst-suppressed. There is no state change. The predominant background consists of 1-5-second bursts of diffuse polymorphic theta, delta, and intermittent beta/alpha activity, often sharply contoured, at times asynchronous, between 3-10-second periods of diffuse suppression.    Background Slowing:  Generalized slowing: As above  Focal slowing: None    State Changes:   None    Interictal Findings:  Occasional bursts and runs of generalized spike/sharp-wave discharges, often periodic (generalized periodic discharges, GPDs) at 1-1.5 Hz, at times up to 3 Hz for 2 cycles, occasionally sporadic. No clinical correlate is seen on video. Discharges occur with stimulation.    Electrographic and Electroclinical seizures:  None    Other Clinical Events:  None    Activation Procedures:   Hyperventilation is not performed.    Photic stimulation is performed and does not elicit any abnormalities. There is symmetric photic driving at 2 and 4 Hz.    Artifacts:  Intermittent myogenic and movement artifacts are present.    EKG:  Single-lead EKG is disconnected for most of the study, otherwise limited by artifact.    EEG Classification / Summary:  Abnormal EEG in a comatose patient.  -Occasional bursts and runs of generalized periodic discharges (GPDs), mostly at 1-1.5 Hz, with stimulation  -Burst-suppressed background  -No electrographic seizures    Clinical Impression:  -Risk of generalized seizures with stimulation  -Severe diffuse cerebral dysfunction of nonspecific etiology.  -No electrographic seizures          -------------------------------------------------------------------------------------------------------  [facility] EEG Reading Room Ph#: [phone]  Epilepsy Answering Service after 5PM and before 8:30AM: Ph#: [phone]    Linnette Jolley MD  Attending Physician, [facility]   FRIDA WELCH [facility]-[id number]     Study Date: 07/11/23 08:00 - 07/12/23 08:00  Duration: 23 hr 58 min  --------------------------------------------------------------------------------------------------  History:  CC/ HPI Patient is a 88y old  Female who presents with a chief complaint of Generalized weakness (12 Jul 2023 06:33)    MEDICATIONS  (STANDING):  cefepime   IVPB 1000 milliGRAM(s) IV Intermittent daily  chlorhexidine 0.12% Liquid 15 milliLiter(s) Oral Mucosa every 12 hours  chlorhexidine 2% Cloths 1 Application(s) Topical daily  cholecalciferol 2000 Unit(s) Oral daily  cyanocobalamin 1000 MICROGram(s) Oral daily  levothyroxine Injectable 56 MICROGram(s) IV Push at bedtime  loratadine 10 milliGRAM(s) Oral daily  misoprostol 200 MICROGram(s) Oral <User Schedule>  multivitamin 1 Tablet(s) Oral daily  mupirocin 2% Ointment 1 Application(s) Topical <User Schedule>  norepinephrine Infusion 0.05 MICROgram(s)/kG/Min (2.81 mL/Hr) IV Continuous <Continuous>  pantoprazole  Injectable 40 milliGRAM(s) IV Push daily  petrolatum Ophthalmic Ointment 1 Application(s) Both EYES two times a day  phenylephrine    Infusion 0.1 MICROgram(s)/kG/Min (1.13 mL/Hr) IV Continuous <Continuous>  polyethylene glycol 3350 17 Gram(s) Oral two times a day  vasopressin Infusion 0.04 Unit(s)/Min (6 mL/Hr) IV Continuous <Continuous>    --------------------------------------------------------------------------------------------------  Study Interpretation:    [[[Abbreviation Key:  PDR=alpha rhythm/posterior dominant rhythm. A-P=anterior posterior.  Amplitude: ‘very low’:<20; ‘low’:20-49; ‘medium’:; ‘high’:>150uV.  Persistence for periodic/rhythmic patterns (% of epoch) ‘rare’:<1%; ‘occasional’:1-10%; ‘frequent’:10-50%; ‘abundant’:50-90%; ‘continuous’:>90%.  Persistence for sporadic discharges: ‘rare’:<1/hr; ‘occasional’:1/min-1/hr; ‘frequent’:>1/min; ‘abundant’:>1/10 sec.  RPP=rhythmic and periodic patterns; GRDA=generalized rhythmic delta activity; FIRDA=frontal intermittent GRDA; LRDA=lateralized rhythmic delta activity; TIRDA=temporal intermittent rhythmic delta activity;  LPD=PLED=lateralized periodic discharges; GPD=generalized periodic discharges; BIPDs =bilateral independent periodic discharges; Mf=multifocal; SIRPDs=stimulus induced rhythmic, periodic, or ictal appearing discharges; BIRDs=brief potentially ictal rhythmic discharges >4 Hz, lasting .5-10s; PFA (paroxysmal bursts >13 Hz or =8 Hz <10s).  Modifiers: +F=with fast component; +S=with spike component; +R=with rhythmic component.  S-B=burst suppression pattern.  Max=maximal. N1-drowsy; N2-stage II sleep; N3-slow wave sleep. SSS/BETS=small sharp spikes/benign epileptiform transients of sleep. HV=hyperventilation; PS=photic stimulation]]]    Daily EEG Visual Analysis    FINDINGS:      Background:  The background is symmetric and burst-suppressed. There is no state change. The predominant background consists of 1-5-second bursts of diffuse polymorphic theta, delta, and intermittent beta/alpha activity, often sharply contoured, at times asynchronous, between 3-10-second periods of diffuse suppression.    Background Slowing:  Generalized slowing: As above  Focal slowing: None    State Changes:   None    Interictal Findings:  Occasional bursts and runs of generalized spike/sharp-wave discharges, often periodic (generalized periodic discharges, GPDs) at 1-1.5 Hz, at times up to 3 Hz for 2 cycles, occasionally sporadic. No clinical correlate is seen on video. Discharges occur with stimulation.    Electrographic and Electroclinical seizures:  None    Other Clinical Events:  None    Activation Procedures:   Hyperventilation is not performed.    Photic stimulation is not performed.    Artifacts:  Intermittent myogenic and movement artifacts are present.    EKG:  Single-lead EKG is disconnected for most of the study, otherwise limited by artifact.    EEG Classification / Summary:  Abnormal EEG in a comatose patient.  -Occasional bursts and runs of generalized periodic discharges (GPDs), mostly at 1-1.5 Hz, with stimulation  -Burst-suppressed background  -No electrographic seizures    Clinical Impression:  -Risk of generalized seizures with stimulation  -Severe diffuse cerebral dysfunction of nonspecific etiology.  -No electrographic seizures          -------------------------------------------------------------------------------------------------------  [facility] EEG Reading Room Ph#: [phone]  Epilepsy Answering Service after 5PM and before 8:30AM: Ph#: [phone]    Linnette Jolley MD  Attending Physician, [facility]

## 2023-07-12 NOTE — PROGRESS NOTE ADULT - ASSESSMENT
ASSESSMENT/PLAN:  Mrs. 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     NEURO  #Altered Mental Status  -Patient is significantly altered compared to their baseline   -CT Head negative for stroke- Nonspecific symmetric dilatation of the bilateral superior ophthalmic veins with slight hyperattenuation on the right, concern for increased intracranial pressure vs cavernous carotid fistula vs right-sided superior ophthalmic vein   thrombosis  -Consider EEG   -Follow up Free T4, Total T3, and Ammonia normal   -Family deferred CTA and CTV head and neck  - Neuro checks Q4H-> neurology following, recs appreciated   -ABG, BMP, and CBC ordered     #Intubated  -Sedated on midazolam goal to lower   -monitor mental status during weaning     CARDIAC  #SVT  -RRT 7/6 and 7/8 for multifocal atrial tachycardia  patient was given adenosine on both events.  - s/p diltiazem gtt, will trial diltiazem 60 Q6 via OGT  - d/c dilt 60 Q6 iso reduced cardiac function  - consider amio instead for SVT managment  -Monitor Tele  -TTE showed moderate mitral and aortic regurgiation,  Bedside POCUS showed reduced EF    -Cardiology following, recs appreciated     #TAVR  -Mild aortic regurgitation as per MATTHIEU     #Shock of unknown etiology  -Septic vs Vasoplegic shock vs Cardiogenic  - POCUS showing severely reduced global LV systolic dysfunction with evidence of cardiogenic shock  - Infectious work up being done with BCx and CBC  - Patient is intubated for respiratory support   - on levophed and vasopressin, wean as tolerated  - start vanco and cefepime  - monitor UOP    PULM  # Pulmonary embolism.   -Pt diagnosed with RUL segmental PE in Aug 2022, now with possible new PE while on Eliquis 2.5 mg BID.  -Hold Eliquis due to bleeding risk, low HgB, and history of RP bleed   - Lower Ultrasound negative for DVT    #Malignant pleural effusion  - R chest tube to waterseal for significant R pleural effusion  - CT chest with large R pleural effusion, slight decrease from 6/21/23 w/ passive atelectasis of R upper and middle lobes, slightly increased small L pleural effusion. Extensive hepatic and peritoneal mets with large ascites  - per Heme, cytology from R pleural effusion shows atypical cells suspicious for malignancy.   - c/w water seal  - will flush chest tube    #Intubation  - c/w mechanical ventilation  - monitor VBGs/ABGs  - wean FiO2 and midazolam     RENAL  #[place]  -DDx: poor PO intake and 3rd spacing (hypoalbuminemia vs sepsis)  -Resolving, trend via CMP  - CT with mild to moderate R hydro likely secondary to CA  - monitor UOP    # GI   - vomited this AM  - s/p Zofran  - NPO for now given vomiting  - EKG had QT prolongation no Reglan   -CXR negative for ileus    - follow up C.diff  -Clamp trial for OG tube     HEME/ONC  #Stage 4 Endometrial cancer.   - Pt with history of stage 4 serous endometrial carcinoma (dx 5/2022) s/p chemo (last in 12/2022) and debulking surgery with POOL/BSO (2/10/23), now recently found to have recurrence of disease with new liver mets, the pt follows with Dr. Alvarado of [facility].   - GOC discussion with pt and family given poor prognosis - pt is fullcode.    #Anemia  -Likely ACD transfuse if HgB<7  - per heme transfuse to maintain hg >7, platelet count >10K, >15K if febrile, or >50K if bleeding   - platelet count 69K  - continue to monitor  - Hep PF4 Ab negative    #DVT  -c/w heparin subQ for ppx  -serotonin release assay   -DVT U/S done     ID  #Leukocytosis  - WBC of 16K  - BCx w/ Gram pos cocci in clusters, coagulase negative-> repeat cultures negative  -c/w Vancomycin and Cefipime  - f/u vanc levels     ENDO  #Hypothyroidism  -Levothyroxine    Ethics  The patient is full code and a GOC discussion has been had with the family, MOLST form is up to date and has been filled.   ASSESSMENT/PLAN:  Mrs. 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     NEURO  #Altered Mental Status  -Patient is significantly altered compared to their baseline   -CT Head negative for stroke- Nonspecific symmetric dilatation of the bilateral superior ophthalmic veins with slight hyperattenuation on the right, concern for increased intracranial pressure vs cavernous carotid fistula vs right-sided superior ophthalmic vein   thrombosis  -Follow up Free T4, Total T3, and Ammonia normal   -Family deferred CTA and CTV head and neck  - Neuro checks Q4H-> neurology following, recs appreciated   -Try and wean FiO2 to the 80s  -Ceased non-essential oral medications(B12, Dilaudid, Oxycodone)     #Intubated  -No sedation   -monitor mental status during weaning     CARDIAC  #SVT  -RRT 7/6 and 7/8 for multifocal atrial tachycardia  patient was given adenosine on both events.  - s/p diltiazem gtt, will trial diltiazem 60 Q6 via OGT  - d/c dilt 60 Q6 iso reduced cardiac function  - consider amio instead for SVT managment  -Monitor Tele  -TTE showed moderate mitral and aortic regurgiation,  Bedside POCUS showed reduced EF    -Cardiology following, recs appreciated     #TAVR  -Mild aortic regurgitation as per MATTHIEU     #Shock of unknown etiology  -Septic vs Vasoplegic shock vs Cardiogenic  - POCUS showing severely reduced global LV systolic dysfunction with evidence of cardiogenic shock  - Infectious work up being done with BCx and CBC  - Patient is intubated for respiratory support   - on levophed,  vasopressin, and norepinephrine , these have been CAPPED   - On vanco and cefepime  - monitor UOP    PULM  # Pulmonary embolism.   -Pt diagnosed with RUL segmental PE in Aug 2022, now with possible new PE while on Eliquis 2.5 mg BID.  -Hold Eliquis due to bleeding risk, low HgB, and history of RP bleed   - Lower Ultrasound negative for DVT    #Malignant pleural effusion  - R chest tube to waterseal for significant R pleural effusion  - CT chest with large R pleural effusion, slight decrease from 6/21/23 w/ passive atelectasis of R upper and middle lobes, slightly increased small L pleural effusion. Extensive hepatic and peritoneal mets with large ascites  - per Heme, cytology from R pleural effusion shows atypical cells suspicious for malignancy.   - c/w water seal  - will flush chest tube    #Intubation  - c/w mechanical ventilation  - monitor VBGs/ABGs  - wean FiO2    RENAL  #[place]  -DDx: poor PO intake and 3rd spacing (hypoalbuminemia vs sepsis)  -Resolving, trend via CMP  - CT with mild to moderate R hydro likely secondary to CA  - monitor UOP    # GI   - vomited this AM  - s/p Zofran  - NPO for now given vomiting  - EKG had QT prolongation no Reglan   -CXR negative for ileus    -C.diff negative       HEME/ONC  #Stage 4 Endometrial cancer.   - Pt with history of stage 4 serous endometrial carcinoma (dx 5/2022) s/p chemo (last in 12/2022) and debulking surgery with POOL/BSO (2/10/23), now recently found to have recurrence of disease with new liver mets, the pt follows with [place] Alvarado of [facility]   - GOC discussion with pt and family given poor prognosis - pt is fullcode.    #Anemia  -Likely ACD transfuse if HgB<7  - per heme transfuse to maintain hg >7, platelet count >10K, >15K if febrile, or >50K if bleeding   - platelet count 41K, likely not due to HIT   - continue to monitor  - Hep PF4 Ab negative    #DVT  -c/w heparin subQ for ppx  -serotonin release assay-pending results   -DVT U/S done     ID  #Leukocytosis  - WBC of 16K  - BCx w/ Gram pos cocci in clusters, coagulase negative-> repeat cultures negative  -c/w Vancomycin and Cefipime  - f/u vanc levels     ENDO  #Hypothyroidism  -Levothyroxine    Ethics  The patient is full code and a GOC discussion has been had with the family, MOLST form is up to date and has been filled.

## 2023-07-12 NOTE — PROGRESS NOTE ADULT - SUBJECTIVE AND OBJECTIVE BOX
[facility] NEPHROLOGY- PROGRESS NOTE    88y Female with history of endometrial carcinoma presents with weakness. Nephrology consulted for elevated Scr.      REVIEW OF SYSTEMS: Unable to obtain as patient intubated.    No Known Allergies      Hospital Medications: Medications reviewed      VITALS:  T(F): 94.9 (07-12-23 @ 08:00), Max: 100.4 (07-11-23 @ 20:00)  HR: 113 (07-12-23 @ 11:00)  BP: --  RR: 23 (07-12-23 @ 11:00)  SpO2: 99% (07-12-23 @ 08:00)  Wt(kg): --    07-11 @ 07:01  -  07-12 @ 07:00  --------------------------------------------------------  IN: 1736.8 mL / OUT: 355 mL / NET: 1381.8 mL    07-12 @ 07:01  -  07-12 @ 12:38  --------------------------------------------------------  IN: 699.2 mL / OUT: 320 mL / NET: 379.2 mL        PHYSICAL EXAM:    Gen: Intubated and sedated  Cards: + tachycardia, +S1/S2, no M/G/R  Resp: Decreased BS @ R base, + R CT  GI: soft, NT/ND, NABS  : + brown  Vascular: + LE edema B/L, + RUE edema        LABS:  07-12    132<L>  |  97<L>  |  40<H>  ----------------------------<  113<H>  4.8   |  15<L>  |  2.00<H>    Ca    7.5<L>      12 Jul 2023 00:50  Phos  5.2     07-12  Mg     2.50     07-12    TPro  5.7<L>  /  Alb  2.6<L>  /  TBili  1.5<H>  /  DBili      /  AST  268<H>  /  ALT  64<H>  /  AlkPhos  100  07-12    Creatinine Trend: 2.00 <--, 1.71 <--, 1.77 <--, 1.68 <--, 1.68 <--, 1.89 <--, 1.97 <--, 2.07 <--, 1.92 <--, 1.88 <--, 1.76 <--, 1.84 <--, 1.89 <--                        8.4    16.71 )-----------( 41       ( 12 Jul 2023 00:50 )             25.1     Urine Studies:  Urinalysis Basic - ( 12 Jul 2023 00:50 )    Color:  / Appearance:  / SG:  / pH:   Gluc: 113 mg/dL / Ketone:   / Bili:  / Urobili:    Blood:  / Protein:  / Nitrite:    Leuk Esterase:  / RBC:  / WBC    Sq Epi:  / Non Sq Epi:  / Bacteria:       Sodium, Random Urine: <20 mmol/L (07-11 @ 14:20)  Potassium, Random Urine: 54.1 mmol/L (07-11 @ 14:20)  Chloride, Random Urine: <20 mmol/L (07-11 @ 14:20)  Creatinine, Random Urine: 111 mg/dL (07-11 @ 14:20)  Protein/Creatinine Ratio Calculation: 2.0 Ratio (07-11 @ 14:20)  Creatinine, Random Urine: 192 mg/dL (07-07 @ 13:00)  Sodium, Random Urine: <20 mmol/L (07-07 @ 13:00)

## 2023-07-12 NOTE — PROGRESS NOTE ADULT - SUBJECTIVE AND OBJECTIVE BOX
Patient is a 88y old  Female who presents with a chief complaint of Generalized weakness (12 Jul 2023 09:00)    Patient seen this afternoon, with many family members, including daughters, niece, and grandchildren at bedside.   Again discussed poor prognosis with family, however per family members, pt had always said she wanted all resuscitative measures, so they are trying to respect her wishes.    MEDICATIONS  (STANDING):  cefepime   IVPB 1000 milliGRAM(s) IV Intermittent daily  chlorhexidine 0.12% Liquid 15 milliLiter(s) Oral Mucosa every 12 hours  chlorhexidine 2% Cloths 1 Application(s) Topical daily  dextrose 50% Injectable 50 milliLiter(s) IV Push once  dextrose 50% Injectable 50 milliLiter(s) IV Push once  levothyroxine Injectable 56 MICROGram(s) IV Push at bedtime  misoprostol 200 MICROGram(s) Oral <User Schedule>  mupirocin 2% Ointment 1 Application(s) Topical <User Schedule>  norepinephrine Infusion 0.05 MICROgram(s)/kG/Min (2.81 mL/Hr) IV Continuous <Continuous>  pantoprazole  Injectable 40 milliGRAM(s) IV Push daily  petrolatum Ophthalmic Ointment 1 Application(s) Both EYES two times a day  phenylephrine    Infusion 0.1 MICROgram(s)/kG/Min (1.13 mL/Hr) IV Continuous <Continuous>  polyethylene glycol 3350 17 Gram(s) Oral two times a day  vasopressin Infusion 0.04 Unit(s)/Min (6 mL/Hr) IV Continuous <Continuous>    MEDICATIONS  (PRN):  acetaminophen     Tablet .. 650 milliGRAM(s) Oral every 6 hours PRN Temp greater or equal to 38C (100.4F), Mild Pain (1 - 3)  albuterol    90 MICROgram(s) HFA Inhaler 2 Puff(s) Inhalation every 6 hours PRN Shortness of Breath and/or Wheezing  senna 2 Tablet(s) Oral at bedtime PRN for constipation        Vital Signs Last 24 Hrs  T(C): 34.3 (12 Jul 2023 12:00), Max: 38 (11 Jul 2023 20:00)  T(F): 93.7 (12 Jul 2023 12:00), Max: 100.4 (11 Jul 2023 20:00)  HR: 77 (12 Jul 2023 14:00) (77 - 167)  BP: --  BP(mean): --  RR: 20 (12 Jul 2023 14:00) (14 - 28)  SpO2: 99% (12 Jul 2023 08:00) (69% - 100%)    Parameters below as of 12 Jul 2023 14:00  Patient On (Oxygen Delivery Method): ventilator    O2 Concentration (%): 100    PE  NAD  intubated, sedated   no rash grossly                             8.4    16.71 )-----------( 41       ( 12 Jul 2023 00:50 )             25.1       07-12    132<L>  |  97<L>  |  40<H>  ----------------------------<  113<H>  4.8   |  15<L>  |  2.00<H>    Ca    7.5<L>      12 Jul 2023 00:50  Phos  5.2     07-12  Mg     2.50     07-12    TPro  5.7<L>  /  Alb  2.6<L>  /  TBili  1.5<H>  /  DBili  x   /  AST  268<H>  /  ALT  64<H>  /  AlkPhos  100  07-12

## 2023-07-12 NOTE — PROGRESS NOTE ADULT - ASSESSMENT
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    1. Uterine carcinosarcoma   -- Under care of Dr. Alvarado at [facility] [place]  -- s/p neoadjuvant chemotherapy completed 12/2022, s/p POOL-BSO 02/10/2023   -- Recent imaging suggests disease recurrence  -- Prognosis is poor. She is not a candidate for further treatment.   -- Palliative care team following, ongoing [facility] discussions. She remains full code at this time.    2. Respiratory failure   -- CTA chest w/o PE; + large right pleural effusion, s/p pigtail catheter currently w minimal drainage   -- LE duplex without DVT   -- Cytology from R pleural effusion positive for malignant cells- metastatic adenocarcinoma favoring mullerian origin.  -- Repeat CT chest 07/08 with large right pleural effusion despite PTC   -- Currently intubated, sedated, on pressors     3. Anemia, thrombocytopenia   -- Likely secondary to malignancy, AOCD, sepsis   -- No evidence of iron, B12, or folate deficiencies.  -- plt count dropping in setting of severe illness. HIT ab neg   -- Monitor CBC and transfuse to maintain hg >7, platelet count >10K, >15K if febrile, or >50K if bleeding     4. AMS  -- Neurology following, appreciate recommendations. repeat CTH abnormal, recommended CTA, CTV head/neck however family declines. Recommending MRI, MRA head/neck when able.   -- Intubated, sedated in MICU  -- on empiric antibiotics, cultures NGTD    5. [place]   -- Renal US shows moderate R hydronephrosis, moderate to large ascites   -- Nephrology following, appreciate recs.   -- IR consulted for NT placement, no need for NT at this time.     Will continue to follow. [facility] discussions ongoing    Twyla Pitts PA-C  Hematology/Oncology  [facility]  [phone] (office)  [phone] (alt office)  Evenings and weekends please call MD on call or office

## 2023-07-12 NOTE — PROGRESS NOTE ADULT - NS_MD_PANP_GEN_ALL_CORE

## 2023-07-12 NOTE — PROVIDER CONTACT NOTE (HYPOGLYCEMIA EVENT) - NS PROVIDER CONTACT BACKGROUND-HYPO
Age: 88y    Gender: Female    POCT Blood Glucose:  <25 mg/dL (07-12-23 @ 13:41)  111 mg/dL (07-12-23 @ 00:28)  92 mg/dL (07-11-23 @ 18:03)      eMAR:  dextrose 50% Injectable   50 milliLiter(s) IV Push (07-12-23 @ 13:58)    levothyroxine Injectable   56 MICROGram(s) IV Push (07-11-23 @ 21:11)    vasopressin Infusion   6 mL/Hr IV Continuous (07-11-23 @ 19:59)

## 2023-07-12 NOTE — PROGRESS NOTE ADULT - NS ATTEND BILL GEN_ALL_CORE
Attending to bill

## 2023-07-12 NOTE — PROGRESS NOTE ADULT - NUTRITIONAL ASSESSMENT
This patient has been assessed with a concern for Malnutrition and has been determined to have a diagnosis/diagnoses of Severe protein-calorie malnutrition.    This patient is being managed with:   Diet Minced and Moist-  1000mL Fluid Restriction ([id number])  Supplement Feeding Modality:  Oral  Nepro Cans or Servings Per Day:  1       Frequency:  Three Times a day  Entered: Jun 26 2023  1:56PM  
This patient has been assessed with a concern for Malnutrition and has been determined to have a diagnosis/diagnoses of Severe protein-calorie malnutrition.    This patient is being managed with:   Diet NPO-  Entered: Jul 10 2023  9:34AM  
This patient has been assessed with a concern for Malnutrition and has been determined to have a diagnosis/diagnoses of Severe protein-calorie malnutrition.    This patient is being managed with:   Diet NPO-  Entered: Jul 10 2023  9:34AM  
This patient has been assessed with a concern for Malnutrition and has been determined to have a diagnosis/diagnoses of Severe protein-calorie malnutrition.    This patient is being managed with:   Diet Soft and Bite Sized-  DASH/TLC {Sodium & Cholesterol Restricted} (DASH)  Entered: Jun 21 2023  6:19AM  
This patient has been assessed with a concern for Malnutrition and has been determined to have a diagnosis/diagnoses of Severe protein-calorie malnutrition.    This patient is being managed with:   Diet NPO with Tube Feed-  Tube Feeding Modality: Orogastric  Nepro with Carb Steady (NEPRORTH)  Total Volume for 24 Hours (mL): 960  Continuous  Starting Tube Feed Rate {mL per Hour}: 10  Increase Tube Feed Rate by (mL): 10     Every 4 hours  Until Goal Tube Feed Rate (mL per Hour): 40  Tube Feed Duration (in Hours): 24  Tube Feed Start Time: 00:30  Entered: Jul 9 2023 12:15AM  
This patient has been assessed with a concern for Malnutrition and has been determined to have a diagnosis/diagnoses of Severe protein-calorie malnutrition.    This patient is being managed with:   Diet NPO-  Entered: Jul 10 2023  9:34AM  
This patient has been assessed with a concern for Malnutrition and has been determined to have a diagnosis/diagnoses of Severe protein-calorie malnutrition.    This patient is being managed with:   Diet Pureed-  Entered: Jul  3 2023  1:11PM  
This patient has been assessed with a concern for Malnutrition and has been determined to have a diagnosis/diagnoses of Severe protein-calorie malnutrition.    This patient is being managed with:   Diet NPO after Midnight-     NPO Start Date: 26-Jun-2023   NPO Start Time: 23:59  Except Medications  Entered: Jun 26 2023  6:21PM    Diet Minced and Moist-  1000mL Fluid Restriction ([id number])  Supplement Feeding Modality:  Oral  Nepro Cans or Servings Per Day:  1       Frequency:  Three Times a day  Entered: Jun 26 2023  1:56PM  
This patient has been assessed with a concern for Malnutrition and has been determined to have a diagnosis/diagnoses of Severe protein-calorie malnutrition.    This patient is being managed with:   Diet NPO-  Entered: Jul 10 2023  9:34AM  
This patient has been assessed with a concern for Malnutrition and has been determined to have a diagnosis/diagnoses of Severe protein-calorie malnutrition.    This patient is being managed with:   Diet Pureed-  Supplement Feeding Modality:  Oral  Ensure Enlive Cans or Servings Per Day:  1       Frequency:  Three Times a day  Entered: Jul 7 2023  5:46PM  
This patient has been assessed with a concern for Malnutrition and has been determined to have a diagnosis/diagnoses of Severe protein-calorie malnutrition.    This patient is being managed with:   Diet NPO-  Entered: Jul 10 2023  9:34AM  
This patient has been assessed with a concern for Malnutrition and has been determined to have a diagnosis/diagnoses of Severe protein-calorie malnutrition.    This patient is being managed with:   Diet NPO-  Except Medications  Entered: Jul 9 2023 12:28PM  
This patient has been assessed with a concern for Malnutrition and has been determined to have a diagnosis/diagnoses of Severe protein-calorie malnutrition.    This patient is being managed with:   Diet Minced and Moist-  1000mL Fluid Restriction ([id number])  Supplement Feeding Modality:  Oral  Nepro Cans or Servings Per Day:  1       Frequency:  Three Times a day  Entered: Jun 26 2023  1:56PM  
This patient has been assessed with a concern for Malnutrition and has been determined to have a diagnosis/diagnoses of Severe protein-calorie malnutrition.    This patient is being managed with:   Diet Minced and Moist-  1000mL Fluid Restriction ([id number])  Supplement Feeding Modality:  Oral  Nepro Cans or Servings Per Day:  1       Frequency:  Three Times a day  Entered: Jun 26 2023  1:56PM  
This patient has been assessed with a concern for Malnutrition and has been determined to have a diagnosis/diagnoses of Severe protein-calorie malnutrition.    This patient is being managed with:   Diet NPO-  Entered: Jul 5 2023  4:15PM  
This patient has been assessed with a concern for Malnutrition and has been determined to have a diagnosis/diagnoses of Severe protein-calorie malnutrition.    This patient is being managed with:   Diet Pureed-  Entered: Jul  3 2023  1:11PM  
This patient has been assessed with a concern for Malnutrition and has been determined to have a diagnosis/diagnoses of Severe protein-calorie malnutrition.    This patient is being managed with:   Diet Minced and Moist-  Supplement Feeding Modality:  Oral  Nepro Cans or Servings Per Day:  1       Frequency:  Three Times a day  Entered: Jun 25 2023 12:47PM  
This patient has been assessed with a concern for Malnutrition and has been determined to have a diagnosis/diagnoses of Severe protein-calorie malnutrition.    This patient is being managed with:   Diet Pureed-  Entered: Jul  3 2023  1:11PM  
This patient has been assessed with a concern for Malnutrition and has been determined to have a diagnosis/diagnoses of Severe protein-calorie malnutrition.    This patient is being managed with:   Diet Soft and Bite Sized-  DASH/TLC {Sodium & Cholesterol Restricted} (DASH)  Entered: Jun 21 2023  6:19AM  
This patient has been assessed with a concern for Malnutrition and has been determined to have a diagnosis/diagnoses of Severe protein-calorie malnutrition.    This patient is being managed with:   Diet Pureed-  Supplement Feeding Modality:  Oral  Ensure Enlive Cans or Servings Per Day:  1       Frequency:  Three Times a day  Entered: Jul 7 2023  5:46PM  
This patient has been assessed with a concern for Malnutrition and has been determined to have a diagnosis/diagnoses of Severe protein-calorie malnutrition.    This patient is being managed with:   Diet Minced and Moist-  1000mL Fluid Restriction ([id number])  Supplement Feeding Modality:  Oral  Nepro Cans or Servings Per Day:  1       Frequency:  Three Times a day  Entered: Jun 26 2023  1:56PM  
This patient has been assessed with a concern for Malnutrition and has been determined to have a diagnosis/diagnoses of Severe protein-calorie malnutrition.    This patient is being managed with:   Diet NPO after Midnight-     NPO Start Date: 26-Jun-2023   NPO Start Time: 23:59  Except Medications  Entered: Jun 26 2023  6:21PM    Diet Minced and Moist-  1000mL Fluid Restriction ([id number])  Supplement Feeding Modality:  Oral  Nepro Cans or Servings Per Day:  1       Frequency:  Three Times a day  Entered: Jun 26 2023  1:56PM  
This patient has been assessed with a concern for Malnutrition and has been determined to have a diagnosis/diagnoses of Severe protein-calorie malnutrition.    This patient is being managed with:   Diet NPO-  Entered: Jul 5 2023  4:15PM  
This patient has been assessed with a concern for Malnutrition and has been determined to have a diagnosis/diagnoses of Severe protein-calorie malnutrition.    This patient is being managed with:   Diet NPO after Midnight-     NPO Start Date: 26-Jun-2023   NPO Start Time: 23:59  Except Medications  Entered: Jun 26 2023  6:21PM    Diet Minced and Moist-  1000mL Fluid Restriction ([id number])  Supplement Feeding Modality:  Oral  Nepro Cans or Servings Per Day:  1       Frequency:  Three Times a day  Entered: Jun 26 2023  1:56PM  
This patient has been assessed with a concern for Malnutrition and has been determined to have a diagnosis/diagnoses of Severe protein-calorie malnutrition.    This patient is being managed with:   Diet Soft and Bite Sized-  DASH/TLC {Sodium & Cholesterol Restricted} (DASH)  Entered: Jun 21 2023  6:19AM  
This patient has been assessed with a concern for Malnutrition and has been determined to have a diagnosis/diagnoses of Severe protein-calorie malnutrition.    This patient is being managed with:   Diet NPO-  Entered: Jul 2 2023  1:50PM

## 2023-07-12 NOTE — PROGRESS NOTE ADULT - SUBJECTIVE AND OBJECTIVE BOX
INTERVAL HPI/OVERNIGHT EVENTS:    SUBJECTIVE: Patient seen and examined at bedside.     CONSTITUTIONAL: No weakness, fevers or chills  EYES/ENT: No visual changes;  No vertigo or throat pain   NECK: No pain or stiffness  RESPIRATORY: No cough, wheezing, hemoptysis; No shortness of breath  CARDIOVASCULAR: No chest pain or palpitations  GASTROINTESTINAL: No abdominal or epigastric pain. No nausea, vomiting, or hematemesis; No diarrhea or constipation. No melena or hematochezia.  GENITOURINARY: No dysuria, frequency or hematuria  NEUROLOGICAL: No numbness or weakness  SKIN: No itching, rashes    OBJECTIVE:    VITAL SIGNS:  ICU Vital Signs Last 24 Hrs  T(C): 37.2 (12 Jul 2023 04:00), Max: 38 (11 Jul 2023 20:00)  T(F): 99 (12 Jul 2023 04:00), Max: 100.4 (11 Jul 2023 20:00)  HR: 153 (12 Jul 2023 06:16) (108 - 167)  BP: --  BP(mean): --  ABP: 54/39 (12 Jul 2023 04:00) (54/39 - 114/59)  ABP(mean): 45 (12 Jul 2023 04:00) (45 - 80)  RR: 28 (12 Jul 2023 04:00) (0 - 28)  SpO2: 90% (12 Jul 2023 04:00) (69% - 100%)    O2 Parameters below as of 12 Jul 2023 04:00  Patient On (Oxygen Delivery Method): ventilator    O2 Concentration (%): 30      Mode: AC/ CMV (Assist Control/ Continuous Mandatory Ventilation), RR (machine): 20, TV (machine): 320, FiO2: 100, PEEP: 5, ITime: 0.85, MAP: 10, PIP: 24    07-10 @ 07:01  -  07-11 @ 07:00  --------------------------------------------------------  IN: 1825.5 mL / OUT: 610 mL / NET: 1215.5 mL    07-11 @ 07:01  -  07-12 @ 06:33  --------------------------------------------------------  IN: 860.8 mL / OUT: 175 mL / NET: 685.8 mL      CAPILLARY BLOOD GLUCOSE      POCT Blood Glucose.: 111 mg/dL (12 Jul 2023 00:28)      PHYSICAL EXAM:    General: NAD  HEENT: NC/AT; PERRL, clear conjunctiva  Neck: supple  Respiratory: CTA b/l  Cardiovascular: +S1/S2; RRR  Abdomen: soft, NT/ND; +BS x4  Extremities: WWP, 2+ peripheral pulses b/l; no LE edema  Skin: normal color and turgor; no rash  Neurological:    MEDICATIONS:  MEDICATIONS  (STANDING):  atorvastatin 80 milliGRAM(s) Oral at bedtime  cefepime   IVPB 1000 milliGRAM(s) IV Intermittent daily  chlorhexidine 0.12% Liquid 15 milliLiter(s) Oral Mucosa every 12 hours  chlorhexidine 2% Cloths 1 Application(s) Topical daily  cholecalciferol 2000 Unit(s) Oral daily  cyanocobalamin 1000 MICROGram(s) Oral daily  levothyroxine Injectable 56 MICROGram(s) IV Push at bedtime  loratadine 10 milliGRAM(s) Oral daily  misoprostol 200 MICROGram(s) Oral <User Schedule>  multivitamin 1 Tablet(s) Oral daily  mupirocin 2% Ointment 1 Application(s) Topical <User Schedule>  norepinephrine Infusion 0.05 MICROgram(s)/kG/Min (2.81 mL/Hr) IV Continuous <Continuous>  pantoprazole  Injectable 40 milliGRAM(s) IV Push daily  petrolatum Ophthalmic Ointment 1 Application(s) Both EYES two times a day  phenylephrine    Infusion 0.1 MICROgram(s)/kG/Min (1.13 mL/Hr) IV Continuous <Continuous>  polyethylene glycol 3350 17 Gram(s) Oral two times a day  vasopressin Infusion 0.04 Unit(s)/Min (6 mL/Hr) IV Continuous <Continuous>    MEDICATIONS  (PRN):  acetaminophen     Tablet .. 650 milliGRAM(s) Oral every 6 hours PRN Temp greater or equal to 38C (100.4F), Mild Pain (1 - 3)  albuterol    90 MICROgram(s) HFA Inhaler 2 Puff(s) Inhalation every 6 hours PRN Shortness of Breath and/or Wheezing  HYDROmorphone  Injectable 0.2 milliGRAM(s) IV Push every 4 hours PRN Severe Pain (7 - 10)  lactulose Syrup 10 Gram(s) Oral daily PRN constipation  oxyCODONE    IR 5 milliGRAM(s) Oral every 4 hours PRN Moderate Pain (4 - 6)  senna 2 Tablet(s) Oral at bedtime PRN for constipation      ALLERGIES:  Allergies    No Known Allergies    Intolerances        LABS:                        8.4    16.71 )-----------( 41       ( 12 Jul 2023 00:50 )             25.1     07-12    132<L>  |  97<L>  |  40<H>  ----------------------------<  113<H>  4.8   |  15<L>  |  2.00<H>    Ca    7.5<L>      12 Jul 2023 00:50  Phos  5.2     07-12  Mg     2.50     07-12    TPro  5.7<L>  /  Alb  2.6<L>  /  TBili  1.5<H>  /  DBili  x   /  AST  268<H>  /  ALT  64<H>  /  AlkPhos  100  07-12    PT/INR - ( 12 Jul 2023 00:50 )   PT: 15.4 sec;   INR: 1.32 ratio         PTT - ( 12 Jul 2023 00:50 )  PTT:50.9 sec  Urinalysis Basic - ( 12 Jul 2023 00:50 )    Color: x / Appearance: x / SG: x / pH: x  Gluc: 113 mg/dL / Ketone: x  / Bili: x / Urobili: x   Blood: x / Protein: x / Nitrite: x   Leuk Esterase: x / RBC: x / WBC x   Sq Epi: x / Non Sq Epi: x / Bacteria: x        RADIOLOGY & ADDITIONAL TESTS: Reviewed. INTERVAL HPI/OVERNIGHT EVENTS: Overnight pressors were capped.     SUBJECTIVE: Patient seen and examined at bedside. No subjective history elicited from the patient.     CONSTITUTIONAL: No weakness, fevers or chills  EYES/ENT: No visual changes;  No vertigo or throat pain   NECK: No pain or stiffness  RESPIRATORY: No cough, wheezing, hemoptysis; No shortness of breath  CARDIOVASCULAR: No chest pain or palpitations  GASTROINTESTINAL: No abdominal or epigastric pain. No nausea, vomiting, or hematemesis; No diarrhea or constipation. No melena or hematochezia.  GENITOURINARY: No dysuria, frequency or hematuria  NEUROLOGICAL: No numbness or weakness  SKIN: No itching, rashes    OBJECTIVE:    VITAL SIGNS:  ICU Vital Signs Last 24 Hrs  T(C): 37.2 (12 Jul 2023 04:00), Max: 38 (11 Jul 2023 20:00)  T(F): 99 (12 Jul 2023 04:00), Max: 100.4 (11 Jul 2023 20:00)  HR: 153 (12 Jul 2023 06:16) (108 - 167)  BP: --  BP(mean): --  ABP: 54/39 (12 Jul 2023 04:00) (54/39 - 114/59)  ABP(mean): 45 (12 Jul 2023 04:00) (45 - 80)  RR: 28 (12 Jul 2023 04:00) (0 - 28)  SpO2: 90% (12 Jul 2023 04:00) (69% - 100%)    O2 Parameters below as of 12 Jul 2023 04:00  Patient On (Oxygen Delivery Method): ventilator    O2 Concentration (%): 30      Mode: AC/ CMV (Assist Control/ Continuous Mandatory Ventilation), RR (machine): 20, TV (machine): 320, FiO2: 100, PEEP: 5, ITime: 0.85, MAP: 10, PIP: 24    07-10 @ 07:01  -  07-11 @ 07:00  --------------------------------------------------------  IN: 1825.5 mL / OUT: 610 mL / NET: 1215.5 mL    07-11 @ 07:01  -  07-12 @ 06:33  --------------------------------------------------------  IN: 860.8 mL / OUT: 175 mL / NET: 685.8 mL      CAPILLARY BLOOD GLUCOSE      POCT Blood Glucose.: 111 mg/dL (12 Jul 2023 00:28)      PHYSICAL EXAM:    General: NAD  HEENT: NC/AT  Cardiovascular:; RRR  Abdomen: firm abdomen   Extremities: WWP  Skin: normal color and turgor    MEDICATIONS:  MEDICATIONS  (STANDING):  atorvastatin 80 milliGRAM(s) Oral at bedtime  cefepime   IVPB 1000 milliGRAM(s) IV Intermittent daily  chlorhexidine 0.12% Liquid 15 milliLiter(s) Oral Mucosa every 12 hours  chlorhexidine 2% Cloths 1 Application(s) Topical daily  cholecalciferol 2000 Unit(s) Oral daily  cyanocobalamin 1000 MICROGram(s) Oral daily  levothyroxine Injectable 56 MICROGram(s) IV Push at bedtime  loratadine 10 milliGRAM(s) Oral daily  misoprostol 200 MICROGram(s) Oral <User Schedule>  multivitamin 1 Tablet(s) Oral daily  mupirocin 2% Ointment 1 Application(s) Topical <User Schedule>  norepinephrine Infusion 0.05 MICROgram(s)/kG/Min (2.81 mL/Hr) IV Continuous <Continuous>  pantoprazole  Injectable 40 milliGRAM(s) IV Push daily  petrolatum Ophthalmic Ointment 1 Application(s) Both EYES two times a day  phenylephrine    Infusion 0.1 MICROgram(s)/kG/Min (1.13 mL/Hr) IV Continuous <Continuous>  polyethylene glycol 3350 17 Gram(s) Oral two times a day  vasopressin Infusion 0.04 Unit(s)/Min (6 mL/Hr) IV Continuous <Continuous>    MEDICATIONS  (PRN):  acetaminophen     Tablet .. 650 milliGRAM(s) Oral every 6 hours PRN Temp greater or equal to 38C (100.4F), Mild Pain (1 - 3)  albuterol    90 MICROgram(s) HFA Inhaler 2 Puff(s) Inhalation every 6 hours PRN Shortness of Breath and/or Wheezing  HYDROmorphone  Injectable 0.2 milliGRAM(s) IV Push every 4 hours PRN Severe Pain (7 - 10)  lactulose Syrup 10 Gram(s) Oral daily PRN constipation  oxyCODONE    IR 5 milliGRAM(s) Oral every 4 hours PRN Moderate Pain (4 - 6)  senna 2 Tablet(s) Oral at bedtime PRN for constipation      ALLERGIES:  Allergies    No Known Allergies    Intolerances        LABS:                        8.4    16.71 )-----------( 41       ( 12 Jul 2023 00:50 )             25.1     07-12    132<L>  |  97<L>  |  40<H>  ----------------------------<  113<H>  4.8   |  15<L>  |  2.00<H>    Ca    7.5<L>      12 Jul 2023 00:50  Phos  5.2     07-12  Mg     2.50     07-12    TPro  5.7<L>  /  Alb  2.6<L>  /  TBili  1.5<H>  /  DBili  x   /  AST  268<H>  /  ALT  64<H>  /  AlkPhos  100  07-12    PT/INR - ( 12 Jul 2023 00:50 )   PT: 15.4 sec;   INR: 1.32 ratio         PTT - ( 12 Jul 2023 00:50 )  PTT:50.9 sec  Urinalysis Basic - ( 12 Jul 2023 00:50 )    Color: x / Appearance: x / SG: x / pH: x  Gluc: 113 mg/dL / Ketone: x  / Bili: x / Urobili: x   Blood: x / Protein: x / Nitrite: x   Leuk Esterase: x / RBC: x / WBC x   Sq Epi: x / Non Sq Epi: x / Bacteria: x        RADIOLOGY & ADDITIONAL TESTS: Reviewed.

## 2023-07-12 NOTE — DISCHARGE NOTE FOR THE EXPIRED PATIENT - HOSPITAL COURSE
Mrs. Shani Bright is a 88 year old female with a PMH of stage 4 serous endometrial carcinoma (dx 5/2022) s/p chemo (12/2022) and debulking surgery with POOL/BSO (2/10/23), RUL segmental PE (8/2022, on Eliquis), GI bleed (4/2023), CAD s/p stent (3/2021), aortic stenosis s/p TAVR, HTN, HLD, and hypothyroidism who initially presented to the hospital on 6/20/23 for a 3 wk history of generalized weakness.    During the patients hospital interval they were started on AC but that was then stopped because the patient had a RP bleed. The patient has also had an [place], leukocytosis, R PL effusion, and potential PE during their hospitalization stay. The patient also had several rapid responses called: 7/3 and 7/6 for AMS and 7/7 for SVT( ) which were resolved with adenosine. The patient also had a rapid respond call on 7/8 where the patient was significantly altered compared to their baseline and was found to be unresponsive with tachycardia (), the patient was again given adenosine and then subsequently intubated . The patient was then intubated and worked up in the ICU for their altered mental status. During their stay we gave pressors and antibiotics. We also evaluated the patient for potential metabolic and endocrine derangements. On 7/12/23 at 1642 the patient passed away.

## 2023-07-12 NOTE — PROGRESS NOTE ADULT - REASON FOR ADMISSION
Generalized weakness

## 2023-07-12 NOTE — PROGRESS NOTE ADULT - ASSESSMENT
88y Female with history of endometrial carcinoma presents with weakness. Nephrology consulted for elevated Scr.    1) [place]: initially resolved now with recurrent [place] suspect due to decreased EABV/ATN. Scr increasing with poor UO due to hypotension. Repeat UA. FeNa low. CT with mild to moderate R hydro likely secondary to CA for which patient would need intervention if within GOC however I do not believe current [place] due to unilateral hydronephrosis. TMA work up negative. Avoid nephrotoxins. Monitor electrolytes.    2) Hypotension: BP low. Pressors as per ICU.    3) LE edema: Due to hypoalbuminemia improved s/p IV albumin. Can give additional diuretics if within GOC. TTE with severe global LVSD. Monitor UO.    4) Hyponatremia: Due to volume overload. Can diurese as above. Monitor serum Na.      POOR PROGNOSIS.    [facility]  Brennon Cates M.D.  Addison Nieto D.O.  Guerline Dias M.D.  Merissa Parsons, GRACIA, ANP-C    Telephone: [phone]  Facsimile: [phone]    [address] [place] [address]

## 2023-07-12 NOTE — PROGRESS NOTE ADULT - ATTENDING COMMENTS
Fevers, leukocytosis, SIRS, R sided pleural effusion s/p R chest tube     Recommend:  -No clear source - has occasional dry cough, no other complaints, ?recurrence of disease    -F/u R pleural effusion fluid cx   -F/u blood cx, MRSA PCR  -Check sputum cx if pt able to expectorate   -CT A/P pending   -Monitor fever curve  -Trend WBC    -Continue Zosyn
Patient is a 87 yo F w/ Stage 4 serous Endometrial carcinoma (dx 5/2022) s/p chemo (12/2022) s/p POOL/BSO (2/10/23), PE (8/2022, on Eliquis), GI bleed (4/2023), CAD s/p stent (3/2021), AS s/p TAVR, HTN, HLD, and hypothyroidism who was initially admitted on 6/20 after p/w weakness. Hospital course c/b RP bleed now off AC, malignant pleural effusion, several RRTs for AMS on 7/3 and 7/6 as well as for SVT on 7/7. Patient transferred to MICU after RRT for SVT and AMS again on 7/8.    #AMS - Unclear etiology. Recurrent this hospital stay. CTH on arrival to MICU with nonspecific symmetric dilatation of b/l superior ophthalmic veins. Possible sign of increased ICP  #Encephalopathy  - Order CTV and CTA head as per discussion with neuro, but family declining contrast  - EEG negative for seizures thus far  - Wean sedation    #Acute respiratory failure - Intubated for airway protection, suspect possible hypercapnia as well in setting of unresponsiveness  - c/w mechanical ventilatory support  - Monitor blood gases    #Shock - Mixed shock. Likely vasoplegic and now cardiogenic with POCUS this AM with new severely reduced global LV systolic dysfunction  #Sepsis - Now with GPC bacteremia  #Coag negative Staph bacteremia  - Will c/w empiric abx with Vanc/Cefepime for now  - f/u repeat blood cultures  - f/u MRSA PCR  - c/w vasopressors, wean as tolerated    #SVT  - d/bahman cardizem given new LV systolic failure  - Monitor on tele    #Malignant pleural effusion - Continued output from chest tube  - c/w water seal  - Will flush chest tube daily  - Likely trapped lung if no improvement with drainage    #[place]  - Monitor creatinine and UOP  - Will check urine lytes, urea  - Trial  cc bolus given decreased UOP    #Diarrhea  - f/u GI PCR and Cdiff    #Dysphagia - on LIWS  - Clamp trial    #Anemia  #Thrombocytopenia - Lower suspicion for HIT, HIT ab negative  #DVT ppx  - WIll check ANDRA  - Trend plts  - f/u official LE duplex  - Unable to tolerate full AC due to RP bleed earlier  - c/w HSQ for now    #GOC - FUll code    John Cotto MD  Pulmonary & Critical Care
Patient is a 89 yo F w/ Stage 4 serous Endometrial carcinoma (dx 5/2022) s/p chemo (12/2022) s/p POOL/BSO (2/10/23), PE (8/2022, on Eliquis), GI bleed (4/2023), CAD s/p stent (3/2021), AS s/p TAVR, HTN, HLD, and hypothyroidism who was initially admitted on 6/20 after p/w weakness. Hospital course c/b RP bleed now off AC, malignant pleural effusion, several RRTs for AMS on 7/3 and 7/6 as well as for SVT on 7/7. Patient transferred to MICU after RRT for SVT and AMS again on 7/8.    #AMS - Unclear etiology. Recurrent this hospital stay. CTH on arrival to MICU with nonspecific symmetric dilatation of b/l superior ophthalmic veins. Possible sign of increased ICP  #Encephalopathy  - Will order CTV and CTA head as per discussion with neuro    #Acute respiratory failure - Intubated for airway protection, suspect possible hypercapnia as well in setting of unresponsiveness  - c/w mechanical ventilatory support  - Monitor blood gases    #Shock - Mixed shock. Likely vasoplegic and now cardiogenic with POCUS this AM with new severely reduced global LV systolic dysfunction  #Sepsis - Now with GPC bacteremia  #GPC bacteremia  - Will c/w empiric abx with Vanc/Cefepime for now  - Repeat TTE for formal reeval of cardiac function and to eval for vegetations  - c/w vasopressors, wean as tolerated    #SVT  - Will d/c cardizem given new LV systolic failure. May need Amio instead if SVT again  - Monitor on tele    #Malignant pleural effusion - Minimal output from chest tube  - c/w water seal  - Will flush chest tube  - Likely trapped lung if no improvement with drainage    #[place]  - Monitor creatinine and UOP  - Bumex 2mg x1    #Anemia  #DVT ppx  - Unable to tolerate full AC due to RP bleed earlier  - c/w HSQ      #GOC - FUll code    John Cotto MD  Pulmonary & Critical Care
88 year old woman with stage 4 endometrial carcinoma ( mets to liver and omental caking) s/p chemo, RUL segmental PE, GI bleed, CAD s/p stent, aortic stenosis s/p TAVR, HTN, HLD, and hypothyroidism transferred to the ICU after intubation for alteration in mental status. Worsening shock state overnight on multiple vasopressors now capped family at bedside    - OGT remains on suction patient likely with ileus   - [place] likely ATN secondary to shock mildly improved  - continue ABx  - continue vent support  - extensive endometrial cancer with malignant pleural effusion with chest tube that continues to drain     prognosis guarded
88 year old woman with stage 4 endometrial carcinoma ( mets to liver and omental caking) s/p chemo, RUL segmental PE, GI bleed, CAD s/p stent, aortic stenosis s/p TAVR, HTN, HLD, and hypothyroidism transferred to the ICU after intubation for alteration in mental status.      - sedated for comfort and vent coordination, will decrease sedation to assess mental status  - findings on CTH concerning for possible rise in intracranial pressure, neuro recommended CTA and CTV for clarification, family declining study with contrast at this time as the patient has impaired renal function  - OGT to suction secondary to vomiting and increased residuals, will get abdominal x-ray to rule out obstruction if negative and QTc is WNL will start Reglan for Gi motility  - [place] likely ATN secondary to shock mildly improved  - on broad spectrum ABx follow up cultures  - shock combination of septic and cardiogenic wean vasopressors as tolerated to keep MAP>65  - continue vent support, periods of apnea on SBT today  - extensive endometrial cancer with malignant pleural effusion with chest tube that continues to drain  - episodes of SVT ? if secondary to pressors will wean down pressors as tolerated     prognosis guarded
Ms. Bright is an 87 yo woman with probable toxic metabolic septic encephalopathy.  I agree with work up and management as above.   Thank you.

## 2023-07-14 LAB
CULTURE RESULTS: SIGNIFICANT CHANGE UP
SPECIMEN SOURCE: SIGNIFICANT CHANGE UP

## 2023-07-22 LAB
CULTURE RESULTS: SIGNIFICANT CHANGE UP
SPECIMEN SOURCE: SIGNIFICANT CHANGE UP

## 2023-07-26 LAB
UNFRACTIONATED HEPARIN INTERPRETATION: SIGNIFICANT CHANGE UP
UNFRACTIONATED HEPARIN INTERPRETATION: SIGNIFICANT CHANGE UP
UNFRACTIONATED HEPARIN RESULT: NEGATIVE — SIGNIFICANT CHANGE UP
UNFRACTIONATED HEPARIN RESULT: NEGATIVE — SIGNIFICANT CHANGE UP
UNFRACTIONATED HEPARIN-HIGH DOSE: 0 % — SIGNIFICANT CHANGE UP
UNFRACTIONATED HEPARIN-HIGH DOSE: 0 % — SIGNIFICANT CHANGE UP
UNFRACTIONATED HEPARIN-LOW DOSE: 0 % — SIGNIFICANT CHANGE UP
UNFRACTIONATED HEPARIN-LOW DOSE: 6 % — SIGNIFICANT CHANGE UP

## 2023-08-22 LAB — PYRIDOXAL PHOS SERPL-MCNC: 10.2 UG/L — SIGNIFICANT CHANGE UP (ref 3.4–65.2)

## 2023-11-24 NOTE — PROGRESS NOTE ADULT - REASON FOR ADMISSION
syncope
syncope
• MEDICATION(S) REQUESTED:               • LAST OFFICE VISIT: 02/16/2023    Current Outpatient Medications   Medication Sig Dispense Refill   • pregabalin (LYRICA) 75 MG capsule Take 1 capsule by mouth in the morning and 1 capsule in the evening. 60 capsule 3   • oxyCODONE-acetaminophen (Percocet) 5-325 MG per tablet Take 1 tablet by mouth nightly as needed for Pain. 20 tablet 0   • albuterol 108 (90 Base) MCG/ACT inhaler Inhale 2 puffs into the lungs.       • fluticasone (FLONASE) 50 MCG/ACT nasal spray Spray 1 spray in each nostril.       • aspirin (ECOTRIN) 81 MG EC tablet Take 81 mg by mouth daily.       • ipratropium (ATROVENT) 0.03 % nasal spray USE 2 SPRAYS IN EACH NOSTRIL EVERY 12 HOURS       • mometasone (NASONEX) 50 MCG/ACT nasal spray         • meclizine (ANTIVERT) 25 MG tablet TAKE 1 TABLET BY MOUTH THREE TIMES DAILY X 5 DAYS AS NEEDED FOR DIZZINESS       • oxyCODONE-acetaminophen (Percocet) 5-325 MG per tablet Take 1-2 tablets by mouth every 4 hours as needed for Pain. 50 tablet 0   • naLOXone (NARCAN) 4 MG/0.1ML nasal spray Spray the content of 1 device into 1 nostril. Call 911. May repeat with 2nd device in alternate nostril if no response in 2-3 minutes. 2 each 0   • metFORMIN (GLUCOPHAGE) 500 MG tablet Take 500 mg by mouth 2 times daily.       • lisinopril (ZESTRIL) 20 MG tablet Take 20 mg by mouth daily.       • Blood Glucose Monitoring Suppl (ONE TOUCH ULTRA 2) w/Device Kit TEST BLOOD SUGAR ONCE DAILY       • blood glucose test strip TEST ONCE DAILY       • Lancets (ONETOUCH DELICA PLUS [id number]) Misc TEST ONCE DAILY       • cyclobenzaprine (FLEXERIL) 5 MG tablet Take 1 tablet by mouth 3 times daily as needed for Muscle spasms. 30 tablet 0   • loratadine (CLARITIN) 10 MG tablet Take 10 mg by mouth daily as needed.       • omeprazole (PrilOSEC) 20 MG capsule 1 CAPSULE DAILY          No current facility-administered medications for this visit.               • FU OFFICE VISIT or FU PROCEDURE 
VISIT:    • PDMP REVIEWED: NO UNEXPECTED ACTIVITY NOTED           • DISPENSE REPORT: NOT APPLICABLE FOR THIS REFILL REQUEST     • LABS:  NO ABNORMAL LABS TO REPORT             • LAST UDS:NOT APPLICABLE FOR THIS REFILL REQUEST.     • Refill(s)  has been sent for PROVIDER to review and sign.         
syncope

## 2024-03-28 NOTE — PATIENT PROFILE ADULT - FALL HARM RISK - CONCLUSION
The physician has confirmed that the patient has been reassessed and is appropriate for moderate sedation Fall with Harm Risk

## 2024-04-17 NOTE — PROGRESS NOTE ADULT - SUBJECTIVE AND OBJECTIVE BOX
[facility] NEPHROLOGY- PROGRESS NOTE    88y Female with history of endometrial carcinoma presents with weakness. Nephrology consulted for elevated Scr.    RRT called this morning for AMS s/p intubation for airway protection.    REVIEW OF SYSTEMS: Unable to obtain as patient intubated.    No Known Allergies      Hospital Medications: Medications reviewed      VITALS:  T(F): 98.6 (07-08-23 @ 12:00), Max: 98.7 (07-08-23 @ 01:20)  HR: 103 (07-08-23 @ 15:25)  BP: 126/60 (07-08-23 @ 15:00)  RR: 22 (07-08-23 @ 15:00)  SpO2: 100% (07-08-23 @ 15:25)  Wt(kg): --    07-07 @ 07:01  -  07-08 @ 07:00  --------------------------------------------------------  IN: 100 mL / OUT: 300 mL / NET: -200 mL        PHYSICAL EXAM:    Gen: Intubated and sedated  Cards: + tachycardia, +S1/S2, no M/G/R  Resp: Decreased BS @ R base, + R CT  GI: soft, NT/ND, NABS  : + brown  Vascular: no LE edema B/L        LABS:  07-08    134<L>  |  95<L>  |  33<H>  ----------------------------<  166<H>  4.4   |  22  |  1.97<H>    Ca    8.7      08 Jul 2023 13:51  Phos  3.1     07-08  Mg     2.10     07-08    TPro  6.5  /  Alb  4.6  /  TBili  1.4<H>  /  DBili      /  AST  98<H>  /  ALT  29  /  AlkPhos  71  07-08    Creatinine Trend: 1.97 <--, 2.07 <--, 1.92 <--, 1.88 <--, 1.76 <--, 1.84 <--, 1.89 <--, 1.48 <--, 1.02 <--, 1.00 <--, 0.97 <--, 1.05 <--, 1.08 <--                        7.1    14.93 )-----------( 80       ( 08 Jul 2023 13:51 )             21.1     Urine Studies:  Urinalysis Basic - ( 08 Jul 2023 13:51 )    Color:  / Appearance:  / SG:  / pH:   Gluc: 166 mg/dL / Ketone:   / Bili:  / Urobili:    Blood:  / Protein:  / Nitrite:    Leuk Esterase:  / RBC:  / WBC    Sq Epi:  / Non Sq Epi:  / Bacteria:       Creatinine, Random Urine: 192 mg/dL (07-07 @ 13:00)  Sodium, Random Urine: <20 mmol/L (07-07 @ 13:00)  Sodium, Random Urine: 36 mmol/L (07-03 @ 12:26)        < from: Xray Chest 1 View- PORTABLE-Urgent (Xray Chest 1 View- PORTABLE-Urgent .) (07.08.23 @ 13:56) >    IMPRESSION:  Status post intubation and enteric tube placement.  Bilateral pleural effusions with right-sided chest tube.    --- End of Report ---    < end of copied text >      < from: CT Chest No Cont (07.08.23 @ 11:50) >  IMPRESSION:  Right chest pigtail catheter in place within a large right pleural   effusion only slightly decreased from 6/21/2023, with associated passive   atelectasis of the right upper and middle lobes. Slightly increased small   left pleural effusion with partial passive atelectasis of the left lower   lobe.    Extensive hepatic and peritoneal metastatic disease with large ascites.   Borderline mediastinal and enlarged right supraclavicular lymph nodes.    --- End of Report ---      < end of copied text >        < from: CT Head No Cont (07.08.23 @ 11:50) >  IMPRESSION:  No acute intracranial hemorrhage,mass effect or midline shift.    Nonspecific symmetric dilatation of the bilateral superior ophthalmic   veins with slight hyperattenuation on the right. Findings can be seen   with raised intracranial pressure amongst other possible etiologies. The   possibility of the cavernous carotid fistula cannot be completely   excluded. The presence of a right-sided superior ophthalmic vein   thrombosis cannot also be excluded. Imaging findings are new when   compared to the prior exams. Consider further evaluation with a CT   angiogram study of the head and neck.    --- End of Report ---      < end of copied text >     Discharged

## 2024-08-27 NOTE — DIETITIAN NUTRITION RISK NOTIFICATION - MALNUTRITION EVALUATION AS DEMONSTRATED BY (ADULTS > 20 YEARS OF AGE)
Patient called back and rescheduled procedure to 09/19@ 8am , welcome letter sent via mail     Inadequate energy intake.../Loss of muscle...

## 2024-10-22 NOTE — DIETITIAN INITIAL EVALUATION ADULT - WEIGHT CHANGE
Medication: Mounjaro     Dosage: 12.5     Sig:    Dispense Quantity:    Refills:     Pharmacy: [facility]    LOV: 9/20/24    NOV: 12/19/24    
Patient called back was upset did not receive the correct dosage  of medication please resend to [facility] in [place] for the 12.5 mg  
pended  
yes

## (undated) DEVICE — BASIN EMESIS 10IN GRADUATED MAUVE

## (undated) DEVICE — PACK IV START WITH CHG

## (undated) DEVICE — LINE BREATHE SAMPLNG

## (undated) DEVICE — BIOPSY FORCEP COLD DISP

## (undated) DEVICE — BIOPSY FORCEP RADIAL JAW 4 STANDARD WITH NEEDLE

## (undated) DEVICE — LUBRICATING JELLY HR ONE SHOT 3G

## (undated) DEVICE — SALIVA EJECTOR (BLUE)

## (undated) DEVICE — DRSG CURITY GAUZE SPONGE 4 X 4" 12-PLY NON-STERILE

## (undated) DEVICE — ELCTR ECG CONDUCTIVE ADHESIVE

## (undated) DEVICE — DRSG BANDAID 0.75X3"

## (undated) DEVICE — CATH IV SAFE BC 22G X 1" (BLUE)

## (undated) DEVICE — DRSG 2X2

## (undated) DEVICE — TUBING IV SET GRAVITY 3Y 100" MACRO

## (undated) DEVICE — TUBING SUCTION NONCONDUCTIVE 6MM X 12FT

## (undated) DEVICE — ELCTR GROUNDING PAD ADULT COVIDIEN

## (undated) DEVICE — CONTAINER FORMALIN 80ML YELLOW

## (undated) DEVICE — TUBING MEDI-VAC W MAXIGRIP CONNECTORS 1/4"X6'

## (undated) DEVICE — GOWN LG